# Patient Record
Sex: FEMALE | Race: BLACK OR AFRICAN AMERICAN | NOT HISPANIC OR LATINO | Employment: OTHER | URBAN - METROPOLITAN AREA
[De-identification: names, ages, dates, MRNs, and addresses within clinical notes are randomized per-mention and may not be internally consistent; named-entity substitution may affect disease eponyms.]

---

## 2017-01-01 ENCOUNTER — TELEPHONE (OUTPATIENT)
Dept: INFECTIOUS DISEASES | Facility: CLINIC | Age: 57
End: 2017-01-01

## 2017-01-01 ENCOUNTER — ANESTHESIA EVENT (OUTPATIENT)
Dept: SURGERY | Facility: HOSPITAL | Age: 57
DRG: 286 | End: 2017-01-01
Payer: MEDICARE

## 2017-01-01 ENCOUNTER — TELEPHONE (OUTPATIENT)
Dept: TRANSPLANT | Facility: CLINIC | Age: 57
End: 2017-01-01

## 2017-01-01 ENCOUNTER — PATIENT OUTREACH (OUTPATIENT)
Dept: ADMINISTRATIVE | Facility: CLINIC | Age: 57
End: 2017-01-01

## 2017-01-01 ENCOUNTER — ANESTHESIA (OUTPATIENT)
Dept: MEDSURG UNIT | Facility: HOSPITAL | Age: 57
DRG: 314 | End: 2017-01-01
Payer: MEDICARE

## 2017-01-01 ENCOUNTER — TELEPHONE (OUTPATIENT)
Dept: ELECTROPHYSIOLOGY | Facility: CLINIC | Age: 57
End: 2017-01-01

## 2017-01-01 ENCOUNTER — ANTI-COAG VISIT (OUTPATIENT)
Dept: CARDIOLOGY | Facility: CLINIC | Age: 57
End: 2017-01-01
Payer: MEDICARE

## 2017-01-01 ENCOUNTER — ANTI-COAG VISIT (OUTPATIENT)
Dept: CARDIOLOGY | Facility: CLINIC | Age: 57
End: 2017-01-01

## 2017-01-01 ENCOUNTER — TELEPHONE (OUTPATIENT)
Dept: TRANSPLANT | Facility: HOSPITAL | Age: 57
End: 2017-01-01

## 2017-01-01 ENCOUNTER — TELEPHONE (OUTPATIENT)
Dept: CARDIOLOGY | Facility: CLINIC | Age: 57
End: 2017-01-01

## 2017-01-01 ENCOUNTER — HOSPITAL ENCOUNTER (INPATIENT)
Facility: HOSPITAL | Age: 57
LOS: 10 days | Discharge: HOME HEALTH | DRG: 177 | End: 2017-09-08
Attending: INTERNAL MEDICINE | Admitting: INTERNAL MEDICINE
Payer: MEDICARE

## 2017-01-01 ENCOUNTER — INFUSION (OUTPATIENT)
Dept: INFECTIOUS DISEASES | Facility: HOSPITAL | Age: 57
End: 2017-01-01
Attending: INTERNAL MEDICINE
Payer: MEDICARE

## 2017-01-01 ENCOUNTER — OFFICE VISIT (OUTPATIENT)
Dept: PHYSICAL MEDICINE AND REHAB | Facility: CLINIC | Age: 57
End: 2017-01-01
Payer: MEDICARE

## 2017-01-01 ENCOUNTER — HOSPITAL ENCOUNTER (EMERGENCY)
Facility: HOSPITAL | Age: 57
Discharge: HOME | End: 2017-10-09
Attending: EMERGENCY MEDICINE
Payer: MEDICARE

## 2017-01-01 ENCOUNTER — DOCUMENTATION ONLY (OUTPATIENT)
Dept: CARDIOLOGY | Facility: CLINIC | Age: 57
End: 2017-01-01

## 2017-01-01 ENCOUNTER — DOCUMENTATION ONLY (OUTPATIENT)
Dept: TRANSPLANT | Facility: CLINIC | Age: 57
End: 2017-01-01

## 2017-01-01 ENCOUNTER — HOSPITAL ENCOUNTER (EMERGENCY)
Facility: HOSPITAL | Age: 57
Discharge: HOME | End: 2017-12-15
Attending: EMERGENCY MEDICINE
Payer: MEDICARE

## 2017-01-01 ENCOUNTER — OFFICE VISIT (OUTPATIENT)
Dept: TRANSPLANT | Facility: CLINIC | Age: 57
End: 2017-01-01
Payer: MEDICARE

## 2017-01-01 ENCOUNTER — TELEPHONE (OUTPATIENT)
Dept: PHYSICAL MEDICINE AND REHAB | Facility: CLINIC | Age: 57
End: 2017-01-01

## 2017-01-01 ENCOUNTER — HOSPITAL ENCOUNTER (INPATIENT)
Facility: HOSPITAL | Age: 57
LOS: 12 days | Discharge: HOME HEALTH | DRG: 291 | End: 2017-10-04
Attending: EMERGENCY MEDICINE | Admitting: INTERNAL MEDICINE
Payer: MEDICARE

## 2017-01-01 ENCOUNTER — HOSPITAL ENCOUNTER (OUTPATIENT)
Facility: HOSPITAL | Age: 57
Discharge: HOME | End: 2017-08-11
Attending: EMERGENCY MEDICINE | Admitting: INTERNAL MEDICINE
Payer: MEDICARE

## 2017-01-01 ENCOUNTER — HOSPITAL ENCOUNTER (INPATIENT)
Facility: HOSPITAL | Age: 57
LOS: 7 days | Discharge: HOME HEALTH | DRG: 314 | End: 2017-11-08
Attending: INTERNAL MEDICINE | Admitting: INTERNAL MEDICINE
Payer: MEDICARE

## 2017-01-01 ENCOUNTER — LAB VISIT (OUTPATIENT)
Dept: LAB | Facility: HOSPITAL | Age: 57
End: 2017-01-01
Attending: INTERNAL MEDICINE
Payer: MEDICARE

## 2017-01-01 ENCOUNTER — HOSPITAL ENCOUNTER (OUTPATIENT)
Dept: PULMONOLOGY | Facility: CLINIC | Age: 57
Discharge: HOME | End: 2017-03-17
Payer: MEDICARE

## 2017-01-01 ENCOUNTER — ANESTHESIA EVENT (OUTPATIENT)
Dept: SURGERY | Facility: HOSPITAL | Age: 57
DRG: 177 | End: 2017-01-01
Payer: MEDICARE

## 2017-01-01 ENCOUNTER — HOSPITAL ENCOUNTER (OUTPATIENT)
Facility: HOSPITAL | Age: 57
Discharge: HOME | End: 2017-11-21
Attending: INTERNAL MEDICINE | Admitting: INTERNAL MEDICINE
Payer: MEDICARE

## 2017-01-01 ENCOUNTER — OFFICE VISIT (OUTPATIENT)
Dept: ELECTROPHYSIOLOGY | Facility: CLINIC | Age: 57
End: 2017-01-01
Payer: MEDICARE

## 2017-01-01 ENCOUNTER — ANESTHESIA (OUTPATIENT)
Dept: SURGERY | Facility: HOSPITAL | Age: 57
DRG: 177 | End: 2017-01-01
Payer: MEDICARE

## 2017-01-01 ENCOUNTER — CLINICAL SUPPORT (OUTPATIENT)
Dept: ELECTROPHYSIOLOGY | Facility: CLINIC | Age: 57
End: 2017-01-01
Payer: MEDICARE

## 2017-01-01 ENCOUNTER — HOSPITAL ENCOUNTER (OUTPATIENT)
Dept: PULMONOLOGY | Facility: CLINIC | Age: 57
Discharge: HOME | End: 2017-07-17
Payer: MEDICARE

## 2017-01-01 ENCOUNTER — ANESTHESIA (OUTPATIENT)
Dept: SURGERY | Facility: HOSPITAL | Age: 57
DRG: 286 | End: 2017-01-01
Payer: MEDICARE

## 2017-01-01 ENCOUNTER — HOSPITAL ENCOUNTER (OUTPATIENT)
Dept: PULMONOLOGY | Facility: CLINIC | Age: 57
Discharge: HOME | DRG: 291 | End: 2017-09-22
Payer: MEDICARE

## 2017-01-01 ENCOUNTER — CLINICAL SUPPORT (OUTPATIENT)
Dept: INFECTIOUS DISEASES | Facility: CLINIC | Age: 57
End: 2017-01-01
Payer: MEDICARE

## 2017-01-01 ENCOUNTER — LAB VISIT (OUTPATIENT)
Dept: LAB | Facility: HOSPITAL | Age: 57
End: 2017-01-01
Payer: MEDICARE

## 2017-01-01 ENCOUNTER — PATIENT MESSAGE (OUTPATIENT)
Dept: TRANSPLANT | Facility: CLINIC | Age: 57
End: 2017-01-01

## 2017-01-01 ENCOUNTER — ANESTHESIA (OUTPATIENT)
Dept: MEDSURG UNIT | Facility: HOSPITAL | Age: 57
End: 2017-01-01
Payer: MEDICARE

## 2017-01-01 ENCOUNTER — HOSPITAL ENCOUNTER (OUTPATIENT)
Dept: RADIOLOGY | Facility: HOSPITAL | Age: 57
Discharge: HOME | End: 2017-03-17
Attending: INTERNAL MEDICINE
Payer: MEDICARE

## 2017-01-01 ENCOUNTER — ANESTHESIA EVENT (OUTPATIENT)
Dept: MEDSURG UNIT | Facility: HOSPITAL | Age: 57
End: 2017-01-01
Payer: MEDICARE

## 2017-01-01 ENCOUNTER — RESEARCH ENCOUNTER (OUTPATIENT)
Dept: RESEARCH | Facility: HOSPITAL | Age: 57
End: 2017-01-01
Payer: MEDICARE

## 2017-01-01 ENCOUNTER — OFFICE VISIT (OUTPATIENT)
Dept: INFECTIOUS DISEASES | Facility: CLINIC | Age: 57
End: 2017-01-01
Payer: MEDICARE

## 2017-01-01 ENCOUNTER — ANESTHESIA EVENT (OUTPATIENT)
Dept: MEDSURG UNIT | Facility: HOSPITAL | Age: 57
DRG: 314 | End: 2017-01-01
Payer: MEDICARE

## 2017-01-01 ENCOUNTER — HOSPITAL ENCOUNTER (OUTPATIENT)
Dept: PULMONOLOGY | Facility: CLINIC | Age: 57
Discharge: HOME | End: 2017-10-24
Payer: MEDICARE

## 2017-01-01 ENCOUNTER — HOSPITAL ENCOUNTER (OUTPATIENT)
Dept: CARDIOLOGY | Facility: CLINIC | Age: 57
Discharge: HOME | End: 2017-11-20
Attending: INTERNAL MEDICINE | Admitting: INTERNAL MEDICINE
Payer: MEDICARE

## 2017-01-01 ENCOUNTER — HOSPITAL ENCOUNTER (INPATIENT)
Facility: HOSPITAL | Age: 57
LOS: 20 days | Discharge: HOME HEALTH | DRG: 286 | End: 2017-06-20
Attending: INTERNAL MEDICINE | Admitting: INTERNAL MEDICINE
Payer: MEDICARE

## 2017-01-01 ENCOUNTER — TELEPHONE (OUTPATIENT)
Dept: INFECTIOUS DISEASES | Facility: HOSPITAL | Age: 57
End: 2017-01-01

## 2017-01-01 VITALS
DIASTOLIC BLOOD PRESSURE: 55 MMHG | RESPIRATION RATE: 22 BRPM | WEIGHT: 155 LBS | OXYGEN SATURATION: 97 % | BODY MASS INDEX: 27.46 KG/M2 | SYSTOLIC BLOOD PRESSURE: 101 MMHG | HEART RATE: 68 BPM | TEMPERATURE: 98 F

## 2017-01-01 VITALS
WEIGHT: 160.06 LBS | HEIGHT: 63 IN | BODY MASS INDEX: 26.57 KG/M2 | DIASTOLIC BLOOD PRESSURE: 57 MMHG | BODY MASS INDEX: 28.36 KG/M2 | TEMPERATURE: 98 F | RESPIRATION RATE: 18 BRPM | HEIGHT: 63 IN | SYSTOLIC BLOOD PRESSURE: 100 MMHG | HEART RATE: 69 BPM | WEIGHT: 149.94 LBS | DIASTOLIC BLOOD PRESSURE: 58 MMHG | SYSTOLIC BLOOD PRESSURE: 103 MMHG | OXYGEN SATURATION: 96 % | HEART RATE: 76 BPM

## 2017-01-01 VITALS
SYSTOLIC BLOOD PRESSURE: 115 MMHG | WEIGHT: 198.44 LBS | HEIGHT: 64 IN | BODY MASS INDEX: 33.88 KG/M2 | DIASTOLIC BLOOD PRESSURE: 75 MMHG | HEART RATE: 87 BPM

## 2017-01-01 VITALS
BODY MASS INDEX: 29.23 KG/M2 | HEIGHT: 63 IN | WEIGHT: 165 LBS | TEMPERATURE: 99 F | RESPIRATION RATE: 20 BRPM | DIASTOLIC BLOOD PRESSURE: 78 MMHG | SYSTOLIC BLOOD PRESSURE: 110 MMHG | HEIGHT: 63 IN | WEIGHT: 165 LBS | OXYGEN SATURATION: 98 % | BODY MASS INDEX: 29.23 KG/M2 | HEART RATE: 70 BPM

## 2017-01-01 VITALS
DIASTOLIC BLOOD PRESSURE: 66 MMHG | WEIGHT: 154.75 LBS | HEART RATE: 72 BPM | SYSTOLIC BLOOD PRESSURE: 102 MMHG | BODY MASS INDEX: 27.42 KG/M2 | HEIGHT: 63 IN

## 2017-01-01 VITALS
SYSTOLIC BLOOD PRESSURE: 113 MMHG | HEIGHT: 63 IN | BODY MASS INDEX: 27.5 KG/M2 | HEART RATE: 92 BPM | WEIGHT: 159.63 LBS | OXYGEN SATURATION: 96 % | SYSTOLIC BLOOD PRESSURE: 105 MMHG | DIASTOLIC BLOOD PRESSURE: 74 MMHG | TEMPERATURE: 99 F | DIASTOLIC BLOOD PRESSURE: 63 MMHG | WEIGHT: 155.19 LBS | OXYGEN SATURATION: 94 % | BODY MASS INDEX: 28.29 KG/M2 | HEART RATE: 75 BPM | RESPIRATION RATE: 18 BRPM | HEIGHT: 63 IN

## 2017-01-01 VITALS
HEART RATE: 77 BPM | HEIGHT: 63 IN | RESPIRATION RATE: 16 BRPM | DIASTOLIC BLOOD PRESSURE: 57 MMHG | BODY MASS INDEX: 28.29 KG/M2 | TEMPERATURE: 98 F | SYSTOLIC BLOOD PRESSURE: 95 MMHG | OXYGEN SATURATION: 94 % | WEIGHT: 159.63 LBS

## 2017-01-01 VITALS
WEIGHT: 144.19 LBS | OXYGEN SATURATION: 95 % | RESPIRATION RATE: 18 BRPM | BODY MASS INDEX: 25.55 KG/M2 | HEART RATE: 83 BPM | HEIGHT: 63 IN | DIASTOLIC BLOOD PRESSURE: 54 MMHG | TEMPERATURE: 98 F | SYSTOLIC BLOOD PRESSURE: 90 MMHG

## 2017-01-01 VITALS
HEIGHT: 63 IN | HEART RATE: 76 BPM | SYSTOLIC BLOOD PRESSURE: 99 MMHG | BODY MASS INDEX: 26.57 KG/M2 | WEIGHT: 149.94 LBS | DIASTOLIC BLOOD PRESSURE: 61 MMHG

## 2017-01-01 VITALS
SYSTOLIC BLOOD PRESSURE: 107 MMHG | HEART RATE: 68 BPM | WEIGHT: 161.19 LBS | BODY MASS INDEX: 28.56 KG/M2 | DIASTOLIC BLOOD PRESSURE: 68 MMHG | OXYGEN SATURATION: 97 % | HEIGHT: 63 IN

## 2017-01-01 VITALS
TEMPERATURE: 98 F | HEART RATE: 100 BPM | DIASTOLIC BLOOD PRESSURE: 58 MMHG | SYSTOLIC BLOOD PRESSURE: 93 MMHG | WEIGHT: 165 LBS | HEIGHT: 63 IN | BODY MASS INDEX: 29.23 KG/M2

## 2017-01-01 VITALS
WEIGHT: 161.38 LBS | HEART RATE: 61 BPM | TEMPERATURE: 98 F | DIASTOLIC BLOOD PRESSURE: 64 MMHG | BODY MASS INDEX: 28.59 KG/M2 | HEIGHT: 63 IN | SYSTOLIC BLOOD PRESSURE: 107 MMHG

## 2017-01-01 VITALS
SYSTOLIC BLOOD PRESSURE: 115 MMHG | BODY MASS INDEX: 32.81 KG/M2 | WEIGHT: 185.19 LBS | HEIGHT: 63 IN | SYSTOLIC BLOOD PRESSURE: 107 MMHG | HEIGHT: 63 IN | HEART RATE: 91 BPM | BODY MASS INDEX: 32.97 KG/M2 | DIASTOLIC BLOOD PRESSURE: 63 MMHG | HEIGHT: 63 IN | DIASTOLIC BLOOD PRESSURE: 64 MMHG | WEIGHT: 186.06 LBS | OXYGEN SATURATION: 90 % | HEART RATE: 100 BPM | BODY MASS INDEX: 32.81 KG/M2 | WEIGHT: 185.19 LBS

## 2017-01-01 VITALS
SYSTOLIC BLOOD PRESSURE: 106 MMHG | WEIGHT: 202.63 LBS | DIASTOLIC BLOOD PRESSURE: 70 MMHG | BODY MASS INDEX: 34.15 KG/M2 | BODY MASS INDEX: 35.9 KG/M2 | HEIGHT: 64 IN | HEART RATE: 68 BPM | WEIGHT: 200 LBS | HEIGHT: 63 IN | OXYGEN SATURATION: 91 %

## 2017-01-01 VITALS
SYSTOLIC BLOOD PRESSURE: 106 MMHG | WEIGHT: 180.13 LBS | HEIGHT: 63 IN | OXYGEN SATURATION: 81 % | BODY MASS INDEX: 31.92 KG/M2 | DIASTOLIC BLOOD PRESSURE: 57 MMHG | HEART RATE: 80 BPM

## 2017-01-01 VITALS
SYSTOLIC BLOOD PRESSURE: 122 MMHG | WEIGHT: 205 LBS | HEART RATE: 60 BPM | HEIGHT: 64 IN | DIASTOLIC BLOOD PRESSURE: 86 MMHG | BODY MASS INDEX: 35 KG/M2

## 2017-01-01 VITALS
BODY MASS INDEX: 28.79 KG/M2 | SYSTOLIC BLOOD PRESSURE: 119 MMHG | HEIGHT: 63 IN | OXYGEN SATURATION: 84 % | DIASTOLIC BLOOD PRESSURE: 79 MMHG | HEART RATE: 7 BPM | RESPIRATION RATE: 18 BRPM | WEIGHT: 162.5 LBS | TEMPERATURE: 98 F

## 2017-01-01 VITALS
OXYGEN SATURATION: 97 % | HEART RATE: 90 BPM | HEIGHT: 63 IN | SYSTOLIC BLOOD PRESSURE: 110 MMHG | TEMPERATURE: 98 F | RESPIRATION RATE: 18 BRPM | WEIGHT: 174.19 LBS | DIASTOLIC BLOOD PRESSURE: 61 MMHG | BODY MASS INDEX: 30.86 KG/M2

## 2017-01-01 VITALS — WEIGHT: 165 LBS | HEIGHT: 64 IN | BODY MASS INDEX: 28.17 KG/M2

## 2017-01-01 DIAGNOSIS — Z79.01 LONG TERM CURRENT USE OF ANTICOAGULANT THERAPY: ICD-10-CM

## 2017-01-01 DIAGNOSIS — I50.810 RIGHT-SIDED HEART FAILURE: ICD-10-CM

## 2017-01-01 DIAGNOSIS — I27.9 CHRONIC CARDIOPULMONARY DISEASE: ICD-10-CM

## 2017-01-01 DIAGNOSIS — I27.9 CHRONIC PULMONARY HEART DISEASE: ICD-10-CM

## 2017-01-01 DIAGNOSIS — M54.2 CHRONIC NECK PAIN: ICD-10-CM

## 2017-01-01 DIAGNOSIS — B95.61 STAPHYLOCOCCUS AUREUS BACTEREMIA: Primary | ICD-10-CM

## 2017-01-01 DIAGNOSIS — I49.9 CARDIAC ARRHYTHMIA, UNSPECIFIED CARDIAC ARRHYTHMIA TYPE: ICD-10-CM

## 2017-01-01 DIAGNOSIS — G89.29 CHRONIC NECK PAIN: Primary | ICD-10-CM

## 2017-01-01 DIAGNOSIS — T80.211A: ICD-10-CM

## 2017-01-01 DIAGNOSIS — T82.110A PACEMAKER LEAD MALFUNCTION: ICD-10-CM

## 2017-01-01 DIAGNOSIS — I27.0 PRIMARY PULMONARY HYPERTENSION: Primary | ICD-10-CM

## 2017-01-01 DIAGNOSIS — R78.81 MRSA BACTEREMIA: ICD-10-CM

## 2017-01-01 DIAGNOSIS — E87.6 HYPOKALEMIA: ICD-10-CM

## 2017-01-01 DIAGNOSIS — Z51.5 PALLIATIVE CARE ENCOUNTER: ICD-10-CM

## 2017-01-01 DIAGNOSIS — G47.30 SLEEP APNEA, UNSPECIFIED TYPE: ICD-10-CM

## 2017-01-01 DIAGNOSIS — M75.51 SUBDELTOID BURSITIS, RIGHT: ICD-10-CM

## 2017-01-01 DIAGNOSIS — M47.22 OSTEOARTHRITIS OF SPINE WITH RADICULOPATHY, CERVICAL REGION: ICD-10-CM

## 2017-01-01 DIAGNOSIS — J18.9 PNEUMONIA OF RIGHT UPPER LOBE DUE TO INFECTIOUS ORGANISM: ICD-10-CM

## 2017-01-01 DIAGNOSIS — Z95.0 CARDIAC PACEMAKER IN SITU: Primary | ICD-10-CM

## 2017-01-01 DIAGNOSIS — Z79.01 LONG TERM CURRENT USE OF ANTICOAGULANT THERAPY: Primary | ICD-10-CM

## 2017-01-01 DIAGNOSIS — I44.2 HEART BLOCK AV THIRD DEGREE: Primary | ICD-10-CM

## 2017-01-01 DIAGNOSIS — J96.21 ACUTE ON CHRONIC RESPIRATORY FAILURE WITH HYPOXIA: ICD-10-CM

## 2017-01-01 DIAGNOSIS — J96.21 ACUTE ON CHRONIC RESPIRATORY FAILURE WITH HYPOXIA: Primary | ICD-10-CM

## 2017-01-01 DIAGNOSIS — I27.0 PRIMARY PULMONARY HYPERTENSION: ICD-10-CM

## 2017-01-01 DIAGNOSIS — T82.110A PACEMAKER LEAD MALFUNCTION, INITIAL ENCOUNTER: Primary | ICD-10-CM

## 2017-01-01 DIAGNOSIS — I27.20 PULMONARY HYPERTENSION: ICD-10-CM

## 2017-01-01 DIAGNOSIS — M54.12 RIGHT CERVICAL RADICULOPATHY: ICD-10-CM

## 2017-01-01 DIAGNOSIS — B95.61 STAPHYLOCOCCUS AUREUS BACTEREMIA: ICD-10-CM

## 2017-01-01 DIAGNOSIS — G89.29 CHRONIC NECK PAIN: ICD-10-CM

## 2017-01-01 DIAGNOSIS — I44.2 HEART BLOCK AV THIRD DEGREE: ICD-10-CM

## 2017-01-01 DIAGNOSIS — Z95.0 CARDIAC PACEMAKER IN SITU: ICD-10-CM

## 2017-01-01 DIAGNOSIS — T82.514A: ICD-10-CM

## 2017-01-01 DIAGNOSIS — R78.81 STAPHYLOCOCCUS AUREUS BACTEREMIA: ICD-10-CM

## 2017-01-01 DIAGNOSIS — J96.11 CHRONIC RESPIRATORY FAILURE WITH HYPOXIA: ICD-10-CM

## 2017-01-01 DIAGNOSIS — G56.01 CARPAL TUNNEL SYNDROME, RIGHT: ICD-10-CM

## 2017-01-01 DIAGNOSIS — I27.9 PULMONARY HEART DISEASE: ICD-10-CM

## 2017-01-01 DIAGNOSIS — R79.1 SUBTHERAPEUTIC INTERNATIONAL NORMALIZED RATIO (INR): ICD-10-CM

## 2017-01-01 DIAGNOSIS — I27.20 PULMONARY HYPERTENSION: Primary | ICD-10-CM

## 2017-01-01 DIAGNOSIS — Z79.01 CHRONIC ANTICOAGULATION: ICD-10-CM

## 2017-01-01 DIAGNOSIS — G47.30 SLEEP APNEA: ICD-10-CM

## 2017-01-01 DIAGNOSIS — E87.6 HYPOKALEMIA: Primary | ICD-10-CM

## 2017-01-01 DIAGNOSIS — B95.62 MRSA BACTEREMIA: ICD-10-CM

## 2017-01-01 DIAGNOSIS — I38 ENDOCARDITIS: ICD-10-CM

## 2017-01-01 DIAGNOSIS — Q21.12 PFO (PATENT FORAMEN OVALE): ICD-10-CM

## 2017-01-01 DIAGNOSIS — T80.90XA: Primary | ICD-10-CM

## 2017-01-01 DIAGNOSIS — Z23 NEED FOR IMMUNIZATION AGAINST INFLUENZA: ICD-10-CM

## 2017-01-01 DIAGNOSIS — M54.2 CHRONIC NECK PAIN: Primary | ICD-10-CM

## 2017-01-01 DIAGNOSIS — R79.1 SUPRATHERAPEUTIC INR: ICD-10-CM

## 2017-01-01 DIAGNOSIS — I50.9 CHF (CONGESTIVE HEART FAILURE): ICD-10-CM

## 2017-01-01 DIAGNOSIS — R09.02 HYPOXIA: ICD-10-CM

## 2017-01-01 DIAGNOSIS — R78.81 STAPHYLOCOCCUS AUREUS BACTEREMIA: Primary | ICD-10-CM

## 2017-01-01 DIAGNOSIS — T82.514A HICKMAN CATHETER DYSFUNCTION, INITIAL ENCOUNTER: ICD-10-CM

## 2017-01-01 DIAGNOSIS — R78.81 BACTEREMIA: ICD-10-CM

## 2017-01-01 DIAGNOSIS — G47.30 SLEEP APNEA: Primary | ICD-10-CM

## 2017-01-01 DIAGNOSIS — I27.20 MODERATE TO SEVERE PULMONARY HYPERTENSION: ICD-10-CM

## 2017-01-01 DIAGNOSIS — T80.211D BLOODSTREAM INFECTION ASSOCIATED WITH CENTRAL VENOUS CATHETER, SUBSEQUENT ENCOUNTER: Primary | ICD-10-CM

## 2017-01-01 DIAGNOSIS — I50.9 ACUTE DECOMPENSATED HEART FAILURE: ICD-10-CM

## 2017-01-01 DIAGNOSIS — T80.211S: ICD-10-CM

## 2017-01-01 DIAGNOSIS — T82.524A DISPLACEMENT OF PERIPHERALLY INSERTED CENTRAL VENOUS CATHETER (PICC): Primary | ICD-10-CM

## 2017-01-01 DIAGNOSIS — J96.91 RESPIRATORY FAILURE WITH HYPOXIA: ICD-10-CM

## 2017-01-01 LAB
ABO + RH BLD: NORMAL
ABO + RH BLD: NORMAL
ALBUMIN SERPL BCP-MCNC: 2.4 G/DL
ALBUMIN SERPL BCP-MCNC: 2.5 G/DL
ALBUMIN SERPL BCP-MCNC: 2.6 G/DL
ALBUMIN SERPL BCP-MCNC: 2.7 G/DL
ALBUMIN SERPL BCP-MCNC: 2.8 G/DL
ALBUMIN SERPL BCP-MCNC: 2.8 G/DL
ALBUMIN SERPL BCP-MCNC: 2.9 G/DL
ALBUMIN SERPL BCP-MCNC: 3 G/DL
ALBUMIN SERPL BCP-MCNC: 3.1 G/DL
ALBUMIN SERPL BCP-MCNC: 3.2 G/DL
ALBUMIN SERPL BCP-MCNC: 3.2 G/DL
ALBUMIN SERPL BCP-MCNC: 3.4 G/DL
ALBUMIN SERPL BCP-MCNC: 3.6 G/DL
ALLENS TEST: ABNORMAL
ALP SERPL-CCNC: 100 U/L
ALP SERPL-CCNC: 101 U/L
ALP SERPL-CCNC: 102 U/L
ALP SERPL-CCNC: 103 U/L
ALP SERPL-CCNC: 105 U/L
ALP SERPL-CCNC: 110 U/L
ALP SERPL-CCNC: 112 U/L
ALP SERPL-CCNC: 113 U/L
ALP SERPL-CCNC: 113 U/L
ALP SERPL-CCNC: 121 U/L
ALP SERPL-CCNC: 64 U/L
ALP SERPL-CCNC: 72 U/L
ALP SERPL-CCNC: 74 U/L
ALP SERPL-CCNC: 79 U/L
ALP SERPL-CCNC: 84 U/L
ALP SERPL-CCNC: 90 U/L
ALP SERPL-CCNC: 93 U/L
ALP SERPL-CCNC: 94 U/L
ALP SERPL-CCNC: 95 U/L
ALP SERPL-CCNC: 97 U/L
ALP SERPL-CCNC: 98 U/L
ALP SERPL-CCNC: 98 U/L
ALP SERPL-CCNC: 99 U/L
ALT SERPL W/O P-5'-P-CCNC: 10 U/L
ALT SERPL W/O P-5'-P-CCNC: 11 U/L
ALT SERPL W/O P-5'-P-CCNC: 11 U/L
ALT SERPL W/O P-5'-P-CCNC: 12 U/L
ALT SERPL W/O P-5'-P-CCNC: 13 U/L
ALT SERPL W/O P-5'-P-CCNC: 14 U/L
ALT SERPL W/O P-5'-P-CCNC: 14 U/L
ALT SERPL W/O P-5'-P-CCNC: 16 U/L
ALT SERPL W/O P-5'-P-CCNC: 16 U/L
ALT SERPL W/O P-5'-P-CCNC: 17 U/L
ALT SERPL W/O P-5'-P-CCNC: 19 U/L
ALT SERPL W/O P-5'-P-CCNC: 19 U/L
ALT SERPL W/O P-5'-P-CCNC: 20 U/L
ALT SERPL W/O P-5'-P-CCNC: 20 U/L
ALT SERPL W/O P-5'-P-CCNC: 24 U/L
ALT SERPL W/O P-5'-P-CCNC: 24 U/L
ALT SERPL W/O P-5'-P-CCNC: 25 U/L
ALT SERPL W/O P-5'-P-CCNC: 26 U/L
ALT SERPL W/O P-5'-P-CCNC: 27 U/L
ALT SERPL W/O P-5'-P-CCNC: 7 U/L
ALT SERPL W/O P-5'-P-CCNC: 8 U/L
ALT SERPL W/O P-5'-P-CCNC: 9 U/L
AMORPH CRY UR QL COMP ASSIST: NORMAL
ANION GAP SERPL CALC-SCNC: 10 MMOL/L
ANION GAP SERPL CALC-SCNC: 11 MMOL/L
ANION GAP SERPL CALC-SCNC: 12 MMOL/L
ANION GAP SERPL CALC-SCNC: 13 MMOL/L
ANION GAP SERPL CALC-SCNC: 14 MMOL/L
ANION GAP SERPL CALC-SCNC: 15 MMOL/L
ANION GAP SERPL CALC-SCNC: 16 MMOL/L
ANION GAP SERPL CALC-SCNC: 5 MMOL/L
ANION GAP SERPL CALC-SCNC: 6 MMOL/L
ANION GAP SERPL CALC-SCNC: 7 MMOL/L
ANION GAP SERPL CALC-SCNC: 8 MMOL/L
ANION GAP SERPL CALC-SCNC: 9 MMOL/L
ANISOCYTOSIS BLD QL SMEAR: ABNORMAL
ANISOCYTOSIS BLD QL SMEAR: SLIGHT
AORTIC ATHEROMA: YES
APTT BLDCRRT: 25.9 SEC
APTT BLDCRRT: 27.4 SEC
AST SERPL-CCNC: 10 U/L
AST SERPL-CCNC: 11 U/L
AST SERPL-CCNC: 11 U/L
AST SERPL-CCNC: 12 U/L
AST SERPL-CCNC: 13 U/L
AST SERPL-CCNC: 14 U/L
AST SERPL-CCNC: 15 U/L
AST SERPL-CCNC: 15 U/L
AST SERPL-CCNC: 16 U/L
AST SERPL-CCNC: 17 U/L
AST SERPL-CCNC: 17 U/L
AST SERPL-CCNC: 18 U/L
AST SERPL-CCNC: 18 U/L
AST SERPL-CCNC: 19 U/L
AST SERPL-CCNC: 20 U/L
AST SERPL-CCNC: 21 U/L
AST SERPL-CCNC: 22 U/L
AST SERPL-CCNC: 23 U/L
AST SERPL-CCNC: 24 U/L
AST SERPL-CCNC: 26 U/L
AST SERPL-CCNC: 27 U/L
BACTERIA #/AREA URNS AUTO: ABNORMAL /HPF
BACTERIA #/AREA URNS AUTO: NORMAL /HPF
BACTERIA BLD CULT: NORMAL
BACTERIA CATH TIP CULT: NO GROWTH
BACTERIA CATH TIP CULT: NORMAL
BACTERIA SPEC AEROBE CULT: NO GROWTH
BACTERIA SPEC AEROBE CULT: NORMAL
BACTERIA SPEC AEROBE CULT: NORMAL
BACTERIA SPEC ANAEROBE CULT: NORMAL
BACTERIA UR CULT: NORMAL
BACTERIA UR CULT: NORMAL
BASO STIPL BLD QL SMEAR: ABNORMAL
BASOPHILS # BLD AUTO: 0 K/UL
BASOPHILS # BLD AUTO: 0.01 K/UL
BASOPHILS # BLD AUTO: 0.02 K/UL
BASOPHILS # BLD AUTO: 0.03 K/UL
BASOPHILS # BLD AUTO: 0.04 K/UL
BASOPHILS # BLD AUTO: 0.05 K/UL
BASOPHILS # BLD AUTO: 0.06 K/UL
BASOPHILS # BLD AUTO: 0.08 K/UL
BASOPHILS # BLD AUTO: 0.09 K/UL
BASOPHILS # BLD AUTO: 0.11 K/UL
BASOPHILS # BLD AUTO: ABNORMAL K/UL
BASOPHILS # BLD AUTO: NORMAL K/UL
BASOPHILS # BLD AUTO: NORMAL K/UL
BASOPHILS NFR BLD: 0 %
BASOPHILS NFR BLD: 0.1 %
BASOPHILS NFR BLD: 0.2 %
BASOPHILS NFR BLD: 0.3 %
BASOPHILS NFR BLD: 0.4 %
BASOPHILS NFR BLD: 0.5 %
BASOPHILS NFR BLD: 0.6 %
BASOPHILS NFR BLD: 0.7 %
BASOPHILS NFR BLD: 0.8 %
BASOPHILS NFR BLD: 0.9 %
BASOPHILS NFR BLD: 1 %
BASOPHILS NFR BLD: 1 %
BASOPHILS NFR BLD: 1.1 %
BASOPHILS NFR BLD: 1.1 %
BASOPHILS NFR BLD: 1.3 %
BASOPHILS NFR BLD: 1.4 %
BASOPHILS NFR BLD: 1.7 %
BASOPHILS NFR BLD: 1.8 %
BILIRUB SERPL-MCNC: 0.3 MG/DL
BILIRUB SERPL-MCNC: 0.3 MG/DL
BILIRUB SERPL-MCNC: 0.4 MG/DL
BILIRUB SERPL-MCNC: 0.5 MG/DL
BILIRUB SERPL-MCNC: 0.6 MG/DL
BILIRUB SERPL-MCNC: 0.7 MG/DL
BILIRUB SERPL-MCNC: 0.8 MG/DL
BILIRUB SERPL-MCNC: 0.9 MG/DL
BILIRUB SERPL-MCNC: 1 MG/DL
BILIRUB SERPL-MCNC: 1.1 MG/DL
BILIRUB SERPL-MCNC: 1.2 MG/DL
BILIRUB SERPL-MCNC: 1.3 MG/DL
BILIRUB SERPL-MCNC: 1.3 MG/DL
BILIRUB SERPL-MCNC: 1.4 MG/DL
BILIRUB SERPL-MCNC: 1.4 MG/DL
BILIRUB SERPL-MCNC: 1.5 MG/DL
BILIRUB SERPL-MCNC: 1.7 MG/DL
BILIRUB SERPL-MCNC: 1.7 MG/DL
BILIRUB SERPL-MCNC: 1.8 MG/DL
BILIRUB SERPL-MCNC: 1.9 MG/DL
BILIRUB SERPL-MCNC: 1.9 MG/DL
BILIRUB UR QL STRIP: NEGATIVE
BLD GP AB SCN CELLS X3 SERPL QL: NORMAL
BLD GP AB SCN CELLS X3 SERPL QL: NORMAL
BLD PROD TYP BPU: NORMAL
BLOOD UNIT EXPIRATION DATE: NORMAL
BLOOD UNIT TYPE CODE: 5100
BLOOD UNIT TYPE: NORMAL
BNP SERPL-MCNC: 108 PG/ML
BNP SERPL-MCNC: 144 PG/ML
BNP SERPL-MCNC: 1492 PG/ML
BNP SERPL-MCNC: 1931 PG/ML
BNP SERPL-MCNC: 212 PG/ML
BNP SERPL-MCNC: 340 PG/ML
BNP SERPL-MCNC: 804 PG/ML
BNP SERPL-MCNC: 93 PG/ML
BUN SERPL-MCNC: 10 MG/DL
BUN SERPL-MCNC: 11 MG/DL
BUN SERPL-MCNC: 12 MG/DL
BUN SERPL-MCNC: 13 MG/DL
BUN SERPL-MCNC: 14 MG/DL
BUN SERPL-MCNC: 15 MG/DL
BUN SERPL-MCNC: 16 MG/DL
BUN SERPL-MCNC: 17 MG/DL
BUN SERPL-MCNC: 18 MG/DL
BUN SERPL-MCNC: 19 MG/DL
BUN SERPL-MCNC: 20 MG/DL
BUN SERPL-MCNC: 24 MG/DL
BUN SERPL-MCNC: 24 MG/DL
BUN SERPL-MCNC: 25 MG/DL
BUN SERPL-MCNC: 29 MG/DL
BUN SERPL-MCNC: 7 MG/DL
BURR CELLS BLD QL SMEAR: ABNORMAL
CALCIUM SERPL-MCNC: 10 MG/DL
CALCIUM SERPL-MCNC: 10.1 MG/DL
CALCIUM SERPL-MCNC: 8.2 MG/DL
CALCIUM SERPL-MCNC: 8.2 MG/DL
CALCIUM SERPL-MCNC: 8.3 MG/DL
CALCIUM SERPL-MCNC: 8.3 MG/DL
CALCIUM SERPL-MCNC: 8.4 MG/DL
CALCIUM SERPL-MCNC: 8.4 MG/DL
CALCIUM SERPL-MCNC: 8.5 MG/DL
CALCIUM SERPL-MCNC: 8.6 MG/DL
CALCIUM SERPL-MCNC: 8.6 MG/DL
CALCIUM SERPL-MCNC: 8.7 MG/DL
CALCIUM SERPL-MCNC: 8.8 MG/DL
CALCIUM SERPL-MCNC: 8.9 MG/DL
CALCIUM SERPL-MCNC: 9 MG/DL
CALCIUM SERPL-MCNC: 9.1 MG/DL
CALCIUM SERPL-MCNC: 9.2 MG/DL
CALCIUM SERPL-MCNC: 9.3 MG/DL
CALCIUM SERPL-MCNC: 9.4 MG/DL
CALCIUM SERPL-MCNC: 9.5 MG/DL
CALCIUM SERPL-MCNC: 9.5 MG/DL
CALCIUM SERPL-MCNC: 9.9 MG/DL
CHLORIDE SERPL-SCNC: 100 MMOL/L
CHLORIDE SERPL-SCNC: 100 MMOL/L
CHLORIDE SERPL-SCNC: 101 MMOL/L
CHLORIDE SERPL-SCNC: 101 MMOL/L
CHLORIDE SERPL-SCNC: 102 MMOL/L
CHLORIDE SERPL-SCNC: 102 MMOL/L
CHLORIDE SERPL-SCNC: 103 MMOL/L
CHLORIDE SERPL-SCNC: 105 MMOL/L
CHLORIDE SERPL-SCNC: 105 MMOL/L
CHLORIDE SERPL-SCNC: 106 MMOL/L
CHLORIDE SERPL-SCNC: 107 MMOL/L
CHLORIDE SERPL-SCNC: 87 MMOL/L
CHLORIDE SERPL-SCNC: 89 MMOL/L
CHLORIDE SERPL-SCNC: 90 MMOL/L
CHLORIDE SERPL-SCNC: 91 MMOL/L
CHLORIDE SERPL-SCNC: 92 MMOL/L
CHLORIDE SERPL-SCNC: 93 MMOL/L
CHLORIDE SERPL-SCNC: 94 MMOL/L
CHLORIDE SERPL-SCNC: 95 MMOL/L
CHLORIDE SERPL-SCNC: 96 MMOL/L
CHLORIDE SERPL-SCNC: 97 MMOL/L
CHLORIDE SERPL-SCNC: 98 MMOL/L
CHLORIDE SERPL-SCNC: 99 MMOL/L
CLARITY UR REFRACT.AUTO: ABNORMAL
CO2 SERPL-SCNC: 22 MMOL/L
CO2 SERPL-SCNC: 23 MMOL/L
CO2 SERPL-SCNC: 24 MMOL/L
CO2 SERPL-SCNC: 25 MMOL/L
CO2 SERPL-SCNC: 26 MMOL/L
CO2 SERPL-SCNC: 26 MMOL/L
CO2 SERPL-SCNC: 27 MMOL/L
CO2 SERPL-SCNC: 28 MMOL/L
CO2 SERPL-SCNC: 29 MMOL/L
CO2 SERPL-SCNC: 30 MMOL/L
CO2 SERPL-SCNC: 31 MMOL/L
CO2 SERPL-SCNC: 32 MMOL/L
CO2 SERPL-SCNC: 33 MMOL/L
CO2 SERPL-SCNC: 33 MMOL/L
CO2 SERPL-SCNC: 34 MMOL/L
CO2 SERPL-SCNC: 34 MMOL/L
CO2 SERPL-SCNC: 35 MMOL/L
CODING SYSTEM: NORMAL
COLOR UR AUTO: YELLOW
CREAT SERPL-MCNC: 0.7 MG/DL
CREAT SERPL-MCNC: 0.8 MG/DL
CREAT SERPL-MCNC: 0.9 MG/DL
CREAT SERPL-MCNC: 1 MG/DL
CREAT SERPL-MCNC: 1.1 MG/DL
CREAT SERPL-MCNC: 1.2 MG/DL
CREAT SERPL-MCNC: 1.3 MG/DL
CREAT SERPL-MCNC: 1.4 MG/DL
CREAT SERPL-MCNC: 1.5 MG/DL
CREAT SERPL-MCNC: 1.6 MG/DL
CREAT SERPL-MCNC: 1.6 MG/DL
CREAT SERPL-MCNC: 1.7 MG/DL
CREAT SERPL-MCNC: 1.8 MG/DL
DACRYOCYTES BLD QL SMEAR: ABNORMAL
DACRYOCYTES BLD QL SMEAR: ABNORMAL
DELSYS: ABNORMAL
DIASTOLIC DYSFUNCTION: NO
DIASTOLIC DYSFUNCTION: NO
DIFFERENTIAL METHOD: ABNORMAL
DIFFERENTIAL METHOD: NORMAL
DISPENSE STATUS: NORMAL
EOSINOPHIL # BLD AUTO: 0 K/UL
EOSINOPHIL # BLD AUTO: 0.1 K/UL
EOSINOPHIL # BLD AUTO: 0.2 K/UL
EOSINOPHIL # BLD AUTO: 0.3 K/UL
EOSINOPHIL # BLD AUTO: 0.4 K/UL
EOSINOPHIL # BLD AUTO: 0.5 K/UL
EOSINOPHIL # BLD AUTO: ABNORMAL K/UL
EOSINOPHIL # BLD AUTO: NORMAL K/UL
EOSINOPHIL # BLD AUTO: NORMAL K/UL
EOSINOPHIL NFR BLD: 0 %
EOSINOPHIL NFR BLD: 0.2 %
EOSINOPHIL NFR BLD: 0.5 %
EOSINOPHIL NFR BLD: 0.7 %
EOSINOPHIL NFR BLD: 1 %
EOSINOPHIL NFR BLD: 1.1 %
EOSINOPHIL NFR BLD: 1.2 %
EOSINOPHIL NFR BLD: 1.2 %
EOSINOPHIL NFR BLD: 1.3 %
EOSINOPHIL NFR BLD: 1.5 %
EOSINOPHIL NFR BLD: 1.6 %
EOSINOPHIL NFR BLD: 1.8 %
EOSINOPHIL NFR BLD: 2 %
EOSINOPHIL NFR BLD: 2.3 %
EOSINOPHIL NFR BLD: 2.3 %
EOSINOPHIL NFR BLD: 2.4 %
EOSINOPHIL NFR BLD: 2.5 %
EOSINOPHIL NFR BLD: 2.6 %
EOSINOPHIL NFR BLD: 2.6 %
EOSINOPHIL NFR BLD: 2.8 %
EOSINOPHIL NFR BLD: 3 %
EOSINOPHIL NFR BLD: 3.1 %
EOSINOPHIL NFR BLD: 3.2 %
EOSINOPHIL NFR BLD: 3.2 %
EOSINOPHIL NFR BLD: 3.5 %
EOSINOPHIL NFR BLD: 3.5 %
EOSINOPHIL NFR BLD: 3.6 %
EOSINOPHIL NFR BLD: 4 %
EOSINOPHIL NFR BLD: 4.3 %
EOSINOPHIL NFR BLD: 4.7 %
EOSINOPHIL NFR BLD: 4.7 %
EOSINOPHIL NFR BLD: 4.9 %
EOSINOPHIL NFR BLD: 4.9 %
EOSINOPHIL NFR BLD: 5.3 %
EOSINOPHIL NFR BLD: 5.5 %
EOSINOPHIL NFR BLD: 5.6 %
EOSINOPHIL NFR BLD: 6 %
EOSINOPHIL NFR BLD: 6 %
EOSINOPHIL NFR BLD: 6.6 %
EOSINOPHIL NFR BLD: 6.9 %
EOSINOPHIL NFR BLD: 6.9 %
EOSINOPHIL NFR BLD: 7.2 %
EP: 5
ERYTHROCYTE [DISTWIDTH] IN BLOOD BY AUTOMATED COUNT: 13.4 %
ERYTHROCYTE [DISTWIDTH] IN BLOOD BY AUTOMATED COUNT: 13.5 %
ERYTHROCYTE [DISTWIDTH] IN BLOOD BY AUTOMATED COUNT: 13.6 %
ERYTHROCYTE [DISTWIDTH] IN BLOOD BY AUTOMATED COUNT: 13.7 %
ERYTHROCYTE [DISTWIDTH] IN BLOOD BY AUTOMATED COUNT: 13.8 %
ERYTHROCYTE [DISTWIDTH] IN BLOOD BY AUTOMATED COUNT: 13.9 %
ERYTHROCYTE [DISTWIDTH] IN BLOOD BY AUTOMATED COUNT: 14.5 %
ERYTHROCYTE [DISTWIDTH] IN BLOOD BY AUTOMATED COUNT: 14.8 %
ERYTHROCYTE [DISTWIDTH] IN BLOOD BY AUTOMATED COUNT: 15.1 %
ERYTHROCYTE [DISTWIDTH] IN BLOOD BY AUTOMATED COUNT: 15.8 %
ERYTHROCYTE [DISTWIDTH] IN BLOOD BY AUTOMATED COUNT: 16 %
ERYTHROCYTE [DISTWIDTH] IN BLOOD BY AUTOMATED COUNT: 16.9 %
ERYTHROCYTE [DISTWIDTH] IN BLOOD BY AUTOMATED COUNT: 17 %
ERYTHROCYTE [DISTWIDTH] IN BLOOD BY AUTOMATED COUNT: 17.1 %
ERYTHROCYTE [DISTWIDTH] IN BLOOD BY AUTOMATED COUNT: 17.6 %
ERYTHROCYTE [DISTWIDTH] IN BLOOD BY AUTOMATED COUNT: 17.8 %
ERYTHROCYTE [DISTWIDTH] IN BLOOD BY AUTOMATED COUNT: 17.9 %
ERYTHROCYTE [DISTWIDTH] IN BLOOD BY AUTOMATED COUNT: 18 %
ERYTHROCYTE [DISTWIDTH] IN BLOOD BY AUTOMATED COUNT: 18 %
ERYTHROCYTE [DISTWIDTH] IN BLOOD BY AUTOMATED COUNT: 18.2 %
ERYTHROCYTE [DISTWIDTH] IN BLOOD BY AUTOMATED COUNT: 18.2 %
ERYTHROCYTE [DISTWIDTH] IN BLOOD BY AUTOMATED COUNT: 20.2 %
ERYTHROCYTE [DISTWIDTH] IN BLOOD BY AUTOMATED COUNT: 20.3 %
ERYTHROCYTE [DISTWIDTH] IN BLOOD BY AUTOMATED COUNT: 20.5 %
ERYTHROCYTE [DISTWIDTH] IN BLOOD BY AUTOMATED COUNT: 20.5 %
ERYTHROCYTE [DISTWIDTH] IN BLOOD BY AUTOMATED COUNT: 20.6 %
ERYTHROCYTE [DISTWIDTH] IN BLOOD BY AUTOMATED COUNT: 20.7 %
ERYTHROCYTE [DISTWIDTH] IN BLOOD BY AUTOMATED COUNT: 20.7 %
ERYTHROCYTE [DISTWIDTH] IN BLOOD BY AUTOMATED COUNT: 20.9 %
ERYTHROCYTE [DISTWIDTH] IN BLOOD BY AUTOMATED COUNT: 21 %
ERYTHROCYTE [DISTWIDTH] IN BLOOD BY AUTOMATED COUNT: 21.2 %
ERYTHROCYTE [DISTWIDTH] IN BLOOD BY AUTOMATED COUNT: 21.8 %
ERYTHROCYTE [DISTWIDTH] IN BLOOD BY AUTOMATED COUNT: 21.9 %
ERYTHROCYTE [DISTWIDTH] IN BLOOD BY AUTOMATED COUNT: 23.3 %
ERYTHROCYTE [DISTWIDTH] IN BLOOD BY AUTOMATED COUNT: 23.4 %
ERYTHROCYTE [DISTWIDTH] IN BLOOD BY AUTOMATED COUNT: 23.5 %
ERYTHROCYTE [DISTWIDTH] IN BLOOD BY AUTOMATED COUNT: 23.7 %
ERYTHROCYTE [DISTWIDTH] IN BLOOD BY AUTOMATED COUNT: 23.8 %
ERYTHROCYTE [DISTWIDTH] IN BLOOD BY AUTOMATED COUNT: 23.9 %
ERYTHROCYTE [SEDIMENTATION RATE] IN BLOOD BY WESTERGREN METHOD: 12 MM/H
ERYTHROCYTE [SEDIMENTATION RATE] IN BLOOD BY WESTERGREN METHOD: 16 MM/H
EST. GFR  (AFRICAN AMERICAN): 35.5 ML/MIN/1.73 M^2
EST. GFR  (AFRICAN AMERICAN): 38 ML/MIN/1.73 M^2
EST. GFR  (AFRICAN AMERICAN): 40.9 ML/MIN/1.73 M^2
EST. GFR  (AFRICAN AMERICAN): 40.9 ML/MIN/1.73 M^2
EST. GFR  (AFRICAN AMERICAN): 44.3 ML/MIN/1.73 M^2
EST. GFR  (AFRICAN AMERICAN): 44.6 ML/MIN/1.73 M^2
EST. GFR  (AFRICAN AMERICAN): 48.1 ML/MIN/1.73 M^2
EST. GFR  (AFRICAN AMERICAN): 48.5 ML/MIN/1.73 M^2
EST. GFR  (AFRICAN AMERICAN): 52.6 ML/MIN/1.73 M^2
EST. GFR  (AFRICAN AMERICAN): 58.4 ML/MIN/1.73 M^2
EST. GFR  (AFRICAN AMERICAN): >60 ML/MIN/1.73 M^2
EST. GFR  (NON AFRICAN AMERICAN): 30.8 ML/MIN/1.73 M^2
EST. GFR  (NON AFRICAN AMERICAN): 33 ML/MIN/1.73 M^2
EST. GFR  (NON AFRICAN AMERICAN): 35.5 ML/MIN/1.73 M^2
EST. GFR  (NON AFRICAN AMERICAN): 35.5 ML/MIN/1.73 M^2
EST. GFR  (NON AFRICAN AMERICAN): 38.4 ML/MIN/1.73 M^2
EST. GFR  (NON AFRICAN AMERICAN): 38.7 ML/MIN/1.73 M^2
EST. GFR  (NON AFRICAN AMERICAN): 41.7 ML/MIN/1.73 M^2
EST. GFR  (NON AFRICAN AMERICAN): 42 ML/MIN/1.73 M^2
EST. GFR  (NON AFRICAN AMERICAN): 45.6 ML/MIN/1.73 M^2
EST. GFR  (NON AFRICAN AMERICAN): 50.6 ML/MIN/1.73 M^2
EST. GFR  (NON AFRICAN AMERICAN): 55.9 ML/MIN/1.73 M^2
EST. GFR  (NON AFRICAN AMERICAN): 56.3 ML/MIN/1.73 M^2
EST. GFR  (NON AFRICAN AMERICAN): >60 ML/MIN/1.73 M^2
ESTIMATED PA SYSTOLIC PRESSURE: 119.04
ESTIMATED PA SYSTOLIC PRESSURE: 78.56
ESTIMATED PA SYSTOLIC PRESSURE: 96
EXT ALBUMIN: 3.9
EXT ALT: 35
EXT AST: 25
EXT BUN: 12
EXT CALCIUM: 9.4
EXT CHLORIDE: 103
EXT CREATININE: 0.9 MG/DL
EXT HEMATOCRIT: 37.4
EXT HEMOGLOBIN: 12.1
EXT PLATELETS: 430
EXT POTASSIUM: 3.7
EXT PROTEIN TOTAL: 7.4
EXT SODIUM: 135 MMOL/L
EXT WBC: 5.3
FACT X PPP CHRO-ACNC: 0.26 IU/ML
FACT X PPP CHRO-ACNC: 0.53 IU/ML
FACT X PPP CHRO-ACNC: 0.57 IU/ML
FIO2: 100
FIO2: 40
FIO2: 50
FIO2: 55
FIO2: 80
FIO2: 85
FLOW: 10
FLOW: 14
FLOW: 15
FLOW: 20
FLOW: 22
FLOW: 22
FLOW: 28
GIANT PLATELETS BLD QL SMEAR: PRESENT
GLOBAL PERICARDIAL EFFUSION: ABNORMAL
GLOBAL PERICARDIAL EFFUSION: ABNORMAL
GLUCOSE SERPL-MCNC: 100 MG/DL
GLUCOSE SERPL-MCNC: 100 MG/DL
GLUCOSE SERPL-MCNC: 101 MG/DL
GLUCOSE SERPL-MCNC: 102 MG/DL
GLUCOSE SERPL-MCNC: 103 MG/DL
GLUCOSE SERPL-MCNC: 104 MG/DL
GLUCOSE SERPL-MCNC: 106 MG/DL
GLUCOSE SERPL-MCNC: 108 MG/DL
GLUCOSE SERPL-MCNC: 108 MG/DL
GLUCOSE SERPL-MCNC: 109 MG/DL
GLUCOSE SERPL-MCNC: 111 MG/DL
GLUCOSE SERPL-MCNC: 112 MG/DL
GLUCOSE SERPL-MCNC: 112 MG/DL
GLUCOSE SERPL-MCNC: 113 MG/DL
GLUCOSE SERPL-MCNC: 115 MG/DL
GLUCOSE SERPL-MCNC: 119 MG/DL
GLUCOSE SERPL-MCNC: 121 MG/DL
GLUCOSE SERPL-MCNC: 124 MG/DL
GLUCOSE SERPL-MCNC: 126 MG/DL
GLUCOSE SERPL-MCNC: 129 MG/DL
GLUCOSE SERPL-MCNC: 130 MG/DL
GLUCOSE SERPL-MCNC: 134 MG/DL
GLUCOSE SERPL-MCNC: 136 MG/DL
GLUCOSE SERPL-MCNC: 137 MG/DL
GLUCOSE SERPL-MCNC: 139 MG/DL
GLUCOSE SERPL-MCNC: 145 MG/DL
GLUCOSE SERPL-MCNC: 167 MG/DL
GLUCOSE SERPL-MCNC: 169 MG/DL
GLUCOSE SERPL-MCNC: 74 MG/DL
GLUCOSE SERPL-MCNC: 77 MG/DL
GLUCOSE SERPL-MCNC: 84 MG/DL
GLUCOSE SERPL-MCNC: 86 MG/DL
GLUCOSE SERPL-MCNC: 88 MG/DL
GLUCOSE SERPL-MCNC: 92 MG/DL
GLUCOSE SERPL-MCNC: 94 MG/DL
GLUCOSE SERPL-MCNC: 95 MG/DL
GLUCOSE SERPL-MCNC: 96 MG/DL
GLUCOSE SERPL-MCNC: 97 MG/DL
GLUCOSE SERPL-MCNC: 98 MG/DL
GLUCOSE UR QL STRIP: NEGATIVE
GRAM STN SPEC: NORMAL
HCO3 UR-SCNC: 26.3 MMOL/L (ref 24–28)
HCO3 UR-SCNC: 28 MMOL/L (ref 24–28)
HCO3 UR-SCNC: 28.2 MMOL/L (ref 24–28)
HCO3 UR-SCNC: 28.3 MMOL/L (ref 24–28)
HCO3 UR-SCNC: 28.9 MMOL/L (ref 24–28)
HCO3 UR-SCNC: 30.2 MMOL/L (ref 24–28)
HCO3 UR-SCNC: 31.8 MMOL/L (ref 24–28)
HCO3 UR-SCNC: 33.2 MMOL/L (ref 24–28)
HCO3 UR-SCNC: 35.9 MMOL/L (ref 24–28)
HCO3 UR-SCNC: 41.3 MMOL/L (ref 24–28)
HCO3 UR-SCNC: 45 MMOL/L (ref 24–28)
HCT VFR BLD AUTO: 27.9 %
HCT VFR BLD AUTO: 28.2 %
HCT VFR BLD AUTO: 28.9 %
HCT VFR BLD AUTO: 28.9 %
HCT VFR BLD AUTO: 29.6 %
HCT VFR BLD AUTO: 29.8 %
HCT VFR BLD AUTO: 30.9 %
HCT VFR BLD AUTO: 31.4 %
HCT VFR BLD AUTO: 32.2 %
HCT VFR BLD AUTO: 32.4 %
HCT VFR BLD AUTO: 32.6 %
HCT VFR BLD AUTO: 32.7 %
HCT VFR BLD AUTO: 33.1 %
HCT VFR BLD AUTO: 33.8 %
HCT VFR BLD AUTO: 34 %
HCT VFR BLD AUTO: 34.2 %
HCT VFR BLD AUTO: 34.6 %
HCT VFR BLD AUTO: 35 %
HCT VFR BLD AUTO: 35 %
HCT VFR BLD AUTO: 35.5 %
HCT VFR BLD AUTO: 36 %
HCT VFR BLD AUTO: 36.1 %
HCT VFR BLD AUTO: 36.5 %
HCT VFR BLD AUTO: 36.6 %
HCT VFR BLD AUTO: 37.7 %
HCT VFR BLD AUTO: 38.1 %
HCT VFR BLD AUTO: 38.3 %
HCT VFR BLD AUTO: 38.6 %
HCT VFR BLD AUTO: 39 %
HCT VFR BLD AUTO: 39 %
HCT VFR BLD AUTO: 39.1 %
HCT VFR BLD AUTO: 39.1 %
HCT VFR BLD AUTO: 39.6 %
HCT VFR BLD AUTO: 39.8 %
HCT VFR BLD AUTO: 39.8 %
HCT VFR BLD AUTO: 39.9 %
HCT VFR BLD AUTO: 40 %
HCT VFR BLD AUTO: 40 %
HCT VFR BLD AUTO: 40.7 %
HCT VFR BLD AUTO: 40.8 %
HCT VFR BLD AUTO: 41 %
HCT VFR BLD AUTO: 41.1 %
HCT VFR BLD AUTO: 41.2 %
HCT VFR BLD AUTO: 42.5 %
HCT VFR BLD AUTO: 42.6 %
HCT VFR BLD AUTO: 43 %
HCT VFR BLD AUTO: 44.2 %
HCT VFR BLD AUTO: 44.5 %
HCT VFR BLD AUTO: 44.7 %
HCT VFR BLD AUTO: 45.1 %
HCT VFR BLD AUTO: 45.5 %
HCT VFR BLD AUTO: 45.8 %
HCT VFR BLD AUTO: 46.4 %
HCT VFR BLD AUTO: 46.5 %
HCT VFR BLD AUTO: 46.5 %
HCT VFR BLD AUTO: 47.8 %
HCT VFR BLD AUTO: 48.2 %
HCT VFR BLD AUTO: 49.9 %
HGB BLD-MCNC: 10 G/DL
HGB BLD-MCNC: 10.2 G/DL
HGB BLD-MCNC: 10.3 G/DL
HGB BLD-MCNC: 10.4 G/DL
HGB BLD-MCNC: 10.6 G/DL
HGB BLD-MCNC: 10.8 G/DL
HGB BLD-MCNC: 10.9 G/DL
HGB BLD-MCNC: 10.9 G/DL
HGB BLD-MCNC: 11 G/DL
HGB BLD-MCNC: 11 G/DL
HGB BLD-MCNC: 11.1 G/DL
HGB BLD-MCNC: 11.1 G/DL
HGB BLD-MCNC: 11.2 G/DL
HGB BLD-MCNC: 11.5 G/DL
HGB BLD-MCNC: 11.6 G/DL
HGB BLD-MCNC: 11.7 G/DL
HGB BLD-MCNC: 11.8 G/DL
HGB BLD-MCNC: 11.8 G/DL
HGB BLD-MCNC: 12 G/DL
HGB BLD-MCNC: 12 G/DL
HGB BLD-MCNC: 12.2 G/DL
HGB BLD-MCNC: 12.4 G/DL
HGB BLD-MCNC: 12.4 G/DL
HGB BLD-MCNC: 12.5 G/DL
HGB BLD-MCNC: 12.7 G/DL
HGB BLD-MCNC: 12.8 G/DL
HGB BLD-MCNC: 12.8 G/DL
HGB BLD-MCNC: 12.9 G/DL
HGB BLD-MCNC: 13 G/DL
HGB BLD-MCNC: 13.1 G/DL
HGB BLD-MCNC: 13.4 G/DL
HGB BLD-MCNC: 13.5 G/DL
HGB BLD-MCNC: 13.5 G/DL
HGB BLD-MCNC: 13.7 G/DL
HGB BLD-MCNC: 13.7 G/DL
HGB BLD-MCNC: 14 G/DL
HGB BLD-MCNC: 14.5 G/DL
HGB BLD-MCNC: 14.6 G/DL
HGB BLD-MCNC: 14.6 G/DL
HGB BLD-MCNC: 14.8 G/DL
HGB BLD-MCNC: 14.9 G/DL
HGB BLD-MCNC: 15.2 G/DL
HGB BLD-MCNC: 15.3 G/DL
HGB BLD-MCNC: 15.4 G/DL
HGB BLD-MCNC: 15.6 G/DL
HGB BLD-MCNC: 15.8 G/DL
HGB BLD-MCNC: 15.9 G/DL
HGB BLD-MCNC: 16.2 G/DL
HGB BLD-MCNC: 16.4 G/DL
HGB BLD-MCNC: 16.7 G/DL
HGB BLD-MCNC: 17.2 G/DL
HGB BLD-MCNC: 8.8 G/DL
HGB BLD-MCNC: 9.2 G/DL
HGB BLD-MCNC: 9.2 G/DL
HGB BLD-MCNC: 9.3 G/DL
HGB BLD-MCNC: 9.6 G/DL
HGB UR QL STRIP: ABNORMAL
HGB UR QL STRIP: ABNORMAL
HGB UR QL STRIP: NEGATIVE
HYALINE CASTS UR QL AUTO: 4 /LPF
HYPOCHROMIA BLD QL SMEAR: ABNORMAL
HYPOCHROMIA BLD QL SMEAR: NORMAL
IMM GRANULOCYTES # BLD AUTO: 0 K/UL
IMM GRANULOCYTES # BLD AUTO: 0.02 K/UL
IMM GRANULOCYTES # BLD AUTO: 0.03 K/UL
IMM GRANULOCYTES # BLD AUTO: 0.03 K/UL
IMM GRANULOCYTES # BLD AUTO: 0.04 K/UL
IMM GRANULOCYTES # BLD AUTO: 0.05 K/UL
IMM GRANULOCYTES # BLD AUTO: 0.06 K/UL
IMM GRANULOCYTES # BLD AUTO: 0.06 K/UL
IMM GRANULOCYTES # BLD AUTO: 0.07 K/UL
IMM GRANULOCYTES # BLD AUTO: 0.07 K/UL
IMM GRANULOCYTES # BLD AUTO: 0.09 K/UL
IMM GRANULOCYTES NFR BLD AUTO: 0 %
IMM GRANULOCYTES NFR BLD AUTO: 0.3 %
IMM GRANULOCYTES NFR BLD AUTO: 0.5 %
IMM GRANULOCYTES NFR BLD AUTO: 0.6 %
IMM GRANULOCYTES NFR BLD AUTO: 0.6 %
IMM GRANULOCYTES NFR BLD AUTO: 0.7 %
IMM GRANULOCYTES NFR BLD AUTO: 0.9 %
IMM GRANULOCYTES NFR BLD AUTO: 1.1 %
INR PPP: 1
INR PPP: 1.1
INR PPP: 1.2
INR PPP: 1.3
INR PPP: 1.3 (ref 2–3)
INR PPP: 1.4
INR PPP: 1.4
INR PPP: 1.5
INR PPP: 1.5
INR PPP: 1.5 (ref 2–3)
INR PPP: 1.6
INR PPP: 1.8
INR PPP: 1.9
INR PPP: 2
INR PPP: 2.2
INR PPP: 2.2 (ref 2–3)
INR PPP: 2.3
INR PPP: 2.3
INR PPP: 2.4
INR PPP: 2.5
INR PPP: 2.5
INR PPP: 2.6
INR PPP: 2.7
INR PPP: 2.7
INR PPP: 2.7 (ref 2–3)
INR PPP: 2.8
INR PPP: 2.8 (ref 2–3)
INR PPP: 2.9
INR PPP: 2.9
INR PPP: 3
INR PPP: 3
INR PPP: 3.1
INR PPP: 3.1
INR PPP: 3.2
INR PPP: 3.2
INR PPP: 3.3
INR PPP: 3.7
INR PPP: 4
INR PPP: 4 (ref 2–3)
INR PPP: 4.1
INR PPP: 4.2 (ref 2–3)
INR PPP: 4.4
INR PPP: 5.4
INR PPP: 7.5
IP: 10
KETONES UR QL STRIP: NEGATIVE
LEUKOCYTE ESTERASE UR QL STRIP: ABNORMAL
LEUKOCYTE ESTERASE UR QL STRIP: NEGATIVE
LEUKOCYTE ESTERASE UR QL STRIP: NEGATIVE
LYMPHOCYTES # BLD AUTO: 0.5 K/UL
LYMPHOCYTES # BLD AUTO: 0.7 K/UL
LYMPHOCYTES # BLD AUTO: 0.7 K/UL
LYMPHOCYTES # BLD AUTO: 0.8 K/UL
LYMPHOCYTES # BLD AUTO: 0.8 K/UL
LYMPHOCYTES # BLD AUTO: 0.9 K/UL
LYMPHOCYTES # BLD AUTO: 1 K/UL
LYMPHOCYTES # BLD AUTO: 1.1 K/UL
LYMPHOCYTES # BLD AUTO: 1.2 K/UL
LYMPHOCYTES # BLD AUTO: 1.3 K/UL
LYMPHOCYTES # BLD AUTO: 1.4 K/UL
LYMPHOCYTES # BLD AUTO: 1.5 K/UL
LYMPHOCYTES # BLD AUTO: 1.6 K/UL
LYMPHOCYTES # BLD AUTO: 1.7 K/UL
LYMPHOCYTES # BLD AUTO: 1.8 K/UL
LYMPHOCYTES # BLD AUTO: 1.8 K/UL
LYMPHOCYTES # BLD AUTO: ABNORMAL K/UL
LYMPHOCYTES # BLD AUTO: NORMAL K/UL
LYMPHOCYTES # BLD AUTO: NORMAL K/UL
LYMPHOCYTES NFR BLD: 11.3 %
LYMPHOCYTES NFR BLD: 12 %
LYMPHOCYTES NFR BLD: 12 %
LYMPHOCYTES NFR BLD: 12.4 %
LYMPHOCYTES NFR BLD: 13.2 %
LYMPHOCYTES NFR BLD: 13.7 %
LYMPHOCYTES NFR BLD: 14 %
LYMPHOCYTES NFR BLD: 14.2 %
LYMPHOCYTES NFR BLD: 14.5 %
LYMPHOCYTES NFR BLD: 14.5 %
LYMPHOCYTES NFR BLD: 15.1 %
LYMPHOCYTES NFR BLD: 16 %
LYMPHOCYTES NFR BLD: 16.1 %
LYMPHOCYTES NFR BLD: 16.3 %
LYMPHOCYTES NFR BLD: 17.7 %
LYMPHOCYTES NFR BLD: 17.8 %
LYMPHOCYTES NFR BLD: 18 %
LYMPHOCYTES NFR BLD: 18.5 %
LYMPHOCYTES NFR BLD: 18.9 %
LYMPHOCYTES NFR BLD: 19 %
LYMPHOCYTES NFR BLD: 19.1 %
LYMPHOCYTES NFR BLD: 19.2 %
LYMPHOCYTES NFR BLD: 19.9 %
LYMPHOCYTES NFR BLD: 20.1 %
LYMPHOCYTES NFR BLD: 20.2 %
LYMPHOCYTES NFR BLD: 20.8 %
LYMPHOCYTES NFR BLD: 21 %
LYMPHOCYTES NFR BLD: 21 %
LYMPHOCYTES NFR BLD: 21.1 %
LYMPHOCYTES NFR BLD: 21.6 %
LYMPHOCYTES NFR BLD: 21.7 %
LYMPHOCYTES NFR BLD: 22.3 %
LYMPHOCYTES NFR BLD: 22.4 %
LYMPHOCYTES NFR BLD: 22.4 %
LYMPHOCYTES NFR BLD: 22.8 %
LYMPHOCYTES NFR BLD: 22.8 %
LYMPHOCYTES NFR BLD: 23 %
LYMPHOCYTES NFR BLD: 23 %
LYMPHOCYTES NFR BLD: 23.1 %
LYMPHOCYTES NFR BLD: 24 %
LYMPHOCYTES NFR BLD: 24.3 %
LYMPHOCYTES NFR BLD: 24.4 %
LYMPHOCYTES NFR BLD: 25.7 %
LYMPHOCYTES NFR BLD: 25.8 %
LYMPHOCYTES NFR BLD: 25.9 %
LYMPHOCYTES NFR BLD: 26.8 %
LYMPHOCYTES NFR BLD: 27.1 %
LYMPHOCYTES NFR BLD: 28.2 %
LYMPHOCYTES NFR BLD: 28.5 %
LYMPHOCYTES NFR BLD: 29.4 %
LYMPHOCYTES NFR BLD: 34.9 %
LYMPHOCYTES NFR BLD: 4 %
LYMPHOCYTES NFR BLD: 6.2 %
LYMPHOCYTES NFR BLD: 7.4 %
LYMPHOCYTES NFR BLD: 8.2 %
LYMPHOCYTES NFR BLD: 8.8 %
LYMPHOCYTES NFR BLD: 9 %
LYMPHOCYTES NFR BLD: 9 %
MAGNESIUM SERPL-MCNC: 1.5 MG/DL
MAGNESIUM SERPL-MCNC: 1.6 MG/DL
MAGNESIUM SERPL-MCNC: 1.7 MG/DL
MAGNESIUM SERPL-MCNC: 1.8 MG/DL
MAGNESIUM SERPL-MCNC: 1.9 MG/DL
MAGNESIUM SERPL-MCNC: 2 MG/DL
MAGNESIUM SERPL-MCNC: 2.1 MG/DL
MAGNESIUM SERPL-MCNC: 2.2 MG/DL
MAGNESIUM SERPL-MCNC: 2.3 MG/DL
MAGNESIUM SERPL-MCNC: 2.4 MG/DL
MAGNESIUM SERPL-MCNC: 2.5 MG/DL
MAGNESIUM SERPL-MCNC: 2.6 MG/DL
MAGNESIUM SERPL-MCNC: 2.7 MG/DL
MCH RBC QN AUTO: 23.2 PG
MCH RBC QN AUTO: 23.3 PG
MCH RBC QN AUTO: 23.6 PG
MCH RBC QN AUTO: 23.8 PG
MCH RBC QN AUTO: 23.9 PG
MCH RBC QN AUTO: 24 PG
MCH RBC QN AUTO: 24 PG
MCH RBC QN AUTO: 24.1 PG
MCH RBC QN AUTO: 24.2 PG
MCH RBC QN AUTO: 24.2 PG
MCH RBC QN AUTO: 24.3 PG
MCH RBC QN AUTO: 24.4 PG
MCH RBC QN AUTO: 24.5 PG
MCH RBC QN AUTO: 24.6 PG
MCH RBC QN AUTO: 24.7 PG
MCH RBC QN AUTO: 24.7 PG
MCH RBC QN AUTO: 24.8 PG
MCH RBC QN AUTO: 24.8 PG
MCH RBC QN AUTO: 25 PG
MCH RBC QN AUTO: 25.3 PG
MCH RBC QN AUTO: 26.4 PG
MCH RBC QN AUTO: 26.4 PG
MCH RBC QN AUTO: 26.7 PG
MCH RBC QN AUTO: 26.7 PG
MCH RBC QN AUTO: 27 PG
MCH RBC QN AUTO: 29.1 PG
MCH RBC QN AUTO: 29.2 PG
MCH RBC QN AUTO: 29.2 PG
MCH RBC QN AUTO: 29.3 PG
MCH RBC QN AUTO: 29.5 PG
MCH RBC QN AUTO: 29.5 PG
MCH RBC QN AUTO: 29.6 PG
MCH RBC QN AUTO: 29.7 PG
MCH RBC QN AUTO: 29.8 PG
MCH RBC QN AUTO: 29.9 PG
MCH RBC QN AUTO: 30 PG
MCH RBC QN AUTO: 30 PG
MCH RBC QN AUTO: 30.2 PG
MCH RBC QN AUTO: 30.3 PG
MCH RBC QN AUTO: 30.5 PG
MCHC RBC AUTO-ENTMCNC: 31.3 G/DL
MCHC RBC AUTO-ENTMCNC: 31.5 G/DL
MCHC RBC AUTO-ENTMCNC: 31.6 G/DL
MCHC RBC AUTO-ENTMCNC: 31.8 G/DL
MCHC RBC AUTO-ENTMCNC: 32 G/DL
MCHC RBC AUTO-ENTMCNC: 32.1 G/DL
MCHC RBC AUTO-ENTMCNC: 32.2 G/DL
MCHC RBC AUTO-ENTMCNC: 32.3 G/DL
MCHC RBC AUTO-ENTMCNC: 32.4 G/DL
MCHC RBC AUTO-ENTMCNC: 32.4 G/DL
MCHC RBC AUTO-ENTMCNC: 32.5 G/DL
MCHC RBC AUTO-ENTMCNC: 32.6 G/DL
MCHC RBC AUTO-ENTMCNC: 32.6 G/DL
MCHC RBC AUTO-ENTMCNC: 32.8 %
MCHC RBC AUTO-ENTMCNC: 32.8 G/DL
MCHC RBC AUTO-ENTMCNC: 32.8 G/DL
MCHC RBC AUTO-ENTMCNC: 32.9 G/DL
MCHC RBC AUTO-ENTMCNC: 33 %
MCHC RBC AUTO-ENTMCNC: 33 G/DL
MCHC RBC AUTO-ENTMCNC: 33.1 G/DL
MCHC RBC AUTO-ENTMCNC: 33.2 G/DL
MCHC RBC AUTO-ENTMCNC: 33.2 G/DL
MCHC RBC AUTO-ENTMCNC: 33.3 G/DL
MCHC RBC AUTO-ENTMCNC: 33.4 %
MCHC RBC AUTO-ENTMCNC: 33.4 %
MCHC RBC AUTO-ENTMCNC: 33.5 %
MCHC RBC AUTO-ENTMCNC: 33.7 %
MCHC RBC AUTO-ENTMCNC: 33.7 G/DL
MCHC RBC AUTO-ENTMCNC: 33.7 G/DL
MCHC RBC AUTO-ENTMCNC: 33.8 %
MCHC RBC AUTO-ENTMCNC: 33.8 G/DL
MCHC RBC AUTO-ENTMCNC: 33.9 %
MCHC RBC AUTO-ENTMCNC: 33.9 G/DL
MCHC RBC AUTO-ENTMCNC: 33.9 G/DL
MCHC RBC AUTO-ENTMCNC: 34 %
MCHC RBC AUTO-ENTMCNC: 34 %
MCHC RBC AUTO-ENTMCNC: 34.1 %
MCHC RBC AUTO-ENTMCNC: 34.1 G/DL
MCHC RBC AUTO-ENTMCNC: 34.2 %
MCHC RBC AUTO-ENTMCNC: 34.2 G/DL
MCHC RBC AUTO-ENTMCNC: 34.3 %
MCHC RBC AUTO-ENTMCNC: 34.3 %
MCHC RBC AUTO-ENTMCNC: 34.3 G/DL
MCHC RBC AUTO-ENTMCNC: 34.5 %
MCHC RBC AUTO-ENTMCNC: 34.6 %
MCHC RBC AUTO-ENTMCNC: 34.7 %
MCHC RBC AUTO-ENTMCNC: 34.7 G/DL
MCHC RBC AUTO-ENTMCNC: 34.8 %
MCHC RBC AUTO-ENTMCNC: 34.8 %
MCV RBC AUTO: 72 FL
MCV RBC AUTO: 73 FL
MCV RBC AUTO: 74 FL
MCV RBC AUTO: 75 FL
MCV RBC AUTO: 76 FL
MCV RBC AUTO: 77 FL
MCV RBC AUTO: 79 FL
MCV RBC AUTO: 81 FL
MCV RBC AUTO: 83 FL
MCV RBC AUTO: 87 FL
MCV RBC AUTO: 88 FL
MCV RBC AUTO: 90 FL
MCV RBC AUTO: 91 FL
MCV RBC AUTO: 91 FL
METAMYELOCYTES NFR BLD MANUAL: 0.5 %
METAMYELOCYTES NFR BLD MANUAL: 1 %
METAMYELOCYTES NFR BLD MANUAL: 1 %
METAMYELOCYTES NFR BLD MANUAL: 2 %
METAMYELOCYTES NFR BLD MANUAL: 2 %
MICROSCOPIC COMMENT: ABNORMAL
MICROSCOPIC COMMENT: NORMAL
MICROSCOPIC COMMENT: NORMAL
MITRAL VALVE MOBILITY: NORMAL
MITRAL VALVE REGURGITATION: ABNORMAL
MITRAL VALVE REGURGITATION: ABNORMAL
MODE: ABNORMAL
MONOCYTES # BLD AUTO: 0.3 K/UL
MONOCYTES # BLD AUTO: 0.4 K/UL
MONOCYTES # BLD AUTO: 0.5 K/UL
MONOCYTES # BLD AUTO: 0.6 K/UL
MONOCYTES # BLD AUTO: 0.7 K/UL
MONOCYTES # BLD AUTO: 0.8 K/UL
MONOCYTES # BLD AUTO: 0.9 K/UL
MONOCYTES # BLD AUTO: 1 K/UL
MONOCYTES # BLD AUTO: 1 K/UL
MONOCYTES # BLD AUTO: 1.1 K/UL
MONOCYTES # BLD AUTO: 1.1 K/UL
MONOCYTES # BLD AUTO: ABNORMAL K/UL
MONOCYTES # BLD AUTO: NORMAL K/UL
MONOCYTES # BLD AUTO: NORMAL K/UL
MONOCYTES NFR BLD: 10 %
MONOCYTES NFR BLD: 10 %
MONOCYTES NFR BLD: 10.1 %
MONOCYTES NFR BLD: 10.4 %
MONOCYTES NFR BLD: 10.6 %
MONOCYTES NFR BLD: 10.7 %
MONOCYTES NFR BLD: 10.9 %
MONOCYTES NFR BLD: 10.9 %
MONOCYTES NFR BLD: 11 %
MONOCYTES NFR BLD: 11.1 %
MONOCYTES NFR BLD: 11.2 %
MONOCYTES NFR BLD: 11.4 %
MONOCYTES NFR BLD: 11.5 %
MONOCYTES NFR BLD: 11.7 %
MONOCYTES NFR BLD: 11.9 %
MONOCYTES NFR BLD: 11.9 %
MONOCYTES NFR BLD: 12.3 %
MONOCYTES NFR BLD: 12.4 %
MONOCYTES NFR BLD: 12.4 %
MONOCYTES NFR BLD: 12.7 %
MONOCYTES NFR BLD: 13.1 %
MONOCYTES NFR BLD: 13.4 %
MONOCYTES NFR BLD: 13.4 %
MONOCYTES NFR BLD: 14.1 %
MONOCYTES NFR BLD: 14.3 %
MONOCYTES NFR BLD: 17.8 %
MONOCYTES NFR BLD: 2 %
MONOCYTES NFR BLD: 3 %
MONOCYTES NFR BLD: 5 %
MONOCYTES NFR BLD: 5.5 %
MONOCYTES NFR BLD: 6 %
MONOCYTES NFR BLD: 6.1 %
MONOCYTES NFR BLD: 6.1 %
MONOCYTES NFR BLD: 6.2 %
MONOCYTES NFR BLD: 6.6 %
MONOCYTES NFR BLD: 6.9 %
MONOCYTES NFR BLD: 7 %
MONOCYTES NFR BLD: 7 %
MONOCYTES NFR BLD: 7.5 %
MONOCYTES NFR BLD: 7.8 %
MONOCYTES NFR BLD: 7.9 %
MONOCYTES NFR BLD: 8 %
MONOCYTES NFR BLD: 8.1 %
MONOCYTES NFR BLD: 8.2 %
MONOCYTES NFR BLD: 8.5 %
MONOCYTES NFR BLD: 8.7 %
MONOCYTES NFR BLD: 8.9 %
MONOCYTES NFR BLD: 8.9 %
MONOCYTES NFR BLD: 9 %
MONOCYTES NFR BLD: 9.2 %
MONOCYTES NFR BLD: 9.3 %
MONOCYTES NFR BLD: 9.4 %
MONOCYTES NFR BLD: 9.4 %
MONOCYTES NFR BLD: 9.9 %
MYELOCYTES NFR BLD MANUAL: 1 %
MYELOCYTES NFR BLD MANUAL: 1 %
MYELOCYTES NFR BLD MANUAL: 2 %
MYELOCYTES NFR BLD MANUAL: 3 %
NEUTROPHILS # BLD AUTO: 1.8 K/UL
NEUTROPHILS # BLD AUTO: 10.6 K/UL
NEUTROPHILS # BLD AUTO: 14.4 K/UL
NEUTROPHILS # BLD AUTO: 2.5 K/UL
NEUTROPHILS # BLD AUTO: 2.6 K/UL
NEUTROPHILS # BLD AUTO: 2.6 K/UL
NEUTROPHILS # BLD AUTO: 2.7 K/UL
NEUTROPHILS # BLD AUTO: 2.7 K/UL
NEUTROPHILS # BLD AUTO: 2.8 K/UL
NEUTROPHILS # BLD AUTO: 2.9 K/UL
NEUTROPHILS # BLD AUTO: 3 K/UL
NEUTROPHILS # BLD AUTO: 3.1 K/UL
NEUTROPHILS # BLD AUTO: 3.3 K/UL
NEUTROPHILS # BLD AUTO: 3.4 K/UL
NEUTROPHILS # BLD AUTO: 3.4 K/UL
NEUTROPHILS # BLD AUTO: 3.6 K/UL
NEUTROPHILS # BLD AUTO: 3.7 K/UL
NEUTROPHILS # BLD AUTO: 3.8 K/UL
NEUTROPHILS # BLD AUTO: 3.9 K/UL
NEUTROPHILS # BLD AUTO: 4 K/UL
NEUTROPHILS # BLD AUTO: 4.3 K/UL
NEUTROPHILS # BLD AUTO: 4.3 K/UL
NEUTROPHILS # BLD AUTO: 4.6 K/UL
NEUTROPHILS # BLD AUTO: 4.7 K/UL
NEUTROPHILS # BLD AUTO: 4.7 K/UL
NEUTROPHILS # BLD AUTO: 4.8 K/UL
NEUTROPHILS # BLD AUTO: 4.8 K/UL
NEUTROPHILS # BLD AUTO: 4.9 K/UL
NEUTROPHILS # BLD AUTO: 5 K/UL
NEUTROPHILS # BLD AUTO: 5.2 K/UL
NEUTROPHILS # BLD AUTO: 5.4 K/UL
NEUTROPHILS # BLD AUTO: 5.7 K/UL
NEUTROPHILS # BLD AUTO: 5.7 K/UL
NEUTROPHILS # BLD AUTO: 5.9 K/UL
NEUTROPHILS # BLD AUTO: 6.4 K/UL
NEUTROPHILS # BLD AUTO: 6.5 K/UL
NEUTROPHILS # BLD AUTO: 6.7 K/UL
NEUTROPHILS # BLD AUTO: 7.3 K/UL
NEUTROPHILS # BLD AUTO: 7.4 K/UL
NEUTROPHILS # BLD AUTO: 7.5 K/UL
NEUTROPHILS # BLD AUTO: 8.5 K/UL
NEUTROPHILS # BLD AUTO: NORMAL K/UL
NEUTROPHILS NFR BLD: 43.4 %
NEUTROPHILS NFR BLD: 50.1 %
NEUTROPHILS NFR BLD: 50.9 %
NEUTROPHILS NFR BLD: 53.8 %
NEUTROPHILS NFR BLD: 54.3 %
NEUTROPHILS NFR BLD: 54.3 %
NEUTROPHILS NFR BLD: 56.2 %
NEUTROPHILS NFR BLD: 56.7 %
NEUTROPHILS NFR BLD: 57.6 %
NEUTROPHILS NFR BLD: 57.9 %
NEUTROPHILS NFR BLD: 58.6 %
NEUTROPHILS NFR BLD: 59 %
NEUTROPHILS NFR BLD: 59.5 %
NEUTROPHILS NFR BLD: 59.6 %
NEUTROPHILS NFR BLD: 60 %
NEUTROPHILS NFR BLD: 60.4 %
NEUTROPHILS NFR BLD: 60.7 %
NEUTROPHILS NFR BLD: 61 %
NEUTROPHILS NFR BLD: 61.2 %
NEUTROPHILS NFR BLD: 61.8 %
NEUTROPHILS NFR BLD: 62.1 %
NEUTROPHILS NFR BLD: 62.2 %
NEUTROPHILS NFR BLD: 62.5 %
NEUTROPHILS NFR BLD: 63 %
NEUTROPHILS NFR BLD: 64.4 %
NEUTROPHILS NFR BLD: 65.2 %
NEUTROPHILS NFR BLD: 66.1 %
NEUTROPHILS NFR BLD: 66.3 %
NEUTROPHILS NFR BLD: 66.8 %
NEUTROPHILS NFR BLD: 66.9 %
NEUTROPHILS NFR BLD: 67.1 %
NEUTROPHILS NFR BLD: 67.9 %
NEUTROPHILS NFR BLD: 68 %
NEUTROPHILS NFR BLD: 69.3 %
NEUTROPHILS NFR BLD: 71 %
NEUTROPHILS NFR BLD: 71 %
NEUTROPHILS NFR BLD: 71.5 %
NEUTROPHILS NFR BLD: 72.5 %
NEUTROPHILS NFR BLD: 72.5 %
NEUTROPHILS NFR BLD: 73 %
NEUTROPHILS NFR BLD: 73 %
NEUTROPHILS NFR BLD: 73.8 %
NEUTROPHILS NFR BLD: 74 %
NEUTROPHILS NFR BLD: 75 %
NEUTROPHILS NFR BLD: 76 %
NEUTROPHILS NFR BLD: 76 %
NEUTROPHILS NFR BLD: 76.7 %
NEUTROPHILS NFR BLD: 77.2 %
NEUTROPHILS NFR BLD: 77.4 %
NEUTROPHILS NFR BLD: 77.4 %
NEUTROPHILS NFR BLD: 77.5 %
NEUTROPHILS NFR BLD: 78 %
NEUTROPHILS NFR BLD: 78.7 %
NEUTROPHILS NFR BLD: 83 %
NEUTROPHILS NFR BLD: 83.9 %
NEUTROPHILS NFR BLD: 84 %
NEUTROPHILS NFR BLD: 84.9 %
NEUTROPHILS NFR BLD: 86.1 %
NEUTROPHILS NFR BLD: 86.2 %
NEUTROPHILS NFR BLD: 90 %
NEUTS BAND NFR BLD MANUAL: 1 %
NITRITE UR QL STRIP: NEGATIVE
NON-SQ EPI CELLS #/AREA URNS AUTO: <1 /HPF
NRBC BLD-RTO: 0 /100 WBC
NRBC BLD-RTO: ABNORMAL /100 WBC
OVALOCYTES BLD QL SMEAR: ABNORMAL
OVALOCYTES BLD QL SMEAR: NORMAL
PCO2 BLDA: 32 MMHG (ref 35–45)
PCO2 BLDA: 36.4 MMHG (ref 35–45)
PCO2 BLDA: 37.2 MMHG (ref 35–45)
PCO2 BLDA: 38.1 MMHG (ref 35–45)
PCO2 BLDA: 40.1 MMHG (ref 35–45)
PCO2 BLDA: 40.2 MMHG (ref 35–45)
PCO2 BLDA: 44.9 MMHG (ref 35–45)
PCO2 BLDA: 49.3 MMHG (ref 35–45)
PCO2 BLDA: 56.2 MMHG (ref 35–45)
PCO2 BLDA: 59.8 MMHG (ref 35–45)
PCO2 BLDA: 60.7 MMHG (ref 35–45)
PH SMN: 7.41 [PH] (ref 7.35–7.45)
PH SMN: 7.41 [PH] (ref 7.35–7.45)
PH SMN: 7.44 [PH] (ref 7.35–7.45)
PH SMN: 7.44 [PH] (ref 7.35–7.45)
PH SMN: 7.45 [PH] (ref 7.35–7.45)
PH SMN: 7.45 [PH] (ref 7.35–7.45)
PH SMN: 7.48 [PH] (ref 7.35–7.45)
PH SMN: 7.51 [PH] (ref 7.35–7.45)
PH SMN: 7.51 [PH] (ref 7.35–7.45)
PH SMN: 7.52 [PH] (ref 7.35–7.45)
PH SMN: 7.55 [PH] (ref 7.35–7.45)
PH UR STRIP: 5 [PH] (ref 5–8)
PH UR STRIP: 6 [PH] (ref 5–8)
PH UR STRIP: 7 [PH] (ref 5–8)
PHOSPHATE SERPL-MCNC: 2.5 MG/DL
PHOSPHATE SERPL-MCNC: 2.5 MG/DL
PHOSPHATE SERPL-MCNC: 2.6 MG/DL
PHOSPHATE SERPL-MCNC: 2.7 MG/DL
PHOSPHATE SERPL-MCNC: 2.8 MG/DL
PHOSPHATE SERPL-MCNC: 3 MG/DL
PHOSPHATE SERPL-MCNC: 3.1 MG/DL
PHOSPHATE SERPL-MCNC: 3.4 MG/DL
PHOSPHATE SERPL-MCNC: 3.4 MG/DL
PHOSPHATE SERPL-MCNC: 3.5 MG/DL
PHOSPHATE SERPL-MCNC: 3.9 MG/DL
PHOSPHATE SERPL-MCNC: 3.9 MG/DL
PHOSPHATE SERPL-MCNC: 4.1 MG/DL
PHOSPHATE SERPL-MCNC: 4.1 MG/DL
PHOSPHATE SERPL-MCNC: 4.2 MG/DL
PHOSPHATE SERPL-MCNC: 4.5 MG/DL
PLATELET # BLD AUTO: 117 K/UL
PLATELET # BLD AUTO: 126 K/UL
PLATELET # BLD AUTO: 127 K/UL
PLATELET # BLD AUTO: 127 K/UL
PLATELET # BLD AUTO: 128 K/UL
PLATELET # BLD AUTO: 131 K/UL
PLATELET # BLD AUTO: 143 K/UL
PLATELET # BLD AUTO: 146 K/UL
PLATELET # BLD AUTO: 157 K/UL
PLATELET # BLD AUTO: 160 K/UL
PLATELET # BLD AUTO: 206 K/UL
PLATELET # BLD AUTO: 208 K/UL
PLATELET # BLD AUTO: 210 K/UL
PLATELET # BLD AUTO: 211 K/UL
PLATELET # BLD AUTO: 215 K/UL
PLATELET # BLD AUTO: 222 K/UL
PLATELET # BLD AUTO: 223 K/UL
PLATELET # BLD AUTO: 225 K/UL
PLATELET # BLD AUTO: 229 K/UL
PLATELET # BLD AUTO: 233 K/UL
PLATELET # BLD AUTO: 235 K/UL
PLATELET # BLD AUTO: 236 K/UL
PLATELET # BLD AUTO: 238 K/UL
PLATELET # BLD AUTO: 242 K/UL
PLATELET # BLD AUTO: 245 K/UL
PLATELET # BLD AUTO: 248 K/UL
PLATELET # BLD AUTO: 253 K/UL
PLATELET # BLD AUTO: 256 K/UL
PLATELET # BLD AUTO: 258 K/UL
PLATELET # BLD AUTO: 261 K/UL
PLATELET # BLD AUTO: 268 K/UL
PLATELET # BLD AUTO: 270 K/UL
PLATELET # BLD AUTO: 275 K/UL
PLATELET # BLD AUTO: 281 K/UL
PLATELET # BLD AUTO: 292 K/UL
PLATELET # BLD AUTO: 296 K/UL
PLATELET # BLD AUTO: 297 K/UL
PLATELET # BLD AUTO: 300 K/UL
PLATELET # BLD AUTO: 301 K/UL
PLATELET # BLD AUTO: 304 K/UL
PLATELET # BLD AUTO: 305 K/UL
PLATELET # BLD AUTO: 308 K/UL
PLATELET # BLD AUTO: 308 K/UL
PLATELET # BLD AUTO: 319 K/UL
PLATELET # BLD AUTO: 324 K/UL
PLATELET # BLD AUTO: 333 K/UL
PLATELET # BLD AUTO: 344 K/UL
PLATELET # BLD AUTO: 381 K/UL
PLATELET # BLD AUTO: 385 K/UL
PLATELET # BLD AUTO: 391 K/UL
PLATELET # BLD AUTO: 398 K/UL
PLATELET # BLD AUTO: 411 K/UL
PLATELET # BLD AUTO: 413 K/UL
PLATELET # BLD AUTO: 421 K/UL
PLATELET # BLD AUTO: 426 K/UL
PLATELET # BLD AUTO: 496 K/UL
PLATELET BLD QL SMEAR: ABNORMAL
PLATELET BLD QL SMEAR: NORMAL
PMV BLD AUTO: 10 FL
PMV BLD AUTO: 10.1 FL
PMV BLD AUTO: 10.1 FL
PMV BLD AUTO: 10.2 FL
PMV BLD AUTO: 10.3 FL
PMV BLD AUTO: 10.4 FL
PMV BLD AUTO: 10.4 FL
PMV BLD AUTO: 10.5 FL
PMV BLD AUTO: 10.6 FL
PMV BLD AUTO: 8.3 FL
PMV BLD AUTO: 8.6 FL
PMV BLD AUTO: 8.6 FL
PMV BLD AUTO: 8.7 FL
PMV BLD AUTO: 8.9 FL
PMV BLD AUTO: 8.9 FL
PMV BLD AUTO: 9 FL
PMV BLD AUTO: 9 FL
PMV BLD AUTO: 9.1 FL
PMV BLD AUTO: 9.2 FL
PMV BLD AUTO: 9.3 FL
PMV BLD AUTO: 9.4 FL
PMV BLD AUTO: 9.4 FL
PMV BLD AUTO: 9.5 FL
PMV BLD AUTO: 9.7 FL
PMV BLD AUTO: 9.8 FL
PMV BLD AUTO: 9.8 FL
PMV BLD AUTO: 9.9 FL
PMV BLD AUTO: 9.9 FL
PMV BLD AUTO: ABNORMAL FL
PO2 BLDA: 31 MMHG (ref 40–60)
PO2 BLDA: 32 MMHG (ref 40–60)
PO2 BLDA: 38 MMHG (ref 40–60)
PO2 BLDA: 44 MMHG (ref 40–60)
PO2 BLDA: 44 MMHG (ref 80–100)
PO2 BLDA: 48 MMHG (ref 80–100)
PO2 BLDA: 51 MMHG (ref 80–100)
PO2 BLDA: 52 MMHG (ref 80–100)
PO2 BLDA: 58 MMHG (ref 80–100)
PO2 BLDA: 62 MMHG (ref 80–100)
PO2 BLDA: 63 MMHG (ref 80–100)
POC BE: 11 MMOL/L
POC BE: 17 MMOL/L
POC BE: 2 MMOL/L
POC BE: 22 MMOL/L
POC BE: 4 MMOL/L
POC BE: 4 MMOL/L
POC BE: 6 MMOL/L
POC BE: 6 MMOL/L
POC BE: 7 MMOL/L
POC BE: 9 MMOL/L
POC BE: 9 MMOL/L
POC SATURATED O2: 62 % (ref 95–100)
POC SATURATED O2: 63 % (ref 95–100)
POC SATURATED O2: 72 % (ref 95–100)
POC SATURATED O2: 82 % (ref 95–100)
POC SATURATED O2: 84 % (ref 95–100)
POC SATURATED O2: 84 % (ref 95–100)
POC SATURATED O2: 87 % (ref 95–100)
POC SATURATED O2: 91 % (ref 95–100)
POC SATURATED O2: 91 % (ref 95–100)
POC SATURATED O2: 92 % (ref 95–100)
POC SATURATED O2: 94 % (ref 95–100)
POC TCO2: 27 MMOL/L (ref 23–27)
POC TCO2: 29 MMOL/L (ref 23–27)
POC TCO2: 29 MMOL/L (ref 24–29)
POC TCO2: 30 MMOL/L (ref 23–27)
POC TCO2: 30 MMOL/L (ref 23–27)
POC TCO2: 31 MMOL/L (ref 23–27)
POC TCO2: 33 MMOL/L (ref 23–27)
POC TCO2: 35 MMOL/L (ref 24–29)
POC TCO2: 38 MMOL/L (ref 23–27)
POC TCO2: 43 MMOL/L (ref 24–29)
POC TCO2: 47 MMOL/L (ref 24–29)
POCT GLUCOSE: 102 MG/DL (ref 70–110)
POCT GLUCOSE: 97 MG/DL (ref 70–110)
POIKILOCYTOSIS BLD QL SMEAR: ABNORMAL
POIKILOCYTOSIS BLD QL SMEAR: SLIGHT
POLYCHROMASIA BLD QL SMEAR: ABNORMAL
POLYCHROMASIA BLD QL SMEAR: NORMAL
POTASSIUM SERPL-SCNC: 2.6 MMOL/L
POTASSIUM SERPL-SCNC: 2.7 MMOL/L
POTASSIUM SERPL-SCNC: 2.9 MMOL/L
POTASSIUM SERPL-SCNC: 2.9 MMOL/L
POTASSIUM SERPL-SCNC: 3 MMOL/L
POTASSIUM SERPL-SCNC: 3.1 MMOL/L
POTASSIUM SERPL-SCNC: 3.2 MMOL/L
POTASSIUM SERPL-SCNC: 3.3 MMOL/L
POTASSIUM SERPL-SCNC: 3.4 MMOL/L
POTASSIUM SERPL-SCNC: 3.5 MMOL/L
POTASSIUM SERPL-SCNC: 3.6 MMOL/L
POTASSIUM SERPL-SCNC: 3.7 MMOL/L
POTASSIUM SERPL-SCNC: 3.8 MMOL/L
POTASSIUM SERPL-SCNC: 3.9 MMOL/L
POTASSIUM SERPL-SCNC: 4 MMOL/L
POTASSIUM SERPL-SCNC: 4.1 MMOL/L
POTASSIUM SERPL-SCNC: 4.3 MMOL/L
POTASSIUM SERPL-SCNC: 4.4 MMOL/L
POTASSIUM SERPL-SCNC: 4.4 MMOL/L
POTASSIUM SERPL-SCNC: 4.8 MMOL/L
POTASSIUM SERPL-SCNC: 5.1 MMOL/L
PROT SERPL-MCNC: 5.7 G/DL
PROT SERPL-MCNC: 5.7 G/DL
PROT SERPL-MCNC: 6.1 G/DL
PROT SERPL-MCNC: 6.3 G/DL
PROT SERPL-MCNC: 6.6 G/DL
PROT SERPL-MCNC: 6.7 G/DL
PROT SERPL-MCNC: 6.7 G/DL
PROT SERPL-MCNC: 6.8 G/DL
PROT SERPL-MCNC: 6.9 G/DL
PROT SERPL-MCNC: 7 G/DL
PROT SERPL-MCNC: 7.1 G/DL
PROT SERPL-MCNC: 7.2 G/DL
PROT SERPL-MCNC: 7.3 G/DL
PROT SERPL-MCNC: 7.4 G/DL
PROT SERPL-MCNC: 7.8 G/DL
PROT UR QL STRIP: ABNORMAL
PROT UR QL STRIP: NEGATIVE
PROT UR QL STRIP: NEGATIVE
PROTHROMBIN TIME: 10.6 SEC
PROTHROMBIN TIME: 10.7 SEC
PROTHROMBIN TIME: 10.9 SEC
PROTHROMBIN TIME: 10.9 SEC
PROTHROMBIN TIME: 11 SEC
PROTHROMBIN TIME: 11.1 SEC
PROTHROMBIN TIME: 11.3 SEC
PROTHROMBIN TIME: 11.5 SEC
PROTHROMBIN TIME: 11.5 SEC
PROTHROMBIN TIME: 11.7 SEC
PROTHROMBIN TIME: 11.9 SEC
PROTHROMBIN TIME: 13 SEC
PROTHROMBIN TIME: 13.1 SEC
PROTHROMBIN TIME: 13.1 SEC
PROTHROMBIN TIME: 13.5 SEC
PROTHROMBIN TIME: 13.6 SEC
PROTHROMBIN TIME: 14.8 SEC
PROTHROMBIN TIME: 15 SEC
PROTHROMBIN TIME: 15 SEC
PROTHROMBIN TIME: 16.4 SEC
PROTHROMBIN TIME: 16.6 SEC
PROTHROMBIN TIME: 16.7 SEC
PROTHROMBIN TIME: 17.9 SEC
PROTHROMBIN TIME: 18.3 SEC
PROTHROMBIN TIME: 18.7 SEC
PROTHROMBIN TIME: 19.4 SEC
PROTHROMBIN TIME: 19.5 SEC
PROTHROMBIN TIME: 19.6 SEC
PROTHROMBIN TIME: 20.1 SEC
PROTHROMBIN TIME: 20.3 SEC
PROTHROMBIN TIME: 20.5 SEC
PROTHROMBIN TIME: 21.6 SEC
PROTHROMBIN TIME: 21.7 SEC
PROTHROMBIN TIME: 22.1 SEC
PROTHROMBIN TIME: 22.2 SEC
PROTHROMBIN TIME: 22.2 SEC
PROTHROMBIN TIME: 22.5 SEC
PROTHROMBIN TIME: 22.9 SEC
PROTHROMBIN TIME: 23.7 SEC
PROTHROMBIN TIME: 24 SEC
PROTHROMBIN TIME: 25.3 SEC
PROTHROMBIN TIME: 26.3 SEC
PROTHROMBIN TIME: 27.1 SEC
PROTHROMBIN TIME: 27.6 SEC
PROTHROMBIN TIME: 27.6 SEC
PROTHROMBIN TIME: 27.7 SEC
PROTHROMBIN TIME: 28.3 SEC
PROTHROMBIN TIME: 29.5 SEC
PROTHROMBIN TIME: 29.6 SEC
PROTHROMBIN TIME: 29.8 SEC
PROTHROMBIN TIME: 30.6 SEC
PROTHROMBIN TIME: 31.6 SEC
PROTHROMBIN TIME: 31.8 SEC
PROTHROMBIN TIME: 32.2 SEC
PROTHROMBIN TIME: 40.3 SEC
PROTHROMBIN TIME: 41.5 SEC
PROTHROMBIN TIME: 44.7 SEC
PROTHROMBIN TIME: 56.2 SEC
PROTHROMBIN TIME: 78.8 SEC
RBC # BLD AUTO: 3.68 M/UL
RBC # BLD AUTO: 3.73 M/UL
RBC # BLD AUTO: 3.76 M/UL
RBC # BLD AUTO: 3.86 M/UL
RBC # BLD AUTO: 3.98 M/UL
RBC # BLD AUTO: 4.04 M/UL
RBC # BLD AUTO: 4.22 M/UL
RBC # BLD AUTO: 4.29 M/UL
RBC # BLD AUTO: 4.31 M/UL
RBC # BLD AUTO: 4.31 M/UL
RBC # BLD AUTO: 4.36 M/UL
RBC # BLD AUTO: 4.36 M/UL
RBC # BLD AUTO: 4.39 M/UL
RBC # BLD AUTO: 4.4 M/UL
RBC # BLD AUTO: 4.45 M/UL
RBC # BLD AUTO: 4.51 M/UL
RBC # BLD AUTO: 4.54 M/UL
RBC # BLD AUTO: 4.54 M/UL
RBC # BLD AUTO: 4.58 M/UL
RBC # BLD AUTO: 4.59 M/UL
RBC # BLD AUTO: 4.6 M/UL
RBC # BLD AUTO: 4.65 M/UL
RBC # BLD AUTO: 4.69 M/UL
RBC # BLD AUTO: 4.69 M/UL
RBC # BLD AUTO: 4.7 M/UL
RBC # BLD AUTO: 4.74 M/UL
RBC # BLD AUTO: 4.79 M/UL
RBC # BLD AUTO: 4.79 M/UL
RBC # BLD AUTO: 4.81 M/UL
RBC # BLD AUTO: 4.81 M/UL
RBC # BLD AUTO: 4.83 M/UL
RBC # BLD AUTO: 4.83 M/UL
RBC # BLD AUTO: 4.84 M/UL
RBC # BLD AUTO: 4.84 M/UL
RBC # BLD AUTO: 4.86 M/UL
RBC # BLD AUTO: 4.88 M/UL
RBC # BLD AUTO: 4.9 M/UL
RBC # BLD AUTO: 4.9 M/UL
RBC # BLD AUTO: 4.94 M/UL
RBC # BLD AUTO: 4.94 M/UL
RBC # BLD AUTO: 5.07 M/UL
RBC # BLD AUTO: 5.11 M/UL
RBC # BLD AUTO: 5.11 M/UL
RBC # BLD AUTO: 5.12 M/UL
RBC # BLD AUTO: 5.15 M/UL
RBC # BLD AUTO: 5.17 M/UL
RBC # BLD AUTO: 5.18 M/UL
RBC # BLD AUTO: 5.22 M/UL
RBC # BLD AUTO: 5.23 M/UL
RBC # BLD AUTO: 5.29 M/UL
RBC # BLD AUTO: 5.3 M/UL
RBC # BLD AUTO: 5.33 M/UL
RBC # BLD AUTO: 5.34 M/UL
RBC # BLD AUTO: 5.35 M/UL
RBC # BLD AUTO: 5.42 M/UL
RBC # BLD AUTO: 5.45 M/UL
RBC # BLD AUTO: 5.47 M/UL
RBC # BLD AUTO: 5.51 M/UL
RBC # BLD AUTO: 5.53 M/UL
RBC # BLD AUTO: 5.67 M/UL
RBC #/AREA URNS AUTO: 1 /HPF (ref 0–4)
RBC #/AREA URNS AUTO: 2 /HPF (ref 0–4)
RBC #/AREA URNS AUTO: 4 /HPF (ref 0–4)
RETIRED EF AND QEF - SEE NOTES: 55 (ref 55–65)
RETIRED EF AND QEF - SEE NOTES: 60 (ref 55–65)
SAMPLE: ABNORMAL
SCHISTOCYTES BLD QL SMEAR: PRESENT
SITE: ABNORMAL
SODIUM SERPL-SCNC: 130 MMOL/L
SODIUM SERPL-SCNC: 130 MMOL/L
SODIUM SERPL-SCNC: 131 MMOL/L
SODIUM SERPL-SCNC: 131 MMOL/L
SODIUM SERPL-SCNC: 132 MMOL/L
SODIUM SERPL-SCNC: 133 MMOL/L
SODIUM SERPL-SCNC: 134 MMOL/L
SODIUM SERPL-SCNC: 135 MMOL/L
SODIUM SERPL-SCNC: 136 MMOL/L
SODIUM SERPL-SCNC: 137 MMOL/L
SODIUM SERPL-SCNC: 138 MMOL/L
SODIUM SERPL-SCNC: 139 MMOL/L
SODIUM SERPL-SCNC: 140 MMOL/L
SODIUM SERPL-SCNC: 140 MMOL/L
SODIUM SERPL-SCNC: 141 MMOL/L
SODIUM SERPL-SCNC: 142 MMOL/L
SODIUM SERPL-SCNC: 142 MMOL/L
SODIUM SERPL-SCNC: 143 MMOL/L
SODIUM SERPL-SCNC: 143 MMOL/L
SP GR UR STRIP: 1.01 (ref 1–1.03)
SP02: 85
SP02: 88
SP02: 89
SP02: 90
SP02: 90
SP02: 92
SP02: 93
SPHEROCYTES BLD QL SMEAR: ABNORMAL
SQUAMOUS #/AREA URNS AUTO: 1 /HPF
SQUAMOUS #/AREA URNS AUTO: 6 /HPF
TARGETS BLD QL SMEAR: ABNORMAL
TOXIC GRANULES BLD QL SMEAR: PRESENT
TOXIC GRANULES BLD QL SMEAR: PRESENT
TRANS ERYTHROCYTES VOL PATIENT: NORMAL ML
TRICUSPID VALVE REGURGITATION: ABNORMAL
TROPONIN I SERPL DL<=0.01 NG/ML-MCNC: 0.01 NG/ML
TROPONIN I SERPL DL<=0.01 NG/ML-MCNC: <0.006 NG/ML
URN SPEC COLLECT METH UR: ABNORMAL
UROBILINOGEN UR STRIP-ACNC: 4 EU/DL
UROBILINOGEN UR STRIP-ACNC: NEGATIVE EU/DL
UROBILINOGEN UR STRIP-ACNC: NEGATIVE EU/DL
VANCOMYCIN SERPL-MCNC: 14.7 UG/ML
VANCOMYCIN SERPL-MCNC: 15.2 UG/ML
VANCOMYCIN SERPL-MCNC: 16.7 UG/ML
VANCOMYCIN SERPL-MCNC: 21.4 UG/ML
VANCOMYCIN SERPL-MCNC: 7.3 UG/ML
VANCOMYCIN TROUGH SERPL-MCNC: 12.3 UG/ML
VANCOMYCIN TROUGH SERPL-MCNC: 12.6 UG/ML
VANCOMYCIN TROUGH SERPL-MCNC: 21.4 UG/ML
VANCOMYCIN TROUGH SERPL-MCNC: 27.9 UG/ML
VANCOMYCIN TROUGH SERPL-MCNC: 33.2 UG/ML
VANCOMYCIN TROUGH SERPL-MCNC: 33.8 UG/ML
WBC # BLD AUTO: 10.82 K/UL
WBC # BLD AUTO: 10.97 K/UL
WBC # BLD AUTO: 11.11 K/UL
WBC # BLD AUTO: 12.32 K/UL
WBC # BLD AUTO: 17.16 K/UL
WBC # BLD AUTO: 3.92 K/UL
WBC # BLD AUTO: 4.05 K/UL
WBC # BLD AUTO: 4.1 K/UL
WBC # BLD AUTO: 4.41 K/UL
WBC # BLD AUTO: 4.5 K/UL
WBC # BLD AUTO: 4.57 K/UL
WBC # BLD AUTO: 4.6 K/UL
WBC # BLD AUTO: 4.61 K/UL
WBC # BLD AUTO: 4.68 K/UL
WBC # BLD AUTO: 4.68 K/UL
WBC # BLD AUTO: 4.74 K/UL
WBC # BLD AUTO: 4.88 K/UL
WBC # BLD AUTO: 4.93 K/UL
WBC # BLD AUTO: 4.95 K/UL
WBC # BLD AUTO: 5.07 K/UL
WBC # BLD AUTO: 5.15 K/UL
WBC # BLD AUTO: 5.18 K/UL
WBC # BLD AUTO: 5.24 K/UL
WBC # BLD AUTO: 5.3 K/UL
WBC # BLD AUTO: 5.38 K/UL
WBC # BLD AUTO: 5.41 K/UL
WBC # BLD AUTO: 5.52 K/UL
WBC # BLD AUTO: 5.55 K/UL
WBC # BLD AUTO: 5.89 K/UL
WBC # BLD AUTO: 5.99 K/UL
WBC # BLD AUTO: 6.22 K/UL
WBC # BLD AUTO: 6.36 K/UL
WBC # BLD AUTO: 6.44 K/UL
WBC # BLD AUTO: 6.55 K/UL
WBC # BLD AUTO: 6.6 K/UL
WBC # BLD AUTO: 6.64 K/UL
WBC # BLD AUTO: 6.79 K/UL
WBC # BLD AUTO: 6.79 K/UL
WBC # BLD AUTO: 7 K/UL
WBC # BLD AUTO: 7 K/UL
WBC # BLD AUTO: 7.09 K/UL
WBC # BLD AUTO: 7.46 K/UL
WBC # BLD AUTO: 7.63 K/UL
WBC # BLD AUTO: 7.73 K/UL
WBC # BLD AUTO: 7.84 K/UL
WBC # BLD AUTO: 7.87 K/UL
WBC # BLD AUTO: 7.91 K/UL
WBC # BLD AUTO: 7.92 K/UL
WBC # BLD AUTO: 7.93 K/UL
WBC # BLD AUTO: 8.05 K/UL
WBC # BLD AUTO: 8.17 K/UL
WBC # BLD AUTO: 8.34 K/UL
WBC # BLD AUTO: 8.37 K/UL
WBC # BLD AUTO: 8.68 K/UL
WBC # BLD AUTO: 8.87 K/UL
WBC # BLD AUTO: 9.15 K/UL
WBC # BLD AUTO: 9.24 K/UL
WBC # BLD AUTO: 9.26 K/UL
WBC # BLD AUTO: 9.71 K/UL
WBC # BLD AUTO: 9.74 K/UL
WBC #/AREA URNS AUTO: 1 /HPF (ref 0–5)
WBC #/AREA URNS AUTO: 4 /HPF (ref 0–5)
WBC #/AREA URNS AUTO: 6 /HPF (ref 0–5)

## 2017-01-01 PROCEDURE — 27000221 HC OXYGEN, UP TO 24 HOURS

## 2017-01-01 PROCEDURE — 99233 SBSQ HOSP IP/OBS HIGH 50: CPT | Mod: ,,, | Performed by: PHYSICIAN ASSISTANT

## 2017-01-01 PROCEDURE — A4216 STERILE WATER/SALINE, 10 ML: HCPCS | Performed by: INTERNAL MEDICINE

## 2017-01-01 PROCEDURE — 20600001 HC STEP DOWN PRIVATE ROOM

## 2017-01-01 PROCEDURE — 63600175 PHARM REV CODE 636 W HCPCS: Performed by: INTERNAL MEDICINE

## 2017-01-01 PROCEDURE — 82803 BLOOD GASES ANY COMBINATION: CPT

## 2017-01-01 PROCEDURE — 86900 BLOOD TYPING SEROLOGIC ABO: CPT

## 2017-01-01 PROCEDURE — 87086 URINE CULTURE/COLONY COUNT: CPT

## 2017-01-01 PROCEDURE — 85610 PROTHROMBIN TIME: CPT

## 2017-01-01 PROCEDURE — 77001 FLUOROGUIDE FOR VEIN DEVICE: CPT | Mod: 26,,, | Performed by: SURGERY

## 2017-01-01 PROCEDURE — 25000003 PHARM REV CODE 250: Performed by: PHYSICIAN ASSISTANT

## 2017-01-01 PROCEDURE — 85007 BL SMEAR W/DIFF WBC COUNT: CPT | Mod: 91

## 2017-01-01 PROCEDURE — 36415 COLL VENOUS BLD VENIPUNCTURE: CPT

## 2017-01-01 PROCEDURE — 99222 1ST HOSP IP/OBS MODERATE 55: CPT | Mod: ,,, | Performed by: INTERNAL MEDICINE

## 2017-01-01 PROCEDURE — 25000003 PHARM REV CODE 250: Performed by: INTERNAL MEDICINE

## 2017-01-01 PROCEDURE — 94761 N-INVAS EAR/PLS OXIMETRY MLT: CPT

## 2017-01-01 PROCEDURE — D9220A PRA ANESTHESIA: Mod: CRNA,,, | Performed by: NURSE ANESTHETIST, CERTIFIED REGISTERED

## 2017-01-01 PROCEDURE — 84132 ASSAY OF SERUM POTASSIUM: CPT

## 2017-01-01 PROCEDURE — 37000008 HC ANESTHESIA 1ST 15 MINUTES: Performed by: INTERNAL MEDICINE

## 2017-01-01 PROCEDURE — 25000003 PHARM REV CODE 250: Performed by: STUDENT IN AN ORGANIZED HEALTH CARE EDUCATION/TRAINING PROGRAM

## 2017-01-01 PROCEDURE — 63600175 PHARM REV CODE 636 W HCPCS: Performed by: PHYSICIAN ASSISTANT

## 2017-01-01 PROCEDURE — 27100171 HC OXYGEN HIGH FLOW UP TO 24 HOURS

## 2017-01-01 PROCEDURE — 80048 BASIC METABOLIC PNL TOTAL CA: CPT

## 2017-01-01 PROCEDURE — 85007 BL SMEAR W/DIFF WBC COUNT: CPT

## 2017-01-01 PROCEDURE — 94640 AIRWAY INHALATION TREATMENT: CPT

## 2017-01-01 PROCEDURE — 99900035 HC TECH TIME PER 15 MIN (STAT)

## 2017-01-01 PROCEDURE — 99999 PR PBB SHADOW E&M-EST. PATIENT-LVL II: CPT | Mod: PBBFAC,,, | Performed by: PHYSICAL MEDICINE & REHABILITATION

## 2017-01-01 PROCEDURE — 85025 COMPLETE CBC W/AUTO DIFF WBC: CPT

## 2017-01-01 PROCEDURE — 63600175 PHARM REV CODE 636 W HCPCS: Performed by: NURSE PRACTITIONER

## 2017-01-01 PROCEDURE — 36569 INSJ PICC 5 YR+ W/O IMAGING: CPT

## 2017-01-01 PROCEDURE — 93320 DOPPLER ECHO COMPLETE: CPT

## 2017-01-01 PROCEDURE — 99239 HOSP IP/OBS DSCHRG MGMT >30: CPT | Mod: ,,, | Performed by: INTERNAL MEDICINE

## 2017-01-01 PROCEDURE — 99232 SBSQ HOSP IP/OBS MODERATE 35: CPT | Mod: ,,, | Performed by: INTERNAL MEDICINE

## 2017-01-01 PROCEDURE — 99284 EMERGENCY DEPT VISIT MOD MDM: CPT

## 2017-01-01 PROCEDURE — 99214 OFFICE O/P EST MOD 30 MIN: CPT | Mod: S$GLB,,, | Performed by: PHYSICAL MEDICINE & REHABILITATION

## 2017-01-01 PROCEDURE — 63600175 PHARM REV CODE 636 W HCPCS: Performed by: STUDENT IN AN ORGANIZED HEALTH CARE EDUCATION/TRAINING PROGRAM

## 2017-01-01 PROCEDURE — 99214 OFFICE O/P EST MOD 30 MIN: CPT | Mod: 25,S$GLB,, | Performed by: INTERNAL MEDICINE

## 2017-01-01 PROCEDURE — 25000003 PHARM REV CODE 250: Performed by: NURSE PRACTITIONER

## 2017-01-01 PROCEDURE — 83735 ASSAY OF MAGNESIUM: CPT

## 2017-01-01 PROCEDURE — 84100 ASSAY OF PHOSPHORUS: CPT

## 2017-01-01 PROCEDURE — 4A0239Z MEASUREMENT OF CARDIAC OUTPUT, PERCUTANEOUS APPROACH: ICD-10-PCS | Performed by: INTERNAL MEDICINE

## 2017-01-01 PROCEDURE — 87186 SC STD MICRODIL/AGAR DIL: CPT

## 2017-01-01 PROCEDURE — D9220A PRA ANESTHESIA: Mod: ,,, | Performed by: ANESTHESIOLOGY

## 2017-01-01 PROCEDURE — 63600175 PHARM REV CODE 636 W HCPCS

## 2017-01-01 PROCEDURE — 63600175 PHARM REV CODE 636 W HCPCS: Performed by: SURGERY

## 2017-01-01 PROCEDURE — 87040 BLOOD CULTURE FOR BACTERIA: CPT

## 2017-01-01 PROCEDURE — 99213 OFFICE O/P EST LOW 20 MIN: CPT | Mod: S$GLB,,, | Performed by: PHYSICIAN ASSISTANT

## 2017-01-01 PROCEDURE — 80202 ASSAY OF VANCOMYCIN: CPT

## 2017-01-01 PROCEDURE — 80053 COMPREHEN METABOLIC PANEL: CPT

## 2017-01-01 PROCEDURE — 99233 SBSQ HOSP IP/OBS HIGH 50: CPT | Mod: ,,, | Performed by: INTERNAL MEDICINE

## 2017-01-01 PROCEDURE — 63600175 PHARM REV CODE 636 W HCPCS: Performed by: ANESTHESIOLOGY

## 2017-01-01 PROCEDURE — 99285 EMERGENCY DEPT VISIT HI MDM: CPT | Mod: ,,, | Performed by: EMERGENCY MEDICINE

## 2017-01-01 PROCEDURE — 0399T 2D ECHO WITH COLOR FLOW DOPPLER: CPT | Mod: ,,, | Performed by: INTERNAL MEDICINE

## 2017-01-01 PROCEDURE — 99211 OFF/OP EST MAY X REQ PHY/QHP: CPT | Mod: 25,S$GLB,,

## 2017-01-01 PROCEDURE — C1751 CATH, INF, PER/CENT/MIDLINE: HCPCS

## 2017-01-01 PROCEDURE — 87040 BLOOD CULTURE FOR BACTERIA: CPT | Mod: 59

## 2017-01-01 PROCEDURE — 63600175 PHARM REV CODE 636 W HCPCS: Performed by: NURSE ANESTHETIST, CERTIFIED REGISTERED

## 2017-01-01 PROCEDURE — 85027 COMPLETE CBC AUTOMATED: CPT

## 2017-01-01 PROCEDURE — 78582 LUNG VENTILAT&PERFUS IMAGING: CPT | Mod: 26,,, | Performed by: NUCLEAR MEDICINE

## 2017-01-01 PROCEDURE — 86920 COMPATIBILITY TEST SPIN: CPT

## 2017-01-01 PROCEDURE — C1751 CATH, INF, PER/CENT/MIDLINE: HCPCS | Performed by: SURGERY

## 2017-01-01 PROCEDURE — 96374 THER/PROPH/DIAG INJ IV PUSH: CPT

## 2017-01-01 PROCEDURE — 99223 1ST HOSP IP/OBS HIGH 75: CPT | Mod: 25,,, | Performed by: SURGERY

## 2017-01-01 PROCEDURE — 93005 ELECTROCARDIOGRAM TRACING: CPT

## 2017-01-01 PROCEDURE — 85610 PROTHROMBIN TIME: CPT | Mod: 91

## 2017-01-01 PROCEDURE — 94760 N-INVAS EAR/PLS OXIMETRY 1: CPT

## 2017-01-01 PROCEDURE — 99999 PR PBB SHADOW E&M-EST. PATIENT-LVL IV: CPT | Mod: PBBFAC,,, | Performed by: PHYSICIAN ASSISTANT

## 2017-01-01 PROCEDURE — 25000003 PHARM REV CODE 250: Performed by: ANESTHESIOLOGY

## 2017-01-01 PROCEDURE — 99213 OFFICE O/P EST LOW 20 MIN: CPT | Mod: GC,,, | Performed by: SURGERY

## 2017-01-01 PROCEDURE — 99499 UNLISTED E&M SERVICE: CPT | Mod: ,,, | Performed by: PHYSICIAN ASSISTANT

## 2017-01-01 PROCEDURE — 4A033B3 MEASUREMENT OF ARTERIAL PRESSURE, PULMONARY, PERCUTANEOUS APPROACH: ICD-10-PCS | Performed by: INTERNAL MEDICINE

## 2017-01-01 PROCEDURE — 94660 CPAP INITIATION&MGMT: CPT

## 2017-01-01 PROCEDURE — 87088 URINE BACTERIA CULTURE: CPT

## 2017-01-01 PROCEDURE — 25000003 PHARM REV CODE 250: Performed by: EMERGENCY MEDICINE

## 2017-01-01 PROCEDURE — 25000003 PHARM REV CODE 250

## 2017-01-01 PROCEDURE — 93010 ELECTROCARDIOGRAM REPORT: CPT | Mod: ,,, | Performed by: INTERNAL MEDICINE

## 2017-01-01 PROCEDURE — A4216 STERILE WATER/SALINE, 10 ML: HCPCS | Performed by: STUDENT IN AN ORGANIZED HEALTH CARE EDUCATION/TRAINING PROGRAM

## 2017-01-01 PROCEDURE — 25000242 PHARM REV CODE 250 ALT 637 W/ HCPCS: Performed by: INTERNAL MEDICINE

## 2017-01-01 PROCEDURE — G0378 HOSPITAL OBSERVATION PER HR: HCPCS

## 2017-01-01 PROCEDURE — D9220A PRA ANESTHESIA: Mod: ANES,,, | Performed by: ANESTHESIOLOGY

## 2017-01-01 PROCEDURE — 99223 1ST HOSP IP/OBS HIGH 75: CPT | Mod: ,,, | Performed by: PHYSICIAN ASSISTANT

## 2017-01-01 PROCEDURE — 90686 IIV4 VACC NO PRSV 0.5 ML IM: CPT | Mod: S$GLB,,, | Performed by: INTERNAL MEDICINE

## 2017-01-01 PROCEDURE — C1766 INTRO/SHEATH,STRBLE,NON-PEEL: HCPCS

## 2017-01-01 PROCEDURE — 83880 ASSAY OF NATRIURETIC PEPTIDE: CPT

## 2017-01-01 PROCEDURE — 99211 OFF/OP EST MAY X REQ PHY/QHP: CPT | Mod: 25,S$GLB,, | Performed by: PHARMACIST

## 2017-01-01 PROCEDURE — 99214 OFFICE O/P EST MOD 30 MIN: CPT | Mod: S$GLB,,, | Performed by: INTERNAL MEDICINE

## 2017-01-01 PROCEDURE — 76937 US GUIDE VASCULAR ACCESS: CPT

## 2017-01-01 PROCEDURE — 83735 ASSAY OF MAGNESIUM: CPT | Mod: 91

## 2017-01-01 PROCEDURE — 99285 EMERGENCY DEPT VISIT HI MDM: CPT | Mod: 25

## 2017-01-01 PROCEDURE — 36600 WITHDRAWAL OF ARTERIAL BLOOD: CPT

## 2017-01-01 PROCEDURE — 87077 CULTURE AEROBIC IDENTIFY: CPT

## 2017-01-01 PROCEDURE — 27000190 HC CPAP FULL FACE MASK W/VALVE

## 2017-01-01 PROCEDURE — 36000706: Performed by: SURGERY

## 2017-01-01 PROCEDURE — B246ZZ4 ULTRASONOGRAPHY OF RIGHT AND LEFT HEART, TRANSESOPHAGEAL: ICD-10-PCS | Performed by: INTERNAL MEDICINE

## 2017-01-01 PROCEDURE — 99284 EMERGENCY DEPT VISIT MOD MDM: CPT | Mod: 25

## 2017-01-01 PROCEDURE — 87205 SMEAR GRAM STAIN: CPT | Mod: 59

## 2017-01-01 PROCEDURE — 99220 PR INITIAL OBSERVATION CARE,LEVL III: CPT | Mod: ,,, | Performed by: INTERNAL MEDICINE

## 2017-01-01 PROCEDURE — 81001 URINALYSIS AUTO W/SCOPE: CPT

## 2017-01-01 PROCEDURE — 97803 MED NUTRITION INDIV SUBSEQ: CPT

## 2017-01-01 PROCEDURE — 99291 CRITICAL CARE FIRST HOUR: CPT | Mod: 25

## 2017-01-01 PROCEDURE — 93306 TTE W/DOPPLER COMPLETE: CPT

## 2017-01-01 PROCEDURE — 87070 CULTURE OTHR SPECIMN AEROBIC: CPT

## 2017-01-01 PROCEDURE — 33235 REMOVAL PACEMAKER ELECTRODE: CPT | Mod: 22,,, | Performed by: INTERNAL MEDICINE

## 2017-01-01 PROCEDURE — 93325 DOPPLER ECHO COLOR FLOW MAPG: CPT

## 2017-01-01 PROCEDURE — 85610 PROTHROMBIN TIME: CPT | Mod: QW,S$GLB,,

## 2017-01-01 PROCEDURE — 3008F BODY MASS INDEX DOCD: CPT | Mod: S$GLB,,, | Performed by: PHYSICIAN ASSISTANT

## 2017-01-01 PROCEDURE — 99999 PR PBB SHADOW E&M-EST. PATIENT-LVL III: CPT | Mod: PBBFAC,,, | Performed by: INTERNAL MEDICINE

## 2017-01-01 PROCEDURE — 93306 TTE W/DOPPLER COMPLETE: CPT | Mod: 26,,, | Performed by: INTERNAL MEDICINE

## 2017-01-01 PROCEDURE — 0JPT3XZ REMOVAL OF TUNNELED VASCULAR ACCESS DEVICE FROM TRUNK SUBCUTANEOUS TISSUE AND FASCIA, PERCUTANEOUS APPROACH: ICD-10-PCS | Performed by: SURGERY

## 2017-01-01 PROCEDURE — 85520 HEPARIN ASSAY: CPT

## 2017-01-01 PROCEDURE — 37000009 HC ANESTHESIA EA ADD 15 MINS: Performed by: INTERNAL MEDICINE

## 2017-01-01 PROCEDURE — 99219 PR INITIAL OBSERVATION CARE,LEVL II: CPT | Mod: GC,,, | Performed by: INTERNAL MEDICINE

## 2017-01-01 PROCEDURE — 97110 THERAPEUTIC EXERCISES: CPT

## 2017-01-01 PROCEDURE — 87075 CULTR BACTERIA EXCEPT BLOOD: CPT | Mod: 59

## 2017-01-01 PROCEDURE — 97165 OT EVAL LOW COMPLEX 30 MIN: CPT

## 2017-01-01 PROCEDURE — 02HV33Z INSERTION OF INFUSION DEVICE INTO SUPERIOR VENA CAVA, PERCUTANEOUS APPROACH: ICD-10-PCS | Performed by: SURGERY

## 2017-01-01 PROCEDURE — 85730 THROMBOPLASTIN TIME PARTIAL: CPT

## 2017-01-01 PROCEDURE — 1160F RVW MEDS BY RX/DR IN RCRD: CPT | Mod: S$GLB,,, | Performed by: INTERNAL MEDICINE

## 2017-01-01 PROCEDURE — 36000707: Performed by: SURGERY

## 2017-01-01 PROCEDURE — 97116 GAIT TRAINING THERAPY: CPT

## 2017-01-01 PROCEDURE — 85610 PROTHROMBIN TIME: CPT | Mod: QW,S$GLB,, | Performed by: PHARMACIST

## 2017-01-01 PROCEDURE — 86850 RBC ANTIBODY SCREEN: CPT

## 2017-01-01 PROCEDURE — 99222 1ST HOSP IP/OBS MODERATE 55: CPT | Mod: ,,, | Performed by: SURGERY

## 2017-01-01 PROCEDURE — 99999 PR PBB SHADOW E&M-EST. PATIENT-LVL II: CPT | Mod: PBBFAC,,,

## 2017-01-01 PROCEDURE — 99223 1ST HOSP IP/OBS HIGH 75: CPT | Mod: ,,, | Performed by: INTERNAL MEDICINE

## 2017-01-01 PROCEDURE — 93320 DOPPLER ECHO COMPLETE: CPT | Mod: 26,,, | Performed by: INTERNAL MEDICINE

## 2017-01-01 PROCEDURE — 83880 ASSAY OF NATRIURETIC PEPTIDE: CPT | Mod: 91

## 2017-01-01 PROCEDURE — 02HV33Z INSERTION OF INFUSION DEVICE INTO SUPERIOR VENA CAVA, PERCUTANEOUS APPROACH: ICD-10-PCS | Performed by: INTERNAL MEDICINE

## 2017-01-01 PROCEDURE — 25000003 PHARM REV CODE 250: Performed by: NURSE ANESTHETIST, CERTIFIED REGISTERED

## 2017-01-01 PROCEDURE — 99232 SBSQ HOSP IP/OBS MODERATE 35: CPT | Mod: ,,, | Performed by: PHYSICIAN ASSISTANT

## 2017-01-01 PROCEDURE — 37000008 HC ANESTHESIA 1ST 15 MINUTES: Performed by: SURGERY

## 2017-01-01 PROCEDURE — 94620 PR PULMONARY STRESS TESTING,SIMPLE: CPT | Mod: S$GLB,,, | Performed by: INTERNAL MEDICINE

## 2017-01-01 PROCEDURE — 1160F RVW MEDS BY RX/DR IN RCRD: CPT | Mod: S$GLB,,, | Performed by: PHYSICAL MEDICINE & REHABILITATION

## 2017-01-01 PROCEDURE — 93005 ELECTROCARDIOGRAM TRACING: CPT | Mod: 59

## 2017-01-01 PROCEDURE — 4A023N6 MEASUREMENT OF CARDIAC SAMPLING AND PRESSURE, RIGHT HEART, PERCUTANEOUS APPROACH: ICD-10-PCS | Performed by: INTERNAL MEDICINE

## 2017-01-01 PROCEDURE — S0020 INJECTION, BUPIVICAINE HYDRO: HCPCS | Performed by: SURGERY

## 2017-01-01 PROCEDURE — 99285 EMERGENCY DEPT VISIT HI MDM: CPT

## 2017-01-01 PROCEDURE — 77001 FLUOROGUIDE FOR VEIN DEVICE: CPT | Mod: 26,,, | Performed by: INTERNAL MEDICINE

## 2017-01-01 PROCEDURE — 3008F BODY MASS INDEX DOCD: CPT | Mod: S$GLB,,, | Performed by: INTERNAL MEDICINE

## 2017-01-01 PROCEDURE — 33233 REMOVAL OF PM GENERATOR: CPT | Mod: ,,, | Performed by: INTERNAL MEDICINE

## 2017-01-01 PROCEDURE — 85025 COMPLETE CBC W/AUTO DIFF WBC: CPT | Mod: 91

## 2017-01-01 PROCEDURE — 36558 INSERT TUNNELED CV CATH: CPT | Mod: ,,, | Performed by: SURGERY

## 2017-01-01 PROCEDURE — 71000015 HC POSTOP RECOV 1ST HR: Performed by: SURGERY

## 2017-01-01 PROCEDURE — 99291 CRITICAL CARE FIRST HOUR: CPT | Mod: ,,, | Performed by: INTERNAL MEDICINE

## 2017-01-01 PROCEDURE — D9220A PRA ANESTHESIA: Mod: CRNA,,, | Performed by: REGISTERED NURSE

## 2017-01-01 PROCEDURE — 93325 DOPPLER ECHO COLOR FLOW MAPG: CPT | Mod: 26,,, | Performed by: INTERNAL MEDICINE

## 2017-01-01 PROCEDURE — 27200043 EP LAB PROCEDURE

## 2017-01-01 PROCEDURE — 77001 FLUOROGUIDE FOR VEIN DEVICE: CPT

## 2017-01-01 PROCEDURE — 27201642 EP LAB PROCEDURE

## 2017-01-01 PROCEDURE — 99217 PR OBSERVATION CARE DISCHARGE: CPT | Mod: ,,, | Performed by: INTERNAL MEDICINE

## 2017-01-01 PROCEDURE — C1894 INTRO/SHEATH, NON-LASER: HCPCS

## 2017-01-01 PROCEDURE — 80048 BASIC METABOLIC PNL TOTAL CA: CPT | Mod: 91

## 2017-01-01 PROCEDURE — 94799 UNLISTED PULMONARY SVC/PX: CPT

## 2017-01-01 PROCEDURE — 99223 1ST HOSP IP/OBS HIGH 75: CPT | Mod: ,,, | Performed by: SURGERY

## 2017-01-01 PROCEDURE — 85027 COMPLETE CBC AUTOMATED: CPT | Mod: 91

## 2017-01-01 PROCEDURE — 99284 EMERGENCY DEPT VISIT MOD MDM: CPT | Mod: ,,, | Performed by: EMERGENCY MEDICINE

## 2017-01-01 PROCEDURE — 20000000 HC ICU ROOM

## 2017-01-01 PROCEDURE — 84484 ASSAY OF TROPONIN QUANT: CPT

## 2017-01-01 PROCEDURE — 05HF33Z INSERTION OF INFUSION DEVICE INTO LEFT CEPHALIC VEIN, PERCUTANEOUS APPROACH: ICD-10-PCS | Performed by: INTERNAL MEDICINE

## 2017-01-01 PROCEDURE — 99217 PR OBSERVATION CARE DISCHARGE: CPT | Mod: ICN,,, | Performed by: INTERNAL MEDICINE

## 2017-01-01 PROCEDURE — 93280 PM DEVICE PROGR EVAL DUAL: CPT | Mod: S$GLB,,, | Performed by: INTERNAL MEDICINE

## 2017-01-01 PROCEDURE — 99238 HOSP IP/OBS DSCHRG MGMT 30/<: CPT | Mod: ,,, | Performed by: INTERNAL MEDICINE

## 2017-01-01 PROCEDURE — 37000009 HC ANESTHESIA EA ADD 15 MINS: Performed by: SURGERY

## 2017-01-01 PROCEDURE — 1159F MED LIST DOCD IN RCRD: CPT | Mod: S$GLB,,, | Performed by: PHYSICAL MEDICINE & REHABILITATION

## 2017-01-01 PROCEDURE — 80053 COMPREHEN METABOLIC PANEL: CPT | Mod: 91

## 2017-01-01 PROCEDURE — 25000003 PHARM REV CODE 250: Performed by: SURGERY

## 2017-01-01 PROCEDURE — 99214 OFFICE O/P EST MOD 30 MIN: CPT | Mod: S$GLB,,, | Performed by: PHYSICIAN ASSISTANT

## 2017-01-01 PROCEDURE — 99999 PR PBB SHADOW E&M-EST. PATIENT-LVL III: CPT | Mod: PBBFAC,,, | Performed by: PHYSICAL MEDICINE & REHABILITATION

## 2017-01-01 PROCEDURE — 25500020 PHARM REV CODE 255

## 2017-01-01 PROCEDURE — 93010 ELECTROCARDIOGRAM REPORT: CPT | Mod: S$GLB,,, | Performed by: INTERNAL MEDICINE

## 2017-01-01 PROCEDURE — 97802 MEDICAL NUTRITION INDIV IN: CPT

## 2017-01-01 PROCEDURE — 93312 ECHO TRANSESOPHAGEAL: CPT | Mod: 26,,, | Performed by: INTERNAL MEDICINE

## 2017-01-01 PROCEDURE — 97162 PT EVAL MOD COMPLEX 30 MIN: CPT

## 2017-01-01 PROCEDURE — 80202 ASSAY OF VANCOMYCIN: CPT | Mod: 91

## 2017-01-01 PROCEDURE — 99291 CRITICAL CARE FIRST HOUR: CPT | Mod: ,,, | Performed by: EMERGENCY MEDICINE

## 2017-01-01 PROCEDURE — 25500020 PHARM REV CODE 255: Performed by: INTERNAL MEDICINE

## 2017-01-01 PROCEDURE — 93451 RIGHT HEART CATH: CPT | Mod: 26,,, | Performed by: INTERNAL MEDICINE

## 2017-01-01 PROCEDURE — 71000033 HC RECOVERY, INTIAL HOUR: Performed by: SURGERY

## 2017-01-01 PROCEDURE — 36000 PLACE NEEDLE IN VEIN: CPT

## 2017-01-01 PROCEDURE — 63600175 PHARM REV CODE 636 W HCPCS: Performed by: EMERGENCY MEDICINE

## 2017-01-01 PROCEDURE — 05HA33Z INSERTION OF INFUSION DEVICE INTO LEFT BRACHIAL VEIN, PERCUTANEOUS APPROACH: ICD-10-PCS | Performed by: INTERNAL MEDICINE

## 2017-01-01 PROCEDURE — G0008 ADMIN INFLUENZA VIRUS VAC: HCPCS | Mod: S$GLB,,, | Performed by: INTERNAL MEDICINE

## 2017-01-01 PROCEDURE — 87205 SMEAR GRAM STAIN: CPT

## 2017-01-01 PROCEDURE — 71000016 HC POSTOP RECOV ADDL HR: Performed by: SURGERY

## 2017-01-01 PROCEDURE — 99213 OFFICE O/P EST LOW 20 MIN: CPT | Mod: S$GLB,,, | Performed by: INTERNAL MEDICINE

## 2017-01-01 PROCEDURE — 99283 EMERGENCY DEPT VISIT LOW MDM: CPT | Mod: ,,, | Performed by: EMERGENCY MEDICINE

## 2017-01-01 PROCEDURE — 99213 OFFICE O/P EST LOW 20 MIN: CPT | Mod: S$GLB,,, | Performed by: PHYSICAL MEDICINE & REHABILITATION

## 2017-01-01 PROCEDURE — 36558 INSERT TUNNELED CV CATH: CPT | Mod: 53,,, | Performed by: INTERNAL MEDICINE

## 2017-01-01 DEVICE — CATH POWERHICKMAN 8FR 5CM: Type: IMPLANTABLE DEVICE | Site: CHEST | Status: FUNCTIONAL

## 2017-01-01 RX ORDER — SODIUM CHLORIDE 0.9 % (FLUSH) 0.9 %
10 SYRINGE (ML) INJECTION
Status: DISCONTINUED | OUTPATIENT
Start: 2017-01-01 | End: 2017-01-01 | Stop reason: HOSPADM

## 2017-01-01 RX ORDER — POTASSIUM CHLORIDE 20 MEQ/1
40 TABLET, EXTENDED RELEASE ORAL ONCE
Status: COMPLETED | OUTPATIENT
Start: 2017-01-01 | End: 2017-01-01

## 2017-01-01 RX ORDER — HEPARIN SODIUM,PORCINE/D5W 25000/250
17 INTRAVENOUS SOLUTION INTRAVENOUS CONTINUOUS
Status: DISCONTINUED | OUTPATIENT
Start: 2017-01-01 | End: 2017-01-01

## 2017-01-01 RX ORDER — LOPERAMIDE HYDROCHLORIDE 2 MG/1
2 CAPSULE ORAL EVERY 4 HOURS PRN
Status: DISCONTINUED | OUTPATIENT
Start: 2017-01-01 | End: 2017-01-01

## 2017-01-01 RX ORDER — POTASSIUM CHLORIDE 20 MEQ/1
60 TABLET, EXTENDED RELEASE ORAL DAILY
Status: DISCONTINUED | OUTPATIENT
Start: 2017-01-01 | End: 2017-01-01

## 2017-01-01 RX ORDER — BUMETANIDE 2 MG/1
2 TABLET ORAL 2 TIMES DAILY
Qty: 60 TABLET | Refills: 11 | Status: SHIPPED | OUTPATIENT
Start: 2017-01-01 | End: 2017-01-01 | Stop reason: SDUPTHER

## 2017-01-01 RX ORDER — HEPARIN 100 UNIT/ML
SYRINGE INTRAVENOUS
Status: DISCONTINUED | OUTPATIENT
Start: 2017-01-01 | End: 2017-01-01 | Stop reason: HOSPADM

## 2017-01-01 RX ORDER — SODIUM CHLORIDE 0.9 % (FLUSH) 0.9 %
3 SYRINGE (ML) INJECTION EVERY 8 HOURS
Status: DISCONTINUED | OUTPATIENT
Start: 2017-01-01 | End: 2017-01-01 | Stop reason: HOSPADM

## 2017-01-01 RX ORDER — FUROSEMIDE 10 MG/ML
40 INJECTION INTRAMUSCULAR; INTRAVENOUS ONCE
Status: COMPLETED | OUTPATIENT
Start: 2017-01-01 | End: 2017-01-01

## 2017-01-01 RX ORDER — WARFARIN SODIUM 5 MG/1
TABLET ORAL
Qty: 150 TABLET | Refills: 0 | Status: SHIPPED | OUTPATIENT
Start: 2017-01-01 | End: 2017-01-01 | Stop reason: SDUPTHER

## 2017-01-01 RX ORDER — POTASSIUM CHLORIDE 20 MEQ/1
20 TABLET, EXTENDED RELEASE ORAL ONCE
Status: COMPLETED | OUTPATIENT
Start: 2017-01-01 | End: 2017-01-01

## 2017-01-01 RX ORDER — LANOLIN ALCOHOL/MO/W.PET/CERES
800 CREAM (GRAM) TOPICAL ONCE
Status: COMPLETED | OUTPATIENT
Start: 2017-01-01 | End: 2017-01-01

## 2017-01-01 RX ORDER — LINEZOLID 600 MG/1
600 TABLET, FILM COATED ORAL EVERY 12 HOURS
Qty: 20 TABLET | Refills: 0 | Status: CANCELLED | OUTPATIENT
Start: 2017-01-01 | End: 2017-01-01

## 2017-01-01 RX ORDER — AMOXICILLIN 250 MG
2 CAPSULE ORAL 2 TIMES DAILY
Status: DISCONTINUED | OUTPATIENT
Start: 2017-01-01 | End: 2017-01-01 | Stop reason: HOSPADM

## 2017-01-01 RX ORDER — BUMETANIDE 1 MG/1
2 TABLET ORAL 2 TIMES DAILY
Status: DISCONTINUED | OUTPATIENT
Start: 2017-01-01 | End: 2017-01-01 | Stop reason: HOSPADM

## 2017-01-01 RX ORDER — ONDANSETRON 8 MG/1
8 TABLET, ORALLY DISINTEGRATING ORAL EVERY 8 HOURS PRN
Status: DISCONTINUED | OUTPATIENT
Start: 2017-01-01 | End: 2017-01-01 | Stop reason: HOSPADM

## 2017-01-01 RX ORDER — ETOMIDATE 2 MG/ML
INJECTION INTRAVENOUS
Status: DISCONTINUED | OUTPATIENT
Start: 2017-01-01 | End: 2017-01-01

## 2017-01-01 RX ORDER — POTASSIUM CHLORIDE 750 MG/1
40 CAPSULE, EXTENDED RELEASE ORAL ONCE
Status: COMPLETED | OUTPATIENT
Start: 2017-01-01 | End: 2017-01-01

## 2017-01-01 RX ORDER — LANOLIN ALCOHOL/MO/W.PET/CERES
400 CREAM (GRAM) TOPICAL DAILY
Refills: 0 | Status: CANCELLED | COMMUNITY
Start: 2017-01-01

## 2017-01-01 RX ORDER — FUROSEMIDE 80 MG/1
80 TABLET ORAL 2 TIMES DAILY
Status: DISCONTINUED | OUTPATIENT
Start: 2017-01-01 | End: 2017-01-01 | Stop reason: HOSPADM

## 2017-01-01 RX ORDER — POTASSIUM CHLORIDE 20 MEQ/1
60 TABLET, EXTENDED RELEASE ORAL ONCE
Status: COMPLETED | OUTPATIENT
Start: 2017-01-01 | End: 2017-01-01

## 2017-01-01 RX ORDER — HYDROCODONE BITARTRATE AND ACETAMINOPHEN 10; 325 MG/1; MG/1
1 TABLET ORAL 3 TIMES DAILY PRN
Qty: 90 TABLET | Refills: 0 | Status: SHIPPED | OUTPATIENT
Start: 2017-01-01 | End: 2017-01-01 | Stop reason: SDUPTHER

## 2017-01-01 RX ORDER — LINEZOLID 600 MG/1
600 TABLET, FILM COATED ORAL EVERY 12 HOURS
Qty: 22 TABLET | Refills: 0 | Status: SHIPPED | OUTPATIENT
Start: 2017-01-01 | End: 2017-01-01

## 2017-01-01 RX ORDER — GABAPENTIN 300 MG/1
300 CAPSULE ORAL SEE ADMIN INSTRUCTIONS
Status: DISCONTINUED | OUTPATIENT
Start: 2017-01-01 | End: 2017-01-01 | Stop reason: HOSPADM

## 2017-01-01 RX ORDER — POTASSIUM CHLORIDE 750 MG/1
30 CAPSULE, EXTENDED RELEASE ORAL ONCE
Status: COMPLETED | OUTPATIENT
Start: 2017-01-01 | End: 2017-01-01

## 2017-01-01 RX ORDER — POTASSIUM CHLORIDE 29.8 MG/ML
40 INJECTION INTRAVENOUS ONCE
Status: COMPLETED | OUTPATIENT
Start: 2017-01-01 | End: 2017-01-01

## 2017-01-01 RX ORDER — FUROSEMIDE 10 MG/ML
40 INJECTION INTRAMUSCULAR; INTRAVENOUS 3 TIMES DAILY
Status: DISCONTINUED | OUTPATIENT
Start: 2017-01-01 | End: 2017-01-01

## 2017-01-01 RX ORDER — SPIRONOLACTONE 25 MG/1
25 TABLET ORAL DAILY
Qty: 30 TABLET | Refills: 11 | Status: SHIPPED | OUTPATIENT
Start: 2017-01-01

## 2017-01-01 RX ORDER — LINEZOLID 600 MG/1
600 TABLET, FILM COATED ORAL EVERY 12 HOURS
Status: DISCONTINUED | OUTPATIENT
Start: 2017-01-01 | End: 2017-01-01 | Stop reason: HOSPADM

## 2017-01-01 RX ORDER — FUROSEMIDE 10 MG/ML
80 INJECTION INTRAMUSCULAR; INTRAVENOUS
Status: COMPLETED | OUTPATIENT
Start: 2017-01-01 | End: 2017-01-01

## 2017-01-01 RX ORDER — OXYCODONE AND ACETAMINOPHEN 5; 325 MG/1; MG/1
1 TABLET ORAL EVERY 4 HOURS PRN
Status: DISCONTINUED | OUTPATIENT
Start: 2017-01-01 | End: 2017-01-01 | Stop reason: HOSPADM

## 2017-01-01 RX ORDER — POTASSIUM CHLORIDE 20 MEQ/1
20 TABLET, EXTENDED RELEASE ORAL 2 TIMES DAILY
Status: DISCONTINUED | OUTPATIENT
Start: 2017-01-01 | End: 2017-01-01

## 2017-01-01 RX ORDER — FENTANYL CITRATE 50 UG/ML
25 INJECTION, SOLUTION INTRAMUSCULAR; INTRAVENOUS EVERY 5 MIN PRN
Status: DISCONTINUED | OUTPATIENT
Start: 2017-01-01 | End: 2017-01-01 | Stop reason: HOSPADM

## 2017-01-01 RX ORDER — CEFAZOLIN SODIUM 1 G/3ML
INJECTION, POWDER, FOR SOLUTION INTRAMUSCULAR; INTRAVENOUS
Status: DISCONTINUED | OUTPATIENT
Start: 2017-01-01 | End: 2017-01-01

## 2017-01-01 RX ORDER — POLYETHYLENE GLYCOL 3350 17 G/17G
17 POWDER, FOR SOLUTION ORAL DAILY
Status: DISCONTINUED | OUTPATIENT
Start: 2017-01-01 | End: 2017-01-01 | Stop reason: HOSPADM

## 2017-01-01 RX ORDER — DOBUTAMINE HYDROCHLORIDE 400 MG/100ML
2.5 INJECTION INTRAVENOUS CONTINUOUS
Status: DISCONTINUED | OUTPATIENT
Start: 2017-01-01 | End: 2017-01-01

## 2017-01-01 RX ORDER — ONDANSETRON 2 MG/ML
4 INJECTION INTRAMUSCULAR; INTRAVENOUS EVERY 8 HOURS PRN
Status: DISCONTINUED | OUTPATIENT
Start: 2017-01-01 | End: 2017-01-01 | Stop reason: HOSPADM

## 2017-01-01 RX ORDER — POTASSIUM CHLORIDE 750 MG/1
20 CAPSULE, EXTENDED RELEASE ORAL 3 TIMES DAILY
Status: DISCONTINUED | OUTPATIENT
Start: 2017-01-01 | End: 2017-01-01 | Stop reason: HOSPADM

## 2017-01-01 RX ORDER — SODIUM CHLORIDE 9 MG/ML
INJECTION, SOLUTION INTRAVENOUS CONTINUOUS
Status: DISCONTINUED | OUTPATIENT
Start: 2017-01-01 | End: 2017-01-01

## 2017-01-01 RX ORDER — GABAPENTIN 300 MG/1
300 CAPSULE ORAL 3 TIMES DAILY
Status: DISCONTINUED | OUTPATIENT
Start: 2017-01-01 | End: 2017-01-01 | Stop reason: HOSPADM

## 2017-01-01 RX ORDER — ACETAMINOPHEN 325 MG/1
650 TABLET ORAL EVERY 6 HOURS PRN
Status: DISCONTINUED | OUTPATIENT
Start: 2017-01-01 | End: 2017-01-01

## 2017-01-01 RX ORDER — FUROSEMIDE 10 MG/ML
80 INJECTION INTRAMUSCULAR; INTRAVENOUS 2 TIMES DAILY
Status: DISCONTINUED | OUTPATIENT
Start: 2017-01-01 | End: 2017-01-01

## 2017-01-01 RX ORDER — BUPIVACAINE HYDROCHLORIDE 5 MG/ML
INJECTION, SOLUTION EPIDURAL; INTRACAUDAL
Status: DISCONTINUED | OUTPATIENT
Start: 2017-01-01 | End: 2017-01-01 | Stop reason: HOSPADM

## 2017-01-01 RX ORDER — POTASSIUM CHLORIDE 750 MG/1
40 CAPSULE, EXTENDED RELEASE ORAL 3 TIMES DAILY
Qty: 360 CAPSULE | Refills: 3 | Status: ON HOLD | OUTPATIENT
Start: 2017-01-01 | End: 2017-01-01 | Stop reason: HOSPADM

## 2017-01-01 RX ORDER — POTASSIUM CHLORIDE 7.45 MG/ML
10 INJECTION INTRAVENOUS
Status: COMPLETED | OUTPATIENT
Start: 2017-01-01 | End: 2017-01-01

## 2017-01-01 RX ORDER — POTASSIUM CHLORIDE 750 MG/1
20 CAPSULE, EXTENDED RELEASE ORAL ONCE
Status: DISCONTINUED | OUTPATIENT
Start: 2017-01-01 | End: 2017-01-01

## 2017-01-01 RX ORDER — WARFARIN 2 MG/1
4 TABLET ORAL DAILY
Status: DISCONTINUED | OUTPATIENT
Start: 2017-01-01 | End: 2017-01-01

## 2017-01-01 RX ORDER — LIDOCAINE HYDROCHLORIDE 10 MG/ML
INJECTION INFILTRATION; PERINEURAL
Status: COMPLETED
Start: 2017-01-01 | End: 2017-01-01

## 2017-01-01 RX ORDER — ACETAMINOPHEN 325 MG/1
650 TABLET ORAL EVERY 8 HOURS PRN
Status: DISCONTINUED | OUTPATIENT
Start: 2017-01-01 | End: 2017-01-01 | Stop reason: HOSPADM

## 2017-01-01 RX ORDER — POTASSIUM CHLORIDE 20 MEQ/15ML
40 SOLUTION ORAL ONCE
Status: COMPLETED | OUTPATIENT
Start: 2017-01-01 | End: 2017-01-01

## 2017-01-01 RX ORDER — LIDOCAINE HYDROCHLORIDE 10 MG/ML
10 INJECTION INFILTRATION; PERINEURAL ONCE
Status: COMPLETED | OUTPATIENT
Start: 2017-01-01 | End: 2017-01-01

## 2017-01-01 RX ORDER — WARFARIN SODIUM 5 MG/1
5 TABLET ORAL DAILY
Qty: 30 TABLET | Refills: 11
Start: 2017-01-01

## 2017-01-01 RX ORDER — MAGNESIUM SULFATE HEPTAHYDRATE 40 MG/ML
2 INJECTION, SOLUTION INTRAVENOUS ONCE
Status: COMPLETED | OUTPATIENT
Start: 2017-01-01 | End: 2017-01-01

## 2017-01-01 RX ORDER — POTASSIUM CHLORIDE 29.8 MG/ML
40 INJECTION INTRAVENOUS ONCE
Status: DISCONTINUED | OUTPATIENT
Start: 2017-01-01 | End: 2017-01-01

## 2017-01-01 RX ORDER — SODIUM CHLORIDE 0.9 % (FLUSH) 0.9 %
10 SYRINGE (ML) INJECTION EVERY 6 HOURS
Status: DISCONTINUED | OUTPATIENT
Start: 2017-01-01 | End: 2017-01-01 | Stop reason: HOSPADM

## 2017-01-01 RX ORDER — POTASSIUM CHLORIDE 20 MEQ/1
40 TABLET, EXTENDED RELEASE ORAL 3 TIMES DAILY
Status: DISCONTINUED | OUTPATIENT
Start: 2017-01-01 | End: 2017-01-01 | Stop reason: HOSPADM

## 2017-01-01 RX ORDER — POTASSIUM CHLORIDE 20 MEQ/1
40 TABLET, EXTENDED RELEASE ORAL 3 TIMES DAILY
Qty: 120 TABLET | Refills: 2 | Status: SHIPPED | OUTPATIENT
Start: 2017-01-01

## 2017-01-01 RX ORDER — WARFARIN SODIUM 5 MG/1
TABLET ORAL
Qty: 150 TABLET | Refills: 0 | Status: ON HOLD | OUTPATIENT
Start: 2017-01-01 | End: 2017-01-01

## 2017-01-01 RX ORDER — PHENYLEPHRINE HYDROCHLORIDE 10 MG/ML
INJECTION INTRAVENOUS
Status: DISCONTINUED | OUTPATIENT
Start: 2017-01-01 | End: 2017-01-01

## 2017-01-01 RX ORDER — POTASSIUM CHLORIDE 20 MEQ/1
20 TABLET, EXTENDED RELEASE ORAL ONCE
Status: DISCONTINUED | OUTPATIENT
Start: 2017-01-01 | End: 2017-01-01

## 2017-01-01 RX ORDER — EPINEPHRINE 1 MG/ML
INJECTION, SOLUTION INTRACARDIAC; INTRAMUSCULAR; INTRAVENOUS; SUBCUTANEOUS
Status: DISCONTINUED | OUTPATIENT
Start: 2017-01-01 | End: 2017-01-01

## 2017-01-01 RX ORDER — LINEZOLID 600 MG/1
600 TABLET, FILM COATED ORAL EVERY 12 HOURS
Qty: 22 TABLET | Refills: 0 | Status: ON HOLD | OUTPATIENT
Start: 2017-01-01 | End: 2017-01-01 | Stop reason: HOSPADM

## 2017-01-01 RX ORDER — POTASSIUM CHLORIDE 20 MEQ/1
60 TABLET, EXTENDED RELEASE ORAL 3 TIMES DAILY
Status: DISCONTINUED | OUTPATIENT
Start: 2017-01-01 | End: 2017-01-01 | Stop reason: HOSPADM

## 2017-01-01 RX ORDER — SODIUM CHLORIDE 0.9 % (FLUSH) 0.9 %
3 SYRINGE (ML) INJECTION
Status: DISCONTINUED | OUTPATIENT
Start: 2017-01-01 | End: 2017-01-01 | Stop reason: HOSPADM

## 2017-01-01 RX ORDER — LANOLIN ALCOHOL/MO/W.PET/CERES
400 CREAM (GRAM) TOPICAL 2 TIMES DAILY
Status: DISCONTINUED | OUTPATIENT
Start: 2017-01-01 | End: 2017-01-01 | Stop reason: HOSPADM

## 2017-01-01 RX ORDER — PROPOFOL 10 MG/ML
VIAL (ML) INTRAVENOUS
Status: DISCONTINUED | OUTPATIENT
Start: 2017-01-01 | End: 2017-01-01

## 2017-01-01 RX ORDER — KETAMINE HYDROCHLORIDE 100 MG/ML
INJECTION, SOLUTION INTRAMUSCULAR; INTRAVENOUS
Status: DISCONTINUED | OUTPATIENT
Start: 2017-01-01 | End: 2017-01-01

## 2017-01-01 RX ORDER — POTASSIUM CHLORIDE 20 MEQ/15ML
60 SOLUTION ORAL 3 TIMES DAILY
Status: DISCONTINUED | OUTPATIENT
Start: 2017-01-01 | End: 2017-01-01

## 2017-01-01 RX ORDER — DULOXETIN HYDROCHLORIDE 20 MG/1
60 CAPSULE, DELAYED RELEASE ORAL 2 TIMES DAILY
Status: DISCONTINUED | OUTPATIENT
Start: 2017-01-01 | End: 2017-01-01 | Stop reason: HOSPADM

## 2017-01-01 RX ORDER — LOPERAMIDE HYDROCHLORIDE 2 MG/1
2 CAPSULE ORAL EVERY 4 HOURS PRN
Status: DISCONTINUED | OUTPATIENT
Start: 2017-01-01 | End: 2017-01-01 | Stop reason: HOSPADM

## 2017-01-01 RX ORDER — HYDROMORPHONE HYDROCHLORIDE 1 MG/ML
0.2 INJECTION, SOLUTION INTRAMUSCULAR; INTRAVENOUS; SUBCUTANEOUS EVERY 5 MIN PRN
Status: DISCONTINUED | OUTPATIENT
Start: 2017-01-01 | End: 2017-01-01 | Stop reason: HOSPADM

## 2017-01-01 RX ORDER — HYDROCODONE BITARTRATE AND ACETAMINOPHEN 10; 325 MG/1; MG/1
1 TABLET ORAL 3 TIMES DAILY PRN
Status: DISCONTINUED | OUTPATIENT
Start: 2017-01-01 | End: 2017-01-01 | Stop reason: HOSPADM

## 2017-01-01 RX ORDER — POTASSIUM CHLORIDE 20 MEQ/1
20 TABLET, EXTENDED RELEASE ORAL DAILY
Status: DISCONTINUED | OUTPATIENT
Start: 2017-01-01 | End: 2017-01-01

## 2017-01-01 RX ORDER — SODIUM CHLORIDE 9 MG/ML
INJECTION, SOLUTION INTRAVENOUS CONTINUOUS PRN
Status: DISCONTINUED | OUTPATIENT
Start: 2017-01-01 | End: 2017-01-01

## 2017-01-01 RX ORDER — ALBUTEROL SULFATE 0.83 MG/ML
2.5 SOLUTION RESPIRATORY (INHALATION) EVERY 6 HOURS
Status: DISCONTINUED | OUTPATIENT
Start: 2017-01-01 | End: 2017-01-01 | Stop reason: HOSPADM

## 2017-01-01 RX ORDER — POTASSIUM CHLORIDE 20 MEQ/1
60 TABLET, EXTENDED RELEASE ORAL 3 TIMES DAILY
Status: DISCONTINUED | OUTPATIENT
Start: 2017-01-01 | End: 2017-01-01

## 2017-01-01 RX ORDER — FUROSEMIDE 40 MG/1
40 TABLET ORAL DAILY
Status: DISCONTINUED | OUTPATIENT
Start: 2017-01-01 | End: 2017-01-01

## 2017-01-01 RX ORDER — OLOPATADINE HYDROCHLORIDE 2 MG/ML
1 SOLUTION/ DROPS OPHTHALMIC DAILY
Status: DISCONTINUED | OUTPATIENT
Start: 2017-01-01 | End: 2017-01-01 | Stop reason: HOSPADM

## 2017-01-01 RX ORDER — WARFARIN SODIUM 5 MG/1
5 TABLET ORAL DAILY
Status: DISCONTINUED | OUTPATIENT
Start: 2017-01-01 | End: 2017-01-01

## 2017-01-01 RX ORDER — POTASSIUM CHLORIDE 20 MEQ/1
40 TABLET, EXTENDED RELEASE ORAL 2 TIMES DAILY
Status: DISCONTINUED | OUTPATIENT
Start: 2017-01-01 | End: 2017-01-01

## 2017-01-01 RX ORDER — ONDANSETRON 2 MG/ML
INJECTION INTRAMUSCULAR; INTRAVENOUS
Status: DISCONTINUED | OUTPATIENT
Start: 2017-01-01 | End: 2017-01-01

## 2017-01-01 RX ORDER — CEFAZOLIN SODIUM 2 G/50ML
2 SOLUTION INTRAVENOUS
Status: DISCONTINUED | OUTPATIENT
Start: 2017-01-01 | End: 2017-01-01 | Stop reason: HOSPADM

## 2017-01-01 RX ORDER — SPIRONOLACTONE 25 MG/1
25 TABLET ORAL DAILY
Status: DISCONTINUED | OUTPATIENT
Start: 2017-01-01 | End: 2017-01-01 | Stop reason: HOSPADM

## 2017-01-01 RX ORDER — SODIUM CHLORIDE 9 MG/ML
INJECTION, SOLUTION INTRAVENOUS CONTINUOUS
Status: ACTIVE | OUTPATIENT
Start: 2017-01-01 | End: 2017-01-01

## 2017-01-01 RX ORDER — WARFARIN 2.5 MG/1
2.5 TABLET ORAL DAILY
Status: DISCONTINUED | OUTPATIENT
Start: 2017-01-01 | End: 2017-01-01

## 2017-01-01 RX ORDER — GABAPENTIN 300 MG/1
CAPSULE ORAL
Qty: 360 CAPSULE | Refills: 3 | Status: SHIPPED | OUTPATIENT
Start: 2017-01-01 | End: 2017-01-01 | Stop reason: SDUPTHER

## 2017-01-01 RX ORDER — POTASSIUM CHLORIDE 750 MG/1
10 CAPSULE, EXTENDED RELEASE ORAL DAILY
Qty: 90 CAPSULE | Refills: 3 | Status: SHIPPED | OUTPATIENT
Start: 2017-01-01 | End: 2017-01-01 | Stop reason: SDUPTHER

## 2017-01-01 RX ORDER — HYDROXYZINE PAMOATE 25 MG/1
50 CAPSULE ORAL DAILY PRN
Status: DISCONTINUED | OUTPATIENT
Start: 2017-01-01 | End: 2017-01-01 | Stop reason: HOSPADM

## 2017-01-01 RX ORDER — ONDANSETRON 8 MG/1
8 TABLET, ORALLY DISINTEGRATING ORAL EVERY 8 HOURS PRN
Status: DISCONTINUED | OUTPATIENT
Start: 2017-01-01 | End: 2017-01-01

## 2017-01-01 RX ORDER — FUROSEMIDE 10 MG/ML
80 INJECTION INTRAMUSCULAR; INTRAVENOUS ONCE
Status: COMPLETED | OUTPATIENT
Start: 2017-01-01 | End: 2017-01-01

## 2017-01-01 RX ORDER — POTASSIUM CHLORIDE 750 MG/1
40 CAPSULE, EXTENDED RELEASE ORAL 2 TIMES DAILY
Status: DISCONTINUED | OUTPATIENT
Start: 2017-01-01 | End: 2017-01-01

## 2017-01-01 RX ORDER — DULOXETIN HYDROCHLORIDE 60 MG/1
60 CAPSULE, DELAYED RELEASE ORAL 2 TIMES DAILY
Status: DISCONTINUED | OUTPATIENT
Start: 2017-01-01 | End: 2017-01-01 | Stop reason: HOSPADM

## 2017-01-01 RX ORDER — FUROSEMIDE 10 MG/ML
40 INJECTION INTRAMUSCULAR; INTRAVENOUS 2 TIMES DAILY
Status: DISCONTINUED | OUTPATIENT
Start: 2017-01-01 | End: 2017-01-01

## 2017-01-01 RX ORDER — MIDAZOLAM HYDROCHLORIDE 1 MG/ML
INJECTION, SOLUTION INTRAMUSCULAR; INTRAVENOUS
Status: DISCONTINUED | OUTPATIENT
Start: 2017-01-01 | End: 2017-01-01

## 2017-01-01 RX ORDER — MUPIROCIN 20 MG/G
OINTMENT TOPICAL DAILY
Status: DISCONTINUED | OUTPATIENT
Start: 2017-01-01 | End: 2017-01-01 | Stop reason: HOSPADM

## 2017-01-01 RX ORDER — WARFARIN SODIUM 5 MG/1
5 TABLET ORAL DAILY
Status: DISCONTINUED | OUTPATIENT
Start: 2017-01-01 | End: 2017-01-01 | Stop reason: HOSPADM

## 2017-01-01 RX ORDER — POTASSIUM CHLORIDE 20 MEQ/1
40 TABLET, EXTENDED RELEASE ORAL 2 TIMES DAILY
Status: DISCONTINUED | OUTPATIENT
Start: 2017-01-01 | End: 2017-01-01 | Stop reason: HOSPADM

## 2017-01-01 RX ORDER — BUMETANIDE 2 MG/1
2 TABLET ORAL DAILY
Qty: 30 TABLET | Refills: 11 | Status: SHIPPED | OUTPATIENT
Start: 2017-01-01 | End: 2017-01-01 | Stop reason: SDUPTHER

## 2017-01-01 RX ORDER — OXYCODONE AND ACETAMINOPHEN 5; 325 MG/1; MG/1
1 TABLET ORAL EVERY 4 HOURS PRN
Status: DISCONTINUED | OUTPATIENT
Start: 2017-01-01 | End: 2017-01-01

## 2017-01-01 RX ORDER — PANTOPRAZOLE SODIUM 40 MG/1
40 TABLET, DELAYED RELEASE ORAL DAILY
Status: DISCONTINUED | OUTPATIENT
Start: 2017-01-01 | End: 2017-01-01 | Stop reason: HOSPADM

## 2017-01-01 RX ORDER — ALBUTEROL SULFATE 0.83 MG/ML
2.5 SOLUTION RESPIRATORY (INHALATION) EVERY 6 HOURS PRN
Status: DISCONTINUED | OUTPATIENT
Start: 2017-01-01 | End: 2017-01-01 | Stop reason: HOSPADM

## 2017-01-01 RX ORDER — SPIRONOLACTONE 25 MG/1
25 TABLET ORAL DAILY
Qty: 30 TABLET | Refills: 11 | Status: SHIPPED | OUTPATIENT
Start: 2017-01-01 | End: 2017-01-01 | Stop reason: SDUPTHER

## 2017-01-01 RX ORDER — PANTOPRAZOLE SODIUM 40 MG/1
40 TABLET, DELAYED RELEASE ORAL DAILY
Qty: 30 TABLET | Refills: 11 | Status: SHIPPED | OUTPATIENT
Start: 2017-01-01 | End: 2018-11-09

## 2017-01-01 RX ORDER — POTASSIUM CHLORIDE 20 MEQ/1
40 TABLET, EXTENDED RELEASE ORAL ONCE
Status: DISCONTINUED | OUTPATIENT
Start: 2017-01-01 | End: 2017-01-01

## 2017-01-01 RX ORDER — POLYETHYLENE GLYCOL 3350 17 G/17G
17 POWDER, FOR SOLUTION ORAL 2 TIMES DAILY
Status: DISCONTINUED | OUTPATIENT
Start: 2017-01-01 | End: 2017-01-01

## 2017-01-01 RX ORDER — POTASSIUM CHLORIDE 20 MEQ/15ML
20 SOLUTION ORAL
Status: COMPLETED | OUTPATIENT
Start: 2017-01-01 | End: 2017-01-01

## 2017-01-01 RX ORDER — DIPHENHYDRAMINE HYDROCHLORIDE 50 MG/ML
INJECTION INTRAMUSCULAR; INTRAVENOUS
Status: DISCONTINUED | OUTPATIENT
Start: 2017-01-01 | End: 2017-01-01

## 2017-01-01 RX ORDER — DEXMEDETOMIDINE HYDROCHLORIDE 100 UG/ML
INJECTION, SOLUTION INTRAVENOUS
Status: DISCONTINUED | OUTPATIENT
Start: 2017-01-01 | End: 2017-01-01

## 2017-01-01 RX ORDER — ACETAMINOPHEN 325 MG/1
650 TABLET ORAL EVERY 6 HOURS PRN
Status: DISCONTINUED | OUTPATIENT
Start: 2017-01-01 | End: 2017-01-01 | Stop reason: HOSPADM

## 2017-01-01 RX ORDER — POTASSIUM CHLORIDE 14.9 MG/ML
20 INJECTION INTRAVENOUS
Status: COMPLETED | OUTPATIENT
Start: 2017-01-01 | End: 2017-01-01

## 2017-01-01 RX ORDER — WARFARIN SODIUM 5 MG/1
5 TABLET ORAL DAILY
Qty: 150 TABLET | Refills: 0 | Status: ON HOLD
Start: 2017-01-01 | End: 2017-01-01

## 2017-01-01 RX ORDER — SILDENAFIL CITRATE 20 MG/1
20 TABLET ORAL 3 TIMES DAILY
Status: DISCONTINUED | OUTPATIENT
Start: 2017-01-01 | End: 2017-01-01

## 2017-01-01 RX ORDER — HYDROCODONE BITARTRATE AND ACETAMINOPHEN 10; 325 MG/1; MG/1
1 TABLET ORAL 3 TIMES DAILY PRN
Qty: 90 TABLET | Refills: 0 | Status: ON HOLD | OUTPATIENT
Start: 2017-01-01 | End: 2017-01-01 | Stop reason: SDUPTHER

## 2017-01-01 RX ORDER — LANOLIN ALCOHOL/MO/W.PET/CERES
400 CREAM (GRAM) TOPICAL 2 TIMES DAILY
Refills: 0 | COMMUNITY
Start: 2017-01-01

## 2017-01-01 RX ORDER — MAGNESIUM SULFATE HEPTAHYDRATE 40 MG/ML
2 INJECTION, SOLUTION INTRAVENOUS ONCE
Status: DISCONTINUED | OUTPATIENT
Start: 2017-01-01 | End: 2017-01-01

## 2017-01-01 RX ORDER — HYDROCODONE BITARTRATE AND ACETAMINOPHEN 500; 5 MG/1; MG/1
TABLET ORAL
Status: DISCONTINUED | OUTPATIENT
Start: 2017-01-01 | End: 2017-01-01 | Stop reason: HOSPADM

## 2017-01-01 RX ORDER — BUMETANIDE 2 MG/1
2 TABLET ORAL 2 TIMES DAILY
Qty: 60 TABLET | Refills: 11 | Status: CANCELLED | OUTPATIENT
Start: 2017-01-01 | End: 2018-09-07

## 2017-01-01 RX ORDER — POTASSIUM CHLORIDE 750 MG/1
20 CAPSULE, EXTENDED RELEASE ORAL ONCE
Status: COMPLETED | OUTPATIENT
Start: 2017-01-01 | End: 2017-01-01

## 2017-01-01 RX ORDER — LIDOCAINE HYDROCHLORIDE 20 MG/ML
SOLUTION OROPHARYNGEAL
Status: DISCONTINUED | OUTPATIENT
Start: 2017-01-01 | End: 2017-01-01

## 2017-01-01 RX ORDER — METOCLOPRAMIDE HYDROCHLORIDE 5 MG/ML
10 INJECTION INTRAMUSCULAR; INTRAVENOUS EVERY 6 HOURS PRN
Status: DISCONTINUED | OUTPATIENT
Start: 2017-01-01 | End: 2017-01-01 | Stop reason: HOSPADM

## 2017-01-01 RX ORDER — GABAPENTIN 300 MG/1
300 CAPSULE ORAL 3 TIMES DAILY
Qty: 120 CAPSULE | Refills: 3 | Status: SHIPPED | OUTPATIENT
Start: 2017-01-01 | End: 2017-01-01 | Stop reason: SDUPTHER

## 2017-01-01 RX ORDER — DOCUSATE SODIUM 100 MG/1
100 CAPSULE, LIQUID FILLED ORAL 2 TIMES DAILY PRN
Status: DISCONTINUED | OUTPATIENT
Start: 2017-01-01 | End: 2017-01-01 | Stop reason: HOSPADM

## 2017-01-01 RX ORDER — ONDANSETRON 2 MG/ML
4 INJECTION INTRAMUSCULAR; INTRAVENOUS EVERY 8 HOURS PRN
Status: DISCONTINUED | OUTPATIENT
Start: 2017-01-01 | End: 2017-01-01

## 2017-01-01 RX ORDER — BUMETANIDE 2 MG/1
2 TABLET ORAL 2 TIMES DAILY
Qty: 60 TABLET | Refills: 5 | Status: ON HOLD | OUTPATIENT
Start: 2017-01-01 | End: 2017-01-01

## 2017-01-01 RX ORDER — GABAPENTIN 300 MG/1
300 CAPSULE ORAL SEE ADMIN INSTRUCTIONS
Qty: 360 CAPSULE | Refills: 2 | Status: SHIPPED | OUTPATIENT
Start: 2017-01-01

## 2017-01-01 RX ORDER — WARFARIN SODIUM 5 MG/1
2.5 TABLET ORAL DAILY
Qty: 150 TABLET | Refills: 0 | Status: ON HOLD
Start: 2017-01-01 | End: 2017-01-01 | Stop reason: HOSPADM

## 2017-01-01 RX ORDER — HYDROCODONE BITARTRATE AND ACETAMINOPHEN 10; 325 MG/1; MG/1
1 TABLET ORAL EVERY 8 HOURS PRN
Status: DISCONTINUED | OUTPATIENT
Start: 2017-01-01 | End: 2017-01-01 | Stop reason: HOSPADM

## 2017-01-01 RX ORDER — HYDROXYZINE PAMOATE 50 MG/1
50 CAPSULE ORAL DAILY PRN
Status: DISCONTINUED | OUTPATIENT
Start: 2017-01-01 | End: 2017-01-01 | Stop reason: HOSPADM

## 2017-01-01 RX ORDER — FENTANYL CITRATE 50 UG/ML
INJECTION, SOLUTION INTRAMUSCULAR; INTRAVENOUS
Status: DISCONTINUED | OUTPATIENT
Start: 2017-01-01 | End: 2017-01-01

## 2017-01-01 RX ORDER — ONDANSETRON 2 MG/ML
INJECTION INTRAMUSCULAR; INTRAVENOUS
Status: COMPLETED
Start: 2017-01-01 | End: 2017-01-01

## 2017-01-01 RX ORDER — DULOXETIN HYDROCHLORIDE 60 MG/1
60 CAPSULE, DELAYED RELEASE ORAL 2 TIMES DAILY
Qty: 180 CAPSULE | Refills: 1 | Status: SHIPPED | OUTPATIENT
Start: 2017-01-01 | End: 2017-01-01 | Stop reason: SDUPTHER

## 2017-01-01 RX ORDER — FUROSEMIDE 40 MG/1
80 TABLET ORAL 2 TIMES DAILY
Status: DISCONTINUED | OUTPATIENT
Start: 2017-01-01 | End: 2017-01-01 | Stop reason: HOSPADM

## 2017-01-01 RX ORDER — LIDOCAINE HYDROCHLORIDE 10 MG/ML
INJECTION, SOLUTION EPIDURAL; INFILTRATION; INTRACAUDAL; PERINEURAL
Status: DISCONTINUED | OUTPATIENT
Start: 2017-01-01 | End: 2017-01-01 | Stop reason: HOSPADM

## 2017-01-01 RX ORDER — FUROSEMIDE 80 MG/1
80 TABLET ORAL 2 TIMES DAILY
Qty: 60 TABLET | Refills: 3 | Status: ON HOLD | OUTPATIENT
Start: 2017-01-01 | End: 2017-01-01 | Stop reason: HOSPADM

## 2017-01-01 RX ORDER — FUROSEMIDE 10 MG/ML
80 INJECTION INTRAMUSCULAR; INTRAVENOUS 3 TIMES DAILY
Status: DISCONTINUED | OUTPATIENT
Start: 2017-01-01 | End: 2017-01-01

## 2017-01-01 RX ORDER — POTASSIUM CHLORIDE 750 MG/1
20 CAPSULE, EXTENDED RELEASE ORAL 3 TIMES DAILY
Qty: 180 CAPSULE | Refills: 3 | Status: SHIPPED | OUTPATIENT
Start: 2017-01-01 | End: 2017-01-01 | Stop reason: SDUPTHER

## 2017-01-01 RX ORDER — DULOXETIN HYDROCHLORIDE 60 MG/1
60 CAPSULE, DELAYED RELEASE ORAL 2 TIMES DAILY
Qty: 180 CAPSULE | Refills: 1 | Status: SHIPPED | OUTPATIENT
Start: 2017-01-01

## 2017-01-01 RX ORDER — LIDOCAINE HYDROCHLORIDE 10 MG/ML
10 INJECTION INFILTRATION; PERINEURAL ONCE
Status: DISCONTINUED | OUTPATIENT
Start: 2017-01-01 | End: 2017-01-01 | Stop reason: HOSPADM

## 2017-01-01 RX ORDER — ONDANSETRON 2 MG/ML
4 INJECTION INTRAMUSCULAR; INTRAVENOUS EVERY 6 HOURS PRN
Status: DISCONTINUED | OUTPATIENT
Start: 2017-01-01 | End: 2017-01-01 | Stop reason: HOSPADM

## 2017-01-01 RX ORDER — POLYETHYLENE GLYCOL 3350 17 G/17G
17 POWDER, FOR SOLUTION ORAL 2 TIMES DAILY
Status: DISCONTINUED | OUTPATIENT
Start: 2017-01-01 | End: 2017-01-01 | Stop reason: HOSPADM

## 2017-01-01 RX ORDER — WARFARIN 2.5 MG/1
2.5 TABLET ORAL DAILY
Status: DISCONTINUED | OUTPATIENT
Start: 2017-01-01 | End: 2017-01-01 | Stop reason: HOSPADM

## 2017-01-01 RX ORDER — BUMETANIDE 2 MG/1
2 TABLET ORAL 2 TIMES DAILY PRN
Qty: 30 TABLET | Refills: 11 | Status: SHIPPED | OUTPATIENT
Start: 2017-01-01 | End: 2018-12-19

## 2017-01-01 RX ORDER — DOPAMINE HCL IN DEXTROSE 5 % 400MG/.25L
3 INFUSION BOTTLE (ML) INTRAVENOUS CONTINUOUS
Status: DISCONTINUED | OUTPATIENT
Start: 2017-01-01 | End: 2017-01-01

## 2017-01-01 RX ORDER — DULOXETIN HYDROCHLORIDE 30 MG/1
60 CAPSULE, DELAYED RELEASE ORAL 2 TIMES DAILY
Status: DISCONTINUED | OUTPATIENT
Start: 2017-01-01 | End: 2017-01-01

## 2017-01-01 RX ORDER — HYDROCODONE BITARTRATE AND ACETAMINOPHEN 10; 325 MG/1; MG/1
1 TABLET ORAL 3 TIMES DAILY PRN
Qty: 90 TABLET | Refills: 0 | Status: SHIPPED | OUTPATIENT
Start: 2017-01-01 | End: 2018-01-01 | Stop reason: SDUPTHER

## 2017-01-01 RX ADMIN — PANTOPRAZOLE SODIUM 40 MG: 40 TABLET, DELAYED RELEASE ORAL at 08:11

## 2017-01-01 RX ADMIN — POTASSIUM CHLORIDE 20 MEQ: 1500 TABLET, EXTENDED RELEASE ORAL at 08:11

## 2017-01-01 RX ADMIN — MACITENTAN 10 MG: 10 TABLET, FILM COATED ORAL at 08:06

## 2017-01-01 RX ADMIN — POTASSIUM CHLORIDE 20 MEQ: 1500 TABLET, EXTENDED RELEASE ORAL at 09:11

## 2017-01-01 RX ADMIN — ONDANSETRON 4 MG: 2 INJECTION INTRAMUSCULAR; INTRAVENOUS at 11:09

## 2017-01-01 RX ADMIN — Medication 10 ML: at 12:06

## 2017-01-01 RX ADMIN — DULOXETINE 60 MG: 60 CAPSULE, DELAYED RELEASE ORAL at 12:09

## 2017-01-01 RX ADMIN — EPOPROSTENOL 20 NG/KG/MIN: 0.5 INJECTION, POWDER, LYOPHILIZED, FOR SOLUTION INTRAVENOUS at 06:09

## 2017-01-01 RX ADMIN — Medication 10 ML: at 06:06

## 2017-01-01 RX ADMIN — EPOPROSTENOL 12 NG/KG/MIN: 1.5 INJECTION, POWDER, LYOPHILIZED, FOR SOLUTION INTRAVENOUS at 02:06

## 2017-01-01 RX ADMIN — WARFARIN SODIUM 5 MG: 2 TABLET ORAL at 04:09

## 2017-01-01 RX ADMIN — POTASSIUM CHLORIDE 60 MEQ: 1500 TABLET, EXTENDED RELEASE ORAL at 09:09

## 2017-01-01 RX ADMIN — PANTOPRAZOLE SODIUM 40 MG: 40 TABLET, DELAYED RELEASE ORAL at 09:11

## 2017-01-01 RX ADMIN — WARFARIN SODIUM 4 MG: 2 TABLET ORAL at 05:06

## 2017-01-01 RX ADMIN — POTASSIUM CHLORIDE 20 MEQ: 750 CAPSULE, EXTENDED RELEASE ORAL at 01:09

## 2017-01-01 RX ADMIN — MACITENTAN 10 MG: 10 TABLET, FILM COATED ORAL at 08:09

## 2017-01-01 RX ADMIN — WARFARIN SODIUM 5 MG: 5 TABLET ORAL at 05:06

## 2017-01-01 RX ADMIN — POTASSIUM CHLORIDE 20 MEQ: 750 CAPSULE, EXTENDED RELEASE ORAL at 02:08

## 2017-01-01 RX ADMIN — BUMETANIDE 2 MG: 1 TABLET ORAL at 09:09

## 2017-01-01 RX ADMIN — GABAPENTIN 300 MG: 300 CAPSULE ORAL at 06:06

## 2017-01-01 RX ADMIN — GABAPENTIN 300 MG: 300 CAPSULE ORAL at 08:06

## 2017-01-01 RX ADMIN — MAGNESIUM OXIDE TAB 400 MG (241.3 MG ELEMENTAL MG) 400 MG: 400 (241.3 MG) TAB at 08:09

## 2017-01-01 RX ADMIN — POTASSIUM CHLORIDE 20 MEQ: 750 CAPSULE, EXTENDED RELEASE ORAL at 09:09

## 2017-01-01 RX ADMIN — FUROSEMIDE 80 MG: 80 TABLET ORAL at 06:06

## 2017-01-01 RX ADMIN — HEPARIN SODIUM AND DEXTROSE 19 UNITS/KG/HR: 10000; 5 INJECTION INTRAVENOUS at 06:06

## 2017-01-01 RX ADMIN — POTASSIUM CHLORIDE 20 MEQ: 750 CAPSULE, EXTENDED RELEASE ORAL at 08:09

## 2017-01-01 RX ADMIN — POTASSIUM CHLORIDE 60 MEQ: 1500 TABLET, EXTENDED RELEASE ORAL at 02:10

## 2017-01-01 RX ADMIN — SPIRONOLACTONE 25 MG: 25 TABLET, FILM COATED ORAL at 08:08

## 2017-01-01 RX ADMIN — ALBUTEROL SULFATE 2.5 MG: 2.5 SOLUTION RESPIRATORY (INHALATION) at 08:09

## 2017-01-01 RX ADMIN — PHENYLEPHRINE HYDROCHLORIDE 100 MCG: 10 INJECTION INTRAVENOUS at 12:11

## 2017-01-01 RX ADMIN — MAGNESIUM OXIDE TAB 400 MG (241.3 MG ELEMENTAL MG) 400 MG: 400 (241.3 MG) TAB at 09:09

## 2017-01-01 RX ADMIN — MACITENTAN 10 MG: 10 TABLET, FILM COATED ORAL at 10:06

## 2017-01-01 RX ADMIN — FUROSEMIDE 80 MG: 80 TABLET ORAL at 05:06

## 2017-01-01 RX ADMIN — FUROSEMIDE 80 MG: 10 INJECTION, SOLUTION INTRAVENOUS at 06:08

## 2017-01-01 RX ADMIN — GABAPENTIN 300 MG: 300 CAPSULE ORAL at 01:06

## 2017-01-01 RX ADMIN — MAGNESIUM OXIDE TAB 400 MG (241.3 MG ELEMENTAL MG) 400 MG: 400 (241.3 MG) TAB at 08:10

## 2017-01-01 RX ADMIN — LINEZOLID 600 MG: 600 TABLET, FILM COATED ORAL at 08:11

## 2017-01-01 RX ADMIN — FUROSEMIDE 40 MG: 10 INJECTION, SOLUTION INTRAVENOUS at 10:09

## 2017-01-01 RX ADMIN — POTASSIUM CHLORIDE 20 MEQ: 750 CAPSULE, EXTENDED RELEASE ORAL at 02:09

## 2017-01-01 RX ADMIN — GABAPENTIN 300 MG: 300 CAPSULE ORAL at 10:06

## 2017-01-01 RX ADMIN — GABAPENTIN 300 MG: 300 CAPSULE ORAL at 05:08

## 2017-01-01 RX ADMIN — BUMETANIDE 2 MG: 1 TABLET ORAL at 02:11

## 2017-01-01 RX ADMIN — GABAPENTIN 300 MG: 300 CAPSULE ORAL at 05:09

## 2017-01-01 RX ADMIN — EPINEPHRINE 10 MCG: 1 INJECTION, SOLUTION INTRAMUSCULAR; SUBCUTANEOUS at 11:11

## 2017-01-01 RX ADMIN — POTASSIUM CHLORIDE 60 MEQ: 1500 TABLET, EXTENDED RELEASE ORAL at 10:11

## 2017-01-01 RX ADMIN — POTASSIUM CHLORIDE 60 MEQ: 1500 TABLET, EXTENDED RELEASE ORAL at 09:10

## 2017-01-01 RX ADMIN — MAGNESIUM OXIDE TAB 400 MG (241.3 MG ELEMENTAL MG) 400 MG: 400 (241.3 MG) TAB at 10:11

## 2017-01-01 RX ADMIN — POTASSIUM CHLORIDE 40 MEQ: 1500 TABLET, EXTENDED RELEASE ORAL at 09:11

## 2017-01-01 RX ADMIN — DULOXETINE 60 MG: 60 CAPSULE, DELAYED RELEASE ORAL at 09:08

## 2017-01-01 RX ADMIN — VANCOMYCIN HYDROCHLORIDE 1000 MG: 1 INJECTION, POWDER, LYOPHILIZED, FOR SOLUTION INTRAVENOUS at 03:11

## 2017-01-01 RX ADMIN — Medication 3 ML: at 06:06

## 2017-01-01 RX ADMIN — Medication 3 ML: at 09:10

## 2017-01-01 RX ADMIN — VANCOMYCIN HYDROCHLORIDE 1000 MG: 1 INJECTION, POWDER, LYOPHILIZED, FOR SOLUTION INTRAVENOUS at 09:08

## 2017-01-01 RX ADMIN — POTASSIUM CHLORIDE 20 MEQ: 750 CAPSULE, EXTENDED RELEASE ORAL at 03:09

## 2017-01-01 RX ADMIN — Medication 10 ML: at 11:06

## 2017-01-01 RX ADMIN — POTASSIUM CHLORIDE 20 MEQ: 750 CAPSULE, EXTENDED RELEASE ORAL at 06:09

## 2017-01-01 RX ADMIN — DULOXETINE 60 MG: 60 CAPSULE, DELAYED RELEASE ORAL at 09:10

## 2017-01-01 RX ADMIN — BUMETANIDE 2 MG: 1 TABLET ORAL at 08:09

## 2017-01-01 RX ADMIN — FUROSEMIDE 10 MG/HR: 10 INJECTION, SOLUTION INTRAMUSCULAR; INTRAVENOUS at 03:06

## 2017-01-01 RX ADMIN — Medication 10 ML: at 01:06

## 2017-01-01 RX ADMIN — OLOPATADINE HYDROCHLORIDE 1 DROP: 2 SOLUTION/ DROPS OPHTHALMIC at 09:08

## 2017-01-01 RX ADMIN — DEXTROSE 2 G: 50 INJECTION, SOLUTION INTRAVENOUS at 10:11

## 2017-01-01 RX ADMIN — ALBUTEROL SULFATE 2.5 MG: 2.5 SOLUTION RESPIRATORY (INHALATION) at 12:09

## 2017-01-01 RX ADMIN — POTASSIUM CHLORIDE 10 MEQ: 10 INJECTION, SOLUTION INTRAVENOUS at 12:06

## 2017-01-01 RX ADMIN — DOBUTAMINE HYDROCHLORIDE 2.5 MCG/KG/MIN: 400 INJECTION INTRAVENOUS at 09:06

## 2017-01-01 RX ADMIN — DEXMEDETOMIDINE HYDROCHLORIDE 12 MCG: 100 INJECTION, SOLUTION, CONCENTRATE INTRAVENOUS at 11:11

## 2017-01-01 RX ADMIN — WARFARIN SODIUM 5 MG: 2 TABLET ORAL at 04:10

## 2017-01-01 RX ADMIN — PHENYLEPHRINE HYDROCHLORIDE 100 MCG: 10 INJECTION INTRAVENOUS at 11:11

## 2017-01-01 RX ADMIN — EPOPROSTENOL 24 NG/KG/MIN: 0.5 INJECTION, POWDER, LYOPHILIZED, FOR SOLUTION INTRAVENOUS at 02:09

## 2017-01-01 RX ADMIN — DULOXETINE 60 MG: 60 CAPSULE, DELAYED RELEASE ORAL at 08:08

## 2017-01-01 RX ADMIN — MACITENTAN 10 MG: 10 TABLET, FILM COATED ORAL at 09:06

## 2017-01-01 RX ADMIN — Medication 3 ML: at 01:06

## 2017-01-01 RX ADMIN — SILDENAFIL 20 MG: 20 TABLET ORAL at 02:09

## 2017-01-01 RX ADMIN — VANCOMYCIN HYDROCHLORIDE 750 MG: 10 INJECTION, POWDER, LYOPHILIZED, FOR SOLUTION INTRAVENOUS at 10:09

## 2017-01-01 RX ADMIN — ETOMIDATE 4 MG: 2 INJECTION, SOLUTION INTRAVENOUS at 11:11

## 2017-01-01 RX ADMIN — Medication: at 10:08

## 2017-01-01 RX ADMIN — MACITENTAN 10 MG: 10 TABLET, FILM COATED ORAL at 11:10

## 2017-01-01 RX ADMIN — GABAPENTIN 300 MG: 300 CAPSULE ORAL at 03:08

## 2017-01-01 RX ADMIN — EPOPROSTENOL 25 NG/KG/MIN: 0.5 INJECTION, POWDER, LYOPHILIZED, FOR SOLUTION INTRAVENOUS at 10:08

## 2017-01-01 RX ADMIN — DULOXETINE 60 MG: 60 CAPSULE, DELAYED RELEASE ORAL at 09:09

## 2017-01-01 RX ADMIN — POTASSIUM CHLORIDE 40 MEQ: 750 CAPSULE, EXTENDED RELEASE ORAL at 08:06

## 2017-01-01 RX ADMIN — FUROSEMIDE 40 MG: 10 INJECTION, SOLUTION INTRAVENOUS at 09:06

## 2017-01-01 RX ADMIN — SPIRONOLACTONE 25 MG: 25 TABLET, FILM COATED ORAL at 09:09

## 2017-01-01 RX ADMIN — FUROSEMIDE 80 MG: 80 TABLET ORAL at 09:06

## 2017-01-01 RX ADMIN — GABAPENTIN 300 MG: 300 CAPSULE ORAL at 02:09

## 2017-01-01 RX ADMIN — ALBUTEROL SULFATE 2.5 MG: 2.5 SOLUTION RESPIRATORY (INHALATION) at 07:09

## 2017-01-01 RX ADMIN — MAGNESIUM OXIDE TAB 400 MG (241.3 MG ELEMENTAL MG) 400 MG: 400 (241.3 MG) TAB at 09:10

## 2017-01-01 RX ADMIN — MACITENTAN 10 MG: 10 TABLET, FILM COATED ORAL at 08:11

## 2017-01-01 RX ADMIN — POTASSIUM CHLORIDE 40 MEQ: 1500 TABLET, EXTENDED RELEASE ORAL at 08:06

## 2017-01-01 RX ADMIN — GABAPENTIN 300 MG: 300 CAPSULE ORAL at 02:08

## 2017-01-01 RX ADMIN — GABAPENTIN 300 MG: 300 CAPSULE ORAL at 06:08

## 2017-01-01 RX ADMIN — MAGNESIUM SULFATE IN WATER 2 G: 40 INJECTION, SOLUTION INTRAVENOUS at 07:08

## 2017-01-01 RX ADMIN — Medication 3 ML: at 02:09

## 2017-01-01 RX ADMIN — WARFARIN SODIUM 5 MG: 5 TABLET ORAL at 04:06

## 2017-01-01 RX ADMIN — DULOXETINE 60 MG: 60 CAPSULE, DELAYED RELEASE ORAL at 08:10

## 2017-01-01 RX ADMIN — BUMETANIDE 2 MG: 1 TABLET ORAL at 09:11

## 2017-01-01 RX ADMIN — GABAPENTIN 300 MG: 300 CAPSULE ORAL at 05:11

## 2017-01-01 RX ADMIN — MAGNESIUM OXIDE TAB 400 MG (241.3 MG ELEMENTAL MG) 400 MG: 400 (241.3 MG) TAB at 11:09

## 2017-01-01 RX ADMIN — MACITENTAN 10 MG: 10 TABLET, FILM COATED ORAL at 02:11

## 2017-01-01 RX ADMIN — SODIUM CHLORIDE, SODIUM GLUCONATE, SODIUM ACETATE, POTASSIUM CHLORIDE, MAGNESIUM CHLORIDE, SODIUM PHOSPHATE, DIBASIC, AND POTASSIUM PHOSPHATE: .53; .5; .37; .037; .03; .012; .00082 INJECTION, SOLUTION INTRAVENOUS at 07:11

## 2017-01-01 RX ADMIN — POTASSIUM CHLORIDE 40 MEQ: 1500 TABLET, EXTENDED RELEASE ORAL at 09:06

## 2017-01-01 RX ADMIN — Medication 3 ML: at 10:09

## 2017-01-01 RX ADMIN — POLYETHYLENE GLYCOL 3350 17 G: 17 POWDER, FOR SOLUTION ORAL at 11:09

## 2017-01-01 RX ADMIN — DULOXETINE 60 MG: 60 CAPSULE, DELAYED RELEASE ORAL at 10:09

## 2017-01-01 RX ADMIN — Medication 10 ML: at 05:11

## 2017-01-01 RX ADMIN — POTASSIUM CHLORIDE 60 MEQ: 1500 TABLET, EXTENDED RELEASE ORAL at 01:09

## 2017-01-01 RX ADMIN — POTASSIUM CHLORIDE 20 MEQ: 1500 TABLET, EXTENDED RELEASE ORAL at 08:06

## 2017-01-01 RX ADMIN — MAGNESIUM OXIDE TAB 400 MG (241.3 MG ELEMENTAL MG) 400 MG: 400 (241.3 MG) TAB at 08:11

## 2017-01-01 RX ADMIN — BUMETANIDE 2 MG: 1 TABLET ORAL at 08:11

## 2017-01-01 RX ADMIN — GABAPENTIN 300 MG: 300 CAPSULE ORAL at 03:06

## 2017-01-01 RX ADMIN — ALBUTEROL SULFATE 2.5 MG: 2.5 SOLUTION RESPIRATORY (INHALATION) at 01:09

## 2017-01-01 RX ADMIN — GABAPENTIN 300 MG: 300 CAPSULE ORAL at 08:11

## 2017-01-01 RX ADMIN — MAGNESIUM OXIDE TAB 400 MG (241.3 MG ELEMENTAL MG) 400 MG: 400 (241.3 MG) TAB at 09:11

## 2017-01-01 RX ADMIN — POTASSIUM CHLORIDE 20 MEQ: 750 CAPSULE, EXTENDED RELEASE ORAL at 09:08

## 2017-01-01 RX ADMIN — SPIRONOLACTONE 25 MG: 25 TABLET, FILM COATED ORAL at 09:08

## 2017-01-01 RX ADMIN — AZITHROMYCIN MONOHYDRATE 500 MG: 500 INJECTION, POWDER, LYOPHILIZED, FOR SOLUTION INTRAVENOUS at 02:09

## 2017-01-01 RX ADMIN — MACITENTAN 10 MG: 10 TABLET, FILM COATED ORAL at 08:10

## 2017-01-01 RX ADMIN — POTASSIUM CHLORIDE 40 MEQ: 750 CAPSULE, EXTENDED RELEASE ORAL at 12:09

## 2017-01-01 RX ADMIN — FUROSEMIDE 80 MG: 10 INJECTION, SOLUTION INTRAVENOUS at 05:09

## 2017-01-01 RX ADMIN — POTASSIUM CHLORIDE 30 MEQ: 750 CAPSULE, EXTENDED RELEASE ORAL at 09:06

## 2017-01-01 RX ADMIN — ONDANSETRON 4 MG: 2 INJECTION INTRAMUSCULAR; INTRAVENOUS at 08:09

## 2017-01-01 RX ADMIN — EPOPROSTENOL 10 NG/KG/MIN: 1.5 INJECTION, POWDER, LYOPHILIZED, FOR SOLUTION INTRAVENOUS at 09:06

## 2017-01-01 RX ADMIN — ONDANSETRON 8 MG: 8 TABLET, ORALLY DISINTEGRATING ORAL at 08:06

## 2017-01-01 RX ADMIN — EPINEPHRINE 10 MCG: 1 INJECTION, SOLUTION INTRAMUSCULAR; SUBCUTANEOUS at 12:11

## 2017-01-01 RX ADMIN — POLYETHYLENE GLYCOL 3350 17 G: 17 POWDER, FOR SOLUTION ORAL at 09:08

## 2017-01-01 RX ADMIN — DOPAMINE HYDROCHLORIDE 3 MCG/KG/MIN: 160 INJECTION, SOLUTION INTRAVENOUS at 11:06

## 2017-01-01 RX ADMIN — LIDOCAINE HYDROCHLORIDE 10 ML: 10 INJECTION INFILTRATION; PERINEURAL at 11:11

## 2017-01-01 RX ADMIN — MAGNESIUM OXIDE TAB 400 MG (241.3 MG ELEMENTAL MG) 400 MG: 400 (241.3 MG) TAB at 12:11

## 2017-01-01 RX ADMIN — FUROSEMIDE 80 MG: 40 TABLET ORAL at 08:08

## 2017-01-01 RX ADMIN — MAGNESIUM OXIDE TAB 400 MG (241.3 MG ELEMENTAL MG) 400 MG: 400 (241.3 MG) TAB at 02:11

## 2017-01-01 RX ADMIN — SPIRONOLACTONE 25 MG: 25 TABLET, FILM COATED ORAL at 08:09

## 2017-01-01 RX ADMIN — POTASSIUM CHLORIDE 60 MEQ: 1500 TABLET, EXTENDED RELEASE ORAL at 02:09

## 2017-01-01 RX ADMIN — POTASSIUM CHLORIDE 60 MEQ: 1500 TABLET, EXTENDED RELEASE ORAL at 01:10

## 2017-01-01 RX ADMIN — MUPIROCIN: 20 OINTMENT TOPICAL at 09:06

## 2017-01-01 RX ADMIN — ACETAMINOPHEN 650 MG: 325 TABLET ORAL at 02:06

## 2017-01-01 RX ADMIN — VANCOMYCIN HYDROCHLORIDE 1000 MG: 1 INJECTION, POWDER, LYOPHILIZED, FOR SOLUTION INTRAVENOUS at 09:09

## 2017-01-01 RX ADMIN — OLOPATADINE HYDROCHLORIDE 1 DROP: 2 SOLUTION/ DROPS OPHTHALMIC at 11:08

## 2017-01-01 RX ADMIN — SPIRONOLACTONE 25 MG: 25 TABLET, FILM COATED ORAL at 09:10

## 2017-01-01 RX ADMIN — Medication 3 ML: at 10:06

## 2017-01-01 RX ADMIN — Medication 3 ML: at 02:06

## 2017-01-01 RX ADMIN — GABAPENTIN 300 MG: 300 CAPSULE ORAL at 09:09

## 2017-01-01 RX ADMIN — SPIRONOLACTONE 25 MG: 25 TABLET, FILM COATED ORAL at 01:09

## 2017-01-01 RX ADMIN — GABAPENTIN 300 MG: 300 CAPSULE ORAL at 06:09

## 2017-01-01 RX ADMIN — POTASSIUM CHLORIDE 40 MEQ: 750 CAPSULE, EXTENDED RELEASE ORAL at 09:06

## 2017-01-01 RX ADMIN — IOHEXOL 100 ML: 350 INJECTION, SOLUTION INTRAVENOUS at 10:06

## 2017-01-01 RX ADMIN — DOPAMINE HYDROCHLORIDE 3 MCG/KG/MIN: 160 INJECTION, SOLUTION INTRAVENOUS at 08:06

## 2017-01-01 RX ADMIN — POTASSIUM CHLORIDE 40 MEQ: 20 SOLUTION ORAL at 12:06

## 2017-01-01 RX ADMIN — POTASSIUM CHLORIDE 60 MEQ: 1500 TABLET, EXTENDED RELEASE ORAL at 04:09

## 2017-01-01 RX ADMIN — SILDENAFIL 20 MG: 20 TABLET ORAL at 09:09

## 2017-01-01 RX ADMIN — GABAPENTIN 300 MG: 300 CAPSULE ORAL at 06:11

## 2017-01-01 RX ADMIN — VANCOMYCIN HYDROCHLORIDE 750 MG: 10 INJECTION, POWDER, LYOPHILIZED, FOR SOLUTION INTRAVENOUS at 11:09

## 2017-01-01 RX ADMIN — POTASSIUM CHLORIDE 60 MEQ: 1500 TABLET, EXTENDED RELEASE ORAL at 11:09

## 2017-01-01 RX ADMIN — POLYETHYLENE GLYCOL 3350 17 G: 17 POWDER, FOR SOLUTION ORAL at 08:11

## 2017-01-01 RX ADMIN — TREPROSTINIL 28 NG/KG/MIN: 200 INJECTION, SOLUTION INTRAVENOUS; SUBCUTANEOUS at 05:11

## 2017-01-01 RX ADMIN — Medication 3 ML: at 06:11

## 2017-01-01 RX ADMIN — GABAPENTIN 300 MG: 300 CAPSULE ORAL at 03:09

## 2017-01-01 RX ADMIN — MACITENTAN 10 MG: 10 TABLET, FILM COATED ORAL at 11:09

## 2017-01-01 RX ADMIN — SODIUM CHLORIDE: 0.9 INJECTION, SOLUTION INTRAVENOUS at 09:06

## 2017-01-01 RX ADMIN — POTASSIUM CHLORIDE 20 MEQ: 750 CAPSULE, EXTENDED RELEASE ORAL at 02:06

## 2017-01-01 RX ADMIN — EPOPROSTENOL 8 NG/KG/MIN: 0.5 INJECTION, POWDER, LYOPHILIZED, FOR SOLUTION INTRAVENOUS at 08:06

## 2017-01-01 RX ADMIN — Medication 3 ML: at 06:09

## 2017-01-01 RX ADMIN — Medication 10 ML: at 05:06

## 2017-01-01 RX ADMIN — GABAPENTIN 300 MG: 300 CAPSULE ORAL at 02:11

## 2017-01-01 RX ADMIN — EPOPROSTENOL 5 NG/KG/MIN: 1.5 INJECTION, POWDER, LYOPHILIZED, FOR SOLUTION INTRAVENOUS at 10:06

## 2017-01-01 RX ADMIN — POTASSIUM CHLORIDE 40 MEQ: 400 INJECTION, SOLUTION INTRAVENOUS at 06:06

## 2017-01-01 RX ADMIN — GABAPENTIN 300 MG: 300 CAPSULE ORAL at 09:06

## 2017-01-01 RX ADMIN — PANTOPRAZOLE SODIUM 40 MG: 40 TABLET, DELAYED RELEASE ORAL at 10:11

## 2017-01-01 RX ADMIN — Medication 10 ML: at 02:11

## 2017-01-01 RX ADMIN — FUROSEMIDE 80 MG: 80 TABLET ORAL at 08:06

## 2017-01-01 RX ADMIN — Medication 3 ML: at 04:09

## 2017-01-01 RX ADMIN — SILDENAFIL 20 MG: 20 TABLET ORAL at 08:09

## 2017-01-01 RX ADMIN — Medication: at 08:06

## 2017-01-01 RX ADMIN — PROPOFOL 40 MG: 10 INJECTION, EMULSION INTRAVENOUS at 11:11

## 2017-01-01 RX ADMIN — PANTOPRAZOLE SODIUM 40 MG: 40 TABLET, DELAYED RELEASE ORAL at 02:11

## 2017-01-01 RX ADMIN — BUMETANIDE 2 MG: 1 TABLET ORAL at 12:11

## 2017-01-01 RX ADMIN — EPOPROSTENOL 20 NG/KG/MIN: 0.5 INJECTION, POWDER, LYOPHILIZED, FOR SOLUTION INTRAVENOUS at 06:10

## 2017-01-01 RX ADMIN — EPOPROSTENOL 22 NG/KG/MIN: 1.5 INJECTION, POWDER, LYOPHILIZED, FOR SOLUTION INTRAVENOUS at 03:06

## 2017-01-01 RX ADMIN — HYDROMORPHONE HYDROCHLORIDE 0.2 MG: 1 INJECTION, SOLUTION INTRAMUSCULAR; INTRAVENOUS; SUBCUTANEOUS at 11:11

## 2017-01-01 RX ADMIN — WARFARIN SODIUM 7.5 MG: 2.5 TABLET ORAL at 05:06

## 2017-01-01 RX ADMIN — VANCOMYCIN HYDROCHLORIDE 1250 MG: 100 INJECTION, POWDER, LYOPHILIZED, FOR SOLUTION INTRAVENOUS at 09:09

## 2017-01-01 RX ADMIN — MIDAZOLAM HYDROCHLORIDE 1 MG: 1 INJECTION, SOLUTION INTRAMUSCULAR; INTRAVENOUS at 08:09

## 2017-01-01 RX ADMIN — SPIRONOLACTONE 25 MG: 25 TABLET, FILM COATED ORAL at 10:09

## 2017-01-01 RX ADMIN — SODIUM CHLORIDE, SODIUM GLUCONATE, SODIUM ACETATE, POTASSIUM CHLORIDE, MAGNESIUM CHLORIDE, SODIUM PHOSPHATE, DIBASIC, AND POTASSIUM PHOSPHATE: .53; .5; .37; .037; .03; .012; .00082 INJECTION, SOLUTION INTRAVENOUS at 10:11

## 2017-01-01 RX ADMIN — POTASSIUM CHLORIDE 20 MEQ: 1500 TABLET, EXTENDED RELEASE ORAL at 10:06

## 2017-01-01 RX ADMIN — POTASSIUM CHLORIDE 40 MEQ: 400 INJECTION, SOLUTION INTRAVENOUS at 04:06

## 2017-01-01 RX ADMIN — MACITENTAN 10 MG: 10 TABLET, FILM COATED ORAL at 10:11

## 2017-01-01 RX ADMIN — POTASSIUM CHLORIDE 20 MEQ: 750 CAPSULE, EXTENDED RELEASE ORAL at 06:08

## 2017-01-01 RX ADMIN — POTASSIUM CHLORIDE 20 MEQ: 1500 TABLET, EXTENDED RELEASE ORAL at 09:06

## 2017-01-01 RX ADMIN — FUROSEMIDE 80 MG: 10 INJECTION, SOLUTION INTRAVENOUS at 12:06

## 2017-01-01 RX ADMIN — MACITENTAN 10 MG: 10 TABLET, FILM COATED ORAL at 11:06

## 2017-01-01 RX ADMIN — LIDOCAINE HYDROCHLORIDE 10 ML: 20 SOLUTION OROPHARYNGEAL at 11:11

## 2017-01-01 RX ADMIN — ALBUTEROL SULFATE 2.5 MG: 2.5 SOLUTION RESPIRATORY (INHALATION) at 04:09

## 2017-01-01 RX ADMIN — SILDENAFIL 20 MG: 20 TABLET ORAL at 06:09

## 2017-01-01 RX ADMIN — FUROSEMIDE 10 MG/HR: 10 INJECTION, SOLUTION INTRAMUSCULAR; INTRAVENOUS at 01:06

## 2017-01-01 RX ADMIN — WARFARIN SODIUM 5 MG: 2 TABLET ORAL at 06:09

## 2017-01-01 RX ADMIN — GABAPENTIN 300 MG: 300 CAPSULE ORAL at 04:06

## 2017-01-01 RX ADMIN — MAGNESIUM OXIDE TAB 400 MG (241.3 MG ELEMENTAL MG) 800 MG: 400 (241.3 MG) TAB at 09:09

## 2017-01-01 RX ADMIN — GABAPENTIN 300 MG: 300 CAPSULE ORAL at 09:08

## 2017-01-01 RX ADMIN — MACITENTAN 10 MG: 10 TABLET, FILM COATED ORAL at 01:09

## 2017-01-01 RX ADMIN — SILDENAFIL 20 MG: 20 TABLET ORAL at 06:08

## 2017-01-01 RX ADMIN — FUROSEMIDE 80 MG: 10 INJECTION, SOLUTION INTRAVENOUS at 02:08

## 2017-01-01 RX ADMIN — EPOPROSTENOL 24 NG/KG/MIN: 0.5 INJECTION, POWDER, LYOPHILIZED, FOR SOLUTION INTRAVENOUS at 02:08

## 2017-01-01 RX ADMIN — Medication 3 ML: at 05:08

## 2017-01-01 RX ADMIN — STANDARDIZED SENNA CONCENTRATE AND DOCUSATE SODIUM 2 TABLET: 8.6; 5 TABLET, FILM COATED ORAL at 08:10

## 2017-01-01 RX ADMIN — DULOXETINE 60 MG: 60 CAPSULE, DELAYED RELEASE ORAL at 10:06

## 2017-01-01 RX ADMIN — EPOPROSTENOL 24 NG/KG/MIN: 0.5 INJECTION, POWDER, LYOPHILIZED, FOR SOLUTION INTRAVENOUS at 04:09

## 2017-01-01 RX ADMIN — EPOPROSTENOL 15 NG/KG/MIN: 1.5 INJECTION, POWDER, LYOPHILIZED, FOR SOLUTION INTRAVENOUS at 03:06

## 2017-01-01 RX ADMIN — MAGNESIUM OXIDE TAB 400 MG (241.3 MG ELEMENTAL MG) 400 MG: 400 (241.3 MG) TAB at 10:09

## 2017-01-01 RX ADMIN — WARFARIN SODIUM 4 MG: 2 TABLET ORAL at 04:06

## 2017-01-01 RX ADMIN — SILDENAFIL 20 MG: 20 TABLET ORAL at 10:09

## 2017-01-01 RX ADMIN — METOCLOPRAMIDE 10 MG: 5 INJECTION, SOLUTION INTRAMUSCULAR; INTRAVENOUS at 10:09

## 2017-01-01 RX ADMIN — WARFARIN SODIUM 5 MG: 2 TABLET ORAL at 05:09

## 2017-01-01 RX ADMIN — SPIRONOLACTONE 25 MG: 25 TABLET, FILM COATED ORAL at 08:10

## 2017-01-01 RX ADMIN — ONDANSETRON 4 MG: 2 INJECTION INTRAMUSCULAR; INTRAVENOUS at 01:06

## 2017-01-01 RX ADMIN — ONDANSETRON 4 MG: 2 INJECTION INTRAMUSCULAR; INTRAVENOUS at 07:06

## 2017-01-01 RX ADMIN — ONDANSETRON 4 MG: 2 INJECTION INTRAMUSCULAR; INTRAVENOUS at 09:06

## 2017-01-01 RX ADMIN — ACETAMINOPHEN 650 MG: 325 TABLET ORAL at 02:09

## 2017-01-01 RX ADMIN — EPOPROSTENOL 19 NG/KG/MIN: 1.5 INJECTION, POWDER, LYOPHILIZED, FOR SOLUTION INTRAVENOUS at 08:06

## 2017-01-01 RX ADMIN — Medication 3 ML: at 01:11

## 2017-01-01 RX ADMIN — FUROSEMIDE 40 MG: 10 INJECTION, SOLUTION INTRAVENOUS at 04:08

## 2017-01-01 RX ADMIN — POTASSIUM CHLORIDE 20 MEQ: 1500 TABLET, EXTENDED RELEASE ORAL at 09:09

## 2017-01-01 RX ADMIN — MIDAZOLAM 1 MG: 1 INJECTION INTRAMUSCULAR; INTRAVENOUS at 11:11

## 2017-01-01 RX ADMIN — DULOXETINE 60 MG: 60 CAPSULE, DELAYED RELEASE ORAL at 05:08

## 2017-01-01 RX ADMIN — AZITHROMYCIN MONOHYDRATE 500 MG: 500 INJECTION, POWDER, LYOPHILIZED, FOR SOLUTION INTRAVENOUS at 03:08

## 2017-01-01 RX ADMIN — FUROSEMIDE 10 MG/HR: 10 INJECTION, SOLUTION INTRAVENOUS at 11:09

## 2017-01-01 RX ADMIN — VANCOMYCIN HYDROCHLORIDE 1250 MG: 10 INJECTION, POWDER, LYOPHILIZED, FOR SOLUTION INTRAVENOUS at 01:11

## 2017-01-01 RX ADMIN — POTASSIUM CHLORIDE 20 MEQ: 750 CAPSULE, EXTENDED RELEASE ORAL at 05:08

## 2017-01-01 RX ADMIN — VANCOMYCIN HYDROCHLORIDE 1000 MG: 1 INJECTION, POWDER, LYOPHILIZED, FOR SOLUTION INTRAVENOUS at 08:08

## 2017-01-01 RX ADMIN — FUROSEMIDE 80 MG: 40 TABLET ORAL at 05:08

## 2017-01-01 RX ADMIN — MAGNESIUM SULFATE HEPTAHYDRATE 2 G: 40 INJECTION, SOLUTION INTRAVENOUS at 08:06

## 2017-01-01 RX ADMIN — GABAPENTIN 300 MG: 300 CAPSULE ORAL at 09:11

## 2017-01-01 RX ADMIN — POTASSIUM CHLORIDE 60 MEQ: 1500 TABLET, EXTENDED RELEASE ORAL at 08:11

## 2017-01-01 RX ADMIN — TREPROSTINIL 28 NG/KG/MIN: 200 INJECTION, SOLUTION INTRAVENOUS; SUBCUTANEOUS at 04:11

## 2017-01-01 RX ADMIN — POLYETHYLENE GLYCOL 3350 17 G: 17 POWDER, FOR SOLUTION ORAL at 08:09

## 2017-01-01 RX ADMIN — POTASSIUM CHLORIDE 40 MEQ: 1500 TABLET, EXTENDED RELEASE ORAL at 06:11

## 2017-01-01 RX ADMIN — MACITENTAN 10 MG: 10 TABLET, FILM COATED ORAL at 09:10

## 2017-01-01 RX ADMIN — Medication 3 ML: at 09:09

## 2017-01-01 RX ADMIN — STANDARDIZED SENNA CONCENTRATE AND DOCUSATE SODIUM 2 TABLET: 8.6; 5 TABLET, FILM COATED ORAL at 09:10

## 2017-01-01 RX ADMIN — VANCOMYCIN HYDROCHLORIDE 1250 MG: 100 INJECTION, POWDER, LYOPHILIZED, FOR SOLUTION INTRAVENOUS at 04:09

## 2017-01-01 RX ADMIN — GABAPENTIN 300 MG: 300 CAPSULE ORAL at 05:06

## 2017-01-01 RX ADMIN — EPOPROSTENOL 6 NG/KG/MIN: 1.5 INJECTION, POWDER, LYOPHILIZED, FOR SOLUTION INTRAVENOUS at 11:06

## 2017-01-01 RX ADMIN — SILDENAFIL 20 MG: 20 TABLET ORAL at 03:09

## 2017-01-01 RX ADMIN — Medication 10 ML: at 06:11

## 2017-01-01 RX ADMIN — GABAPENTIN 300 MG: 300 CAPSULE ORAL at 02:06

## 2017-01-01 RX ADMIN — FUROSEMIDE 20 MG/HR: 10 INJECTION, SOLUTION INTRAVENOUS at 09:09

## 2017-01-01 RX ADMIN — ALBUTEROL SULFATE 2.5 MG: 2.5 SOLUTION RESPIRATORY (INHALATION) at 12:08

## 2017-01-01 RX ADMIN — POTASSIUM CHLORIDE 10 MEQ: 10 INJECTION, SOLUTION INTRAVENOUS at 01:06

## 2017-01-01 RX ADMIN — EPOPROSTENOL 6 NG/KG/MIN: 1.5 INJECTION, POWDER, LYOPHILIZED, FOR SOLUTION INTRAVENOUS at 10:06

## 2017-01-01 RX ADMIN — Medication 3 ML: at 02:11

## 2017-01-01 RX ADMIN — MACITENTAN 10 MG: 10 TABLET, FILM COATED ORAL at 09:09

## 2017-01-01 RX ADMIN — FUROSEMIDE 40 MG: 10 INJECTION, SOLUTION INTRAVENOUS at 07:08

## 2017-01-01 RX ADMIN — POTASSIUM CHLORIDE 40 MEQ: 1500 TABLET, EXTENDED RELEASE ORAL at 05:11

## 2017-01-01 RX ADMIN — VANCOMYCIN HYDROCHLORIDE 1250 MG: 10 INJECTION, POWDER, LYOPHILIZED, FOR SOLUTION INTRAVENOUS at 02:11

## 2017-01-01 RX ADMIN — Medication: at 02:09

## 2017-01-01 RX ADMIN — FUROSEMIDE 40 MG: 10 INJECTION, SOLUTION INTRAVENOUS at 12:06

## 2017-01-01 RX ADMIN — BUMETANIDE 2 MG: 1 TABLET ORAL at 08:10

## 2017-01-01 RX ADMIN — POTASSIUM CHLORIDE 20 MEQ: 750 CAPSULE, EXTENDED RELEASE ORAL at 05:09

## 2017-01-01 RX ADMIN — DULOXETINE 60 MG: 60 CAPSULE, DELAYED RELEASE ORAL at 11:05

## 2017-01-01 RX ADMIN — FUROSEMIDE 20 MG/HR: 10 INJECTION, SOLUTION INTRAVENOUS at 08:09

## 2017-01-01 RX ADMIN — MAGNESIUM SULFATE HEPTAHYDRATE 1 G: 500 INJECTION, SOLUTION INTRAMUSCULAR; INTRAVENOUS at 11:06

## 2017-01-01 RX ADMIN — Medication 3 ML: at 11:11

## 2017-01-01 RX ADMIN — MIDAZOLAM HYDROCHLORIDE 1 MG: 1 INJECTION, SOLUTION INTRAMUSCULAR; INTRAVENOUS at 09:06

## 2017-01-01 RX ADMIN — POLYETHYLENE GLYCOL 3350 17 G: 17 POWDER, FOR SOLUTION ORAL at 09:11

## 2017-01-01 RX ADMIN — DOBUTAMINE HYDROCHLORIDE 2.5 MCG/KG/MIN: 400 INJECTION INTRAVENOUS at 01:06

## 2017-01-01 RX ADMIN — GABAPENTIN 300 MG: 300 CAPSULE ORAL at 10:09

## 2017-01-01 RX ADMIN — FUROSEMIDE 40 MG: 10 INJECTION, SOLUTION INTRAVENOUS at 05:06

## 2017-01-01 RX ADMIN — POTASSIUM CHLORIDE 40 MEQ: 750 CAPSULE, EXTENDED RELEASE ORAL at 10:06

## 2017-01-01 RX ADMIN — FUROSEMIDE 10 MG/HR: 10 INJECTION, SOLUTION INTRAVENOUS at 09:09

## 2017-01-01 RX ADMIN — MAGNESIUM SULFATE IN WATER 2 G: 40 INJECTION, SOLUTION INTRAVENOUS at 05:06

## 2017-01-01 RX ADMIN — ALBUTEROL SULFATE 2.5 MG: 2.5 SOLUTION RESPIRATORY (INHALATION) at 07:08

## 2017-01-01 RX ADMIN — POTASSIUM CHLORIDE 60 MEQ: 1500 TABLET, EXTENDED RELEASE ORAL at 05:10

## 2017-01-01 RX ADMIN — DULOXETINE 60 MG: 60 CAPSULE, DELAYED RELEASE ORAL at 08:09

## 2017-01-01 RX ADMIN — MAGNESIUM SULFATE IN WATER 2 G: 40 INJECTION, SOLUTION INTRAVENOUS at 08:06

## 2017-01-01 RX ADMIN — MIDAZOLAM 0.5 MG: 1 INJECTION INTRAMUSCULAR; INTRAVENOUS at 07:11

## 2017-01-01 RX ADMIN — MACITENTAN 10 MG: 10 TABLET, FILM COATED ORAL at 10:09

## 2017-01-01 RX ADMIN — EPINEPHRINE 100 MCG: 1 INJECTION, SOLUTION INTRAMUSCULAR; SUBCUTANEOUS at 08:11

## 2017-01-01 RX ADMIN — SPIRONOLACTONE 25 MG: 25 TABLET, FILM COATED ORAL at 02:11

## 2017-01-01 RX ADMIN — DULOXETINE 60 MG: 60 CAPSULE, DELAYED RELEASE ORAL at 11:09

## 2017-01-01 RX ADMIN — WARFARIN SODIUM 2.5 MG: 2.5 TABLET ORAL at 05:11

## 2017-01-01 RX ADMIN — POLYETHYLENE GLYCOL 3350 17 G: 17 POWDER, FOR SOLUTION ORAL at 10:09

## 2017-01-01 RX ADMIN — EPOPROSTENOL 2 NG/KG/MIN: 0.5 INJECTION, POWDER, LYOPHILIZED, FOR SOLUTION INTRAVENOUS at 05:06

## 2017-01-01 RX ADMIN — WARFARIN SODIUM 5 MG: 5 TABLET ORAL at 06:06

## 2017-01-01 RX ADMIN — FUROSEMIDE 80 MG: 80 TABLET ORAL at 11:06

## 2017-01-01 RX ADMIN — FUROSEMIDE 15 MG/HR: 10 INJECTION, SOLUTION INTRAVENOUS at 08:08

## 2017-01-01 RX ADMIN — AZITHROMYCIN MONOHYDRATE 500 MG: 500 INJECTION, POWDER, LYOPHILIZED, FOR SOLUTION INTRAVENOUS at 02:08

## 2017-01-01 RX ADMIN — KETAMINE HYDROCHLORIDE 10 MG: 100 INJECTION, SOLUTION, CONCENTRATE INTRAMUSCULAR; INTRAVENOUS at 09:11

## 2017-01-01 RX ADMIN — FUROSEMIDE 40 MG: 10 INJECTION, SOLUTION INTRAVENOUS at 08:06

## 2017-01-01 RX ADMIN — FUROSEMIDE 40 MG: 40 TABLET ORAL at 08:06

## 2017-01-01 RX ADMIN — SILDENAFIL 20 MG: 20 TABLET ORAL at 05:09

## 2017-01-01 RX ADMIN — METOCLOPRAMIDE 10 MG: 5 INJECTION, SOLUTION INTRAMUSCULAR; INTRAVENOUS at 09:09

## 2017-01-01 RX ADMIN — ALBUTEROL SULFATE 2.5 MG: 2.5 SOLUTION RESPIRATORY (INHALATION) at 01:08

## 2017-01-01 RX ADMIN — Medication 3 ML: at 08:10

## 2017-01-01 RX ADMIN — MACITENTAN 10 MG: 10 TABLET, FILM COATED ORAL at 09:11

## 2017-01-01 RX ADMIN — WARFARIN SODIUM 2.5 MG: 2.5 TABLET ORAL at 05:10

## 2017-01-01 RX ADMIN — FUROSEMIDE 40 MG: 10 INJECTION, SOLUTION INTRAVENOUS at 11:05

## 2017-01-01 RX ADMIN — POTASSIUM CHLORIDE 10 MEQ: 10 INJECTION, SOLUTION INTRAVENOUS at 09:11

## 2017-01-01 RX ADMIN — EPOPROSTENOL 9 NG/KG/MIN: 0.5 INJECTION, POWDER, LYOPHILIZED, FOR SOLUTION INTRAVENOUS at 08:06

## 2017-01-01 RX ADMIN — POTASSIUM CHLORIDE 20 MEQ: 750 CAPSULE, EXTENDED RELEASE ORAL at 10:09

## 2017-01-01 RX ADMIN — LIDOCAINE HYDROCHLORIDE 10 ML: 10 INJECTION, SOLUTION INFILTRATION; PERINEURAL at 11:11

## 2017-01-01 RX ADMIN — DULOXETINE 60 MG: 60 CAPSULE, DELAYED RELEASE ORAL at 01:09

## 2017-01-01 RX ADMIN — GABAPENTIN 300 MG: 300 CAPSULE ORAL at 03:11

## 2017-01-01 RX ADMIN — Medication 3 ML: at 08:11

## 2017-01-01 RX ADMIN — BUMETANIDE 2 MG: 1 TABLET ORAL at 09:10

## 2017-01-01 RX ADMIN — EPOPROSTENOL 1 NG/KG/MIN: 0.5 INJECTION, POWDER, LYOPHILIZED, FOR SOLUTION INTRAVENOUS at 11:06

## 2017-01-01 RX ADMIN — POTASSIUM CHLORIDE 10 MEQ: 10 INJECTION, SOLUTION INTRAVENOUS at 11:09

## 2017-01-01 RX ADMIN — EPOPROSTENOL 7 NG/KG/MIN: 0.5 INJECTION, POWDER, LYOPHILIZED, FOR SOLUTION INTRAVENOUS at 09:06

## 2017-01-01 RX ADMIN — Medication 10 ML: at 11:11

## 2017-01-01 RX ADMIN — POTASSIUM CHLORIDE 10 MEQ: 10 INJECTION, SOLUTION INTRAVENOUS at 10:09

## 2017-01-01 RX ADMIN — Medication 10 ML: at 01:11

## 2017-01-01 RX ADMIN — ONDANSETRON 8 MG: 8 TABLET, ORALLY DISINTEGRATING ORAL at 02:06

## 2017-01-01 RX ADMIN — FUROSEMIDE 40 MG: 10 INJECTION, SOLUTION INTRAVENOUS at 01:06

## 2017-01-01 RX ADMIN — GABAPENTIN 300 MG: 300 CAPSULE ORAL at 01:11

## 2017-01-01 RX ADMIN — EPOPROSTENOL 24 NG/KG/MIN: 0.5 INJECTION, POWDER, LYOPHILIZED, FOR SOLUTION INTRAVENOUS at 03:09

## 2017-01-01 RX ADMIN — POTASSIUM CHLORIDE 40 MEQ: 1500 TABLET, EXTENDED RELEASE ORAL at 08:09

## 2017-01-01 RX ADMIN — POTASSIUM CHLORIDE 60 MEQ: 1500 TABLET, EXTENDED RELEASE ORAL at 06:09

## 2017-01-01 RX ADMIN — VANCOMYCIN HYDROCHLORIDE 1000 MG: 1 INJECTION, POWDER, LYOPHILIZED, FOR SOLUTION INTRAVENOUS at 10:09

## 2017-01-01 RX ADMIN — DULOXETINE 60 MG: 60 CAPSULE, DELAYED RELEASE ORAL at 09:06

## 2017-01-01 RX ADMIN — EPOPROSTENOL 11 NG/KG/MIN: 0.5 INJECTION, POWDER, LYOPHILIZED, FOR SOLUTION INTRAVENOUS at 05:06

## 2017-01-01 RX ADMIN — POTASSIUM CHLORIDE 60 MEQ: 1500 TABLET, EXTENDED RELEASE ORAL at 05:09

## 2017-01-01 RX ADMIN — DOPAMINE HYDROCHLORIDE 3 MCG/KG/MIN: 160 INJECTION, SOLUTION INTRAVENOUS at 01:06

## 2017-01-01 RX ADMIN — MACITENTAN 10 MG: 10 TABLET, FILM COATED ORAL at 07:09

## 2017-01-01 RX ADMIN — FENTANYL CITRATE 25 MCG: 50 INJECTION, SOLUTION INTRAMUSCULAR; INTRAVENOUS at 08:09

## 2017-01-01 RX ADMIN — POTASSIUM CHLORIDE 60 MEQ: 20 SOLUTION ORAL at 09:09

## 2017-01-01 RX ADMIN — GABAPENTIN 300 MG: 300 CAPSULE ORAL at 01:09

## 2017-01-01 RX ADMIN — POTASSIUM CHLORIDE 40 MEQ: 1500 TABLET, EXTENDED RELEASE ORAL at 06:09

## 2017-01-01 RX ADMIN — POTASSIUM CHLORIDE 20 MEQ: 1500 TABLET, EXTENDED RELEASE ORAL at 01:11

## 2017-01-01 RX ADMIN — Medication 10 ML: at 06:09

## 2017-01-01 RX ADMIN — KETAMINE HYDROCHLORIDE 10 MG: 100 INJECTION, SOLUTION, CONCENTRATE INTRAMUSCULAR; INTRAVENOUS at 09:06

## 2017-01-01 RX ADMIN — MIDAZOLAM 1 MG: 1 INJECTION INTRAMUSCULAR; INTRAVENOUS at 07:11

## 2017-01-01 RX ADMIN — SODIUM CHLORIDE: 0.9 INJECTION, SOLUTION INTRAVENOUS at 10:11

## 2017-01-01 RX ADMIN — SPIRONOLACTONE 25 MG: 25 TABLET, FILM COATED ORAL at 11:09

## 2017-01-01 RX ADMIN — Medication 3 ML: at 05:06

## 2017-01-01 RX ADMIN — MAGNESIUM SULFATE IN WATER 2 G: 40 INJECTION, SOLUTION INTRAVENOUS at 10:09

## 2017-01-01 RX ADMIN — ALBUTEROL SULFATE 2.5 MG: 2.5 SOLUTION RESPIRATORY (INHALATION) at 11:09

## 2017-01-01 RX ADMIN — HYDROCODONE BITARTRATE AND ACETAMINOPHEN 1 TABLET: 10; 325 TABLET ORAL at 01:11

## 2017-01-01 RX ADMIN — Medication 3 ML: at 10:11

## 2017-01-01 RX ADMIN — DOCUSATE SODIUM 100 MG: 100 CAPSULE, LIQUID FILLED ORAL at 04:06

## 2017-01-01 RX ADMIN — WARFARIN SODIUM 7.5 MG: 2.5 TABLET ORAL at 06:06

## 2017-01-01 RX ADMIN — WARFARIN SODIUM 5 MG: 2 TABLET ORAL at 05:10

## 2017-01-01 RX ADMIN — Medication: at 10:06

## 2017-01-01 RX ADMIN — LINEZOLID 600 MG: 600 TABLET, FILM COATED ORAL at 12:11

## 2017-01-01 RX ADMIN — VANCOMYCIN HYDROCHLORIDE 1250 MG: 100 INJECTION, POWDER, LYOPHILIZED, FOR SOLUTION INTRAVENOUS at 01:09

## 2017-01-01 RX ADMIN — FUROSEMIDE 20 MG/HR: 10 INJECTION, SOLUTION INTRAVENOUS at 05:09

## 2017-01-01 RX ADMIN — FUROSEMIDE 40 MG: 40 TABLET ORAL at 09:06

## 2017-01-01 RX ADMIN — POTASSIUM CHLORIDE 40 MEQ: 1500 TABLET, EXTENDED RELEASE ORAL at 10:11

## 2017-01-01 RX ADMIN — POTASSIUM CHLORIDE 40 MEQ: 1500 TABLET, EXTENDED RELEASE ORAL at 02:11

## 2017-01-01 RX ADMIN — MUPIROCIN: 20 OINTMENT TOPICAL at 08:06

## 2017-01-01 RX ADMIN — BUMETANIDE 2 MG: 1 TABLET ORAL at 11:09

## 2017-01-01 RX ADMIN — SILDENAFIL 20 MG: 20 TABLET ORAL at 08:08

## 2017-01-01 RX ADMIN — EPOPROSTENOL 20 NG/KG/MIN: 0.5 INJECTION, POWDER, LYOPHILIZED, FOR SOLUTION INTRAVENOUS at 04:10

## 2017-01-01 RX ADMIN — SPIRONOLACTONE 25 MG: 25 TABLET, FILM COATED ORAL at 10:11

## 2017-01-01 RX ADMIN — POTASSIUM CHLORIDE 40 MEQ: 1500 TABLET, EXTENDED RELEASE ORAL at 08:11

## 2017-01-01 RX ADMIN — WARFARIN SODIUM 5 MG: 5 TABLET ORAL at 05:11

## 2017-01-01 RX ADMIN — Medication 3 ML: at 09:06

## 2017-01-01 RX ADMIN — ACETAMINOPHEN 650 MG: 325 TABLET ORAL at 05:06

## 2017-01-01 RX ADMIN — Medication 10 ML: at 11:09

## 2017-01-01 RX ADMIN — PROMETHAZINE HYDROCHLORIDE 25 MG: 25 INJECTION, SOLUTION INTRAMUSCULAR; INTRAVENOUS at 12:06

## 2017-01-01 RX ADMIN — POTASSIUM CHLORIDE 40 MEQ: 1500 TABLET, EXTENDED RELEASE ORAL at 07:11

## 2017-01-01 RX ADMIN — METOCLOPRAMIDE 10 MG: 5 INJECTION, SOLUTION INTRAMUSCULAR; INTRAVENOUS at 11:09

## 2017-01-01 RX ADMIN — Medication 3 ML: at 11:06

## 2017-01-01 RX ADMIN — FUROSEMIDE 80 MG: 10 INJECTION, SOLUTION INTRAVENOUS at 09:09

## 2017-01-01 RX ADMIN — EPOPROSTENOL 24 NG/KG/MIN: 0.5 INJECTION, POWDER, LYOPHILIZED, FOR SOLUTION INTRAVENOUS at 03:08

## 2017-01-01 RX ADMIN — POTASSIUM CHLORIDE 20 MEQ: 20 SOLUTION ORAL at 05:09

## 2017-01-01 RX ADMIN — Medication 3 ML: at 09:11

## 2017-01-01 RX ADMIN — ONDANSETRON 4 MG: 2 INJECTION INTRAMUSCULAR; INTRAVENOUS at 06:09

## 2017-01-01 RX ADMIN — ONDANSETRON 4 MG: 2 INJECTION INTRAMUSCULAR; INTRAVENOUS at 06:06

## 2017-01-01 RX ADMIN — LIDOCAINE HYDROCHLORIDE 200 MG: 10 INJECTION, SOLUTION INFILTRATION; PERINEURAL at 05:06

## 2017-01-01 RX ADMIN — POTASSIUM CHLORIDE 20 MEQ: 200 INJECTION, SOLUTION INTRAVENOUS at 10:06

## 2017-01-01 RX ADMIN — POTASSIUM CHLORIDE 20 MEQ: 750 CAPSULE, EXTENDED RELEASE ORAL at 08:08

## 2017-01-01 RX ADMIN — EPOPROSTENOL 7 NG/KG/MIN: 0.5 INJECTION, POWDER, LYOPHILIZED, FOR SOLUTION INTRAVENOUS at 08:06

## 2017-01-01 RX ADMIN — EPOPROSTENOL 20 NG/KG/MIN: 0.5 INJECTION, POWDER, LYOPHILIZED, FOR SOLUTION INTRAVENOUS at 05:10

## 2017-01-01 RX ADMIN — STANDARDIZED SENNA CONCENTRATE AND DOCUSATE SODIUM 2 TABLET: 8.6; 5 TABLET, FILM COATED ORAL at 09:09

## 2017-01-01 RX ADMIN — ONDANSETRON 8 MG: 8 TABLET, ORALLY DISINTEGRATING ORAL at 09:08

## 2017-01-01 RX ADMIN — EPOPROSTENOL 17 NG/KG/MIN: 1.5 INJECTION, POWDER, LYOPHILIZED, FOR SOLUTION INTRAVENOUS at 05:06

## 2017-01-01 RX ADMIN — POTASSIUM CHLORIDE 60 MEQ: 1500 TABLET, EXTENDED RELEASE ORAL at 06:10

## 2017-01-01 RX ADMIN — DEXMEDETOMIDINE HYDROCHLORIDE 0.5 MCG/KG/HR: 100 INJECTION, SOLUTION, CONCENTRATE INTRAVENOUS at 08:09

## 2017-01-01 RX ADMIN — POTASSIUM CHLORIDE 20 MEQ: 1500 TABLET, EXTENDED RELEASE ORAL at 03:10

## 2017-01-01 RX ADMIN — VANCOMYCIN HYDROCHLORIDE 1000 MG: 1 INJECTION, POWDER, LYOPHILIZED, FOR SOLUTION INTRAVENOUS at 02:11

## 2017-01-01 RX ADMIN — ALBUTEROL SULFATE 2.5 MG: 2.5 SOLUTION RESPIRATORY (INHALATION) at 08:08

## 2017-01-01 RX ADMIN — ONDANSETRON 4 MG: 2 INJECTION INTRAMUSCULAR; INTRAVENOUS at 06:08

## 2017-01-01 RX ADMIN — FUROSEMIDE 10 MG/HR: 10 INJECTION, SOLUTION INTRAVENOUS at 01:09

## 2017-01-01 RX ADMIN — EPOPROSTENOL 18 NG/KG/MIN: 1.5 INJECTION, POWDER, LYOPHILIZED, FOR SOLUTION INTRAVENOUS at 02:06

## 2017-01-01 RX ADMIN — FENTANYL CITRATE 25 MCG: 50 INJECTION, SOLUTION INTRAMUSCULAR; INTRAVENOUS at 09:11

## 2017-01-01 RX ADMIN — FUROSEMIDE 80 MG: 80 TABLET ORAL at 10:06

## 2017-01-01 RX ADMIN — GABAPENTIN 300 MG: 300 CAPSULE ORAL at 08:09

## 2017-01-01 RX ADMIN — KETAMINE HYDROCHLORIDE 10 MG: 100 INJECTION, SOLUTION, CONCENTRATE INTRAMUSCULAR; INTRAVENOUS at 11:11

## 2017-01-01 RX ADMIN — POTASSIUM CHLORIDE 20 MEQ: 1500 TABLET, EXTENDED RELEASE ORAL at 11:06

## 2017-01-01 RX ADMIN — EPOPROSTENOL 21 NG/KG/MIN: 1.5 INJECTION, POWDER, LYOPHILIZED, FOR SOLUTION INTRAVENOUS at 09:06

## 2017-01-01 RX ADMIN — GABAPENTIN 300 MG: 300 CAPSULE ORAL at 12:11

## 2017-01-01 RX ADMIN — ONDANSETRON 4 MG: 2 INJECTION INTRAMUSCULAR; INTRAVENOUS at 10:09

## 2017-01-01 RX ADMIN — FUROSEMIDE 40 MG: 10 INJECTION, SOLUTION INTRAVENOUS at 02:06

## 2017-01-01 RX ADMIN — EPOPROSTENOL 16 NG/KG/MIN: 1.5 INJECTION, POWDER, LYOPHILIZED, FOR SOLUTION INTRAVENOUS at 11:06

## 2017-01-01 RX ADMIN — EPOPROSTENOL 6 NG/KG/MIN: 1.5 INJECTION, POWDER, LYOPHILIZED, FOR SOLUTION INTRAVENOUS at 04:06

## 2017-01-01 RX ADMIN — DULOXETINE 60 MG: 60 CAPSULE, DELAYED RELEASE ORAL at 07:09

## 2017-01-01 RX ADMIN — ONDANSETRON 4 MG: 2 INJECTION INTRAMUSCULAR; INTRAVENOUS at 09:11

## 2017-01-01 RX ADMIN — EPOPROSTENOL 22 NG/KG/MIN: 1.5 INJECTION, POWDER, LYOPHILIZED, FOR SOLUTION INTRAVENOUS at 07:09

## 2017-01-01 RX ADMIN — POTASSIUM CHLORIDE 60 MEQ: 1500 TABLET, EXTENDED RELEASE ORAL at 07:09

## 2017-01-01 RX ADMIN — SILDENAFIL 20 MG: 20 TABLET ORAL at 09:08

## 2017-01-01 RX ADMIN — SILDENAFIL 20 MG: 20 TABLET ORAL at 02:08

## 2017-01-01 RX ADMIN — SODIUM CHLORIDE: 0.9 INJECTION, SOLUTION INTRAVENOUS at 04:11

## 2017-01-01 RX ADMIN — Medication 10 ML: at 12:11

## 2017-01-01 RX ADMIN — EPOPROSTENOL 15 NG/KG/MIN: 1.5 INJECTION, POWDER, LYOPHILIZED, FOR SOLUTION INTRAVENOUS at 06:06

## 2017-01-01 RX ADMIN — POLYETHYLENE GLYCOL 3350 17 G: 17 POWDER, FOR SOLUTION ORAL at 08:08

## 2017-01-01 RX ADMIN — MUPIROCIN: 20 OINTMENT TOPICAL at 02:06

## 2017-01-01 RX ADMIN — DOPAMINE HYDROCHLORIDE 3 MCG/KG/MIN: 160 INJECTION, SOLUTION INTRAVENOUS at 07:06

## 2017-01-01 RX ADMIN — EPOPROSTENOL 6 NG/KG/MIN: 1.5 INJECTION, POWDER, LYOPHILIZED, FOR SOLUTION INTRAVENOUS at 06:06

## 2017-01-01 RX ADMIN — POTASSIUM CHLORIDE 60 MEQ: 1500 TABLET, EXTENDED RELEASE ORAL at 09:11

## 2017-01-01 RX ADMIN — POTASSIUM CHLORIDE 10 MEQ: 10 INJECTION, SOLUTION INTRAVENOUS at 08:11

## 2017-01-01 RX ADMIN — MACITENTAN 10 MG: 10 TABLET, FILM COATED ORAL at 01:08

## 2017-01-01 RX ADMIN — GABAPENTIN 300 MG: 300 CAPSULE ORAL at 08:08

## 2017-01-01 RX ADMIN — WARFARIN SODIUM 5 MG: 5 TABLET ORAL at 04:11

## 2017-01-01 RX ADMIN — POTASSIUM CHLORIDE 40 MEQ: 1500 TABLET, EXTENDED RELEASE ORAL at 01:09

## 2017-01-01 RX ADMIN — POTASSIUM CHLORIDE 60 MEQ: 1500 TABLET, EXTENDED RELEASE ORAL at 08:09

## 2017-01-01 RX ADMIN — FUROSEMIDE 80 MG: 40 TABLET ORAL at 06:08

## 2017-01-01 RX ADMIN — ONDANSETRON 4 MG: 2 INJECTION INTRAMUSCULAR; INTRAVENOUS at 12:09

## 2017-01-01 RX ADMIN — EPOPROSTENOL 6 NG/KG/MIN: 1.5 INJECTION, POWDER, LYOPHILIZED, FOR SOLUTION INTRAVENOUS at 08:06

## 2017-01-01 RX ADMIN — POTASSIUM CHLORIDE 20 MEQ: 200 INJECTION, SOLUTION INTRAVENOUS at 08:06

## 2017-01-01 RX ADMIN — ALBUTEROL SULFATE 2.5 MG: 2.5 SOLUTION RESPIRATORY (INHALATION) at 09:08

## 2017-01-01 RX ADMIN — MUPIROCIN: 20 OINTMENT TOPICAL at 11:06

## 2017-01-01 RX ADMIN — SPIRONOLACTONE 25 MG: 25 TABLET, FILM COATED ORAL at 07:09

## 2017-01-01 RX ADMIN — EPINEPHRINE 5 MCG: 1 INJECTION, SOLUTION INTRAMUSCULAR; SUBCUTANEOUS at 11:11

## 2017-01-01 RX ADMIN — POTASSIUM PHOSPHATE, MONOBASIC AND POTASSIUM PHOSPHATE, DIBASIC 15 MMOL: 224; 236 INJECTION, SOLUTION, CONCENTRATE INTRAVENOUS at 08:06

## 2017-01-01 RX ADMIN — BUMETANIDE 2 MG: 1 TABLET ORAL at 10:11

## 2017-01-01 RX ADMIN — POTASSIUM CHLORIDE 40 MEQ: 400 INJECTION, SOLUTION INTRAVENOUS at 05:06

## 2017-01-01 RX ADMIN — ONDANSETRON 4 MG: 2 INJECTION INTRAMUSCULAR; INTRAVENOUS at 03:06

## 2017-01-01 RX ADMIN — DIPHENHYDRAMINE HYDROCHLORIDE 25 MG: 50 INJECTION, SOLUTION INTRAMUSCULAR; INTRAVENOUS at 08:09

## 2017-01-01 RX ADMIN — ALBUTEROL SULFATE 2.5 MG: 2.5 SOLUTION RESPIRATORY (INHALATION) at 02:09

## 2017-01-01 RX ADMIN — MAGNESIUM SULFATE HEPTAHYDRATE 2 G: 40 INJECTION, SOLUTION INTRAVENOUS at 06:06

## 2017-01-01 RX ADMIN — Medication 3 ML: at 02:10

## 2017-01-01 RX ADMIN — POTASSIUM CHLORIDE 20 MEQ: 750 CAPSULE, EXTENDED RELEASE ORAL at 03:08

## 2017-01-01 RX ADMIN — FUROSEMIDE 80 MG: 10 INJECTION, SOLUTION INTRAVENOUS at 08:08

## 2017-01-01 RX ADMIN — ALBUTEROL SULFATE 2.5 MG: 2.5 SOLUTION RESPIRATORY (INHALATION) at 06:09

## 2017-01-01 RX ADMIN — VANCOMYCIN HYDROCHLORIDE 1500 MG: 10 INJECTION, POWDER, LYOPHILIZED, FOR SOLUTION INTRAVENOUS at 08:11

## 2017-01-01 RX ADMIN — FUROSEMIDE 5 MG/HR: 10 INJECTION, SOLUTION INTRAMUSCULAR; INTRAVENOUS at 10:06

## 2017-01-01 RX ADMIN — Medication: at 03:09

## 2017-01-01 RX ADMIN — MAGNESIUM OXIDE TAB 400 MG (241.3 MG ELEMENTAL MG) 400 MG: 400 (241.3 MG) TAB at 01:09

## 2017-01-01 RX ADMIN — FUROSEMIDE 80 MG: 40 TABLET ORAL at 09:08

## 2017-01-01 RX ADMIN — POTASSIUM CHLORIDE 60 MEQ: 1500 TABLET, EXTENDED RELEASE ORAL at 08:10

## 2017-01-01 RX ADMIN — CEFAZOLIN 2 G: 1 INJECTION, POWDER, FOR SOLUTION INTRAVENOUS at 08:09

## 2017-01-01 RX ADMIN — HEPARIN SODIUM AND DEXTROSE 17 UNITS/KG/HR: 10000; 5 INJECTION INTRAVENOUS at 11:06

## 2017-01-03 ENCOUNTER — HOSPITAL ENCOUNTER (OUTPATIENT)
Dept: PULMONOLOGY | Facility: CLINIC | Age: 57
Discharge: HOME | End: 2017-01-03
Payer: MEDICARE

## 2017-01-03 ENCOUNTER — INITIAL CONSULT (OUTPATIENT)
Dept: TRANSPLANT | Facility: CLINIC | Age: 57
End: 2017-01-03
Payer: MEDICARE

## 2017-01-03 ENCOUNTER — ANTI-COAG VISIT (OUTPATIENT)
Dept: CARDIOLOGY | Facility: CLINIC | Age: 57
End: 2017-01-03
Payer: MEDICARE

## 2017-01-03 ENCOUNTER — HOSPITAL ENCOUNTER (OUTPATIENT)
Dept: RADIOLOGY | Facility: HOSPITAL | Age: 57
Discharge: HOME | End: 2017-01-03
Attending: INTERNAL MEDICINE
Payer: MEDICARE

## 2017-01-03 VITALS
RESPIRATION RATE: 20 BRPM | BODY MASS INDEX: 33.8 KG/M2 | DIASTOLIC BLOOD PRESSURE: 68 MMHG | OXYGEN SATURATION: 93 % | TEMPERATURE: 97 F | SYSTOLIC BLOOD PRESSURE: 101 MMHG | HEART RATE: 55 BPM | WEIGHT: 198 LBS | HEIGHT: 64 IN

## 2017-01-03 DIAGNOSIS — I27.0 PRIMARY PULMONARY HYPERTENSION: ICD-10-CM

## 2017-01-03 DIAGNOSIS — Z76.82 LUNG TRANSPLANT CANDIDATE: ICD-10-CM

## 2017-01-03 DIAGNOSIS — Z79.01 LONG TERM CURRENT USE OF ANTICOAGULANT THERAPY: Primary | ICD-10-CM

## 2017-01-03 DIAGNOSIS — G47.30 SLEEP APNEA, UNSPECIFIED TYPE: ICD-10-CM

## 2017-01-03 DIAGNOSIS — I27.9 CHRONIC CARDIOPULMONARY DISEASE: ICD-10-CM

## 2017-01-03 LAB
CTP QC/QA: NORMAL
INR PPP: 2.7 (ref 2–3)
PRE FEV1 FVC: 73
PRE FEV1: 1.47
PRE FVC: 2.01
PREDICTED FEV1 FVC: 81
PREDICTED FEV1: 2.5
PREDICTED FVC: 3.09

## 2017-01-03 PROCEDURE — 99999 PR PBB SHADOW E&M-EST. PATIENT-LVL III: CPT | Mod: PBBFAC,,, | Performed by: INTERNAL MEDICINE

## 2017-01-03 PROCEDURE — 94010 BREATHING CAPACITY TEST: CPT | Mod: S$GLB,,, | Performed by: INTERNAL MEDICINE

## 2017-01-03 PROCEDURE — 85610 PROTHROMBIN TIME: CPT | Mod: QW,S$GLB,,

## 2017-01-03 PROCEDURE — 1159F MED LIST DOCD IN RCRD: CPT | Mod: S$GLB,,, | Performed by: INTERNAL MEDICINE

## 2017-01-03 PROCEDURE — 99211 OFF/OP EST MAY X REQ PHY/QHP: CPT | Mod: 25,S$GLB,,

## 2017-01-03 PROCEDURE — 99203 OFFICE O/P NEW LOW 30 MIN: CPT | Mod: 25,S$GLB,, | Performed by: INTERNAL MEDICINE

## 2017-01-03 PROCEDURE — 94727 GAS DIL/WSHOT DETER LNG VOL: CPT | Mod: S$GLB,,, | Performed by: INTERNAL MEDICINE

## 2017-01-03 PROCEDURE — 94729 DIFFUSING CAPACITY: CPT | Mod: S$GLB,,, | Performed by: INTERNAL MEDICINE

## 2017-01-03 PROCEDURE — 71250 CT THORAX DX C-: CPT | Mod: 26,,, | Performed by: RADIOLOGY

## 2017-01-03 RX ORDER — CHOLECALCIFEROL (VITAMIN D3) 125 MCG
1 CAPSULE ORAL EVERY 6 HOURS PRN
COMMUNITY
End: 2017-01-01 | Stop reason: SINTOL

## 2017-01-03 NOTE — PROGRESS NOTES
INR therapeutic with warfarin dose adjustment.  Naproxen noted on med card.  Patient states she only takes as needed.  Reviewed increased bleeding risk with NSAIDs to avoid use if possible.  Patient advised to contact clinic with any changes, questions, or concerns.

## 2017-01-03 NOTE — PROGRESS NOTES
LUNG TRANSPLANT INITIAL EVALUATION                                                                                                                                           Reason for Visit:  Evaluation for lung transplant    Referring Physician: Kaylee Cazares MD    History of Present Illness: Emily Cook is a 56 y.o. female who is on 2L of oxygen prn and notes that she has not been wearing this lately.  She is on CPAP but again nightly compliance is questionable.  Her New York Heart Association Class is II  and a Karnofsky score of 80. Normal activity with effort; some symptoms of disease.  She is not diabetic. She explains that she has been seeing Dr. Cazares for PHTN (PAH, WHO group 1)since 2010 for dyspnea. She has been on SQ remodulin but states that she had several blisters and was taken off of it last year due to this intolerance. She was started on tyvaso and opsumit (but has not been able to obtain this). She did not tolerate adempas or adcirca in the past. She endorses compliance with tyvaso. She has worsening BNP over the course of the past year.   She is a lifetime non-smoker. Social alcohol use.  Has family near by and endorses emotional/social support from them.   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  Lives in [place].  Denies syncope, chest pain, dizziness, cough, sputum production, orthopnea, fevers or chills.     Past Medical History   Diagnosis Date    Arrhythmia     Arthritis     Cardiac pacemaker in situ     Carpal tunnel syndrome, right     Cervical spondylosis     Cervicalgia     CHF (congestive heart failure)     Diverticulitis     Heart block AV third degree     Hyperlipidemia     ALFREDO on CPAP     Pulmonary hypertension     Subdeltoid bursitis      Colonoscopy Results: No procedure found.    Past Surgical History   Procedure Laterality Date    Cardiac surgery      Cardiac pacemaker placement  7/29/13     Merku MT PM    Knee arthroscopy       "Ectopic pregnancy surgery Left     Colonoscopy N/A 9/28/2015     Procedure: COLONOSCOPY;  Surgeon: Jason Diaz MD;  Location: [facility] (2ND FLR);  Service: Endoscopy;  Laterality: N/A;  Please schedule in 2-3 months with Dr Diaz  Pulmonary HTN, 2nd floor (off remodulin infusion as of "a few days" before 9/9/15)    3 day hold Coumadin, [facility]  PT/INR scheduled before procedure    Cardiac catheterization       [place]umas: none    Blood Product Transfusions: no    Allergies: Chlorhexidine and Adhesive    Current Outpatient Prescriptions   Medication Sig    clobetasol (TEMOVATE) 0.05 % external solution     conjugated estrogens (PREMARIN) vaginal cream Place a pea-sized amount in vagina every night for 2 weeks, then use 2-3 nights a week    duloxetine (CYMBALTA) 60 MG capsule TAKE 1 CAPSULE BY MOUTH TWICE DAILY    furosemide (LASIX) 20 MG tablet Take 1 tablet (20 mg total) by mouth once daily.    hydrocodone-acetaminophen 10-325mg (NORCO)  mg Tab Take 1 tablet by mouth 3 (three) times daily as needed.    hydrOXYzine (VISTARIL) 50 MG Cap 50 mg daily as needed.     ketoconazole (NIZORAL) 2 % cream Apply topically 2 (two) times daily.    macitentan (OPSUMIT) 10 mg Tab Take 1 tablet (10 mg total) by mouth once daily.    naproxen sodium (ALEVE) 220 mg Cap Take 1 tablet by mouth every 6 (six) hours as needed.    PATADAY 0.2 % Drop     peg 3350-electrolytes-vit C (MOVIPREP) 100-7.5-2.691 gram PwPk Take 1 packet by mouth as directed.    potassium chloride (MICRO-K) 10 MEQ CpSR Take 1 capsule (10 mEq total) by mouth once daily.    treprostinil-neb accessories 1.74 mg/2.9 mL (0.6 mg/mL) Nebu Inhale as directed by physician, 3-9 breaths 4 times daily as tolerated, approximately 4 hours apart during waking hours.    warfarin (COUMADIN) 5 MG tablet TAKE 1 AND 1/2 TABLETS BY MOUTH DAILY EXCEPT 1 TABLET ON TUESDAY OR AS DIRECTED BY COUMADIN CLINIC    gabapentin (NEURONTIN) 300 MG capsule Take 1 " "capsule (300 mg total) by mouth every evening. In 1 wk, if no relief, increase to twice daily.In another wk, may increase to 3 times.     No current facility-administered medications for this visit.        Immunization History   Administered Date(s) Administered    Influenza 10/18/2013     Family History:    Family History   Problem Relation Age of Onset    Arthritis Mother     Diabetes Mother     Hyperlipidemia Mother     Arthritis Father     Hyperlipidemia Father      History   Alcohol Use    0.0 oz/week    0 Standard drinks or equivalent per week     Comment: rarely      History   Drug Use No      Social History     Social History    Marital status: Single     Spouse name: N/A    Number of children: N/A    Years of education: N/A     Occupational History    Not on file.     Social History Main Topics    Smoking status: Never Smoker    Smokeless tobacco: Not on file    Alcohol use 0.0 oz/week     0 Standard drinks or equivalent per week      Comment: rarely    Drug use: No    Sexual activity: No     Other Topics Concern    Not on file     Social History Narrative     Review of Systems   Constitutional: Negative for chills, fever and weight loss.   HENT: Negative for congestion, nosebleeds and sore throat.    Respiratory: Positive for shortness of breath. Negative for cough, hemoptysis, sputum production, wheezing and stridor.    Cardiovascular: Negative for chest pain, palpitations, orthopnea, claudication and leg swelling.   Gastrointestinal: Negative for constipation, diarrhea, heartburn and nausea.   Skin: Negative for rash.   Neurological: Negative for dizziness, tingling, tremors, focal weakness, weakness and headaches.   Psychiatric/Behavioral: Negative for memory loss. The patient is not nervous/anxious.      Vitals  Visit Vitals    /68 (BP Location: Left arm, Patient Position: Sitting, BP Method: Automatic)    Pulse (!) 55    Temp 96.8 °F (36 °C) (Oral)    Resp 20    Ht 5' 4" " "(1.626 m)    Wt 89.8 kg (198 lb)    SpO2 (!) 93%  Comment: room air    BMI 33.99 kg/m2     Physical Exam   Constitutional: She is oriented to person, place, and time and well-developed, well-nourished, and in no distress. No distress.   HENT:   Head: Normocephalic and atraumatic.   Mouth/Throat: Oropharynx is clear and moist. No oropharyngeal exudate.   Eyes: Conjunctivae and EOM are normal. Pupils are equal, round, and reactive to light. Right eye exhibits no discharge. Left eye exhibits no discharge. No scleral icterus.   Neck: Normal range of motion. Neck supple. No tracheal deviation present. No thyromegaly present.   Cardiovascular: Normal rate, regular rhythm, normal heart sounds and intact distal pulses.  Exam reveals no gallop and no friction rub.    No murmur heard.  Pulmonary/Chest: Effort normal and breath sounds normal. No stridor. No respiratory distress. She has no wheezes. She exhibits no tenderness.   Abdominal: Soft. There is no tenderness.   Musculoskeletal: Normal range of motion. She exhibits no edema, tenderness or deformity.   Neurological: She is alert and oriented to person, place, and time.   Skin: Skin is warm and dry. No rash noted. No erythema. No pallor.   Psychiatric: Mood, memory, affect and judgment normal.   Nursing note and vitals reviewed.     Vitals:    01/03/17 1302   BP: 101/68   BP Location: Left arm   Patient Position: Sitting   BP Method: Automatic   Pulse: (!) 55   Resp: 20   Temp: 96.8 °F (36 °C)   TempSrc: Oral   SpO2: (!) 93%  Comment: room air   Weight: 89.8 kg (198 lb)   Height: 5' 4" (1.626 m)       Labs:  Anti-coag visit on 01/03/2017   Component Date Value    INR 01/03/2017 2.7      Imaging:  CT chest 1/13/17  Narrative   Routine CT of the thorax was performed without contrast.  Multiplanar reformations and high-resolution lung images were post processed from the original data.  Comparison --- CT thorax 2/27/12, chest radiographs 9/3/16, 9/12/15.    The " visualized structures in the lower neck and in both axillary areas are unremarkable. Pacemaker is present on the left with electrodes to the right atrium and ventricle. Overall heart size is mildly enlarged. Small pericardial effusion is present. Minimal atherosclerosis calcifications are present in the thoracic aorta, which is normal size with left-sided arch. The pulmonary artery is again dilated, a little more than on old CT exam at 4.6 cm and this is consistent with history of primary pulmonary hypertension. The mediastinal and hilar areas show no abnormal masses or significant lymph node enlargement. Normal size lymph nodes are present, some demonstrating small granulomatous calcifications.    Visualized upper abdomen again shows a few small hypodense areas in the liver that are unchanged and likely cysts. Left adrenal gland has a nodule approximately 1 cm size with low attenuation consistent with adenoma, unchanged.    The trachea and bronchi are patent with good expansion of both lungs. Increased size of pulmonary arteries is present. Multiple, chronic pulmonary findings are again identified. The right lung has 3 subcentimeter nodules, 2 in the upper lobe and one in the lower lobe which are stable going back to 2012 indicating they are probably benign. Both lungs demonstrate mild, patchy groundglass opacity which may indicate edema. Alternatively, this may represent mosaic alteration of lung attenuation which could be seen with small vessel or airways disease. Both lungs, right more than left also demonstrate mild, scattered areas of small irregular opacities with reticulation in the periphery. These are consistent with localized areas of fibrosis. No mass, bronchiectasis, honeycombing, or pleural effusion is seen.    The skeletal and chest wall structures are intact without acute fracture or lytic/blastic lesion.   Impression       1. Chronic dilatation of main pulmonary artery and branches consistent with  pulmonary arterial hypertension.    2. Mild cardiomegaly with small pericardial effusion. Pacemaker present.    3. Stable, subcentimeter size right lung nodules that are probably benign. Mild reticulation in some areas of both lungs consistent with localized fibrotic change . Minimal, patchy groundglass opacities in both lungs, possible edema.    4. Other findings as above.      Electronically signed by: KARELY LOPEZ MD  Date: 01/03/17  Time: 13:38      RHC 10/24/16:  Summary/Post-Operative Diagnosis       Compared to prior RHC of  2013, significantly elevated pulmonary pressures (numbers are significant worse when compared to RHC done 3 years ago).    - Severe Pulmonary Hypertension with Eisenmenger physiology.  .    - Low Cardiac output / index.    Hemodynamic Results    Condition 1:  AOPRES: 92/63 (73)  AOSAT: 92  FICKCI: 1.94  FICKCO: 3.77  PAPRES: 106/38 (63)  PASAT: 58  PVR: 14.59  PWPRES: 12/9 (8)  RAPRES: 12/9 (7)  RVPRES: 101/4, 11    6MWT 12/2/16: CLINICAL INTERPRETATION:  Six minute walk distance is 322.48 meters (1058 feet) with somewhat heavy dyspnea.  During exercise, there was no significant desaturation while breathing supplemental oxygen.  Both blood pressure and heart rate increased significantly with walking.  This may represent a tachycardic response to exercise.  The patient did not report non-pulmonary symptoms during exercise.  Since the previous study in September 2016, exercise capacity may be somewhat worse.  Based upon age and body mass index, exercise capacity is less than predicted.  Assessment:  1. Lung transplant candidate    2. Primary pulmonary hypertension    3. Sleep apnea, unspecified type      Plan:  1. Review of RHC does show worsening PHTN with rising BNP, 6MWT with decline of 43 meters from 9/16 to 12/16 and echo with severe RV enlargement, but there is questionable compliance with oxygen therapy and CPAP therapy. Will need to discuss with Dr. Cazares optimization of PAH  medications prior to further discussion regarding transplant. Appears patient is a candidate for parental therapy.     2. Continue PAH medications including oxygen therapy.    3. CPAP qhs.    4. RTC once clarification regarding PAH optimization.      Eleanor Mendoza MD  Pulmonary/Critical Care Fellow  [phone]      Attending Note:    I have seen and evaluated the patient with the fellow. Their note reflects the content of our discussion and my plan of care. Although her symptoms are still NYHA class II all of her objective data suggests she should be evaluated for transplant (decreasing, 6MWT distance, increasing BNP, low CI). I spoke with Dr. Cazares and she agrees that her therapy needs to be optimized but that the patient is hesitant. Will follow up on Dr. Cazares plan. If Dr. Cazares recommends parenteral therapy and she refuses I would be suspicious about her commitment to therapy or a future transplant.       Brendan Hernandez MD  Pulmonary/Critical Care Medicine

## 2017-01-23 DIAGNOSIS — G89.29 CHRONIC NECK PAIN: ICD-10-CM

## 2017-01-23 DIAGNOSIS — M54.2 CHRONIC NECK PAIN: ICD-10-CM

## 2017-01-23 RX ORDER — HYDROCODONE BITARTRATE AND ACETAMINOPHEN 10; 325 MG/1; MG/1
1 TABLET ORAL 3 TIMES DAILY PRN
Qty: 90 TABLET | Refills: 0 | Status: SHIPPED | OUTPATIENT
Start: 2017-01-23 | End: 2017-01-01 | Stop reason: SDUPTHER

## 2017-01-23 NOTE — TELEPHONE ENCOUNTER
----- Message from Latisha Chavarria sent at 1/23/2017  9:21 AM CST -----  Contact: pt [phone]  Pt is calling for a refill of hydrocodone-acetaminophen 10-325mg (NORCO) 10-thanks                 [id number] - [place]  [address] AT [place] [address]  [place] [address]  Phone: [phone] Fax: [phone]

## 2017-01-30 NOTE — PROGRESS NOTES
Subjective:       Patient ID: Emily Cook is a 56 y.o. female.    Chief Complaint: No chief complaint on file.    HPI    Mrs. Cook is a 56-year-old black female with pulmonary hypertension, third   degree AV block, status post pacemaker, on chronic Coumadin therapy.  She is   followed up in the [facility] Medicine Clinic for chronic neck pain with right   cervical radiculopathy, right subdeltoid bursitis and right carpal tunnel   syndrome.  Her last visit was on 10/31/2016.  She was maintained on Cymbalta,   gabapentin, p.r.n. hydrocodone and topical compound cream.    The patient is coming today to the clinic for followup.  She fell while   exercising about two weeks ago and landed on her right shoulder.  Her right   shoulder pain has gotten worse, but is slowly improving.    Her neck pain has been stable.  It is a constant aching pain in the cervical   spine.  She has occasional shooting pain to the right hand with numbness.  Her   pain is worse with activity and better with rest.  Maximum pain is 7-8/10 and   minimum 3-4/10.  Today, it is 5-6/10.  The patient denies any upper extremity   weakness.  She continues also to complain of occasional right hand numbness,   often separate from her neck pain.  She wakes up often in the morning with this   numbness.  She has not been wearing her carpal tunnel splints regularly.    Her right shoulder pain has been worse as noted above.  It is a constant aching   pain in the deltoid region.  It is aggravated by shoulder movement and better   with holding her arm still and with her pain medications.  Her maximum pain is   9-10/10 and minimum 3-4/10.  Today, it is 3-4/10.    She is currently taking Cymbalta 60 mg p.o. twice per day, gabapentin 300 mg   p.o. twice per day, hydrocodone/APAP 10/325 p.r.n., usually three times per day   and topical compound cream to her right shoulder twice per day.  She has been   exercising regularly.      MS/IN  dd: 01/30/2017 10:50:18  (CST)  td: 01/31/2017 05:53:45 (CST)  Doc ID   #[id number]  Job ID #[id number]    CC:           Review of Systems   Constitutional: Negative for fatigue.   Eyes: Negative for visual disturbance.   Respiratory: Negative for shortness of breath.    Cardiovascular: Negative for chest pain.   Gastrointestinal: Positive for nausea. Negative for blood in stool, constipation and vomiting.   Genitourinary: Negative for difficulty urinating.   Musculoskeletal: Positive for arthralgias (Rt shoulder), back pain and neck pain. Negative for gait problem.   Neurological: Negative for dizziness and headaches.   Psychiatric/Behavioral: Negative for behavioral problems and sleep disturbance.       Objective:      Physical Exam   Constitutional: She appears well-developed and well-nourished. No distress.   Neck: Normal range of motion.   +ve tenderness low cervical spine.   Musculoskeletal:   BUE:  ROM:full.  Strength:    RUE: 4/5 at shoulder abduction, 5 elbow flexion, elbow extension, hand .   LUE: 5/5 at shoulder abduction, elbow flexion, elbow extension, hand .  Sensation to pinprick:   RUE: intact.   LUE: intact.    Impingement Signs:  Neer:  RUE: +ve    LUE: -ve  Sales: RUE: +ve    LUE: -ve   Empty Can test:    RUE: +ve    LUE: -ve  +ve Rt deltoid & upper trapezius tenderness.       Neurological: She is alert.   Skin: Skin is warm.   Psychiatric: She has a normal mood and affect.   Vitals reviewed.          Assessment:       1. Chronic neck pain    2. Osteoarthritis of spine with radiculopathy, cervical region    3. Right cervical radiculopathy    4. Subdeltoid bursitis, right    5. Carpal tunnel syndrome, right (mild)        Plan:         - Continue duloxetine (CYMBALTA) 60 MG capsule; Take 1 capsule (60 mg total) by mouth 2 (two) times daily.  - Increase gabapentin (NEURONTIN) 300 MG capsule; Take 1 capsule (300 mg total) by mouth 3 times per day. If no relief, she was asked to increase it to 1 qam, 1 qpm, 2 qhs.  -  Continue hydrocodone-acetaminophen 10-325mg (NORCO)  mg Tab; Take 1 tablet by mouth 3 times daily as needed.  - Increase topical compound ointment to Rt shoulder & neck 3-4 times per day.  - Regular home exercise program.  - She was encouraged to wear Rt carpal Tunnel Splint at night regularly.  - Return in about 3 months (around 4/30/2017).

## 2017-02-07 NOTE — PROGRESS NOTES
INR high. Pt has not had any greens this past week. No other changes. Pt has minor bruising. No s/sx of bleeding. Aleve in medlist and was discussed at past appt with her. She confirms today that she is avoiding it. We will hold a dose today, then resume maintenance dose. Pt will resume greens once per week. Repeat INR next week. Pt seen by student, Stephanie

## 2017-02-15 NOTE — PROGRESS NOTES
Pt called to R/s 2/16 appt., advised Pt against waiting since last INR was elevated and changes were made to coumadin, states no transportation--car in the shop, appt was R/s'ed to 2/20

## 2017-02-21 NOTE — TELEPHONE ENCOUNTER
01/30/17 last office visit  01/23/17 last Rx refill  04/26/17 RTC      Patient is requesting a early refill on her, hydrocodone-acetaminophen 10-325mg (NORCO)  mg Tab because she is going out of town.         [id number] - [place] - [address] AT [facility] & [place] [address]   [place] [address]   Phone: [phone] Fax: [phone]

## 2017-03-17 NOTE — PROGRESS NOTES
Subjective:     HPI  55 yo AAF with PPM 7/29/13 for high deg AVB/syncope,  pulmonary HTN, currently on  Tyvaso and opsummit(changed from IV to SQ secondary to line issues, then to inhaled tyvaso), is here for 2 month PH f/u.    Since last visit, pt says her reabthing has been fine except when she does too much- says her walk was harder today, thinks it was a farther stretch. When she gets winded will take a break. Walks at the grocery store but has to push the basket, no sOB getting dressed or showered, but does get SOB mopping, cleaning up around the kitchen. Her son says she is not taking her medicine right and has not been wearing her o2 hasnt been doing her tyvaso at all lately because she thinks shes better.      no CP or swelling  No orthopnea,  PND, LH.        6mw today 244, (322 Dec, 427m, 442m, 399.5 m  last visit)                                              O2 sat  99->93%                                                   HR   64-> 135                                                                 /56   ->148 /62                                                         Fatou  2 ->7-8    Echo last visit in sept  1 - Normal left ventricular systolic function (EF 60-65%).   2 - Normal right ventricular systolic function .   3 - Normal left ventricular diastolic function.   4 - Biatrial enlargement.   5 - Pulmonary hypertension.   6 - Mildly elevated central venous pressure PASP 49      PFTs    today  FVC    2.61 L  86   % pred  FEV1 1.92   L  78   % pred  FEV1/FVC  73   %  TLC  3.83 L  79 %pred  DLCO        72    % pred         Review of Systems   Constitution: Positive for weight loss. Negative for chills, fever, malaise/fatigue and weight gain.   HENT: Negative.    Eyes: Negative.    Cardiovascular: Negative for chest pain, dyspnea on exertion, leg swelling, near-syncope, orthopnea, palpitations, paroxysmal nocturnal dyspnea and syncope.   Respiratory: Negative for cough and shortness of breath.   "  Endocrine: Negative.    Skin: Negative.    Musculoskeletal: Negative.    Gastrointestinal: Negative for bloating, abdominal pain and change in bowel habit.   Neurological: Negative for dizziness and light-headedness.   Psychiatric/Behavioral: Negative for depression.        Objective: /70  Pulse 68  Ht 5' 3" (1.6 m)  Wt 91.9 kg (202 lb 9.6 oz)  SpO2 (!) 91%  BMI 35.89 kg/m2      Physical Exam   Constitutional: She is oriented to person, place, and time. She appears well-developed and well-nourished.   HENT:   Head: Normocephalic and atraumatic.   Eyes: Right eye exhibits no discharge. Left eye exhibits no discharge.   Neck: Neck supple. No JVD present. No thyromegaly present.   Cardiovascular: Normal rate and regular rhythm.  Exam reveals no gallop and no friction rub.    No murmur heard.       Pulmonary/Chest: Effort normal and breath sounds normal. No respiratory distress. She has no wheezes. She has no rales.   Abdominal: Soft. Bowel sounds are normal. She exhibits no distension. There is no tenderness.   Musculoskeletal: Normal range of motion. She exhibits no edema or tenderness.   Neurological: She is alert and oriented to person, place, and time. No cranial nerve deficit. Coordination normal.   Skin: Skin is warm and dry. No rash noted.   Psychiatric: She has a normal mood and affect. Judgment and thought content normal.       Chemistry        Component Value Date/Time     03/17/2017 1415    K 4.0 03/17/2017 1415     (H) 03/17/2017 1415    CO2 26 03/17/2017 1415    BUN 16 03/17/2017 1415    CREATININE 0.9 03/17/2017 1415    GLU 91 03/17/2017 1415     01/27/2017 2130        Component Value Date/Time    CALCIUM 8.3 (L) 03/17/2017 1415    ALKPHOS 73 03/17/2017 1415    AST 31 03/17/2017 1415    ALT 30 03/17/2017 1415    BILITOT 1.0 03/17/2017 1415            Magnesium   Date Value Ref Range Status   03/17/2017 2.1 1.6 - 2.6 mg/dL Final       Lab Results   Component Value Date    " WBC 6.34 03/17/2017    HGB 14.0 03/17/2017    HCT 42.1 03/17/2017    MCV 91 03/17/2017     03/17/2017       Lab Results   Component Value Date    INR 2.7 03/17/2017    INR 2.5 (H) 03/17/2017    INR 2.8 02/23/2017       BNP   Date Value Ref Range Status   03/17/2017 924 (H) 0 - 99 pg/mL Final     Comment:     Values of less than 100 pg/ml are consistent with non-CHF populations.   12/02/2016 541 (H) 0 - 99 pg/mL Final     Comment:     Values of less than 100 pg/ml are consistent with non-CHF populations.   09/27/2016 356 (H) 0 - 99 pg/mL Final     Comment:     Values of less than 100 pg/ml are consistent with non-CHF populations.           Assessment:       1. Pulmonary hypertension- WHO group 1- VERY concerned about her today. 6mw dropped, BNP cont to trend up (now its over 900), FC declining (III)  Did not tolerate adempas or adcirca.    2. Encounter for long-term (current) use of anticoagulants- INR therapeutic today- (goal 2-3)   3. PPM     Plan:     1.  Cont gregory, will switch tyvaso to uptravi as pt has been noncompliant with the tyvaso- instructed to call us with any SE  2. Needs to work on diet/wt loss  3.  Keep salt intake to under 2000 mg sodium, fluids to under 2 L (64 oz)  4. Daily am wts. If weight goes up 3# overnight or 5# in one week she should call us  Needs to wear her O2 on exertion    F/U 2 mo with labs and 6mw and echo

## 2017-03-17 NOTE — LETTER
March 17, 2017        Kaylee Cazares  [address] [facility] [address]  Phone: [phone]  Fax: [phone]             [facility]  [address]  [place] [address]  Phone: [phone]   Patient: Emily Cook   MR Number: [id number]   YOB: 1960   Date of Visit: 3/17/2017       Dear Dr. Kaylee Cazares    Thank you for referring Emily Cook to me for evaluation. Attached you will find relevant portions of my assessment and plan of care.    If you have questions, please do not hesitate to call me. I look forward to following Emily Cook along with you.    Sincerely,    Kaylee Cazares MD    Enclosure    If you would like to receive this communication electronically, please contact [email or web address] or [phone] to request [employer] access.    [employer] is a tool which provides read-only access to select patient information with whom you have a relationship. Its easy to use and provides real time access to review your patients record including encounter summaries, notes, results, and demographic information.    If you feel you have received this communication in error or would no longer like to receive these types of communications, please e-mail [email or web address]

## 2017-03-17 NOTE — PATIENT INSTRUCTIONS
Stop tyvaso and begin selexipag (uptravi) 200 mcg twice a day  We will slowly increase it until we get to 1600 mcg twice a day    Keep salt intake to under 2000 mg sodium, fluids to under 2 L (64 oz)    Wear your oxygen when you are exerting yourself    Try to work on diet and weight loss- stay away from the startches (sweets, potato, rice, bread and pasta) just cutting these out along with cold drinks will help take a little weight off, this will help your breathing too    Call us if you find yourself getting more short of breath, have more swelling or unexpected weight changes or if you have any issues with the new medicine

## 2017-03-17 NOTE — MR AVS SNAPSHOT
[facility]  [address]  [facility] [address]  Phone: [phone]                  Emily Cook   3/17/2017 3:30 PM   Office Visit    Description:  Female : 1960   Provider:  Kaylee Cazares MD   Department:  [facility]           Reason for Visit     Pulmonary Hypertension           Diagnoses this Visit        Comments    Primary pulmonary hypertension    -  Primary     Sleep apnea, unspecified type         Chronic pulmonary heart disease         Long term current use of anticoagulant therapy         Cardiac pacemaker in situ                To Do List           Future Appointments        Provider Department Dept Phone    3/29/2017 11:00 AM TELEPHONE CHECK, PACEMAKER [facility] - Arrhythmia [phone]    2017 9:00 AM Ronald LakeD Herbert [facility]esa - Coumadin [phone]    2017 10:40 AM MD Herbert Renner [facility] [phone]      Goals (5 Years of Data)     None      Follow-Up and Disposition     Return in about 2 months (around 2017).      [facility]     [facility] -  Assistance  Registered nurses in the [facility] provide clinical advisement, health education, appointment booking, and other advisory services.  Call for this free service at [phone].             Medications           Message regarding Medications     Verify the changes and/or additions to your medication regime listed below are the same as discussed with your clinician today.  If any of these changes or additions are incorrect, please notify your healthcare provider.             Verify that the below list of medications is an accurate representation of the medications you are currently taking.  If none reported, the list may be blank. If incorrect, please contact your healthcare provider. Carry this list with you in case of emergency.           Current Medications     clobetasol (TEMOVATE)  "0.05 % external solution     conjugated estrogens (PREMARIN) vaginal cream Place a pea-sized amount in vagina every night for 2 weeks, then use 2-3 nights a week    duloxetine (CYMBALTA) 60 MG capsule Take 1 capsule (60 mg total) by mouth 2 (two) times daily.    furosemide (LASIX) 20 MG tablet Take 1 tablet (20 mg total) by mouth once daily.    gabapentin (NEURONTIN) 300 MG capsule TAKE 1 CAPSULE BY MOUTH THREE TIMES DAILY FOR 1 WEEK, IF NO RELIEF INCREASE TO 1 IN MORNING, 1 IN AFTERNOON AND 2 AT BEDTIME    hydrocodone-acetaminophen 10-325mg (NORCO)  mg Tab Take 1 tablet by mouth 3 (three) times daily as needed.    hydrOXYzine (VISTARIL) 50 MG Cap 50 mg daily as needed.     ketoconazole (NIZORAL) 2 % cream Apply topically 2 (two) times daily.    macitentan (OPSUMIT) 10 mg Tab Take 1 tablet (10 mg total) by mouth once daily.    naproxen sodium (ALEVE) 220 mg Cap Take 1 tablet by mouth every 6 (six) hours as needed.    PATADAY 0.2 % Drop     peg 3350-electrolytes-vit C (MOVIPREP) 100-7.5-2.691 gram PwPk Take 1 packet by mouth as directed.    potassium chloride (MICRO-K) 10 MEQ CpSR Take 1 capsule (10 mEq total) by mouth once daily.    treprostinil-neb accessories 1.74 mg/2.9 mL (0.6 mg/mL) Nebu Inhale as directed by physician, 3-9 breaths 4 times daily as tolerated, approximately 4 hours apart during waking hours.    warfarin (COUMADIN) 5 MG tablet TAKE 1 AND 1/2 TABLETS BY MOUTH DAILY EXCEPT 1 TABLET ON TUESDAY OR AS DIRECTED BY COUMADIN CLINIC    warfarin (COUMADIN) 5 MG tablet TAKE 1 AND 1/2 TABLETS BY MOUTH DAILY EXCEPT 1 TABLET ON TUESDAY OR AS DIRECTED BY COUMADIN CLINIC           Clinical Reference Information           Your Vitals Were     BP Pulse Height Weight SpO2 BMI    106/70 68 5' 3" (1.6 m) 91.9 kg (202 lb 9.6 oz) 91% 35.89 kg/m2      Blood Pressure          Most Recent Value    BP  106/70      Allergies as of 3/17/2017     Chlorhexidine    Adhesive      Immunizations Administered on Date of " Encounter - 3/17/2017     None      Orders Placed During Today's Visit     Future Labs/Procedures Expected by Expires    2D echo with color flow doppler  As directed 3/17/2018      Maintenance Dialysis History     Patient has no recorded history of maintenance dialysis.      Instructions    Stop tyvaso and begin selexipag (uptravi) 200 mcg twice a day  We will slowly increase it until we get to 1600 mcg twice a day    Keep salt intake to under 2000 mg sodium, fluids to under 2 L (64 oz)    Wear your oxygen when you are exerting yourself    Try to work on diet and weight loss- stay away from the startches (sweets, potato, rice, bread and pasta) just cutting these out along with cold drinks will help take a little weight off, this will help your breathing too    Call us if you find yourself getting more short of breath, have more swelling or unexpected weight changes or if you have any issues with the new medicine         Language Assistance Services     ATTENTION: Language assistance services are available, free of charge. Please call [phone].      ATENCIÓN: Si habla sully, tiene a salazar disposición servicios gratuitos de asistencia lingüística. Llame al [phone].     CHÚ Ý: N?u b?n nói Ti?ng Vi?t, có các d?ch v? h? tr? ngôn ng? mi?n phí dành cho b?n. G?i s? [phone].         [facility] complies with applicable Federal civil rights laws and does not discriminate on the basis of race, color, national origin, age, disability, or sex.

## 2017-03-17 NOTE — PROGRESS NOTES
INR remains therapeutic.  Patient reports she is now avoiding Aleve.  She denies other changes to affect INR.  Continue warfarin dose and repeat INR nex month.  Patient advised to contact clinic with any changes, questions, or concerns.  I have reviewed the student's initial findings and agree with their assessment.  I have personally spoken with and assessed the patient in clinic to devise care plan.

## 2017-03-18 NOTE — PROCEDURES
Emily Cook is a 56 y.o.  female patient, who presents for a 6 minute walk test ordered by Kaylee Cazares MD.  The diagnosis is Pulmonary Hypertension.  The patient's BMI is 34.4 kg/m2.  Predicted distance (lower limit of normal) is 336.86 meters.      Test Results:    The test was not completed.  The patient stopped 1 time for a total of 70 seconds.  The total time walked was 290 seconds.  During walking, the patient reported:  Dyspnea, Lightheadedness. The patient used supplemental oxygen during testing.     03/17/2017---------Distance: 243.84 meters (800 feet)     O2 Sat % Supplemental Oxygen Heart Rate Blood Pressure Fatou Scale   Pre-exercise  (Resting) 99 % 4 L/M 64 bpm 108/56 mmHg 2   During Exercise 93 % 4 L/M 135 bpm 148/62 mmHg 7-8   Post-exercise  (Recovery) 97 % 4 L/M  87 bpm       Recovery Time: 172 seconds    Performing nurse/tech: ANTIONE Senior      PREVIOUS STUDY:   12/02/2016---------Distance: 322.48 meters (1058 feet)       O2 Sat % Supplemental Oxygen Heart Rate Blood Pressure Fatou Scale   Pre-exercise  (Resting) 97 % 4 L/M 79 bpm 123/79 mmHg 2   During Exercise 96 % 4 L/M 130 bpm 151/88 mmHg 4   Post-exercise  (Recovery) 96 % 4 L/M  77 bpm           CLINICAL INTERPRETATION:  Six minute walk distance is 243.84 meters (800 feet) with very heavy dyspnea.  During exercise, there was significant desaturation while breathing supplemental oxygen.  Both blood pressure and heart rate increased significantly with walking.  This may represent a tachycardic response to exercise.  The patient reported non-pulmonary symptoms during exercise.  Significant exercise impairment is likely due to cardiovascular causes and subjective symptoms.  The patient did not complete the study, walking 290 seconds of the 360 second test.  Since the previous study in December 2016, exercise capacity is significantly worse.  Based upon age and body mass index, exercise capacity is less than predicted.

## 2017-03-23 NOTE — TELEPHONE ENCOUNTER
01/30/17 last Office visit.  02/21/17 last Rx refill  04/26/17 RT    Patient is requesting a refill on her hydrocodone-acetaminophen 10-325mg (NORCO)  mg Tab.     [id number] - [place] - [address] AT [facility]   [address] [facility]   [place] [address]   Phone: [phone] Fax: [phone]

## 2017-04-12 NOTE — TELEPHONE ENCOUNTER
"Received a message from [facility] that patient has been taking her Uptravi incorrectly:    "WHEN I QUESTIONED PT RE: THE ABOVE SHE THEN STATED THAT SHE INCREASED DOSE YESTERDAY  MCG BID (WHICH IS CONSISTENT WITH THE 4/7 TITRATION TOLERANCE ASSESSMENT). BUT THEN WHEN JOAQUIM QUESTIONED THE PT FURTHER SHE REVERTED TO INSISTING THAT SHE TAKES THE UPTV ONLY ONCE A WEEK. PT SAID SHE SEES MD TOMORROW AND WILL REVIEW DOSING AT THAT TIME."    Called patient and reviewed medication administration and titration instructions. Per Dr. Cazares, patient is to start again with 200 mcg tablets, twice daily for one week. Will keep the same titration day but will base increase on how patient feels and MD direction. Patient verbalized understanding. Additionally, [facility] will re-educate patient.  "

## 2017-04-26 NOTE — PROGRESS NOTES
Subjective:       Patient ID: Emily Cook is a 56 y.o. female.    Chief Complaint: No chief complaint on file.    HPI    HISTORY OF PRESENT ILLNESS:  Ms. Cook is a 56-year-old black female with   pulmonary hypertension, third-degree AV block, status post pacemaker, on chronic   Coumadin therapy.  She is followed up in the [facility] Medicine Clinic for   chronic neck pain with right cervical radiculopathy, right subdeltoid bursitis   and right carpal tunnel syndrome.  Her last visit to the clinic was on   01/30/2017.  She was maintained on Cymbalta, gabapentin and p.r.n.   hydrocodone/APAP.    The patient is coming today to the clinic for followup.  Her neck pain has been   stable.  It is an almost constant aching pain in the cervical spine and across   her upper back.  She has occasional radiation to the right hand with numbness.    Her pain is worse with excess activity and better with rest.  Her maximum pain   is 7-8/10 and minimum 3-4/10.  Today, it is 5-6/10.  The patient complains of   mild upper extremity weakness.  She complains of occasional bilateral hand   numbness unrelated to her neck pain.  She has been using the carpal tunnel   splint at night, but only on the right.  She continues also to complain of right   shoulder pain.  It is an intermittent aching pain aggravated by shoulder   movement, especially elevation.  It is better with rest.  Her maximum pain is   7-8/10 and minimum 3-4/10.  Today, it is 3-4/10.    She is currently taking Cymbalta 60 mg p.o. twice per day, gabapentin 300 mg   p.o. three to four times per day (she reports that higher doses cause sedation),   and hydrocodone/APAP 10/325 p.r.n., usually three to four times per day.      MS/HN  dd: 04/26/2017 11:02:01 (CDT)  td: 04/27/2017 09:50:31 (CDT)  Doc ID   #[id number]  Job ID #[id number]    CC:       Review of Systems   Constitutional: Negative for fatigue.   Eyes: Negative for visual disturbance.   Respiratory: Positive for  shortness of breath.    Cardiovascular: Negative for chest pain.   Gastrointestinal: Positive for diarrhea and nausea. Negative for constipation and vomiting.   Genitourinary: Negative for difficulty urinating.   Musculoskeletal: Positive for arthralgias (Rt shoulder), back pain and neck pain. Negative for gait problem.   Neurological: Positive for headaches. Negative for dizziness.   Psychiatric/Behavioral: Negative for behavioral problems and sleep disturbance.       Objective:      Physical Exam   Constitutional: She appears well-developed and well-nourished. No distress.   Neck: Normal range of motion.   +ve tenderness low cervical spine.   Musculoskeletal:   BUE:  ROM:full.  Strength:    RUE: 4/5 at shoulder abduction, 5 elbow flexion, elbow extension, hand .   LUE: 5/5 at shoulder abduction, elbow flexion, elbow extension, hand .  Sensation to pinprick:   RUE: intact.   LUE: intact.  DTR:    RUE: +1 biceps, +1 triceps.   LUE:  +1 biceps, +1 triceps.  Fowler:   RUE: -ve.   LUE: -ve.    Impingement Signs:  Neer:  RUE: +ve    LUE: -ve  Sales: RUE: +ve    LUE: -ve     +ve Rt deltoid & upper trapezius tenderness.       Neurological: She is alert.   Skin: Skin is warm.   Psychiatric: She has a normal mood and affect.   Vitals reviewed.          Assessment:       1. Chronic neck pain    2. Osteoarthritis of spine with radiculopathy, cervical region    3. Right cervical radiculopathy    4. Subdeltoid bursitis, right    5. Carpal tunnel syndrome, right (mild)        Plan:     - Continue duloxetine (CYMBALTA) 60 MG capsule; Take 1 capsule (60 mg total) by mouth 2 (two) times daily.  - Increase gabapentin (NEURONTIN) 300 MG capsule; Take 1 capsule (300 mg total) by mouth 3 times per day. If no relief, she was asked to increase it to 1 qam, 1 qpm, 2 qhs.  - Continue hydrocodone-acetaminophen 10-325mg (NORCO)  mg Tab; Take 1 tablet by mouth 3 times daily as needed.  - Continue topical compound ointment to  Rt shoulder & neck 3-4 times per day.  - Regular home exercise program.  - She was encouraged to wear bilateral carpal Tunnel Splint at night regularly.  - Return in about 3 months (around 7/26/2017).

## 2017-04-26 NOTE — PROGRESS NOTES
Patient missed a dose and took a lower weekly dose than advised. These changes causing her INR. She will begin an increased weekly dose until follow-up. She has bruising on her leg and arm from use. I advised her to contact us with any changes or problems.

## 2017-05-03 NOTE — TELEPHONE ENCOUNTER
"Patient F/U. Asked patient how she felt and she stated, "so-so." Asked what dose of Uptravi patient is currently taking and patient stated, "I stopped that last week. It made me nauseous, have diarrhea and vomit." Patient reports passing out while vomiting. She states that she was up to 800 mcg but only once a day. Patient says that she is just going to go back to the inhaled [Tyvaso] because she has some left over.   Patient also reported that she was going to tell Dr. Cazares next week about the medication side effects and that she stopped taking the medication.    Notified Dr. Cazares and [facility].  "

## 2017-05-10 NOTE — PROGRESS NOTES
Patient reports no changes in diet or medications. She does report increase fluid retention, especially in her ankles and feet. She denies pain. She is taking lasix as prescribed but will place a call to her PCP to explain her fluid retention. We will hold her coumadin today with lowering of weekly dose

## 2017-05-24 NOTE — PROGRESS NOTES
INR subtherapeutic without overt complication.  Patient seen in ER 5/20 for SOB and fluid retention.  She reports symptoms have improved with diuresis and denies current fluid retention.  She is unable to clearly state warfarin dose but reports following yellow card.  Boost then increase warfarin regimen.  Patient on warfarin for chronic pulmonary heart disease; will not utilize lovenox.  Patient requests to coordinate follow up with other appointments.  Patient advised to contact clinic with any changes, questions, or concerns.

## 2017-05-31 NOTE — NURSING
Report received from Manda HYLTON. Will wait for patient's arrival at [facility] and the [facility].

## 2017-06-01 NOTE — CONSULTS
Single lumen PICC placed in right brachial vein , 36cm in length with 0cm exposed. Arm circumference 36cm. Lot #[id number].

## 2017-06-01 NOTE — PROGRESS NOTES
"0210- RN called CT to bring pt down. Tech stated, "there are 2 STAT  CT ahead of your pt, will call when ready."  0300- RN called CT to check status. CT lonnie stated, "We are still taking care of the STATS from the ED, will call when ready."  0325- RN called to check status. CT tech stated, "The air down here isn't working, and I can scan when the room temperature is over 73 degree, currently the room temperature is 75 degrees.  0348- CT tech notified RN that the room temperature is 75 degree and she can't scan with a temperature over 7 degrees. CT lonnie stated that maintenance should be on the way with fans to cool the room down       "

## 2017-06-01 NOTE — PROGRESS NOTES
"Pt found on bathroom floor. Pt stated, "I needed to have a BM and started to feel nauseous and passed out." Pt has an open wound and hematoma on anterior portion of head  and posterior portion of head Notified Abhijeet Hamilton. Ordered a STAT CT of the head.  Upon admission pt denied any recent dizziness, lightheadness, syncope. While in the room, pt on the phone with family and stated, " this happened to me a month ago." VSS. Post fall educated pt on the importance of calling to get out of the bed even to get to the bedside commode. Replaced nonskid socks. Explained to the pt that she is a fall risk so she will wear yellow nonskid socks, wear yellow fall risk band, and the bed alarm will be set and will alarm if she tries to get out of bed. Pt verbalized understanding. Will continue to monitor.   "

## 2017-06-01 NOTE — PROGRESS NOTES
Pt transferred from outside facility to . Transferred from stretcher to bed without assistance. No acute distress noted. Pt denies pain. 20g RAC peripheral IV intact. No redness or swelling noted. Telemetry and visi applied. Bed in lowest position. Call bell within reach. Side rails up x 2. Will continue to monitor.

## 2017-06-01 NOTE — NURSING TRANSFER
Nursing Transfer Note      6/1/2017     Transfer To: Cath Lab    Transfer via stretcher    Transfer with 10L Non-rebreather to O2, cardiac monitoring    Transported by [facility]    Medicines sent: IV infusing Mg    Chart send with patient: No

## 2017-06-01 NOTE — PLAN OF CARE
Problem: Patient Care Overview  Goal: Plan of Care Review  Outcome: Ongoing (interventions implemented as appropriate)  POC reviewed with patient and he verbalized understanding. VSS and patient denies presence of pain. Patient continues on non-rebreather at 15L with oxygen being monitored continuous. Patient had RHC preformed today which revealed elevated CVP, PA and wedge pressure, with decreased CO. Patient received Mg and K replacement today and was started on Lasix IVP to diurese. Fall bundle implemented and patient has adhered to calling for assistance to get up and down. Patient waiting for family to come with home breathing machine so she can receive PHTN medication. Patient in no apparent sign of distress, all questions answered, will continue to monitor.

## 2017-06-01 NOTE — PROGRESS NOTES
attempted to see pt in order to complete assessment.  Pt currently not in room.  Transplant  will follow.

## 2017-06-01 NOTE — H&P
HPI:   56 y.o. woman with PMH of AVB/syncope s/p PPM, pulmonary HTN, who is now transferred from [facility] for shortness of breath and management of patient's pulmonary HTN.   Patient has had difficult to treat PH, has been intolerant to Adcira and Adempas in the past. She last saw Dr. Cazares in 3/2017 and at that time was on a regimen of Macitentan and inhaled Tyvaso. She had a worsening 6MW at that time with uptrending BNP to 924, was switched from Tyvaso to Uptravi. She tolerated this regimen until early May when she called our clinic c/o nausea and vomiting from the medication. She still had her Tyvaso supplies and has since switched back to it, taking about 4 doses per day. Since then, she has been to the ED 3 times for SOB, on 5/14/2017, 5/20/2017, and again earlier today. Each time she was given IVP Lasix and sent home, although she refused admission for further workup on 5/14/2017.   After arrival tonight, patient feels comfortable, on 5L NC. Denies any chest discomfort, has minimal SOB, and no palpitations. Denies any recent dizziness or syncope, no PND, orthopnea. Has had some recent LE edema and abdominal distension, though states it is improved currently.     Passed out overnight in bathroom, hit her head. CT (-)   Now here for RHC in setting of what I suspect is end-staged PH    ROS:    Constitution: Negative for fever, chills, weight loss or gain.   HENT: Negative for sore throat, rhinorrhea, or headache.   Eyes: Negative for blurred or double vision.   Cardiovascular: See above   Pulmonary: Positive for SOB   Gastrointestinal: Negative for abdominal pain, nausea, vomiting, or diarrhea.   : Negative for dysuria.   Neurological: Negative for focal weakness or sensory changes.   PMH:          Past Medical History:   Diagnosis Date    Arrhythmia     Arthritis     Cardiac pacemaker in situ     Carpal tunnel syndrome, right     Cervical spondylosis     Cervicalgia     CHF (congestive  "heart failure)     Diverticulitis     Heart block AV third degree     Hyperlipidemia     ALFREDO on CPAP     Pulmonary hypertension     Subdeltoid bursitis            Past Surgical History:   Procedure Laterality Date    CARDIAC CATHETERIZATION      CARDIAC PACEMAKER PLACEMENT  7/29/13    [employer] DC PM    CARDIAC SURGERY      COLONOSCOPY N/A 9/28/2015    Procedure: COLONOSCOPY; Surgeon: Jason Diaz MD; Location: [facility] ([address] [place] [address]); Service: Endoscopy; Laterality: N/A; Please schedule in 2-3 months with Dr Diaz   Pulmonary HTN, 2nd floor (off remodulin infusion as of "a few days" before 9/9/15)   3 day hold Coumadin, [facility]   PT/INR scheduled before procedure    ECTOPIC PREGNANCY SURGERY Left     knee arthroscopy       Allergies:           Review of patient's allergies indicates:   Allergen Reactions    Chlorhexidine Itching and Rash     Patient had raised rash on neck following RHC procedure. She states she's never had a problem with the "prep" used until this time.     Adhesive Rash     Medications:               Current Facility-Administered Medications on File Prior to Encounter   Medication Dose Route Frequency Provider Last Rate Last Dose    [COMPLETED] albuterol-ipratropium 2.5mg-0.5mg/3mL nebulizer solution 3 mL 3 mL Nebulization Q5 Min BRANDI Duff  3 mL at 05/31/17 0751    [COMPLETED] albuterol-ipratropium 2.5mg-0.5mg/3mL nebulizer solution 3 mL 3 mL Nebulization Once Pankaj Eli MD  3 mL at 05/31/17 1742    [COMPLETED] furosemide injection 20 mg 20 mg Intravenous ED 1 Time Angel Sharpe MD  20 mg at 05/31/17 1412    [DISCONTINUED] albuterol-ipratropium 2.5mg-0.5mg/3mL (DUO-NEB) 0.5 mg-3 mg(2.5 mg base)/3 mL nebulizer solution                  Current Outpatient Prescriptions on File Prior to Encounter   Medication Sig Dispense Refill    clobetasol (TEMOVATE) 0.05 % external solution   2    conjugated estrogens (PREMARIN) vaginal cream Place a " pea-sized amount in vagina every night for 2 weeks, then use 2-3 nights a week 45 g 12    duloxetine (CYMBALTA) 60 MG capsule Take 1 capsule (60 mg total) by mouth 2 (two) times daily. 180 capsule 1    furosemide (LASIX) 20 MG tablet Take 1 tablet (20 mg total) by mouth once daily. 90 tablet 11    gabapentin (NEURONTIN) 300 MG capsule TAKE 1 CAPSULE BY MOUTH THREE TIMES DAILY FOR 1 WEEK, IF NO RELIEF INCREASE TO 1 IN MORNING, 1 IN AFTERNOON AND 2 AT BEDTIME 360 capsule 3    hydrocodone-acetaminophen 10-325mg (NORCO)  mg Tab Take 1 tablet by mouth 3 (three) times daily as needed. 90 tablet 0    hydrOXYzine (VISTARIL) 50 MG Cap 50 mg daily as needed.       ketoconazole (NIZORAL) 2 % cream Apply topically 2 (two) times daily. 60 g 2    macitentan (OPSUMIT) 10 mg Tab Take 1 tablet (10 mg total) by mouth once daily. 30 tablet 11    PATADAY 0.2 % Drop   6    peg 3350-electrolytes-vit C (MOVIPREP) 100-7.5-2.691 gram PwPk Take 1 packet by mouth as directed. 1 each 0    potassium chloride (MICRO-K) 10 MEQ CpSR Take 1 capsule (10 mEq total) by mouth once daily. 90 capsule 3    treprostinil-neb accessories 1.74 mg/2.9 mL (0.6 mg/mL) Nebu Inhale into the lungs.      warfarin (COUMADIN) 5 MG tablet TAKE 1 AND 1/2 TABLETS BY MOUTH DAILY EXCEPT 1 TABLET ON TUESDAY OR AS DIRECTED BY COUMADIN CLINIC 150 tablet 5    warfarin (COUMADIN) 5 MG tablet TAKE 1 AND 1/2 TABLETS BY MOUTH DAILY EXCEPT 1 TABLET ON TUESDAY OR AS DIRECTED BY COUMADIN CLINIC 150 tablet 0    warfarin (COUMADIN) 5 MG tablet TAKE 1 AND 1/2 TABLETS BY MOUTH DAILY EXCEPT 1 TABLET ON TUESDAY OR AS DIRECTED BY COUMADIN CLINIC 150 tablet 0     Inpatient Medications   Continuous Infusions:   Scheduled Meds:     duloxetine 60 mg Oral BID    [START ON 6/1/2017] gabapentin 300 mg Oral TID    [START ON 6/1/2017] macitentan 10 mg Oral Daily    [START ON 6/1/2017] sodium chloride 0.9% 3 mL Intravenous Q8H    [START ON 6/1/2017] warfarin 5 mg Oral Daily      PRN Meds:hydrocodone-acetaminophen 10-325mg     Social History:             Social History    Substance Use Topics     Smoking status: Never Smoker     Smokeless tobacco: Not on file     Alcohol use 0.0 oz/week      Comment: rarely      Family History:           Family History   Problem Relation Age of Onset    Arthritis Mother     Diabetes Mother     Hyperlipidemia Mother     Arthritis Father     Hyperlipidemia Father      Physical Exam:         Constitutional: NAD, conversant   HEENT: Sclera anicteric, PERRLA, EOMI   Neck: 12-14 cm JVD, no carotid bruits   CV: RRR, no murmur, S1/S2 with loud P2 component   Pulm: CTAB, no wheezes, rales, or ronchi   GI: Abdomen soft, NTND, +BS   Extremities: Trace LE edema, warm and well perfused   Skin: No ecchymosis, erythema, or ulcers   Psych: AOx3, appropriate affect   Neuro: No gross deficits   Labs:     Lab Results   Component Value Date    BNP 1,931 (H) 06/01/2017     06/01/2017    K 3.7 06/01/2017    MG 1.7 06/01/2017     06/01/2017    CO2 25 06/01/2017    BUN 16 06/01/2017    CREATININE 0.9 06/01/2017     06/01/2017     01/27/2017    HGBA1C 5.9 02/27/2012    AST 24 06/01/2017    ALT 20 06/01/2017    ALBUMIN 3.1 (L) 06/01/2017    ALBUMIN 3.6 01/27/2017    PROT 5.7 (L) 06/01/2017    BILITOT 1.3 (H) 06/01/2017    CHOL 232 (H) 02/27/2012    HDL 63 02/27/2012    LDLCALC 151.0 02/27/2012    TRIG 92 02/27/2012       Magnesium   Date Value Ref Range Status   06/01/2017 1.7 1.6 - 2.6 mg/dL Final       Lab Results   Component Value Date    WBC 4.50 06/01/2017    HGB 14.6 06/01/2017    HCT 44.2 06/01/2017    MCV 91 06/01/2017     06/01/2017       Lab Results   Component Value Date    INR 2.0 (H) 06/01/2017    INR 2.0 (H) 06/01/2017    INR 1.3 05/24/2017       BNP   Date Value Ref Range Status   06/01/2017 1,931 (H) 0 - 99 pg/mL Final     Comment:     Values of less than 100 pg/ml are consistent with non-CHF populations.   03/17/2017 928  (H) 0 - 99 pg/mL Final     Comment:     Values of less than 100 pg/ml are consistent with non-CHF populations.   12/02/2016 541 (H) 0 - 99 pg/mL Final     Comment:     Values of less than 100 pg/ml are consistent with non-CHF populations.       No results found for: LDH          Assessment:   56 y.o. woman with PMH of AVB/syncope s/p PPM, pulmonary HTN, who is now transferred from [facility] for shortness of breath and management of patient's pulmonary HTN. Here for RHC to reassess PAP/CO    Plan:    RHC L IJ, 7 Fr sheath with local lidocaine, micropuncture kit and US guidance.    I have explained the risks, benefits and alternatives of the procedure in detail. The patient voices understanding and all questions have been answered,. The patient agrees to proceed as planned.

## 2017-06-01 NOTE — NURSING
Pt is currently on inhaled Tyvaso for pulm htn. Pt reports taking med 4x daily since being started on med several weeks ago. She does not have her home medicine nor does she have the nebulizing machine needed to adminster the med. The [facility] SRN spoke with the pharmacist and the U charge nurse regarding the availability of a machine. Pharmacy stated they do not carry the machine. The SRN on U stated the same and stated their pt bring the apparatus from home. The pt has received 2 doses today. She stated she would have her personal machine brought to the hospital on tomorrow. Dr. Castle notified. Orders to monitor pt; goal O2 sats low 90's per MD. Will continue to monitor.

## 2017-06-01 NOTE — PROCEDURES
"Emily Cook is a 56 y.o. female patient.    Temp: 97.4 °F (36.3 °C) (06/01/17 0800)  Pulse: 79 (06/01/17 1600)  Resp: (!) 23 (06/01/17 1600)  BP: 122/68 (06/01/17 1600)  SpO2: 95 % (06/01/17 1600)  Weight: 86.1 kg (189 lb 13.1 oz) (06/01/17 0500)  Height: 5' 3" (160 cm) (05/31/17 2203)    PICC  Date/Time: 6/1/2017 5:08 PM  Performed by: JONATHON HUGHES  Consent Done: Yes  Time out: Immediately prior to procedure a time out was called to verify the correct patient, procedure, equipment, support staff and site/side marked as required  Indications: med administration and vascular access  Local anesthetic: lidocaine 1% without epinephrine  Anesthetic Total (mL): 3  Preparation: skin prepped with ChloraPrep  Skin prep agent dried: skin prep agent completely dried prior to procedure  Sterile barriers: all five maximum sterile barriers used - cap, mask, sterile gown, sterile gloves, and large sterile sheet  Hand hygiene: hand hygiene performed prior to central venous catheter insertion  Location details: right brachial  Catheter type: single lumen  Catheter size: 4 Fr  Catheter Length: 36cm    Ultrasound guidance: yes  Vessel Caliber: medium and patent, compressibility normal  Vascular Doppler: not done  Needle advanced into vessel with real time Ultrasound guidance.  Guidewire confirmed in vessel.  Image recorded and saved.  Sterile sheath used.  no esophageal manometryNumber of attempts: 1  Post-procedure: blood return through all ports, chlorhexidine patch and sterile dressing applied  Specimens: No  Implants: No  Assessment: placement verified by x-ray        Maria G La  6/1/2017  "

## 2017-06-01 NOTE — PLAN OF CARE
Problem: Fall Risk (Adult)  Intervention: Safety Promotion/Fall Prevention  Pt fall on 6/1/17. Replaced yellow socks, applied yellow arm band, and set bed alarm.

## 2017-06-01 NOTE — H&P
HTS History & Physical  Attending Physician: Yves Ruth Jr.,*  Chief Complaint: Shortness of breath     HPI:   56 y.o. woman with PMH of AVB/syncope s/p PPM, pulmonary HTN, who is now transferred from [facility] for shortness of breath and management of patient's pulmonary HTN.    Patient has had difficult to treat PH, has been intolerant to Adcira and Adempas in the past. She last saw Dr. Cazares in 3/2017 and at that time was on a regimen of Macitentan and inhaled Tyvaso. She had a worsening 6MW at that time with uptrending BNP to 924, was switched from Tyvaso to Uptravi. She tolerated this regimen until early May when she called our clinic c/o nausea and vomiting from the medication. She still had her Tyvaso supplies and has since switched back to it, taking about 4 doses per day. Since then, she has been to the ED 3 times for SOB, on 5/14/2017, 5/20/2017, and again earlier today. Each time she was given IVP Lasix and sent home, although she refused admission for further workup on 5/14/2017.    After arrival tonight, patient feels comfortable, on 5L NC. Denies any chest discomfort, has minimal SOB, and no palpitations. Denies any recent dizziness or syncope, no PND, orthopnea. Has had some recent LE edema and abdominal distension, though states it is improved currently. She states she has taken her Tyvaso earlier today, but did not bring her machine or supplies with her. No other complaints at this time.    ROS:    Constitution: Negative for fever, chills, weight loss or gain.   HENT: Negative for sore throat, rhinorrhea, or headache.  Eyes: Negative for blurred or double vision.   Cardiovascular: See above  Pulmonary: Positive for SOB   Gastrointestinal: Negative for abdominal pain, nausea, vomiting, or diarrhea.   : Negative for dysuria.   Neurological: Negative for focal weakness or sensory changes.  PMH:     Past Medical History:   Diagnosis Date    Arrhythmia     Arthritis      "Cardiac pacemaker in situ     Carpal tunnel syndrome, right     Cervical spondylosis     Cervicalgia     CHF (congestive heart failure)     Diverticulitis     Heart block AV third degree     Hyperlipidemia     ALFREDO on CPAP     Pulmonary hypertension     Subdeltoid bursitis      Past Surgical History:   Procedure Laterality Date    CARDIAC CATHETERIZATION      CARDIAC PACEMAKER PLACEMENT  7/29/13    [employer] DC PM    CARDIAC SURGERY      COLONOSCOPY N/A 9/28/2015    Procedure: COLONOSCOPY;  Surgeon: Jason Diaz MD;  Location: [facility] ([address] [place] [address]);  Service: Endoscopy;  Laterality: N/A;  Please schedule in 2-3 months with Dr Diaz  Pulmonary HTN, 2nd floor (off remodulin infusion as of "a few days" before 9/9/15)    3 day hold Coumadin, [facility]  PT/INR scheduled before procedure    ECTOPIC PREGNANCY SURGERY Left     knee arthroscopy       Allergies:     Review of patient's allergies indicates:   Allergen Reactions    Chlorhexidine Itching and Rash     Patient had raised rash on neck following RHC procedure. She states she's never had a problem with the "prep" used until this time.     Adhesive Rash     Medications:     Current Facility-Administered Medications on File Prior to Encounter   Medication Dose Route Frequency Provider Last Rate Last Dose    [COMPLETED] albuterol-ipratropium 2.5mg-0.5mg/3mL nebulizer solution 3 mL  3 mL Nebulization Q5 Min BRANDI Duff   3 mL at 05/31/17 0751    [COMPLETED] albuterol-ipratropium 2.5mg-0.5mg/3mL nebulizer solution 3 mL  3 mL Nebulization Once Pankaj Eli MD   3 mL at 05/31/17 1742    [COMPLETED] furosemide injection 20 mg  20 mg Intravenous ED 1 Time Angel Sharpe MD   20 mg at 05/31/17 1412    [DISCONTINUED] albuterol-ipratropium 2.5mg-0.5mg/3mL (DUO-NEB) 0.5 mg-3 mg(2.5 mg base)/3 mL nebulizer solution              Current Outpatient Prescriptions on File Prior to Encounter   Medication Sig Dispense Refill    " clobetasol (TEMOVATE) 0.05 % external solution   2    conjugated estrogens (PREMARIN) vaginal cream Place a pea-sized amount in vagina every night for 2 weeks, then use 2-3 nights a week 45 g 12    duloxetine (CYMBALTA) 60 MG capsule Take 1 capsule (60 mg total) by mouth 2 (two) times daily. 180 capsule 1    furosemide (LASIX) 20 MG tablet Take 1 tablet (20 mg total) by mouth once daily. 90 tablet 11    gabapentin (NEURONTIN) 300 MG capsule TAKE 1 CAPSULE BY MOUTH THREE TIMES DAILY FOR 1 WEEK, IF NO RELIEF INCREASE TO 1 IN MORNING, 1 IN AFTERNOON AND 2 AT BEDTIME 360 capsule 3    hydrocodone-acetaminophen 10-325mg (NORCO)  mg Tab Take 1 tablet by mouth 3 (three) times daily as needed. 90 tablet 0    hydrOXYzine (VISTARIL) 50 MG Cap 50 mg daily as needed.       ketoconazole (NIZORAL) 2 % cream Apply topically 2 (two) times daily. 60 g 2    macitentan (OPSUMIT) 10 mg Tab Take 1 tablet (10 mg total) by mouth once daily. 30 tablet 11    PATADAY 0.2 % Drop   6    peg 3350-electrolytes-vit C (MOVIPREP) 100-7.5-2.691 gram PwPk Take 1 packet by mouth as directed. 1 each 0    potassium chloride (MICRO-K) 10 MEQ CpSR Take 1 capsule (10 mEq total) by mouth once daily. 90 capsule 3    treprostinil-neb accessories 1.74 mg/2.9 mL (0.6 mg/mL) Nebu Inhale into the lungs.      warfarin (COUMADIN) 5 MG tablet TAKE 1 AND 1/2 TABLETS BY MOUTH DAILY EXCEPT 1 TABLET ON TUESDAY OR AS DIRECTED BY COUMADIN CLINIC 150 tablet 5    warfarin (COUMADIN) 5 MG tablet TAKE 1 AND 1/2 TABLETS BY MOUTH DAILY EXCEPT 1 TABLET ON TUESDAY OR AS DIRECTED BY COUMADIN CLINIC 150 tablet 0    warfarin (COUMADIN) 5 MG tablet TAKE 1 AND 1/2 TABLETS BY MOUTH DAILY EXCEPT 1 TABLET ON TUESDAY OR AS DIRECTED BY COUMADIN CLINIC 150 tablet 0       Inpatient Medications   Continuous Infusions:   Scheduled Meds:   duloxetine  60 mg Oral BID    [START ON 6/1/2017] gabapentin  300 mg Oral TID    [START ON 6/1/2017] macitentan  10 mg Oral Daily     [START ON 6/1/2017] sodium chloride 0.9%  3 mL Intravenous Q8H    [START ON 6/1/2017] warfarin  5 mg Oral Daily     PRN Meds:hydrocodone-acetaminophen 10-325mg     Social History:     Social History   Substance Use Topics    Smoking status: Never Smoker    Smokeless tobacco: Not on file    Alcohol use 0.0 oz/week      Comment: rarely     Family History:     Family History   Problem Relation Age of Onset    Arthritis Mother     Diabetes Mother     Hyperlipidemia Mother     Arthritis Father     Hyperlipidemia Father      Physical Exam:     Vitals:  Temp:  [99 °F (37.2 °C)-99.8 °F (37.7 °C)]   Pulse:  [66-90]   Resp:  [18-55]   BP: ()/(51-71)   SpO2:  [83 %-99 %]  on 5L NC I/O's:  No intake or output data in the 24 hours ending 05/31/17 2359     Constitutional: NAD, conversant  HEENT: Sclera anicteric, PERRLA, EOMI  Neck: 12-14 cm JVD, no carotid bruits  CV: RRR, no murmur, S1/S2 with loud P2 component  Pulm: CTAB, no wheezes, rales, or ronchi  GI: Abdomen soft, NTND, +BS  Extremities: Trace LE edema, warm and well perfused  Skin: No ecchymosis, erythema, or ulcers  Psych: AOx3, appropriate affect  Neuro: No gross deficits    Labs:       Recent Labs  Lab 05/31/17  0730      K 3.9      CO2 21*   BUN 17   CREATININE 0.90       Recent Labs  Lab 05/31/17  0730   AST 30   ALT 45*   ALKPHOS 82   BILITOT 1.3   ALBUMIN 3.4*       Recent Labs  Lab 05/31/17  0730   TROPONINI 0.022      Recent Labs  Lab 05/31/17  0730   WBC 3.90   HGB 13.8   HCT 42.2      GRAN 51.4  2.0     No results for input(s): PTT, INR in the last 168 hours.  Lab Results   Component Value Date    CHOL 232 (H) 02/27/2012    HDL 63 02/27/2012    LDLCALC 151.0 02/27/2012    TRIG 92 02/27/2012     Lab Results   Component Value Date    HGBA1C 5.9 02/27/2012        Micro:  Blood Cultures  Lab Results   Component Value Date    LABBLOO NO GROWTH AFTER 5 DAYS  - FINAL REPORT. 09/07/2011    SURI NO GROWTH AFTER 5 DAYS  - FINAL  REPORT. 09/07/2011    LABBLOO NO GROWTH AFTER 5 DAYS  - FINAL REPORT. 07/19/2011    LABBLOO NO GROWTH AFTER 5 DAYS  - FINAL REPORT. 07/19/2011    LABBLOO NO GROWTH AFTER 5 DAYS  - FINAL REPORT. 07/17/2011     Urine Cultures  Lab Results   Component Value Date    LABURIN  07/15/2011     MULTIPLE ORGANISMS ISOLATED. NONE IN PREDOMINANCE  REPEAT IF CLINICALLY NECESSARY.    LABURIN  06/20/2011     MULTIPLE ORGANISMS ISOLATED. NONE IN PREDOMINANCE  REPEAT IF CLINICALLY NECESSARY.    LABURIN NO GROWTH AFTER 48 HOURS. 11/10/2010       Imaging:     EF   Date Value Ref Range Status   09/27/2016 55 55 - 65    03/17/2015 60 55 - 65    12/09/2013 65     07/27/2013 65       Assessment:   56 y.o. woman with PMH of AVB/syncope s/p PPM, pulmonary HTN, who is now transferred from [facility] for shortness of breath and management of patient's pulmonary HTN.    Plan:   Pulmonary HTN, WHO group 1; clinically mildly overloaded on exam  - Will give one dose Lasix IVP 40 mg  - Will continue home dose Macitentan  - Has received home dose Tyvaso earlier today, cannot give inhaled Tyvaso tonight as equipment unavailable from [facility] and from pharmacy; will re-address in the AM with consideration for parenteral therapy after patient discusses with Dr. Cazares  - INR 2.0, will continue Coumadin at 5 mg Daily  - Additional rec's in AM    Code Status: FULL CODE  Cardiac Diet  On Warfarin, no GI PPx indicated at this time    Patient seen and examined earlier tonight. Discussed with Dr. Ruth.    Signed:  Abhijeet Hamilton MD  Cardiology Fellow, PGY-4  Pager: [phone]  5/31/2017 11:59 PM

## 2017-06-01 NOTE — BRIEF OP NOTE
Date of admit to cath lab: 6/1/2017      Date of discharge from cath lab: 6/1/2017      Principal diagnosis: PH/acute RVF    Discharge attending physician: Kaylee Cazares MD    Hospital Course/Outcome of the treatment, procedures or surgery: Pt admitted for RHC.   See CVIS/cath lab procedure report in EPIC  for full report of today's procedure.    Disposition of the case (d/c disposition): [facility]    Discharge Medication List: see med card    Plan for follow up care, diet, activity level: F/U as scheduled. Resume low Na diet.  Activity as tolerated    Condition on discharge from Cath lab: Stable.

## 2017-06-01 NOTE — PROGRESS NOTES
Admit Note     Met with patient to assess needs. Patient is a 56 y.o.  female, admitted  for pulmonary hypertension.  Pt reports SOB.     Patient admitted from [facility] on 5/31/2017 .  At this time, patient presents as alert and oriented x 4, pleasant and good eye contact.  At this time, patients caregiver is not in attendance.     Household/Family Systems     Patient resides with patient's son, at     [address] [id number].      Support system includes three sons and mother.     Patient does not have dependents that are need of being cared for.        Patients primary caregiver is self and son when needed.    Home phone:  [phone]  Pt's cell:  [phone]  Emergency Contacts:  Geeta Cook (mother, ) [phone]  Jaden Cook (son, lives with pt) [phone]  Pt's son is not currently working.  Pt reports they are going to apply for the Medicaid PCA program.  Malini Naidu Jr (son, lives in [place]) [phone]    During admission, patient's caregiver plans to stay at home.  Confirmed patient and patients caregivers do have access to reliable transportation.    Cognitive Status/Learning     Patient reports reading ability as 10th grade and states patient does not have difficulty with reading, writing, seeing, hearing, comprehension, learning and memory. Pt wears glasses.  Patient reports patient learns best by one on one.   Needed: No.   Highest education level: High School (9-12) or GED    Vocation/Disability   .  Working for Income: No  If no, reason not working: Disability  Patient is disabled due to PH  since 2011.  Prior to disability, patient  was employed as a cook.    Adherence     Patient reports a high level of adherence to patients health care regimen. Pt reports she is on a regular diet at home.   Adherence counseling and education provided. Patient verbalizes understanding.    Substance Use    Patient reports the following substance usage.    Tobacco:  none.  Pt has smoked in the past..  Alcohol: none, patient denies any use.  Illicit Drugs/Non-prescribed Medications: none.  Pt has used marijuana in the past.  Patient states clear understanding of the potential impact of substance use.  Substance abstinence/cessation counseling, education and resources provided and reviewed.     Services Utilizing/ADLS    Infusion Service: Prior to admission, patient utilizing? no Pt has used remoduline in the past.  .  Pt reports she is now open to returning home with IV PH medication.    Home Health: Prior to admission, patient utilizing? no Pt has used HH in the past, but can't remember the name.  Pt reports HH may be helpful when discharged.  Pt wants  to set up same with any co.   DME: Prior to admission, yes home o2 set up with portables at [facility] DME co not known.  Pt reports she has a walker and wheelchair.  Pulmonary/Cardiac Rehab: Prior to admission, no  Dialysis:  Prior to admission, no  Transplant Specialty Pharmacy:  Prior to admission, no.    Prior to admission, patient reports patient was independent with ADLS. Pt reports she has experienced dizziness and falling at home.  Pt experienced same during admission.   Pt  was driving. Pt reports he son also drives.  Patient reports patient is not able to care for self at this time due to compromised medical condition (as documented in medical record) and physical weakness..  Patient indicates a willingness to care for self once medically cleared to do so.    Insurance/Medications    Insured by   Payor/Plan Subscr  Sex Relation Sub. Ins. ID Effective Group Num   1. HUMANA MANAGE* SHALA OCNN MA* 1960 Female  [id number] 10/1/16 [id number]                                   [address] [address]   2. [place]* SHALA CONN [place]* 1960 Female  [id number]* 12                                    [address] [address]      Primary Insurance (for UNOS reporting): Public Insurance - Medicare FFS (Fee For  Service)  Secondary Insurance (for UNOS reporting): Public Insurance - Medicaid    Patient reports patient is able to obtain and afford medications at this time and at time of discharge.    Living Will/Healthcare Power of     Patient states patient does not have a LW and/or HCPA.   provided education regarding LW and HCPA and the completion of forms.    Coping/Mental Health    Patient is coping adequately with the aid of  family members. .  Patient denies mental health difficulties. Pt reports concern about medical status and need for IV treatment at home.  The pt reports the anniversary of her brothers murder was a few weeks ago and she has been grieving.  Worker provided support.    Pt reports no out pt counseling or need for same at this time.    Transplant  will follow.    Discharge Planning    At time of discharge, patient plans to return to patient's home under the care of self and son.  Patients son will transport patient.  Per rounds today, expected discharge date has not been medically determined at this time. Patient and patients caregiver  verbalize understanding and are involved in treatment planning and discharge process.    Additional Concerns    Patient is being followed for needs, education, resources, information, emotional support, supportive counseling, and for supportive and skilled discharge plan of care.  providing ongoing psychosocial support, education, resources and d/c planning as needed.  SW remains available. Patient denies additional needs and/or concerns at this time. Patient verbalizes understanding and agreement with information reviewed, social work availability, and how to access available resources as needed.

## 2017-06-02 NOTE — NURSING TRANSFER
Nursing Transfer Note      6/1/2017     Transfer To: TSU    Transfer via wheelchair    Transfer with  to O2, cardiac monitor        Chart send with patient: Yes      Patient reassessed at: 2122      Upon arrival to floor: cardiac monitor applied, patient oriented to room, call bell in reach and bed in lowest position

## 2017-06-02 NOTE — PHYSICIAN QUERY
"PT Name: Emily Cook  MR #: [id number]    Physician Query Form - Heart  Condition Clarification     CDS/: Nimisha Renner               Contact information: [email or web address]   This form is a permanent document in the medical record.     Query Date: June 2, 2017    By submitting this query, we are merely seeking further clarification of documentation. Please utilize your independent clinical judgment when addressing the question(s) below.    The medical record contains the following   Indicators     Supporting Clinical Findings Location in Medical Record   X BNP BNP 1931   Lab 6/1   X EF EF 55   2D Echo 6/1   X Radiology findings Cardiac silhouette is enlarged, unchanged.    CXR 5/31    Echo Results Small, underfilled left ventricle (compressed by the RV) with a normal ejection fraction (EF 55-60%).     Severe right ventricular enlargement with severely depressed systolic function.  2D Echo 6/1        2D Echo 6/1    "Ascites" documented     X "SOB" or "SANCHEZ" documented Positive for SOB  H&P 5/31    "Hypoxia" documented     X Heart Failure documented CHF (congestive heart failure)   H&P history 5/31   X "Edema" documented Trace LE edema H&P 5/31    Diuretics/Meds     X Treatment: Lasix 40mg ivp x one   Lasix 40mg ivp bid   Lasix 40mg po q day  MAR 5/31  MAR 6/1  MAR 6/2    Other:          Provider, please specify diagnosis or diagnoses associated with above clinical findings.                               [  ] Acute Systolic Heart Failure ( EF < 40)*  [  ] Acute on Chronic Systolic Heart Failure ( EF < 40)*  [  ] Acute Diastolic Heart Failure ( EF > 40)*  [  ] Acute on Chronic Diastolic Heart Failure( EF > 40)*  [  ] Acute Combined Systolic and Diastolic Heart Failure  [  ] Acute on Chronic Combined Systolic and Diastolic Heart Failure  [  ] Other Type of Heart Failure (please specify type): _________________________  [  ] Heart Failure Ruled Out  [x  ] Other (please specify): __Acute on Chronic R " Systolic HF on admit_________________________________  [  ] Clinically Undetermined            *American Heart Association                                                                                                          Please document in your progress notes daily for the duration of treatment until resolved and include in your discharge summary.

## 2017-06-02 NOTE — PROGRESS NOTES
Pt's infusion of Veletri started @ 1ng/kg/min DW 86 Kg. Medication and rate verified with ANNABELLA Allen RN. Pt teaching provided on possible side effects of medication. Pt connected to telemetry and continuous pulse oximetry.

## 2017-06-02 NOTE — PLAN OF CARE
Problem: Patient Care Overview  Goal: Plan of Care Review  Pt's Spo2 remains >92% on 10L non rebreather. Pt reports dyspnea on exertion only. Pt does not report any side effects from Veletri administration so far this shift. Veletri to be titrated Q6 by 1ng/kg/min. Pt's HR remains SR. UO >300ml overnight. Pt turns self in bed independently no signs of skin breakdown noted. This RN instructed the pt on the importance of hand hygiene, pt verbalized understanding. Fall precautions in place, side rails upx3, non slip footwear applied, pt instructed to call for assistance before attempting to ambulate, pt verbalized understanding. Call bell within reach.

## 2017-06-02 NOTE — PLAN OF CARE
Problem: Patient Care Overview  Goal: Plan of Care Review  Outcome: Ongoing (interventions implemented as appropriate)  POC reviewed w/ pt this am to include titrating veletri, monitoring for s/s of intolerance, weaning O2, and safety.  Pt doing well today.  Veletri titration in progress, currently at 3 ng/kg/min - pt tolerating well.  Pt's O2 weaning to venti mask 14L/55%.  Pt ambulating in room independently without difficult.  Pt c/o constipation, prn stool softener ordered.

## 2017-06-02 NOTE — PROGRESS NOTES
Pt being transferred from [facility] [place] [facility] to [facility]. Pt transferred from bed to wheelchair with stand by assistance. Pt transferred on O2 15L nonrebreather. Pt transferred on telemetry. Pt transferrred with TEA PICC and 20 g RAC PIV intact. No redness or swelling noted. Pt denies any distress.

## 2017-06-02 NOTE — PHYSICIAN QUERY
PT Name: Emily Cook  MR #: [id number]    Physician Query Form - Respiratory Condition Clarification      CDS/: Nimisha Renner               Contact information: [email or web address]    This form is a permanent document in the medical record.    Query Date: June 2, 2017    By submitting this query, we are merely seeking further clarification of documentation. Please utilize your independent clinical judgment when addressing the question(s) below.    The Medical record contains the following   Indicators   Supporting Clinical Findings Location in Medical Record   X   SOB, SANCHEZ, Wheezing, Productive Cough, Use of Accessory Muscles, etc. transferred from [facility] for shortness of breath     Positive for SOB     Pt reports dyspnea on exertion  H&P 6/1      H&P 6/1    RN POC 6/2      Acute/Chronic Illness        Radiology Findings  CHF and possible pulmonary artery hypertension CXR 6/1   X   Respiratory Distress or Failure Chronic Respiratory Failure Transplant  PN 6/2        Hypoxia or Hypercapnia        RR         ABGs         O2 sat        BiPAP/Intubation     X   Supplemental O2 Still on O2 via nonrebreather mask Transplant  PN 6/2   X   Home O2, Oxygen Dependence -On Home O2 via NC, 4-5L. Currenlty on nonrebreather mask Transplant  PN 6/2        Treatment     X   Other has been to the ED 3 times for SOB H&P 6/1     Provider, please specify diagnosis or diagnoses associated with above clinical findings.      [ x ] Acute and (on) Chronic Respiratory Failure with Hypoxia    [  ] Chronic Respiratory Failure with Hypoxia    [  ] Other Respiratory Diagnosis (please specify): _______________________________    [  ] Clinically Undetermined    Please document in your progress notes daily for the duration of treatment until resolved and include in your discharge summary.

## 2017-06-02 NOTE — PROGRESS NOTES
"Hospital Day: 3    Subjective:  Interval History: Feeling OK this am. Still on O2 via nonrebreather mask    Scheduled Meds:   duloxetine  60 mg Oral BID    furosemide  40 mg Oral Daily    gabapentin  300 mg Oral TID    macitentan  10 mg Oral Daily    sodium chloride 0.9%  10 mL Intravenous Q6H    sodium chloride 0.9%  3 mL Intravenous Q8H    warfarin  5 mg Oral Daily     Continuous Infusions:   epoprostenol (VELETRI) infusion 2 ng/kg/min (06/02/17 2050)    veletri/remodulin cassette      veletri/remodulin tubing       PRN Meds:acetaminophen, hydrocodone-acetaminophen 10-325mg, loperamide, ondansetron, ondansetron, Flushing PICC Protocol **AND** sodium chloride 0.9% **AND** sodium chloride 0.9%    Objective:  Vital signs in last 24 hours:   Temp:  [97.4 °F (36.3 °C)-99.1 °F (37.3 °C)] 98.6 °F (37 °C)  Pulse:  [58-84] 64  Resp:  [19-24] 24  SpO2:  [92 %-98 %] 93 %  BP: ()/(52-73) 97/52    Intake/Output last 3 shifts:  I/O last 3 completed shifts:  In: 935.9 [P.O.:930; I.V.:5.9]  Out: 3125 [Urine:3125]  Intake/Output this shift:  No intake/output data recorded.    Physical Exam:  /63 (BP Location: Left arm, Patient Position: Lying, BP Method: Automatic)   Pulse 78   Temp 98.5 °F (36.9 °C) (Oral)   Resp (!) 24   Ht 5' 3" (1.6 m)   Wt 89.9 kg (198 lb 1.6 oz)   SpO2 96%   Breastfeeding? No   BMI 35.09 kg/m²   General appearance: alert, appears stated age and cooperative  Neck: + JVD and supple, symmetrical, trachea midline  Lungs: clear to auscultation bilaterally  Heart: regular rate and rhythm, S1, S2 normal, II/VI systolic murmur  Abdomen: soft, non-tender; bowel sounds normal; no masses,  no organomegaly  Extremities: extremities normal, atraumatic, no cyanosis or edema  Neurologic: Grossly normal  Lab Review  Lab Results   Component Value Date     06/02/2017    K 4.0 06/02/2017     06/02/2017    CO2 27 06/02/2017    BUN 17 06/02/2017    CREATININE 0.9 06/02/2017    CALCIUM " 8.5 (L) 06/02/2017     Lab Results   Component Value Date    WBC 4.10 06/02/2017    HGB 14.6 06/02/2017    HCT 44.5 06/02/2017    MCV 91 06/02/2017     06/02/2017      RHC 6/1/17:  Hemodynamic Results  Condition 1:  AOPRES: 102/68 (79)  AOSAT: 95  FICKCI: 1.81  FICKCO: 3.44  PAPRES: 90/38 (57)  PASAT: 58  PVR: 12.21  PWPRES: 18/19 (15)  RAPRES: 18/15 (13)  RVPRES: 103/6, 20    2DE 6/1/17:  CONCLUSIONS     1 - Small, underfilled left ventricle (compressed by the RV) with a normal ejection fraction (EF 55-60%).     2 - Severe right ventricular enlargement with severely depressed systolic function.     3 - Right atrial enlargement.     4 - Mild to moderate tricuspid regurgitation.     5 - Severe pulmonary hypertension. The estimated PA systolic pressure is 119 mmHg.     6 - Increased central venous pressure.       Assessment/Plan:  56 y.o. woman with PMH of AVB/syncope s/p PPM, pulmonary HTN, who is now transferred from [facility] for shortness of breath and management of patient's pulmonary HTN.    Pulmonary HTN, WHO group 1  Chronic RV Failure  -Has not tolerated multiple PH therapies  -RHC yesterday showed RA 13, PCWP 15, PAP 90/38, CO/CI- 3.4/1.8.   -Pt was started on IV Valetri yesterday, currently at 2 ng. Will try to uptitrate to 20 ng prior to d/c. Continue home dose Macitentan  -Transitioned to PO Lasix today  - INR 1.8, will continue Coumadin at 5 mg Daily  - Pt currently has single lumen PICC but will plan for Cardoso prior to d/c if pt tolerates Veletri and gets approval    Chronic Respiratory Failure  -On Home O2 via NC, 4-5L. Currenlty on nonrebreather mask. Will wean today    Active Hospital Problems    Diagnosis  POA    Pulmonary hypertension [I27.2]  Yes      Resolved Hospital Problems    Diagnosis Date Resolved POA   No resolved problems to display.

## 2017-06-03 PROBLEM — J96.21 ACUTE ON CHRONIC RESPIRATORY FAILURE WITH HYPOXIA: Status: ACTIVE | Noted: 2017-01-01

## 2017-06-03 NOTE — PLAN OF CARE
Problem: Patient Care Overview  Goal: Plan of Care Review  Outcome: Ongoing (interventions implemented as appropriate)  Pt is AAOx3.Pt is ambulatory and independent.Pt able to perform all ADL's without assistance. NAD noted.Telly monitoring on going. Standard precautions maintained.  Pt turns independently, pt is aware of bony area and pressure reduction positions No breakdown noted, po fluids encouraged.Pt denies pian and/or discomfort at this time.Pt currently on strict I&O's and daily weight to monitor fluid status on a daily basis.Pt remains injury and fall free, non skid footwear donned, call light within reach, personal items within reach, bed in low/locked position, pt able to voice needs all needs voiced have been met at this time.

## 2017-06-03 NOTE — PROGRESS NOTES
I have seen the patient, reviewed the hospitalist's assessment and plan. I have personally interviewed and examined the patient at bedside and: agree with the findings.     She had drop in O2 sat this morning with higher flows  Elected to give dose of lasix and hold titration of veletri for now    She does not have ILD so will diurese as BP and renal function allow.      With BP 90's, under filled LV on recent echo and prior dizziness on diuretics will add dopamine 3-5 mcg/kg/min in view of RV failure and severe PH (I prefer over dobutamine due to latter vasodilatory effects) and will follow closely.      Would like to perform bubble study with saline contrast to assess for PFO as trying to explain need to high flow oxygen.  If that is confirmed as BP can be ideally raised or at least maintained will up-titrate veletri as only treatment for paradoxical shunting in this situation is to reduce RA pressure     Fellow will place central line and I have reviewed with patient and she is agreeable.

## 2017-06-03 NOTE — PROGRESS NOTES
"Hospital Day: 4    Subjective:  Interval History: Feeling OK this am. Still on O2 via nonrebreather mask    Scheduled Meds:   duloxetine  60 mg Oral BID    furosemide  40 mg Oral Daily    gabapentin  300 mg Oral TID    macitentan  10 mg Oral Daily    sodium chloride 0.9%  10 mL Intravenous Q6H    sodium chloride 0.9%  3 mL Intravenous Q8H    warfarin  5 mg Oral Daily     Continuous Infusions:   epoprostenol (VELETRI) infusion 6 ng/kg/min (06/03/17 0446)    veletri/remodulin cassette      veletri/remodulin tubing       PRN Meds:acetaminophen, docusate sodium, hydrocodone-acetaminophen 10-325mg, loperamide, ondansetron, ondansetron, Flushing PICC Protocol **AND** sodium chloride 0.9% **AND** sodium chloride 0.9%    Objective:  Vital signs in last 24 hours:   Temp:  [98.4 °F (36.9 °C)-99.3 °F (37.4 °C)] 99.3 °F (37.4 °C)  Pulse:  [] 82  Resp:  [21-24] 22  SpO2:  [86 %-98 %] 91 %  BP: ()/(53-70) 98/56    Intake/Output last 3 shifts:  I/O last 3 completed shifts:  In: 1970.5 [P.O.:1940; I.V.:30.5]  Out: 2325 [Urine:2325]  Intake/Output this shift:  I/O this shift:  In: -   Out: 1000 [Urine:1000]    Physical Exam:  BP (!) 98/56 (BP Location: Left arm, Patient Position: Lying, BP Method: Automatic)   Pulse 82   Temp 99.3 °F (37.4 °C) (Oral)   Resp (!) 22   Ht 5' 3" (1.6 m)   Wt 89.9 kg (198 lb 1.6 oz)   SpO2 (!) 91%   Breastfeeding? No   BMI 35.09 kg/m²   General appearance: alert, appears stated age and cooperative  Neck: + JVD and supple, symmetrical, trachea midline  Lungs: clear to auscultation bilaterally  Heart: regular rate and rhythm, S1, S2 normal, II/VI systolic murmur  Abdomen: soft, non-tender; bowel sounds normal; no masses,  no organomegaly  Extremities: extremities normal, atraumatic, no cyanosis or edema  Neurologic: Grossly normal  Lab Review  Lab Results   Component Value Date     (L) 06/03/2017    K 4.4 06/03/2017    CL 98 06/03/2017    CO2 24 06/03/2017    BUN 14 " 06/03/2017    CREATININE 0.9 06/03/2017    CALCIUM 8.2 (L) 06/03/2017     Lab Results   Component Value Date    WBC 4.88 06/03/2017    HGB 15.3 06/03/2017    HCT 45.8 06/03/2017    MCV 90 06/03/2017     06/03/2017      RHC 6/1/17:  Hemodynamic Results  Condition 1:  AOPRES: 102/68 (79)  AOSAT: 95  FICKCI: 1.81  FICKCO: 3.44  PAPRES: 90/38 (57)  PASAT: 58  PVR: 12.21  PWPRES: 18/19 (15)  RAPRES: 18/15 (13)  RVPRES: 103/6, 20    2DE 6/1/17:  CONCLUSIONS     1 - Small, underfilled left ventricle (compressed by the RV) with a normal ejection fraction (EF 55-60%).     2 - Severe right ventricular enlargement with severely depressed systolic function.     3 - Right atrial enlargement.     4 - Mild to moderate tricuspid regurgitation.     5 - Severe pulmonary hypertension. The estimated PA systolic pressure is 119 mmHg.     6 - Increased central venous pressure.       Assessment/Plan:  56 y.o. woman with PMH of AVB/syncope s/p PPM, pulmonary HTN, who is now transferred from [facility] for shortness of breath and management of patient's pulmonary HTN.    Pulmonary HTN, WHO group 1  Chronic RV Failure  -Has not tolerated multiple PH therapies  -RHC yesterday showed RA 13, PCWP 15, PAP 90/38, CO/CI- 3.4/1.8.   -Pt was started on IV Valetri; currently at 6 nG WITH TARGET DOSE AT 20 NG as per protocol, before discharge.   - Continue home dose Macitentan  - Continue PO Lasix today  - INR 1.6, will continue Coumadin at 5 mg Daily  - Pt currently has single lumen PICC but will plan for Cardoso prior to d/c if pt tolerates Veletri and gets approval    Chronic Respiratory Failure  -On Home O2 via NC, 4-5L. Currenlty on nonrebreather mask.   - Failed weaning yesterday. Will reassess after dose of Valetri increased.   - Probably will need IS a swell.     Active Hospital Problems    Diagnosis  POA    *Primary pulmonary hypertension [I27.0]  Yes    Sleep apnea- on CPAP [G47.30]  Yes    Cardiac pacemaker in situ [Z95.0]   Yes    Heart block AV third degree [I44.2]  Yes     Intermittent infranodal        Long term current use of anticoagulant therapy [Z79.01]  Not Applicable      Resolved Hospital Problems    Diagnosis Date Resolved POA   No resolved problems to display.

## 2017-06-03 NOTE — PROGRESS NOTES
Notified MD on call of pt's low O2 sats and hypotension.  BP 80's/50's - MAP 66.  Unable to titrate up on veletri.  Pt c/o mask irritation, respiratory notified - pt switched to comfort flow 20L/100% FiO2.  Order received for STAT ABG, will call MD w/ results.

## 2017-06-03 NOTE — PLAN OF CARE
Problem: Patient Care Overview  Goal: Plan of Care Review  Outcome: Ongoing (interventions implemented as appropriate)  POC reviewed w/ pt to include weaning O2, increasing veletri, promoting respiratory function, and accurate intake/output.  Pt unable to maintain adequate O2 sats this am on venti mask, pt transitioned from non-rebreather to comfort flow.  Currently on comfort flow at 20L/100% sating high 80s/low 90s.  Pt denied SOB, c/o mild headache.  ABG and CXR done.  40 mg IV lasix x 1 given.  Verbal instruction received from staff MD to hold veletri increases until patient can maintain O2 sat of 90 or above.  Pt experienced periodic hypotension 80s/50s.  2D echo w/ bubble study ordered today.  Central line being placed per HTS fellow this pm.  Dopamine gtt to start once central line is OK'd for use at 3 mcg/kg/min.

## 2017-06-04 NOTE — PROGRESS NOTES
"Progress Note  Heart Transplant Service    Admit Date: 5/31/2017   LOS: 4 days     SUBJECTIVE:     Follow up for: Primary pulmonary hypertension    Interval History:   Remains ill requiring pressors for RV support and renal perfusion. Low urine output; had to be started on lasix drip with adequate response. Had to be transferred to the unit given hypoxia.   Reports nauseas and vomiting this morning.     Scheduled Meds:   duloxetine  60 mg Oral BID    gabapentin  300 mg Oral TID    macitentan  10 mg Oral Daily    sodium chloride 0.9%  10 mL Intravenous Q6H    sodium chloride 0.9%  3 mL Intravenous Q8H    warfarin  5 mg Oral Daily     Continuous Infusions:   DOPamine 3 mcg/kg/min (06/04/17 0602)    epoprostenol (VELETRI) infusion 6 ng/kg/min (06/04/17 0602)    furosemide (LASIX) 1 mg/mL infusion (non-titrating) 10 mg/hr (06/04/17 0602)    veletri/remodulin cassette      veletri/remodulin tubing       PRN Meds:acetaminophen, docusate sodium, hydrocodone-acetaminophen 10-325mg, loperamide, ondansetron, ondansetron, Flushing PICC Protocol **AND** sodium chloride 0.9% **AND** sodium chloride 0.9%    Review of patient's allergies indicates:   Allergen Reactions    Chlorhexidine Itching and Rash     Patient had raised rash on neck following RHC procedure. She states she's never had a problem with the "prep" used until this time.     Adhesive Rash       OBJECTIVE:     Vital Signs (Most Recent)  Temp: 98.8 °F (37.1 °C) (06/04/17 0300)  Pulse: 81 (06/04/17 0602)  Resp: (!) 91 (06/04/17 0602)  BP: 109/70 (06/04/17 0602)  SpO2: 97 % (06/04/17 0602)    Vital Signs Range (Last 24H):  Temp:  [98.1 °F (36.7 °C)-99.3 °F (37.4 °C)]   Pulse:  []   Resp:  []   BP: ()/(52-70)   SpO2:  [84 %-97 %]     I & O (Last 24H):  Intake/Output Summary (Last 24 hours) at 06/04/17 0648  Last data filed at 06/04/17 0602   Gross per 24 hour   Intake           550.35 ml   Output             2690 ml   Net         -2139.65 " ml            Physical Exam   Neck: Tracheal deviation present.     Gen: NAD  Head/Eyes/Ears/Nose: NCAT, MMM   Neck: Soft, supple, no JVD   Lung: decreased breath sounds bilaterally, no wheezes  Heart: Normal S1/S2, regular rate and rhythm, no gallops, normal PMI  Abdomen: Soft, NT/ND, NABS, no masses, no guarding/rebounding, no HSM, no ascitis  Extremities: No LE edema bilaterally, 2+ pulses bilaterally in upper/lower extremities   Skin: Normal color and turgor. No rashes, no petechia, no ecchymoses.   Neuro: AAOx3    Labs:       Recent Labs  Lab 06/02/17  0456 06/03/17  0420 06/04/17  0350   WBC 4.10 4.88 6.60   HGB 14.6 15.3 16.2*   HCT 44.5 45.8 46.5    236 223   LYMPH 34.9  1.4 23.0  1.1 7.4*  0.5*   MONO 17.8*  0.7 14.1  0.7 6.1  0.4   EOSINOPHIL 3.2 0.2 0.0         Recent Labs  Lab 06/02/17  0456 06/03/17  0420 06/04/17  0350   INR 1.8* 1.6* 1.6*        Recent Labs  Lab 06/02/17  0456 06/03/17  0420 06/04/17  0350   GLU 95 109 167*   CALCIUM 8.5* 8.2* 8.8   ALBUMIN 2.9* 3.0* 3.1*   PROT 5.7* 6.3 6.9    132* 132*   K 4.0 4.4 3.4*   CO2 27 24 27    98 91*   BUN 17 14 19   CREATININE 0.9 0.9 0.9   ALKPHOS 79 84 93   ALT 19 17 17   AST 24 24 20   BILITOT 1.1* 1.3* 1.7*   MG 2.1 2.2 1.9   PHOS 4.1 3.5 4.5     Estimated Creatinine Clearance: 74.3 mL/min (based on Cr of 0.9).      Recent Labs  Lab 06/01/17  0011   BNP 1,931*       No results for input(s): LDH in the last 168 hours.    Microbiology Results (last 7 days)     ** No results found for the last 168 hours. **            ASSESSMENT AND PLAN:   56 y.o. woman with PMH of AVB/syncope s/p PPM, pulmonary HTN, who is now transferred from [facility] for shortness of breath and management of patient's pulmonary HTN.    Pulmonary HTN, WHO group 1  Chronic RV Failure  -Has not tolerated multiple PH therapies  -RHC showed RA 13, PCWP 15, PAP 90/38, CO/CI- 3.4/1.8.   -Pt was started on IV Valetri; currently at 6 nG.Holding up titration  given hypotension and significant hypoxia  -Started on dopamine 3 mcg/hour given RV failure and poor organ perfusion  - Continue home dose Macitentan  - Switched to lasix ggt at 10 ml./hour; has put out more than 2.5 L over the past 24 hours  - INR 1.6, will continue Coumadin at 5 mg Daily  - Pt currently has single lumen PICC but will plan for Cardoso prior to d/c if pt tolerates Veletri and gets approval     Chronic Hypoxia  -On Home O2 via NC, 4-5L. Currenlty on nonrebreather mask.   - Appreciate pulm recs  - doppler lower extremity failed to show DVT  - Will obtain TTE with bubble to evaluate right lo left shunt to explain hypoxia      Further recommendations pending attending addendum.    Julio Washington MD  Cardiology Fellow, PGY V  #[phone]

## 2017-06-04 NOTE — PROCEDURES
Procedure Note    Procedure:  Central line  Indication:  Hemodynamic monitoring, medications  Access Site: Left IJV    Risks, benefits, and indications for the procedure were reviewed with patient. Consent was signed and placed in chart. Pt prepped and draped in sterile fashion. The following sterile precautions were followed: cap and mask, hand hygiene, sterile gown and sterile gloves, large sterile sheet and sterile field and 2% Chlorhexidine for cutaneous antisepsis. Time out was performed with nursing staff. Lidocaine 1% injected at site. Ultrasound guidance utilized to visualize anatomy of LIJ. Access obtained without difficulty and blood flow was non-pulsatile. Wire threaded smoothly and US confirmed appropriate venous placement of wire. Catheter advanced without resistance. Pt tolerated procedure well. No evidence of hematoma at vascular access site. CXR has been ordered to confirm appropriate placement.     CXR reviewed: no complications  May proceed with use.    Nancy Ball MD  Cardiology Fellow  Pager: [phone]  6/3/2017 8:32 PM

## 2017-06-04 NOTE — PROGRESS NOTES
Pt on 20L 100% CF stating 84%. NV noted. Pt reports SOB. House Sup notified. Dr uRth notified of worsening condition. Dr Arana ordered Pt to transferred to CICU.

## 2017-06-04 NOTE — PROGRESS NOTES
Pt received from TSU on high flow canula 22L 100%fio2 and 100%nrb mask. Villetri gtt infusing via cad pump @6ng/kg/min via a dedicated picc line sats in high 80s no respirator distress noted will cont to monitor.

## 2017-06-04 NOTE — PLAN OF CARE
Problem: Patient Care Overview  Goal: Plan of Care Review  Outcome: Ongoing (interventions implemented as appropriate)  Pt is AAOx3.Pt needs stand by assistance only. NAD noted.Pt denies pian and/or discomfort at this time.Standard precautions maintained.  Telly monitoring on going. Pt transferred to CICU room 3085. RT at bedside through out transfer. Report called to Augie HYLTON.

## 2017-06-04 NOTE — PLAN OF CARE
Problem: Patient Care Overview  Goal: Plan of Care Review  Outcome: Ongoing (interventions implemented as appropriate)  No acute events throughout day.  Patient remains on Veletri at 6 ng/kg/min - currently not titrating dose at the moment.  She has been vomiting throughout the day with minimal relief with Zofran. She was eventually given Phenergan with moderate relief, but continues to report nausea. Patient has been unable to lie flat thus unable to achieve accurate CVPs. She remains on Comfort flow at 28L and 100% FiO2 which was weaned from 35L with an additional NRB on top. She denies SOB.  VS and assessment per flow sheet, patient progressing towards goals as tolerated, plan of care reviewed with Emily Cook and family, all concerns addressed, will continue to monitor.

## 2017-06-05 NOTE — CONSULTS
"Pulmonary & Critical Care Medicine   Consultation Note- 6/4/2017     Staff- Shannan   Fellow- Jhonatan     Reason for Consultation: Pulmonary HTN, worsening hypoxia     HPI:     57 y/o female with longstanding history of PH (WHO1), diagnosed in 2010 by Dr. Cazares. She has been on numerous PH specific therapy and most recently Ambika/inhaled Tyvaso prior to admission. Compliance with home inhaled therapy intermittent and questionable. Over the last 6 months she has had a precipitous decline in functional status (Class 3) with multiple ED visits to [facility] for worsening dyspnea. [place] [facility] Dr. Cazares on [place]/1 from [place] ED. Underwent RHC on admission with significant PH, PCWP- 15. Initially on 5L NC. Initiated on IV epoprostenol and since with worsening hypoxemia and significant increase in oxygen requirements. She is currently in ICU on dopamine (3mcg), Epo (6ng), lasix gtt. Remains on comfort flow 100%/28L. Due to worsening hypoxemia the pulmonary service has been asked to evaluate and make additional recommendations.     Past Medical History:   Diagnosis Date    Arrhythmia     Arthritis     Cardiac pacemaker in situ     Carpal tunnel syndrome, right     Cervical spondylosis     Cervicalgia     CHF (congestive heart failure)     Diverticulitis     Heart block AV third degree     Hyperlipidemia     ALFREDO on CPAP     Pulmonary hypertension     Subdeltoid bursitis      Past Surgical History:   Procedure Laterality Date    CARDIAC CATHETERIZATION      CARDIAC PACEMAKER PLACEMENT  7/29/13    [employer] CT PM    CARDIAC SURGERY      COLONOSCOPY N/A 9/28/2015    Procedure: COLONOSCOPY;  Surgeon: Jason Diaz MD;  Location: [facility] ([address] [place] [address]);  Service: Endoscopy;  Laterality: N/A;  Please schedule in 2-3 months with Dr Diaz  Pulmonary HTN, 2nd floor (off remodulin infusion as of "a few days" before 9/9/15)    3 day hold Coumadin, [facility]  PT/INR scheduled before procedure    ECTOPIC PREGNANCY SURGERY Left     knee arthroscopy       Drug Allergies:   Review of patient's allergies indicates:   Allergen Reactions    Chlorhexidine Itching and Rash     Patient had raised rash on neck following RHC procedure. She states she's never had a problem with the "prep" used until this time.     Adhesive Rash     Current Infusions:   DOPamine 3 mcg/kg/min (06/04/17 1800)    epoprostenol (VELETRI) infusion 6 ng/kg/min (06/04/17 1800)    furosemide (LASIX) 1 mg/mL infusion (non-titrating) 10 mg/hr (06/04/17 1800)    veletri/remodulin cassette      veletri/remodulin tubing       Scheduled Medications:     duloxetine  60 mg Oral BID    gabapentin  300 mg Oral TID    macitentan  10 mg Oral Daily    sodium chloride 0.9%  10 mL Intravenous Q6H    sodium chloride 0.9%  3 mL Intravenous Q8H    warfarin  5 mg Oral Daily     Vital Signs:    Temp:  [97.3 °F (36.3 °C)-98.8 °F (37.1 °C)] 98 °F (36.7 °C)  Pulse:  [72-97] 96  Resp:  [] 30  SpO2:  [84 %-97 %] 90 %  BP: ()/(52-70) 91/55     Fluid Balance:     Intake/Output Summary (Last 24 hours) at 06/04/17 1904  Last data filed at 06/04/17 1800   Gross per 24 hour   Intake           595.53 ml   Output             1970 ml   Net         -1374.47 ml       Physical Exam:   GEN- NAD AAOx3 HFNC inplace   HEENT- ATNC, PERRLA, EOMI, OP-Cl. No JVD, LAD or bruit noted. Trachea Midline.   CV- RRR No M/R/G Loud P2   RESP- CTA bilateral anterior. Few scattered basilar crackles noted. Decreased BS bilateral secondary to body habitus   GI- S/NT/ND. Positive BS X 4. No HSM Noted  BACK- Spine midline. No step off, crepitus or deformity noted. No midline TTP.   Ext- MAEW, No deformity. No edema or rashes noted.   Neuro- Strength 5/5 symmetric. CN 2-12 intact. Normal gait. Normal sensation.      Personal Review and Summary of Prior Diagnostics    Laboratory Studies:     Recent Labs  Lab 06/04/17  0016   PH 7.407 "   PCO2 44.9   PO2 48*   HCO3 28.3*   POCSATURATED 84*   BE 4       Recent Labs  Lab 06/04/17  0350   WBC 6.60   RBC 5.34   HGB 16.2*   HCT 46.5      MCV 87   MCH 30.3   MCHC 34.8       Recent Labs  Lab 06/04/17  0350 06/04/17  0911   * 132*   K 3.4* 4.3   CL 91* 91*   CO2 27 28   BUN 19 19   CREATININE 0.9 0.9   MG 1.9  --        Microbiology Data:   Microbiology Results (last 7 days)     ** No results found for the last 168 hours. **        Summary of Chest Imaging Personally Reviewed:     LE U/S- No evidence of DVT in either lower extremity.    CXR- patchy bilateral interstitial opacities, lines in appropriate position . Cardiomegally with enlarged pulmonary vessels        2D Echo:   CONCLUSIONS     1 - Small, underfilled left ventricle (compressed by the RV) with a normal ejection fraction (EF 55-60%).     2 - Severe right ventricular enlargement with severely depressed systolic function.     3 - Right atrial enlargement.     4 - Mild to moderate tricuspid regurgitation.     5 - Severe pulmonary hypertension. The estimated PA systolic pressure is 119 mmHg.     6 - Increased central venous pressure.      RHC-   CO- 3.44   CI 1.81  WP- 15  PAP- 57  PVR- 12        Impression & Recommendations    1. Acute on chronic hypoxemic respiratory failure- secondary to advanced PH (WHO-1). On arrival she had fairly reasonable O2 requirements (5L), but significant PH by RHC with echocardiogram evidence of RV overload and septal deviation impeding LV function. After initiation of IV epoprostenol she has had significant hypoxemia and worsening O2 requirements. No on HFNC @28L, 100% and maintaining SpO2 85-90's. LE doppler unrevealing for DVT and appears on chronic OAC from the best I can tell making superimposed PE unlikely. In reviewing her chest imaging her prior CTA has scattered GGO in pulmonary vascular distribution which is likely secondary to pulmonary edema. A few linear reticulations, but certainly no gross  evidence of underlying DPLD. Her admission CXR still appears to have some degree of mild pulmonary edema. Despite lack of significant parenchymal findings the temporal relationship between initiation of IV epoprostenol and worsening hypoxemia point more to an intrapulmonary shunt/loss of hypoxemic vasoconstriction as etiology.     -- Diuresis (currently on lasix infusion)   -- Ultimately, the epoprostenol may need to be scaled back  -- wean supplemental O2 to maintain sats 88-92%     Consult appreciated, will continue to follow with you.       Noam Israel M.D.  Pulmonary/Critical Care Fellow- PGY VII   [phone]     Patient seen on pulmonary consult rounds with Dr Hopper. Agree with consult note & recommendations as written by my co-fellow, Dr Israel.

## 2017-06-05 NOTE — PROGRESS NOTES
"Progress Note  Heart Transplant Service    Admit Date: 5/31/2017   LOS: 5 days     SUBJECTIVE:     Follow up for: Primary pulmonary hypertension    Interval History: On 28L, 100% FIO2. Currently no complaints.       Scheduled Meds:   duloxetine  60 mg Oral BID    gabapentin  300 mg Oral TID    macitentan  10 mg Oral Daily    potassium chloride  40 mEq Intravenous Once    sodium chloride 0.9%  10 mL Intravenous Q6H    sodium chloride 0.9%  3 mL Intravenous Q8H    warfarin  5 mg Oral Daily     Continuous Infusions:   DOPamine 3 mcg/kg/min (06/05/17 0900)    epoprostenol (VELETRI) infusion 6 ng/kg/min (06/05/17 0900)    furosemide (LASIX) 1 mg/mL infusion (non-titrating) 10 mg/hr (06/05/17 0900)    veletri/remodulin cassette      veletri/remodulin tubing       PRN Meds:acetaminophen, docusate sodium, hydrocodone-acetaminophen 10-325mg, loperamide, ondansetron, ondansetron, promethazine (PHENERGAN) IVPB, Flushing PICC Protocol **AND** sodium chloride 0.9% **AND** sodium chloride 0.9%    Review of patient's allergies indicates:   Allergen Reactions    Chlorhexidine Itching and Rash     Patient had raised rash on neck following RHC procedure. She states she's never had a problem with the "prep" used until this time.     Adhesive Rash       OBJECTIVE:     Vital Signs (Most Recent)  Temp: 98.2 °F (36.8 °C) (06/05/17 0701)  Pulse: 101 (06/05/17 0900)  Resp: (!) 35 (06/05/17 0900)  BP: (!) 104/59 (06/05/17 0900)  SpO2: (!) 94 % (06/05/17 0900)    Vital Signs Range (Last 24H):  Temp:  [97.5 °F (36.4 °C)-98.2 °F (36.8 °C)]   Pulse:  []   Resp:  []   BP: ()/(54-71)   SpO2:  [88 %-98 %]     I & O (Last 24H):    Intake/Output Summary (Last 24 hours) at 06/05/17 1024  Last data filed at 06/05/17 0900   Gross per 24 hour   Intake           759.68 ml   Output             1845 ml   Net         -1085.32 ml            Physical Exam     Gen: NAD  Neck: + JVD to above clavicle with HJR  Lung: CTA " bilat  Heart: Normal S1/S2, tachy, regular rhythm, no gallops, normal PMI  Abdomen: Soft, NT/ND, NABS, no masses, no guarding/rebounding  Extremities: No LE edema bilaterally, 2+ pulses bilaterally in upper/lower extremities   Skin: Normal color and turgor. No rashes, no petechia, no ecchymoses.   Neuro: AAOx3    Labs:       Recent Labs  Lab 06/03/17  0420 06/04/17  0350 06/05/17  0421   WBC 4.88 6.60 8.87   HGB 15.3 16.2* 16.4*   HCT 45.8 46.5 47.8    223 238   LYMPH 23.0  1.1 7.4*  0.5* 8.8*  0.8*   MONO 14.1  0.7 6.1  0.4 7.0  0.6   EOSINOPHIL 0.2 0.0 0.0         Recent Labs  Lab 06/03/17  0420 06/04/17  0350 06/05/17  0421   INR 1.6* 1.6* 2.9*          Recent Labs  Lab 06/03/17  0420 06/04/17  0350 06/04/17  0911 06/05/17  0421    167* 145* 169*   CALCIUM 8.2* 8.8 9.0 9.3   ALBUMIN 3.0* 3.1*  --  3.1*   PROT 6.3 6.9  --  7.2   * 132* 132* 136   K 4.4 3.4* 4.3 3.3*   CO2 24 27 28 35*   CL 98 91* 91* 89*   BUN 14 19 19 20   CREATININE 0.9 0.9 0.9 0.8   ALKPHOS 84 93  --  90   ALT 17 17  --  14   AST 24 20  --  18   BILITOT 1.3* 1.7*  --  1.4*   MG 2.2 1.9  --  1.9   PHOS 3.5 4.5  --  3.4     Estimated Creatinine Clearance: 83.5 mL/min (based on Cr of 0.8).      Recent Labs  Lab 06/01/17  0011   BNP 1,931*       No results for input(s): LDH in the last 168 hours.    Microbiology Results (last 7 days)     ** No results found for the last 168 hours. **            ASSESSMENT AND PLAN:   56 y.o. woman with PMH of AVB/syncope s/p PPM, pulmonary HTN, who is now transferred from [facility] for shortness of breath and management of patient's pulmonary HTN.    Pulmonary HTN, WHO group 1  Chronic RV Failure  -Has not tolerated multiple PH therapies  -RHC showed RA 13, PCWP 15, PAP 90/38, CO/CI- 3.4/1.8.   -Pt was started on IV Veletri; currently at 6 nG.Holding up titration given hypotension and significant hypoxia  -Started on dopamine 3 mcg/hour given RV failure and poor organ perfusion  -  Continue home dose Macitentan  - Switched to lasix drip 10 mg/hour for diuresis. Net neg 1.1L in 24 hours  - INR 2.9 today, will continue Coumadin at 5 mg Daily  - Pt currently has single lumen PICC but will plan for Cardoso prior to d/c if pt tolerates Veletri and gets approval     Chronic Hypoxia, Acute on Chronic Resp Failure  -On Home O2 via NC, 4-5L. Currenlty on high flow O2   - Appreciate pulm recs  - doppler lower extremity failed to show DVT  - Will obtain TTE with bubble to evaluate right lo left shunt to explain hypoxia      Elena Raygoza PA-C  Heart Transplant Service  [phone]

## 2017-06-05 NOTE — PROGRESS NOTES
Patient identified by 2 identifiers.   Allergies reviewed.  Lt neck TLC IV in place.  Bubble study explained to patient, patient verbalized understanding.  Bubble performed X 2, with & without Valsalva.  Pt tolerated procedure well.

## 2017-06-06 NOTE — PROGRESS NOTES
"Progress Note  Heart Transplant Service    Admit Date: 5/31/2017   LOS: 6 days     SUBJECTIVE:     Follow up for: Primary pulmonary hypertension    Interval History: Pt denies SOB    Scheduled Meds:   duloxetine  60 mg Oral BID    gabapentin  300 mg Oral TID    macitentan  10 mg Oral Daily    potassium chloride  40 mEq Oral BID    sodium chloride 0.9%  10 mL Intravenous Q6H    sodium chloride 0.9%  3 mL Intravenous Q8H     Continuous Infusions:   DOPamine 3 mcg/kg/min (06/06/17 0600)    epoprostenol (VELETRI) infusion 6 ng/kg/min (06/06/17 0600)    furosemide (LASIX) 1 mg/mL infusion (non-titrating) 10 mg/hr (06/06/17 0600)    veletri/remodulin cassette      veletri/remodulin tubing       PRN Meds:acetaminophen, docusate sodium, hydrocodone-acetaminophen 10-325mg, loperamide, ondansetron, ondansetron, promethazine (PHENERGAN) IVPB, Flushing PICC Protocol **AND** sodium chloride 0.9% **AND** sodium chloride 0.9%    Review of patient's allergies indicates:   Allergen Reactions    Chlorhexidine Itching and Rash     Patient had raised rash on neck following RHC procedure. She states she's never had a problem with the "prep" used until this time.     Adhesive Rash       OBJECTIVE:     Vital Signs (Most Recent)  Temp: 98 °F (36.7 °C) (06/05/17 2300)  Pulse: 103 (06/06/17 0305)  Resp: 20 (06/05/17 1800)  BP: 112/64 (06/06/17 0305)  SpO2: (!) 93 % (06/06/17 0305)    Vital Signs Range (Last 24H):  Temp:  [97.7 °F (36.5 °C)-98.2 °F (36.8 °C)]   Pulse:  []   Resp:  []   BP: ()/(54-75)   SpO2:  [86 %-98 %]     I & O (Last 24H):    Intake/Output Summary (Last 24 hours) at 06/06/17 0612  Last data filed at 06/06/17 0600   Gross per 24 hour   Intake           666.84 ml   Output             1655 ml   Net          -988.16 ml            Physical Exam  Gen: NAD  Neck: + JVD to above clavicle with HJR  Lung: CTA bilat  Heart: Normal S1/S2, tachy, regular rhythm, II/VI systolic murmur  Abdomen: Soft, " NT/ND, NABS, no masses, no guarding/rebounding  Extremities: No LE edema bilaterally, 2+ pulses bilaterally in upper/lower extremities   Skin: Normal color and turgor. No rashes, no petechia, no ecchymoses.   Neuro: AAOx3    Labs:       Recent Labs  Lab 06/04/17  0350 06/05/17  0421 06/06/17  0245   WBC 6.60 8.87 12.32   HGB 16.2* 16.4* 16.7*   HCT 46.5 47.8 48.2    238 225   LYMPH 7.4*  0.5* 8.8*  0.8* 6.2*  0.8*   MONO 6.1  0.4 7.0  0.6 6.9  0.9   EOSINOPHIL 0.0 0.0 0.0         Recent Labs  Lab 06/04/17 0350 06/05/17  0421 06/06/17  0245   INR 1.6* 2.9* 4.1*          Recent Labs  Lab 06/04/17  0350 06/04/17  0911 06/05/17  0421 06/05/17  1101 06/06/17  0245   * 145* 169*  --  139*   CALCIUM 8.8 9.0 9.3  --  9.3   ALBUMIN 3.1*  --  3.1*  --  3.0*   PROT 6.9  --  7.2  --  7.2   * 132* 136  --  135*   K 3.4* 4.3 3.3* 4.0 3.6   CO2 27 28 35*  --  34*   CL 91* 91* 89*  --  87*   BUN 19 19 20  --  17   CREATININE 0.9 0.9 0.8  --  0.8   ALKPHOS 93  --  90  --  93   ALT 17  --  14  --  13   AST 20  --  18  --  19   BILITOT 1.7*  --  1.4*  --  1.5*   MG 1.9  --  1.9 2.3 2.2   PHOS 4.5  --  3.4  --  2.5*     Estimated Creatinine Clearance: 83.5 mL/min (based on Cr of 0.8).      Recent Labs  Lab 06/01/17  0011   BNP 1,931*       No results for input(s): LDH in the last 168 hours.    Microbiology Results (last 7 days)     ** No results found for the last 168 hours. **            ASSESSMENT AND PLAN:   56 y.o. woman with PMH of AVB/syncope s/p PPM, pulmonary HTN, who is now transferred from [facility] for shortness of breath and management of patient's pulmonary HTN.    Pulmonary HTN, WHO group 1  Acute on Chronic RV Systolic Failure  -Has not tolerated multiple PH therapies  -RHC 6/1/17 showed RA 13, PCWP 15, PAP 90/38, CO/CI- 3.4/1.8.   -Pt was started on IV Veletri; currently at 6 nG.Holding up titration given hypotension and significant hypoxia  -Started on dopamine 3 mcg/hour for RV  failure and poor organ perfusion  - Continue home dose Macitentan  - On lasix drip 10 mg/hour for diuresis. Net neg 1.2L in 24 hours  - INR 4.1 today, hold coumadin  - Pt currently has single lumen PICC but will plan for Cardoso prior to d/c if pt tolerates Veletri and gets approval     Chronic Hypoxia, Acute on Chronic Resp Failure  -On Home O2 via NC, 4-5L but currenlty on high flow O2   - Appreciate pulm recs  - doppler lower extremity failed to show DVT  - TTE with bubble negative  -Get CTA Chest today to r/o Pulmonary Veno-Occlusive Disease    Elena Raygoza PA-C  Heart Transplant Service  [phone]

## 2017-06-06 NOTE — PLAN OF CARE
Problem: Patient Care Overview  Goal: Plan of Care Review  Outcome: Ongoing (interventions implemented as appropriate)  No acute events overnight, however patient remains nauseated and vomited twice. Pt is finding it difficult to keep down PO medications. No c/o SOB, and is on comfort flow 28L and 100% FiO2. Pt remains on lasix, veletri, and dopamine gtts. Plan of care is to try and wean veletri and continue to diurese. Plan of care reviewed with patient, questions answered, needs addressed.

## 2017-06-07 NOTE — PLAN OF CARE
Problem: Patient Care Overview  Goal: Plan of Care Review  Outcome: Ongoing (interventions implemented as appropriate)  No acute events throughout day. See vital signs and assessments in flowsheets. See below for updates on today's progress.     Pulmonary: O2 weaned to 22L and 55% FiO2 on Comfort Flow, sats >90%    Cardiovascular: Sinus tach 110-120s, SBP 90s-100s. Dopamine infusion stopped and switched with dobutamine. Pt tolerated well.    Neurological: AAOx4    Gastrointestinal: No BM today, cardiac diet.    Genitourinary: Vasquez output 45-80cc/hr.    Infusions: Lasix, dobutamine    Patient progressing towards goals as tolerated, plan of care communicated and reviewed with Emily Cook and family. All concerns addressed. Will continue to monitor.

## 2017-06-07 NOTE — PROGRESS NOTES
Patient removed the bipap mask ,she stated that she could not sleep with it.Comfort flow resumed with 22L and 60% fio2.

## 2017-06-07 NOTE — PROGRESS NOTES
"Progress Note  Heart Transplant Service    Admit Date: 5/31/2017   LOS: 7 days     SUBJECTIVE:     Follow up for: Primary pulmonary hypertension    Interval History: Pt denies SOB, weaned off some of her O2. Wanted to get up    Scheduled Meds:   duloxetine  60 mg Oral BID    gabapentin  300 mg Oral TID    macitentan  10 mg Oral Daily    potassium chloride  40 mEq Oral BID    sodium chloride 0.9%  10 mL Intravenous Q6H    sodium chloride 0.9%  3 mL Intravenous Q8H    warfarin  4 mg Oral Daily     Continuous Infusions:   DOBUTamine 2.5 mcg/kg/min (06/07/17 1329)    epoprostenol (VELETRI) infusion 6 ng/kg/min (06/07/17 1111)    furosemide (LASIX) 1 mg/mL infusion (non-titrating) 10 mg/hr (06/07/17 1100)    veletri/remodulin cassette      veletri/remodulin tubing       PRN Meds:acetaminophen, docusate sodium, hydrocodone-acetaminophen 10-325mg, loperamide, ondansetron, ondansetron, promethazine (PHENERGAN) IVPB, Flushing PICC Protocol **AND** sodium chloride 0.9% **AND** sodium chloride 0.9%    Review of patient's allergies indicates:   Allergen Reactions    Chlorhexidine Itching and Rash     Patient had raised rash on neck following RHC procedure. She states she's never had a problem with the "prep" used until this time.     Adhesive Rash       OBJECTIVE:     Vital Signs (Most Recent)  Temp: 98.4 °F (36.9 °C) (06/06/17 2300)  Pulse: (!) 120 (06/07/17 1300)  Resp: 18 (06/07/17 1300)  BP: (!) 85/51 (06/07/17 1300)  SpO2: (!) 88 % (06/07/17 1300)    Vital Signs Range (Last 24H):  Temp:  [97.7 °F (36.5 °C)-98.8 °F (37.1 °C)]   Pulse:  []   Resp:  [15-30]   BP: ()/(51-67)   SpO2:  [87 %-98 %]     I & O (Last 24H):    Intake/Output Summary (Last 24 hours) at 06/07/17 1410  Last data filed at 06/07/17 1100   Gross per 24 hour   Intake           939.76 ml   Output             1600 ml   Net          -660.24 ml            Physical Exam  Gen: NAD  Neck: + JVD to above clavicle with HJR  Lung: CTA " bilat  Heart: Normal S1/S2, tachy, regular rhythm, II/VI systolic murmur  Abdomen: Soft, NT/ND, NABS, no masses, no guarding/rebounding  Extremities: No LE edema bilaterally, 2+ pulses bilaterally in upper/lower extremities   Skin: Normal color and turgor. No rashes, no petechia, no ecchymoses.   Neuro: AAOx3    Labs:       Recent Labs  Lab 06/05/17  0421 06/06/17  0245 06/07/17  0406   WBC 8.87 12.32 9.24   HGB 16.4* 16.7* 17.2*   HCT 47.8 48.2 49.9*    225 233   LYMPH 8.8*  0.8* 6.2*  0.8* 11.3*  1.0   MONO 7.0  0.6 6.9  0.9 9.0  0.8   EOSINOPHIL 0.0 0.0 0.0         Recent Labs  Lab 06/05/17  0421 06/06/17  0245 06/07/17  0406   INR 2.9* 4.1* 2.7*          Recent Labs  Lab 06/05/17  0421  06/06/17  0245 06/06/17  1435 06/07/17  0406   *  --  139*  --  137*   CALCIUM 9.3  --  9.3  --  9.4   ALBUMIN 3.1*  --  3.0*  --  2.9*   PROT 7.2  --  7.2  --  7.3     --  135*  --  137   K 3.3*  < > 3.6 3.8 3.5   CO2 35*  --  34*  --  33*   CL 89*  --  87*  --  90*   BUN 20  --  17  --  18   CREATININE 0.8  --  0.8  --  0.8   ALKPHOS 90  --  93  --  102   ALT 14  --  13  --  13   AST 18  --  19  --  20   BILITOT 1.4*  --  1.5*  --  1.9*   MG 1.9  < > 2.2 2.3 2.1   PHOS 3.4  --  2.5*  --  2.5*   < > = values in this interval not displayed.  Estimated Creatinine Clearance: 83.5 mL/min (based on Cr of 0.8).      Recent Labs  Lab 06/01/17  0011   BNP 1,931*       No results for input(s): LDH in the last 168 hours.    Microbiology Results (last 7 days)     ** No results found for the last 168 hours. **            ASSESSMENT AND PLAN:   56 y.o. woman with PMH of AVB/syncope s/p PPM, pulmonary HTN, who is now transferred from [facility] for shortness of breath and management of patient's pulmonary HTN.    Pulmonary HTN, WHO group 1  Acute on Chronic RV Systolic Failure  -Has not tolerated multiple PH therapies  -RHC 6/1/17 showed RA 13, PCWP 15, PAP 90/38, CO/CI- 3.4/1.8.   -Pt was started on IV  Veletri; currently at 6 nG.Holding up titration given hypotension and significant hypoxia  -Started on dopamine 3 mcg/hour for RV failure and poor organ perfusion; will change to  2.5mcg/kg/min 6/7  - Continue home dose Macitentan  - On lasix drip 10 mg/hour for diuresis. Net neg 1.2L in 24 hours  - INR 2.9, restart coumadin 6/7  - Pt currently has single lumen PICC but will plan for Cardoso prior to d/c if pt tolerates Veletri and gets approval  - will slowly uptitrate Veletri and watch for side effects 6/7  - hope to get to TSU this PM or tomorrow Am  - CT-A with no evidence of PVOD     Chronic Hypoxia, Acute on Chronic Resp Failure  -On Home O2 via NC, 4-5L but currenlty on high flow O2   - Appreciate pulm recs  - doppler lower extremity failed to show DVT  - TTE with bubble negative  - CTA chest with no evidence of PVOD     Further recommendations per attending addendum    Naman Delgado MD  PGY-4 ([phone])  Cardiology Fellow

## 2017-06-08 NOTE — PROGRESS NOTES
"Progress Note  Heart Transplant Service    Admit Date: 5/31/2017   LOS: 8 days     SUBJECTIVE:     Follow up for: Primary pulmonary hypertension    Interval History: Pt denies SOB, weaned to FiO2 50% but at 22L. Moved to TSU.     Scheduled Meds:   duloxetine  60 mg Oral BID    gabapentin  300 mg Oral TID    macitentan  10 mg Oral Daily    potassium chloride  40 mEq Oral BID    sodium chloride 0.9%  10 mL Intravenous Q6H    sodium chloride 0.9%  3 mL Intravenous Q8H    warfarin  4 mg Oral Daily     Continuous Infusions:   DOBUTamine 2.5 mcg/kg/min (06/07/17 2230)    epoprostenol (VELETRI) infusion 6 ng/kg/min (06/07/17 2100)    furosemide (LASIX) 1 mg/mL infusion (non-titrating) 10 mg/hr (06/08/17 0311)    veletri/remodulin cassette      veletri/remodulin tubing       PRN Meds:acetaminophen, docusate sodium, hydrocodone-acetaminophen 10-325mg, loperamide, ondansetron, ondansetron, promethazine (PHENERGAN) IVPB, Flushing PICC Protocol **AND** sodium chloride 0.9% **AND** sodium chloride 0.9%    Review of patient's allergies indicates:   Allergen Reactions    Chlorhexidine Itching and Rash     Patient had raised rash on neck following RHC procedure. She states she's never had a problem with the "prep" used until this time.     Adhesive Rash       OBJECTIVE:     Vital Signs (Most Recent)  Temp: 98.3 °F (36.8 °C) (06/08/17 0800)  Pulse: (!) 121 (06/08/17 0800)  Resp: 20 (06/08/17 0800)  BP: (!) 84/56 (06/08/17 0800)  SpO2: (!) 92 % (06/08/17 0800)    Vital Signs Range (Last 24H):  Temp:  [98.3 °F (36.8 °C)-98.8 °F (37.1 °C)]   Pulse:  [109-130]   Resp:  [17-20]   BP: ()/(51-67)   SpO2:  [87 %-93 %]     I & O (Last 24H):    Intake/Output Summary (Last 24 hours) at 06/08/17 1018  Last data filed at 06/08/17 0623   Gross per 24 hour   Intake           594.25 ml   Output             1100 ml   Net          -505.75 ml            Physical Exam  Gen: NAD  Neck: + JVD to above clavicle with HJR  Lung: CTA " bilat  Heart: Normal S1/S2, tachy, regular rhythm, II/VI systolic murmur  Abdomen: Soft, NT/ND, NABS, no masses, no guarding/rebounding  Extremities: No LE edema bilaterally, 2+ pulses bilaterally in upper/lower extremities   Skin: Normal color and turgor. No rashes, no petechia, no ecchymoses.   Neuro: AAOx3    Labs:       Recent Labs  Lab 06/06/17  0245 06/07/17  0406 06/08/17  0301   WBC 12.32 9.24 10.97   HGB 16.7* 17.2* 15.8   HCT 48.2 49.9* 46.4    233 210   LYMPH 6.2*  0.8* 11.3*  1.0 15.1*  1.7   MONO 6.9  0.9 9.0  0.8 6.2  0.7   EOSINOPHIL 0.0 0.0 0.2         Recent Labs  Lab 06/06/17  0245 06/07/17  0406 06/08/17  0301   INR 4.1* 2.7* 2.0*          Recent Labs  Lab 06/06/17  0245 06/06/17  1435 06/07/17  0406 06/08/17  0301   *  --  137* 129*   CALCIUM 9.3  --  9.4 8.9   ALBUMIN 3.0*  --  2.9* 2.7*   PROT 7.2  --  7.3 7.0   *  --  137 135*   K 3.6 3.8 3.5 3.2*   CO2 34*  --  33* 32*   CL 87*  --  90* 90*   BUN 17  --  18 18   CREATININE 0.8  --  0.8 0.8   ALKPHOS 93  --  102 101   ALT 13  --  13 12   AST 19  --  20 18   BILITOT 1.5*  --  1.9* 1.9*   MG 2.2 2.3 2.1 1.9   PHOS 2.5*  --  2.5* 2.7     Estimated Creatinine Clearance: 79.6 mL/min (based on Cr of 0.8).    No results for input(s): BNP, BNPTRIAGEBLO in the last 168 hours.    No results for input(s): LDH in the last 168 hours.    Microbiology Results (last 7 days)     ** No results found for the last 168 hours. **            ASSESSMENT AND PLAN:   56 y.o. woman with PMH of AVB/syncope s/p PPM, pulmonary HTN, who is now transferred from [facility] for shortness of breath and management of patient's pulmonary HTN.    Pulmonary HTN, WHO group 1  Acute on Chronic RV Systolic Failure  -Has not tolerated multiple PH therapies  -RHC 6/1/17 showed RA 13, PCWP 15, PAP 90/38, CO/CI- 3.4/1.8.   -Pt was started on IV Veletri; currently at 6 nG.Holding up titration given hypotension and significant hypoxia - will slowly increase  today to 7ng if tolerated. Will be likely determined by BP  -Started on dopamine 3 mcg/hour for RV failure and poor organ perfusion; changed to  2.5mcg/kg/min 6/7  - Continue home dose Macitentan  - On lasix drip 10 mg/hour for diuresis. Net neg 1.2L in 24 hours - will titrate down to 5mg/hr today and hopefully to PO tomorrow  - INR 2.0, restarted coumadin 6/7  - Pt currently has single lumen PICC but will plan for Cardoso prior to d/c if pt tolerates Veletri and gets approval  - will slowly uptitrate Veletri and watch for side effects 6/7  - CT-A with no evidence of PVOD  - if unable to increase dose or if significant side effects, will initiate code discussion and involve palliative care     Chronic Hypoxia, Acute on Chronic Resp Failure  -On Home O2 via NC, 4-5L but currenlty on high flow O2   - Appreciate pulm recs  - doppler lower extremity failed to show DVT  - TTE with bubble negative  - CTA chest with no evidence of PVOD     Further recommendations per attending addendum    Naman Delgado MD  PGY-4 ([phone])  Cardiology Fellow

## 2017-06-08 NOTE — PT/OT/SLP EVAL
"Physical Therapy  Evaluation    Emily Cook   MRN: [id number]   Admitting Diagnosis: Primary pulmonary hypertension    PT Received On: 06/07/17  PT Start Time: 1028     PT Stop Time: 1042    PT Total Time (min): 14 min       Billable Minutes:  Evaluation 14 mins    Diagnosis: Primary pulmonary hypertension    Past Medical History:   Diagnosis Date    Arrhythmia     Arthritis     Cardiac pacemaker in situ     Carpal tunnel syndrome, right     Cervical spondylosis     Cervicalgia     CHF (congestive heart failure)     Diverticulitis     Heart block AV third degree     Hyperlipidemia     ALFREDO on CPAP     Pulmonary hypertension     Subdeltoid bursitis       Past Surgical History:   Procedure Laterality Date    CARDIAC CATHETERIZATION      CARDIAC PACEMAKER PLACEMENT  7/29/13    [employer] DC PM    CARDIAC SURGERY      COLONOSCOPY N/A 9/28/2015    Procedure: COLONOSCOPY;  Surgeon: Jason Diaz MD;  Location: [facility] ([address] [place] [address]);  Service: Endoscopy;  Laterality: N/A;  Please schedule in 2-3 months with Dr Diaz  Pulmonary HTN, 2nd floor (off remodulin infusion as of "a few days" before 9/9/15)    3 day hold Coumadin, [facility]  PT/INR scheduled before procedure    ECTOPIC PREGNANCY SURGERY Left     knee arthroscopy       General Precautions: Standard, fall  Orthopedic Precautions: N/A      Do you have any cultural, spiritual, Mandaen conflicts, given your current situation?: none noted    Patient History:  Lives With: significant other, child(varinder), adult  Living Environment Comment: pt reports living in a 1 level house with 4-5 [address] with her fiancee and son, however they both work during the day.   Equipment Currently Used at Home:  (Veletri)  DME owned (not currently used): rolling walker and wheelchair    Previous Level of Function:  Ambulation Skills: independent  Transfer Skills: independent  ADL Skills: independent  Work/Leisure Activity: " independent    Subjective:  Communicated with RN prior to session.  Pt agreeable to session  Chief Complaint: back soreness from being in bed  Patient goals: to go home    Pain/Comfort  Pain Rating 1: 0/10 (does report soreness from being in the bed)  Pain Rating Post-Intervention 1: 0/10      Objective:   Patient found with: blood pressure cuff, pulse ox (continuous), telemetry, oxygen, central line, PICC line, metz catheter (Veletri)     Cognitive Exam:  Oriented to: Person, Place, Time and Situation    Follows Commands/attention: Follows multistep  commands  Communication: clear/fluent  Safety awareness/insight to disability: intact    Physical Exam:  Postural examination/scapula alignment: No postural abnormalities identified    Skin integrity: Visible skin intact  Edema: None noted     Upper Extremity Range of Motion:  Right Upper Extremity: WFL  Left Upper Extremity: WFL    Upper Extremity Strength:  Right Upper Extremity: WFL  Left Upper Extremity: WFL    Lower Extremity Range of Motion:  Right Lower Extremity: WFL  Left Lower Extremity: WFL    Lower Extremity Strength:  Right Lower Extremity: WFL  Left Lower Extremity: WFL     Fine motor coordination:  Intact    Gross motor coordination: WFL    Functional Mobility:  Bed Mobility:  Scooting: CGA  Supine to sit: CGA    Transfers:  Sit<>stand with Min A with no AD  Bed<>chair: SPT with min A with HHA    Balance:   Static Sit: NORMAL: No deviations seen in posture held statically  Dynamic Sit: GOOD+: Maintains balance through MAXIMAL excursions of active trunk motion  Static Stand: FAIR+: Takes MINIMAL challenges from all directions  Dynamic stand: POOR: N/A    AM-PAC 6 CLICK MOBILITY  How much help from another person does this patient currently need?   1 = Unable, Total/Dependent Assistance  2 = A lot, Maximum/Moderate Assistance  3 = A little, Minimum/Contact Guard/Supervision  4 = None, Modified [place]/Independent    Turning over in bed (including  adjusting bedclothes, sheets and blankets)?: 3  Sitting down on and standing up from a chair with arms (e.g., wheelchair, bedside commode, etc.): 3  Moving from lying on back to sitting on the side of the bed?: 3  Moving to and from a bed to a chair (including a wheelchair)?: 3  Need to walk in hospital room?: 3  Climbing 3-5 steps with a railing?: 2  Total Score: 17     AM-PAC Raw Score CMS G-Code Modifier Level of Impairment Assistance   6 % Total / Unable   7 - 9 CM 80 - 100% Maximal Assist   10 - 14 CL 60 - 80% Moderate Assist   15 - 19 CK 40 - 60% Moderate Assist   20 - 22 CJ 20 - 40% Minimal Assist   23 CI 1-20% SBA / CGA   24 CH 0% Independent/ Mod I     Patient left up in chair with all lines intact, call button in reach, RN notified and mother present.    Assessment:   Emily Cook is a 56 y.o. female with a medical diagnosis of Primary pulmonary hypertension and presents with deficits listed below. Pt appears very motivated this session, however limited by comfort flow O2. Pt will need skilled PT to address deficits and increase functional mobility as able.      Rehab identified problem list/impairments: Rehab identified problem list/impairments: weakness, impaired endurance, impaired functional mobilty, impaired balance, gait instability, decreased lower extremity function, impaired cardiopulmonary response to activity    Rehab potential is good.    Activity tolerance: Good    Discharge recommendations: Discharge Facility/Level Of Care Needs: home with home health     Barriers to discharge: Barriers to Discharge: Decreased caregiver support    Equipment recommendations: Equipment Needed After Discharge: none     GOALS:    Physical Therapy Goals        Problem: Physical Therapy Goal    Goal Priority Disciplines Outcome Goal Variances Interventions   Physical Therapy Goal     PT/OT, PT Ongoing (interventions implemented as appropriate)     Description:  Goals to be met by: 6/21/17     Patient  will increase functional independence with mobility by performin. Supine to sit with Stand-by Assistance  2. Sit to supine with Stand-by Assistance  3. Sit to stand transfer with Stand-by Assistance  4. Gait  x 150 feet with Stand-by Assistance.   5. Lower extremity exercise program x15 reps per handout, with supervision                      PLAN:    Patient to be seen 4 x/week to address the above listed problems via gait training, therapeutic activities, therapeutic exercises, neuromuscular re-education  Plan of Care expires: 17  Plan of Care reviewed with: patient, mother          Mel FRIEND Alina, PT  2017

## 2017-06-08 NOTE — TREATMENT PLAN
Notified by RN patient is hypotensive BP high 70's-80's.   Rn notified Dr. Howard, Lasix gtt decreased from 10mg to 5mg/hr    Plan:  Once BP returns to baseline, will increase IV Veletri by 1ng/day as tolerated by the patient.    Specialty Pharmacy RN to educate patient today around noon for home Veletri management.

## 2017-06-08 NOTE — PROGRESS NOTES
UPDATE    SW to pt's room for update. Pt presents as aaox3 with calm affect. No family at bedside. Pt reports coping adequately with no needs at this time. SW providing psychosocial and counseling support, education, resources, and d/c planning as needed. SW continuing to follow and remains available.

## 2017-06-08 NOTE — PLAN OF CARE
Problem: Patient Care Overview  Goal: Plan of Care Review  Outcome: Ongoing (interventions implemented as appropriate)  Pt had a good day. Her Veletri was increased today to 7 ngs. Pt tolerated increase well. Pt was instructed on the use of Veletri at home by outside pharmacy. Will continue to monitor.

## 2017-06-08 NOTE — PLAN OF CARE
Problem: Patient Care Overview  Goal: Plan of Care Review  Outcome: Ongoing (interventions implemented as appropriate)  Pt has call bell in reach, non slip socks on, and bedrails up x2. Pt is on lasix gtt with metz catheter patent. Pt on dobutamine gtt with telemetry monitoring. Pt encouraged to wash hands. Pt has strict I&Os. Pt encouraged to sleep on side in bed.

## 2017-06-08 NOTE — NURSING
Dr. Rhodes is on the unit doing rounds. He was updated on pt B/P and other vitals. No new orders were rec'd.

## 2017-06-08 NOTE — PLAN OF CARE
Problem: Physical Therapy Goal  Goal: Physical Therapy Goal  Goals to be met by: 17     Patient will increase functional independence with mobility by performin. Supine to sit with Stand-by Assistance  2. Sit to supine with Stand-by Assistance  3. Sit to stand transfer with Stand-by Assistance  4. Gait  x 150 feet with Stand-by Assistance.   5. Lower extremity exercise program x15 reps per handout, with supervision    Outcome: Ongoing (interventions implemented as appropriate)  Goals established this date

## 2017-06-08 NOTE — PROGRESS NOTES
Pt arrived to TSU by bed with respiratory therapist, CONCETTA uribe, and a PCT. Pt vitals are stable and on telemetry. Pt able to walk with assistance. Pt dobutamine gtt set to a weight based dose of 89.9kg instead of the ordered 86kg. The gtt is going at a rate of 3.4 cc/hr instead of 3.2 cc/hr. I have notified charge nurse Emily and will let the day nurse know tomorrow. I will notify  on call with HTS.

## 2017-06-09 NOTE — PLAN OF CARE
Problem: Patient Care Overview  Goal: Plan of Care Review  Patient is AAOx4. Bed low, locked, call bell within reach, non skid socks on.   Patient educated to use call bell for assistance, verbalized understanding.   Telemetry monitoring ST -110s.   On 22 L/50% comfort flow, sats 85-91%. MD ordered to titrate to keep sats 88% and above. Patient denies shortness of breath.   Afebrile, WBC elevated at 17.6 - UA negative, UC pending, BC pending.   TEA PICC with Veletri infusing at 7 ng/kg/min, dosing weight 86 kg, 58 ml/24 hours. Backup cassette in fridge, additional tubing/CADD pump/batteries at bedside. Cassette changed due tonight.   HOLDING titration today due to hypotension. Discontinued dobutamine infusion.   CR 0.8 - urine output 675 cc. Transitioned to 40 mg IV lasix TID. Vasquez dcd, BSC/bed pan in use.   Patient reported 1 bowel movement this shift.   No complaints of pain or nausea.   No skin breakdown noted, patient able to reposition self in bed independently.   See flow sheet for complete assessment details.

## 2017-06-09 NOTE — PROGRESS NOTES
Notified Dr. Delgado of the following:  CAROLINA Gunter RN ordered to check BP prior to placing orders for titrating veletri.   BP checked 83/57, .   MD ordered to STOP dobutamine, recheck BP in 1 hour. Will cycle BP q15.   MD also placed orders to stop lasix infusion, orders placed for IV push lasix.   Will continue to monitor closely.

## 2017-06-09 NOTE — PROGRESS NOTES
Notified Dr. Delgado of the following:  Patient's BP 1 hour after dobutamine stopped - 89/50, .   MD stated will NOT titrate veletri today.   Continue to hold Dobutamine.   OK to admin 40 mg IV lasix per order.   Will carry out orders. Will continue to monitor.

## 2017-06-09 NOTE — PROGRESS NOTES
"Progress Note  Heart Transplant Service    Admit Date: 5/31/2017   LOS: 9 days     SUBJECTIVE:     Follow up for: Primary pulmonary hypertension    Interval History: Pt denies SOB, tolerated increase in Veletri but remains hypoxic. Cultured this AM for increased WBC    Scheduled Meds:   duloxetine  60 mg Oral BID    furosemide  40 mg Intravenous TID    gabapentin  300 mg Oral TID    macitentan  10 mg Oral Daily    potassium chloride  40 mEq Oral BID    sodium chloride 0.9%  10 mL Intravenous Q6H    sodium chloride 0.9%  3 mL Intravenous Q8H    warfarin  4 mg Oral Daily     Continuous Infusions:   DOBUTamine 2.5 mcg/kg/min (06/08/17 2113)    epoprostenol (VELETRI) infusion 7 ng/kg/min (06/08/17 2109)    veletri/remodulin cassette      veletri/remodulin tubing       PRN Meds:acetaminophen, docusate sodium, hydrocodone-acetaminophen 10-325mg, loperamide, ondansetron, ondansetron, promethazine (PHENERGAN) IVPB, Flushing PICC Protocol **AND** sodium chloride 0.9% **AND** sodium chloride 0.9%    Review of patient's allergies indicates:   Allergen Reactions    Chlorhexidine Itching and Rash     Patient had raised rash on neck following RHC procedure. She states she's never had a problem with the "prep" used until this time.     Adhesive Rash       OBJECTIVE:     Vital Signs (Most Recent)  Temp: 98.5 °F (36.9 °C) (06/09/17 1130)  Pulse: 106 (06/09/17 1200)  Resp: 20 (06/09/17 1135)  BP: (!) 89/55 (06/09/17 1130)  SpO2: (!) 90 % (06/09/17 1135)    Vital Signs Range (Last 24H):  Temp:  [97.7 °F (36.5 °C)-98.9 °F (37.2 °C)]   Pulse:  [106-132]   Resp:  [20]   BP: ()/(50-64)   SpO2:  [85 %-94 %]     I & O (Last 24H):    Intake/Output Summary (Last 24 hours) at 06/09/17 1315  Last data filed at 06/09/17 0530   Gross per 24 hour   Intake           253.29 ml   Output             1975 ml   Net         -1721.71 ml            Physical Exam  Gen: NAD  Neck: + JVD to above clavicle with HJR  Lung: CTA bilat  Heart: " Normal S1/S2, tachy, regular rhythm, II/VI systolic murmur  Abdomen: Soft, NT/ND, NABS, no masses, no guarding/rebounding  Extremities: No LE edema bilaterally, 2+ pulses bilaterally in upper/lower extremities   Skin: Normal color and turgor. No rashes, no petechia, no ecchymoses.   Neuro: AAOx3    Labs:       Recent Labs  Lab 06/07/17  0406 06/08/17  0301 06/09/17  0508   WBC 9.24 10.97 17.16*   HGB 17.2* 15.8 15.4   HCT 49.9* 46.4 45.1    210 208   LYMPH 11.3*  1.0 15.1*  1.7 8.2*  1.4   MONO 9.0  0.8 6.2  0.7 6.6  1.1*   EOSINOPHIL 0.0 0.2 0.2         Recent Labs  Lab 06/07/17  0406 06/08/17  0301 06/09/17  0540   INR 2.7* 2.0* 2.4*          Recent Labs  Lab 06/07/17  0406 06/08/17  0301 06/09/17  0508   * 129* 124*   CALCIUM 9.4 8.9 8.7   ALBUMIN 2.9* 2.7* 2.5*   PROT 7.3 7.0 6.8    135* 136   K 3.5 3.2* 4.1   CO2 33* 32* 29   CL 90* 90* 96   BUN 18 18 20   CREATININE 0.8 0.8 0.8   ALKPHOS 102 101 100   ALT 13 12 13   AST 20 18 16   BILITOT 1.9* 1.9* 1.8*   MG 2.1 1.9 2.1   PHOS 2.5* 2.7 2.8     Estimated Creatinine Clearance: 80.1 mL/min (based on Cr of 0.8).    No results for input(s): BNP, BNPTRIAGEBLO in the last 168 hours.    No results for input(s): LDH in the last 168 hours.    Microbiology Results (last 7 days)     Procedure Component Value Units Date/Time    Blood culture [[id number]] Collected:  06/09/17 0913    Order Status:  Sent Specimen:  Blood Updated:  06/09/17 0957    Blood culture [[id number]] Collected:  06/09/17 0742    Order Status:  Sent Specimen:  Blood from Peripheral, Jugular, Internal Left Updated:  06/09/17 0847    Urine culture [[id number]] Collected:  06/09/17 0743    Order Status:  Sent Specimen:  Urine from Urine, Catheterized Updated:  06/09/17 0846    Blood culture [[id number]]     Order Status:  Canceled Specimen:  Blood     Blood culture [[id number]]     Order Status:  Canceled Specimen:  Blood     Blood culture [[id number]]     Order Status:  Canceled  Specimen:  Blood             ASSESSMENT AND PLAN:   56 y.o. woman with PMH of AVB/syncope s/p PPM, pulmonary HTN, who is now transferred from [facility] for shortness of breath and management of patient's pulmonary HTN.    Pulmonary HTN, WHO group 1  Acute on Chronic RV Systolic Failure  -Has not tolerated multiple PH therapies  -RHC 6/1/17 showed RA 13, PCWP 15, PAP 90/38, CO/CI- 3.4/1.8.   -Pt was started on IV Veletri; currently at 7 nG.Holding up titration given hypotension and significant hypoxia - will slowly increase today to 8ng if tolerated. Will be likely determined by BP  -Started on dopamine 3 mcg/hour for RV failure and poor organ perfusion; changed to  2.5mcg/kg/min 6/7; if remains hypotensive, will turn off   - Continue home dose Macitentan  - On lasix drip 5 mg/hour for diuresis. - will titrate down to 40mg TID IV today and hopefully to PO over the weekend  - INR at goal, restarted coumadin 6/7  - Pt currently has single lumen PICC but will plan for Cardoso prior to d/c if pt tolerates Veletri and gets approval  - will slowly uptitrate Veletri and watch for side effects  - CT-A with no evidence of PVOD  - if unable to increase dose or if significant side effects, will initiate code discussion and involve palliative care     Chronic Hypoxia, Acute on Chronic Resp Failure  -On Home O2 via NC, 4-5L but currenlty on high flow O2   - Appreciate pulm recs  - doppler lower extremity failed to show DVT  - TTE with bubble negative  - CTA chest with no evidence of PVOD     Leukocytosis  --cultured urine and blood; U/A unremarkable    Further recommendations per attending addendum    Naman Delgado MD  PGY-4 [phone])  Cardiology Fellow

## 2017-06-09 NOTE — PROGRESS NOTES
Notified Dr. Delgado of the following:  Patient's BP 89/55, .   Oxygen saturation on 22L/50% - 85%. Continued on 22 L, increased FiO2 to 55% - sats increased to 90%. Patient denies shortness of breath.   Will continue to monitor.

## 2017-06-09 NOTE — PROGRESS NOTES
Notified Dr. Delgado of the following:   Patient's BP 89/52, .   KCL 40 meq 2x a day ordered - K 4.1 this AM - per MD hold AM dose.   Orders in place for BC/UC/UA.  Will carry out orders. Will continue to monitor.

## 2017-06-09 NOTE — PLAN OF CARE
Problem: Patient Care Overview  Goal: Plan of Care Review  Outcome: Ongoing (interventions implemented as appropriate)  Pt AAO X 4. Comfort flow 22 L @ 50%, O2 sats 90-94%. Veletri infusing @ 7 ng/kg/min, Dobutamine @ 2.5 mcg/kg, Lasix @ 5 mg/hr. Vasquez to gravity. Tele in place. AM labs ordered.

## 2017-06-09 NOTE — PROGRESS NOTES
Notified Dr. Delgado of patient's metz catheter leaking.   MD ordered to remove catheter, OK for patient to use BSC.   Will carry out orders. Will continue to monitor.

## 2017-06-10 NOTE — PLAN OF CARE
**VSS and afebrile.   **Bed in lower and locked position, non-skid socks on, call light within reach, pt verbalized to call fro assistance. **Telemetry monitoring ST -114.   **TEA PICC w/ Veletri infusing @ 8ng/kg/min --was titrated up today.. Backup cassette in fridge.   **No c/o of nausea, SOB or pain thus far.   **Titrated pt @ 1520 to 22L-40% O2- 92%   See flowsheet for full assessment. Care on going.

## 2017-06-10 NOTE — NURSING
Increased Veletri pt's BP 30mins in 86/55 MAP 66 --called MD. No new orders at this time. Continue to monitor want MAP >/=65 per MD. Pt asymptomatic and sleeping no s/s of distress noted. Care ongoing.

## 2017-06-10 NOTE — PLAN OF CARE
Problem: Patient Care Overview  Goal: Plan of Care Review  Outcome: Ongoing (interventions implemented as appropriate)  Pt is AAOx4 in bed wearing non-skid footwear, bed in low/locked position and with call bell within reach. Pt reminded to use call bell to call for assistance, pt verbalizes understanding. Pt is afebrile at this time. Proper hand hygiene performed before and after pt care activities. Veletri infusing via single lumen PICC at 7ng/kg/min (58mL/24hrs). Veletri cassette changed at 9pm, patient tolerated well. Patient 92-93% on comfort flow 22L 50%. Denies any SOB or difficulty breathing. Sinus tachycardia in low 100s on telemetry monitor while at rest. Denies any pain or discomfort at this time.

## 2017-06-10 NOTE — PROGRESS NOTES
" LOS: 10 days     24h events and S:  Emily Cook is a 56 y.o. female who reports feeling better.     O:  Vitals:  Temp: 98.7 °F (37.1 °C) (06/10/17 0730)  Pulse: 107 (06/10/17 0730)  Resp: 18 (06/10/17 0730)  BP: 102/62 (06/10/17 0730)  SpO2: (!) 90 % (06/10/17 0810)    Ins/Outs:    Intake/Output Summary (Last 24 hours) at 06/10/17 0916  Last data filed at 06/10/17 0600   Gross per 24 hour   Intake          1368.26 ml   Output             1725 ml   Net          -356.74 ml       Physical Exam:  General: alert and oriented, conversant  Heart: RRR, no r/g appreciated  Lungs: CTAB  Abdomen: soft, NT, ND, +BS  Vascular: 2+ radial pulse, no edema    Medications:   duloxetine  60 mg Oral BID    furosemide  40 mg Intravenous TID    gabapentin  300 mg Oral TID    macitentan  10 mg Oral Daily    potassium chloride  40 mEq Oral BID    potassium chloride  20 mEq Oral Once    sodium chloride 0.9%  10 mL Intravenous Q6H    sodium chloride 0.9%  3 mL Intravenous Q8H    warfarin  4 mg Oral Daily      epoprostenol (VELETRI) infusion 7 ng/kg/min (06/09/17 2054)    veletri/remodulin cassette      veletri/remodulin tubing       acetaminophen, docusate sodium, hydrocodone-acetaminophen 10-325mg, loperamide, ondansetron, ondansetron, promethazine (PHENERGAN) IVPB, Flushing PICC Protocol **AND** sodium chloride 0.9% **AND** sodium chloride 0.9%    Review of patient's allergies indicates:   Allergen Reactions    Chlorhexidine Itching and Rash     Patient had raised rash on neck following RHC procedure. She states she's never had a problem with the "prep" used until this time.     Adhesive Rash       Labs:  CBC with Diff:     Recent Labs  Lab 06/08/17  0301 06/09/17  0508 06/10/17  0500   WBC 10.97 17.16* 11.11   HGB 15.8 15.4 15.6   HCT 46.4 45.1 45.5    208 238   LYMPH 15.1*  1.7 8.2*  1.4 16.0*  CANCELED   MONO 6.2  0.7 6.6  1.1* 6.0  CANCELED   EOSINOPHIL 0.2 0.2 1.0       COAG:    Recent Labs  Lab " 06/08/17  0301 06/09/17  0540 06/10/17  0500   INR 2.0* 2.4* 2.2*       CMP:   Recent Labs  Lab 06/08/17  0301 06/09/17  0508 06/10/17  0500   * 124* 115*   CALCIUM 8.9 8.7 9.1   ALBUMIN 2.7* 2.5* 2.5*   PROT 7.0 6.8 7.2   * 136 136   K 3.2* 4.1 3.4*   CO2 32* 29 30*   CL 90* 96 95   BUN 18 20 19   CREATININE 0.8 0.8 0.8   ALKPHOS 101 100 105   ALT 12 13 13   AST 18 16 17   BILITOT 1.9* 1.8* 1.7*   MG 1.9 2.1 2.1   PHOS 2.7 2.8 3.0     Estimated Creatinine Clearance: 79.8 mL/min (based on Cr of 0.8).    .No results for input(s): CPK, TROPONINI, MB, BNP in the last 168 hours.      Diagnostic Results:  Ejection Fractions   EF   Date Value Ref Range Status   06/05/2017 60 55 - 65    06/01/2017 55 55 - 65    09/27/2016 55 55 - 65         Infectious disease labs:  Microbiology Results (last 7 days)     Procedure Component Value Units Date/Time    Blood culture [[id number]] Collected:  06/09/17 0742    Order Status:  Completed Specimen:  Blood from Peripheral, Jugular, Internal Left Updated:  06/09/17 1715     Blood Culture, Routine No Growth to date    Blood culture [[id number]] Collected:  06/09/17 0913    Order Status:  Completed Specimen:  Blood Updated:  06/09/17 1715     Blood Culture, Routine No Growth to date    Urine culture [[id number]] Collected:  06/09/17 0743    Order Status:  Sent Specimen:  Urine from Urine, Catheterized Updated:  06/09/17 0846    Blood culture [[id number]]     Order Status:  Canceled Specimen:  Blood     Blood culture [[id number]]     Order Status:  Canceled Specimen:  Blood     Blood culture [[id number]]     Order Status:  Canceled Specimen:  Blood           Assessment and Plan:  56 y.o. woman with PMH of AVB/syncope s/p PPM, pulmonary HTN, who is now transferred from [facility] for shortness of breath and management of patient's pulmonary HTN.     Pulmonary HTN, WHO group 1  Acute on Chronic RV Systolic Failure  -Has not tolerated multiple PH therapies  -RHC 6/1/17 showed  RA 13, PCWP 15, PAP 90/38, CO/CI- 3.4/1.8.   -Pt was started on IV Veletri; currently at 7 nG. Increase today to 8ng as tolerated. Will be likely determined by BP  -Started on dopamine 3 mcg/hour for RV failure and poor organ perfusion; changed to  2.5mcg/kg/min 6/7; remained hypotensive, will turned off   - Continue home dose Macitentan  - On lasix 40mg TID IV today and hopefully to PO over the weekend  - INR at goal, restarted coumadin 6/7  - Pt currently has single lumen PICC but will plan for Cardoso prior to d/c if pt tolerates Veletri and gets approval  - will slowly uptitrate Veletri and watch for side effects  - CT-A with no evidence of PVOD  - if unable to increase dose or if significant side effects, will initiate code discussion and involve palliative care  - decrease supplemental O2 as tolerated     Chronic Hypoxia, Acute on Chronic Resp Failure  -On Home O2 via NC, 4-5L but currenlty on high flow O2   - Appreciate pulm recs  - doppler lower extremity failed to show DVT  - TTE with bubble negative  - CTA chest with no evidence of PVOD      Leukocytosis, resolved  --cultured urine and blood; U/A unremarkable     Further recommendations per attending addendum

## 2017-06-10 NOTE — NURSING
"Tele called pt had a run of vtac. v/s at 1722 stable. Pt stated, " I feel fine I just finished using the bathroom." will continue to monitor.   "

## 2017-06-10 NOTE — NURSING
Increased Veletri by 1 @ 1115  --- new rate 8ng/kg/min 2.75ml/hr (66ml/24hrs)   --- pt BP 98/53     Will try to titrate O2 after monitoring pt's Veletri increase for 1hr.   Care ongoing. Will continue to monitor.

## 2017-06-11 NOTE — PLAN OF CARE
Problem: Patient Care Overview  Goal: Plan of Care Review  Outcome: Ongoing (interventions implemented as appropriate)  Patient injury free this shift. No changes in mentation noted. Code status changed to DNR, patient agreeable and aware of the meaning of new code status. Documentation in the the chart. Patient bathe and up w/ 1 person assistance this shift. Bedside commode in close reach, see flowsheet for I&Os. Lasix changed to PO this shift. Veletri infusion incrased to 9ng/kg/min at 1230. Infusion infusing now at 74 mL/24hr. Patient has tolerated well so far, Zofran given x 1 after titration. Afebrile this shift. No signs of infection. Telemetry continued, STACH 100-110s. MAP above 65 throughout this shift. Patient denied c/o dizziness. Bed locked and low. Side rails up x's two. Call bell in close reach. Will monitor.

## 2017-06-11 NOTE — PLAN OF CARE
Problem: Patient Care Overview  Goal: Plan of Care Review  Outcome: Ongoing (interventions implemented as appropriate)  Pt is AAOx4 in bed wearing non-skid footwear, bed in low/locked position and with call bell within reach. Pt reminded to use call bell to call for assistance, pt verbalizes understanding. Pt is afebrile at this time. Proper hand hygiene performed before and after pt care activities. 89-92% on comfort flow 22L 40%. Patient denies SOB or difficulty breathing while at rest. Bedside commode present. veletri infusing via single lumen PICC to right upper arm at 8ng/kg/min (66mL/24hr). Sinus tachycardia in low 100s on telemetry while at rest. Denies any pain or discomfort at this time.

## 2017-06-11 NOTE — PROGRESS NOTES
" LOS: 11 days     24h events and S:  Emily Cook is a 56 y.o. female with no SOB overnight.     O:  Vitals:  Temp: 98.7 °F (37.1 °C) (06/11/17 0730)  Pulse: 107 (06/11/17 1159)  Resp: 20 (06/11/17 0730)  BP: 103/65 (06/11/17 1159)  SpO2: 95 % (06/11/17 1159)    Ins/Outs:    Intake/Output Summary (Last 24 hours) at 06/11/17 1301  Last data filed at 06/11/17 1200   Gross per 24 hour   Intake             1080 ml   Output             2575 ml   Net            -1495 ml       Physical Exam:  General: alert and oriented, conversant  Heart: RRR, no r/g appreciated  Lungs: CTAB  Abdomen: soft, NT, ND, +BS  Vascular: 2+ radial pulse, no edema    Medications:   furosemide  80 mg Oral BID    gabapentin  300 mg Oral TID    macitentan  10 mg Oral Daily    potassium chloride  40 mEq Oral BID    sodium chloride 0.9%  10 mL Intravenous Q6H    sodium chloride 0.9%  3 mL Intravenous Q8H    warfarin  4 mg Oral Daily      epoprostenol (VELETRI) infusion 9 ng/kg/min (06/11/17 1233)    veletri/remodulin cassette      veletri/remodulin tubing       acetaminophen, docusate sodium, hydrocodone-acetaminophen 10-325mg, loperamide, ondansetron, ondansetron, promethazine (PHENERGAN) IVPB, Flushing PICC Protocol **AND** sodium chloride 0.9% **AND** sodium chloride 0.9%    Review of patient's allergies indicates:   Allergen Reactions    Chlorhexidine Itching and Rash     Patient had raised rash on neck following RHC procedure. She states she's never had a problem with the "prep" used until this time.     Adhesive Rash       Labs:  CBC with Diff:     Recent Labs  Lab 06/09/17  0508 06/10/17  0500 06/11/17  0500   WBC 17.16* 11.11 9.71   HGB 15.4 15.6 15.9   HCT 45.1 45.5 46.5    238 238   LYMPH 8.2*  1.4 16.0*  CANCELED 18.5  1.8   MONO 6.6  1.1* 6.0  CANCELED 11.4  1.1*   EOSINOPHIL 0.2 1.0 1.2       COAG:    Recent Labs  Lab 06/09/17  0540 06/10/17  0500 06/11/17  0500   INR 2.4* 2.2* 2.2*       CMP:   Recent " Labs  Lab 06/09/17  0508 06/10/17  0500 06/11/17  0500   * 115* 115*   CALCIUM 8.7 9.1 9.0   ALBUMIN 2.5* 2.5* 2.6*   PROT 6.8 7.2 7.4    136 134*   K 4.1 3.4* 3.7   CO2 29 30* 30*   CL 96 95 94*   BUN 20 19 20   CREATININE 0.8 0.8 0.8   ALKPHOS 100 105 105   ALT 13 13 14   AST 16 17 17   BILITOT 1.8* 1.7* 1.4*   MG 2.1 2.1 2.0   PHOS 2.8 3.0 3.0     Estimated Creatinine Clearance: 79.8 mL/min (based on Cr of 0.8).    .No results for input(s): CPK, TROPONINI, MB, BNP in the last 168 hours.      Diagnostic Results:  Ejection Fractions   EF   Date Value Ref Range Status   06/05/2017 60 55 - 65    06/01/2017 55 55 - 65    09/27/2016 55 55 - 65         Infectious disease labs:  Microbiology Results (last 7 days)     Procedure Component Value Units Date/Time    Urine culture [[id number]]  (Susceptibility) Collected:  06/09/17 0743    Order Status:  Completed Specimen:  Urine from Urine, Catheterized Updated:  06/11/17 1248     Urine Culture, Routine --     ESCHERICHIA COLI  >100,000 cfu/ml      Blood culture [[id number]] Collected:  06/09/17 0742    Order Status:  Completed Specimen:  Blood from Peripheral, Jugular, Internal Left Updated:  06/11/17 1022     Blood Culture, Routine No Growth to date     Blood Culture, Routine No Growth to date     Blood Culture, Routine No Growth to date    Blood culture [[id number]] Collected:  06/09/17 0913    Order Status:  Completed Specimen:  Blood Updated:  06/11/17 1022     Blood Culture, Routine No Growth to date     Blood Culture, Routine No Growth to date     Blood Culture, Routine No Growth to date    Blood culture [[id number]]     Order Status:  Canceled Specimen:  Blood     Blood culture [[id number]]     Order Status:  Canceled Specimen:  Blood     Blood culture [[id number]]     Order Status:  Canceled Specimen:  Blood           Assessment and Plan:  56 y.o. woman with PMH of AVB/syncope s/p PPM, pulmonary HTN, who is now transferred from [facility] for  shortness of breath and management of patient's pulmonary HTN.     Pulmonary HTN, WHO group 1  Acute on Chronic RV Systolic Failure  -ABG reviewed with staff  -Has not tolerated multiple PH therapies  -RHC 6/1/17 showed RA 13, PCWP 15, PAP 90/38, CO/CI- 3.4/1.8.   -Pt was started on IV Veletri; currently at 8 nG. Increase today to 9ng as tolerated. Will be likely determined by BP  -Started on dopamine 3 mcg/hour for RV failure and poor organ perfusion; changed to  2.5mcg/kg/min 6/7; remained hypotensive, turned off   - Continue home dose Macitentan  - PO Lasix 80 mg BID  - INR at goal, restarted coumadin 6/7  - Pt currently has single lumen PICC but will plan for Cardoso prior to d/c if pt tolerates Veletri and gets approval  - will slowly uptitrate Veletri and watch for side effects  - CT-A with no evidence of PVOD  - if unable to increase dose or if significant side effects, will initiate code discussion and involve palliative care  - decrease supplemental O2 as tolerated     Chronic Hypoxia, Acute on Chronic Resp Failure  -On Home O2 via NC, 4-5L but currenlty on high flow O2   - Appreciate pulm recs  - doppler lower extremity failed to show DVT  - TTE with bubble negative  - CTA chest with no evidence of PVOD      Leukocytosis, resolved  --UCx with E. Coli but UA w/o pyuria    DNR     Further recommendations per attending addendum

## 2017-06-11 NOTE — PROGRESS NOTES
Patient's Veletri increased to increased to 9 ng/kg/min per MD orders. Infusion currently infusing via single lumen R upper arm PICC at 77mL/24hr. Patient is currently tolerating titration. See doc flow sheet for VS, MAP currently >70. Patient denying c/o difficulty breathing. Will continue monitor.

## 2017-06-12 NOTE — PLAN OF CARE
Problem: Physical Therapy Goal  Goal: Physical Therapy Goal  Goals to be met by: 17     Patient will increase functional independence with mobility by performin. Supine to sit with Stand-by Assistance - GOAL MET  2. Sit to supine with Stand-by Assistance - GOAL MET  3. Sit to stand transfer with Stand-by Assistance - GOAL MET  4. Gait  x 150 feet with Stand-by Assistance.   5. Lower extremity exercise program x15 reps per handout, with supervision     Outcome: Ongoing (interventions implemented as appropriate)  Pt achieved 3/5 goals.

## 2017-06-12 NOTE — CONSULTS
Consult Note  Palliative Care      Consult Requested By: Augustine Rhodes MD  Reason for Consult: Goals of Care      ASSESSMENT/PLAN:     Impression: Emily Cook is a 56 y.o. female with AVB/syncope s/p pacemaker placement and pulmonary HTN. She was transferred to [facility] for worsening symptoms related to her pulmonary HTN. Cardiology is attempting to wean the HFNC as they uptitrate Veletri.     Spoke with Dr. Delgado of [facility] regarding consult placed to palliative medicine. Assistance is needed with goals of care.    Goals of Care:  -DNR  -Next of kin for decision making, are patient's 3 sons.  -Patient was seen this afternoon. Introduction to palliative medicine was made.  -Ms. Cook offers good insight into her current condition and prognosis. She reports improvement in her symptoms with Veletri.  -The patient has a large family/support system  -Spiritual Care was contacted; they will see the patient  -Hospice was NOT discussed     Plan/Recommendations:  -If Veletri can be uptitrated enough to wean the patient from high flow oxygen, then she will likely be discharged home with home health. Recommend an Advanced Illness Management (AIM) service.  -If patient cannot be weaned from high flow oxygen, then goals of care discussion will change direction.   -Palliative medicine will continue to follow    Thank you for allowing palliative medicine to participate in the care of this patient. Please call with any questions.  [facility]  [phone]      > 50% of 70 min visit spent in chart review, face to face discussion of goals of care,  symptom assessment, coordination of care and emotional support.      SUBJECTIVE:     History of Present Illness:  Emily Cook is a 56 y.o. female with AVB/syncope s/p pacemaker placement and pulmonary HTN. She was transferred to [facility] for worsening symptoms related to her pulmonary HTN. Cardiology attempting to  "uptitrate Veletri in order to wean oxygen; she is currently on 22L HFNC.    Hospital Course:  -RHC on 6/01/2017  -Pulmonology consulted  -Palliative medicine consulted for goals of care    Interval History:  There were no acute events overnight.     Past Medical History:   Diagnosis Date    Arrhythmia     Arthritis     Cardiac pacemaker in situ     Carpal tunnel syndrome, right     Cervical spondylosis     Cervicalgia     CHF (congestive heart failure)     Diverticulitis     Heart block AV third degree     Hyperlipidemia     ALFREDO on CPAP     Pulmonary hypertension     Subdeltoid bursitis      Past Surgical History:   Procedure Laterality Date    CARDIAC CATHETERIZATION      CARDIAC PACEMAKER PLACEMENT  7/29/13    [employer] DC PM    CARDIAC SURGERY      COLONOSCOPY N/A 9/28/2015    Procedure: COLONOSCOPY;  Surgeon: Jason Diaz MD;  Location: [facility] ([address] [place] [address]);  Service: Endoscopy;  Laterality: N/A;  Please schedule in 2-3 months with Dr Diaz  Pulmonary HTN, 2nd floor (off remodulin infusion as of "a few days" before 9/9/15)    3 day hold Coumadin, [facility]  PT/INR scheduled before procedure    ECTOPIC PREGNANCY SURGERY Left     knee arthroscopy       Family History   Problem Relation Age of Onset    Arthritis Mother     Diabetes Mother     Hyperlipidemia Mother     Arthritis Father     Hyperlipidemia Father      Review of patient's allergies indicates:   Allergen Reactions    Chlorhexidine Itching and Rash     Patient had raised rash on neck following RHC procedure. She states she's never had a problem with the "prep" used until this time.     Adhesive Rash       Medications:  Continuous Infusions:    epoprostenol (VELETRI) infusion      veletri/remodulin cassette      veletri/remodulin tubing       Scheduled:    furosemide  80 mg Oral BID    gabapentin  300 mg Oral TID    macitentan  10 mg Oral Daily    potassium chloride  40 mEq Oral BID    sodium chloride " 0.9%  10 mL Intravenous Q6H    sodium chloride 0.9%  3 mL Intravenous Q8H     PRN Meds: acetaminophen, docusate sodium, hydrocodone-acetaminophen 10-325mg, loperamide, ondansetron, ondansetron, promethazine (PHENERGAN) IVPB, Flushing PICC Protocol **AND** sodium chloride 0.9% **AND** sodium chloride 0.9%    24h Oral Morphine Equivalents (OME): 0    Bowel Management Plan (BMP): Yes (x )  NO  (  ) docusate sodium capsule 100 mg  BID PRN constipation    OBJECTIVE:   Symptom Assessment (ESAS 0-10 scale)     ESAS 0 1 2 3 4 5 6 7 8 9 10   Pain x             Dyspnea        x      Anxiety x             Nausea x             Depression  x             Anorexia x             Fatigue x             Insomnia x             Restlessness  x             Agitation x             Constipation     -  Diarrhea          -     Comments: The patient denies complaints of pain or discomfort      Perfromance Status: PPS Score ( 60 )       Physical Exam:  Vitals: Temp: 98.3 °F (36.8 °C) (06/12/17 0830)  Pulse: 101 (06/12/17 1053)  Resp: 20 (06/12/17 0830)  BP: (!) 95/52 (06/12/17 0830)  SpO2: (!) 93 % (06/12/17 0900)  General: Afebrile, alert, comfortable, no acute distress.   Pulmonary: Labored, HFNC oxygen; CTAB   Cardiac: Tachycardic. Normal S1 & S2 w/o rubs/murmurs/gallops.   Abdominal: Non-tender, non-distended.Bowel sounds present    Extremities: Moves all extremities x 4.  Skin: Healing abrasion to forehead.No jaundice, rashes.  Neurological: Alert and oriented. No focal neuro deficits.   Psych/Mental Status:  CAM / Delirium -  FAST Stage for Dementia:1      Labs:  CBC:   WBC   Date Value Ref Range Status   06/12/2017 9.15 3.90 - 12.70 K/uL Final     Hemoglobin   Date Value Ref Range Status   06/12/2017 15.2 12.0 - 16.0 g/dL Final     Hematocrit   Date Value Ref Range Status   06/12/2017 44.7 37.0 - 48.5 % Final     MCV   Date Value Ref Range Status   06/12/2017 88 82 - 98 fL Final     Platelets   Date Value Ref Range Status   06/12/2017  "206 150 - 350 K/uL Final       BMP:   Recent Labs  Lab 06/12/17  0122   *   *   K 3.7   CL 94*   CO2 30*   BUN 25*   CREATININE 0.8   CALCIUM 8.6*   MG 2.0       LFT: Lab Results   Component Value Date    AST 19 06/12/2017    ALKPHOS 113 06/12/2017    BILITOT 1.0 06/12/2017       Albumin:   Albumin   Date Value Ref Range Status   06/12/2017 2.5 (L) 3.5 - 5.2 g/dL Final   01/27/2017 3.6 3.5 - 5.0 G/DL Final     Protein:   Total Protein   Date Value Ref Range Status   06/12/2017 7.2 6.0 - 8.4 g/dL Final       LACTIC ACID: No results found for: LACTATE    Radiology:  CTA Chest 6/06/17:  Read as "1. Normal-appearing pulmonary veins without evidence of venoocclusive disease.  2.  Diffuse pulmonary parenchymal heterogeneity, interlobular septal thickening, and widespread groundglass attenuation seen throughout both lungs, however most prominent in the lower lobes in a perihilar/posterior distribution.  These findings are consistent with pulmonary edema, and demonstrate significant interval detriment change when compared to CTA chest 05/14/2017.  3.  Mild cardiomegaly with a small pericardial effusion.  4.  Dilated main pulmonary artery which can be seen in the setting of pulmonary artery hypertension.    CXR 6/04/17:   Read as "There is no pneumothorax.  Left chest wall pacer and right PICC catheter are stable as is left central venous catheter.  There is no pneumothorax.  There has been no significant interval detrimental change in the cardiopulmonary status since previous exam allowing for rotation.    Legal/Advanced Directives: not on file  Living Will: not on file  Resuscitate Status: DNR  Decision-Making Capacity: demonstrates capacity  Medical Power of : no; next of kin are patient's 3 sons    Psychosocial/Cultural: Ms. Cook resides in [place], [place]. She has been  twice. She is currently engaged; they have been together for 17 years. She has 3 adult sons and 5 grandchildren, all living in " [place]. She has 1 sister and 2 brothers. She lives with her fiancee and son, Ashwin Nadiu; her mother and father live down the street. 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 Hobbies include playing bingo.    Spiritual:   F- Itzel and Belief: Sabianist    I - Importance: attends service regularly  .  C - Community: no mention of community involvement    A - Address in Care: asked spiritual care to see the patient      Problem list:  Active Hospital Problems    Diagnosis  POA    *Primary pulmonary hypertension [I27.0]  Yes    Acute on chronic respiratory failure with hypoxia [J96.21]  Yes    Sleep apnea- on CPAP [G47.30]  Yes    Cardiac pacemaker in situ [Z95.0]  Yes    Heart block AV third degree [I44.2]  Yes     Intermittent infranodal        Pulmonary hypertension [I27.2]  Yes    Long term current use of anticoagulant therapy [Z79.01]  Not Applicable      Resolved Hospital Problems    Diagnosis Date Resolved POA   No resolved problems to display.

## 2017-06-12 NOTE — PROGRESS NOTES
Notified MD of pt with 9 beat run of VTACH.   MD ordered to go ahead and have all of pt AM labs drawn NOW.   Will continue to monitor.

## 2017-06-12 NOTE — PLAN OF CARE
Problem: Patient Care Overview  Goal: Plan of Care Review  Outcome: Ongoing (interventions implemented as appropriate)    Pt AAOx4     VSS  Pt on tele running sinus tach with hr  Low 100's-1 teens  MD on call for HTS notified of 9 beat run of V tach. AM labs drawn early.   BPs low 100's/50's-70's  Afebrile  Satting 91% on 22L 40% on comfort flow    Veletri infusing continueous to R UA PICC  Veletri cassette changed at 9PM with NOEL Chung.  Veletri currently at 9ng/kg/min (DW 86 kg) (3.1 mL/hr) (74 ml/ 24 hr )      Pt remained free from falls or injury thus far.   Bed is in low/ locked position, side rails are up x 2, call light is in reach.   Will continue to monitor.

## 2017-06-12 NOTE — PLAN OF CARE
"Problem: Spiritual Distress, Risk/Actual (Adult,Obstetrics,Pediatric)  Intervention: Facilitate Personal Exploration/Expression of Spirituality  Facts (Spiritual Perception of Illness): Pt requested prayer support.     Feelings (Emotions/Experiences/Coping):  Pt mentioned she was "doing better" and "hopes to get home."    Family/Friends:   Pt expressed family support.     Itzel (Belief/Meaning/Philosophy): Pt of Cheondoism itzel.     Future (Spiritual Care Plan of Action): Introduced and offered pastoral support with reflective listening and prayer upon request. No further spiritual needs expressed. Pt made aware of 's presence as needed.         "

## 2017-06-12 NOTE — PT/OT/SLP PROGRESS
"Physical Therapy  Treatment    Emily Cook   MRN: [id number]   Admitting Diagnosis: Primary pulmonary hypertension    PT Received On: 06/12/17  PT Start Time: 1526     PT Stop Time: 1600    PT Total Time (min): 34 min       Billable Minutes:  Gait Training 16 and Therapeutic Exercise 15    Treatment Type: Treatment  PT/PTA: PT             General Precautions: Standard, fall  Orthopedic Precautions: N/A   Braces: N/A    Do you have any cultural, spiritual, Voodoo conflicts, given your current situation?: none noted    Subjective:  Communicated with nursing prior to session.  "I take off the O2 to go to the bathroom."    Pain/Comfort  Pain Rating 1: 0/10    Objective:   Patient found with: telemetry, oxygen, central line, peripheral IV (PICC line)    Functional Mobility:  Bed Mobility:   Scooting/Bridging: Independent  Supine to Sit: Independent  Sit to Supine: Independent    Transfers:  Sit <> Stand Assistance: Supervision (Pt perf multiple times)  Sit <> Stand Assistive Device: No Assistive Device    Gait:   Gait Distance: Pt amb 974', with multiple episodes of LOB with Cassandra to recover, lacks trunk control and proper foot placement with advancement, with 6L/min supp O2 via NC  Assistance 1: Minimum assistance  Gait Assistive Device: No device  Gait Pattern: swing-through gait  Gait Deviation(s): decreased neville, increased time in double stance, decreased step length, decreased stride length, increased stride width, decreased weight-shifting ability    Balance:   Static Sit: NORMAL: No deviations seen in posture held statically  Dynamic Sit: NORMAL: No deviations seen in posture held dynamically  Static Stand: POOR+: Needs MINIMAL assist to maintain  Dynamic stand: POOR: N/A     Therapeutic Activities and Exercises:  Ankle pumps: 20 reps   LAQs: 20 reps each LE perf individually, in sit  Hip fabd/add: 20 reps each LE perf individually, in sit  Ipsilateral hip flex and sh flex: 20 reps, alt sides, in " sit  Czuka 10x sit<>stand: 28.21 sec, 0/10 RPE following, SaO2 92%,    6MWT: 874' with Cassandra for safety, 8/10 RPE following, SaO2 96%,     AM-PAC 6 CLICK MOBILITY  How much help from another person does this patient currently need?   1 = Unable, Total/Dependent Assistance  2 = A lot, Maximum/Moderate Assistance  3 = A little, Minimum/Contact Guard/Supervision  4 = None, Modified Harrington/Independent    Turning over in bed (including adjusting bedclothes, sheets and blankets)?: 3  Sitting down on and standing up from a chair with arms (e.g., wheelchair, bedside commode, etc.): 4  Moving from lying on back to sitting on the side of the bed?: 4  Moving to and from a bed to a chair (including a wheelchair)?: 3  Need to walk in hospital room?: 3  Climbing 3-5 steps with a railing?: 2  Total Score: 19    AM-PAC Raw Score CMS G-Code Modifier Level of Impairment Assistance   6 % Total / Unable   7 - 9 CM 80 - 100% Maximal Assist   10 - 14 CL 60 - 80% Moderate Assist   15 - 19 CK 40 - 60% Moderate Assist   20 - 22 CJ 20 - 40% Minimal Assist   23 CI 1-20% SBA / CGA   24 CH 0% Independent/ Mod I     Patient left supine with all lines intact, call button in reach and nursing notified.    Assessment:  Emily Cook is a 56 y.o. female with a medical diagnosis of Primary pulmonary hypertension and presents with improvements in functional mobility and aerobic capacity.  Pt was able to perf bed mobility independently.  Pt cont to require A with gait sec to impaired balance.  Pt also requires supp O2 at rest and with activity.  Established a HEP for pt to perf 2xs/day.  Pt will cont to benefit from skilled PT services.      Rehab identified problem list/impairments: Rehab identified problem list/impairments: weakness, impaired balance, gait instability, impaired functional mobilty, impaired endurance, impaired cardiopulmonary response to activity    Rehab potential is fair.    Activity tolerance:  Good    Discharge recommendations: Discharge Facility/Level Of Care Needs: home health PT     Barriers to discharge: Barriers to Discharge: Decreased caregiver support    Equipment recommendations: Equipment Needed After Discharge: none     GOALS:    Physical Therapy Goals        Problem: Physical Therapy Goal    Goal Priority Disciplines Outcome Goal Variances Interventions   Physical Therapy Goal     PT/OT, PT Ongoing (interventions implemented as appropriate)     Description:  Goals to be met by: 17     Patient will increase functional independence with mobility by performin. Supine to sit with Stand-by Assistance - GOAL MET  2. Sit to supine with Stand-by Assistance - GOAL MET  3. Sit to stand transfer with Stand-by Assistance - GOAL MET  4. Gait  x 150 feet with Stand-by Assistance.   5. Lower extremity exercise program x15 reps per handout, with supervision                       PLAN:    Patient to be seen 4 x/week  to address the above listed problems via gait training, therapeutic activities, therapeutic exercises, neuromuscular re-education  Plan of Care expires: 17  Plan of Care reviewed with: patient         Flor Anusha, PT  2017

## 2017-06-12 NOTE — PROGRESS NOTES
"Progress Note  Heart Transplant Service    Admit Date: 5/31/2017   LOS: 12 days     SUBJECTIVE:     Follow up for: Primary pulmonary hypertension    Interval History: Pt denies SOB, tolerated increase in Veletri but remains hypoxic. Made DNR over weekend    Scheduled Meds:   furosemide  80 mg Oral BID    gabapentin  300 mg Oral TID    macitentan  10 mg Oral Daily    potassium chloride  40 mEq Oral BID    sodium chloride 0.9%  10 mL Intravenous Q6H    sodium chloride 0.9%  3 mL Intravenous Q8H     Continuous Infusions:   epoprostenol (VELETRI) infusion 10 ng/kg/min (06/12/17 1145)    veletri/remodulin cassette      veletri/remodulin tubing       PRN Meds:acetaminophen, docusate sodium, hydrocodone-acetaminophen 10-325mg, loperamide, ondansetron, ondansetron, promethazine (PHENERGAN) IVPB, Flushing PICC Protocol **AND** sodium chloride 0.9% **AND** sodium chloride 0.9%    Review of patient's allergies indicates:   Allergen Reactions    Chlorhexidine Itching and Rash     Patient had raised rash on neck following RHC procedure. She states she's never had a problem with the "prep" used until this time.     Adhesive Rash     Gen: denies fever chills fatigue weight loss  HEENT: denies HA, blurry vision, tinnitus, dry mouth  Resp: denies SOB, cough, hemoptysis  CV: Denies CP, palps, orthopnea, PND, edema  GI: denies abd pain, nausea/vomiting, diarrhea, melena, constipation, hematochezia    OBJECTIVE:     Vital Signs (Most Recent)  Temp: 97.6 °F (36.4 °C) (06/12/17 1208)  Pulse: 105 (06/12/17 1208)  Resp: 20 (06/12/17 0830)  BP: 109/64 (06/12/17 1208)  SpO2: (!) 93 % (06/12/17 1208)    Vital Signs Range (Last 24H):  Temp:  [97.6 °F (36.4 °C)-98.6 °F (37 °C)]   Pulse:  []   Resp:  [18-20]   BP: ()/(52-75)   SpO2:  [84 %-94 %]     I & O (Last 24H):    Intake/Output Summary (Last 24 hours) at 06/12/17 1302  Last data filed at 06/12/17 0900   Gross per 24 hour   Intake           635.45 ml   Output          "     800 ml   Net          -164.55 ml            Physical Exam  Gen: NAD  Neck: + JVD to above clavicle with HJR  Lung: CTA bilat  Heart: Normal S1/S2, tachy, regular rhythm, II/VI systolic murmur  Abdomen: Soft, NT/ND, NABS, no masses, no guarding/rebounding  Extremities: No LE edema bilaterally, 2+ pulses bilaterally in upper/lower extremities   Skin: Normal color and turgor. No rashes, no petechia, no ecchymoses.   Neuro: AAOx3    Labs:       Recent Labs  Lab 06/10/17  0500 06/11/17  0500 06/12/17  0122   WBC 11.11 9.71 9.15   HGB 15.6 15.9 15.2   HCT 45.5 46.5 44.7    238 206   LYMPH 16.0*  CANCELED 18.5  1.8 19.0  CANCELED   MONO 6.0  CANCELED 11.4  1.1* 6.0  CANCELED   EOSINOPHIL 1.0 1.2 0.0         Recent Labs  Lab 06/10/17  0500 06/11/17  0500 06/12/17  0122   INR 2.2* 2.2* 2.2*          Recent Labs  Lab 06/10/17  0500 06/11/17  0500 06/12/17  0122   * 115* 115*   CALCIUM 9.1 9.0 8.6*   ALBUMIN 2.5* 2.6* 2.5*   PROT 7.2 7.4 7.2    134* 133*   K 3.4* 3.7 3.7   CO2 30* 30* 30*   CL 95 94* 94*   BUN 19 20 25*   CREATININE 0.8 0.8 0.8   ALKPHOS 105 105 113   ALT 13 14 16   AST 17 17 19   BILITOT 1.7* 1.4* 1.0   MG 2.1 2.0 2.0   PHOS 3.0 3.0 2.8     Estimated Creatinine Clearance: 78.7 mL/min (based on Cr of 0.8).    No results for input(s): BNP, BNPTRIAGEBLO in the last 168 hours.    No results for input(s): LDH in the last 168 hours.    Microbiology Results (last 7 days)     Procedure Component Value Units Date/Time    Blood culture [[id number]] Collected:  06/09/17 0742    Order Status:  Completed Specimen:  Blood from Peripheral, Jugular, Internal Left Updated:  06/12/17 1022     Blood Culture, Routine No Growth to date     Blood Culture, Routine No Growth to date     Blood Culture, Routine No Growth to date     Blood Culture, Routine No Growth to date    Blood culture [[id number]] Collected:  06/09/17 0913    Order Status:  Completed Specimen:  Blood Updated:  06/12/17 1022     Blood  Culture, Routine No Growth to date     Blood Culture, Routine No Growth to date     Blood Culture, Routine No Growth to date     Blood Culture, Routine No Growth to date    Urine culture [[id number]]  (Susceptibility) Collected:  06/09/17 0743    Order Status:  Completed Specimen:  Urine from Urine, Catheterized Updated:  06/11/17 1248     Urine Culture, Routine --     ESCHERICHIA COLI  >100,000 cfu/ml      Blood culture [[id number]]     Order Status:  Canceled Specimen:  Blood     Blood culture [[id number]]     Order Status:  Canceled Specimen:  Blood     Blood culture [[id number]]     Order Status:  Canceled Specimen:  Blood             ASSESSMENT AND PLAN:   56 y.o. woman with PMH of AVB/syncope s/p PPM, pulmonary HTN, who is now transferred from [facility] for shortness of breath and management of patient's pulmonary HTN.    Pulmonary HTN, WHO group 1  Acute on Chronic RV Systolic Failure  -Has not tolerated multiple PH therapies  -RHC 6/1/17 showed RA 13, PCWP 15, PAP 90/38, CO/CI- 3.4/1.8.   -Pt was started on IV Veletri; currently at 9 nG. Increase q6h 1ng/hr to see what she can tolerate  -Started on dopamine 3 mcg/hour for RV failure and poor organ perfusion; changed to  2.5mcg/kg/min 6/7;  turned off 6/9 2/2 hypotension  - Continue home dose Macitentan  - changed to PO lasix 80mg BID  - INR at goal, restarted coumadin 6/7  - Pt currently has single lumen PICC but will plan for Cardoso prior to d/c if pt tolerates Veletri and gets approval  - will slowly uptitrate Veletri and watch for side effects  - CT-A with no evidence of PVOD  - consult palliative care given persistent hypoxia despite high flow oxygen delivery     Chronic Hypoxia, Acute on Chronic Resp Failure  -On Home O2 via NC, 4-5L but currenlty on high flow O2   - Appreciate pulm recs  - doppler lower extremity failed to show DVT  - TTE with bubble negative  - CTA chest with no evidence of PVOD     Leukocytosis  --resolved    Further  recommendations per attending addendum    Naman Delgado MD  PGY-4 [phone])  Cardiology Fellow

## 2017-06-12 NOTE — CONSULTS
Palliative Care Acknowledgement of Consult 6/12/17 at 11:00 am.    Consult received.  Will touch base with team prior to seeing patient.  Full consult to follow.    Thank you for allowing us to be a part of the care of this patient.    aVnia Alvarado PA-C  [facility]  [phone]

## 2017-06-13 NOTE — PLAN OF CARE
Problem: Physical Therapy Goal  Goal: Physical Therapy Goal  Goals to be met by: 17     Patient will increase functional independence with mobility by performin. Supine to sit with Stand-by Assistance - GOAL MET  2. Sit to supine with Stand-by Assistance - GOAL MET  3. Sit to stand transfer with Stand-by Assistance - GOAL MET  4. Gait  x 150 feet with Stand-by Assistance.   5. Lower extremity exercise program x15 reps per handout, with supervision met (2017)    Outcome: Ongoing (interventions implemented as appropriate)  Pt met one goal today.

## 2017-06-13 NOTE — PROGRESS NOTES
"Progress Note  Heart Transplant Service    Admit Date: 5/31/2017   LOS: 13 days     SUBJECTIVE:     Follow up for: Primary pulmonary hypertension    Interval History: Pt denies SOB, tolerated increase in Veletri but remains hypoxic.     Scheduled Meds:   furosemide  80 mg Oral BID    gabapentin  300 mg Oral TID    macitentan  10 mg Oral Daily    potassium chloride 10%  40 mEq Oral Once    potassium chloride  40 mEq Oral BID    sodium chloride 0.9%  10 mL Intravenous Q6H    sodium chloride 0.9%  3 mL Intravenous Q8H     Continuous Infusions:   epoprostenol (VELETRI) infusion      heparin (porcine) in D5W      veletri/remodulin cassette      veletri/remodulin tubing       PRN Meds:acetaminophen, docusate sodium, heparin (PORCINE), heparin (PORCINE), hydrocodone-acetaminophen 10-325mg, loperamide, ondansetron, ondansetron, promethazine (PHENERGAN) IVPB, Flushing PICC Protocol **AND** sodium chloride 0.9% **AND** sodium chloride 0.9%    Review of patient's allergies indicates:   Allergen Reactions    Chlorhexidine Itching and Rash     Patient had raised rash on neck following RHC procedure. She states she's never had a problem with the "prep" used until this time.     Adhesive Rash     Gen: denies fever chills fatigue weight loss  HEENT: denies HA, blurry vision, tinnitus, dry mouth  Resp: denies SOB, cough, hemoptysis  CV: Denies CP, palps, orthopnea, PND, edema  GI: denies abd pain, nausea/vomiting, diarrhea, melena, constipation, hematochezia    OBJECTIVE:     Vital Signs (Most Recent)  Temp: 98.3 °F (36.8 °C) (06/13/17 0832)  Pulse: 100 (06/13/17 0900)  Resp: 19 (06/13/17 0900)  BP: (!) 95/56 (06/13/17 0832)  SpO2: (!) 93 % (06/13/17 0900)    Vital Signs Range (Last 24H):  Temp:  [97.6 °F (36.4 °C)-98.7 °F (37.1 °C)]   Pulse:  []   Resp:  [18-20]   BP: ()/(52-64)   SpO2:  [67 %-94 %]     I & O (Last 24H):    Intake/Output Summary (Last 24 hours) at 06/13/17 1124  Last data filed at 06/13/17 " 1000   Gross per 24 hour   Intake              900 ml   Output             1700 ml   Net             -800 ml            Physical Exam  Gen: NAD  Neck: + JVD to above clavicle with HJR  Lung: CTA bilat  Heart: Normal S1/S2, tachy, regular rhythm, II/VI systolic murmur  Abdomen: Soft, NT/ND, NABS, no masses, no guarding/rebounding  Extremities: No LE edema bilaterally, 2+ pulses bilaterally in upper/lower extremities   Skin: Normal color and turgor. No rashes, no petechia, no ecchymoses.   Neuro: AAOx3    Labs:       Recent Labs  Lab 06/12/17  0122 06/13/17  0430 06/13/17  1032   WBC 9.15 9.26 7.84   HGB 15.2 14.9 14.8   HCT 44.7 43.0 42.5    222 215   LYMPH 19.0  CANCELED 12.0*  CANCELED  --    MONO 6.0  CANCELED 10.0  CANCELED  --    EOSINOPHIL 0.0 0.0  --          Recent Labs  Lab 06/11/17  0500 06/12/17  0122 06/13/17  0430   INR 2.2* 2.2* 1.4*          Recent Labs  Lab 06/11/17  0500 06/12/17  0122 06/13/17  0430   * 115* 111*   CALCIUM 9.0 8.6* 8.5*   ALBUMIN 2.6* 2.5* 2.5*   PROT 7.4 7.2 6.9   * 133* 134*   K 3.7 3.7 3.5   CO2 30* 30* 30*   CL 94* 94* 96   BUN 20 25* 24*   CREATININE 0.8 0.8 0.8   ALKPHOS 105 113 103   ALT 14 16 17   AST 17 19 19   BILITOT 1.4* 1.0 0.9   MG 2.0 2.0 2.0   PHOS 3.0 2.8 2.6*     Estimated Creatinine Clearance: 78.7 mL/min (based on Cr of 0.8).    No results for input(s): BNP, BNPTRIAGEBLO in the last 168 hours.    No results for input(s): LDH in the last 168 hours.    Microbiology Results (last 7 days)     Procedure Component Value Units Date/Time    Blood culture [[id number]] Collected:  06/09/17 0742    Order Status:  Completed Specimen:  Blood from Peripheral, Jugular, Internal Left Updated:  06/13/17 1022     Blood Culture, Routine No Growth to date     Blood Culture, Routine No Growth to date     Blood Culture, Routine No Growth to date     Blood Culture, Routine No Growth to date     Blood Culture, Routine No Growth to date    Blood culture [[id number]]  Collected:  06/09/17 0913    Order Status:  Completed Specimen:  Blood Updated:  06/13/17 1022     Blood Culture, Routine No Growth to date     Blood Culture, Routine No Growth to date     Blood Culture, Routine No Growth to date     Blood Culture, Routine No Growth to date     Blood Culture, Routine No Growth to date    Urine culture [[id number]]  (Susceptibility) Collected:  06/09/17 0743    Order Status:  Completed Specimen:  Urine from Urine, Catheterized Updated:  06/11/17 1248     Urine Culture, Routine --     ESCHERICHIA COLI  >100,000 cfu/ml      Blood culture [[id number]]     Order Status:  Canceled Specimen:  Blood     Blood culture [[id number]]     Order Status:  Canceled Specimen:  Blood     Blood culture [[id number]]     Order Status:  Canceled Specimen:  Blood             ASSESSMENT AND PLAN:   56 y.o. woman with PMH of AVB/syncope s/p PPM, pulmonary HTN, who is now transferred from [facility] for shortness of breath and management of patient's pulmonary HTN.    Pulmonary HTN, WHO group 1  Acute on Chronic RV Systolic Failure  -Has not tolerated multiple PH therapies  -RHC 6/1/17 showed RA 13, PCWP 15, PAP 90/38, CO/CI- 3.4/1.8.   -Pt was started on IV Veletri; currently at 11 nG. Increase q6h 1ng/hr to see what she can tolerate  -Started on dopamine 3 mcg/hour for RV failure and poor organ perfusion; changed to  2.5mcg/kg/min 6/7;  turned off 6/9 2/2 hypotension  - Continue home dose Macitentan  - changed to PO lasix 80mg BID  - INR 1.4 6/13 after stopping coumadin for tunneled cath, will bridge with heparin  - Pt currently has single lumen PICC but will plan for Cardoso prior to d/c if pt tolerates Veletri and gets approval  - will slowly uptitrate Veletri and watch for side effects  - CT-A with no evidence of PVOD  - appreciate palliative care assistance     Chronic Hypoxia, Acute on Chronic Resp Failure  -On Home O2 via NC, 4-5L but currenlty on high flow O2   - Appreciate pulm recs  -  doppler lower extremity failed to show DVT  - TTE with bubble negative  - CTA chest with no evidence of PVOD     Leukocytosis  --resolved    Further recommendations per attending addendum    Naman Delgado MD  PGY-4 [phone])  Cardiology Fellow

## 2017-06-13 NOTE — PLAN OF CARE
Problem: Patient Care Overview  Goal: Plan of Care Review  Outcome: Ongoing (interventions implemented as appropriate)    Pt AAOx4. Very pleasant.    VSS  Pt on tele running sinus tach with hr  Low 100's  BPs low 90's-100's/50's  Afebrile  Satting 89-92% on 22L 40% on comfort flow    Veletri infusing continueous to R UA PICC  Veletri cassette changed at 9PM with NOEL Diaz.  Veletri currently at 10ng/kg/min (DW 86 kg) (3.4 mL/hr) (83 ml/ 24 hr )    Pt up to bedside commode independently.  Pt remained free from falls or injury thus far.   Bed is in low/ locked position, side rails are up x 2, call light is in reach.   Will continue to monitor.

## 2017-06-13 NOTE — PT/OT/SLP PROGRESS
Physical Therapy  Treatment    Emily Cook   MRN: [id number]   Admitting Diagnosis: Primary pulmonary hypertension    PT Received On: 06/13/17  PT Start Time: 1430     PT Stop Time: 1445    PT Total Time (min): 15 min       Billable Minutes:  Therapeutic Exercise 15    Treatment Type: Treatment  PT/PTA: PT     PTA Visit Number: 0       General Precautions: Standard, fall  Orthopedic Precautions: N/A   Braces:      Do you have any cultural, spiritual, Latter day conflicts, given your current situation?: none noted    Subjective:  Communicated with pt prior to session.  Pt was agreeable to PT services via exercises. Reported she sat up in the bedside chair for the majority of the day.    Pain/Comfort  Pain Rating 1: 0/10    Objective:   Patient found with: telemetry, oxygen, central line (COMFORT FLOW)    Functional Mobility:  Bed Mobility:   Scooting/Bridging: Modified Independent, Independent  Supine to Sit: Modified Independent, Independent  Sit to Supine: Modified Independent, Independent    Transfers:  Sit <> Stand Assistance: Supervision  Sit <> Stand Assistive Device: No Assistive Device  Bed <> Chair Technique: Stand Pivot  Bed <> Chair Assistance: Stand By Assistance (bed<>BSC)  Bed <> Chair Assistive Device: No Assistive Device    Gait:   Gait Distance: refused 2/2 lines (comfort flow O2)    Balance:   Static Sit: GOOD+: Takes MAXIMAL challenges from all directions.    Dynamic Sit: GOOD+: Maintains balance through MAXIMAL excursions of active trunk motion  Static Stand: GOOD-: Takes MODERATE challenges from all directions inconsistently    Therapeutic Activities and Exercises:  Seated- hip flexion, long arc quads, ankle pumps (with quad/hamstring co-contraction), gluteal sets- 2x 20 reps BLE   Standing- heel raises, marches-2x 20 reps BLE     AM-PAC 6 CLICK MOBILITY  How much help from another person does this patient currently need?   1 = Unable, Total/Dependent Assistance  2 = A lot, Maximum/Moderate  Assistance  3 = A little, Minimum/Contact Guard/Supervision  4 = None, Modified Graves/Independent    Turning over in bed (including adjusting bedclothes, sheets and blankets)?: 4  Sitting down on and standing up from a chair with arms (e.g., wheelchair, bedside commode, etc.): 4  Moving from lying on back to sitting on the side of the bed?: 4  Moving to and from a bed to a chair (including a wheelchair)?: 4  Need to walk in hospital room?: 4  Climbing 3-5 steps with a railing?: 3  Total Score: 23    AM-PAC Raw Score CMS G-Code Modifier Level of Impairment Assistance   6 % Total / Unable   7 - 9 CM 80 - 100% Maximal Assist   10 - 14 CL 60 - 80% Moderate Assist   15 - 19 CK 40 - 60% Moderate Assist   20 - 22 CJ 20 - 40% Minimal Assist   23 CI 1-20% SBA / CGA   24 CH 0% Independent/ Mod I     Patient left supine with call button in reach.    Assessment:  Emily Cook is a 56 y.o. female with a medical diagnosis of Primary pulmonary hypertension and presents with slight imbalance with standing.  Pt declined ambulation to the door 2/2 lines involved with comfort flow oxygen.  Ms. Cook will benefit from further PT services to improve her endurance and ensure functional mobility.    Rehab identified problem list/impairments: Rehab identified problem list/impairments: impaired endurance, impaired balance, impaired cardiopulmonary response to activity    Rehab potential is good.    Activity tolerance: Good    Discharge recommendations: Discharge Facility/Level Of Care Needs: home health PT     Barriers to discharge:      Equipment recommendations: Equipment Needed After Discharge: none     GOALS:    Physical Therapy Goals        Problem: Physical Therapy Goal    Goal Priority Disciplines Outcome Goal Variances Interventions   Physical Therapy Goal     PT/OT, PT Ongoing (interventions implemented as appropriate)     Description:  Goals to be met by: 6/21/17     Patient will increase functional  independence with mobility by performin. Supine to sit with Stand-by Assistance - GOAL MET  2. Sit to supine with Stand-by Assistance - GOAL MET  3. Sit to stand transfer with Stand-by Assistance - GOAL MET  4. Gait  x 150 feet with Stand-by Assistance.   5. Lower extremity exercise program x15 reps per handout, with supervision met (2017)                      PLAN:    Patient to be seen 4 x/week  to address the above listed problems via gait training, therapeutic activities, therapeutic exercises, neuromuscular re-education  Plan of Care expires: 17  Plan of Care reviewed with: patient         Chelsea HOLLAND Sharon, PT  2017

## 2017-06-13 NOTE — PROGRESS NOTES
"[facility]  Adult Nutrition  Progress Note    SUMMARY     Recommendations    Recommendation/Intervention: Pt consuming % of meals. Continue cardiac diet, continue to encourage good PO intake.  RD following.     Goals: Pt t consume >75% of meals  Nutrition Goal Status: goal met  Communication of RD Recs: reviewed with RN      Reason for Assessment    Reason for Assessment: length of stay  Diagnosis: pulmonary disease, cardiac disease (pulmonary hypertension, heart block, pacemaker)  General Information Comments: Pt seems well laying in bed.  Pt reports loosing 30# of fluid.  Pt requires CPAP to help sleep.   Nutrition Discharge Planning: Pt to consume adequate nutrition and follow a low Na diet.    Nutrition Prescription Ordered    Current Diet Order: Cardiac           Nutrition Risk Screen     Nutrition Risk Screen: no indicators present    Nutrition/Diet History    Patient Reported Diet/Restrictions/Preferences: general  Typical Food/Fluid Intake: Pt reports good appetite, consuming 100% of meals.  Food Preferences: No cultural, Anabaptist needs reported.        Factors Affecting Nutritional Intake:  (none at this time)    Labs/Tests/Procedures/Meds       Pertinent Labs Reviewed: reviewed  Pertinent Labs Comments: Na 133, Cl 94, BUN 25, glu 115, Ca 8.6   Pertinent Medications Reviewed: reviewed  Pertinent Medications Comments: furesemide, gabapentin    Physical Findings    Overall Physical Appearance: overweight, nourished  Skin: intact    Anthropometrics    Temp: 98.3 °F (36.8 °C)     Height: 5' 3" (160 cm)  Weight Method: Bed Scale  Weight: 80.1 kg (176 lb 9.4 oz)     Ideal Body Weight (IBW), Female: 115 lb     % Ideal Body Weight, Female (lb): 153.56 lb  BMI (Calculated): 31.3  BMI Grade: 30 - 34.9- obesity - grade I       Estimated/Assessed Needs    Weight Used For Calorie Calculations: 80.1 kg (176 lb 9.4 oz)         Energy Need Method: Rusk-St Jeor (1700 kcal/day (1.25))  RMR " (Dimmit-St. Shaunor Equation): 1360.12  Weight Used For Protein Calculations: 80.1 kg (176 lb 9.4 oz)  Protein Requirements: 80 g/day  Fluid Need Method: RDA Method (or per MD)      Nutrition Diagnosis:    None at this time    Monitor and Evaluation    Food and Nutrient Intake: energy intake, food and beverage intake  Food and Nutrient Adminstration: diet order  Knowledge/Beliefs/Attitudes: food and nutrition knowledge/skill  Physical Activity and Function: nutrition-related ADLs and IADLs  Anthropometric Measurements: weight, weight change, body mass index  Biochemical Data, Medical Tests and Procedures: electrolyte and renal panel, gastrointestinal profile, glucose/endocrine profile  Nutrition-Focused Physical Findings: overall appearance  % Intake of Estimated Energy Needs: 75 - 100 %  % Meal Intake: 100%    Nutrition Risk    Level of Risk:  (1 x per week)    Nutrition Follow-Up    RD Follow-up?: Yes

## 2017-06-13 NOTE — PLAN OF CARE
Problem: Occupational Therapy Goal  Goal: Occupational Therapy Goal  Pt is currently performing ADLs, functional mobility & t/fs without assistance and displays age-appropriate strength, endurance & balance. OT services are not recommended at this time and patient is safe to D/C home.    D/C acute OT services     Comments: Patrice Soto OTR/L  6/13/2017

## 2017-06-13 NOTE — PT/OT/SLP EVAL
"Occupational Therapy  Evaluation/ Discharge     Emily Cook   MRN: [id number]   Admitting Diagnosis: Primary pulmonary hypertension    OT Date of Treatment: 06/13/17   OT Start Time: 0904  OT Stop Time: 0914  OT Total Time (min): 10 min    Billable Minutes:  Evaluation 10 minutes    Diagnosis: Primary pulmonary hypertension       Past Medical History:   Diagnosis Date    Arrhythmia     Arthritis     Cardiac pacemaker in situ     Carpal tunnel syndrome, right     Cervical spondylosis     Cervicalgia     CHF (congestive heart failure)     Diverticulitis     Heart block AV third degree     Hyperlipidemia     ALFREDO on CPAP     Pulmonary hypertension     Subdeltoid bursitis       Past Surgical History:   Procedure Laterality Date    CARDIAC CATHETERIZATION      CARDIAC PACEMAKER PLACEMENT  7/29/13    [employer] DC PM    CARDIAC SURGERY      COLONOSCOPY N/A 9/28/2015    Procedure: COLONOSCOPY;  Surgeon: Jason Diaz MD;  Location: [facility] ([address] [place] [address]);  Service: Endoscopy;  Laterality: N/A;  Please schedule in 2-3 months with Dr Diaz  Pulmonary HTN, 2nd floor (off remodulin infusion as of "a few days" before 9/9/15)    3 day hold Coumadin, [facility]  PT/INR scheduled before procedure    ECTOPIC PREGNANCY SURGERY Left     knee arthroscopy         General Precautions: Standard, fall  Orthopedic Precautions: N/A  Braces: N/A    Patient History:  Living Environment  Lives With: significant other, child(varinder), adult  Living Arrangements: house  Home Accessibility: stairs to enter home  Number of Stairs to Enter Home: 5  Stair Railings at Home: none  Transportation Available: family or friend will provide  Living Environment Comment: Pt statese she lives in a [facility] w/ 5 steps to enter and no railings. Pt lives w/ significant other and adult son. PLOF indep w/ ADL's and Mod I for mobility (rollator). Pt has assist at home.  Equipment Currently Used at Home: walker, rolling    Prior level of " function:   Bed Mobility/Transfers: needs device  Grooming: independent  Bathing: independent  Upper Body Dressing: independent  Lower Body Dressing: independent  Toileting: independent  Home Management Skills: independent        Dominant hand: right    Subjective:  Communicated with nursee prior to session.  Pt agreeable to skilled OT services.    Chief Complaint: No complaints   Patient/Family stated goals: return home    Pain/Comfort  Pain Rating 1: 0/10  Pain Rating Post-Intervention 2: 0/10    Objective:  Patient found with: telemetry, oxygen, central line  Pt received w/ bed in chair position. Pt stated she was indep w/ ADL's and had just completed them before OT entered.     Cognitive Exam:  Oriented to: Person, Place, Time and Situation  Follows Commands/attention: Follows multistep  commands  Communication: clear/fluent  Memory:  No Deficits noted  Safety awareness/insight to disability: intact  Coping skills/emotional control: Appropriate to situation    Visual/perceptual:  Intact    Physical Exam:  Postural examination/scapula alignment: No postural abnormalities identified  Skin integrity: Visible skin intact  Edema: None noted     Sensation:   Intact    Upper Extremity Range of Motion:  Right Upper Extremity: WFL  Left Upper Extremity: WFL    Upper Extremity Strength:  Right Upper Extremity: WFL  Left Upper Extremity: WFL   Strength: WFL    Fine motor coordination:   Intact    Gross motor coordination: WFL    Functional Mobility:  Bed Mobility:  Rolling/Turning Right: Modified independent, With side rail  Scooting/Bridging: Modified Independent, With side rail  Supine to Sit: Modified Independent, Other (see comments) (HOB elevated)  Sit to Supine: Modified Independent (HOB elevated)    Transfers:  Sit <> Stand Assistance: Independent  Sit <> Stand Assistive Device: No Assistive Device  Toilet Transfer Technique: Stand Pivot  Toilet Transfer Assistance: Supervision  Toilet Transfer Assistive Device:  "bedside commode    Functional Ambulation: Took 4 steps to bsc. Pt ambulation limited s/t non-mobile comfort flow machine.     Activities of Daily Living:  Feeding Level of Assistance: Supervision  LE Dressing Level of Assistance: Independent (donned/doffed socks)    Balance:   Static Sit: GOOD+: Takes MAXIMAL challenges from all directions.    Dynamic Sit: GOOD+: Maintains balance through MAXIMAL excursions of active trunk motion  Static Stand: GOOD: Takes MODERATE challenges from all directions  Dynamic stand: GOOD: Needs SUPERVISION only during gait and able to self right with moderate     Therapeutic Activities and Exercises:  Pt educated on POC.    AM-PAC 6 CLICK ADL  How much help from another person does this patient currently need?  1 = Unable, Total/Dependent Assistance  2 = A lot, Maximum/Moderate Assistance  3 = A little, Minimum/Contact Guard/Supervision  4 = None, Modified [place]/Independent    Putting on and taking off regular lower body clothing? : 4  Bathing (including washing, rinsing, drying)?: 3  Toileting, which includes using toilet, bedpan, or urinal? : 4  Putting on and taking off regular upper body clothing?: 4  Taking care of personal grooming such as brushing teeth?: 4  Eating meals?: 4  Total Score: 23    AM-PAC Raw Score CMS "G-Code Modifier Level of Impairment Assistance   6 % Total / Unable   7 - 9 CM 80 - 100% Maximal Assist   10-14 CL 60 - 80% Moderate Assist   15 - 19 CK 40 - 60% Moderate Assist   20 - 22 CJ 20 - 40% Minimal Assist   23 CI 1-20% SBA / CGA   24 CH 0% Independent/ Mod I       Patient left with bed in chair position with all lines intact and call button in reach    Assessment:  Emily Cook is a 56 y.o. female with a medical diagnosis of Primary pulmonary hypertension and presents with Impairments listed below. Pt is not currently displaying a need for acute OT services. D/C acute OT services and recommend pt D/C home. Pt to continue to see PT for " improvement in ambulation.     Rehab identified problem list/impairments: Rehab identified problem list/impairments: impaired endurance, impaired cardiopulmonary response to activity    Rehab potential is good.    Activity tolerance: Good    Discharge recommendations: Discharge Facility/Level Of Care Needs: home     Barriers to discharge: Barriers to Discharge: None    Equipment recommendations: none     GOALS:    Occupational Therapy Goals        Problem: Occupational Therapy Goal    Goal Priority Disciplines Outcome Interventions   Occupational Therapy Goal     OT, PT/OT     Description:  Pt is currently performing ADLs, functional mobility & t/fs without assistance and displays age-appropriate strength, endurance & balance. OT services are not recommended at this time and patient is safe to D/C home.                      PLAN:    (D/C acute OT services )   Plan of Care reviewed with: patient    Patrice RUBIA Soto, OT  06/13/2017

## 2017-06-14 NOTE — PROGRESS NOTES
"Progress Note  Heart Transplant Service    Admit Date: 5/31/2017   LOS: 14 days     SUBJECTIVE:     Follow up for: Primary pulmonary hypertension    Interval History: No worsening SOB. Sat up in chair yesterday. Per nurse, pt down to flow 10 and 30% yesterday.     Scheduled Meds:   furosemide  80 mg Oral BID    gabapentin  300 mg Oral TID    macitentan  10 mg Oral Daily    potassium chloride  40 mEq Oral BID    sodium chloride 0.9%  10 mL Intravenous Q6H    sodium chloride 0.9%  3 mL Intravenous Q8H     Continuous Infusions:   epoprostenol (VELETRI) infusion 15 ng/kg/min (06/14/17 0617)    heparin (porcine) in D5W 19 Units/kg/hr (06/14/17 0659)    veletri/remodulin cassette      veletri/remodulin tubing       PRN Meds:acetaminophen, docusate sodium, heparin (PORCINE), heparin (PORCINE), hydrocodone-acetaminophen 10-325mg, loperamide, ondansetron, ondansetron, promethazine (PHENERGAN) IVPB, Flushing PICC Protocol **AND** sodium chloride 0.9% **AND** sodium chloride 0.9%    Review of patient's allergies indicates:   Allergen Reactions    Chlorhexidine Itching and Rash     Patient had raised rash on neck following RHC procedure. She states she's never had a problem with the "prep" used until this time.     Adhesive Rash     Gen: denies fever chills fatigue weight loss  HEENT: denies HA, blurry vision, tinnitus, dry mouth  Resp: denies SOB, cough, hemoptysis  CV: Denies CP, palps, orthopnea, PND, edema  GI: denies abd pain, nausea/vomiting, diarrhea, melena, constipation, hematochezia    OBJECTIVE:     Vital Signs (Most Recent)  Temp: 98.2 °F (36.8 °C) (06/14/17 0400)  Pulse: 105 (06/14/17 0426)  Resp: 18 (06/14/17 0426)  BP: (!) 95/51 (06/14/17 0400)  SpO2: (!) 88 % (06/14/17 0400)    Vital Signs Range (Last 24H):  Temp:  [98.1 °F (36.7 °C)-98.8 °F (37.1 °C)]   Pulse:  []   Resp:  [18-20]   BP: ()/(50-66)   SpO2:  [67 %-95 %]     I & O (Last 24H):    Intake/Output Summary (Last 24 hours) at " 06/14/17 0724  Last data filed at 06/14/17 0600   Gross per 24 hour   Intake            640.1 ml   Output             2300 ml   Net          -1659.9 ml            Physical Exam  Gen: NAD  Neck: + JVD to above clavicle with HJR  Lung: CTA bilat  Heart: Normal S1/S2, tachy, regular rhythm, II/VI systolic murmur  Abdomen: Soft, NT/ND, NABS, no masses, no guarding/rebounding  Extremities: No LE edema bilaterally, 2+ pulses bilaterally in upper/lower extremities   Skin: Normal color and turgor.    Neuro: AAOx3    Labs:       Recent Labs  Lab 06/12/17  0122 06/13/17  0430 06/13/17  1032   WBC 9.15 9.26 7.84   HGB 15.2 14.9 14.8   HCT 44.7 43.0 42.5    222 215   LYMPH 19.0  CANCELED 12.0*  CANCELED 21.0  CANCELED   MONO 6.0  CANCELED 10.0  CANCELED 2.0*  CANCELED   EOSINOPHIL 0.0 0.0 0.0         Recent Labs  Lab 06/12/17  0122 06/13/17  0430 06/13/17  1032 06/14/17  0120   APTT  --   --  25.9  --    INR 2.2* 1.4*  --  1.3*          Recent Labs  Lab 06/11/17  0500 06/12/17  0122 06/13/17  0430   * 115* 111*   CALCIUM 9.0 8.6* 8.5*   ALBUMIN 2.6* 2.5* 2.5*   PROT 7.4 7.2 6.9   * 133* 134*   K 3.7 3.7 3.5   CO2 30* 30* 30*   CL 94* 94* 96   BUN 20 25* 24*   CREATININE 0.8 0.8 0.8   ALKPHOS 105 113 103   ALT 14 16 17   AST 17 19 19   BILITOT 1.4* 1.0 0.9   MG 2.0 2.0 2.0   PHOS 3.0 2.8 2.6*     Estimated Creatinine Clearance: 79.1 mL/min (based on Cr of 0.8).    No results for input(s): BNP, BNPTRIAGEBLO in the last 168 hours.    No results for input(s): LDH in the last 168 hours.    Microbiology Results (last 7 days)     Procedure Component Value Units Date/Time    Blood culture [[id number]] Collected:  06/09/17 0742    Order Status:  Completed Specimen:  Blood from Peripheral, Jugular, Internal Left Updated:  06/13/17 1022     Blood Culture, Routine No Growth to date     Blood Culture, Routine No Growth to date     Blood Culture, Routine No Growth to date     Blood Culture, Routine No Growth to date      Blood Culture, Routine No Growth to date    Blood culture [[id number]] Collected:  06/09/17 0913    Order Status:  Completed Specimen:  Blood Updated:  06/13/17 1022     Blood Culture, Routine No Growth to date     Blood Culture, Routine No Growth to date     Blood Culture, Routine No Growth to date     Blood Culture, Routine No Growth to date     Blood Culture, Routine No Growth to date    Urine culture [[id number]]  (Susceptibility) Collected:  06/09/17 0743    Order Status:  Completed Specimen:  Urine from Urine, Catheterized Updated:  06/11/17 1248     Urine Culture, Routine --     ESCHERICHIA COLI  >100,000 cfu/ml      Blood culture [[id number]]     Order Status:  Canceled Specimen:  Blood     Blood culture [[id number]]     Order Status:  Canceled Specimen:  Blood     Blood culture [[id number]]     Order Status:  Canceled Specimen:  Blood             ASSESSMENT AND PLAN:   56 y.o. woman with PMH of AVB/syncope s/p PPM, pulmonary HTN, who [place] now [place] for shortness of breath and management of patient's pulmonary HTN.    Pulmonary HTN, WHO group 1  Acute on Chronic RV Systolic Failure  -Has not tolerated multiple PH therapies  -RHC 6/1/17 showed RA 13, PCWP 15, PAP 90/38, CO/CI- 3.4/1.8.   -Pt was started on IV Veletri; currently at 15 nG. Increase q6h 1ng/hr to see what she can tolerate  -Started on dopamine 3 mcg/hour for RV failure and poor organ perfusion; changed to  2.5 mcg/kg/min 6/7;  turned off 6/9 2/2 hypotension  - Continue home dose Macitentan  - changed to PO lasix 80mg BID  - INR 1.3 today. Holding coumadin for tunneled cath. No need for Heparin bridge   - Pt currently has single lumen PICC but will plan for Cardoso. Consult General Surgery today  - CT-A with no evidence of PVOD  - appreciate palliative care assistance. Plan for now is for pt to go home with H/H AIM program.  Pt DNR     Chronic Hypoxia, Acute on Chronic Resp Failure  -On Home O2 via NC, 4-5L but  currenlty on high flow O2- weaning and will go to 6L NC today   - Appreciate pulm recs  - doppler lower extremity failed to show DVT  - TTE with bubble negative  - CTA chest with no evidence of PVOD     Leukocytosis  --resolved    Elena Raygoza PA-C  HT[facility], [id number]

## 2017-06-14 NOTE — CONSULTS
[facility]  General Surgery  Consult Note    Inpatient consult to General Surgery  Consult performed by: RIDGE ROBLERO  Consult ordered by: CLEMENTINA NAIDU JR  Reason for consult: single lumen brush for veletri        Subjective:     Chief Complaint/Reason for Admission: Primary pulmonary hypertension    History of Present Illness:   Patient is a 57 yo female a pmhx of AVB/syncope s/p PPM, pulmonary HTN, who was transferred from OSH for shortness of breath and management of pulmonary HTN. Her pHTN has been difficult to treat, and she has been intolerant of multiple medications in the past. She currently denies dizziness, syncope, PND, orthopnea. She has some recent LE edema and abdominal distension. Underwent RHC on 6/1/17 which showed RV overload and septal deviation impeding LV function. Hospital course has been complicated by worsening hypoxemia. Primary has started Veletri and patient's symptoms have improved. Primary team has started veletri with the intent to wean HFNC and uptitrate Veletri. Currently on 11L NC. General surgery has been consulted for brush placement for home veletri.      No current facility-administered medications on file prior to encounter.      Current Outpatient Prescriptions on File Prior to Encounter   Medication Sig    clobetasol (TEMOVATE) 0.05 % external solution     conjugated estrogens (PREMARIN) vaginal cream Place a pea-sized amount in vagina every night for 2 weeks, then use 2-3 nights a week    duloxetine (CYMBALTA) 60 MG capsule Take 1 capsule (60 mg total) by mouth 2 (two) times daily.    furosemide (LASIX) 20 MG tablet Take 1 tablet (20 mg total) by mouth once daily.    gabapentin (NEURONTIN) 300 MG capsule TAKE 1 CAPSULE BY MOUTH THREE TIMES DAILY FOR 1 WEEK, IF NO RELIEF INCREASE TO 1 IN MORNING, 1 IN AFTERNOON AND 2 AT BEDTIME    hydrocodone-acetaminophen 10-325mg (NORCO)  mg Tab Take 1 tablet by mouth 3 (three) times daily as  "needed.    hydrOXYzine (VISTARIL) 50 MG Cap 50 mg daily as needed.     ketoconazole (NIZORAL) 2 % cream Apply topically 2 (two) times daily.    macitentan (OPSUMIT) 10 mg Tab Take 1 tablet (10 mg total) by mouth once daily.    PATADAY 0.2 % Drop     peg 3350-electrolytes-vit C (MOVIPREP) 100-7.5-2.691 gram PwPk Take 1 packet by mouth as directed.    potassium chloride (MICRO-K) 10 MEQ CpSR Take 1 capsule (10 mEq total) by mouth once daily.    treprostinil-neb accessories 1.74 mg/2.9 mL (0.6 mg/mL) Nebu Inhale into the lungs.    warfarin (COUMADIN) 5 MG tablet TAKE 1 AND 1/2 TABLETS BY MOUTH DAILY EXCEPT 1 TABLET ON TUESDAY OR AS DIRECTED BY COUMADIN CLINIC    warfarin (COUMADIN) 5 MG tablet TAKE 1 AND 1/2 TABLETS BY MOUTH DAILY EXCEPT 1 TABLET ON TUESDAY OR AS DIRECTED BY COUMADIN CLINIC    warfarin (COUMADIN) 5 MG tablet TAKE 1 AND 1/2 TABLETS BY MOUTH DAILY EXCEPT 1 TABLET ON TUESDAY OR AS DIRECTED BY COUMADIN CLINIC       Review of patient's allergies indicates:   Allergen Reactions    Chlorhexidine Itching and Rash     Patient had raised rash on neck following RHC procedure. She states she's never had a problem with the "prep" used until this time.     Adhesive Rash       Past Medical History:   Diagnosis Date    Arrhythmia     Arthritis     Cardiac pacemaker in situ     Carpal tunnel syndrome, right     Cervical spondylosis     Cervicalgia     CHF (congestive heart failure)     Diverticulitis     Heart block AV third degree     Hyperlipidemia     ALFREDO on CPAP     Pulmonary hypertension     Subdeltoid bursitis      Past Surgical History:   Procedure Laterality Date    CARDIAC CATHETERIZATION      CARDIAC PACEMAKER PLACEMENT  7/29/13    [facility] PM    CARDIAC SURGERY      COLONOSCOPY N/A 9/28/2015    Procedure: COLONOSCOPY;  Surgeon: Jason Diaz MD;  Location: [facility] ([address] [place] [address]);  Service: Endoscopy;  Laterality: N/A;  Please schedule in 2-3 months with Dr" "Joe  Pulmonary HTN, 2nd floor (off remodulin infusion as of "a few days" before 9/9/15)    3 day hold Coumadin, [facility]  PT/INR scheduled before procedure    ECTOPIC PREGNANCY SURGERY Left     knee arthroscopy       Family History     Problem Relation (Age of Onset)    Arthritis Mother, Father    Diabetes Mother    Hyperlipidemia Mother, Father        Social History Main Topics    Smoking status: Never Smoker    Smokeless tobacco: Not on file    Alcohol use 0.0 oz/week      Comment: rarely    Drug use: No    Sexual activity: No     Review of Systems   Constitutional: Negative for chills and fever.   HENT: Negative for congestion and trouble swallowing.    Eyes: Negative for photophobia and visual disturbance.   Respiratory: Negative for cough and shortness of breath.    Cardiovascular: Negative for chest pain and palpitations.   Gastrointestinal: Negative for nausea and vomiting.   Endocrine: Positive for heat intolerance. Negative for cold intolerance.   Musculoskeletal: Negative for arthralgias and myalgias.   Skin: Negative for color change and wound.   Neurological: Negative for tremors and weakness.   Psychiatric/Behavioral: Negative for agitation.     Objective:     Vital Signs (Most Recent):  Temp: 98.1 °F (36.7 °C) (06/14/17 1150)  Pulse: 92 (06/14/17 1150)  Resp: 20 (06/14/17 1150)  BP: (!) 85/55 (06/14/17 1150)  SpO2: 96 % (06/14/17 1150) Vital Signs (24h Range):  Temp:  [98.1 °F (36.7 °C)-98.9 °F (37.2 °C)] 98.1 °F (36.7 °C)  Pulse:  [] 92  Resp:  [18-20] 20  SpO2:  [86 %-97 %] 96 %  BP: ()/(51-66) 85/55     Weight: 81 kg (178 lb 9.2 oz)  Body mass index is 31.63 kg/m².      Intake/Output Summary (Last 24 hours) at 06/14/17 1446  Last data filed at 06/14/17 1323   Gross per 24 hour   Intake            737.3 ml   Output             1700 ml   Net           -962.7 ml       Physical Exam   Constitutional: She is oriented to person, place, and time. She appears well-developed " and well-nourished. No distress.   HENT:   Head: Normocephalic and atraumatic.   Eyes: EOM are normal.   Neck: Normal range of motion. Neck supple.   Cardiovascular: Normal rate and regular rhythm.    Pulmonary/Chest: Effort normal. No respiratory distress.   Left pacemaker in place  Previous history of right tunneled line   Abdominal:   Soft, NTND   Musculoskeletal: She exhibits no edema or tenderness.   Neurological: She is alert and oriented to person, place, and time.   Skin: Skin is warm and dry.   Psychiatric: She has a normal mood and affect.       Significant Labs:  BMP:   Recent Labs  Lab 06/14/17 0627      *   K 3.7   CL 97   CO2 27   BUN 18   CREATININE 0.8   CALCIUM 8.5*   MG 1.9       CBC:   Recent Labs  Lab 06/14/17 0627   WBC 8.68   RBC 4.90   HGB 14.5   HCT 42.6      MCV 87   MCH 29.6   MCHC 34.0     CMP:   Recent Labs  Lab 06/14/17 0627      CALCIUM 8.5*   ALBUMIN 2.5*   PROT 6.7   *   K 3.7   CO2 27   CL 97   BUN 18   CREATININE 0.8   ALKPHOS 97   ALT 16   AST 21   BILITOT 0.9     Coagulation:   Recent Labs  Lab 06/13/17  1032 06/14/17  0120   INR  --  1.3*   APTT 25.9  --      LFTs:   Recent Labs  Lab 06/14/17 0627   ALT 16   AST 21   ALKPHOS 97   BILITOT 0.9   PROT 6.7   ALBUMIN 2.5*     All pertinent labs from the last 24 hours have been reviewed.    Assessment/Plan:     Active Diagnoses:    Diagnosis Date Noted POA    PRINCIPAL PROBLEM:  Primary pulmonary hypertension [I27.0]  Yes    Acute on chronic respiratory failure with hypoxia [J96.21] 06/03/2017 Yes    Sleep apnea- on CPAP [G47.30] 06/19/2015 Yes    Cardiac pacemaker in situ [Z95.0] 09/10/2013 Yes    Heart block AV third degree [I44.2] 07/28/2013 Yes    Pulmonary hypertension [I27.2] 10/18/2012 Yes    Long term current use of anticoagulant therapy [Z79.01] 07/11/2012 Not Applicable      Problems Resolved During this Admission:    Diagnosis Date Noted Date Resolved POA     Plan for gonsalo  placement in OR on Friday 6/16/17  NPO p MN 6/16/17  Consents to be obtained  Discussed with staff    Thank you for your consult. I will follow-up with patient. Please contact us if you have any additional questions.    Candi Saha PA-C   [id number]  General Surgery  [facility]

## 2017-06-14 NOTE — NURSING
Elena PAZ notified that Veletri not titrated up to 16 ng as sbp 80's this afternoon with vomitting episode this am.. OK to titrate up this pm if sbp>90 and patient without symptoms ( nausea, headache). Will also recheck K+ level now as Kcl riders given today ( patient said she could not take Micro K caps scheduled bid)

## 2017-06-14 NOTE — PLAN OF CARE
Problem: Patient Care Overview  Goal: Plan of Care Review  Outcome: Ongoing (interventions implemented as appropriate)  Pt tolerating titration of veletri Increase. Pt on heparin drip at 19units/kg.Pt free from falls. Pt wears non slip socks when ambulating. Pt bed low and locked position. Pt afebrile. Pt IV site without redness or edema. . Pt has denied any pain or discomfort this shift. Pt has orders for oxycodone PRN for pain.

## 2017-06-14 NOTE — PROGRESS NOTES
Progress Note  Palliative Care      Consult Requested By: Yves Ruth Jr.,*  Reason for Consult: Goals of Care      ASSESSMENT/PLAN:     Impression: Emily Cook is a 56 y.o. female with AVB/syncope s/p pacemaker placement and pulmonary HTN. She was transferred to [facility] from [facility] for worsening symptoms related to her pulmonary HTN. Cardiology is attempting to wean the HFNC as they uptitrate Veletri.       Goals of Care:  -DNR  -Next of kin for decision making, are patient's 3 sons.  -The patient's HFNC oxygen is being weaned as the Veletri is uptitrated. General surgery is planning for Cardoso placement on 6/16/17.  -The patient was seen this afternoon. She reports improvement in her dyspnea. She was solemn/tearful after general surgery discussed the Cardoso placement and risk for intubation with the inability to extubate.     Plan/Recommendations:  -Recommend discharge home with home health with Advanced Illness Management (AIM) program.    Thank you for allowing palliative medicine to participate in the care of this patient. Please call with any questions.  Vania Alvarado PA-C  [facility]  [phone]      > 50% of 35 min visit spent in chart review, face to face discussion of goals of care,  symptom assessment, coordination of care and emotional support.      SUBJECTIVE:     History of Present Illness:  Emily Cook is a 56 y.o. female with AVB/syncope s/p pacemaker placement and pulmonary HTN. She was transferred to [facility] from [facility] for worsening symptoms related to her pulmonary HTN. Cardiology attempting to uptitrate Veletri in order to wean oxygen.    Hospital Course:  -[facility] on 6/01/2017  -Pulmonology consulted  -Palliative medicine consulted for goals of care  -General surgery consulted for Cardoso placement; plan on procedure on 6/16/17.    Interval History:  There were no acute events overnight.     Past Medical History:   Diagnosis Date    Arrhythmia      "Arthritis     Cardiac pacemaker in situ     Carpal tunnel syndrome, right     Cervical spondylosis     Cervicalgia     CHF (congestive heart failure)     Diverticulitis     Heart block AV third degree     Hyperlipidemia     ALFREDO on CPAP     Pulmonary hypertension     Subdeltoid bursitis      Past Surgical History:   Procedure Laterality Date    CARDIAC CATHETERIZATION      CARDIAC PACEMAKER PLACEMENT  7/29/13    [employer] [facility] PM    CARDIAC SURGERY      COLONOSCOPY N/A 9/28/2015    Procedure: COLONOSCOPY;  Surgeon: Jason Diaz MD;  Location: [facility] ([address] [place] [address]);  Service: Endoscopy;  Laterality: N/A;  Please schedule in 2-3 months with Dr Diaz  Pulmonary HTN, 2nd floor (off remodulin infusion as of "a few days" before 9/9/15)    3 day hold Coumadin, [facility]  PT/INR scheduled before procedure    ECTOPIC PREGNANCY SURGERY Left     knee arthroscopy       Family History   Problem Relation Age of Onset    Arthritis Mother     Diabetes Mother     Hyperlipidemia Mother     Arthritis Father     Hyperlipidemia Father      Review of patient's allergies indicates:   Allergen Reactions    Chlorhexidine Itching and Rash     Patient had raised rash on neck following RHC procedure. She states she's never had a problem with the "prep" used until this time.     Adhesive Rash       Medications:  Continuous Infusions:    epoprostenol (VELETRI) infusion 15 ng/kg/min (06/14/17 0617)    veletri/remodulin cassette      veletri/remodulin tubing       Scheduled:    furosemide  80 mg Oral BID    gabapentin  300 mg Oral TID    macitentan  10 mg Oral Daily    potassium chloride  10 mEq Intravenous Q1H    potassium chloride  40 mEq Oral BID    sodium chloride 0.9%  10 mL Intravenous Q6H    sodium chloride 0.9%  3 mL Intravenous Q8H     PRN Meds: acetaminophen, docusate sodium, heparin (PORCINE), heparin (PORCINE), hydrocodone-acetaminophen 10-325mg, loperamide, ondansetron, ondansetron, " promethazine (PHENERGAN) IVPB, Flushing PICC Protocol **AND** sodium chloride 0.9% **AND** sodium chloride 0.9%    24h Oral Morphine Equivalents (OME): 0    Bowel Management Plan (BMP): Yes (x )  NO  (  ) docusate sodium capsule 100 mg  BID PRN constipation    OBJECTIVE:   Symptom Assessment (ESAS 0-10 scale)     ESAS 0 1 2 3 4 5 6 7 8 9 10   Pain x             Dyspnea     x         Anxiety x             Nausea     x         Depression  x             Anorexia x             Fatigue x             Insomnia x             Restlessness  x             Agitation x             Constipation     -  Diarrhea          -     Comments: The patient denies complaints of pain or discomfort      Perfromance Status: PPS Score ( 60 )       Physical Exam:  Vitals: Temp: 98.1 °F (36.7 °C) (06/14/17 1150)  Pulse: 92 (06/14/17 1150)  Resp: 20 (06/14/17 1150)  BP: (!) 85/55 (06/14/17 1150)  SpO2: 96 % (06/14/17 1150)  General: Afebrile, alert, comfortable, no acute distress.   Pulmonary: Labored, HFNC oxygen; CTAB   Cardiac: Tachycardic. Normal S1 & S2 w/o rubs/murmurs/gallops.   Abdominal: Non-tender, non-distended.Bowel sounds present    Extremities: Moves all extremities x 4.  Skin: Healing abrasion to forehead with bandage.No jaundice, rashes.  Neurological: Alert and oriented. No focal neuro deficits.   Psych/Mental Status:  CAM / Delirium -  FAST Stage for Dementia:1      Labs:  CBC:   WBC   Date Value Ref Range Status   06/14/2017 8.68 3.90 - 12.70 K/uL Final       Hemoglobin   Date Value Ref Range Status   06/14/2017 14.5 12.0 - 16.0 g/dL Final       Hematocrit   Date Value Ref Range Status   06/14/2017 42.6 37.0 - 48.5 % Final       MCV   Date Value Ref Range Status   06/14/2017 87 82 - 98 fL Final       Platelets   Date Value Ref Range Status   06/14/2017 245 150 - 350 K/uL Corrected     Comment:     Platelets are clumped on smear.Platelet count may be affected.       BMP:     Recent Labs  Lab 06/14/17  0627      *   K  "3.7   CL 97   CO2 27   BUN 18   CREATININE 0.8   CALCIUM 8.5*   MG 1.9       LFT:   Lab Results   Component Value Date    AST 21 06/14/2017    ALKPHOS 97 06/14/2017    BILITOT 0.9 06/14/2017       Albumin:   Albumin   Date Value Ref Range Status   06/14/2017 2.5 (L) 3.5 - 5.2 g/dL Final   01/27/2017 3.6 3.5 - 5.0 G/DL Final     Protein:   Total Protein   Date Value Ref Range Status   06/14/2017 6.7 6.0 - 8.4 g/dL Final       LACTIC ACID: No results found for: LACTATE    Radiology:  CTA Chest 6/06/17:  Read as "1. Normal-appearing pulmonary veins without evidence of venoocclusive disease.  2.  Diffuse pulmonary parenchymal heterogeneity, interlobular septal thickening, and widespread groundglass attenuation seen throughout both lungs, however most prominent in the lower lobes in a perihilar/posterior distribution.  These findings are consistent with pulmonary edema, and demonstrate significant interval detriment change when compared to CTA chest 05/14/2017.  3.  Mild cardiomegaly with a small pericardial effusion.  4.  Dilated main pulmonary artery which can be seen in the setting of pulmonary artery hypertension.    CXR 6/04/17:   Read as "There is no pneumothorax.  Left chest wall pacer and right PICC catheter are stable as is left central venous catheter.  There is no pneumothorax.  There has been no significant interval detrimental change in the cardiopulmonary status since previous exam allowing for rotation.    Legal/Advanced Directives: not on file  Living Will: not on file  Resuscitate Status: DNR  Decision-Making Capacity: demonstrates capacity  Medical Power of : no; next of kin are patient's 3 sons    Psychosocial/Cultural: Ms. Cook resides in [place], [place]. She has been  twice. She is currently engaged; they have been together for 17 years. She has 3 adult sons and 5 grandchildren, all living in [place]. She has 1 sister and 2 brothers. She lives with her fiancee and son, Ashwin Naidu; her " mother and father live down the street. 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 Hobbies include playing bingo.    Spiritual:   F- Itzel and Belief: Christian    I - Importance: attends service regularly  .  C - Community: no mention of community involvement    A - Address in Care: patient was seen by spiritual care      Problem list:  Active Hospital Problems    Diagnosis  POA    *Primary pulmonary hypertension [I27.0]  Yes    Acute on chronic respiratory failure with hypoxia [J96.21]  Yes    Sleep apnea- on CPAP [G47.30]  Yes    Cardiac pacemaker in situ [Z95.0]  Yes    Heart block AV third degree [I44.2]  Yes     Intermittent infranodal        Pulmonary hypertension [I27.2]  Yes    Long term current use of anticoagulant therapy [Z79.01]  Not Applicable      Resolved Hospital Problems    Diagnosis Date Resolved POA   No resolved problems to display.

## 2017-06-15 NOTE — NURSING
Titrated O2 down at 0930 to 8L high flow NC then again at 1011 to 7L high flow NC. Stats 93-95% will continue to monitor.

## 2017-06-15 NOTE — PROGRESS NOTES
Veletri gtt titrated to 16ng/kg/min (66ml/24hrs) at 2340. Pt tolerated well; pt denies having any adverse reactions(headache, SOB, etc). B/P stable (sys); MAP 62-72. Sheikh PRECIADO, notified. Order given to avoid titrating up  On Veletri till patient's B/P is >90(sys). Last B/P 86(sys)

## 2017-06-15 NOTE — NURSING
Titrated O2 to 7L high flow NC pt's stats 93-95% while in bed. When ambulating to the bathroom dropped to 81%.

## 2017-06-15 NOTE — NURSING
"On tele monitor pt's -140 knocked on bathroom door pt stated, "I'm still kicking." care ongoing. Will continue to monitor.   "

## 2017-06-15 NOTE — PROGRESS NOTES
"Progress Note  Heart Transplant Service    Admit Date: 5/31/2017   LOS: 15 days     SUBJECTIVE:     Follow up for: Primary pulmonary hypertension    Interval History: No issues overnight. Currently at 17ng/kg/mn on Veletri and 9L HFNC.     Scheduled Meds:   furosemide  80 mg Oral BID    gabapentin  300 mg Oral TID    macitentan  10 mg Oral Daily    potassium chloride  40 mEq Oral BID    sodium chloride 0.9%  10 mL Intravenous Q6H    sodium chloride 0.9%  3 mL Intravenous Q8H     Continuous Infusions:   epoprostenol (VELETRI) infusion 17 ng/kg/min (06/15/17 0559)    veletri/remodulin cassette      veletri/remodulin tubing       PRN Meds:acetaminophen, docusate sodium, heparin (PORCINE), heparin (PORCINE), hydrocodone-acetaminophen 10-325mg, loperamide, ondansetron, ondansetron, promethazine (PHENERGAN) IVPB, Flushing PICC Protocol **AND** sodium chloride 0.9% **AND** sodium chloride 0.9%    Review of patient's allergies indicates:   Allergen Reactions    Chlorhexidine Itching and Rash     Patient had raised rash on neck following RHC procedure. She states she's never had a problem with the "prep" used until this time.     Adhesive Rash     Gen: denies fever chills fatigue weight loss  HEENT: denies HA, blurry vision, tinnitus, dry mouth  Resp: denies SOB, cough, hemoptysis  CV: Denies CP, palps, orthopnea, PND, edema  GI: denies abd pain, nausea/vomiting, diarrhea, melena, constipation, hematochezia    OBJECTIVE:     Vital Signs (Most Recent)  Temp: 98.3 °F (36.8 °C) (06/15/17 0730)  Pulse: 94 (06/15/17 0711)  Resp: 18 (06/15/17 0711)  BP: (!) 89/55 (06/15/17 0711)  SpO2: (!) 88 % (06/15/17 0711)    Vital Signs Range (Last 24H):  Temp:  [98 °F (36.7 °C)-98.9 °F (37.2 °C)]   Pulse:  []   Resp:  [16-20]   BP: ()/(50-61)   SpO2:  [85 %-97 %]     I & O (Last 24H):    Intake/Output Summary (Last 24 hours) at 06/15/17 0750  Last data filed at 06/15/17 0500   Gross per 24 hour   Intake             " 1341 ml   Output             1900 ml   Net             -559 ml        Physical Exam  Gen: NAD  Neck: + JVD to above clavicle with HJR  Lung: CTA bilat  Heart: Normal S1/S2, tachy, regular rhythm, II/VI systolic murmur  Abdomen: Soft, NT/ND, NABS, no masses, no guarding/rebounding  Extremities: No LE edema bilaterally, 2+ pulses bilaterally in upper/lower extremities   Skin: Normal color and turgor.    Neuro: AAOx3    Labs:       Recent Labs  Lab 06/13/17  1032 06/14/17  0627 06/15/17  0500   WBC 7.84 8.68 7.46   HGB 14.8 14.5 14.0   HCT 42.5 42.6 41.1    245 253   LYMPH 21.0  CANCELED 18.0  Test Not Performed 18.9  1.4   MONO 2.0*  CANCELED 8.0  Test Not Performed 7.8  0.6   EOSINOPHIL 0.0 0.0 2.0         Recent Labs  Lab 06/13/17  0430 06/13/17  1032 06/14/17  0120 06/15/17  0500   APTT  --  25.9  --   --    INR 1.4*  --  1.3* 1.1          Recent Labs  Lab 06/13/17  0430 06/14/17  0627 06/14/17  1724 06/15/17  0500   * 104  --  108   CALCIUM 8.5* 8.5*  --  8.3*   ALBUMIN 2.5* 2.5*  --  2.6*   PROT 6.9 6.7  --  6.9   * 134*  --  133*   K 3.5 3.7 3.7 3.6   CO2 30* 27  --  29   CL 96 97  --  96   BUN 24* 18  --  15   CREATININE 0.8 0.8  --  0.8   ALKPHOS 103 97  --  98   ALT 17 16  --  19   AST 19 21  --  22   BILITOT 0.9 0.9  --  0.8   MG 2.0 1.9  --  2.2   PHOS 2.6* 2.8  --  3.0     Estimated Creatinine Clearance: 79.8 mL/min (based on Cr of 0.8).      Recent Labs  Lab 06/14/17  0627   *       No results for input(s): LDH in the last 168 hours.    Microbiology Results (last 7 days)     Procedure Component Value Units Date/Time    Blood culture [[id number]] Collected:  06/09/17 0742    Order Status:  Completed Specimen:  Blood from Peripheral, Jugular, Internal Left Updated:  06/14/17 1022     Blood Culture, Routine No growth after 5 days.    Blood culture [[id number]] Collected:  06/09/17 0913    Order Status:  Completed Specimen:  Blood Updated:  06/14/17 1022     Blood Culture,  Routine No growth after 5 days.    Urine culture [[id number]]  (Susceptibility) Collected:  06/09/17 0743    Order Status:  Completed Specimen:  Urine from Urine, Catheterized Updated:  06/11/17 1248     Urine Culture, Routine --     ESCHERICHIA COLI  >100,000 cfu/ml      Blood culture [[id number]]     Order Status:  Canceled Specimen:  Blood     Blood culture [[id number]]     Order Status:  Canceled Specimen:  Blood     Blood culture [[id number]]     Order Status:  Canceled Specimen:  Blood         ASSESSMENT AND PLAN:   56 y.o. woman with PMH of AVB/syncope s/p PPM, pulmonary HTN, who is now transferred from [facility] for shortness of breath and management of patient's pulmonary HTN.     Pulmonary HTN, WHO group 1  Acute on Chronic RV Systolic Failure  -Has not tolerated multiple PH therapies  - Continue home dose Macitentan  -RHC 6/1/17 showed RA 13, PCWP 15, PAP 90/38, CO/CI- 3.4/1.8.   -Pt was started on IV Veletri; currently at 17 ng/kg/mn. Increase q6h 1ng/hr to see what she can tolerate  -Started on dopamine 3 mcg/hour for RV failure and poor organ perfusion; changed to  2.5 mcg/kg/min 6/7;  turned off 6/9 2/2 hypotension  -CT-A with no evidence of PVOD  -Continue PO lasix 80mg BID  -INR sub therqpeutic today. Holding coumadin for tunneled cath. No need for Heparin bridge   -Plan for Cardoso on Fridy with Gen SOPHIA.   -Appreciate palliative care assistance. Plan for now is for pt to go home with H/H AIM program.  Pt DNR     Chronic Hypoxia, Acute on Chronic Resp Failure  - On Home O2 via NC, 4-5L but currenlty on high flow O2. Will wean as tolerated.   - Appreciate pulm recs  - Doppler lower extremity failed to show DVT  - TTE with bubble negative  - CTA chest with no evidence of PVOD     Leukocytosis  - Resolved

## 2017-06-15 NOTE — ANESTHESIA PREPROCEDURE EVALUATION
"                                                                                                             06/15/2017  Pre-operative evaluation for Procedure(s) (LRB):  INSERTION-CATHETER-CARUSO (Right)    Emily Cook is a 56 y.o. female AVB/syncope s/p PPM, pulmonary HTN, who was transferred from OSH for shortness of breath and management of pulmonary HTN. Her pHTN has been difficult to treat, and she has been intolerant of multiple medications in the past. Primary team has started veletri with the intent to wean HFNC and uptitrate Veletri. Currently on 7L NC with O2 sats noted to be in mid-90s. General surgery has been consulted for caruso placement for home veletri.    Per Palliative Care note: "Yesterday general surgery explained the risk of intubation with the inability to extubate due to her underlying pulmonary HTN. She is in agreement with Caruso placement for home Veletri at this time. She clearly stated today as she has in the past, that she would "not want to live attached to a machine." She is NOT in agreement with a long term ventilator or tracheostomy." She has been made DNR.     LDA:   L IJ triple lumen  R brachial PICC    Prev airway: None on file    Drips: Veletri     Patient Active Problem List   Diagnosis    Chronic pulmonary heart disease    Long term current use of anticoagulant therapy    Pulmonary hypertension    Heart block AV third degree    Cardiac pacemaker in situ    Cervical spondylosis    Cervicalgia    Chronic neck pain    Subdeltoid bursitis    Diverticulitis    Sleep apnea- on CPAP    Diverticulitis large intestine    Carpal tunnel syndrome, right (mild)    Primary pulmonary hypertension    Acute on chronic respiratory failure with hypoxia       Review of patient's allergies indicates:   Allergen Reactions    Chlorhexidine Itching and Rash     Patient had raised rash on neck following RHC procedure. She states she's never had a problem with the "prep" " used until this time.     Adhesive Rash        No current facility-administered medications on file prior to encounter.      Current Outpatient Prescriptions on File Prior to Encounter   Medication Sig Dispense Refill    clobetasol (TEMOVATE) 0.05 % external solution   2    conjugated estrogens (PREMARIN) vaginal cream Place a pea-sized amount in vagina every night for 2 weeks, then use 2-3 nights a week 45 g 12    duloxetine (CYMBALTA) 60 MG capsule Take 1 capsule (60 mg total) by mouth 2 (two) times daily. 180 capsule 1    furosemide (LASIX) 20 MG tablet Take 1 tablet (20 mg total) by mouth once daily. 90 tablet 11    gabapentin (NEURONTIN) 300 MG capsule TAKE 1 CAPSULE BY MOUTH THREE TIMES DAILY FOR 1 WEEK, IF NO RELIEF INCREASE TO 1 IN MORNING, 1 IN AFTERNOON AND 2 AT BEDTIME 360 capsule 3    hydrocodone-acetaminophen 10-325mg (NORCO)  mg Tab Take 1 tablet by mouth 3 (three) times daily as needed. 90 tablet 0    hydrOXYzine (VISTARIL) 50 MG Cap 50 mg daily as needed.       ketoconazole (NIZORAL) 2 % cream Apply topically 2 (two) times daily. 60 g 2    macitentan (OPSUMIT) 10 mg Tab Take 1 tablet (10 mg total) by mouth once daily. 30 tablet 11    PATADAY 0.2 % Drop   6    peg 3350-electrolytes-vit C (MOVIPREP) 100-7.5-2.691 gram PwPk Take 1 packet by mouth as directed. 1 each 0    potassium chloride (MICRO-K) 10 MEQ CpSR Take 1 capsule (10 mEq total) by mouth once daily. 90 capsule 3    treprostinil-neb accessories 1.74 mg/2.9 mL (0.6 mg/mL) Nebu Inhale into the lungs.      warfarin (COUMADIN) 5 MG tablet TAKE 1 AND 1/2 TABLETS BY MOUTH DAILY EXCEPT 1 TABLET ON TUESDAY OR AS DIRECTED BY COUMADIN CLINIC 150 tablet 5    warfarin (COUMADIN) 5 MG tablet TAKE 1 AND 1/2 TABLETS BY MOUTH DAILY EXCEPT 1 TABLET ON TUESDAY OR AS DIRECTED BY COUMADIN CLINIC 150 tablet 0    warfarin (COUMADIN) 5 MG tablet TAKE 1 AND 1/2 TABLETS BY MOUTH DAILY EXCEPT 1 TABLET ON TUESDAY OR AS DIRECTED BY COUMADIN CLINIC  "150 tablet 0       Past Surgical History:   Procedure Laterality Date    CARDIAC CATHETERIZATION      CARDIAC PACEMAKER PLACEMENT  7/29/13    [employer] DC PM    CARDIAC SURGERY      COLONOSCOPY N/A 9/28/2015    Procedure: COLONOSCOPY;  Surgeon: Jason Diaz MD;  Location: [facility] ([address] [place] [address]);  Service: Endoscopy;  Laterality: N/A;  Please schedule in 2-3 months with Dr Diaz  Pulmonary HTN, 2nd floor (off remodulin infusion as of "a few days" before 9/9/15)    3 day hold Coumadin, [facility]  PT/INR scheduled before procedure    ECTOPIC PREGNANCY SURGERY Left     knee arthroscopy         Social History     Social History    Marital status: Single     Spouse name: N/A    Number of children: N/A    Years of education: N/A     Occupational History    Not on file.     Social History Main Topics    Smoking status: Never Smoker    Smokeless tobacco: Not on file    Alcohol use 0.0 oz/week      Comment: rarely    Drug use: No    Sexual activity: No     Other Topics Concern    Not on file     Social History Narrative    No narrative on file         Vital Signs Range (Last 24H):  Temp:  [36.7 °C (98 °F)-37.3 °C (99.2 °F)]   Pulse:  []   Resp:  [16-20]   BP: ()/(50-61)   SpO2:  [85 %-97 %]       CBC:   Recent Labs      06/14/17   0627  06/15/17   0500   WBC  8.68  7.46   RBC  4.90  4.74   HGB  14.5  14.0   HCT  42.6  41.1   PLT  245  253   MCV  87  87   MCH  29.6  29.5   MCHC  34.0  34.1       CMP:   Recent Labs      06/14/17   0627  06/14/17   1724  06/15/17   0500   NA  134*   --   133*   K  3.7  3.7  3.6   CL  97   --   96   CO2  27   --   29   BUN  18   --   15   CREATININE  0.8   --   0.8   GLU  104   --   108   MG  1.9   --   2.2   PHOS  2.8   --   3.0   CALCIUM  8.5*   --   8.3*   ALBUMIN  2.5*   --   2.6*   PROT  6.7   --   6.9   ALKPHOS  97   --   98   ALT  16   --   19   AST  21   --   22   BILITOT  0.9   --   0.8       INR  Recent Labs      06/13/17   0430  06/13/17   " 1032  06/14/17   0120  06/15/17   0500   INR  1.4*   --   1.3*  1.1   APTT   --   25.9   --    --        Diagnostic Studies:  RHC 6/1/17:   Severe Pulmonary Hypertension.  RHC demonstrates increased right and left-sided filling pressures.  Severely decreased CI by Eliu Method indicative of Low output state.   Compared to prior RHC of  10/16 ,   no significant change in PAP, CO is alittle lower and filling pressures are higher today.    EKG 5/31/17:  Sinus rhythm with marked sinus arrythmia  Possible Left atrial enlargement  Right axis deviation  Possible Right ventricular hypertrophy  Septal infarct (cited on or before 20-MAY-2017)  ST and T wave abnormality, consider anterior ischemia  Abnormal ECG  When compared with ECG of 20-MAY-2017 17:51,  Questionable change in initial forces of Septal leads    2D Echo 6/5/17:  CONCLUSIONS     1 - Normal left ventricular systolic function (EF 60-65%).     2 - Right ventricular enlargement with moderately depressed systolic function.     3 - Indeterminate LV diastolic function.     4 - Trivial mitral regurgitation.     5 - Trivial tricuspid regurgitation.     6 - Intermediate central venous pressure.     7 - No evidence of intracardiac shunt.     8 - Pulmonary hypertension. The estimated PA systolic pressure is 79 mmHg. PA pressure likely underestimated due to incomplete TR jet.     Previous echo had PAP of 119.   Anesthesia Evaluation    I have reviewed the Patient Summary Reports.    I have reviewed the Nursing Notes.   I have reviewed the Medications.     Review of Systems  Anesthesia Hx:  History of prior surgery of interest to airway management or planning: Denies Family Hx of Anesthesia complications.   Denies Personal Hx of Anesthesia complications.   Cardiovascular:   Exercise tolerance: poor Pacemaker Denies MI.   CHF Severe pulm HTN. Moderate depressed RV systolic function   Pulmonary:   Shortness of breath Severe pulm htn   Renal/:  Renal/ Normal      Hepatic/GI:  Hepatic/GI Normal    Musculoskeletal:  Musculoskeletal Normal    Neurological:   Denies TIA. Denies CVA. Denies Seizures.    Endocrine:  Endocrine Normal        Physical Exam  General:  Well nourished    Airway/Jaw/Neck:  Airway Findings: Mouth Opening: Normal Tongue: Normal  Mallampati: II  TM Distance: Normal, at least 6 cm  Jaw/Neck Findings:  Neck ROM: Normal ROM      Dental:  Dental Findings: In tact   Chest/Lungs:  Chest/Lungs Findings: Normal Respiratory Rate, Clear to auscultation     Heart/Vascular:  Heart Findings: Rate: Normal  Rhythm: Regular Rhythm        Mental Status:  Mental Status Findings:  Cooperative, Alert and Oriented         Anesthesia Plan  Type of Anesthesia, risks & benefits discussed:  Anesthesia Type:  general, MAC  Patient's Preference: Local-MAC  Intra-op Monitoring Plan:   Intra-op Monitoring Plan Comments:   Post Op Pain Control Plan:   Post Op Pain Control Plan Comments:   Induction:   IV  Beta Blocker:  Patient is not currently on a Beta-Blocker (No further documentation required).       Informed Consent: Patient understands risks and agrees with Anesthesia plan.  Questions answered. Anesthesia consent signed with patient.  ASA Score: 4     Day of Surgery Review of History & Physical:        Anesthesia Plan Notes: Discussed with patient about code status with respect to leti-operative time course. DNR to be rescinded during leti-operative time with reinstatement upon transfer back to floor. Discussed that procedure would be done with local anesthetic and minimal sedation in order to prevent a decline in respiratory status.        Ready For Surgery From Anesthesia Perspective.

## 2017-06-15 NOTE — PLAN OF CARE
Problem: Patient Care Overview  Goal: Plan of Care Review  Plan of care reviewed on am rounds, afrebrile , sbp 85-94/50's, tele 's SR, Cr 0.8, Mg 1.9( replaced) K 3.7 ( replaced with IV riders as patient states hard to swallow Micro K caps). Hep gtt dcd per MD order, Veletri infusion in progress via PICC @ current dose 15 ng, no titration done this shift as patient with n/v this am and SBP< 90, will resume when asymptomatic and bp> 90, CF weaned off to high flow nasal cannula at 10 liters( goal > 82%), states more comfortable with change in O2 delivery, up to bedside commode often,PT/OT in progress, encouraged to walk in montero tomorrow with PT since O2 now portable. Family to visit, plan for tunneled cath placement on Fri, cooperative with care though not looking forward to going home on Veletri. Signed DNR on chart.

## 2017-06-15 NOTE — PROGRESS NOTES
Pt's K 3.7. PO K currently ordered however, pt is refusing it due to nausea. Cony PRECIADO, notified. 40meq of IV K ordered in addition to 2g of IV mg. Recheck electrolytes with AM labs.    MD also notified of pt's B/P of 84(sys). No new orders at this time.     Will continue to monitor.

## 2017-06-15 NOTE — NURSING
Increased and changed cassette @ 1430  --veletri now @ 18ng/kg/hr 74ml/hr     --next increase @ 2030     Care on going. Will continue to monitor.

## 2017-06-15 NOTE — PROGRESS NOTES
"Progress Note  Palliative Care      Consult Requested By: Yves Ruth Jr.,*  Reason for Consult: Goals of Care      ASSESSMENT/PLAN:     Impression: Emily Cook is a 56 y.o. female with AVB/syncope s/p pacemaker placement and pulmonary HTN. She was transferred to [facility] from [facility] for worsening symptoms related to her pulmonary HTN. Cardiology is attempting to wean the HFNC as they uptitrate Veletri.       Goals of Care:  -DNR  -Next of kin for decision making, are patient's 3 sons.  -The patient's HFNC oxygen is being weaned as the Veletri is uptitrated. General surgery is planning for Cardoso placement on 6/16/17.  -The patient was seen this morning.  -Yesterday general surgery explained the risk of intubation with the inability to extubate due to her underlying pulmonary HTN. She is in agreement with Cardoso placement for home Veletri at this time. She clearly stated today as she has in the past, that she would "not want to live attached to a machine." She is NOT in agreement with a long term ventilator or tracheostomy.  -Her mother and son are arriving at the hospital tomorrow for support.     Plan/Recommendations:  -Recommend discharge home with home health with [facility] (AIM) program.    Ms. Cook was discussed with attending, Dr. Baldwin.  We will sign off for now. Please re consult if the patient's status or hospital course changes and our assistance is needed. Thank you for allowing palliative medicine to participate in the care of this patient.  Vania Alvarado PA-C  [facility]  [id number]      > 50% of 35 min visit spent in chart review, face to face discussion of goals of care,  symptom assessment, coordination of care and emotional support.      SUBJECTIVE:     History of Present Illness:  Emily Cook is a 56 y.o. female with AVB/syncope s/p pacemaker placement and pulmonary HTN. She was transferred to [facility] from [facility] for worsening " "symptoms related to her pulmonary HTN. Cardiology attempting to uptitrate Veletri in order to wean oxygen.    Hospital Course:  -RHC on 6/01/2017  -Pulmonology consulted  -Palliative medicine consulted for goals of care  -General surgery consulted for Cardoso placement; plan on procedure on 6/16/17.    Interval History:  There were no acute events overnight. At the time the patient was seen this morning she was sitting in a chair watching her soap opera. She had no complaints of pain and reports improvement in her dyspnea.     Past Medical History:   Diagnosis Date    Arrhythmia     Arthritis     Cardiac pacemaker in situ     Carpal tunnel syndrome, right     Cervical spondylosis     Cervicalgia     CHF (congestive heart failure)     Diverticulitis     Heart block AV third degree     Hyperlipidemia     ALFREDO on CPAP     Pulmonary hypertension     Subdeltoid bursitis      Past Surgical History:   Procedure Laterality Date    CARDIAC CATHETERIZATION      CARDIAC PACEMAKER PLACEMENT  7/29/13    [employer] DC PM    CARDIAC SURGERY      COLONOSCOPY N/A 9/28/2015    Procedure: COLONOSCOPY;  Surgeon: Jason Diaz MD;  Location: [facility] ([address] [place] [address]);  Service: Endoscopy;  Laterality: N/A;  Please schedule in 2-3 months with Dr Diaz  Pulmonary HTN, 2nd floor (off remodulin infusion as of "a few days" before 9/9/15)    3 day hold Coumadin, [facility]  PT/INR scheduled before procedure    ECTOPIC PREGNANCY SURGERY Left     knee arthroscopy       Family History   Problem Relation Age of Onset    Arthritis Mother     Diabetes Mother     Hyperlipidemia Mother     Arthritis Father     Hyperlipidemia Father      Review of patient's allergies indicates:   Allergen Reactions    Chlorhexidine Itching and Rash     Patient had raised rash on neck following RHC procedure. She states she's never had a problem with the "prep" used until this time.     Adhesive Rash       Medications:  Continuous " Infusions:    epoprostenol (VELETRI) infusion 17 ng/kg/min (06/15/17 0559)    veletri/remodulin cassette      veletri/remodulin tubing       Scheduled:    furosemide  80 mg Oral BID    gabapentin  300 mg Oral TID    macitentan  10 mg Oral Daily    mupirocin   Topical (Top) Daily    potassium chloride  20 mEq Oral BID    sodium chloride 0.9%  10 mL Intravenous Q6H    sodium chloride 0.9%  3 mL Intravenous Q8H     PRN Meds: acetaminophen, docusate sodium, heparin (PORCINE), heparin (PORCINE), hydrocodone-acetaminophen 10-325mg, loperamide, ondansetron, ondansetron, promethazine (PHENERGAN) IVPB, Flushing PICC Protocol **AND** sodium chloride 0.9% **AND** sodium chloride 0.9%    24h Oral Morphine Equivalents (OME): 0    Bowel Management Plan (BMP): Yes (x )  NO  (  ) docusate sodium capsule 100 mg  BID PRN constipation    OBJECTIVE:   Symptom Assessment (ESAS 0-10 scale)     ESAS 0 1 2 3 4 5 6 7 8 9 10   Pain x             Dyspnea    x          Anxiety x             Nausea x             Depression  x             Anorexia x             Fatigue x             Insomnia x             Restlessness  x             Agitation x             Constipation     -  Diarrhea          -     Comments: The patient denies complaints of pain or discomfort      Perfromance Status: PPS Score ( 60 )       Physical Exam:  Vitals: Temp: 98.3 °F (36.8 °C) (06/15/17 0730)  Pulse: 105 (06/15/17 0912)  Resp: 18 (06/15/17 0912)  BP: 100/60 (06/15/17 0912)  SpO2: (!) 92 % (06/15/17 1011)  General: Afebrile, alert, comfortable, no acute distress.   Pulmonary: HFNC oxygen; CTAB   Cardiac: Tachycardic. Normal S1 & S2 w/o rubs/murmurs/gallops.   Abdominal: Non-tender, non-distended.Bowel sounds present    Extremities: Moves all extremities x 4.  Skin: Healing abrasion to forehead with bandage.No jaundice, rashes.  Neurological: Alert and oriented. No focal neuro deficits.   Psych/Mental Status:  CAM / Delirium -  FAST Stage for  "Dementia:1      Labs:  CBC:   WBC   Date Value Ref Range Status   06/15/2017 7.46 3.90 - 12.70 K/uL Final     MCV   Date Value Ref Range Status   06/14/2017 87 82 - 98 fL Final           Hematocrit   Date Value Ref Range Status   06/15/2017 41.1 37.0 - 48.5 % Final                               MG 2.2       LFT:   Lab Results   Component Value Date    AST 22 06/15/2017    ALKPHOS 98 06/15/2017    BILITOT 0.8 06/15/2017       Albumin:   Albumin   Date Value Ref Range Status   06/15/2017 2.6 (L) 3.5 - 5.2 g/dL Final   01/27/2017 3.6 3.5 - 5.0 G/DL Final     Protein:   Total Protein   Date Value Ref Range Status   06/15/2017 6.9 6.0 - 8.4 g/dL Final       LACTIC ACID: No results found for: LACTATE    Radiology:  CTA Chest 6/06/17:  Read as "1. Normal-appearing pulmonary veins without evidence of venoocclusive disease.  2.  Diffuse pulmonary parenchymal heterogeneity, interlobular septal thickening, and widespread groundglass attenuation seen throughout both lungs, however most prominent in the lower lobes in a perihilar/posterior distribution.  These findings are consistent with pulmonary edema, and demonstrate significant interval detriment change when compared to CTA chest 05/14/2017.  3.  Mild cardiomegaly with a small pericardial effusion.  4.  Dilated main pulmonary artery which can be seen in the setting of pulmonary artery hypertension.    CXR 6/04/17:   Read as "There is no pneumothorax.  Left chest wall pacer and right PICC catheter are stable as is left central venous catheter.  There is no pneumothorax.  There has been no significant interval detrimental change in the cardiopulmonary status since previous exam allowing for rotation.    Legal/Advanced Directives: not on file  Living Will: not on file  Resuscitate Status: DNR  Decision-Making Capacity: demonstrates capacity  Medical Power of : no; next of kin are patient's 3 sons    Psychosocial/Cultural: Ms. Cook resides in [place], [place]. She has been "  twice. She is currently engaged; they have been together for 17 years. She has 3 adult sons and 5 grandchildren, all living in [place]. She has 1 sister and 2 brothers. She lives with her fiancee and son, Ashwin Naidu; her mother and father live down the street. 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 Hobbies include playing bingo.    Spiritual:   F- Itzel and Belief: Denominational    I - Importance: attends service regularly  .  C - Community: no mention of community involvement    A - Address in Care: patient was seen by spiritual care      Problem list:  Active Hospital Problems    Diagnosis  POA    *Primary pulmonary hypertension [I27.0]  Yes    Acute on chronic respiratory failure with hypoxia [J96.21]  Yes    Sleep apnea- on CPAP [G47.30]  Yes    Cardiac pacemaker in situ [Z95.0]  Yes    Heart block AV third degree [I44.2]  Yes     Intermittent infranodal        Pulmonary hypertension [I27.2]  Yes    Long term current use of anticoagulant therapy [Z79.01]  Not Applicable      Resolved Hospital Problems    Diagnosis Date Resolved POA   No resolved problems to display.

## 2017-06-15 NOTE — PLAN OF CARE
Problem: Patient Care Overview  Goal: Plan of Care Review  Outcome: Ongoing (interventions implemented as appropriate)  Pt AAO. VSS. B/P remains low (sys), see chart for further assessment details.    Telemetry monitoring in progress. NSR. HR     Pulm htn mgmt in progress. Veletri titrated up 1ng during shift. Current rate 17ng/kg/min, DW 86kg (66mls/24hrs). Goal rate 25ng/kg/min. Will continue to titrate up 1 ng Q6 as long as pt's systolic B/P is > 90. Pt to go to OR on 6/16 for Hickmann placement.    O2 levels stable at 85-95% on 9L via HF n/c. Will continue to wean oxygen down if pt can tolerate.    I/Os monitored. UO 1L.    Pt chairfast, up with 2 person assist. NO new skin breakdown noted. Bandaid on forehead changed on 6/15.    Fall precautions in place. Daily labs monitored; K(40meq) and Mg(2g) given overnight. NO acute events overnight.

## 2017-06-16 NOTE — PT/OT/SLP PROGRESS
Physical Therapy      Emily Cook  MRN: [id number]    Patient not seen today secondary to  (pt. in OR for line placement). Will follow-up over weekend or Monday.    Dain Nesbitt, PT   6/16/2017

## 2017-06-16 NOTE — PROGRESS NOTES
Pt seen and examined.   VALENCIA. HDS.  Ready for OR for brush placement      Dain Ford MD PGY II  [phone]

## 2017-06-16 NOTE — PROGRESS NOTES
UPDATE    SW to pt's room for update. Pt presents as aaox3 with calm affect. Pt reports coping adequately with no needs at this time. Per HTS rounds this morning, pt will likely d/c beginning of next week with AIM. SW explained to pt that home health and AIM are compatible with IV Veletri, but not with hospice. Pt confirms understanding. SW provided some education regarding home health, AIM, and hospice. Pt states she would like SW to return tomorrow when her family is present. SW providing psychosocial and counseling support, education, resources, and d/c planning as needed. SW continuing to follow and remains available.

## 2017-06-16 NOTE — PLAN OF CARE
Problem: Patient Care Overview  Goal: Plan of Care Review  Outcome: Ongoing (interventions implemented as appropriate)  Pt AAO. VSS. B/P remains low (sys), see chart for further assessment details.     Telemetry monitoring in progress. NSR. HR/ST      Pulm htn mgmt in progress. Veletri titrated up 1ng during shift. Current rate 19ng/kg/min, DW 86kg (78mls/24hrs). Goal rate 25ng/kg/min, per MD notes. Will continue to titrate up 1 ng Q6 as long as pt's systolic B/P is > 90. Pt to go to OR on 6/16 for Hickmann placement.     O2 levels stable at 89-95% on 6-7L via HF n/c when lying or sitting. However, pt often desats to 80-85% on 6-7L when ambulating or any type of activity. Will continue to wean oxygen down if pt can tolerate.     I/Os monitored. UO 600mls with 0BM during shift. .     Pt chairfast, up with 2 person assist. NO new skin breakdown noted. Bandaid on forehead changed on 6/15.     Fall precautions in place. Daily labs monitored. NO acute events overnight.

## 2017-06-16 NOTE — PROGRESS NOTES
"Progress Note  Heart Transplant Service    Admit Date: 5/31/2017   LOS: 16 days     SUBJECTIVE:     Follow up for: Primary pulmonary hypertension    Interval History: No issues overnight. Currently at 17ng/kg/mn on Veletri and 9L HFNC.     Scheduled Meds:   furosemide  80 mg Oral BID    gabapentin  300 mg Oral TID    macitentan  10 mg Oral Daily    mupirocin   Topical (Top) Daily    potassium chloride  20 mEq Oral BID    sodium chloride 0.9%  10 mL Intravenous Q6H    sodium chloride 0.9%  3 mL Intravenous Q8H     Continuous Infusions:   epoprostenol (VELETRI) infusion 19 ng/kg/min (06/15/17 2053)    veletri/remodulin cassette      veletri/remodulin tubing       PRN Meds:acetaminophen, docusate sodium, heparin (PORCINE), heparin (PORCINE), hydrocodone-acetaminophen 10-325mg, loperamide, ondansetron, ondansetron, promethazine (PHENERGAN) IVPB, Flushing PICC Protocol **AND** sodium chloride 0.9% **AND** sodium chloride 0.9%    Review of patient's allergies indicates:   Allergen Reactions    Chlorhexidine Itching and Rash     Patient had raised rash on neck following RHC procedure. She states she's never had a problem with the "prep" used until this time.     Adhesive Rash     Gen: denies fever chills fatigue weight loss  HEENT: denies HA, blurry vision, tinnitus, dry mouth  Resp: denies SOB, cough, hemoptysis  CV: Denies CP, palps, orthopnea, PND, edema  GI: denies abd pain, nausea/vomiting, diarrhea, melena, constipation, hematochezia    OBJECTIVE:     Vital Signs (Most Recent)  Temp: 98.2 °F (36.8 °C) (06/16/17 0453)  Pulse: 88 (06/16/17 0453)  Resp: 18 (06/16/17 0453)  BP: (!) 89/53 (06/16/17 0453)  SpO2: 97 % (06/16/17 0453)    Vital Signs Range (Last 24H):  Temp:  [97.8 °F (36.6 °C)-99.2 °F (37.3 °C)]   Pulse:  []   Resp:  [18]   BP: ()/(48-61)   SpO2:  [87 %-97 %]     I & O (Last 24H):    Intake/Output Summary (Last 24 hours) at 06/16/17 0717  Last data filed at 06/16/17 0500   Gross per " 24 hour   Intake             1400 ml   Output             1200 ml   Net              200 ml        Physical Exam  Gen: NAD  Neck: + JVD to above clavicle with HJR  Lung: CTA bilat  Heart: Normal S1/S2, tachy, regular rhythm, II/VI systolic murmur  Abdomen: Soft, NT/ND, NABS, no masses, no guarding/rebounding  Extremities: No LE edema bilaterally, 2+ pulses bilaterally in upper/lower extremities   Skin: Normal color and turgor.    Neuro: AAOx3    Labs:       Recent Labs  Lab 06/14/17  0627 06/15/17  0500 06/16/17  0500   WBC 8.68 7.46 7.92   HGB 14.5 14.0 13.4   HCT 42.6 41.1 40.0    253 261   LYMPH 18.0  Test Not Performed 18.9  1.4 17.7*  1.4   MONO 8.0  Test Not Performed 7.8  0.6 10.7  0.9   EOSINOPHIL 0.0 2.0 1.8         Recent Labs  Lab 06/13/17  1032 06/14/17  0120 06/15/17  0500 06/16/17  0500   APTT 25.9  --   --   --    INR  --  1.3* 1.1 1.0          Recent Labs  Lab 06/14/17  0627 06/14/17  1724 06/15/17  0500 06/16/17  0500     --  108 106   CALCIUM 8.5*  --  8.3* 8.4*   ALBUMIN 2.5*  --  2.6* 2.6*   PROT 6.7  --  6.9 6.6   *  --  133* 130*   K 3.7 3.7 3.6 3.8   CO2 27  --  29 29   CL 97  --  96 94*   BUN 18  --  15 15   CREATININE 0.8  --  0.8 0.8   ALKPHOS 97  --  98 97   ALT 16  --  19 20   AST 21  --  22 24   BILITOT 0.9  --  0.8 0.9   MG 1.9  --  2.2 2.0   PHOS 2.8  --  3.0 3.1     Estimated Creatinine Clearance: 79.8 mL/min (based on Cr of 0.8).      Recent Labs  Lab 06/14/17 0627   *       No results for input(s): LDH in the last 168 hours.    Microbiology Results (last 7 days)     Procedure Component Value Units Date/Time    Blood culture [[id number]] Collected:  06/09/17 0742    Order Status:  Completed Specimen:  Blood from Peripheral, Jugular, Internal Left Updated:  06/14/17 1022     Blood Culture, Routine No growth after 5 days.    Blood culture [[id number]] Collected:  06/09/17 0913    Order Status:  Completed Specimen:  Blood Updated:  06/14/17 1022     Blood  Culture, Routine No growth after 5 days.    Urine culture [[id number]]  (Susceptibility) Collected:  06/09/17 0743    Order Status:  Completed Specimen:  Urine from Urine, Catheterized Updated:  06/11/17 1248     Urine Culture, Routine --     ESCHERICHIA COLI  >100,000 cfu/ml      Blood culture [[id number]]     Order Status:  Canceled Specimen:  Blood     Blood culture [[id number]]     Order Status:  Canceled Specimen:  Blood     Blood culture [[id number]]     Order Status:  Canceled Specimen:  Blood         ASSESSMENT AND PLAN:   56 y.o. woman with PMH of AVB/syncope s/p PPM, pulmonary HTN, who is now transferred from [facility] for shortness of breath and management of patient's pulmonary HTN.     Pulmonary HTN, WHO group 1  Acute on Chronic RV Systolic Failure  -Has not tolerated multiple PH therapies  - Continue home dose Macitentan  -RHC 6/1/17 showed RA 13, PCWP 15, PAP 90/38, CO/CI- 3.4/1.8.   -Pt was started on IV Veletri; currently at 17 ng/kg/mn. Increase q6h 1ng/hr to see what she can tolerate  -Started on dopamine 3 mcg/hour for RV failure and poor organ perfusion; changed to  2.5 mcg/kg/min 6/7;  turned off 6/9 2/2 hypotension  -CT-A with no evidence of PVOD  -Continue PO lasix 80mg BID  -INR sub therqpeutic today. Holding coumadin for tunneled cath. No need for Heparin bridge   -Plan for Cardoso on Fridy with Gen SOPHIA.   -Appreciate palliative care assistance. Plan for now is for pt to go home with H/H AIM program.  Pt DNR     Chronic Hypoxia, Acute on Chronic Resp Failure  - On Home O2 via NC, 4-5L but currenlty on high flow O2. Will wean as tolerated.   - Appreciate pulm recs  - Doppler lower extremity failed to show DVT  - TTE with bubble negative  - CTA chest with no evidence of PVOD     Leukocytosis  - Resolved

## 2017-06-16 NOTE — PROGRESS NOTES
"Progress Note  Heart Transplant Service    Admit Date: 5/31/2017   LOS: 16 days     SUBJECTIVE:     Follow up for: Primary pulmonary hypertension    Interval History: No issues overnight. Currently at 19ng/kg/mn on Veletri and 6L NC.     Scheduled Meds:   furosemide  80 mg Oral BID    gabapentin  300 mg Oral TID    macitentan  10 mg Oral Daily    mupirocin   Topical (Top) Daily    potassium chloride  20 mEq Oral BID    sodium chloride 0.9%  10 mL Intravenous Q6H    sodium chloride 0.9%  3 mL Intravenous Q8H     Continuous Infusions:   epoprostenol (VELETRI) infusion 19 ng/kg/min (06/15/17 2053)    veletri/remodulin cassette      veletri/remodulin tubing       PRN Meds:acetaminophen, docusate sodium, heparin (PORCINE), heparin (PORCINE), hydrocodone-acetaminophen 10-325mg, loperamide, ondansetron, ondansetron, promethazine (PHENERGAN) IVPB, Flushing PICC Protocol **AND** sodium chloride 0.9% **AND** sodium chloride 0.9%    Review of patient's allergies indicates:   Allergen Reactions    Chlorhexidine Itching and Rash     Patient had raised rash on neck following RHC procedure. She states she's never had a problem with the "prep" used until this time.     Adhesive Rash     Gen: denies fever chills fatigue weight loss  HEENT: denies HA, blurry vision, tinnitus, dry mouth  Resp: denies SOB, cough, hemoptysis  CV: Denies CP, palps, orthopnea, PND, edema  GI: denies abd pain, nausea/vomiting, diarrhea, melena, constipation, hematochezia    OBJECTIVE:     Vital Signs (Most Recent)  Temp: 98.2 °F (36.8 °C) (06/16/17 0453)  Pulse: 88 (06/16/17 0453)  Resp: 18 (06/16/17 0453)  BP: (!) 89/53 (06/16/17 0453)  SpO2: 97 % (06/16/17 0453)    Vital Signs Range (Last 24H):  Temp:  [97.8 °F (36.6 °C)-99.2 °F (37.3 °C)]   Pulse:  []   Resp:  [18]   BP: ()/(48-61)   SpO2:  [87 %-97 %]     I & O (Last 24H):    Intake/Output Summary (Last 24 hours) at 06/16/17 8264  Last data filed at 06/16/17 0500   Gross per 24 " hour   Intake             1400 ml   Output             1200 ml   Net              200 ml        Physical Exam  Gen: NAD  Neck: JVD RIGHT above clavicle  Lung: CTA bilat  Heart: Normal S1/S2, tachy, regular rhythm, II/VI systolic murmur  Abdomen: Soft, NT/ND, NABS, no masses, no guarding/rebounding  Extremities: No LE edema bilaterally, 2+ pulses bilaterally in upper/lower extremities   Skin: Normal color and turgor.    Neuro: AAOx3    Labs:       Recent Labs  Lab 06/14/17  0627 06/15/17  0500 06/16/17  0500   WBC 8.68 7.46 7.92   HGB 14.5 14.0 13.4   HCT 42.6 41.1 40.0    253 261   LYMPH 18.0  Test Not Performed 18.9  1.4 17.7*  1.4   MONO 8.0  Test Not Performed 7.8  0.6 10.7  0.9   EOSINOPHIL 0.0 2.0 1.8         Recent Labs  Lab 06/13/17  1032 06/14/17  0120 06/15/17  0500 06/16/17  0500   APTT 25.9  --   --   --    INR  --  1.3* 1.1 1.0          Recent Labs  Lab 06/14/17  0627 06/14/17  1724 06/15/17  0500 06/16/17  0500     --  108 106   CALCIUM 8.5*  --  8.3* 8.4*   ALBUMIN 2.5*  --  2.6* 2.6*   PROT 6.7  --  6.9 6.6   *  --  133* 130*   K 3.7 3.7 3.6 3.8   CO2 27  --  29 29   CL 97  --  96 94*   BUN 18  --  15 15   CREATININE 0.8  --  0.8 0.8   ALKPHOS 97  --  98 97   ALT 16  --  19 20   AST 21  --  22 24   BILITOT 0.9  --  0.8 0.9   MG 1.9  --  2.2 2.0   PHOS 2.8  --  3.0 3.1     Estimated Creatinine Clearance: 79.8 mL/min (based on Cr of 0.8).      Recent Labs  Lab 06/14/17 0627   *       No results for input(s): LDH in the last 168 hours.    Microbiology Results (last 7 days)     Procedure Component Value Units Date/Time    Blood culture [[id number]] Collected:  06/09/17 0742    Order Status:  Completed Specimen:  Blood from Peripheral, Jugular, Internal Left Updated:  06/14/17 1022     Blood Culture, Routine No growth after 5 days.    Blood culture [[id number]] Collected:  06/09/17 0913    Order Status:  Completed Specimen:  Blood Updated:  06/14/17 1022     Blood Culture,  Routine No growth after 5 days.    Urine culture [[id number]]  (Susceptibility) Collected:  06/09/17 0743    Order Status:  Completed Specimen:  Urine from Urine, Catheterized Updated:  06/11/17 1248     Urine Culture, Routine --     ESCHERICHIA COLI  >100,000 cfu/ml      Blood culture [[id number]]     Order Status:  Canceled Specimen:  Blood         ASSESSMENT AND PLAN:   56 y.o. woman with PMH of AVB/syncope s/p PPM, pulmonary HTN, who is now transferred from [facility] for shortness of breath and management of patient's pulmonary HTN.     Pulmonary HTN, WHO group 1  Acute on Chronic RV Systolic Failure  -Has not tolerated multiple PH therapies  - Continue home dose Macitentan  -RHC 6/1/17 showed RA 13, PCWP 15, PAP 90/38, CO/CI- 3.4/1.8.   -Pt was started on IV Veletri; currently at 17 ng/kg/mn. Increase q6h 1ng/hr to see what she can tolerate  -Started on dopamine 3 mcg/hour for RV failure and poor organ perfusion; changed to  2.5 mcg/kg/min 6/7;  turned off 6/9 2/2 hypotension  -CT-A with no evidence of PVOD  -Continue PO lasix 80mg BID  -INR sub therqpeutic today. Holding coumadin for tunneled cath. No need for Heparin bridge   -Plan for Cardoso today with Gen SOPHIA.   -Appreciate palliative care assistance. Plan for now is for pt to go home with H/H AIM program.  Pt DNR     Chronic Hypoxia, Acute on Chronic Resp Failure  - On Home O2 via NC, 4-5L but currenlty on high flow O2. Will wean as tolerated.   - Appreciate pulm recs  - Doppler lower extremity failed to show DVT  - TTE with bubble negative  - CTA chest with no evidence of PVOD     Leukocytosis  - Resolved

## 2017-06-16 NOTE — ANESTHESIA POSTPROCEDURE EVALUATION
"Anesthesia Post Evaluation    Patient: Emily Cook    Procedure(s) Performed: Procedure(s) (LRB):  INSERTION-CATHETER-CARUSO (Right)    Final Anesthesia Type: MAC  Patient location during evaluation: PACU  Patient participation: Yes- Able to Participate  Level of consciousness: awake and alert  Post-procedure vital signs: reviewed and stable  Pain management: adequate  Airway patency: patent  PONV status at discharge: No PONV  Anesthetic complications: no      Cardiovascular status: blood pressure returned to baseline  Respiratory status: unassisted, room air and spontaneous ventilation  Hydration status: euvolemic  Follow-up not needed.        Visit Vitals  BP 92/62   Pulse 96   Temp 37.2 °C (98.9 °F) (Oral)   Resp 20   Ht 5' 3" (1.6 m)   Wt 82.3 kg (181 lb 7 oz)   SpO2 (!) 93%   Breastfeeding? No   BMI 32.14 kg/m²       Pain/Rancho Score: Pain Assessment Performed: Yes (6/16/2017 10:12 AM)  Presence of Pain: denies (6/16/2017 10:12 AM)  Pain Rating Prior to Med Admin: 0 (6/15/2017  2:46 PM)      "

## 2017-06-16 NOTE — NURSING
Patient escorted off unit for line placement. Report given to Ms. Mariana. Veletri infusing at 78cc/24 hours, 39cc remaining in reservoir. Telemetry monitoring intact. Patient ambulated to stretcher. Oxygen sats 91% on 8L portable oxygen while on stretcher. Will await patient's return. HAMILTON-RN

## 2017-06-16 NOTE — NURSING TRANSFER
Nursing Transfer Note      6/16/2017     Transfer To: [id number]    Transfer via stretcher    Transfer with 6L NC to O2, cardiac monitoring    Transported by nurse and pct    Medicines sent: none    Chart send with patient: Yes    Notified: family    Patient reassessed at: 6/16/17 1100

## 2017-06-16 NOTE — OP NOTE
DATE OF PROCEDURE:  06/16/2017    PREOPERATIVE DIAGNOSIS:  Pulmonary hypertension.    POSTOPERATIVE DIAGNOSIS:  Pulmonary hypertension.    PROCEDURES:  1.  Placement of right subclavian Cardoso catheter (single lumen tunneled with   cuff).  2.  Intraoperative fluoroscopy.    SURGEON:  Abdi Pride M.D.    ASSISTANT:  Dain Ford M.D. (RES).    ANESTHESIA:  Intravenous sedation plus local 1% Xylocaine.    CLINICAL NOTE:  The patient is a 56-year-old female with pulmonary hypertension   who will require chronic infusion.    PROCEDURE NOTE:  Following induction of adequate intravenous sedation, the   patient's right chest was prepped and draped with Betadine.  We then infiltrated   1% Xylocaine utilizing Seldinger technique, cannulated the right subclavian   vein and threaded the guidewire centrally.  Its proper positioning in the   superior vena cava was documented fluoroscopically and then we made a separate   stab wound in the right upper chest and tunneled the catheter to the insertion   site.  We then cut the catheter in length and utilizing a peel-away sheath,   threaded it centrally.  It had excellent flow with blood return and it was in   excellent position in the superior vena cava fluoroscopically.  We then attached   the catheter to the exit site in the right upper chest with nylon suture and   then closed the insertion site with subcuticular 4-0 Monocryl suture.  Sterile   dressings were then applied and the procedure was terminated with the patient   tolerating it well.    ESTIMATED BLOOD LOSS:  5 mL      MCT/HN  dd: 06/16/2017 10:22:15 (CDT)  td: 06/16/2017 16:18:42 (CDT)  Doc ID   #[id number]  Job ID #[id number]    CC:

## 2017-06-16 NOTE — OP NOTE
DATE: 06/16/2017    PREOPERATIVE DIAGNOSIS: pulmonary hypertension    POSTOPERATIVE DIAGNOSIS: pulmonary hypertension    PROCEDURE PERFORMED: Left subclavian tunneled venous (Starr) catheter placement.     ATTENDING SURGEON: Abdi Pride M.D.     HOUSESTAFF SURGEON: Dain Ford M.D. (RES)     ANESTHESIA: Monitored anesthesia care plus local.     ESTIMATED BLOOD LOSS: 10 mL     FINDINGS: A single-lumen Power Starr catheter placed with tip at junction of superior vena cava and right atrium.     SPECIMEN: None.     DRAINS: None.     COMPLICATIONS: None.     INDICATIONS:  is a 56 y.o. y/o female referred to my General Surgery consultation for Starr catheter placement for home anti-pulmonary hypertension medication administration . I recommended a right subclavian catheter given patients ICD in place on left, and the patient agreed to proceed. The patient did sign informed consent and expressed understanding of the risks and benefits of surgery.     OPERATIVE PROCEDURE: The patient was identified in preoperative holding and transported directly to the Operating Room. she was placed supine on the operating table and padded appropriately. Monitors were applied. Monitored anesthesia care was initiated. The right neck and chest were prepped and draped in the standard sterile surgical fashion. A timeout was performed and all team members present agreed this was the correct procedure on the correct patient. We also confirmed administration of appropriate preoperative antibiotics.     After administering local anesthesia, the right subclavian vein was cannulated with a hollow bore needle. A guidewire was placed and confirmed to be in correct position using fluoroscopy. A small skin incision was made around the guidewire. An appropriately sized catheter was chosen. A counter incision inferolateral on the right chest was made after administering additional lidocaine. A subcutaneous tunnel between the lower chest  incision and the cannulation site was made with a hemostat after administration of additional local. The catheter was loaded onto the tunneling device and tunneled from the right chest incision to the upper one, and then cut to appropriate length. Under fluoroscopic guidance, the split sheath and dilator were placed over the guidewire, and the guidewire and dilator were then removed. The catheter was placed down the split sheath and the sheath was split and peeled away. Fluoroscopy confirmed appropriate position of the catheter, which aspirated and flushed easily. Heparinized saline was instilled in the catheter. The catheter was secured at it's exit site using 2-0 Prolene suture. The skin incision at the cannulation site was closed with subcuticular 4-0 Monocryl. A sterile dressing was applied. The patient was transported to the Recovery Room in stable condition. All sponge, instrument and needle counts were correct at the end of the procedure. I was present and scrubbed for the entire case.

## 2017-06-16 NOTE — ANESTHESIA RELEASE NOTE
Anesthesia Release from PACU Note    Patient name: Emily Cook    Procedure(s): Procedure(s) (LRB):  INSERTION-CATHETER-CARUSO (Right)    Anesthesia type: MAC    Post pain: adequate analgesia    Post assessment: no apparent complications    Last vitals:   Vitals:    06/16/17 1030   BP: 92/62   Pulse: 96   Resp: 20   Temp:        Post vital signs: stable    Level of consciousness: alert     Nausea/Vomiting: no nausea/no vomiting    Complications: none    Airway Patency:  patent    Respiratory: unassisted    Cardiovascular: stable and blood pressure at baseline    Hydration: euvolemic

## 2017-06-16 NOTE — BRIEF OP NOTE
[facility]  Brief Operative Note    SUMMARY     Surgery Date: 6/16/2017     Surgeon(s) and Role:     * Abdi Pride MD - Primary     * Dain Ford MD - Resident - Assisting        Pre-op Diagnosis:  Pulmonary hypertension [I27.2]    Post-op Diagnosis:  Post-Op Diagnosis Codes:     * Pulmonary hypertension [I27.2]    Procedure(s) (LRB):  INSERTION-CATHETER-CARUSO (Right)    Anesthesia: Choice    Description of Procedure: right sided subclavian caruso catheter placed    Description of the findings of the procedure: see above    Estimated Blood Loss: * No values recorded between 6/16/2017  9:45 AM and 6/16/2017 10:08 AM *         Specimens:   Specimen (12h ago through future)    None

## 2017-06-16 NOTE — TRANSFER OF CARE
"Anesthesia Transfer of Care Note    Patient: Emily Cook    Procedure(s) Performed: Procedure(s) (LRB):  INSERTION-CATHETER-CARUSO (Right)    Patient location: PACU    Anesthesia Type: MAC    Transport from OR: Transported from OR on 6-10 L/min O2 by face mask with adequate spontaneous ventilation    Post pain: adequate analgesia    Post assessment: no apparent anesthetic complications    Post vital signs: stable    Level of consciousness: awake    Nausea/Vomiting: no nausea/vomiting    Complications: none    Transfer of care protocol was followed      Last vitals:   Visit Vitals  BP (!) 89/62   Pulse 96   Temp 37.2 °C (98.9 °F) (Oral)   Resp 20   Ht 5' 3" (1.6 m)   Wt 82.3 kg (181 lb 7 oz)   SpO2 (!) 92%   Breastfeeding? No   BMI 32.14 kg/m²     "

## 2017-06-17 NOTE — NURSING
Patient with 13 beat run of vtach on the monitor. Patient asymptomatic, denies chest pain, palpitations, shortness of breath. BP 80/50. Notified Dr. Lewis of the above. New orders for serum mag and potassium levels noted and carried out. Will continue to monitor patient. MIKEY

## 2017-06-17 NOTE — PT/OT/SLP PROGRESS
Physical Therapy      Emily Cook  MRN: [id number]    Patient not seen today secondary to pt with c/o of fatigue and nuasea. Will follow-up  Next scheduled treatment per PT POC.    Piero Tesfaye, PTA  6/17/2017

## 2017-06-17 NOTE — NURSING
In to perform cassette change and rate increase. Patient's blood pressure 82/53, outside titration parameters. Will only change cassette at this time. Dose will remain at 22ng/kg/min. Will continue to monitor patient. BNW-RN

## 2017-06-17 NOTE — PROGRESS NOTES
"Progress Note  Heart Transplant Service    Admit Date: 5/31/2017   LOS: 17 days     SUBJECTIVE:     Follow up for: Primary pulmonary hypertension    Interval History: No issues overnight. Currently at 22ng/kg/mn on Veletri and 6L NC.     Scheduled Meds:   furosemide  80 mg Oral BID    gabapentin  300 mg Oral TID    macitentan  10 mg Oral Daily    mupirocin   Topical (Top) Daily    potassium chloride  20 mEq Oral BID    sodium chloride 0.9%  10 mL Intravenous Q6H    sodium chloride 0.9%  3 mL Intravenous Q8H     Continuous Infusions:   epoprostenol (VELETRI) infusion 22 ng/kg/min (06/17/17 0605)    veletri/remodulin cassette      veletri/remodulin tubing       PRN Meds:acetaminophen, docusate sodium, heparin (PORCINE), heparin (PORCINE), hydrocodone-acetaminophen 10-325mg, loperamide, ondansetron, ondansetron, promethazine (PHENERGAN) IVPB, Flushing PICC Protocol **AND** sodium chloride 0.9% **AND** sodium chloride 0.9%    Review of patient's allergies indicates:   Allergen Reactions    Chlorhexidine Itching and Rash     Patient had raised rash on neck following RHC procedure. She states she's never had a problem with the "prep" used until this time.     Adhesive Rash     Gen: denies fever chills fatigue weight loss  HEENT: denies HA, blurry vision, tinnitus, dry mouth  Resp: denies SOB, cough, hemoptysis  CV: Denies CP, palps, orthopnea, PND, edema  GI: denies abd pain, nausea/vomiting, diarrhea, melena, constipation, hematochezia    OBJECTIVE:     Vital Signs (Most Recent)  Temp: 98.8 °F (37.1 °C) (06/17/17 0500)  Pulse: 87 (06/17/17 0700)  Resp: 18 (06/17/17 0630)  BP: (!) 98/53 (06/17/17 0645)  SpO2: (!) 91 % (06/17/17 0500)    Vital Signs Range (Last 24H):  Temp:  [97.8 °F (36.6 °C)-98.9 °F (37.2 °C)]   Pulse:  []   Resp:  [18-20]   BP: ()/(50-62)   SpO2:  [88 %-97 %]     I & O (Last 24H):    Intake/Output Summary (Last 24 hours) at 06/17/17 0728  Last data filed at 06/17/17 0600   Gross " per 24 hour   Intake          1070.36 ml   Output             2200 ml   Net         -1129.64 ml        Physical Exam  Gen: NAD  Neck: JVD RIGHT above clavicle  Lung: CTA bilat  Heart: Normal S1/S2, tachy, regular rhythm, II/VI systolic murmur  Abdomen: Soft, NT/ND, NABS, no masses, no guarding/rebounding  Extremities: No LE edema bilaterally, 2+ pulses bilaterally in upper/lower extremities   Skin: Normal color and turgor.    Neuro: AAOx3    Labs:       Recent Labs  Lab 06/14/17  0627 06/15/17  0500 06/16/17  0500 06/17/17  0500   WBC 8.68 7.46 7.92  --    HGB 14.5 14.0 13.4 13.7   HCT 42.6 41.1 40.0 40.7    253 261 292   LYMPH 18.0  Test Not Performed 18.9  1.4 17.7*  1.4  --    MONO 8.0  Test Not Performed 7.8  0.6 10.7  0.9  --    EOSINOPHIL 0.0 2.0 1.8  --          Recent Labs  Lab 06/13/17  1032  06/15/17  0500 06/16/17  0500 06/17/17  0500   APTT 25.9  --   --   --   --    INR  --   < > 1.1 1.0 1.0   < > = values in this interval not displayed.       Recent Labs  Lab 06/15/17  0500 06/16/17  0500 06/17/17  0500    106 115*   CALCIUM 8.3* 8.4* 8.7   ALBUMIN 2.6* 2.6* 2.7*   PROT 6.9 6.6 7.0   * 130* 134*   K 3.6 3.8 3.4*   CO2 29 29 29   CL 96 94* 97   BUN 15 15 13   CREATININE 0.8 0.8 0.9   ALKPHOS 98 97 100   ALT 19 20 24   AST 22 24 26   BILITOT 0.8 0.9 0.9   MG 2.2 2.0 1.8   PHOS 3.0 3.1 4.1     Estimated Creatinine Clearance: 69.9 mL/min (based on Cr of 0.9).      Recent Labs  Lab 06/14/17 0627   *       No results for input(s): LDH in the last 168 hours.    Microbiology Results (last 7 days)     Procedure Component Value Units Date/Time    Blood culture [[id number]] Collected:  06/09/17 0742    Order Status:  Completed Specimen:  Blood from Peripheral, Jugular, Internal Left Updated:  06/14/17 1022     Blood Culture, Routine No growth after 5 days.    Blood culture [[id number]] Collected:  06/09/17 0913    Order Status:  Completed Specimen:  Blood Updated:  06/14/17 1022      Blood Culture, Routine No growth after 5 days.    Urine culture [[id number]]  (Susceptibility) Collected:  06/09/17 0743    Order Status:  Completed Specimen:  Urine from Urine, Catheterized Updated:  06/11/17 1248     Urine Culture, Routine --     ESCHERICHIA COLI  >100,000 cfu/ml          ASSESSMENT AND PLAN:   56 y.o. woman with PMH of AVB/syncope s/p PPM, pulmonary HTN, who is now transferred from [facility] for shortness of breath and management of patient's pulmonary HTN.     Pulmonary HTN, WHO group 1  Acute on Chronic RV Systolic Failure  -Has not tolerated multiple PH therapies  -Continue home dose Macitentan  -RHC 6/1/17 showed RA 13, PCWP 15, PAP 90/38, CO/CI- 3.4/1.8.   -Pt was started on IV Veletri; currently at 22 ng/kg/mn. Increase q6h 1ng/hr to see what she can tolerate  -Started on dopamine 3 mcg/hour for RV failure and poor organ perfusion; changed to  2.5 mcg/kg/min 6/7;  turned off 6/9 2/2 hypotension  -CT-A with no evidence of PVOD  -Continue PO lasix 80mg BID  -INR sub therapeutic today. Restart post brush.   -Appreciate palliative care assistance. Plan for now is for pt to go home with H/H AIM program.  Pt DNR     Chronic Hypoxia, Acute on Chronic Resp Failure  - On Home O2 via NC, 4-5L but currenlty on high flow O2. Will wean as tolerated.   - Appreciate pulm recs  - Doppler lower extremity failed to show DVT  - TTE with bubble negative  - CTA chest with no evidence of PVOD     Leukocytosis  - Resolved

## 2017-06-17 NOTE — PROGRESS NOTES
I increased the veletri dose to 22ng/kg/min, 90cc/24hr. I had charge nurse julienne check with me. Pt is asymptomatic and vitals are stable.

## 2017-06-17 NOTE — PLAN OF CARE
Problem: Patient Care Overview  Goal: Plan of Care Review  Outcome: Ongoing (interventions implemented as appropriate)  Pt has call bell in reach, non slip socks on, and bedrails up x2. Pt on telemetry monitoring and veletri gtt to dedicated line. Pt has veletri increases q6 hrs as tolerated and being checked off with the charge nurse. Pt encouraged to wash hands.

## 2017-06-17 NOTE — PROGRESS NOTES
I increased the veletri gtt to 21 ng at 2100. Pt's bp has been systolic btn 93-83. I notified dr oliva on call with cardiology. He is okay with this and said to check her bp q1hr. Pt is asymptomatic.

## 2017-06-18 NOTE — PLAN OF CARE
Problem: Patient Care Overview  Goal: Plan of Care Review  Outcome: Ongoing (interventions implemented as appropriate)  Veletri remains at 22ng/kg/min. Unable to titrate d/t BP - SBP 82 - 87 this shift. Oxygen weaned to 4.5L - sats maintained 89%. Patient up to BSC - UOP 600cc. Patient denies any pain or complaints. Patient is a DNR - plan is to d/c with [facility]IM this week.

## 2017-06-18 NOTE — PROGRESS NOTES
"Progress Note  Heart Transplant Service    Admit Date: 5/31/2017   LOS: 18 days     SUBJECTIVE:     Follow up for: Primary pulmonary hypertension    Interval History: No issues overnight. Currently at 22ng/kg/mn on Veletri and 6L NC.     Scheduled Meds:   furosemide  80 mg Oral BID    gabapentin  300 mg Oral TID    macitentan  10 mg Oral Daily    mupirocin   Topical (Top) Daily    potassium chloride  20 mEq Oral Once    potassium chloride  40 mEq Oral BID    sodium chloride 0.9%  10 mL Intravenous Q6H    sodium chloride 0.9%  3 mL Intravenous Q8H    warfarin  7.5 mg Oral Daily     Continuous Infusions:   epoprostenol (VELETRI) infusion 22 ng/kg/min (06/17/17 1501)    veletri/remodulin cassette      veletri/remodulin cassette      veletri/remodulin tubing      veletri/remodulin tubing       PRN Meds:acetaminophen, docusate sodium, heparin (PORCINE), heparin (PORCINE), hydrocodone-acetaminophen 10-325mg, loperamide, ondansetron, ondansetron, promethazine (PHENERGAN) IVPB, Flushing PICC Protocol **AND** sodium chloride 0.9% **AND** sodium chloride 0.9%    Review of patient's allergies indicates:   Allergen Reactions    Chlorhexidine Itching and Rash     Patient had raised rash on neck following RHC procedure. She states she's never had a problem with the "prep" used until this time.     Adhesive Rash     Gen: denies fever chills fatigue weight loss  HEENT: denies HA, blurry vision, tinnitus, dry mouth  Resp: denies SOB, cough, hemoptysis  CV: Denies CP, palps, orthopnea, PND, edema  GI: denies abd pain, nausea/vomiting, diarrhea, melena, constipation, hematochezia    OBJECTIVE:     Vital Signs (Most Recent)  Temp: 98.4 °F (36.9 °C) (06/18/17 0745)  Pulse: 103 (06/18/17 0745)  Resp: 20 (06/18/17 0745)  BP: (!) 87/52 (06/18/17 0745)  SpO2: (!) 89 % (06/18/17 0745)    Vital Signs Range (Last 24H):  Temp:  [98.4 °F (36.9 °C)-99.4 °F (37.4 °C)]   Pulse:  []   Resp:  [18-20]   BP: ()/(50-56) "   SpO2:  [89 %-92 %]     I & O (Last 24H):    Intake/Output Summary (Last 24 hours) at 06/18/17 0753  Last data filed at 06/18/17 0600   Gross per 24 hour   Intake          1142.39 ml   Output             1850 ml   Net          -707.61 ml        Physical Exam  Gen: NAD  Neck: JVD RIGHT above clavicle  Lung: CTA bilat  Heart: Normal S1/S2, tachy, regular rhythm, II/VI systolic murmur  Abdomen: Soft, NT/ND, NABS, no masses, no guarding/rebounding  Extremities: No LE edema bilaterally, 2+ pulses bilaterally in upper/lower extremities   Skin: Normal color and turgor.    Neuro: AAOx3    Labs:       Recent Labs  Lab 06/16/17  0500 06/17/17  0500 06/18/17  0500   WBC 7.92 8.37 7.09   HGB 13.4 13.7 13.7   HCT 40.0 40.7 41.0    292 301   LYMPH 17.7*  1.4 14.2*  1.2 21.0  1.5   MONO 10.7  0.9 7.0  0.6 9.4  0.7   EOSINOPHIL 1.8 1.1 2.3         Recent Labs  Lab 06/13/17  1032  06/16/17  0500 06/17/17  0500 06/18/17  0500   APTT 25.9  --   --   --   --    INR  --   < > 1.0 1.0 1.0   < > = values in this interval not displayed.       Recent Labs  Lab 06/16/17  0500 06/17/17  0500 06/17/17  1739 06/18/17  0500    115*  --  112*   CALCIUM 8.4* 8.7  --  8.9   ALBUMIN 2.6* 2.7*  --  2.8*   PROT 6.6 7.0  --  7.0   * 134*  --  134*   K 3.8 3.4* 3.3* 3.4*   CO2 29 29  --  28   CL 94* 97  --  97   BUN 15 13  --  15   CREATININE 0.8 0.9  --  1.1   ALKPHOS 97 100  --  99   ALT 20 24  --  27   AST 24 26  --  27   BILITOT 0.9 0.9  --  0.8   MG 2.0 1.8 1.8 1.7   PHOS 3.1 4.1  --  4.2     Estimated Creatinine Clearance: 56.8 mL/min (based on Cr of 1.1).      Recent Labs  Lab 06/14/17  0627   *       No results for input(s): LDH in the last 168 hours.    Microbiology Results (last 7 days)     Procedure Component Value Units Date/Time    Blood culture [[id number]] Collected:  06/09/17 0742    Order Status:  Completed Specimen:  Blood from Peripheral, Jugular, Internal Left Updated:  06/14/17 1022     Blood  Culture, Routine No growth after 5 days.    Blood culture [[id number]] Collected:  06/09/17 0913    Order Status:  Completed Specimen:  Blood Updated:  06/14/17 1022     Blood Culture, Routine No growth after 5 days.    Urine culture [[id number]]  (Susceptibility) Collected:  06/09/17 0743    Order Status:  Completed Specimen:  Urine from Urine, Catheterized Updated:  06/11/17 1248     Urine Culture, Routine --     ESCHERICHIA COLI  >100,000 cfu/ml          ASSESSMENT AND PLAN:   56 y.o. woman with PMH of AVB/syncope s/p PPM, pulmonary HTN, who is now transferred from [facility] for shortness of breath and management of patient's pulmonary HTN.     Pulmonary HTN, WHO group 1  Acute on Chronic RV Systolic Failure  -Has not tolerated multiple PH therapies  -Continue home dose Macitentan  -RHC 6/1/17 showed RA 13, PCWP 15, PAP 90/38, CO/CI- 3.4/1.8.   -Pt was started on IV Veletri; currently at 22 ng/kg/mn. Increase q6h 1ng/hr to see what she can tolerate  -Started on dopamine 3 mcg/hour for RV failure and poor organ perfusion; changed to  2.5 mcg/kg/min 6/7;  turned off 6/9 2/2 hypotension  -CT-A with no evidence of PVOD  -Continue PO lasix 80mg BID  -INR sub therapeutic today. Restarted warfarin post brush.   -Appreciate palliative care assistance. Plan for now is for pt to go home with H/H AIM program.  Pt DNR     Chronic Hypoxia, Acute on Chronic Resp Failure  - On Home O2 via NC, 4-5L but currenlty on high flow O2. Will wean as tolerated.   - Appreciate pulm recs  - Doppler lower extremity failed to show DVT  - TTE with bubble negative  - CTA chest with no evidence of PVOD     Leukocytosis  - Resolved

## 2017-06-18 NOTE — PLAN OF CARE
Problem: Patient Care Overview  Goal: Plan of Care Review  Outcome: Ongoing (interventions implemented as appropriate)  Pt has call bell in reach, non slip socks on, and bedrails up x2. Pt on telemetry monitoring. Pt on strict I&Os. Pt sitting in chair with  at bedside. Pt encouraged to wash hands. Pt on veletri gtt.

## 2017-06-19 NOTE — PLAN OF CARE
Problem: Patient Care Overview  Goal: Plan of Care Review  Outcome: Ongoing (interventions implemented as appropriate)  Plan is for patient to be d/c'd home tomorrow. Veletri remains at 22ng/kg/min. SKYLAR Torres RN with [facility] to bedside today to continue Veletri teaching. [facility] TLC d/c'd per order; pressure dressing remains CDI. Patient instructed to leave dressing in place x24 hours. SL PICC left in place as IV access until discharge. Potassium 3.5 - replaced with additional dose of KCl.

## 2017-06-19 NOTE — PROGRESS NOTES
Patient's HR 150s on telemetry. Checked on patient - up brushing teeth at sink, asymptomatic, denies feeling any palpitations or chest pain. Patient returned to bed - patient's heart rate decreased to 120s and then back to baseline after several minutes of rest. SKYLAR Calles, NP notified. Clarified titration orders - okay to hold Veletri titration with current SBP 87. Will try to increase dose for SBP >90 as patient tolerates. Will also try to wean oxygen - currently on 7L high-flow NC. Goal is to maintain SpO2 > 82% per NP.

## 2017-06-19 NOTE — PT/OT/SLP PROGRESS
Physical Therapy      Emily Cook  MRN: [id number]    Patient not seen today secondary to Patient unwilling to participate. Attempted to treat pt 2xs: 1:05pm pt refused sec to complaints of nausea.  4:40pm pt refused stating that she already walked around the room to clean up.  Changing recommendation for pt discharge to home without PT services secondary to repeated refusals of PT services.  Will follow-up when scheduled.    Flor Tavares, PT

## 2017-06-19 NOTE — PROGRESS NOTES
[facility]  Adult Nutrition  Progress Note    SUMMARY     Pt reports varying appetite. PO intake 50% per flowsheets. C/o nausea. She attributes poor PO to not feeling well. Declined ONS at this time. Na 132. .     Recommendations    1. Continue current diet order.   2. Encoruaged good PO     RD following    Goals: Pt t consume >75% of meals  Nutrition Goal Status: goal met  Communication of RD Recs: reviewed with RN    Continuum of Care Plan    Nursing Team: obtain weeky wts    Reason for Assessment    Reason for Assessment: RD follow-up  Diagnosis: pulmonary disease, cardiac disease (pulmonary hypertension, heart block, pacemaker)  Past Medical History:   Diagnosis Date    Arrhythmia     Arthritis     Cardiac pacemaker in situ     Carpal tunnel syndrome, right     Cervical spondylosis     Cervicalgia     CHF (congestive heart failure)     Diverticulitis     Heart block AV third degree     Hyperlipidemia     ALFREDO on CPAP     Pulmonary hypertension     Subdeltoid bursitis      Interdisciplinary Rounds: did not attend  Nutrition Discharge Planning: Pt to consume adequate nutrition and follow a low Na diet.    Nutrition Prescription Ordered    Current Diet Order: Cardiac  Nutrition Order Comments: 2 gm Na  Oral Nutrition Supplement: declined ONS today       Nutrition Risk Screen     Nutrition Risk Screen: no indicators present    Nutrition/Diet History    Patient Reported Diet/Restrictions/Preferences: general  Typical Food/Fluid Intake: Pt reports good appetite, consuming 100% of meals.  Food Preferences: No cultural, Yazidism needs reported.  Factors Affecting Nutritional Intake:  Decreased appetite    Labs/Tests/Procedures/Meds    Pertinent Labs Reviewed: reviewed  Pertinent Labs Comments: Na 132,   Pertinent Medications Reviewed: reviewed  Pertinent Medications Comments: lasix, warfarin    Physical Findings    Overall Physical Appearance: overweight, nourished  Tubes:   "(none)  Oral/Mouth Cavity: WDL  Skin: intact    Anthropometrics    Temp: 98.5 °F (36.9 °C)  Height: 5' 3" (160 cm)  Weight Method: Bed Scale  Weight: 79.9 kg (176 lb 2.4 oz)  Ideal Body Weight (IBW), Female: 115 lb  % Ideal Body Weight, Female (lb): 153.56 lb  BMI (Calculated): 31.3  BMI Grade: 30 - 34.9- obesity - grade I  Weight Loss:  (YOUNG)    Estimated/Assessed Needs    Weight Used For Calorie Calculations: 80.1 kg (176 lb 9.4 oz)   Energy Need Method: [place]-St Jeor (1700 kcal/day (1.25))  RMR ([place]-St. Jeor Equation): 1358.13  Weight Used For Protein Calculations: 80.1 kg (176 lb 9.4 oz)  Protein Requirements: 80 g/day  Fluid Need Method: RDA Method (1700 mL/d or per MD)    Nutrition Dx    None at this time    Monitor and Evaluation    Food and Nutrient Intake: energy intake, food and beverage intake  Food and Nutrient Adminstration: diet order  Knowledge/Beliefs/Attitudes: food and nutrition knowledge/skill  Physical Activity and Function: nutrition-related ADLs and IADLs  Anthropometric Measurements: weight, weight change, body mass index  Biochemical Data, Medical Tests and Procedures: electrolyte and renal panel, gastrointestinal profile, glucose/endocrine profile  Nutrition-Focused Physical Findings: overall appearance     Nutrition Risk    Level of Risk:  (1 x per week)    Nutrition Follow-Up    RD Follow-up?: Yes     Steffi Dietrich RD, LEON   "

## 2017-06-19 NOTE — PROGRESS NOTES
"Progress Note  Heart Transplant Service    Admit Date: 5/31/2017   LOS: 19 days     SUBJECTIVE:     Follow up for: Primary pulmonary hypertension    Interval History: No issues overnight. Currently at 22ng/kg/mn on Veletri and 6L NC.     Scheduled Meds:   furosemide  80 mg Oral BID    gabapentin  300 mg Oral TID    macitentan  10 mg Oral Daily    mupirocin   Topical (Top) Daily    potassium chloride  30 mEq Oral Once    potassium chloride  40 mEq Oral BID    sodium chloride 0.9%  10 mL Intravenous Q6H    sodium chloride 0.9%  3 mL Intravenous Q8H    warfarin  7.5 mg Oral Daily     Continuous Infusions:   epoprostenol (VELETRI) infusion 22 ng/kg/min (06/18/17 1548)    veletri/remodulin cassette      veletri/remodulin cassette      veletri/remodulin tubing      veletri/remodulin tubing       PRN Meds:acetaminophen, docusate sodium, heparin (PORCINE), heparin (PORCINE), hydrocodone-acetaminophen 10-325mg, loperamide, ondansetron, ondansetron, promethazine (PHENERGAN) IVPB, Flushing PICC Protocol **AND** sodium chloride 0.9% **AND** sodium chloride 0.9%    Review of patient's allergies indicates:   Allergen Reactions    Chlorhexidine Itching and Rash     Patient had raised rash on neck following RHC procedure. She states she's never had a problem with the "prep" used until this time.     Adhesive Rash     Gen: denies fever chills fatigue weight loss  HEENT: denies HA, blurry vision, tinnitus, dry mouth  Resp: denies SOB, cough, hemoptysis  CV: Denies CP, palps, orthopnea, PND, edema  GI: denies abd pain, nausea/vomiting, diarrhea, melena, constipation, hematochezia    OBJECTIVE:     Vital Signs (Most Recent)  Temp: 98.7 °F (37.1 °C) (06/19/17 0500)  Pulse: 104 (06/19/17 0500)  Resp: 18 (06/19/17 0500)  BP: (!) 86/47 (06/19/17 0500)  SpO2: (!) 90 % (06/19/17 0500)    Vital Signs Range (Last 24H):  Temp:  [98.5 °F (36.9 °C)-99.6 °F (37.6 °C)]   Pulse:  []   Resp:  [18-20]   BP: (81-86)/(4659) "   SpO2:  [89 %-97 %]     I & O (Last 24H):    Intake/Output Summary (Last 24 hours) at 06/19/17 0754  Last data filed at 06/19/17 0600   Gross per 24 hour   Intake          1345.36 ml   Output             1300 ml   Net            45.36 ml        Physical Exam  Gen: NAD  Neck: JVD RIGHT above clavicle  Lung: CTA bilat  Heart: Normal S1/S2, tachy, regular rhythm, II/VI systolic murmur  Abdomen: Soft, NT/ND, NABS, no masses, no guarding/rebounding  Extremities: No LE edema bilaterally, 2+ pulses bilaterally in upper/lower extremities   Skin: Normal color and turgor.    Neuro: AAOx3    Labs:       Recent Labs  Lab 06/17/17  0500 06/18/17  0500 06/19/17  0500   WBC 8.37 7.09 9.74   HGB 13.7 13.7 13.4   HCT 40.7 41.0 39.6    301 292   LYMPH 14.2*  1.2 21.0  1.5 14.5*  1.4   MONO 7.0  0.6 9.4  0.7 5.5  0.5   EOSINOPHIL 1.1 2.3 2.0         Recent Labs  Lab 06/13/17  1032  06/17/17  0500 06/18/17  0500 06/19/17  0500   APTT 25.9  --   --   --   --    INR  --   < > 1.0 1.0 1.1   < > = values in this interval not displayed.       Recent Labs  Lab 06/17/17  0500 06/17/17  1739 06/18/17  0500 06/19/17  0500   *  --  112* 113*   CALCIUM 8.7  --  8.9 9.1   ALBUMIN 2.7*  --  2.8* 2.9*   PROT 7.0  --  7.0 7.1   *  --  134* 132*   K 3.4* 3.3* 3.4* 3.5   CO2 29  --  28 25   CL 97  --  97 95   BUN 13  --  15 18   CREATININE 0.9  --  1.1 1.0   ALKPHOS 100  --  99 100   ALT 24  --  27 25   AST 26  --  27 23   BILITOT 0.9  --  0.8 0.8   MG 1.8 1.8 1.7 1.7   PHOS 4.1  --  4.2 3.4     Estimated Creatinine Clearance: 62.9 mL/min (based on Cr of 1).      Recent Labs  Lab 06/14/17  0627   *       No results for input(s): LDH in the last 168 hours.    Microbiology Results (last 7 days)     Procedure Component Value Units Date/Time    Blood culture [[id number]] Collected:  06/09/17 0742    Order Status:  Completed Specimen:  Blood from Peripheral, Jugular, Internal Left Updated:  06/14/17 1022     Blood Culture,  Routine No growth after 5 days.    Blood culture [[id number]] Collected:  06/09/17 0913    Order Status:  Completed Specimen:  Blood Updated:  06/14/17 1022     Blood Culture, Routine No growth after 5 days.        ASSESSMENT AND PLAN:   56 y.o. woman with PMH of AVB/syncope s/p PPM, pulmonary HTN, who is now transferred from [facility] for shortness of breath and management of patient's pulmonary HTN.     Pulmonary HTN, WHO group 1  Acute on Chronic RV Systolic Failure  -Has not tolerated multiple PH therapies in the past.   -Continue home dose Macitentan  -RHC 6/1/17 showed RA 13, PCWP 15, PAP 90/38, CO/CI- 3.4/1.8.   -Pt was started on IV Veletri; currently at 22 ng/kg/mn.  -Started on dopamine 3 mcg/hour for RV failure and poor organ perfusion; changed to ;  turned off 6/9 2/2 hypotension  -CT-A with no evidence of PVOD  -Continue PO lasix 80mg BID  -INR sub therapeutic today. Restarted warfarin post brush.   -Appreciate palliative care assistance.   -Plan for now is for pt to go home with H/H AIM program today. Pt DNR  -Remove TLC/PICC. Keep brush.      Chronic Hypoxia, Acute on Chronic Resp Failure  - O2 weaned down to 4.5 L which is her home dose.   - Appreciate pulm recs  - Doppler lower extremity failed to show DVT  - TTE with bubble negative  - CTA chest with no evidence of PVOD

## 2017-06-19 NOTE — PLAN OF CARE
[facility]   Heart Transplant/PHTN Clinic   [address] [place] [address]   [phone] after hours [phone] fax   HOME HEALTH ORDERS     Admit to Home Health   Diagnosis:  Patient Active Problem List   Diagnosis    Chronic pulmonary heart disease    Long term current use of anticoagulant therapy    Pulmonary hypertension    Heart block AV third degree    Cardiac pacemaker in situ    Cervical spondylosis    Cervicalgia    Chronic neck pain    Subdeltoid bursitis    Diverticulitis    Sleep apnea- on CPAP    Diverticulitis large intestine    Carpal tunnel syndrome, right (mild)    Primary pulmonary hypertension    Acute on chronic respiratory failure with hypoxia       Patient is homebound due to: PHTN    Diet: Low Sodium    Acitivities: As Tolerated    Nursing:   SN to complete comprehensive assessment including routine vital signs. Instruct on disease process and s/s of complications to report to MD. Review/verify medication list sent home with the patient at time of discharge and instruct patient/caregiver as needed. Frequency may be adjusted depending on start of care date.     Notify MD if SBP > 160 or < 80; DBP > 90 or < 50; HR > 120 or < 50; Temp > 101; Weight gain >3lbs in 1 day or 5lbs in 1 week.    HOME INFUSION THERAPY:     SN to perform Infusion Therapy/Central Line Care.   Review Central Line Care & Central Line Flush with patient.     **For questions or concerns, please call [phone] and ask for Pulmonary Hypertension clinic, M-F 8-5. After hours, weekends, call [phone] and ask for the Heart Transplant Cardiologist on call.**     Central line care:   Do not access/flush line.     Central :   - Sterile dressing changes are done weekly and as needed.   - Use chlor-hexadine scrub to cleanse site, apply Biopatch to insertion site,   apply securement device dressing   - Posi-flow caps are changed weekly and after  EVERY lab draw.   - If sterile gauze is under dressing to control oozing,   dressing change must be performed every 24 hours until gauze is not needed.     CONSULTS:     Physical Therapy to evaluate and treat. Evaluate for home safety and equipment needs; Establish/upgrade home exercise program. Perform / instruct on therapeutic exercises, gait training, transfer training, and Range of Motion.     Occupational Therapy to evaluate and treat. Evaluate home environment for safety and equipment needs. Perform/Instruct on transfers, ADL training, ROM, and therapeutic exercises.     Send follow up questions to [phone] or [phone].

## 2017-06-20 NOTE — PLAN OF CARE
Problem: Physical Therapy Goal  Goal: Physical Therapy Goal  Goals to be met by: 17     Patient will increase functional independence with mobility by performin. Supine to sit with Stand-by Assistance - GOAL MET  2. Sit to supine with Stand-by Assistance - GOAL MET  3. Sit to stand transfer with Stand-by Assistance - GOAL MET  4. Gait  x 150 feet with Stand-by Assistance.   5. Lower extremity exercise program x15 reps per handout, with supervision met (2017)     Outcome: Ongoing (interventions implemented as appropriate)  Pt will continue to progress towards goals per PT POC.    I certify that I was present in the room directing the student in service delivery and guiding them using my skilled judgment. As the co-signing therapist I have reviewed the students documentation and am responsible for the treatment, assessment, and plan  Piero Tesfaye, PTA  2017

## 2017-06-20 NOTE — PT/OT/SLP PROGRESS
"Physical Therapy  Treatment    Emily Cook   MRN: [id number]   Admitting Diagnosis: Primary pulmonary hypertension    PT Received On: 06/20/17  PT Start Time: 1020     PT Stop Time: 1047    PT Total Time (min): 27 min       Billable Minutes:  Gait [id number] and Therapeutic Exercise 8    Treatment Type: Treatment  PT/PTA: PTA     PTA Visit Number: 1       General Precautions: Standard, fall  Orthopedic Precautions: N/A   Braces: N/A    Do you have any cultural, spiritual, Muslim conflicts, given your current situation?: none noted    Subjective:  Communicated with RN prior to session.  Pt stated that she was fine with working with PT session, stating, "Let me us the commode first".    Pain/Comfort  Pain Rating 1: 0/10  Pain Rating Post-Intervention 1: 0/10    Objective:   Patient found with: oxygen, central line, telemetry,CADD pump    Functional Mobility:  Bed Mobility:   Rolling/Turning Right: Supervision  Scooting/Bridging: Supervision    Transfers:  Sit <> Stand Assistance: Supervision  Sit <> Stand Assistive Device: No Assistive Device  SPT bed to commode with no AD with (S)  Gait:   Gait Distance: 220ft CGA no AD and oxygen in tow. Pt started amb with 4L O2 and stats were 88%,PTA increased O2 to 6L for gait and stats increased to 91%. Pt stats presented slightly decreased from exertion and increased after short rest break.   Assistance 1: Contact Guard Assistance  Gait Assistive Device: No device  Gait Deviation(s): decreased velocity of limb motion, decreased step length, decreased stride length    Stairs:  Pt ascended/descend x7 stair(s) with No Assistive Device with left and handrails with Contact Guard Assistance.     Therapeutic Activities and Exercises:  Pt performed BLE 1x15 reps each of ankle pumps, LAQ, and hip flex/abd/add.   White board updated.  Donned an extra gown   Pt encouraged to ambulate in hallways 3x/day with nursing or family assistance to improve endurance.    AM-PAC 6 CLICK " MOBILITY  How much help from another person does this patient currently need?   1 = Unable, Total/Dependent Assistance  2 = A lot, Maximum/Moderate Assistance  3 = A little, Minimum/Contact Guard/Supervision  4 = None, Modified [place]/Independent    Turning over in bed (including adjusting bedclothes, sheets and blankets)?: 4  Sitting down on and standing up from a chair with arms (e.g., wheelchair, bedside commode, etc.): 4  Moving from lying on back to sitting on the side of the bed?: 4  Moving to and from a bed to a chair (including a wheelchair)?: 4  Need to walk in hospital room?: 3  Climbing 3-5 steps with a railing?: 3  Total Score: 22    AM-PAC Raw Score CMS G-Code Modifier Level of Impairment Assistance   6 % Total / Unable   7 - 9 CM 80 - 100% Maximal Assist   10 - 14 CL 60 - 80% Moderate Assist   15 - 19 CK 40 - 60% Moderate Assist   20 - 22 CJ 20 - 40% Minimal Assist   23 CI 1-20% SBA / CGA   24 CH 0% Independent/ Mod I     Patient left up in chair with call button in reach.    Assessment:  Emily Cook is a 56 y.o. female with a medical diagnosis of Primary pulmonary hypertension and presents with combined deficits as listed below. Pt return demonstrated good tolerance to gait/stairs/therex with SOB. Pt O2 stats  Decreased from exertion on 4L, Pt O2 stat needs to be monitored during amb. Pt will continue to benefit and progress from PT if pt willing to participate. Pt presented good participation in PT session today.       Rehab identified problem list/impairments: Rehab identified problem list/impairments: impaired endurance, impaired functional mobilty    Rehab potential is good.    Activity tolerance: Good    Discharge recommendations: Discharge Facility/Level Of Care Needs: home with home health     Barriers to discharge: Barriers to Discharge: Inaccessible home environment, Decreased caregiver support    Equipment recommendations: Equipment Needed After Discharge: none      GOALS:    Physical Therapy Goals        Problem: Physical Therapy Goal    Goal Priority Disciplines Outcome Goal Variances Interventions   Physical Therapy Goal     PT/OT, PT Ongoing (interventions implemented as appropriate)     Description:  Goals to be met by: 17     Patient will increase functional independence with mobility by performin. Supine to sit with Stand-by Assistance - GOAL MET  2. Sit to supine with Stand-by Assistance - GOAL MET  3. Sit to stand transfer with Stand-by Assistance - GOAL MET  4. Gait  x 150 feet with Stand-by Assistance.   5. Lower extremity exercise program x15 reps per handout, with supervision met (2017)                      PLAN:    Patient to be seen 4 x/week  to address the above listed problems via gait training, therapeutic exercises, therapeutic activities  Plan of Care expires: 17  Plan of Care reviewed with: patient         Deondre Washington, SPTA  2017  I certify that I was present in the room directing the student in service delivery and guiding them using my skilled judgment. As the co-signing therapist I have reviewed the students documentation and am responsible for the treatment, assessment, and plan. Piero Tesfaye, PTA      2017

## 2017-06-20 NOTE — DISCHARGE SUMMARY
[facility] General Surgery  Discharge Summary      Patient Name: Emily Cook  MRN: [id number]  Admission Date: 5/31/2017  Hospital Length of Stay: 20 days  Discharge Date and Time:  06/20/2017 1:46 PM  Attending Physician: Augustine Rhodes MD   Discharging Provider: BRANDI Dexter  Primary Care Provider: Kaylee Cazares MD     HPI: 56 y.o. woman with PMH of AVB/syncope s/p PPM, pulmonary HTN, who is now transferred from [facility] for shortness of breath and management of patient's pulmonary HTN.   Patient has had difficult to treat PH, has been intolerant to Adcira and Adempas in the past. She last saw Dr. Cazares in 3/2017 and at that time was on a regimen of Macitentan and inhaled Tyvaso. She had a worsening 6MW at that time with uptrending BNP to 924, was switched from Tyvaso to Uptravi. She tolerated this regimen until early May when she called our clinic c/o nausea and vomiting from the medication. She still had her Tyvaso supplies and has since switched back to it, taking about 4 doses per day. Since then, she has been to the ED 3 times for SOB, on 5/14/2017, 5/20/2017, and again earlier today. Each time she was given IVP Lasix and sent home, although she refused admission for further workup on 5/14/2017.   After arrival tonight, patient feels comfortable, on 5L NC. Denies any chest discomfort, has minimal SOB, and no palpitations. Denies any recent dizziness or syncope, no PND, orthopnea. Has had some recent LE edema and abdominal distension, though states it is improved currently. She states she has taken her Tyvaso earlier today, but did not bring her machine or supplies with her. No other complaints at this time.     Procedure(s) (LRB):  INSERTION-CATHETER-CARUSO (Right)     Hospital Course: Patient admitted to the [facility] service and placed on 24 hour telemetry  Pulmonary HTN, WHO group 1  Acute on Chronic RV Systolic Failure  -Has not tolerated multiple PH therapies  in the past, on admit, we continued home dose of Macitentan.    -RHC 6/1/17 showed RA 13, PCWP 15, PAP 90/38, CO/CI- 3.4/1.8; therefor, patient was started on Veletri with plan to up titrate as tolerated.  She had to be moved to the ICU secondary to need for high flow oxygen.  She was started on Dopamine and diuresed.  CT of chest negative for PVOD.  -Dopamine changed to ;  turned off 6/9 2/2 hypotension  -Palliative Care consulted and pt made DNR/DNI secondary to advance class 4 PAH.  -We were able up titrate Veteltri to 22ng/kg/mn.  Cardoso line was placed and pt was set up for home infusion  -She was transitioned from IV Lasix to PO at 80mg BID  -Coumadin was restarted after Cardoso placement.   -Plan for now is for pt to go home with home health  -will check lab work and arrange for follow up in one week     Chronic Hypoxia, Acute on Chronic Resp Failure  - O2 weaned down to 4.5 L which is her home dose.   - Appreciate pulm recs  - Doppler lower extremity failed to show DVT  - TTE with bubble negative  - CTA chest with no evidence of PVOD     Consults:   Consults         Status Ordering Provider     Inpatient consult to General Surgery  Once     Provider:  (Not yet assigned)    Completed LUIS ENRIQUE DEL TORO     Inpatient consult to Palliative Care  Once     Provider:  (Not yet assigned)    Completed SCOOBY CANDELARIA     Inpatient consult to PICC team ([facility])  Once     Provider:  (Not yet assigned)    Completed LUIS ENRIQUE DEL TORO     Inpatient consult to Pulmonology  Once     Provider:  (Not yet assigned)    Completed MERLENE JULIEN          Significant Diagnostic Studies: see report for full details  RHC: 6/1/17   Severe Pulmonary Hypertension.    RHC demonstrates increased right and left-sided filling pressures.    Severely decreased CI by Eliu Method indicative of Low output state.    Compared to prior RHC of  10/16 ,   no significant change in PAP, CO is alittle lower and filling pressures are  higher today.  2D echo with CFD: 6/1/17  CONCLUSIONS     1 - Small, underfilled left ventricle (compressed by the RV) with a normal ejection fraction (EF 55-60%).     2 - Severe right ventricular enlargement with severely depressed systolic function.     3 - Right atrial enlargement.     4 - Mild to moderate tricuspid regurgitation.     5 - Severe pulmonary hypertension. The estimated PA systolic pressure is 119 mmHg.     6 - Increased central venous pressure.  2D echo with CFD: 6/5/17  CONCLUSIONS     1 - Normal left ventricular systolic function (EF 60-65%).     2 - Right ventricular enlargement with moderately depressed systolic function.     3 - Indeterminate LV diastolic function.     4 - Trivial mitral regurgitation.     5 - Trivial tricuspid regurgitation.     6 - Intermediate central venous pressure.     7 - No evidence of intracardiac shunt.     8 - Pulmonary hypertension. The estimated PA systolic pressure is 79 mmHg. PA pressure likely underestimated due to incomplete TR jet.  US lower extremity: 6/4/17  No evidence of DVT in either lower extremity  CTA chest: 6/6/17  1. Normal-appearing pulmonary veins without evidence of venoocclusive disease.  2.  Diffuse pulmonary parenchymal heterogeneity, interlobular septal thickening, and widespread groundglass attenuation seen throughout both lungs, however most prominent in the lower lobes in a perihilar/posterior distribution.  These findings are consistent with pulmonary edema, and demonstrate significant interval detriment change when compared to CTA chest 05/14/2017.  3.  Mild cardiomegaly with a small pericardial effusion.  4.  Dilated main pulmonary artery which can be seen in the setting of pulmonary artery hypertension.  5.  Additional findings as above.      Pending Diagnostic Studies:     None        Final Active Diagnoses:    Diagnosis Date Noted POA    PRINCIPAL PROBLEM:  Primary pulmonary hypertension [I27.0]  Yes    Acute on chronic respiratory  failure with hypoxia [J96.21] 06/03/2017 Yes    Sleep apnea- on CPAP [G47.30] 06/19/2015 Yes    Cardiac pacemaker in situ [Z95.0] 09/10/2013 Yes    Heart block AV third degree [I44.2] 07/28/2013 Yes    Pulmonary hypertension [I27.2] 10/18/2012 Yes    Long term current use of anticoagulant therapy [Z79.01] 07/11/2012 Not Applicable      Problems Resolved During this Admission:    Diagnosis Date Noted Date Resolved POA      Discharged Condition: stable    Disposition: Home or Self Care    Follow Up:  Follow-up Information     Augustine Rhodes MD In 1 week.    Specialties:  Cardiology, Transplant  Contact information:  [facility] [id number] [phone]                 Patient Instructions:     BASIC METABOLIC PANEL   Standing Status: Future  Standing Exp. Date: 08/19/18     B-TYPE NATRIURETIC PEPTIDE   Standing Status: Future  Standing Exp. Date: 08/19/18     Diet general   Order Specific Question Answer Comments   Additional restrictions: Cardiac (Low Na/Chol)      Activity as tolerated     Call MD for:  temperature >100.4     Call MD for:  redness, tenderness, or signs of infection (pain, swelling, redness, odor or green/yellow discharge around incision site)       Medications:  Reconciled Home Medications:   Current Discharge Medication List      CONTINUE these medications which have CHANGED    Details   furosemide (LASIX) 80 MG tablet Take 1 tablet (80 mg total) by mouth 2 (two) times daily.  Qty: 60 tablet, Refills: 3         CONTINUE these medications which have NOT CHANGED    Details   clobetasol (TEMOVATE) 0.05 % external solution Refills: 2      conjugated estrogens (PREMARIN) vaginal cream Place a pea-sized amount in vagina every night for 2 weeks, then use 2-3 nights a week  Qty: 45 g, Refills: 12    Associated Diagnoses: Vaginal atrophy      duloxetine (CYMBALTA) 60 MG capsule Take 1 capsule (60 mg total) by mouth 2 (two) times daily.  Qty: 180 capsule, Refills: 1    Associated  Diagnoses: Chronic neck pain; Right cervical radiculopathy      gabapentin (NEURONTIN) 300 MG capsule TAKE 1 CAPSULE BY MOUTH THREE TIMES DAILY FOR 1 WEEK, IF NO RELIEF INCREASE TO 1 IN MORNING, 1 IN AFTERNOON AND 2 AT BEDTIME  Qty: 360 capsule, Refills: 3    Comments: **Patient requests 90 days supply**  Associated Diagnoses: Chronic neck pain; Right cervical radiculopathy      hydrocodone-acetaminophen 10-325mg (NORCO)  mg Tab Take 1 tablet by mouth 3 (three) times daily as needed.  Qty: 90 tablet, Refills: 0    Associated Diagnoses: Chronic neck pain      hydrOXYzine (VISTARIL) 50 MG Cap 50 mg daily as needed.       ketoconazole (NIZORAL) 2 % cream Apply topically 2 (two) times daily.  Qty: 60 g, Refills: 2      macitentan (OPSUMIT) 10 mg Tab Take 1 tablet (10 mg total) by mouth once daily.  Qty: 30 tablet, Refills: 11    Associated Diagnoses: Secondary pulmonary hypertension      PATADAY 0.2 % Drop Refills: 6      peg 3350-electrolytes-vit C (MOVIPREP) 100-7.5-2.691 gram PwPk Take 1 packet by mouth as directed.  Qty: 1 each, Refills: 0    Associated Diagnoses: Special screening for malignant neoplasms, colon      potassium chloride (MICRO-K) 10 MEQ CpSR Take 1 capsule (10 mEq total) by mouth once daily.  Qty: 90 capsule, Refills: 3      !! warfarin (COUMADIN) 5 MG tablet TAKE 1 AND 1/2 TABLETS BY MOUTH DAILY EXCEPT 1 TABLET ON TUESDAY OR AS DIRECTED BY COUMADIN CLINIC  Qty: 150 tablet, Refills: 5    Comments: **Patient requests 90 days supply**      !! warfarin (COUMADIN) 5 MG tablet TAKE 1 AND 1/2 TABLETS BY MOUTH DAILY EXCEPT 1 TABLET ON TUESDAY OR AS DIRECTED BY COUMADIN CLINIC  Qty: 150 tablet, Refills: 0      !! warfarin (COUMADIN) 5 MG tablet TAKE 1 AND 1/2 TABLETS BY MOUTH DAILY EXCEPT 1 TABLET ON TUESDAY OR AS DIRECTED BY COUMADIN CLINIC  Qty: 150 tablet, Refills: 0       !! - Potential duplicate medications found. Please discuss with provider.      STOP taking these medications        treprostinil-neb accessories 1.74 mg/2.9 mL (0.6 mg/mL) Nebu Comments:   Reason for Stopping:               BRANDI Dexter  General Surgery  [facility]

## 2017-06-20 NOTE — PROGRESS NOTES
Notified BRANDI Dexter with HTS of pt's BP with am vitals: 75/45 manual lying. Pt asymptomatic. No new orders at this time. PA will round on pt again and notify RN for any new orders.

## 2017-06-20 NOTE — PROGRESS NOTES
"Progress Note  Heart Transplant Service    Admit Date: 5/31/2017   LOS: 20 days     SUBJECTIVE:     Follow up for: Primary pulmonary hypertension    Interval History: No issues overnight. Currently at 22ng/kg/mn on Veletri and 6L NC.     Scheduled Meds:   furosemide  80 mg Oral BID    gabapentin  300 mg Oral TID    macitentan  10 mg Oral Daily    mupirocin   Topical (Top) Daily    potassium chloride  40 mEq Oral BID    sodium chloride 0.9%  10 mL Intravenous Q6H    sodium chloride 0.9%  3 mL Intravenous Q8H    warfarin  7.5 mg Oral Daily     Continuous Infusions:   epoprostenol (VELETRI) infusion 22 ng/kg/min (06/19/17 1532)    veletri/remodulin cassette      veletri/remodulin cassette      veletri/remodulin tubing      veletri/remodulin tubing       PRN Meds:acetaminophen, docusate sodium, heparin (PORCINE), heparin (PORCINE), hydrocodone-acetaminophen 10-325mg, loperamide, ondansetron, ondansetron, promethazine (PHENERGAN) IVPB, Flushing PICC Protocol **AND** sodium chloride 0.9% **AND** sodium chloride 0.9%    Review of patient's allergies indicates:   Allergen Reactions    Chlorhexidine Itching and Rash     Patient had raised rash on neck following RHC procedure. She states she's never had a problem with the "prep" used until this time.     Adhesive Rash     Gen: denies fever chills fatigue weight loss  HEENT: denies HA, blurry vision, tinnitus, dry mouth  Resp: denies SOB, cough, hemoptysis  CV: Denies CP, palps, orthopnea, PND, edema  GI: denies abd pain, nausea/vomiting, diarrhea, melena, constipation, hematochezia    OBJECTIVE:     Vital Signs (Most Recent)  Temp: 98.4 °F (36.9 °C) (06/20/17 0445)  Pulse: 105 (06/20/17 0445)  Resp: 18 (06/20/17 0445)  BP: (!) 88/51 (06/20/17 0445)  SpO2: (!) 90 % (06/20/17 0445)    Vital Signs Range (Last 24H):  Temp:  [97.8 °F (36.6 °C)-98.7 °F (37.1 °C)]   Pulse:  []   Resp:  [18-20]   BP: (86-95)/(50-61)   SpO2:  [89 %-96 %]     I & O (Last " 24H):    Intake/Output Summary (Last 24 hours) at 06/20/17 0724  Last data filed at 06/20/17 0500   Gross per 24 hour   Intake           823.47 ml   Output             1400 ml   Net          -576.53 ml        Physical Exam  Gen: NAD  Neck: JVD RIGHT above clavicle  Lung: CTA bilat  Heart: Normal S1/S2, tachy, regular rhythm, II/VI systolic murmur  Abdomen: Soft, NT/ND, NABS, no masses, no guarding/rebounding  Extremities: No LE edema bilaterally, 2+ pulses bilaterally in upper/lower extremities   Skin: Normal color and turgor.    Neuro: AAOx3    Labs:       Recent Labs  Lab 06/17/17  0500 06/18/17  0500 06/19/17  0500 06/20/17  0400   WBC 8.37 7.09 9.74  --    HGB 13.7 13.7 13.4 13.5   HCT 40.7 41.0 39.6 39.8    301 292 296   LYMPH 14.2*  1.2 21.0  1.5 14.5*  1.4  --    MONO 7.0  0.6 9.4  0.7 5.5  0.5  --    EOSINOPHIL 1.1 2.3 2.0  --          Recent Labs  Lab 06/13/17  1032  06/18/17  0500 06/19/17  0500 06/20/17  0400   APTT 25.9  --   --   --   --    INR  --   < > 1.0 1.1 1.5*   < > = values in this interval not displayed.       Recent Labs  Lab 06/18/17  0500 06/19/17  0500 06/20/17  0400   * 113* 109   CALCIUM 8.9 9.1 9.0   ALBUMIN 2.8* 2.9* 3.1*   PROT 7.0 7.1 7.3   * 132* 134*   K 3.4* 3.5 3.8   CO2 28 25 30*   CL 97 95 96   BUN 15 18 17   CREATININE 1.1 1.0 1.1   ALKPHOS 99 100 98   ALT 27 25 24   AST 27 23 20   BILITOT 0.8 0.8 0.8   MG 1.7 1.7 1.9   PHOS 4.2 3.4 3.9     Estimated Creatinine Clearance: 56.8 mL/min (based on Cr of 1.1).      Recent Labs  Lab 06/14/17  0627   *       No results for input(s): LDH in the last 168 hours.    Microbiology Results (last 7 days)     Procedure Component Value Units Date/Time    Blood culture [[id number]] Collected:  06/09/17 0742    Order Status:  Completed Specimen:  Blood from Peripheral, Jugular, Internal Left Updated:  06/14/17 1022     Blood Culture, Routine No growth after 5 days.    Blood culture [[id number]] Collected:  06/09/17  0913    Order Status:  Completed Specimen:  Blood Updated:  06/14/17 1022     Blood Culture, Routine No growth after 5 days.        ASSESSMENT AND PLAN:   56 y.o. woman with PMH of AVB/syncope s/p PPM, pulmonary HTN, who is now transferred from [facility] for shortness of breath and management of patient's pulmonary HTN.     Pulmonary HTN, WHO group 1  Acute on Chronic RV Systolic Failure  -Has not tolerated multiple PH therapies in the past.   -Continue home dose Macitentan  -RHC 6/1/17 showed RA 13, PCWP 15, PAP 90/38, CO/CI- 3.4/1.8.   -Pt was started on IV Veletri; currently at 22 ng/kg/mn.  -Started on dopamine 3 mcg/hour for RV failure and poor organ perfusion; changed to ;  turned off 6/9 2/2 hypotension  -CT-A with no evidence of PVOD  -Continue PO lasix 80mg BID  -INR sub therapeutic today. Restarted warfarin post brush.   -Appreciate palliative care assistance.   -Plan for now is for pt to go home with H/H AIM program today. Pt DNR  -Remove TLC/PICC. Keep brush.      Chronic Hypoxia, Acute on Chronic Resp Failure  - O2 weaned down to 4.5 L which is her home dose.   - Appreciate pulm recs  - Doppler lower extremity failed to show DVT  - TTE with bubble negative  - CTA chest with no evidence of PVOD

## 2017-06-20 NOTE — PROGRESS NOTES
[facility] over to home cadd pump with prescribed home concentration of veletri running to R chest wall brush. Central line dressing changed using sterile technique this afternoon, marked with date. PICC removed. Discharge instructions and follow up appointments reviewed with pt and sons. Pt verbalized understanding, no questions at this time. VSS, see flowsheet for details. Request placed for patient transport to bring pt to son's car with personal belongings and boxes of supplies provided by [facility]. Pt's son has brought home oxygen supply for the trip. Will continue to monitor until pt leaves unit.

## 2017-06-20 NOTE — PLAN OF CARE
AAO x 4. VSS, afebrile, SpO2 93% on 4.5L HFNC. Veletri infusing at 22 ng/kg/min x 86 kg. PO Lasix administered during day. PO K replacement as ordered. OIB=073 mL so far this shift. Fall precautions maintained and pt repositions self. See flowsheet for assessment findings. Plan for d/c with home health today. Will continue to monitor.

## 2017-06-21 NOTE — PROGRESS NOTES
The pt was recently admitted from 5/31 through 6/20 for pulm HTN/SOB.  See calendar for recent INRs and coumadin doses.  The pt will resume her past weekly dose and I have asked her home health agency to check an INR for us upon admit on 6/22.

## 2017-06-21 NOTE — TELEPHONE ENCOUNTER
SW confirmed the [facility] ph [phone], fax [phone] is able to accept pt for home health needs and will admit her tomorrow. SW remains available.

## 2017-06-21 NOTE — PROGRESS NOTES
DISCHARGE    SW to pt's room for d/c plan. Pt presents as aaox3 with calm affect. Pt reports in agreement with plan to d/c home today with home health. Pt's family providing transportation. Pt states she has portable O2 for ride home.    SW called the following home health companies:  [facility]  - cannot take pt because PCP is not an [facility] MD  [facility] - is not in network with pt's insurance  [facility] - is not in network with pt's insurance  [facility] - not in network  [facility] - not in network    The [facility] ph [phone], fax [phone] states they do accept [employer], but will have to review referral and follow up with SW. SW faxed referral and confirmed receipt. SW providing psychosocial and counseling support, education, resources, and d/c planning as needed. SW continuing to follow and remains available.

## 2017-06-21 NOTE — PROGRESS NOTES
6/21 :  Patient was admitted into [facility] regarding Pulmonary hypertension 5/31/17. Then transferred to [facility] 6/03/17 and transferd out 6/07/17. Insertion catheter brush dated 6/16 and discharged 6/20.  Called patient no answer phone line was busy.

## 2017-06-26 NOTE — PROGRESS NOTES
Physical Therapy Discharge Summary    Emily Cook  MRN: [id number]   Primary pulmonary hypertension   Patient Discharged from acute Physical Therapy on 17.  Please refer to prior PT noted date on 17 for functional status.     Assessment:   Patient appropriate for care in another setting.  GOALS:    Physical Therapy Goals        Problem: Physical Therapy Goal    Goal Priority Disciplines Outcome Goal Variances Interventions   Physical Therapy Goal     PT/OT, PT Ongoing (interventions implemented as appropriate)     Description:  Goals to be met by: 17     Patient will increase functional independence with mobility by performin. Supine to sit with Stand-by Assistance - GOAL MET  2. Sit to supine with Stand-by Assistance - GOAL MET  3. Sit to stand transfer with Stand-by Assistance - GOAL MET  4. Gait  x 150 feet with Stand-by Assistance.   5. Lower extremity exercise program x15 reps per handout, with supervision met (2017)                    Reasons for Discontinuation of Therapy Services  Transfer to alternate level of care.      Plan:  Patient Discharged to: Home with Home Health Service.    Chelsea Herrera, PT  2017

## 2017-06-26 NOTE — PROGRESS NOTES
Labs reviewed- K was 2.5-   Will increase micro K to 20 meq tid and add aldactone 25mg daily. Repeat labs on Thurs.

## 2017-06-27 NOTE — TELEPHONE ENCOUNTER
Spoke with patient yesterday about lab results and change of medication regimen, per Dr. Cazares. Patient is to take 20 mEq Potassium, three times a day. Also, MD prescribed Aldactone, 25 mg. Both RX's sent to patient's preferred pharmacy.  Contacted the [facility] and informed them of medication changes. CONCETTA Porter stated that she will confirm and reinforce MD instructions. Will draw labs on Thursday.

## 2017-06-29 NOTE — TELEPHONE ENCOUNTER
Roman, RN with [facility] called to report on home visit w/patient today. Roman worked w/patient's son and taught dressing change to Cardoso catheter. Also reinforced teaching regarding mixing of Veletri cassettes, as well as need for back up cassette in refrigerator at all times (good for 8 days), as patient had  cassette during visit.     Pt was reportedly not wearing oxygen upon nurse arrival, but put it on during nurse visit. Pt had also not made increases in Veletri since discharge home from hospital, so dosing sheet was dated for patient to make increases accordingly, and teaching on this reinforced with patient's son. Patient should be up to 30ng by , if tolerates increases. Will continue to monitor.

## 2017-07-05 NOTE — PROGRESS NOTES
Asked Dr. Hernandez about f/u for this patient.  He would like to wait until she is fully optimized on PH therapy and for Dr. Cazares to re-refer once she is.

## 2017-07-10 NOTE — PROGRESS NOTES
The pt reports no new meds, no alcohol intake, no changes to warfarin dose.  She has been constipated lately and reports an overall decreased appetite.  I am holding her coumadin tonight, lowering her weekly dose of warfarin and monitoring closely with a repeat INR later this week.

## 2017-07-17 NOTE — PROGRESS NOTES
Subjective:       Patient ID: Emily Cook is a 56 y.o. female.    Chief Complaint: No chief complaint on file.    HPI    Mrs. Cook is a 56-year-old black female with past medical history of pulmonary   hypertension, third-degree AV block status post pacemaker, on chronic Coumadin   therapy.  She is followed up in the [facility] for chronic neck   pain with right cervical radiculopathy and right carpal tunnel syndrome and   recurrent right subdeltoid bursitis.  Her last visit to the clinic was on   04/26/2017.  She was maintained on gabapentin, Cymbalta and p.r.n.   hydrocodone/APAP.  The patient reports being recently discharged from the   hospital after a 20-day hospital stay due to pneumonia.  She is still feeling   fatigued.  She denies any fevers or chills.  Her neck pain has been stable.  It   is a constant aching pain in the cervical spine.  She has occasional radiation   to the right hand with numbness.  Her pain is worse with activity and better   with rest.  Her maximum pain is 7-8/10 and minimum 3-4/10.  Today, it is 5-6/10.    The patient complains of mild right upper extremity weakness, but without   change from baseline.  She continues complaining of right hand numbness due to   carpal tunnel syndrome.  She has been wearing the carpal tunnel splint, but not   consistently.    Her right shoulder pain has also been stable.  It is an almost constant aching   pain at the deltoid region.  It is aggravated by extensive shoulder movement and   better with rest.  Her maximum pain is 7-8/10 and minimum 3-4/10.  Today, it is   5-6/10.    She is currently taking gabapentin 300 mg p.o. three times per day, Cymbalta 60   mg p.o. twice per day and hydrocodone/APAP 10/325 p.r.n., usually three times   per day.  She uses a topical analgesic ointment to her shoulder once or twice   per day.      MS/HN  dd: 07/17/2017 13:27:14 (CDT)  td: 07/18/2017 01:35:58 (CDT)  Doc ID   #[id number]  Job ID  #[id number]    CC:           Review of Systems   Constitutional: Positive for fatigue.   Eyes: Positive for visual disturbance.   Respiratory: Positive for shortness of breath.    Cardiovascular: Negative for chest pain.   Gastrointestinal: Positive for diarrhea and nausea. Negative for constipation and vomiting.   Genitourinary: Negative for difficulty urinating.   Musculoskeletal: Positive for arthralgias (Rt shoulder), back pain and neck pain. Negative for gait problem.   Neurological: Positive for headaches. Negative for dizziness.   Psychiatric/Behavioral: Positive for sleep disturbance. Negative for behavioral problems.       Objective:      Physical Exam   Constitutional: She appears well-developed and well-nourished. No distress.   Neck: Normal range of motion.   Pain at end range.  +ve tenderness low cervical spine.   Musculoskeletal:   BUE:  ROM:full.  Strength:    RUE: 4/5 at shoulder abduction, 5 elbow flexion, elbow extension, hand .   LUE: 5/5 at shoulder abduction, elbow flexion, elbow extension, hand .  Sensation to pinprick:   RUE: intact.   LUE: intact.  DTR:    RUE: +1 biceps, +1 triceps.   LUE:  +1 biceps, +1 triceps.  Fowler:   RUE: -ve.   LUE: -ve.    Impingement Signs:  Neer:  RUE: +ve    LUE: -ve  Sales: RUE: +ve    LUE: -ve  Empty Can test:    RUE: +-ve    LUE: -ve  +ve Rt deltoid & upper trapezius tenderness.       Neurological: She is alert.   Skin: Skin is warm.   Psychiatric: She has a normal mood and affect.   Vitals reviewed.          Assessment:       1. Chronic neck pain    2. Osteoarthritis of spine with radiculopathy, cervical region    3. Right cervical radiculopathy    4. Subdeltoid bursitis, right    5. Carpal tunnel syndrome, right (mild)        Plan:     - Continue duloxetine (CYMBALTA) 60 MG capsule; Take 1 capsule (60 mg total) by mouth 2 (two) times daily.  - Increase gabapentin (NEURONTIN) 300 MG capsule; Take 1 capsule (300 mg total) by mouth, take 1 qam, 1 qpm, 2  qhs.  - Continue hydrocodone-acetaminophen 10-325mg (NORCO)  mg Tab; Take 1 tablet by mouth 3 times daily as needed.  - Continue topical compound ointment to Rt shoulder & neck 3-4 times per day.  - Regular home exercise program for neck and shoulder  - She was again encouraged to wear bilateral carpal Tunnel Splint at night regularly.  - Return in about 3 months (around 10/17/2017).

## 2017-07-17 NOTE — PROGRESS NOTES
Subjective:     HPI:  Ms. Cook is a 56 y.o. year old Black or  female who presents for hospital discharge followup. She has a PMH of AVB/syncope s/p PPM, pulmonary HTN WHO group 1, who was transferred from [facility] for shortness of breath and management of patient's pulmonary HTN. Patient admitted to the [facility] service and placed on 24 hour telemetry.  On admit, we continued home dose of Macitentan.  RHC 6/1/17 showed RA 13, PCWP 15, PAP 90/38, CO/CI- 3.4/1.8; therefor, patient was started on Veletri with plan to up titrate as tolerated.  She had to be moved to the ICU secondary to need for high flow oxygen.  She was started on Dopamine and diuresed.  CT of chest negative for PVOD. Dopamine changed to ;  turned off 6/9 2/2 hypotension. Palliative Care consulted and pt made DNR/DNI secondary to advance class 4 PAH.  We were able up titrate Veteltri to 22ng/kg/mn.  Cardoso line was placed and pt was set up for home infusion. Since then her son has been uptitrating her dose every other day as directed. Today, she feels worse. Feels more flushed and has a rash which she attributes to potassium. Of note, she stopped taking her potassium for a few days but has now resumed. 6MWT done with 600 feet walked.     Past Medical History:   Diagnosis Date    Arrhythmia     Arthritis     Cardiac pacemaker in situ     Carpal tunnel syndrome, right     Cervical spondylosis     Cervicalgia     CHF (congestive heart failure)     Diverticulitis     Heart block AV third degree     Hyperlipidemia     ALFREDO on CPAP     Pulmonary hypertension     Subdeltoid bursitis      Past Surgical History:   Procedure Laterality Date    CARDIAC CATHETERIZATION      CARDIAC PACEMAKER PLACEMENT  7/29/13    [employer] DC PM    CARDIAC SURGERY      COLONOSCOPY N/A 9/28/2015    Procedure: COLONOSCOPY;  Surgeon: Jason Diaz MD;  Location: [facility] ([address] [place] [address]);  Service: Endoscopy;  Laterality: N/A;   "Please schedule in 2-3 months with Dr Diaz  Pulmonary HTN, 2nd floor (off remodulin infusion as of "a few days" before 9/9/15)    3 day hold Coumadin, [facility]  PT/INR scheduled before procedure    ECTOPIC PREGNANCY SURGERY Left     knee arthroscopy       OB History      Para Term  AB Living    5 4 4   1 3    SAB TAB Ectopic Multiple Live Births        1            Review of Systems   Constitution: Positive for diaphoresis and malaise/fatigue. Negative for chills, decreased appetite, fever, night sweats and weight gain.   Eyes: Negative for blurred vision.   Cardiovascular: Positive for dyspnea on exertion. Negative for chest pain, claudication, leg swelling, near-syncope, orthopnea and palpitations.   Respiratory: Positive for shortness of breath. Negative for sleep disturbances due to breathing and snoring.    Skin: Positive for color change, dry skin, flushing, itching and rash. Negative for poor wound healing.   Musculoskeletal: Negative for back pain.   Gastrointestinal: Positive for nausea. Negative for melena.       Objective:   Blood pressure 115/64, pulse 91, height 5' 3" (1.6 m), weight 84.4 kg (186 lb 1.1 oz), SpO2 (!) 90 %.body mass index is 32.96 kg/m².  Physical Exam  Constitutional: She is oriented to person, place, and time. She appears well-developed and well-nourished.   HENT:   Head: Normocephalic and atraumatic.   Eyes: Right eye exhibits no discharge. Left eye exhibits no discharge.   Neck: Neck supple. No JVD present. No thyromegaly present.   Cardiovascular: Normal rate and regular rhythm.  Exam reveals no gallop and no friction rub.    No murmur heard.  Pulmonary/Chest: Effort normal and breath sounds normal. No respiratory distress. She has no wheezes. She has no rales.   Abdominal: Soft. Bowel sounds are normal. She exhibits no distension. There is no tenderness.   Musculoskeletal: Normal range of motion. She exhibits no edema or tenderness.   Neurological: She " is alert and oriented to person, place, and time. No cranial nerve deficit. Coordination normal.   Skin: Skin is warm and dry. No rash noted.   Psychiatric: She has a normal mood and affect. Judgment and thought content normal.   Labs:    Chemistry        Component Value Date/Time     07/17/2017 1144    K 3.4 (L) 07/17/2017 1144     07/17/2017 1144    CO2 22 (L) 07/17/2017 1144    BUN 11 07/17/2017 1144    CREATININE 0.8 07/17/2017 1144     07/17/2017 1144     01/27/2017 2130        Component Value Date/Time    CALCIUM 8.7 07/17/2017 1144    ALKPHOS 82 07/17/2017 1144    AST 15 07/17/2017 1144    ALT 13 07/17/2017 1144    BILITOT 1.1 (H) 07/17/2017 1144    ESTGFRAFRICA >60.0 07/17/2017 1144    EGFRNONAA >60.0 07/17/2017 1144          Magnesium   Date Value Ref Range Status   07/17/2017 1.9 1.6 - 2.6 mg/dL Final     Lab Results   Component Value Date    WBC 3.85 (L) 07/17/2017    HGB 12.9 07/17/2017    HCT 40.1 07/17/2017     07/17/2017     Lab Results   Component Value Date    INR 1.9 07/13/2017    INR 3.7 07/10/2017    INR 3.3 07/05/2017     BNP   Date Value Ref Range Status   07/17/2017 507 (H) 0 - 99 pg/mL Final     Comment:     Values of less than 100 pg/ml are consistent with non-CHF populations.   06/14/2017 144 (H) 0 - 99 pg/mL Final     Comment:     Values of less than 100 pg/ml are consistent with non-CHF populations.   06/01/2017 1,931 (H) 0 - 99 pg/mL Final     Comment:     Values of less than 100 pg/ml are consistent with non-CHF populations.     No results found for: LDH  No results found for this or any previous visit.  No results found for this or any previous visit.    Assessment:      1. Acute on chronic respiratory failure with hypoxia    2. Chronic pulmonary heart disease    3. Pulmonary hypertension        Plan:   Will hold off on any further uptitration at this time secondary to side effects. Will call patient next week and see if flushing improved.   RTC in 3  weeks for PH clinic   Patient is now NYHA III  Recommend 2 gram sodium restriction and 1500cc fluid restriction.  Encourage physical activity with graded exercise program.  Requested patient to weigh themselves daily, and to notify us if their weight increases by more than 3 lbs in 1 day or 5 lbs in 1 week.

## 2017-07-17 NOTE — LETTER
July 17, 2017        Kaylee Cazares  [address] [address]  Phone: [phone]  Fax: [phone]             [facility]  [address]  [place] [address]  Phone: [phone]   Patient: Emily Cook   MR Number: [id number]   YOB: 1960   Date of Visit: 7/17/2017       Dear Dr. Kaylee Cazares    Thank you for referring Emily Cook to me for evaluation. Attached you will find relevant portions of my assessment and plan of care.    If you have questions, please do not hesitate to call me. I look forward to following Emily Cook along with you.    Sincerely,    BRANDI Gusman    Enclosure    If you would like to receive this communication electronically, please contact [email or web address] or [phone] to request [employer] access.    [employer] is a tool which provides read-only access to select patient information with whom you have a relationship. Its easy to use and provides real time access to review your patients record including encounter summaries, notes, results, and demographic information.    If you feel you have received this communication in error or would no longer like to receive these types of communications, please e-mail [email or web address]

## 2017-07-18 NOTE — PROGRESS NOTES
Per emanuel from medical team [facility] , states patient was unable to contact yesterday will go out today . 7/18.

## 2017-07-18 NOTE — PROCEDURES
Emily Cook is a 56 y.o.  female patient, who presents for a 6 minute walk test ordered by MD. Debora.  The diagnosis is Pulmonary Hypertension.  The patient's BMI is 32.9 kg/m2.  Predicted distance (lower limit of normal) is 346.22 meters.      Test Results:    The test was not completed.  The patient stopped 1 times for a total of 137 seconds.  The total time walked was 223 seconds.  During walking, the patient reported:  Dyspnea, Leg pain, Lightheadedness. The patient used no assistive devices and supplemental oxygen during testing.     07/17/2017---------Distance: 182.88 meters (600 feet)     O2 Sat % Supplemental Oxygen Heart Rate Blood Pressure Fatou Scale   Pre-exercise  (Resting) 92 % 6 L/M 100 bpm 102/58 mmHg 2   During Exercise 69 % 6 L/M 122 bpm 109/67 mmHg 5-6   Post-exercise  (Recovery) 91 % 6 L/M  96 bpm   mmHg       Recovery Time: 201 seconds    Performing nurse/tech: ANTIONE Diaz      PREVIOUS STUDY:   The patient had a previous study.  03/17/2017---------Distance: 243.84 meters (800 feet)       O2 Sat % Supplemental Oxygen Heart Rate Blood Pressure Fatou Scale   Pre-exercise  (Resting) 99 % 4 L/M 64 bpm 108/56 mmHg 2   During Exercise 93 % 4 L/M 135 bpm 148/62 mmHg 7-8   Post-exercise  (Recovery) 97 % 4 L/M  87 bpm             CLINICAL INTERPRETATION:  Six minute walk distance is 182.88 meters (600 feet) with heavy dyspnea.  During exercise, there was significant desaturation while breathing supplemental oxygen.  Both blood pressure and heart rate remained stable with walking.  Tachycardia was present prior to exercise.  The patient reported non-pulmonary symptoms during exercise.  Significant exercise impairment is likely due to desaturation.  The patient did not complete the study, walking 223 seconds of the 360 second test.  Since the previous study in March 2017, exercise capacity is significantly worse.  Based upon age and body mass index, exercise capacity is less than  predicted.

## 2017-07-26 NOTE — TELEPHONE ENCOUNTER
"Called patient for follow-up. States that she is doing ok. Asked the current does of patient's Veletri and patient stated that she had "gone down" on the medication because she was feeling bad. Patient could only tell me that she was at 68 ml/24hr. She was unsure of her dose. Confirmed appt with Dr. sheehan on August 18th.  RN contacted [facility] for help determining patient's actual dose. On 7/19, the patient told the specialty pharmacist that she was at 27mg/kg/min. The rate at that time was 74ml/24hr. Pharmacist stated that a rate of 68ml/24hours would equal a dose of 24.7ng/kg/min/    "

## 2017-08-08 PROBLEM — T82.514A HICKMAN CATHETER DYSFUNCTION: Status: ACTIVE | Noted: 2017-01-01

## 2017-08-09 NOTE — SUBJECTIVE & OBJECTIVE
"Past Medical History:   Diagnosis Date    Arrhythmia     Arthritis     Cardiac pacemaker in situ     Carpal tunnel syndrome, right     Cervical spondylosis     Cervicalgia     CHF (congestive heart failure)     Diverticulitis     Heart block AV third degree     Hyperlipidemia     ALFREDO on CPAP     Pulmonary hypertension     Subdeltoid bursitis      Past Surgical History:   Procedure Laterality Date    CARDIAC CATHETERIZATION      CARDIAC PACEMAKER PLACEMENT  7/29/13    [employer] DC PM    CARDIAC SURGERY      COLONOSCOPY N/A 9/28/2015    Procedure: COLONOSCOPY;  Surgeon: Jason Diaz MD;  Location: [facility] ([address] [place] [address]);  Service: Endoscopy;  Laterality: N/A;  Please schedule in 2-3 months with Dr Diaz  Pulmonary HTN, 2nd floor (off remodulin infusion as of "a few days" before 9/9/15)    3 day hold Coumadin, [facility]  PT/INR scheduled before procedure    ECTOPIC PREGNANCY SURGERY Left     HYSTERECTOMY      partial    knee arthroscopy       Review of patient's allergies indicates:   Allergen Reactions    Chlorhexidine Itching and Rash     Patient had raised rash on neck following RHC procedure. She states she's never had a problem with the "prep" used until this time.     Adhesive Rash     No current facility-administered medications on file prior to encounter.      Current Outpatient Prescriptions on File Prior to Encounter   Medication Sig    clobetasol (TEMOVATE) 0.05 % external solution     conjugated estrogens (PREMARIN) vaginal cream Place a pea-sized amount in vagina every night for 2 weeks, then use 2-3 nights a week    duloxetine (CYMBALTA) 60 MG capsule Take 1 capsule (60 mg total) by mouth 2 (two) times daily.    furosemide (LASIX) 80 MG tablet Take 1 tablet (80 mg total) by mouth 2 (two) times daily.    gabapentin (NEURONTIN) 300 MG capsule Take 1 capsule (300 mg total) by mouth As instructed. Take one capsule qam, 1 qpm, 2 qhs.    hydrocodone-acetaminophen " 10-325mg (NORCO)  mg Tab Take 1 tablet by mouth 3 (three) times daily as needed.    hydrOXYzine (VISTARIL) 50 MG Cap 50 mg daily as needed.     ketoconazole (NIZORAL) 2 % cream Apply topically 2 (two) times daily.    macitentan (OPSUMIT) 10 mg Tab Take 1 tablet (10 mg total) by mouth once daily.    PATADAY 0.2 % Drop     potassium chloride (MICRO-K) 10 MEQ CpSR Take 2 capsules (20 mEq total) by mouth 3 (three) times daily.    spironolactone (ALDACTONE) 25 MG tablet Take 1 tablet (25 mg total) by mouth once daily.    warfarin (COUMADIN) 5 MG tablet TAKE 1 AND 1/2 TABLETS BY MOUTH DAILY EXCEPT 1 TABLET ON TUESDAY OR AS DIRECTED BY COUMADIN CLINIC     Family History     Problem Relation (Age of Onset)    Arthritis Mother, Father    Diabetes Mother    Hyperlipidemia Mother, Father        Social History Main Topics    Smoking status: Never Smoker    Smokeless tobacco: Never Used    Alcohol use No      Comment: rarely    Drug use: No    Sexual activity: No     SOB as below at baseline.   Review of Systems   Constitution: Negative.   HENT: Negative.    Eyes: Negative.    Cardiovascular: Negative.    Respiratory: Positive for shortness of breath. Negative for sputum production and wheezing.    Endocrine: Negative.    Hematologic/Lymphatic: Negative.    Skin: Negative.    Musculoskeletal: Negative.    Gastrointestinal: Negative.    Genitourinary: Negative.    Neurological: Negative.    All other systems reviewed and are negative.    Objective:     Vital Signs (Most Recent):  Temp: 98 °F (36.7 °C) (08/08/17 1744)  Pulse: 98 (08/08/17 1744)  Resp: 20 (08/08/17 1744)  BP: 110/60 (08/08/17 1744)  SpO2: (!) 91 % (on 5L 02 per NC as prescribed at home states patient) (08/08/17 1744) Vital Signs (24h Range):  Temp:  [98 °F (36.7 °C)] 98 °F (36.7 °C)  Pulse:  [98] 98  Resp:  [20] 20  SpO2:  [91 %] 91 %  BP: (110)/(60) 110/60     Weight: 84.4 kg (186 lb)  Body mass index is 32.95 kg/m².    SpO2: (!) 91 % (on 5L 02  "per NC as prescribed at home states patient)       No intake or output data in the 24 hours ending 08/08/17 2343    Lines/Drains/Airways     Central Venous Catheter Line                 Tunneled Central Line Insertion/Assessment - Single Lumen  06/16/17 0956 right subclavian 53 days              Physical Exam    Significant Labs: All pertinent lab results from the last 24 hours have been reviewed.    Significant Imaging: No new imaging   Past Medical History:   Diagnosis Date    Arrhythmia     Arthritis     Cardiac pacemaker in situ     Carpal tunnel syndrome, right     Cervical spondylosis     Cervicalgia     CHF (congestive heart failure)     Diverticulitis     Heart block AV third degree     Hyperlipidemia     ALFREDO on CPAP     Pulmonary hypertension     Subdeltoid bursitis        Past Surgical History:   Procedure Laterality Date    CARDIAC CATHETERIZATION      CARDIAC PACEMAKER PLACEMENT  7/29/13    [employer] DC PM    CARDIAC SURGERY      COLONOSCOPY N/A 9/28/2015    Procedure: COLONOSCOPY;  Surgeon: Jason Diaz MD;  Location: [facility] ([address] [place] [address]);  Service: Endoscopy;  Laterality: N/A;  Please schedule in 2-3 months with Dr Diaz  Pulmonary HTN, 2nd floor (off remodulin infusion as of "a few days" before 9/9/15)    3 day hold Coumadin, [facility]  PT/INR scheduled before procedure    ECTOPIC PREGNANCY SURGERY Left     HYSTERECTOMY      partial    knee arthroscopy         Review of patient's allergies indicates:   Allergen Reactions    Chlorhexidine Itching and Rash     Patient had raised rash on neck following RHC procedure. She states she's never had a problem with the "prep" used until this time.     Adhesive Rash         (Not in a hospital admission)  Family History     Problem Relation (Age of Onset)    Arthritis Mother, Father    Diabetes Mother    Hyperlipidemia Mother, Father        Social History Main Topics    Smoking status: Never Smoker    Smokeless tobacco: " Never Used    Alcohol use No      Comment: rarely    Drug use: No    Sexual activity: No     ROS   Gen: denies fever chills fatigue weight loss  Resp: denies SOB, cough, hemoptysis  CV: Denies CP, palps, orthopnea, PND, edema  GI: denies abd pain, nausea/vomiting, diarrhea  : denies dysuria, hematuria      Objective:     Vital Signs (Most Recent):  Temp: 98 °F (36.7 °C) (08/08/17 1744)  Pulse: 98 (08/08/17 1744)  Resp: 20 (08/08/17 1744)  BP: 110/60 (08/08/17 1744)  SpO2: (!) 91 % (on 5L 02 per NC as prescribed at home states patient) (08/08/17 1744) Vital Signs (24h Range):  Temp:  [98 °F (36.7 °C)] 98 °F (36.7 °C)  Pulse:  [98] 98  Resp:  [20] 20  SpO2:  [91 %] 91 %  BP: (110)/(60) 110/60     Weight: 84.4 kg (186 lb)  Body mass index is 32.95 kg/m².    SpO2: (!) 91 % (on 5L 02 per NC as prescribed at home states patient)       No intake or output data in the 24 hours ending 08/08/17 2356    Lines/Drains/Airways     Central Venous Catheter Line                 Tunneled Central Line Insertion/Assessment - Single Lumen  06/16/17 0956 right subclavian 53 days              Physical Exam   Gen: NAD. Well nourished   Neck: JVD at +14cm  CV:RRR no M/R/G; Cardoso catheter dislodged 3cm over R chest. No drainage, redness or tenderness around insertion site.   Resp: CTA B/L. No signs of respiratory distress.   GI: Soft NT. Non disteneded. Normal bowel sounds.   Ext: Warm. Minimal (1+ edema) in lower legs up to knees.     Significant Labs:     Recent Labs  Lab 08/08/17  2054   WBC 4.95   HGB 12.9   HCT 39.9          Recent Labs  Lab 08/07/17   INR 2.5     All pertinent lab results from the last 24 hours have been reviewed.    Significant Imaging: Echocardiogram:   2D echo with color flow doppler:   Results for orders placed or performed during the hospital encounter of 05/31/17   2D echo with color flow doppler   Result Value Ref Range    EF 55 55 - 65    Est. PA Systolic Pressure 119.04 (A)     Pericardial  Effusion TRIVIAL     Tricuspid Valve Regurgitation MILD TO MODERATE

## 2017-08-09 NOTE — ASSESSMENT & PLAN NOTE
Ms. Cook is a 56 year old with end-stage PAH on IV remodulin who presents this evening with a slightly dislodged Cardoso. Remodulin still infusing but will need to be replaced in AM. No exam findings concerning for infection.    --admit to HTS, observation status  --NPO after MN for Cardoso replacement  --continue home meds  --check coags, CBC, CMP    Discussed with Dr. Rhodes and hospitalist on call

## 2017-08-09 NOTE — CONSULTS
[facility]  Interventional Cardiology  Consult Note    Patient Name: Emily Cook  MRN: [id number]  Admission Date: 8/8/2017  Hospital Length of Stay: 0 days  Code Status: Prior   Attending Provider: Sharon Ruiz MD  Consulting Provider: Scooby Howard MD  Primary Care Physician: Kaylee Cazares MD  Principal Problem:Chronic pulmonary heart disease    Patient information was obtained from patient.     Inpatient consult to Cardiology  Consult performed by: SCOOBY HOWARD  Consult ordered by: SHARON RUIZ        Subjective:     Chief Complaint:  Dislodged Cardoso     HPI:  Ms. Cook is a 56 y.o. year old Black or  female  with a PMH of AVB/syncope s/p PPM, pulmonary HTN WHO group 1 who presents this evening with a displaced Cardoso. She was recently admitted with decompensated PAH and made DNR/DNI with goal of uptitrating Veletri as tolerated. She has been at her baseline at home but noticed this afternoon that her Cardoso sutures had popped and had fallen out ~3-4 inches. Has noticed scant amounts of clear-white discharge from the entry site but denies fever/chills worsening SOB.    Past Medical History:   Diagnosis Date    Arrhythmia     Arthritis     Cardiac pacemaker in situ     Carpal tunnel syndrome, right     Cervical spondylosis     Cervicalgia     CHF (congestive heart failure)     Diverticulitis     Heart block AV third degree     Hyperlipidemia     ALFREDO on CPAP     Pulmonary hypertension     Subdeltoid bursitis        Past Surgical History:   Procedure Laterality Date    CARDIAC CATHETERIZATION      CARDIAC PACEMAKER PLACEMENT  7/29/13    [employer] DC PM    CARDIAC SURGERY      COLONOSCOPY N/A 9/28/2015    Procedure: COLONOSCOPY;  Surgeon: Jason Diaz MD;  [facility]  Service: Endoscopy;  Laterality: N/A;  Please schedule in 2-3 months with Dr Diaz  Pulmonary HTN, 2nd floor (off remodulin infusion as of  ""a few days" before 9/9/15)    3 day hold Coumadin, [facility]  PT/INR scheduled before procedure    ECTOPIC PREGNANCY SURGERY Left     HYSTERECTOMY      partial    knee arthroscopy         Review of patient's allergies indicates:   Allergen Reactions    Chlorhexidine Itching and Rash     Patient had raised rash on neck following RHC procedure. She states she's never had a problem with the "prep" used until this time.     Adhesive Rash         (Not in a hospital admission)  Family History     Problem Relation (Age of Onset)    Arthritis Mother, Father    Diabetes Mother    Hyperlipidemia Mother, Father        Social History Main Topics    Smoking status: Never Smoker    Smokeless tobacco: Never Used    Alcohol use No      Comment: rarely    Drug use: No    Sexual activity: No     ROS   Gen: denies fever chills fatigue weight loss  Resp: denies SOB, cough, hemoptysis  CV: Denies CP, palps, orthopnea, PND, edema  GI: denies abd pain, nausea/vomiting, diarrhea  : denies dysuria, hematuria      Objective:     Vital Signs (Most Recent):  Temp: 98 °F (36.7 °C) (08/08/17 1744)  Pulse: 98 (08/08/17 1744)  Resp: 20 (08/08/17 1744)  BP: 110/60 (08/08/17 1744)  SpO2: (!) 91 % (on 5L 02 per NC as prescribed at home states patient) (08/08/17 1744) Vital Signs (24h Range):  Temp:  [98 °F (36.7 °C)] 98 °F (36.7 °C)  Pulse:  [98] 98  Resp:  [20] 20  SpO2:  [91 %] 91 %  BP: (110)/(60) 110/60     Weight: 84.4 kg (186 lb)  Body mass index is 32.95 kg/m².    SpO2: (!) 91 % (on 5L 02 per NC as prescribed at home states patient)       No intake or output data in the 24 hours ending 08/08/17 2050    Lines/Drains/Airways     Central Venous Catheter Line                 Tunneled Central Line Insertion/Assessment - Single Lumen  06/16/17 0956 right subclavian 53 days                Physical Exam   Gen: no acute distress, alert & oriented x 3  Neck: +14cm JVD, no carotid bruits  CV: regular rate and rhythm, normal S1/2, " no murmurs, rubs, gallops; R chest Cardoso catheter ~3 inches dislodged but still infusing  Resp: clear to auscultation bilaterally, normal effort  GI: soft, nontender nondistended, normal bowel sounds  Ext: warm well perfused, +1 edema, no clubbing or cyanosis      Significant Labs:   CMP No results for input(s): NA, K, CL, CO2, GLU, BUN, CREATININE, CALCIUM, PROT, ALBUMIN, BILITOT, ALKPHOS, AST, ALT, ANIONGAP, ESTGFRAFRICA, EGFRNONAA in the last 48 hours., CBC No results for input(s): WBC, HGB, HCT, PLT in the last 48 hours., INR   Recent Labs  Lab 08/07/17   INR 2.5    and All pertinent lab results from the last 24 hours have been reviewed.    Significant Imaging: Echocardiogram:   2D echo with color flow doppler:   Results for orders placed or performed during the hospital encounter of 05/31/17   2D echo with color flow doppler   Result Value Ref Range    EF 55 55 - 65    Est. PA Systolic Pressure 119.04 (A)     Pericardial Effusion TRIVIAL     Tricuspid Valve Regurgitation MILD TO MODERATE      Assessment and Plan:     * Chronic pulmonary heart disease    Ms. Cook is a 56 year old with end-stage PAH on IV remodulin who presents this evening with a slightly dislodged Cardoso. Remodulin still infusing but will need to be replaced in AM. No exam findings concerning for infection.    --admit to HTS, observation status  --NPO after MN for Cardoso replacement  --continue home meds  --check coags, CBC, CMP    Discussed with Dr. Rhodes and hospitalist on call            VTE Risk Mitigation     None          Thank you for your consult. I will follow-up with patient. Please contact us if you have any additional questions.    Naman Howard MD  Interventional Cardiology   [facility]

## 2017-08-09 NOTE — ED NOTES
"Pt ambulated to restroom without assistance, upon return, pt vomited. pt states that she started choking"possibly on her spit". But felt nauseous.Will continue to monitor.   "

## 2017-08-09 NOTE — ED PROVIDER NOTES
"Encounter Date: 8/8/2017    SCRIBE #1 NOTE: I, Archana Reardon, am scribing for, and in the presence of,  Dr. Mays. I have scribed the entire note.       History     Chief Complaint   Patient presents with    Vascular Access Problem     pt states that the stitch on her PICC line "popped" states line was not pulled out, medicine infusing well-- states noticed today at 4 pm     Time seen by provider: 8:17 PM    This is a 56 y.o. female with history of HLD, arrhythmia, pulmonary HTN, CHF, cardiac pacemaker, and ALFREDO who presents with complaint of acute Cardoso catheter problem. Pt states she noticed that the stitches on her Cardoso  had popped around 4 PM today and is concerned the area may be infected. She notes that the area around the Cardoso line has been itching. She states the line is otherwise intact and still infusing medicine. Pt denies fever.       The history is provided by the patient.     Review of patient's allergies indicates:   Allergen Reactions    Chlorhexidine Itching and Rash     Patient had raised rash on neck following RHC procedure. She states she's never had a problem with the "prep" used until this time.     Adhesive Rash     Past Medical History:   Diagnosis Date    Arrhythmia     Arthritis     Cardiac pacemaker in situ     Carpal tunnel syndrome, right     Cervical spondylosis     Cervicalgia     CHF (congestive heart failure)     Diverticulitis     Heart block AV third degree     Hyperlipidemia     ALFREDO on CPAP     Pulmonary hypertension     Subdeltoid bursitis      Past Surgical History:   Procedure Laterality Date    CARDIAC CATHETERIZATION      CARDIAC PACEMAKER PLACEMENT  7/29/13    [employer] DC PM    CARDIAC SURGERY      COLONOSCOPY N/A 9/28/2015    Procedure: COLONOSCOPY;  Surgeon: Jason Diaz MD;  Location: [facility] ([address] [place] [address]);  Service: Endoscopy;  Laterality: N/A;  Please schedule in 2-3 months with Dr Diaz  Pulmonary HTN, 2nd floor (off remodulin " "infusion as of "a few days" before 9/9/15)    3 day hold Coumadin, [facility]  PT/INR scheduled before procedure    ECTOPIC PREGNANCY SURGERY Left     HYSTERECTOMY      partial    knee arthroscopy       Family History   Problem Relation Age of Onset    Arthritis Mother     Diabetes Mother     Hyperlipidemia Mother     Arthritis Father     Hyperlipidemia Father      Social History   Substance Use Topics    Smoking status: Never Smoker    Smokeless tobacco: Never Used    Alcohol use No      Comment: rarely     Review of Systems   Constitutional: Negative for fever.   HENT: Negative for nosebleeds.    Eyes: Negative for visual disturbance.   Respiratory: Negative for cough.    Cardiovascular: Negative for chest pain.   Gastrointestinal: Negative for abdominal pain.   Genitourinary: Negative for dysuria.   Musculoskeletal: Negative for back pain.   Skin:        Positive for itching around PICC line.    Neurological: Negative for headaches.       Physical Exam     Initial Vitals [08/08/17 1744]   BP Pulse Resp Temp SpO2   110/60 98 20 98 °F (36.7 °C) (!) 91 %      MAP       76.67         Physical Exam    Nursing note and vitals reviewed.  Constitutional: No distress.   HENT:   Head: Normocephalic and atraumatic.   Neck: Normal range of motion. Neck supple.   Pulmonary/Chest: No respiratory distress.   Pt has flushing of the anterior chest but no tenderness to palpation. Insertion of the catheter is 3 cm from the base. There is no dressing on the brush. There is some crusting over the insertion site. Base of brush is loose as the stitch has popped. There is no swelling at the incision site. Pt on 2 liter of O2.   She is tachypnic but this is baseline for pt.    Abdominal: Soft. She exhibits no distension. There is no tenderness.   Neurological: She is alert and oriented to person, place, and time.   Skin:   Some hyperpigmentation and scaling in a rectangular distribution surrounding the brush " catheter.    Psychiatric: Her behavior is normal. Thought content normal.         ED Course   Procedures  Labs Reviewed   CBC W/ AUTO DIFFERENTIAL - Abnormal; Notable for the following:        Result Value    MCH 26.7 (*)     RDW 16.0 (*)     All other components within normal limits   CBC W/ AUTO DIFFERENTIAL - Abnormal; Notable for the following:     Hematocrit 36.0 (*)     MCV 81 (*)     RDW 15.8 (*)     MPV 9.1 (*)     All other components within normal limits   BASIC METABOLIC PANEL - Abnormal; Notable for the following:     Potassium 3.0 (*)     Calcium 8.2 (*)     All other components within normal limits   PROTIME-INR - Abnormal; Notable for the following:     Prothrombin Time 22.1 (*)     INR 2.2 (*)     All other components within normal limits   TYPE & SCREEN             Medical Decision Making:   History:   Old Medical Records: I decided to obtain old medical records.  Old Records Summarized: other records.       <> Summary of Records: Pt with history of arthritis, HLD, pulmonary HTN, CHF, pacemaker, diverticulitis, on coumadin, on veletri through brush for pulmonary HTN. PT is DNR.   Initial Assessment:   Pt with severe pulmonary HTN on veletri through a brush catheter presenting after brush catheter has come loose. There is no sterile dressing overlying the brush. It appears to have either pulled out 3 cm or only inserted up to 3 cm from the base. The stitch at the base has popped off. There appears to be dermatitis in a rectangular fashion over the dressing which prompted the pt to stop using the dressing and use gauze instead. Medicine is still infusing well. Pt denies any fever or change in her mental status. Will do basic labs and consult cardiology. Pt will likely need admission for new vascular access.   Clinical Tests:   Lab Tests: Reviewed and Ordered  Other:   I have discussed this case with another health care provider.       <> Summary of the Discussion: Cardiology.            Bebeto  Attestation:   Scribe #1: I performed the above scribed service and the documentation accurately describes the services I performed. I attest to the accuracy of the note.    Attending Attestation:           Physician Attestation for Scribe:  Physician Attestation Statement for Scribe #1: I, Dr. Mays, reviewed documentation, as scribed by rAchana Reardon in my presence, and it is both accurate and complete.         Attending ED Notes:   9:51 PM   Pt seen by cardiology who have recommended admission to [facility] for brush replacement in the morning.           ED Course     Clinical Impression:   The primary encounter diagnosis was Primary pulmonary hypertension. Diagnoses of Brush catheter dysfunction, Brush catheter dysfunction, initial encounter, Acute on chronic respiratory failure with hypoxia, Chronic pulmonary heart disease, Long term current use of anticoagulant therapy, Sleep apnea, unspecified type, Pulmonary hypertension, and Cardiac pacemaker in situ were also pertinent to this visit.    Disposition:   Disposition: Admitted                        Sharon Mays MD  08/12/17 1120

## 2017-08-09 NOTE — H&P
"[facility]  Cardiology  History and Physical     Patient Name: Emily Cook  MRN: [id number]  Admission Date: 8/8/2017  Code Status: Prior   Attending Provider: Augustine Rhodes MD  Primary Care Physician: Kaylee Cazares MD  Principal Problem:Cardoso catheter dysfunction    Patient information was obtained from patient and ER records.     Subjective:     Chief Complaint:  Dislodgment of Cardoso Catheter     HPI:  "Ms. Cook is a 56 y.o. year old Black or  female  with a PMH of AVB/syncope s/p PPM, pulmonary HTN WHO group 1 who presents this evening with a displaced Cardoso. She was recently admitted with decompensated PAH and made DNR/DNI with goal of uptitrating Veletri as tolerated. She has been at her baseline at home but noticed this afternoon that her Cardoso sutures had popped and had fallen out ~3-4 inches. Has noticed scant amounts of clear-white discharge from the entry site but denies fever/chills worsening SOB."     Current Outpatient Prescriptions on File Prior to Encounter   Medication Sig    clobetasol (TEMOVATE) 0.05 % external solution     conjugated estrogens (PREMARIN) vaginal cream Place a pea-sized amount in vagina every night for 2 weeks, then use 2-3 nights a week    duloxetine (CYMBALTA) 60 MG capsule Take 1 capsule (60 mg total) by mouth 2 (two) times daily.    furosemide (LASIX) 80 MG tablet Take 1 tablet (80 mg total) by mouth 2 (two) times daily.    gabapentin (NEURONTIN) 300 MG capsule Take 1 capsule (300 mg total) by mouth As instructed. Take one capsule qam, 1 qpm, 2 qhs.    hydrocodone-acetaminophen 10-325mg (NORCO)  mg Tab Take 1 tablet by mouth 3 (three) times daily as needed.    hydrOXYzine (VISTARIL) 50 MG Cap 50 mg daily as needed.     ketoconazole (NIZORAL) 2 % cream Apply topically 2 (two) times daily.    macitentan (OPSUMIT) 10 mg Tab Take 1 tablet (10 mg total) by mouth once daily.    PATADAY 0.2 % Drop     potassium " "chloride (MICRO-K) 10 MEQ CpSR Take 2 capsules (20 mEq total) by mouth 3 (three) times daily.    spironolactone (ALDACTONE) 25 MG tablet Take 1 tablet (25 mg total) by mouth once daily.    warfarin (COUMADIN) 5 MG tablet TAKE 1 AND 1/2 TABLETS BY MOUTH DAILY EXCEPT 1 TABLET ON TUESDAY OR AS DIRECTED BY COUMADIN CLINIC     Past Medical History:   Diagnosis Date    Arrhythmia     Arthritis     Cardiac pacemaker in situ     Carpal tunnel syndrome, right     Cervical spondylosis     Cervicalgia     CHF (congestive heart failure)     Diverticulitis     Heart block AV third degree     Hyperlipidemia     ALFREDO on CPAP     Pulmonary hypertension     Subdeltoid bursitis      Past Surgical History:   Procedure Laterality Date    CARDIAC CATHETERIZATION      CARDIAC PACEMAKER PLACEMENT  7/29/13    [employer] DC PM    CARDIAC SURGERY      COLONOSCOPY N/A 9/28/2015    Procedure: COLONOSCOPY;  Surgeon: Jason Diaz MD;  Location: [facility] ([address] [place] [address]);  Service: Endoscopy;  Laterality: N/A;  Please schedule in 2-3 months with Dr Diaz  Pulmonary HTN, 2nd floor (off remodulin infusion as of "a few days" before 9/9/15)    3 day hold Coumadin, [facility]  PT/INR scheduled before procedure    ECTOPIC PREGNANCY SURGERY Left     HYSTERECTOMY      partial    knee arthroscopy       Review of patient's allergies indicates:   Allergen Reactions    Chlorhexidine Itching and Rash     Patient had raised rash on neck following RHC procedure. She states she's never had a problem with the "prep" used until this time.     Adhesive Rash     Family History     Problem Relation (Age of Onset)    Arthritis Mother, Father    Diabetes Mother    Hyperlipidemia Mother, Father        Social History Main Topics    Smoking status: Never Smoker    Smokeless tobacco: Never Used    Alcohol use No      Comment: rarely    Drug use: No    Sexual activity: No     SOB as below at baseline    Review of Systems "   Constitution: Negative.   HENT: Negative.    Eyes: Negative.    Cardiovascular: Negative.    Respiratory: Positive for shortness of breath. Negative for sputum production and wheezing.    Endocrine: Negative.    Hematologic/Lymphatic: Negative.    Skin: Negative.    Musculoskeletal: Negative.    Gastrointestinal: Negative.    Genitourinary: Negative.    Neurological: Negative.    All other systems reviewed and are negative.    Objective:     Vital Signs (Most Recent):  Temp: 98 °F (36.7 °C) (08/08/17 1744)  Pulse: 98 (08/08/17 1744)  Resp: 20 (08/08/17 1744)  BP: 110/60 (08/08/17 1744)  SpO2: (!) 91 % (on 5L 02 per NC as prescribed at home states patient) (08/08/17 1744) Vital Signs (24h Range):  Temp:  [98 °F (36.7 °C)] 98 °F (36.7 °C)  Pulse:  [98] 98  Resp:  [20] 20  SpO2:  [91 %] 91 %  BP: (110)/(60) 110/60     Weight: 84.4 kg (186 lb)  Body mass index is 32.95 kg/m².  SpO2: (!) 91 % (on 5L 02 per NC as prescribed at home states patient)    Lines/Drains/Airways     Central Venous Catheter Line                 Tunneled Central Line Insertion/Assessment - Single Lumen  06/16/17 0956 right subclavian 53 days              Physical Exam   Gen: NAD. Well nourished   Neck: JVD at +14cm  CV:RRR no M/R/G; Cardoso catheter dislodged 3cm over R chest. No drainage, redness or tenderness around insertion site.   Resp: CTA B/L. No signs of respiratory distress.   GI: Soft NT. Non disteneded. Normal bowel sounds.   Ext: Warm. Minimal (1+ edema) in lower legs up to knees.     Significant Labs:     Recent Labs  Lab 08/08/17  2054   WBC 4.95   HGB 12.9   HCT 39.9          Recent Labs  Lab 08/07/17   INR 2.5     All pertinent lab results from the last 24 hours have been reviewed.    Significant Imaging: Echocardiogram:   Results for orders placed or performed during the hospital encounter of 05/31/17   2D echo with color flow doppler   Result Value Ref Range    EF 55 55 - 65    Est. PA Systolic Pressure 119.04 (A)      Pericardial Effusion TRIVIAL     Tricuspid Valve Regurgitation MILD TO MODERATE      Assessment and Plan:     * Cardoso catheter dysfunction    NPO after midnight for placement of new catheter in the AM  No concern for sepsis / bacteremia based on clinical symptoms and evaluation of insertion site         Primary pulmonary hypertension    Called specialty pharmacy ([facility]) and verified most recent script with their on call nurse   Will continue Epoprostenol per home setting using the hospital order set and patient's home pump. (Dosing Weight: 86kg; Concentration (ng/mL) 45,000; Patient dose (ng/kg/minute): 25)          Acute on chronic respiratory failure with hypoxia    Continue oxygen therapy  Pulmonary toilet while admitted         Sleep apnea- on CPAP    Continue CPAP QHS per home settings.         Long term current use of anticoagulant therapy    Will check INR and hold coumadin for now with goal of INR <1.5 for procedure   Will obtain type and screen in expectation of FFP as we would like to avoid vitamin K if possible         Chronic pulmonary heart disease    Continue PTA Lasix and spironolactone             VTE Risk Mitigation     None        Patient Discussed with Dr. Carmelo [email or web address],   Cardiology   [facility]

## 2017-08-09 NOTE — ED NOTES
Spoke to Adela with HTS at [address].  Notified of patient's sats in 80s.  States sats in 80s is OK for the patient due to her pulmonary HTN.  States to notify her if she drops to less than 80's.

## 2017-08-09 NOTE — ED NOTES
Patient resting with family at the bedside. No complaints of pain. Complains of nausea.  Bed rails up x1, call bell within reach, and bed in low position.

## 2017-08-09 NOTE — ED TRIAGE NOTES
Pt reports stitches to picc line popped and she thinks the access site is infected.  Line still otherwise intact and medicine is infusing.

## 2017-08-09 NOTE — ASSESSMENT & PLAN NOTE
Called specialty pharmacy ([facility]) and verified most recent script with their on call nurse   Will continue Epoprostenol per home setting using the hospital order set and patient's home pump. (Dosing Weight: 86kg; Concentration (ng/mL) 45,000; Patient dose (ng/kg/minute): 25)

## 2017-08-09 NOTE — ED NOTES
Attempted to call report, nurse has to check with charge nurse regarding assignment. Awaiting call back.

## 2017-08-09 NOTE — ASSESSMENT & PLAN NOTE
NPO after midnight for placement of new catheter in the AM  No concern for sepsis / bacteremia based on clinical symptoms and evaluation of insertion site

## 2017-08-09 NOTE — ED NOTES
Pt identifiers Emily Cook checked and correct  LOC: The patient is awake, alert, aware of environment with an appropriate affect. Oriented x3, speaking appropriately  APPEARANCE: Pt resting comfortably, in no acute distress, pt is clean and well groomed, clothing properly fastened  SKIN: Skin warm, dry and intact, normal skin turgor, moist mucus membranes. Cardoso noted to right chest with medication infusing  RESPIRATORY: Airway is open and patent, respirations are spontaneous, even and unlabored, normal effort and rate. Pt on 5L NC per home use.   CARDIAC: Normal rate and rhythm, no peripheral edema noted, capillary refill < 3 seconds, bilateral radial pulses 2+

## 2017-08-09 NOTE — SUBJECTIVE & OBJECTIVE
"Interval History: patient denies SOB    Continuous Infusions:   epoprostenol (veletri)      veletri/remodulin cassette      veletri/remodulin tubing       Scheduled Meds:   duloxetine  60 mg Oral BID    furosemide  80 mg Oral BID    gabapentin  300 mg Oral TID    olopatadine  1 drop Both Eyes Daily    potassium chloride  20 mEq Oral TID    sodium chloride 0.9%  3 mL Intravenous Q8H    spironolactone  25 mg Oral Daily     PRN Meds:acetaminophen, loperamide, ondansetron, ondansetron, oxycodone-acetaminophen    Review of patient's allergies indicates:   Allergen Reactions    Chlorhexidine Itching and Rash     Patient had raised rash on neck following RHC procedure. She states she's never had a problem with the "prep" used until this time.     Adhesive Rash     Objective:     Vital Signs (Most Recent):  Temp: 97.6 °F (36.4 °C) (08/09/17 0944)  Pulse: 86 (08/09/17 1223)  Resp: 18 (08/09/17 0938)  BP: 116/60 (08/09/17 1222)  SpO2: (!) 84 % (08/09/17 1223) Vital Signs (24h Range):  Temp:  [97.6 °F (36.4 °C)-98 °F (36.7 °C)] 97.6 °F (36.4 °C)  Pulse:  [77-98] 86  Resp:  [18-20] 18  SpO2:  [84 %-94 %] 84 %  BP: (102-116)/(51-72) 116/60     Weight: 84.4 kg (186 lb)  Body mass index is 32.95 kg/m².    No intake or output data in the 24 hours ending 08/09/17 1334    Hemodynamic Parameters:           Physical Exam   Constitutional: She appears well-developed and well-nourished. No distress.   HENT:   Head: Normocephalic.   Eyes: Pupils are equal, round, and reactive to light.   Neck: Normal range of motion. No JVD present.   Cardiovascular: Normal rate.  Exam reveals no gallop and no friction rub.    No murmur heard.  Pulmonary/Chest: Effort normal. No respiratory distress.   Abdominal: Soft. She exhibits no distension. There is no tenderness. There is no guarding.   Musculoskeletal: Normal range of motion. She exhibits no edema.   Neurological: She is alert.   Skin: Skin is warm. She is not diaphoretic. "   Psychiatric: She has a normal mood and affect.       Significant Labs:  CBC:    Recent Labs  Lab 08/09/17 0518   WBC 4.61   RBC 4.45   HGB 12.0   HCT 36.0*      MCV 81*   MCH 27.0   MCHC 33.3     BNP:  No results for input(s): BNP in the last 72 hours.    Invalid input(s): BNPTRIAGELBLO  CMP:    Recent Labs  Lab 08/09/17 0518      CALCIUM 8.2*      K 3.0*   CO2 24      BUN 12   CREATININE 0.8      Coagulation:     Recent Labs  Lab 08/09/17 0518   INR 2.2*     LDH:  No results for input(s): LDH in the last 72 hours.  Microbiology:  Microbiology Results (last 7 days)     ** No results found for the last 168 hours. **          I have reviewed all pertinent labs within the past 24 hours.    Estimated Creatinine Clearance: 80.8 mL/min (based on Cr of 0.8).    Diagnostic Results:  I have reviewed and interpreted all pertinent imaging results/findings within the past 24 hours.

## 2017-08-09 NOTE — HPI
""Ms. Cook is a 56 y.o. year old Black or  female  with a PMH of AVB/syncope s/p PPM, pulmonary HTN WHO group 1 who presents this evening with a displaced Cardoso. She was recently admitted with decompensated PAH and made DNR/DNI with goal of uptitrating Veletri as tolerated. She has been at her baseline at home but noticed this afternoon that her Cardoso sutures had popped and had fallen out ~3-4 inches. Has noticed scant amounts of clear-white discharge from the entry site but denies fever/chills worsening SOB."   "

## 2017-08-09 NOTE — SUBJECTIVE & OBJECTIVE
"Past Medical History:   Diagnosis Date    Arrhythmia     Arthritis     Cardiac pacemaker in situ     Carpal tunnel syndrome, right     Cervical spondylosis     Cervicalgia     CHF (congestive heart failure)     Diverticulitis     Heart block AV third degree     Hyperlipidemia     ALFREDO on CPAP     Pulmonary hypertension     Subdeltoid bursitis        Past Surgical History:   Procedure Laterality Date    CARDIAC CATHETERIZATION      CARDIAC PACEMAKER PLACEMENT  7/29/13    [employer] [facility]    CARDIAC SURGERY      COLONOSCOPY N/A 9/28/2015    Procedure: COLONOSCOPY;  Surgeon: Jason Diaz MD;  [facility] ([address] [place] [address]);  Service: Endoscopy;  Laterality: N/A;  Please schedule in 2-3 months with Dr Diaz  Pulmonary HTN, 2nd floor (off remodulin infusion as of "a few days" before 9/9/15)    3 day hold Coumadin, [facility]  PT/INR scheduled before procedure    ECTOPIC PREGNANCY SURGERY Left     HYSTERECTOMY      partial    knee arthroscopy         Review of patient's allergies indicates:   Allergen Reactions    Chlorhexidine Itching and Rash     Patient had raised rash on neck following RHC procedure. She states she's never had a problem with the "prep" used until this time.     Adhesive Rash         (Not in a hospital admission)  Family History     Problem Relation (Age of Onset)    Arthritis Mother, Father    Diabetes Mother    Hyperlipidemia Mother, Father        Social History Main Topics    Smoking status: Never Smoker    Smokeless tobacco: Never Used    Alcohol use No      Comment: rarely    Drug use: No    Sexual activity: No     ROS   Gen: denies fever chills fatigue weight loss  Resp: denies SOB, cough, hemoptysis  CV: Denies CP, palps, orthopnea, PND, edema  GI: denies abd pain, nausea/vomiting, diarrhea  : denies dysuria, hematuria      Objective:     Vital Signs (Most Recent):  Temp: 98 °F (36.7 °C) (08/08/17 1744)  Pulse: 98 (08/08/17 1744)  Resp: 20 (08/08/17 " 1744)  BP: 110/60 (08/08/17 1744)  SpO2: (!) 91 % (on 5L 02 per NC as prescribed at home states patient) (08/08/17 1744) Vital Signs (24h Range):  Temp:  [98 °F (36.7 °C)] 98 °F (36.7 °C)  Pulse:  [98] 98  Resp:  [20] 20  SpO2:  [91 %] 91 %  BP: (110)/(60) 110/60     Weight: 84.4 kg (186 lb)  Body mass index is 32.95 kg/m².    SpO2: (!) 91 % (on 5L 02 per NC as prescribed at home states patient)       No intake or output data in the 24 hours ending 08/08/17 2050    Lines/Drains/Airways     Central Venous Catheter Line                 Tunneled Central Line Insertion/Assessment - Single Lumen  06/16/17 0956 right subclavian 53 days                Physical Exam   Gen: no acute distress, alert & oriented x 3  Neck: +14cm JVD, no carotid bruits  CV: regular rate and rhythm, normal S1/2, no murmurs, rubs, gallops; R chest Cardoso catheter ~3 inches dislodged but still infusing  Resp: clear to auscultation bilaterally, normal effort  GI: soft, nontender nondistended, normal bowel sounds  Ext: warm well perfused, +1 edema, no clubbing or cyanosis      Significant Labs:   CMP No results for input(s): NA, K, CL, CO2, GLU, BUN, CREATININE, CALCIUM, PROT, ALBUMIN, BILITOT, ALKPHOS, AST, ALT, ANIONGAP, ESTGFRAFRICA, EGFRNONAA in the last 48 hours., CBC No results for input(s): WBC, HGB, HCT, PLT in the last 48 hours., INR   Recent Labs  Lab 08/07/17   INR 2.5    and All pertinent lab results from the last 24 hours have been reviewed.    Significant Imaging: Echocardiogram:   2D echo with color flow doppler:   Results for orders placed or performed during the hospital encounter of 05/31/17   2D echo with color flow doppler   Result Value Ref Range    EF 55 55 - 65    Est. PA Systolic Pressure 119.04 (A)     Pericardial Effusion TRIVIAL     Tricuspid Valve Regurgitation MILD TO MODERATE

## 2017-08-09 NOTE — ASSESSMENT & PLAN NOTE
Will check INR and hold coumadin for now with goal of INR <1.5 for procedure   Will obtain type and screen in expectation of FFP as we would like to avoid vitamin K if possible

## 2017-08-09 NOTE — ED NOTES
Patient resting with family members at the bedside. Call bell within reach, bed in low position, and bed rails up x2.

## 2017-08-09 NOTE — PROGRESS NOTES
"[facility]  Heart Transplant  Progress Note    Patient Name: Emily Cook  MRN: [id number]  Admission Date: 8/8/2017  Hospital Length of Stay: 0 days  Attending Physician: Sharon Mays MD  Primary Care Provider: Kaylee Cazares MD  Principal Problem:Cardoso catheter dysfunction    Subjective:     Interval History: patient denies SOB    Continuous Infusions:   epoprostenol (veletri)      veletri/remodulin cassette      veletri/remodulin tubing       Scheduled Meds:   duloxetine  60 mg Oral BID    furosemide  80 mg Oral BID    gabapentin  300 mg Oral TID    olopatadine  1 drop Both Eyes Daily    potassium chloride  20 mEq Oral TID    sodium chloride 0.9%  3 mL Intravenous Q8H    spironolactone  25 mg Oral Daily     PRN Meds:acetaminophen, loperamide, ondansetron, ondansetron, oxycodone-acetaminophen    Review of patient's allergies indicates:   Allergen Reactions    Chlorhexidine Itching and Rash     Patient had raised rash on neck following RHC procedure. She states she's never had a problem with the "prep" used until this time.     Adhesive Rash     Objective:     Vital Signs (Most Recent):  Temp: 97.6 °F (36.4 °C) (08/09/17 0944)  Pulse: 86 (08/09/17 1223)  Resp: 18 (08/09/17 0938)  BP: 116/60 (08/09/17 1222)  SpO2: (!) 84 % (08/09/17 1223) Vital Signs (24h Range):  Temp:  [97.6 °F (36.4 °C)-98 °F (36.7 °C)] 97.6 °F (36.4 °C)  Pulse:  [77-98] 86  Resp:  [18-20] 18  SpO2:  [84 %-94 %] 84 %  BP: (102-116)/(51-72) 116/60     Weight: 84.4 kg (186 lb)  Body mass index is 32.95 kg/m².    No intake or output data in the 24 hours ending 08/09/17 1334    Hemodynamic Parameters:           Physical Exam   Constitutional: She appears well-developed and well-nourished. No distress.   HENT:   Head: Normocephalic.   Eyes: Pupils are equal, round, and reactive to light.   Neck: Normal range of motion. No JVD present.   Cardiovascular: Normal rate.  Exam reveals no gallop and no friction " rub.    No murmur heard.  Pulmonary/Chest: Effort normal. No respiratory distress.   Abdominal: Soft. She exhibits no distension. There is no tenderness. There is no guarding.   Musculoskeletal: Normal range of motion. She exhibits no edema.   Neurological: She is alert.   Skin: Skin is warm. She is not diaphoretic.   Psychiatric: She has a normal mood and affect.       Significant Labs:  CBC:    Recent Labs  Lab 08/09/17 0518   WBC 4.61   RBC 4.45   HGB 12.0   HCT 36.0*      MCV 81*   MCH 27.0   MCHC 33.3     BNP:  No results for input(s): BNP in the last 72 hours.    Invalid input(s): BNPTRIAGELBLO  CMP:    Recent Labs  Lab 08/09/17 0518      CALCIUM 8.2*      K 3.0*   CO2 24      BUN 12   CREATININE 0.8      Coagulation:     Recent Labs  Lab 08/09/17 0518   INR 2.2*     LDH:  No results for input(s): LDH in the last 72 hours.  Microbiology:  Microbiology Results (last 7 days)     ** No results found for the last 168 hours. **          I have reviewed all pertinent labs within the past 24 hours.    Estimated Creatinine Clearance: 80.8 mL/min (based on Cr of 0.8).    Diagnostic Results:  I have reviewed and interpreted all pertinent imaging results/findings within the past 24 hours.    Assessment and Plan:     Acute on chronic respiratory failure with hypoxia    -continue po lasix BID        Primary pulmonary hypertension    -continue IV remodulin         Sleep apnea- on CPAP    -continue CPAP        Long term current use of anticoagulant therapy    -continue coumadin         Chronic pulmonary heart disease    -Continue IV remodulin, will consult general surgery for replacement of Cardoso         * Cardoso catheter dysfunction    -will consult general surgery for replacement of Cardoso            BRANDI Gusman  Heart Transplant  [facility]

## 2017-08-10 NOTE — PROGRESS NOTES
"[facility]  Heart Transplant  Progress Note    Patient Name: Emily Cook  MRN: [id number]  Admission Date: 8/8/2017  Hospital Length of Stay: 0 days  Attending Physician: Augustine Rhodes MD  Primary Care Provider: Kaylee Cazares MD  Principal Problem:Cardoso catheter dysfunction    Subjective:     Interval History: Patient had some NSVT overnight that was asymptomatic.  She has no new complaints and is ready to go home.    Continuous Infusions:   epoprostenol (VELETRI) infusion 25 ng/kg/min (08/09/17 2224)    veletri/remodulin cassette      veletri/remodulin tubing       Scheduled Meds:   duloxetine  60 mg Oral BID    furosemide  80 mg Oral BID    gabapentin  300 mg Oral TID    olopatadine  1 drop Both Eyes Daily    potassium chloride  20 mEq Oral TID    sodium chloride 0.9%  3 mL Intravenous Q8H    spironolactone  25 mg Oral Daily     PRN Meds:acetaminophen, loperamide, ondansetron, ondansetron, oxycodone-acetaminophen    Review of patient's allergies indicates:   Allergen Reactions    Chlorhexidine Itching and Rash     Patient had raised rash on neck following RHC procedure. She states she's never had a problem with the "prep" used until this time.     Adhesive Rash     Objective:     Vital Signs (Most Recent):  Temp: 98.4 °F (36.9 °C) (08/10/17 0800)  Pulse: 105 (08/10/17 1055)  Resp: 20 (08/10/17 0800)  BP: 103/60 (08/10/17 0800)  SpO2: (!) 89 % (08/10/17 0810) Vital Signs (24h Range):  Temp:  [97.8 °F (36.6 °C)-99 °F (37.2 °C)] 98.4 °F (36.9 °C)  Pulse:  [] 105  Resp:  [16-22] 20  SpO2:  [75 %-95 %] 89 %  BP: ()/(52-73) 103/60     Weight: 74 kg (163 lb 2.3 oz)  Body mass index is 28.9 kg/m².      Intake/Output Summary (Last 24 hours) at 08/10/17 1109  Last data filed at 08/10/17 1100   Gross per 24 hour   Intake           507.09 ml   Output              600 ml   Net           -92.91 ml       Hemodynamic Parameters:           Physical Exam   Constitutional: She " appears well-developed and well-nourished. No distress.   HENT:   Head: Normocephalic.   Eyes: Pupils are equal, round, and reactive to light.   Neck: Normal range of motion. No JVD present.   Cardiovascular: Normal rate.  Exam reveals no gallop and no friction rub.    No murmur heard.  Pulmonary/Chest: Effort normal. No respiratory distress.   Abdominal: Soft. She exhibits no distension. There is no tenderness. There is no guarding.   Musculoskeletal: Normal range of motion. She exhibits no edema.   Neurological: She is alert.   Skin: Skin is warm. She is not diaphoretic.   Psychiatric: She has a normal mood and affect.       Significant Labs:  CBC:    Recent Labs  Lab 08/10/17  0416   WBC 4.05   RBC 4.69   HGB 12.4   HCT 39.0      MCV 83   MCH 26.4*   MCHC 31.8*     BNP:  No results for input(s): BNP in the last 72 hours.    Invalid input(s): BNPTRIAGELBLO  CMP:    Recent Labs  Lab 08/10/17  0416   *   CALCIUM 8.7      K 3.4*   CO2 23      BUN 13   CREATININE 1.0      Coagulation:     Recent Labs  Lab 08/10/17  0416   INR 2.2*     LDH:  No results for input(s): LDH in the last 72 hours.  Microbiology:  Microbiology Results (last 7 days)     ** No results found for the last 168 hours. **          I have reviewed all pertinent labs within the past 24 hours.    Estimated Creatinine Clearance: 60.5 mL/min (based on Cr of 1).    Diagnostic Results:  CXR: 8/9/17  I have reviewed and interpreted all pertinent imaging results/findings within the past 24 hours.       Assessment and Plan:     * Brush catheter dysfunction    -Consulted general surgery and awaiting recommendations and brush repair or replacement         Chronic pulmonary heart disease    -Continue IV remodulin, general surgery consulted for replacement of Brush         Primary pulmonary hypertension    -continue IV remodulin         Sleep apnea- on CPAP    -continue CPAP        Acute on chronic respiratory failure with hypoxia     -continue po lasix BID        Long term current use of anticoagulant therapy    -continue coumadin             BRANDI Dexter  Heart Transplant spectralink: [id number]  [facility]

## 2017-08-10 NOTE — PROGRESS NOTES
Call to patient's room for c/o episodic vomiting. Once at bedside, patient unresponsive to verbal and tactile simulation. CN called to bedside. Episode lasted between 2-3 minutes. VS assessed, BP 87/57; ; oxy saturations 40-60% after nasal cannula was replaced. NC noted onside of patient in the bed. Patient has been on 6 L w/ oxy saturations between 80-90%. Orders were to keep saturations >80%. Patient was pending dc'd but was waiting for Veletri to be mixed w/ home concentration.     Dr. Ibrahim, cardiology fellow, paged also. MD at bedside immediatly assisting with situation - orders for labs, stat ABG, EKG, and Xray noted.      1745: Patient more alert, responding to name and answering questions verbally. VS- /60  - patient now on non-rebreather at 100% - oxy saturations between 70-80%. CORE team called, but cancel due to MD arrival and change in mental status.     1805: VSS - BP 98/55 HR 94 and oxy saturation 94% on non-re breather. Patient oriented x 3 and denying c/o CP or SOB. Will titrate oxygen down once patient is more stable per Dr. Ibrahim orders. PIV reinserted - IV dose of Zofran given.

## 2017-08-10 NOTE — CONSULTS
"[facility]  General Surgery  Consult Note    Patient Name: Emily Cook  MRN: [id number]  Code Status: DNR  Admission Date: 8/8/2017  Hospital Length of Stay: 0 days  Attending Physician: Augustine Rhodes MD  Primary Care Provider: Kaylee Cazares MD    Patient information was obtained from patient and ER records.     Inpatient consult to General Surgery  Consult performed by: THADDEUS HANEY  Consult ordered by: MANISH HUGO        Subjective:     Principal Problem: Cardoso catheter dysfunction    History of Present Illness: Ms Cook is a 56 yoF with PMH of AVB/Syncompe s/p PPM, pulmonary HTN on Remodulin who presented yesterday in the emergency department due to "displaced Cardoso". Patient states the stitch that was holding the Cardoso catheter fell off but states the catheter is in the same position.     She has some purulent drainage from the skin incision site, but denies any fevers or chills, no cp or sob.     She is on Coumadin with an INR of 2.2    No current facility-administered medications on file prior to encounter.      Current Outpatient Prescriptions on File Prior to Encounter   Medication Sig    clobetasol (TEMOVATE) 0.05 % external solution     conjugated estrogens (PREMARIN) vaginal cream Place a pea-sized amount in vagina every night for 2 weeks, then use 2-3 nights a week    duloxetine (CYMBALTA) 60 MG capsule Take 1 capsule (60 mg total) by mouth 2 (two) times daily.    furosemide (LASIX) 80 MG tablet Take 1 tablet (80 mg total) by mouth 2 (two) times daily.    gabapentin (NEURONTIN) 300 MG capsule Take 1 capsule (300 mg total) by mouth As instructed. Take one capsule qam, 1 qpm, 2 qhs.    hydrocodone-acetaminophen 10-325mg (NORCO)  mg Tab Take 1 tablet by mouth 3 (three) times daily as needed.    hydrOXYzine (VISTARIL) 50 MG Cap 50 mg daily as needed.     ketoconazole (NIZORAL) 2 % cream Apply topically 2 (two) times daily.    macitentan " "(OPSUMIT) 10 mg Tab Take 1 tablet (10 mg total) by mouth once daily.    PATADAY 0.2 % Drop     potassium chloride (MICRO-K) 10 MEQ CpSR Take 2 capsules (20 mEq total) by mouth 3 (three) times daily.    spironolactone (ALDACTONE) 25 MG tablet Take 1 tablet (25 mg total) by mouth once daily.    warfarin (COUMADIN) 5 MG tablet TAKE 1 AND 1/2 TABLETS BY MOUTH DAILY EXCEPT 1 TABLET ON TUESDAY OR AS DIRECTED BY COUMADIN CLINIC       Review of patient's allergies indicates:   Allergen Reactions    Chlorhexidine Itching and Rash     Patient had raised rash on neck following RHC procedure. She states she's never had a problem with the "prep" used until this time.     Adhesive Rash       Past Medical History:   Diagnosis Date    Arrhythmia     Arthritis     Cardiac pacemaker in situ     Carpal tunnel syndrome, right     Cervical spondylosis     Cervicalgia     CHF (congestive heart failure)     Diverticulitis     Heart block AV third degree     Hyperlipidemia     ALFREDO on CPAP     Pulmonary hypertension     Subdeltoid bursitis      Past Surgical History:   Procedure Laterality Date    CARDIAC CATHETERIZATION      CARDIAC PACEMAKER PLACEMENT  7/29/13    [facility]    CARDIAC SURGERY      COLONOSCOPY N/A 9/28/2015    Procedure: COLONOSCOPY;  Surgeon: Jason Diaz MD;  Location: [facility] ([address] [place] [address]);  Service: Endoscopy;  Laterality: N/A;  Please schedule in 2-3 months with Dr Diaz  Pulmonary HTN, 2nd floor (off remodulin infusion as of "a few days" before 9/9/15)    3 day hold Coumadin, [facility]  PT/INR scheduled before procedure    ECTOPIC PREGNANCY SURGERY Left     HYSTERECTOMY      partial    knee arthroscopy       Family History     Problem Relation (Age of Onset)    Arthritis Mother, Father    Diabetes Mother    Hyperlipidemia Mother, Father        Social History Main Topics    Smoking status: Never Smoker    Smokeless tobacco: Never Used    Alcohol use No      Comment: " rarely    Drug use: No    Sexual activity: No     Review of Systems   Constitutional: Negative for activity change, appetite change and chills.   HENT: Negative.    Respiratory: Negative.  Negative for chest tightness, shortness of breath, wheezing and stridor.    Cardiovascular: Negative.  Negative for chest pain and palpitations.   Gastrointestinal: Negative.  Negative for abdominal distention, abdominal pain, blood in stool, diarrhea, nausea and rectal pain.   Genitourinary: Negative.    Musculoskeletal: Negative.    Neurological: Negative.    Hematological: Negative.      Objective:     Vital Signs (Most Recent):  Temp: 98.2 °F (36.8 °C) (08/10/17 1100)  Pulse: 87 (08/10/17 1100)  Resp: 18 (08/10/17 1100)  BP: 112/73 (08/10/17 1100)  SpO2: (!) 85 % (08/10/17 1100) Vital Signs (24h Range):  Temp:  [97.8 °F (36.6 °C)-99 °F (37.2 °C)] 98.2 °F (36.8 °C)  Pulse:  [] 87  Resp:  [18-22] 18  SpO2:  [75 %-95 %] 85 %  BP: ()/(52-73) 112/73     Weight: 74 kg (163 lb 2.3 oz)  Body mass index is 28.9 kg/m².    Physical Exam      General: In no acute distress, well appearance.   CV: RRR, S1, S2 no murmur or gallops   Chest: Right Cardoso catheter in place with stitch not sutured to skin. Patient states Cardoso catheter is in the same position as before. Some purulent drainage seen at the insertion site.             Significant Labs:  CBC:   Recent Labs  Lab 08/10/17  0416   WBC 4.05   RBC 4.69   HGB 12.4   HCT 39.0      MCV 83   MCH 26.4*   MCHC 31.8*     CMP:   Recent Labs  Lab 08/10/17  0416   *   CALCIUM 8.7      K 3.4*   CO2 23      BUN 13   CREATININE 1.0       Significant Diagnostics:    CXR    One view: Central line in SVC.  There is a pacer, cardiomegaly, and mild improving CHF.    CXR showing Cardoso catheter in well position, same as past CXR        Assessment/Plan:     * Cardoso catheter dysfunction    Ms Cook is a 56 yoF with PMH of AVB/Syncompe s/p PPM, pulmonary HTN on  "Remodulin who presented yesterday in the emergency department due to "displaced Cardoso".    - Cardoso catheter in place, confirmed by CXR.   - Stitches can come off > 6 weeks since surgery.   - Mild localized purulent discharge from the insertion site stitch, this was removed and the wound was washed out.   - No need for antibiotics at this time.   - Patient was advised to come back to clinic if any signs of worsening infection, fevers or chills.   - Thank you for the consult, please call if any questions.     Isha Holcomb MD  General Surgery PGY IV  Beeper: [phone]                "

## 2017-08-10 NOTE — SUBJECTIVE & OBJECTIVE
"Interval History: Patient had some NSVT overnight that was asymptomatic.  She has no new complaints and is ready to go home.    Continuous Infusions:   epoprostenol (VELETRI) infusion 25 ng/kg/min (08/09/17 2224)    veletri/remodulin cassette      veletri/remodulin tubing       Scheduled Meds:   duloxetine  60 mg Oral BID    furosemide  80 mg Oral BID    gabapentin  300 mg Oral TID    olopatadine  1 drop Both Eyes Daily    potassium chloride  20 mEq Oral TID    sodium chloride 0.9%  3 mL Intravenous Q8H    spironolactone  25 mg Oral Daily     PRN Meds:acetaminophen, loperamide, ondansetron, ondansetron, oxycodone-acetaminophen    Review of patient's allergies indicates:   Allergen Reactions    Chlorhexidine Itching and Rash     Patient had raised rash on neck following RHC procedure. She states she's never had a problem with the "prep" used until this time.     Adhesive Rash     Objective:     Vital Signs (Most Recent):  Temp: 98.4 °F (36.9 °C) (08/10/17 0800)  Pulse: 105 (08/10/17 1055)  Resp: 20 (08/10/17 0800)  BP: 103/60 (08/10/17 0800)  SpO2: (!) 89 % (08/10/17 0810) Vital Signs (24h Range):  Temp:  [97.8 °F (36.6 °C)-99 °F (37.2 °C)] 98.4 °F (36.9 °C)  Pulse:  [] 105  Resp:  [16-22] 20  SpO2:  [75 %-95 %] 89 %  BP: ()/(52-73) 103/60     Weight: 74 kg (163 lb 2.3 oz)  Body mass index is 28.9 kg/m².      Intake/Output Summary (Last 24 hours) at 08/10/17 1109  Last data filed at 08/10/17 1100   Gross per 24 hour   Intake           507.09 ml   Output              600 ml   Net           -92.91 ml       Hemodynamic Parameters:           Physical Exam   Constitutional: She appears well-developed and well-nourished. No distress.   HENT:   Head: Normocephalic.   Eyes: Pupils are equal, round, and reactive to light.   Neck: Normal range of motion. No JVD present.   Cardiovascular: Normal rate.  Exam reveals no gallop and no friction rub.    No murmur heard.  Pulmonary/Chest: Effort normal. No " respiratory distress.   Abdominal: Soft. She exhibits no distension. There is no tenderness. There is no guarding.   Musculoskeletal: Normal range of motion. She exhibits no edema.   Neurological: She is alert.   Skin: Skin is warm. She is not diaphoretic.   Psychiatric: She has a normal mood and affect.       Significant Labs:  CBC:    Recent Labs  Lab 08/10/17  0416   WBC 4.05   RBC 4.69   HGB 12.4   HCT 39.0      MCV 83   MCH 26.4*   MCHC 31.8*     BNP:  No results for input(s): BNP in the last 72 hours.    Invalid input(s): BNPTRIAGELBLO  CMP:    Recent Labs  Lab 08/10/17  0416   *   CALCIUM 8.7      K 3.4*   CO2 23      BUN 13   CREATININE 1.0      Coagulation:     Recent Labs  Lab 08/10/17  0416   INR 2.2*     LDH:  No results for input(s): LDH in the last 72 hours.  Microbiology:  Microbiology Results (last 7 days)     ** No results found for the last 168 hours. **          I have reviewed all pertinent labs within the past 24 hours.    Estimated Creatinine Clearance: 60.5 mL/min (based on Cr of 1).    Diagnostic Results:  CXR: 8/9/17  I have reviewed and interpreted all pertinent imaging results/findings within the past 24 hours.   Interval History: patient denies SOB    Continuous Infusions:   epoprostenol (VELETRI) infusion 25 ng/kg/min (08/09/17 222)    veletri/remodulin cassette      veletri/remodulin tubing       Scheduled Meds:   duloxetine  60 mg Oral BID    furosemide  80 mg Oral BID    gabapentin  300 mg Oral TID    olopatadine  1 drop Both Eyes Daily    potassium chloride  20 mEq Oral TID    sodium chloride 0.9%  3 mL Intravenous Q8H    spironolactone  25 mg Oral Daily     PRN Meds:acetaminophen, loperamide, ondansetron, ondansetron, oxycodone-acetaminophen    Review of patient's allergies indicates:   Allergen Reactions    Chlorhexidine Itching and Rash     Patient had raised rash on neck following RHC procedure. She states she's never had a problem with the  ""prep" used until this time.     Adhesive Rash     Objective:     Vital Signs (Most Recent):  Temp: 98.2 °F (36.8 °C) (08/10/17 1100)  Pulse: 87 (08/10/17 1100)  Resp: 18 (08/10/17 1100)  BP: 112/73 (08/10/17 1100)  SpO2: (!) 85 % (08/10/17 1100) Vital Signs (24h Range):  Temp:  [97.8 °F (36.6 °C)-99 °F (37.2 °C)] 98.2 °F (36.8 °C)  Pulse:  [] 87  Resp:  [18-22] 18  SpO2:  [75 %-95 %] 85 %  BP: ()/(52-73) 112/73     Weight: 74 kg (163 lb 2.3 oz)  Body mass index is 28.9 kg/m².      Intake/Output Summary (Last 24 hours) at 08/10/17 1403  Last data filed at 08/10/17 1100   Gross per 24 hour   Intake           507.09 ml   Output              900 ml   Net          -392.91 ml       Hemodynamic Parameters:           Physical Exam   Constitutional: She appears well-developed and well-nourished. No distress.   HENT:   Head: Normocephalic.   Eyes: Pupils are equal, round, and reactive to light.   Neck: Normal range of motion. No JVD present.   Cardiovascular: Normal rate.  Exam reveals no gallop and no friction rub.    No murmur heard.  Pulmonary/Chest: Effort normal. No respiratory distress.   Abdominal: Soft. She exhibits no distension. There is no tenderness. There is no guarding.   Musculoskeletal: Normal range of motion. She exhibits no edema.   Neurological: She is alert.   Skin: Skin is warm. She is not diaphoretic.   Psychiatric: She has a normal mood and affect.       Significant Labs:  CBC:    Recent Labs  Lab 08/10/17  0416   WBC 4.05   RBC 4.69   HGB 12.4   HCT 39.0      MCV 83   MCH 26.4*   MCHC 31.8*     BNP:  No results for input(s): BNP in the last 72 hours.    Invalid input(s): BNPTRIAGELBLO  CMP:    Recent Labs  Lab 08/10/17  0416   *   CALCIUM 8.7      K 3.4*   CO2 23      BUN 13   CREATININE 1.0      Coagulation:     Recent Labs  Lab 08/10/17  0416   INR 2.2*     LDH:  No results for input(s): LDH in the last 72 hours.  Microbiology:  Microbiology Results (last 7 " days)     ** No results found for the last 168 hours. **          I have reviewed all pertinent labs within the past 24 hours.    Estimated Creatinine Clearance: 60.5 mL/min (based on Cr of 1).

## 2017-08-10 NOTE — PROGRESS NOTES
Cardiology Brief Progress Note    I was notified by the RN that patient had an episode of vomiting and she was unresponsive for 2-3 minutes. Her O2 sat went to 40% momentarily after vomiting.Currently she is AAOX3 with bialteral normal pupil reactive to light. Her neurological exam is unremarkable. She sat 92 on non re breather. Her lungs clear bilaterally on exam.  -Will get Stat ABG.  -Stat Labs.  -Chest Xray.  -Will keep her overnight (there was an issue also with mixing her Veletri for discharge, spoke to pharmacist and they will solve issue by am)  -Discussed with Dr Rhodes.      Francis Ibrahim MD  Cardiology Fellow  Pager: [phone]

## 2017-08-10 NOTE — NURSING
Patient dc'd home per providers. Patient being sent home on IV Veletri infusing to R chest wall brush. Patient being dc'd after seeing the resident from general surgery who removed/washed out the mild localized purulent discharge surrounding R chest wall brush. Patient instructed to come back to clinic for any signs of worsening infection, fevers, or chills. All discharge instructions given to patient. Patient verbalized understanding. PIV removed, guaze dressing applied. Patient will be dc'd once home Veletri pump is applied. Patient will leave unit via wheelchair per family members. Will continue to monitor.

## 2017-08-10 NOTE — PROGRESS NOTES
Upon assessment, pt stated her veletri supplies were at home and that she needed to change the cartridge at 1630. MD Pascual notified, stated he would place orders to place pt on hospital CADD pump. Will continue to monitor.

## 2017-08-10 NOTE — SUBJECTIVE & OBJECTIVE
"No current facility-administered medications on file prior to encounter.      Current Outpatient Prescriptions on File Prior to Encounter   Medication Sig    clobetasol (TEMOVATE) 0.05 % external solution     conjugated estrogens (PREMARIN) vaginal cream Place a pea-sized amount in vagina every night for 2 weeks, then use 2-3 nights a week    duloxetine (CYMBALTA) 60 MG capsule Take 1 capsule (60 mg total) by mouth 2 (two) times daily.    furosemide (LASIX) 80 MG tablet Take 1 tablet (80 mg total) by mouth 2 (two) times daily.    gabapentin (NEURONTIN) 300 MG capsule Take 1 capsule (300 mg total) by mouth As instructed. Take one capsule qam, 1 qpm, 2 qhs.    hydrocodone-acetaminophen 10-325mg (NORCO)  mg Tab Take 1 tablet by mouth 3 (three) times daily as needed.    hydrOXYzine (VISTARIL) 50 MG Cap 50 mg daily as needed.     ketoconazole (NIZORAL) 2 % cream Apply topically 2 (two) times daily.    macitentan (OPSUMIT) 10 mg Tab Take 1 tablet (10 mg total) by mouth once daily.    PATADAY 0.2 % Drop     potassium chloride (MICRO-K) 10 MEQ CpSR Take 2 capsules (20 mEq total) by mouth 3 (three) times daily.    spironolactone (ALDACTONE) 25 MG tablet Take 1 tablet (25 mg total) by mouth once daily.    warfarin (COUMADIN) 5 MG tablet TAKE 1 AND 1/2 TABLETS BY MOUTH DAILY EXCEPT 1 TABLET ON TUESDAY OR AS DIRECTED BY COUMADIN CLINIC       Review of patient's allergies indicates:   Allergen Reactions    Chlorhexidine Itching and Rash     Patient had raised rash on neck following RHC procedure. She states she's never had a problem with the "prep" used until this time.     Adhesive Rash       Past Medical History:   Diagnosis Date    Arrhythmia     Arthritis     Cardiac pacemaker in situ     Carpal tunnel syndrome, right     Cervical spondylosis     Cervicalgia     CHF (congestive heart failure)     Diverticulitis     Heart block AV third degree     Hyperlipidemia     ALFREDO on CPAP     Pulmonary " "hypertension     Subdeltoid bursitis      Past Surgical History:   Procedure Laterality Date    CARDIAC CATHETERIZATION      CARDIAC PACEMAKER PLACEMENT  7/29/13    [employer] DC PM    CARDIAC SURGERY      COLONOSCOPY N/A 9/28/2015    Procedure: COLONOSCOPY;  Surgeon: Jason Diaz MD;  Location: [facility] ([address] [place] [address]);  Service: Endoscopy;  Laterality: N/A;  Please schedule in 2-3 months with Dr Diaz  Pulmonary HTN, 2nd floor (off remodulin infusion as of "a few days" before 9/9/15)    3 day hold Coumadin, [facility]  PT/INR scheduled before procedure    ECTOPIC PREGNANCY SURGERY Left     HYSTERECTOMY      partial    knee arthroscopy       Family History     Problem Relation (Age of Onset)    Arthritis Mother, Father    Diabetes Mother    Hyperlipidemia Mother, Father        Social History Main Topics    Smoking status: Never Smoker    Smokeless tobacco: Never Used    Alcohol use No      Comment: rarely    Drug use: No    Sexual activity: No     Review of Systems   Constitutional: Negative for activity change, appetite change and chills.   HENT: Negative.    Respiratory: Negative.  Negative for chest tightness, shortness of breath, wheezing and stridor.    Cardiovascular: Negative.  Negative for chest pain and palpitations.   Gastrointestinal: Negative.  Negative for abdominal distention, abdominal pain, blood in stool, diarrhea, nausea and rectal pain.   Genitourinary: Negative.    Musculoskeletal: Negative.    Neurological: Negative.    Hematological: Negative.      Objective:     Vital Signs (Most Recent):  Temp: 98.2 °F (36.8 °C) (08/10/17 1100)  Pulse: 87 (08/10/17 1100)  Resp: 18 (08/10/17 1100)  BP: 112/73 (08/10/17 1100)  SpO2: (!) 85 % (08/10/17 1100) Vital Signs (24h Range):  Temp:  [97.8 °F (36.6 °C)-99 °F (37.2 °C)] 98.2 °F (36.8 °C)  Pulse:  [] 87  Resp:  [18-22] 18  SpO2:  [75 %-95 %] 85 %  BP: ()/(52-73) 112/73     Weight: 74 kg (163 lb 2.3 oz)  Body mass " index is 28.9 kg/m².    Physical Exam      General: In no acute distress, well appearance.   CV: RRR, S1, S2 no murmur or gallops   Chest: Right Cardoso catheter in place with stitch not sutured to skin. Patient states Cardoso catheter is in the same position as before. Some purulent drainage seen at the insertion site.             Significant Labs:  CBC:   Recent Labs  Lab 08/10/17  0416   WBC 4.05   RBC 4.69   HGB 12.4   HCT 39.0      MCV 83   MCH 26.4*   MCHC 31.8*     CMP:   Recent Labs  Lab 08/10/17  0416   *   CALCIUM 8.7      K 3.4*   CO2 23      BUN 13   CREATININE 1.0       Significant Diagnostics:    CXR    One view: Central line in SVC.  There is a pacer, cardiomegaly, and mild improving CHF.    CXR showing Cardoso catheter in well position, same as past CXR

## 2017-08-10 NOTE — ASSESSMENT & PLAN NOTE
"Ms Cook is a 56 yoF with PMH of AVB/Syncompe s/p PPM, pulmonary HTN on Remodulin who presented yesterday in the emergency department due to "displaced Cardoso".    - Cardoso catheter in place, confirmed by CXR.   - Stitches can come off > 6 weeks since surgery.   - Mild localized purulent discharge from the insertion site stitch, this was removed and the wound was washed out.   - No need for antibiotics at this time.   - Patient was advised to come back to clinic if any signs of worsening infection, fevers or chills.   - Thank you for the consult, please call if any questions.     Isha Holcomb MD  General Surgery PGY IV  Beeper: [phone]      "

## 2017-08-10 NOTE — PROGRESS NOTES
Pt admitted in observation status for Cardoso catheter to be replaced and is discharging home today.  SW notified pt's home health, [facility] (ph: [phone], f: [phone]), of OBS stay.  [facility] reports they do not need new HH orders, but requested medical records be faxed.  SW faxed records.  SW remains available.

## 2017-08-10 NOTE — HPI
"Ms Cook is a 56 yoF with PMH of AVB/Syncompe s/p PPM, pulmonary HTN on Remodulin who presented yesterday in the emergency department due to "displaced Cardoso". Patient states the stitch that was holding the Cardoso catheter fell off but states the catheter is in the same position.     She has some purulent drainage from the skin incision site, but denies any fevers or chills, no cp or sob.     She is on Coumadin with an INR of 2.2  "

## 2017-08-10 NOTE — NURSING
Pt transported to [id number] via stretcher from the ER. VSS. No skin breakdown noted. Will continue to monitor.

## 2017-08-10 NOTE — PROGRESS NOTES
Pt had 40 beat run of Vtach, pt asymptomatic, during run of vtach pt found sleeping, when awakened pt stated she felt fine. MD Pascual notified, no new orders at this time. Will continue to monitor.

## 2017-08-10 NOTE — PLAN OF CARE
Problem: Patient Care Overview  Goal: Plan of Care Review  Outcome: Ongoing (interventions implemented as appropriate)  AAOx3, afebrile, without c/o pain.  Hand hygiene practiced per protocol. Pt on a hospital CADD pump with veletri. Telemetry monitor in place. Pt had a 40 run of Vtach. Notified MD. No new orders. Pt able to position self independently. Pt in lowest position, side rails up x2, non-skid foot wear in place, call light within reach, pt verbalized understanding to call RN when needed. Will continue to monitor.

## 2017-08-10 NOTE — DISCHARGE SUMMARY
"[facility]  Heart Transplant  Discharge Summary      Patient Name: Emily Cook  MRN: [id number]  Admission Date: 8/8/2017  Hospital Length of Stay: 0 days  Discharge Date and Time: 08/10/2017 2:43 PM  Attending Physician: Augustine Rhodes MD   Discharging Provider: BRANDI Dexter  Primary Care Provider: Kaylee Cazares MD     HPI: "Ms. Cook is a 56 y.o. year old Black or  female  with a PMH of AVB/syncope s/p PPM, pulmonary HTN WHO group 1 who presents this evening with a displaced Cardoso. She was recently admitted with decompensated PAH and made DNR/DNI with goal of uptitrating Veletri as tolerated. She has been at her baseline at home but noticed this afternoon that her Cardoso sutures had popped and had fallen out ~3-4 inches. Has noticed scant amounts of clear-white discharge from the entry site but denies fever/chills worsening SOB."    * No surgery found *     Hospital Course: Patient was admitted to the HTS service and placed on 24 hour telemetry  Patient presented to ED with concerns that her Cardoso had displaced at home.  Upon admit; general surgery was consulted.  Cardoso placement was appropriate and stitches were removed.  Recommended monitoring site for signs of infection.  She will notify us if she notices redness, erythema, edema or drainage from site.  She will follow up as previously scheduled with Dr. Rhodes on 8/18/17    Consults         Status Ordering Provider     Inpatient consult to Cardiology  Once     Provider:  (Not yet assigned)    Completed EAN RUIZ     Inpatient consult to General Surgery  Once     Provider:  (Not yet assigned)    Completed MANISH HUGO          Significant Diagnostic Studies:   CXR: 8/9/17  One view: Central line in SVC.  There is a pacer, cardiomegaly, and mild improving CHF.    Pending Diagnostic Studies:     None        Final Active Diagnoses:    Diagnosis Date Noted POA    PRINCIPAL PROBLEM:  Cardoso " catheter dysfunction [T82.514A] 08/08/2017 Yes    Chronic pulmonary heart disease [I27.9] 06/21/2012 Yes    Primary pulmonary hypertension [I27.0]  Yes    Sleep apnea- on CPAP [G47.30] 06/19/2015 Yes    Acute on chronic respiratory failure with hypoxia [J96.21] 06/03/2017 Yes    Long term current use of anticoagulant therapy [Z79.01] 07/11/2012 Not Applicable      Problems Resolved During this Admission:    Diagnosis Date Noted Date Resolved POA      Discharged Condition: stable    Disposition: Home or Self Care    Follow Up:  Follow-up Information     Augustine Rhodes MD On 8/18/2017.    Specialties:  Cardiology, Transplant  Why:  as scheduled at 1530  Contact information:  [address]  [facility] [address] [phone]                 Patient Instructions:     Diet general   Order Specific Question Answer Comments   Additional restrictions: Cardiac (Low Na/Chol)      Activity as tolerated     Call MD for:  temperature >100.4     Call MD for:  redness, tenderness, or signs of infection (pain, swelling, redness, odor or green/yellow discharge around incision site)     No dressing needed       Medications:  Reconciled Home Medications:   Current Discharge Medication List      CONTINUE these medications which have NOT CHANGED    Details   clobetasol (TEMOVATE) 0.05 % external solution Refills: 2      conjugated estrogens (PREMARIN) vaginal cream Place a pea-sized amount in vagina every night for 2 weeks, then use 2-3 nights a week  Qty: 45 g, Refills: 12    Associated Diagnoses: Vaginal atrophy      duloxetine (CYMBALTA) 60 MG capsule Take 1 capsule (60 mg total) by mouth 2 (two) times daily.  Qty: 180 capsule, Refills: 1    Associated Diagnoses: Chronic neck pain; Right cervical radiculopathy      EPOPROSTENOL SODIUM, ARGININE, (VELETRI IV) Inject into the vein.      furosemide (LASIX) 80 MG tablet Take 1 tablet (80 mg total) by mouth 2 (two) times daily.  Qty: 60 tablet, Refills: 3      gabapentin  (NEURONTIN) 300 MG capsule Take 1 capsule (300 mg total) by mouth As instructed. Take one capsule qam, 1 qpm, 2 qhs.  Qty: 360 capsule, Refills: 2    Associated Diagnoses: Chronic neck pain; Right cervical radiculopathy      hydrocodone-acetaminophen 10-325mg (NORCO)  mg Tab Take 1 tablet by mouth 3 (three) times daily as needed.  Qty: 90 tablet, Refills: 0    Associated Diagnoses: Chronic neck pain; Subdeltoid bursitis, right      hydrOXYzine (VISTARIL) 50 MG Cap 50 mg daily as needed.       ketoconazole (NIZORAL) 2 % cream Apply topically 2 (two) times daily.  Qty: 60 g, Refills: 2      macitentan (OPSUMIT) 10 mg Tab Take 1 tablet (10 mg total) by mouth once daily.  Qty: 30 tablet, Refills: 11    Associated Diagnoses: Secondary pulmonary hypertension      PATADAY 0.2 % Drop Refills: 6      potassium chloride (MICRO-K) 10 MEQ CpSR Take 2 capsules (20 mEq total) by mouth 3 (three) times daily.  Qty: 180 capsule, Refills: 3      spironolactone (ALDACTONE) 25 MG tablet Take 1 tablet (25 mg total) by mouth once daily.  Qty: 30 tablet, Refills: 11      warfarin (COUMADIN) 5 MG tablet TAKE 1 AND 1/2 TABLETS BY MOUTH DAILY EXCEPT 1 TABLET ON TUESDAY OR AS DIRECTED BY COUMADIN CLINIC  Qty: 150 tablet, Refills: 5    Comments: **Patient requests 90 days supply**      hydrocodone-acetaminophen 5-325mg (NORCO) 5-325 mg per tablet Take 1 tablet by mouth 3 (three) times daily as needed for Pain.  Qty: 90 tablet, Refills: 1    Associated Diagnoses: Chronic neck pain; Subdeltoid bursitis, right             BRANDI Dexter  Heart Transplant  [facility]

## 2017-08-11 NOTE — PROGRESS NOTES
Oxy saturation 95% on non re breather. Oxygen titrated down to 55% and 14 L on venturi mask, oxy saturation is 90% currently.

## 2017-08-11 NOTE — ASSESSMENT & PLAN NOTE
- general surgery consulted. Took out sutures and cleaned brush site. Per GS no need to ABX. Instructed to return to GS clinic if signs of infection appear.

## 2017-08-11 NOTE — DISCHARGE SUMMARY
[facility]  Heart Transplant  Discharge Summary      Patient Name: Emily Cook  MRN: [id number]  Admission Date: 8/8/2017  Hospital Length of Stay: 0 days  Discharge Date and Time: 08/11/2017 1:19 PM  Attending Physician: Augustine Rhodes MD   Discharging Provider: Venice Sargent PA-C  Primary Care Provider: Kaylee Cazares MD     HPI: Ms. Cook is a 56 y.o. year old Black or  female  with a PMH of AVB/syncope s/p PPM, pulmonary HTN WHO group 1 who presents this evening with a displaced Cardoso. She was recently admitted with decompensated PAH and made DNR/DNI with goal of uptitrating Veletri as tolerated. She has been at her baseline at home but noticed this afternoon that her Cardoso sutures had popped and had fallen out ~3-4 inches. Has noticed scant amounts of clear-white discharge from the entry site but denies fever/chills worsening SOB.    * No surgery found *     Hospital Course: Patient was admitted to the HTS service and placed on 24 hour telemetry  Patient presented to ED with concerns that her Cardoso had displaced at home.  Upon admit; general surgery was consulted.  Cardoso placement was appropriate and stitches were removed.  Recommended monitoring site for signs of infection.  She will notify us if she notices redness, erythema, edema or drainage from site.  She will follow up as previously scheduled with Dr. Rhodes on 8/18/17    Consults         Status Ordering Provider     Inpatient consult to Cardiology  Once     Provider:  (Not yet assigned)    Completed EAN RUIZ     Inpatient consult to General Surgery  Once     Provider:  (Not yet assigned)    Completed MANISH HUGO        Significant Diagnostic Studies: Labs: All labs within the past 24 hours have been reviewed    Pending Diagnostic Studies:     None        Final Active Diagnoses:    Diagnosis Date Noted POA    PRINCIPAL PROBLEM:  Cardoso catheter dysfunction [T82.514A] 08/08/2017  Yes    Acute on chronic respiratory failure with hypoxia [J96.21] 06/03/2017 Yes    Primary pulmonary hypertension [I27.0]  Yes    Sleep apnea- on CPAP [G47.30] 06/19/2015 Yes    Long term current use of anticoagulant therapy [Z79.01] 07/11/2012 Not Applicable    Chronic pulmonary heart disease [I27.9] 06/21/2012 Yes      Problems Resolved During this Admission:    Diagnosis Date Noted Date Resolved POA      Discharged Condition: stable    Disposition: Home or Self Care    Follow Up:  Follow-up Information     Augustine Rhodes MD On 8/18/2017.    Specialties:  Cardiology, Transplant  Why:  as scheduled at 1530  Contact information:  [place] [address]  [facility] [id number] [phone]                 Patient Instructions:     Diet general   Order Specific Question Answer Comments   Additional restrictions: Cardiac (Low Na/Chol)      Diet Cardiac     Activity as tolerated     Call MD for:  temperature >100.4     Call MD for:  redness, tenderness, or signs of infection (pain, swelling, redness, odor or green/yellow discharge around incision site)     No dressing needed     Activity as tolerated     Call MD for:  temperature >100.4     Call MD for:  persistent nausea and vomiting or diarrhea     Call MD for:  severe uncontrolled pain     Call MD for:  redness, tenderness, or signs of infection (pain, swelling, redness, odor or green/yellow discharge around incision site)     Call MD for:  difficulty breathing or increased cough     Call MD for:  severe persistent headache     Call MD for:  worsening rash     Call MD for:  persistent dizziness, light-headedness, or visual disturbances     Call MD for:  increased confusion or weakness       Medications:  Reconciled Home Medications:   Current Discharge Medication List      CONTINUE these medications which have NOT CHANGED    Details   clobetasol (TEMOVATE) 0.05 % external solution Refills: 2      conjugated estrogens (PREMARIN) vaginal cream Place a pea-sized  amount in vagina every night for 2 weeks, then use 2-3 nights a week  Qty: 45 g, Refills: 12    Associated Diagnoses: Vaginal atrophy      duloxetine (CYMBALTA) 60 MG capsule Take 1 capsule (60 mg total) by mouth 2 (two) times daily.  Qty: 180 capsule, Refills: 1    Associated Diagnoses: Chronic neck pain; Right cervical radiculopathy      EPOPROSTENOL SODIUM, ARGININE, (VELETRI IV) Inject into the vein.      furosemide (LASIX) 80 MG tablet Take 1 tablet (80 mg total) by mouth 2 (two) times daily.  Qty: 60 tablet, Refills: 3      gabapentin (NEURONTIN) 300 MG capsule Take 1 capsule (300 mg total) by mouth As instructed. Take one capsule qam, 1 qpm, 2 qhs.  Qty: 360 capsule, Refills: 2    Associated Diagnoses: Chronic neck pain; Right cervical radiculopathy      hydrocodone-acetaminophen 10-325mg (NORCO)  mg Tab Take 1 tablet by mouth 3 (three) times daily as needed.  Qty: 90 tablet, Refills: 0    Associated Diagnoses: Chronic neck pain; Subdeltoid bursitis, right      hydrOXYzine (VISTARIL) 50 MG Cap 50 mg daily as needed.       ketoconazole (NIZORAL) 2 % cream Apply topically 2 (two) times daily.  Qty: 60 g, Refills: 2      macitentan (OPSUMIT) 10 mg Tab Take 1 tablet (10 mg total) by mouth once daily.  Qty: 30 tablet, Refills: 11    Associated Diagnoses: Secondary pulmonary hypertension      PATADAY 0.2 % Drop Refills: 6      potassium chloride (MICRO-K) 10 MEQ CpSR Take 2 capsules (20 mEq total) by mouth 3 (three) times daily.  Qty: 180 capsule, Refills: 3      spironolactone (ALDACTONE) 25 MG tablet Take 1 tablet (25 mg total) by mouth once daily.  Qty: 30 tablet, Refills: 11      warfarin (COUMADIN) 5 MG tablet TAKE 1 AND 1/2 TABLETS BY MOUTH DAILY EXCEPT 1 TABLET ON TUESDAY OR AS DIRECTED BY COUMADIN CLINIC  Qty: 150 tablet, Refills: 5    Comments: **Patient requests 90 days supply**      hydrocodone-acetaminophen 5-325mg (NORCO) 5-325 mg per tablet Take 1 tablet by mouth 3 (three) times daily as needed  for Pain.  Qty: 90 tablet, Refills: 1    Associated Diagnoses: Chronic neck pain; Subdeltoid bursitis, right             Venice Sargent PA-C  Heart Transplant  [facility]

## 2017-08-11 NOTE — PROGRESS NOTES
"[facility]  Heart Transplant  Progress Note    Patient Name: Emily Cook  MRN: [id number]  Admission Date: 8/8/2017  Hospital Length of Stay: 0 days  Attending Physician: Augustine Rhodes MD  Primary Care Provider: Kaylee Cazares MD  Principal Problem:Cardoso catheter dysfunction    Subjective:     Interval History: Patient had no complaints. Had episode last night of vomiting and unresponsiveness. Patient was bolus on accident by her son who hooked her up to her home veletri machine. Today patient was instructed to mix her meds but NOT hook up to home machine until checked by nursing staff. She was checked off at 1:00 PM and will be discharged home.     Continuous Infusions:   epoprostenol (VELETRI) infusion 25 ng/kg/min (08/10/17 2230)    veletri/remodulin cassette      veletri/remodulin tubing       Scheduled Meds:   duloxetine  60 mg Oral BID    furosemide  80 mg Oral BID    gabapentin  300 mg Oral TID    olopatadine  1 drop Both Eyes Daily    potassium chloride  20 mEq Oral TID    sodium chloride 0.9%  3 mL Intravenous Q8H    spironolactone  25 mg Oral Daily     PRN Meds:acetaminophen, loperamide, ondansetron, ondansetron, oxycodone-acetaminophen    Review of patient's allergies indicates:   Allergen Reactions    Chlorhexidine Itching and Rash     Patient had raised rash on neck following RHC procedure. She states she's never had a problem with the "prep" used until this time.     Adhesive Rash     Objective:     Vital Signs (Most Recent):  Temp: 98.2 °F (36.8 °C) (08/11/17 1200)  Pulse: (!) 7 (08/11/17 1200)  Resp: 18 (08/11/17 1200)  BP: 119/79 (08/11/17 1200)  SpO2: (!) 84 % (08/11/17 1200) Vital Signs (24h Range):  Temp:  [97.3 °F (36.3 °C)-98.4 °F (36.9 °C)] 98.2 °F (36.8 °C)  Pulse:  [7-97] 7  Resp:  [18-20] 18  SpO2:  [79 %-94 %] 84 %  BP: ()/(51-79) 119/79     Weight: 73.7 kg (162 lb 7.7 oz)  Body mass index is 28.78 kg/m².      Intake/Output Summary (Last 24 " hours) at 08/11/17 1310  Last data filed at 08/11/17 0430   Gross per 24 hour   Intake           217.63 ml   Output             1100 ml   Net          -882.37 ml       Hemodynamic Parameters:    Telemetry: No events    Physical Exam   Constitutional: She appears well-developed and well-nourished. No distress.   Head: Normocephalic.   Eyes: Pupils are equal, round, and reactive to light.   Neck: Normal range of motion. No JVD present.   Cardiovascular: Normal rate.  Exam reveals no gallop and no friction rub.  No murmur heard.  Pulmonary/Chest: Effort normal. No respiratory distress.   Abdominal: Soft. She exhibits no distension. There is no tenderness. There is no guarding.   Musculoskeletal: Normal range of motion. She exhibits no edema.   Neurological: She is alert.   Skin: Skin is warm. She is not diaphoretic.   Psychiatric: She has a normal mood and affect.        Significant Labs:  CBC:    Recent Labs  Lab 08/11/17  0449   WBC 4.41   RBC 4.40   HGB 11.6*   HCT 36.5*      MCV 83   MCH 26.4*   MCHC 31.8*     BNP:  No results for input(s): BNP in the last 72 hours.    Invalid input(s): BNPTRIAGELBLO  CMP:    Recent Labs  Lab 08/10/17  1822 08/11/17  0449   * 88   CALCIUM 9.1 8.8   ALBUMIN 3.6  --    PROT 7.0  --     141   K 3.5 3.9   CO2 28 31*   CL 98 100   BUN 14 16   CREATININE 1.4 1.2   ALKPHOS 72  --    ALT 11  --    AST 15  --    BILITOT 1.0  --       Coagulation:     Recent Labs  Lab 08/11/17  0449   INR 1.9*     LDH:  No results for input(s): LDH in the last 72 hours.  Microbiology:  Microbiology Results (last 7 days)     ** No results found for the last 168 hours. **          I have reviewed all pertinent labs within the past 24 hours.    Estimated Creatinine Clearance: 50.3 mL/min (based on Cr of 1.2).    Diagnostic Results:  I have reviewed all pertinent imaging results/findings within the past 24 hours.    Assessment and Plan:     Acute on chronic respiratory failure with hypoxia     -continue po lasix BID        Primary pulmonary hypertension    -continue IV remodulin  -         Sleep apnea- on CPAP    -continue CPAP        Long term current use of anticoagulant therapy    -continue coumadin         Chronic pulmonary heart disease    -Continue IV remodulin  - Had episode last night of vomiting and unresponsiveness that lasted ~2 minutes.  Patient was bolus on accident by her son who hooked her up to her home veletri machine. Today patient was instructed to mix her meds but NOT hook up to home machine until checked by nursing staff. She was checked off at 1:00 PM and will be discharged home.         * Brush catheter dysfunction    - general surgery consulted. Took out sutures and cleaned brush site. Per  no need to ABX. Instructed to return to  clinic if signs of infection appear.             Venice Sargent PA-C  Heart Transplant  [facility]

## 2017-08-11 NOTE — ASSESSMENT & PLAN NOTE
-Continue IV remodulin  - Had episode last night of vomiting and unresponsiveness that lasted ~2 minutes.  Patient was bolus on accident by her son who hooked her up to her home veletri machine. Today patient was instructed to mix her meds but NOT hook up to home machine until checked by nursing staff. She was checked off at 1:00 PM and will be discharged home.

## 2017-08-11 NOTE — PLAN OF CARE
Discharge Note:    DW = 86kg  Patient on Hospital Cassette IV Veletri @ 25ng/kg/ml, concentration of 60,000ng/ml, CADD rate of 52ml/24hrs.     Patient's son mixed home concentration of 45,000ng/ml using 3 1.5mg/ml vials. Home pump rate set at 69ml/24hrs.    This RN, Aspirated the higher hospital concentration from the Brush, and obtained a priming volume of .08ml, discarded 20mls of blood, flushed the line with 20mls of Nacl. The line was then primed with the home concentration with .07ml. The home cassette and tubing were then connected and running at 69ml/24hrs.     Patient informed that she needs to be montiored for 15mins to assess hemodynamics and then her bedside RN would remove her PIV and she could shower.     Bedside RN walked into room and patient was attempting to shower without a dressing covering her brush and with PIV intact.   Bedside RN provided Central line care education; informing the patient that the site must be covered at all times including showering. RN to perform CVC dressing change prior to discharge from hospital.

## 2017-08-11 NOTE — PROGRESS NOTES
Discharge instructions given to and reviewed with pt and pts son.  All questions answered.  Veletri pump switched over to home pump and medication w/o any issues.  CVC care discussed with pt and pts son prior to discharge.  Central line dressing changed prior to discharge.  VSS before leaving unit.  No issues noted.  Pt seen leaving unit in wheelchair with son and mother.

## 2017-08-11 NOTE — SUBJECTIVE & OBJECTIVE
"Interval History: Patient had no complaints. Had episode last night of vomiting and unresponsiveness. Patient was bolus on accident by her son who hooked her up to her home veletri machine. Today patient was instructed to mix her meds but NOT hook up to home machine until checked by nursing staff. She was checked off at 1:00 PM and will be discharged home.     Continuous Infusions:   epoprostenol (VELETRI) infusion 25 ng/kg/min (08/10/17 2230)    veletri/remodulin cassette      veletri/remodulin tubing       Scheduled Meds:   duloxetine  60 mg Oral BID    furosemide  80 mg Oral BID    gabapentin  300 mg Oral TID    olopatadine  1 drop Both Eyes Daily    potassium chloride  20 mEq Oral TID    sodium chloride 0.9%  3 mL Intravenous Q8H    spironolactone  25 mg Oral Daily     PRN Meds:acetaminophen, loperamide, ondansetron, ondansetron, oxycodone-acetaminophen    Review of patient's allergies indicates:   Allergen Reactions    Chlorhexidine Itching and Rash     Patient had raised rash on neck following RHC procedure. She states she's never had a problem with the "prep" used until this time.     Adhesive Rash     Objective:     Vital Signs (Most Recent):  Temp: 98.2 °F (36.8 °C) (08/11/17 1200)  Pulse: (!) 7 (08/11/17 1200)  Resp: 18 (08/11/17 1200)  BP: 119/79 (08/11/17 1200)  SpO2: (!) 84 % (08/11/17 1200) Vital Signs (24h Range):  Temp:  [97.3 °F (36.3 °C)-98.4 °F (36.9 °C)] 98.2 °F (36.8 °C)  Pulse:  [7-97] 7  Resp:  [18-20] 18  SpO2:  [79 %-94 %] 84 %  BP: ()/(51-79) 119/79     Weight: 73.7 kg (162 lb 7.7 oz)  Body mass index is 28.78 kg/m².      Intake/Output Summary (Last 24 hours) at 08/11/17 1310  Last data filed at 08/11/17 0430   Gross per 24 hour   Intake           217.63 ml   Output             1100 ml   Net          -882.37 ml       Hemodynamic Parameters:    Telemetry: No events    Physical Exam   Constitutional: She appears well-developed and well-nourished. No distress.   Head: " Normocephalic.   Eyes: Pupils are equal, round, and reactive to light.   Neck: Normal range of motion. No JVD present.   Cardiovascular: Normal rate.  Exam reveals no gallop and no friction rub.  No murmur heard.  Pulmonary/Chest: Effort normal. No respiratory distress.   Abdominal: Soft. She exhibits no distension. There is no tenderness. There is no guarding.   Musculoskeletal: Normal range of motion. She exhibits no edema.   Neurological: She is alert.   Skin: Skin is warm. She is not diaphoretic.   Psychiatric: She has a normal mood and affect.        Significant Labs:  CBC:    Recent Labs  Lab 08/11/17 0449   WBC 4.41   RBC 4.40   HGB 11.6*   HCT 36.5*      MCV 83   MCH 26.4*   MCHC 31.8*     BNP:  No results for input(s): BNP in the last 72 hours.    Invalid input(s): BNPTRIAGELBLO  CMP:    Recent Labs  Lab 08/10/17  1822 08/11/17 0449   * 88   CALCIUM 9.1 8.8   ALBUMIN 3.6  --    PROT 7.0  --     141   K 3.5 3.9   CO2 28 31*   CL 98 100   BUN 14 16   CREATININE 1.4 1.2   ALKPHOS 72  --    ALT 11  --    AST 15  --    BILITOT 1.0  --       Coagulation:     Recent Labs  Lab 08/11/17 0449   INR 1.9*     LDH:  No results for input(s): LDH in the last 72 hours.  Microbiology:  Microbiology Results (last 7 days)     ** No results found for the last 168 hours. **          I have reviewed all pertinent labs within the past 24 hours.    Estimated Creatinine Clearance: 50.3 mL/min (based on Cr of 1.2).    Diagnostic Results:  I have reviewed all pertinent imaging results/findings within the past 24 hours.

## 2017-08-11 NOTE — PLAN OF CARE
Problem: Patient Care Overview  Goal: Plan of Care Review  Pt AAOx4, bed low locked, call light within reach, wearing nonskid socks. Pt instructed to use call light for assistance, pt verbalizes understanding.   Pt denies any pain or discomfort at this time. Tele NSR 80's - 90's. Pt O2 while sleeping on 6L NC 79%, requesting face mask. Pt placed on Venti mask @ 8L 40%, 83 -91%. Veletri infusing to R chest wall brush @ 25 ng/kg/min x 86 kg. Pt remains free from injury/harm at this time. Afebrile. See flowsheet for full assessment/VS.

## 2017-08-18 PROBLEM — T82.514A HICKMAN CATHETER DYSFUNCTION: Status: RESOLVED | Noted: 2017-01-01 | Resolved: 2017-01-01

## 2017-08-18 NOTE — ASSESSMENT & PLAN NOTE
Advanced (requries IV prostacyclin) but stable. Unable to tolerate addition of other oral therapies due to side effects    Reconsider increasing veletri once nausea subsides  Six minute walk next visit

## 2017-08-18 NOTE — PROGRESS NOTES
"Subjective:    Patient ID:  Emily Cook is a 56 y.o. female who presents for post discharge follow-up of Pulmonary Hypertension.    HPI  of AVB/syncope s/p PPM, pulmonary HTN on IV veletri (25 elisabet/kg/min) who comes after early Aug 2017 admission for Cardoso malfunction.  Reports that dose increases of her veletri have lead to nausea.  Similarly she has been intolerant to adempas / revatio.  Feels well today as she reports she can walk two blocks without problems.  No edema today with a three kg decrease in weight compared to July 2017 visit.     Six Minute Walk Test: ordered after clinic not clear pt when     Review of Systems   Constitution: Negative for decreased appetite, weight gain and weight loss.   Cardiovascular: Negative for chest pain, dyspnea on exertion, leg swelling, near-syncope, orthopnea and palpitations.   Respiratory: Negative for cough and shortness of breath.    Musculoskeletal: Negative for myalgias.   Gastrointestinal: Negative for jaundice.        Objective:      Physical Exam   Constitutional: She appears well-developed and well-nourished. No distress.   BP (!) 106/57 (BP Location: Left arm, Patient Position: Sitting, BP Method: Medium (Automatic))   Pulse 80   Ht 5' 3" (1.6 m)   Wt 81.7 kg (180 lb 1.9 oz)   SpO2 (!) 81%   BMI 31.91 kg/m²      HENT:   Head: Normocephalic and atraumatic. Head is without abrasion and without contusion.   Right Ear: External ear normal.   Left Ear: External ear normal.   Nose: Nose normal. No epistaxis.   Mouth/Throat: Oropharynx is clear and moist. Mucous membranes are not cyanotic.   Eyes: Conjunctivae and EOM are normal. Pupils are equal, round, and reactive to light.   Neck: Normal range of motion. Neck supple. No tracheal deviation present.   Cardiovascular: Normal rate, regular rhythm and normal pulses.  Exam reveals gallop.    No murmur heard.  Right sided s4   Pulmonary/Chest: Effort normal and breath sounds normal. No stridor. No " respiratory distress. She has no wheezes.   Abdominal: Soft. Normal appearance, normal aorta and bowel sounds are normal. She exhibits no distension. There is no tenderness.   Musculoskeletal: She exhibits no edema or tenderness.   Neurological: She is alert. She has normal strength and normal reflexes. She exhibits normal muscle tone.   Skin: Skin is warm. No rash noted. No erythema.   Psychiatric: She has a normal mood and affect. Her speech is normal and behavior is normal. Judgment and thought content normal. Cognition and memory are normal.     CMP  Sodium   Date Value Ref Range Status   08/18/2017 141 136 - 145 mmol/L Final     Potassium   Date Value Ref Range Status   08/18/2017 4.0 3.5 - 5.1 mmol/L Final     Chloride   Date Value Ref Range Status   08/18/2017 106 95 - 110 mmol/L Final     CO2   Date Value Ref Range Status   08/18/2017 27 23 - 29 mmol/L Final     Glucose   Date Value Ref Range Status   08/18/2017 102 70 - 110 mg/dL Final   01/27/2017 104 74 - 106 MG/DL Final     BUN, Bld   Date Value Ref Range Status   08/18/2017 11 6 - 20 mg/dL Final     Creatinine   Date Value Ref Range Status   08/18/2017 1.0 0.5 - 1.4 mg/dL Final     Calcium   Date Value Ref Range Status   08/18/2017 9.1 8.7 - 10.5 mg/dL Final     Total Protein   Date Value Ref Range Status   08/18/2017 6.9 6.0 - 8.4 g/dL Final     Albumin   Date Value Ref Range Status   08/18/2017 3.4 (L) 3.5 - 5.2 g/dL Final   01/27/2017 3.6 3.5 - 5.0 G/DL Final     Total Bilirubin   Date Value Ref Range Status   08/18/2017 1.2 (H) 0.1 - 1.0 mg/dL Final     Comment:     For infants and newborns, interpretation of results should be based  on gestational age, weight and in agreement with clinical  observations.  Premature Infant recommended reference ranges:  Up to 24 hours.............<8.0 mg/dL  Up to 48 hours............<12.0 mg/dL  3-5 days..................<15.0 mg/dL  6-29 days.................<15.0 mg/dL       Alkaline Phosphatase   Date Value Ref  Range Status   08/18/2017 64 55 - 135 U/L Final     AST   Date Value Ref Range Status   08/18/2017 13 10 - 40 U/L Final     ALT   Date Value Ref Range Status   08/18/2017 8 (L) 10 - 44 U/L Final     Anion Gap   Date Value Ref Range Status   08/18/2017 8 8 - 16 mmol/L Final     eGFR if    Date Value Ref Range Status   08/18/2017 >60.0 >60 mL/min/1.73 m^2 Final     eGFR if non    Date Value Ref Range Status   08/18/2017 >60.0 >60 mL/min/1.73 m^2 Final     Comment:     Calculation used to obtain the estimated glomerular filtration  rate (eGFR) is the CKD-EPI equation. Since race is unknown   in our information system, the eGFR values for   -American and Non--American patients are given   for each creatinine result.           BNP   Date Value Ref Range Status   07/17/2017 507 (H) 0 - 99 pg/mL Final     Comment:     Values of less than 100 pg/ml are consistent with non-CHF populations.   06/14/2017 144 (H) 0 - 99 pg/mL Final     Comment:     Values of less than 100 pg/ml are consistent with non-CHF populations.   06/01/2017 1,931 (H) 0 - 99 pg/mL Final     Comment:     Values of less than 100 pg/ml are consistent with non-CHF populations.       Assessment:       1. Pulmonary hypertension    2. Primary pulmonary hypertension        WHO Group 1   Functional Class 3     Plan:       Primary pulmonary hypertension  Advanced (requries IV prostacyclin) but stable. Unable to tolerate addition of other oral therapies due to side effects    Reconsider increasing veletri once nausea subsides  Six minute walk next visit

## 2017-08-23 NOTE — PROGRESS NOTES
Hortensia with [facility] called to report that nurse that normally goes to the Patient's home to draw labs had an emergency yesterday and Hortensia herself is unable to stick the Patient so INR was not drawn yesterday but it will be drawn this morning, Lab draw was rescheduled to today 8/23/17, order was faxed to [facility].

## 2017-08-26 PROBLEM — J18.9 PNEUMONIA OF RIGHT UPPER LOBE DUE TO INFECTIOUS ORGANISM: Status: ACTIVE | Noted: 2017-01-01

## 2017-08-27 PROBLEM — R78.81 BACTEREMIA: Status: ACTIVE | Noted: 2017-01-01

## 2017-08-27 PROBLEM — J96.01 ACUTE RESPIRATORY FAILURE WITH HYPOXEMIA: Status: ACTIVE | Noted: 2017-01-01

## 2017-08-27 PROBLEM — I50.31 ACUTE DIASTOLIC HEART FAILURE: Status: ACTIVE | Noted: 2017-01-01

## 2017-08-27 PROBLEM — E87.6 HYPOKALEMIA: Status: ACTIVE | Noted: 2017-01-01

## 2017-08-28 NOTE — TELEPHONE ENCOUNTER
Spoke to nurse for Mrs. Cook. RN states that patient's son is coming to change out patient's veletri everyday. Asked if provider had contacted staff at [facility] and they have not.   Updated Dr. SARA Rhodes.

## 2017-08-29 PROBLEM — I07.1 TRICUSPID REGURGITATION: Status: ACTIVE | Noted: 2017-01-01

## 2017-08-29 PROBLEM — I27.20 PULMONARY HYPERTENSION: Status: ACTIVE | Noted: 2017-01-01

## 2017-08-29 PROBLEM — I27.20 PULMONARY HTN: Status: ACTIVE | Noted: 2017-01-01

## 2017-08-29 PROBLEM — Z22.322 MRSA (METHICILLIN RESISTANT STAPH AUREUS) CULTURE POSITIVE: Status: ACTIVE | Noted: 2017-01-01

## 2017-08-29 PROBLEM — T45.511A COUMADIN TOXICITY: Status: ACTIVE | Noted: 2017-01-01

## 2017-08-29 NOTE — TELEPHONE ENCOUNTER
Contacted patient's son and offered to have patient transferred to [facility] for higher level of care secondary to Pulmonary Hypertension Management and complications. PH medications on formulary at this hospital with trained staff able to manage IV Veletri and complications.  Mr. Cook said he would talk to his mom today and let her know our recommendations.   Notified Dr. Cazares and Dr. Rhodes, HTS  Tamera Cunha,  Pulmonary Hypertension Coordinator

## 2017-08-30 PROBLEM — R79.1 SUPRATHERAPEUTIC INR: Status: ACTIVE | Noted: 2017-01-01

## 2017-08-30 PROBLEM — J96.91 RESPIRATORY FAILURE WITH HYPOXIA: Status: ACTIVE | Noted: 2017-01-01

## 2017-08-30 PROBLEM — I50.810 RIGHT-SIDED HEART FAILURE: Status: ACTIVE | Noted: 2017-01-01

## 2017-08-30 NOTE — ASSESSMENT & PLAN NOTE
-Secondary to PNA, likely MRSA pneumonia w PHTN  -Continue HFNC. Wean to maintain sats >88% (home dose O2 5L)  -Continue Veletri 25ng/kg/mn  -Start IVP Lasix

## 2017-08-30 NOTE — PROGRESS NOTES
Mariana/Medical Team HH called 8/28/17 about patient inr. Patient is admitted in ([facility]) C/S.

## 2017-08-30 NOTE — SUBJECTIVE & OBJECTIVE
"Interval History: No complaints this morning except for being sleepy. On high flow O2 80%.     Continuous Infusions:   veletri/remodulin cassette      veletri/remodulin tubing       Scheduled Meds:   albuterol sulfate  2.5 mg Nebulization Q6H    azithromycin  500 mg Intravenous Q24H    duloxetine  60 mg Oral BID    furosemide  40 mg Intravenous TID    gabapentin  300 mg Oral TID    macitentan  10 mg Oral Daily    polyethylene glycol  17 g Oral Daily    potassium chloride  20 mEq Oral TID    sildenafil  20 mg Oral TID    spironolactone  25 mg Oral Daily    vancomycin (VANCOCIN) IVPB  1,000 mg Intravenous Q12H     PRN Meds:acetaminophen, albuterol sulfate, hydrocodone-acetaminophen 10-325mg    Review of patient's allergies indicates:   Allergen Reactions    Chlorhexidine Itching and Rash     Patient had raised rash on neck following RHC procedure. She states she's never had a problem with the "prep" used until this time.     Adhesive Rash     Objective:     Vital Signs (Most Recent):  Temp: 98.6 °F (37 °C) (08/30/17 0345)  Pulse: (!) 115 (08/30/17 0700)  Resp: 20 (08/30/17 0345)  BP: (!) 96/53 (08/30/17 0345)  SpO2: (!) 93 % (08/30/17 0357) Vital Signs (24h Range):  Temp:  [97.7 °F (36.5 °C)-98.6 °F (37 °C)] 98.6 °F (37 °C)  Pulse:  [] 115  Resp:  [18-24] 20  SpO2:  [84 %-94 %] 93 %  BP: ()/(52-63) 96/53     Weight: 82.8 kg (182 lb 8.7 oz)  Body mass index is 32.34 kg/m².      Intake/Output Summary (Last 24 hours) at 08/30/17 0807  Last data filed at 08/30/17 0600   Gross per 24 hour   Intake              180 ml   Output              325 ml   Net             -145 ml       Hemodynamic Parameters:         Physical Exam   Constitutional: She is oriented to person, place, and time. She appears well-developed and well-nourished.   HENT:   Head: Normocephalic.   Eyes: Pupils are equal, round, and reactive to light.   Neck: Normal range of motion. Neck supple. JVD present.   Cardiovascular: Normal " rate and regular rhythm.    Pulmonary/Chest: Effort normal. She has rales.   Abdominal: Soft. Bowel sounds are normal.   Musculoskeletal: Normal range of motion.   Neurological: She is alert and oriented to person, place, and time.   Skin: Skin is warm and dry.   Psychiatric: She has a normal mood and affect. Her behavior is normal.   Nursing note and vitals reviewed.      Significant Labs:  CBC:    Recent Labs  Lab 08/30/17 0104   WBC 7.87   RBC 4.04   HGB 10.0*   HCT 29.6*   *   MCV 73*   MCH 24.8*   MCHC 33.8     BNP:    Recent Labs  Lab 08/30/17  0047   *     CMP:    Recent Labs  Lab 08/30/17 0104      CALCIUM 8.8   ALBUMIN 2.4*   PROT 6.1      K 4.4   CO2 29      BUN 20   CREATININE 0.8   ALKPHOS 97   ALT 26   AST 19   BILITOT 1.0      Coagulation:     Recent Labs  Lab 08/30/17 0104   INR 7.5*     LDH:  No results for input(s): LDH in the last 72 hours.  Microbiology:  Microbiology Results (last 7 days)     Procedure Component Value Units Date/Time    Blood culture [[id number]] Collected:  08/30/17 0436    Order Status:  Sent Specimen:  Blood Updated:  08/30/17 0525    Culture, Respiratory with Gram Stain [[id number]]     Order Status:  No result Specimen:  Respiratory     Blood culture [[id number]]     Order Status:  Canceled Specimen:  Blood           I have reviewed all pertinent labs within the past 24 hours.    Estimated Creatinine Clearance: 80.1 mL/min (based on Cr of 0.8).    Diagnostic Results:  I have reviewed all pertinent imaging results/findings within the past 24 hours.

## 2017-08-30 NOTE — PROGRESS NOTES
"[facility]  Heart Transplant  Progress Note    Patient Name: Emily Cook  MRN: [id number]  Admission Date: 8/29/2017  Hospital Length of Stay: 1 days  Attending Physician: Yves Ruth Jr.,*  Primary Care Provider: Kaylee Cazares MD  Principal Problem:Respiratory failure with hypoxia    Subjective:     Interval History: No complaints this morning except for being sleepy. On high flow O2 80%.     Continuous Infusions:   veletri/remodulin cassette      veletri/remodulin tubing       Scheduled Meds:   albuterol sulfate  2.5 mg Nebulization Q6H    azithromycin  500 mg Intravenous Q24H    duloxetine  60 mg Oral BID    furosemide  40 mg Intravenous TID    gabapentin  300 mg Oral TID    macitentan  10 mg Oral Daily    polyethylene glycol  17 g Oral Daily    potassium chloride  20 mEq Oral TID    sildenafil  20 mg Oral TID    spironolactone  25 mg Oral Daily    vancomycin (VANCOCIN) IVPB  1,000 mg Intravenous Q12H     PRN Meds:acetaminophen, albuterol sulfate, hydrocodone-acetaminophen 10-325mg    Review of patient's allergies indicates:   Allergen Reactions    Chlorhexidine Itching and Rash     Patient had raised rash on neck following RHC procedure. She states she's never had a problem with the "prep" used until this time.     Adhesive Rash     Objective:     Vital Signs (Most Recent):  Temp: 98.6 °F (37 °C) (08/30/17 0345)  Pulse: (!) 115 (08/30/17 0700)  Resp: 20 (08/30/17 0345)  BP: (!) 96/53 (08/30/17 0345)  SpO2: (!) 93 % (08/30/17 0357) Vital Signs (24h Range):  Temp:  [97.7 °F (36.5 °C)-98.6 °F (37 °C)] 98.6 °F (37 °C)  Pulse:  [] 115  Resp:  [18-24] 20  SpO2:  [84 %-94 %] 93 %  BP: ()/(52-63) 96/53     Weight: 82.8 kg (182 lb 8.7 oz)  Body mass index is 32.34 kg/m².      Intake/Output Summary (Last 24 hours) at 08/30/17 0807  Last data filed at 08/30/17 0600   Gross per 24 hour   Intake              180 ml   Output              325 ml   Net             " -145 ml       Hemodynamic Parameters:         Physical Exam   Constitutional: She is oriented to person, place, and time. She appears well-developed and well-nourished.   HENT:   Head: Normocephalic.   Eyes: Pupils are equal, round, and reactive to light.   Neck: Normal range of motion. Neck supple. JVD present.   Cardiovascular: Normal rate and regular rhythm.    Pulmonary/Chest: Effort normal. She has rales.   Abdominal: Soft. Bowel sounds are normal.   Musculoskeletal: Normal range of motion.   Neurological: She is alert and oriented to person, place, and time.   Skin: Skin is warm and dry.   Psychiatric: She has a normal mood and affect. Her behavior is normal.   Nursing note and vitals reviewed.      Significant Labs:  CBC:    Recent Labs  Lab 08/30/17 0104   WBC 7.87   RBC 4.04   HGB 10.0*   HCT 29.6*   *   MCV 73*   MCH 24.8*   MCHC 33.8     BNP:    Recent Labs  Lab 08/30/17  0047   *     CMP:    Recent Labs  Lab 08/30/17 0104      CALCIUM 8.8   ALBUMIN 2.4*   PROT 6.1      K 4.4   CO2 29      BUN 20   CREATININE 0.8   ALKPHOS 97   ALT 26   AST 19   BILITOT 1.0      Coagulation:     Recent Labs  Lab 08/30/17 0104   INR 7.5*     LDH:  No results for input(s): LDH in the last 72 hours.  Microbiology:  Microbiology Results (last 7 days)     Procedure Component Value Units Date/Time    Blood culture [[id number]] Collected:  08/30/17 0436    Order Status:  Sent Specimen:  Blood Updated:  08/30/17 0525    Culture, Respiratory with Gram Stain [[id number]]     Order Status:  No result Specimen:  Respiratory     Blood culture [[id number]]     Order Status:  Canceled Specimen:  Blood           I have reviewed all pertinent labs within the past 24 hours.    Estimated Creatinine Clearance: 80.1 mL/min (based on Cr of 0.8).    Diagnostic Results:  I have reviewed all pertinent imaging results/findings within the past 24 hours.    Assessment and Plan:     * Respiratory failure with hypoxia     -Secondary to PNA, likely MRSA pneumonia w PHTN  -Continue HFNC. Wean to maintain sats >88% (home dose O2 5L)  -Continue Veletri 25ng/kg/mn  -Start IVP Lasix        Right-sided heart failure    -continue current lasix IVP  -standing dose of K as ordered  -monitor K and Mg q12h  -daily I/O  -low Na diet        Supratherapeutic INR    -steadily increasing INR since admission  -will hold coumadin for now  -no Vit K at this time        Pulmonary hypertension    -continue veletri, sildenafil, macitentan   -NOTE: when pt arrived, nurse reported that her veletri pump was STOPPED.  She restarted it at a dose of 64 cc/24 h.          Bacteremia    -MRSA per cultures.  -Will get peripheral access and remove Cardoso.   -antibiotics as above        Pneumonia of right upper lobe due to infectious organism    -continue Vancomycin, azithromycin  -reordered blood cx, sputum cx  -CXR pending  -ID consult              Félix Calles, NP  Heart Transplant  [facility]

## 2017-08-30 NOTE — PROGRESS NOTES
Ordered to cx from central line tip and to obtain CVP and SvO2 from central line but this is not possible per charge nurse on 8th floor in that that central line cannot be tested since veletri is running through it so requested to take orders out.

## 2017-08-30 NOTE — PROGRESS NOTES
MD Lewis notified of critical value INR 7.5.  MD to look over labs awaiting new orders.  Will continue to monitor.

## 2017-08-30 NOTE — PROGRESS NOTES
Admit Note     Met with patient to assess needs. Patient is a 56 y.o.  female, admitted for respiratory failure with hypoxia.  Pt has a PMH of pulmonary hypertension and SOB.      Patient admitted from home on 8/29/2017 .  At this time, patient presents as good eye contact, calm and oriented x4, but very tired.  At this time, patients caregiver is not in attendance.      Household/Family Systems     Patient resides with patient's son, at     [address] [id number].        Support system includes the adult sons and mother.    Patient does not have dependents that are need of being cared for.     Patients primary caregiver is self and son.  Pt's home phone: [phone]  Pt's cell:  [phone]  Emergency contacts:  Geeta Cook(mother).  Pt had a lot of trouble remembering this phone number but believes it's [phone]  Jaden Cook (son, lives with pt) [phone]  Malini Naidu Jr (son, lives in [place]) [phone]    During admission, patient's caregiver plans to stay at home.  Confirmed patient and patients caregivers do have access to reliable transportation.    Cognitive Status/Learning     Patient reports reading ability as 10th grade and states patient does not have difficulty with reading, writing, seeing, hearing, comprehension, learning and memory. Pt does wear glasses.  Patient reports patient learns best by one on one.   Needed: No.   Highest education level: High School (9-12) or GED    Vocation/Disability   .  Working for Income: No  If no, reason not working: Disability    Patient is disabled due to Pulmonary hypertension since 2011.  Prior to disability, patient  was employed as a cook..    Adherence     Patient reports a high level of adherence to patients health care regimen.  Adherence counseling and education provided. Patient verbalizes understanding.    Substance Use    Patient reports the following substance usage.    Tobacco: none.  Pt has smoked in the  past.  Alcohol: none, patient denies any use.  Illicit Drugs/Non-prescribed Medications: none.  Pt has used marijuana.  Patient states clear understanding of the potential impact of substance use.  Substance abstinence/cessation counseling, education and resources provided and reviewed.     Services Utilizing/ADLS    Infusion Service: Prior to admission, patient utilizing? yes on continous IV Veletri  Home Health: Prior to admission, patient utilizing? no Pt has used HH in the past  DME: Prior to admission, yes 02 set up with [employer]ables pt at 5LPM .  DME CO? Pt reports she has a walker.    Pulmonary/Cardiac Rehab: Prior to admission, no  Dialysis:  Prior to admission, no  Transplant Specialty Pharmacy:  Prior to admission, no.   HH companies not available to pt:  [facility] because [facility] not [facility]  [facility]-not in network with pt's insurance  [facility]-not in network with pt's insurance  [facility]-not in network  [facility] not in network      Prior to admission, patient reports patient was somewhat independent with ADLS, but is having trouble with dizziness and falling at home.   Pt  was driving. Pt's son also drives.  Patient reports patient is not able to care for self at this time due to compromised medical condition (as documented in medical record) and physical weakness..  Patient indicates a willingness to care for self once medically cleared to do so.    Insurance/Medications    Insured by   Payor/Plan Subscr  Sex Relation Sub. Ins. ID Effective Group Num   1. HU[employer]NA MANAGE* SHALA CONN MA* 1960 Female  [id number] 10/1/16 [id number]                                   [address] [address]   2. [place]* SHALA CONN [place]* 1960 Female  [id number]* 12                                    [address] [address]      Primary Insurance (for UNOS reporting): Public Insurance - Medicare FFS (Fee For Service)  Secondary Insurance (for UNOS reporting): Public Insurance - Medicaid    Patient reports patient  is able to obtain and afford medications at this time and at time of discharge.    Living Will/Healthcare Power of     Patient states patient does not have a LW and/or HCPA.   provided education regarding LW and HCPA and the completion of forms.    Coping/Mental Health    Patient is coping adequately with the aid of  family members. .  Patient denies mental health difficulties. Pt reports she has been coping adequately at home.    Discharge Planning    At time of discharge, patient plans to return to patient's home under the care of self and son.  Patients son will transport patient.  Per rounds today, expected discharge date has not been medically determined at this time. Patient and patients caregiver  verbalize understanding and are involved in treatment planning and discharge process.    Additional Concerns    Patient is being followed for needs, education, resources, information, emotional support, supportive counseling, and for supportive and skilled discharge plan of care.  providing ongoing psychosocial support, education, resources and d/c planning as needed.  SW remains available. Patient denies additional needs and/or concerns at this time. Patient verbalizes understanding and agreement with information reviewed, social work availability, and how to access available resources as needed.

## 2017-08-30 NOTE — ASSESSMENT & PLAN NOTE
-continue veletri, sildenafil, macitentan   -NOTE: when pt arrived, nurse reported that her veletri pump was STOPPED.  She restarted it at a dose of 64 cc/24 h.

## 2017-08-30 NOTE — ASSESSMENT & PLAN NOTE
Pt on coumadin for PH, documented by Derrek D/c summary 11/15/2011  -steadily increasing INR since admission  -will hold coumadin for now  -no Vit K at this time

## 2017-08-30 NOTE — H&P
[facility]  Heart Transplant  H&P    Patient Name: Emily Cook  MRN: [id number]  Admission Date: 8/29/2017  Attending Physician: Hernán Kilpatrick MD  Primary Care Provider: Kaylee Cazares MD  Principal Problem:Respiratory failure with hypoxia    Subjective:     History of Present Illness:  57 y/o F w PHTN on 3 agents transferred from [facility] hospital after being admitted w acute respiratory failure 2/2 to [facility] in setting of severe PHTN.  Found to be MRSA bacteremic and have supratherapeutic INR at [facility].  PMHx includes 3rd degree HB s/p pacemaker, tricuspid regurg, on chronic coumadin since 2011.  Pt was admitted to the ER at [facility] with complaints of neck pain and SOB. Upon admission, oxygen saturation was 60's. Placed on 100% bipap and maintained at O2 sat of 90's. CXR there showed hilar vascular congestion and a question of mild bilateral perihilar edema. On admission Tmax of 100.4 with complaints of dry nonproductive cough. BNP 21890 when admitted at outside facility.  BCx at previous hospital + for MRSA.  Started on azithromycin, vanc, rocephin but then rocephin d/cherry when + MRSA found.    When pt arrived, nurse reported that her veletri pump was STOPPED.  She restarted it at a dose of 64 cc/24 h, which was what the nurse was told to be last known dose by report.    Pt's son controls her veletri dosing.  No dosing sheet or record of Veletri dose came with the patient.  I called [facility] to obtain dosing sheet, which was faxed to 8th floor, nurse has it and calling pharmacy to dose Veletri   -Nurse got in report that pt's son changed the cartridge yesterday.  Tried to contact son regarding details but no answer.  I spoke with pt's mother, Geeta, who gave me his number.            Past Medical History:   Diagnosis Date    Arrhythmia     Arthritis     Cardiac pacemaker in situ     Carpal tunnel syndrome, right     Cervical spondylosis     Cervicalgia     CHF (congestive heart failure)      "Diverticulitis     Heart block AV third degree     Hyperlipidemia     ALFREDO on CPAP     Pulmonary hypertension     Subdeltoid bursitis        Past Surgical History:   Procedure Laterality Date    CARDIAC CATHETERIZATION      CARDIAC PACEMAKER PLACEMENT  7/29/13    [employer] DC PM    CARDIAC SURGERY      COLONOSCOPY N/A 9/28/2015    Procedure: COLONOSCOPY;  Surgeon: Jason Diaz MD;  Location: [facility] ([address] [place] [address]);  Service: Endoscopy;  Laterality: N/A;  Please schedule in 2-3 months with Dr Diaz  Pulmonary HTN, 2nd floor (off remodulin infusion as of "a few days" before 9/9/15)    3 day hold Coumadin, [facility]  PT/INR scheduled before procedure    ECTOPIC PREGNANCY SURGERY Left     HYSTERECTOMY      partial    knee arthroscopy         Review of patient's allergies indicates:   Allergen Reactions    Chlorhexidine Itching and Rash     Patient had raised rash on neck following RHC procedure. She states she's never had a problem with the "prep" used until this time.     Adhesive Rash       Current Facility-Administered Medications   Medication    acetaminophen tablet 650 mg    albuterol nebulizer solution 2.5 mg    albuterol nebulizer solution 2.5 mg    azithromycin 500 mg in dextrose 5 % 250 mL IVPB (ready to mix system)    duloxetine DR capsule 60 mg    furosemide injection 40 mg    gabapentin capsule 300 mg    hydrocodone-acetaminophen 10-325mg per tablet 1 tablet    macitentan tablet 10 mg    polyethylene glycol packet 17 g    potassium chloride CR capsule 20 mEq    sildenafil tablet 20 mg    spironolactone tablet 25 mg    vancomycin 1 g in dextrose 5 % 250 mL IVPB (ready to mix system)    VELETRI/REMODULIN CASSETTE    VELETRI/REMODULIN TUBING     Family History     Problem Relation (Age of Onset)    Arthritis Mother, Father    Diabetes Mother    Hyperlipidemia Mother, Father        Social History Main Topics    Smoking status: Never Smoker    Smokeless tobacco: " Never Used    Alcohol use No      Comment: rarely    Drug use: No    Sexual activity: No     Review of Systems   Constitutional: Positive for chills, diaphoresis, fatigue and fever. Negative for activity change, appetite change and unexpected weight change.   HENT: Positive for congestion. Negative for hearing loss, postnasal drip, rhinorrhea, sinus pressure, sneezing, sore throat and tinnitus.    Respiratory: Positive for apnea, cough, choking, chest tightness, shortness of breath and wheezing. Negative for stridor.    Cardiovascular: Negative for chest pain, palpitations and leg swelling.   Gastrointestinal: Positive for diarrhea. Negative for nausea and vomiting.   Genitourinary: Negative for difficulty urinating.   Neurological: Positive for dizziness, weakness and headaches. Negative for syncope.     Objective:     Vital Signs (Most Recent):  Temp: 98.5 °F (36.9 °C) (08/29/17 2315)  Pulse: 105 (08/29/17 2315)  Resp: (!) 22 (08/29/17 2315)  BP: 105/63 (08/29/17 2315)  SpO2: (!) 91 % (08/29/17 2315) Vital Signs (24h Range):  Temp:  [97.7 °F (36.5 °C)-98.5 °F (36.9 °C)] 98.5 °F (36.9 °C)  Pulse:  [] 105  Resp:  [18-24] 22  SpO2:  [90 %-95 %] 91 %  BP: ()/(52-63) 105/63        There is no height or weight on file to calculate BMI.    No intake or output data in the 24 hours ending 08/30/17 0013    Physical Exam     General Appearance:    Alert, cooperative, no distress   Lungs:     B/l crackles at bases, respirations unlabored    Heart:    No JVP, Regular rate and rhythm, S1 and S2 normal, no murmur, rub   or gallop   Abdomen:     Soft, non-tender, +BS, no masses, no organomegaly   Extremities:   No edema b/l, pulses +2 b/l       Significant Labs:  CBC:    Recent Labs  Lab 08/29/17  0554   WBC 6.30   RBC 4.07   HGB 10.3*   HCT 31.3*   *   MCV 77*   MCH 25.2*   MCHC 32.7     BNP:  No results for input(s): BNP in the last 72 hours.    Invalid input(s): BNPTRIAGELHUYEN  CMP:    Recent Labs  Lab  08/27/17  0640  08/29/17  0554   *  < > 102   CALCIUM 8.7  < > 8.7   ALBUMIN 3.4*  --   --    PROT 6.8  --   --    *  < > 137   K 4.1  < > 4.3   CO2 25  < > 28     < > 100   BUN 26*  < > 28*   CREATININE 1.20  < > 0.80   ALKPHOS 87  --   --    ALT 60*  --   --    AST 40*  --   --    BILITOT 1.6*  --   --    < > = values in this interval not displayed. Coagulation:     Recent Labs  Lab 08/29/17  0554   INR 5.1*     LDH:  No results for input(s): LDH in the last 72 hours.  Microbiology:  Microbiology Results (last 7 days)     ** No results found for the last 168 hours. **          Microbiology Results (last 7 days)     ** No results found for the last 168 hours. **        I have reviewed all pertinent labs within the past 24 hours.    Diagnostic Results:  CXR: No results found in the last 24 hours.    Assessment/Plan:       * Respiratory failure with hypoxia    Secondary to PNA, likely MRSA pneumonia w PHTN  See treatment below  Continue ventimask at 10 L (home dose O2 5L)  -obtain ABG  -treat PHTN as below and PNA        Pneumonia of right upper lobe due to infectious organism    -continue to monitor lactate, WBC daily, SIRS criteria  -continue to monitor O2 sats  -continue Vancomycin, azithromycin  -reordered blood cx, sputum cx  -unable to cx off of central line since this is the Veletri line   -CXR pending  -ID consult          Pulmonary hypertension    -continue veletri, sildenafil, macitentan   -paper came with almost no paperwork from outside hospital  -called [facility] to obtain dosing sheet, which was faxed to 8th floor, nurse has it and calling pharmacy to dose Veletri  -pt stated that her son doses Veletri for her.  He changed the cartridge yesterday.  Tried to contact son regarding details but no answer.  I spoke with pt's mother, Geeta, who gave me his number.  -NOTE: when pt arrived, nurse reported that her veletri pump was STOPPED.  She restarted it at a dose of 64 cc/24 h.  -we are  currently having pharmacy address this dosing issue and I will email Jese to ensure this is resolved in the morning now that we have the dosing sheet        Bacteremia    -continue to monitor blood cx  -antibiotics as above        Supratherapeutic INR    Pt on coumadin for PH, documented by Derrek D/c summary 11/15/2011  -steadily increasing INR since admission  -will hold coumadin for now  -no Vit K at this time        Right-sided heart failure    Currently hypervolemic on exam  -continue current lasix dose of 40 mg IV TID  -standing dose of K as ordered  -monitor K and Mg q12h  -daily I/O  -low Na diet  -although septic, would like to maintain net negative          Pt discussed with Dr Grabiel Jarvis, [facility] fellow.    Sonia Lewis MD  Heart Transplant  [facility]

## 2017-08-30 NOTE — ASSESSMENT & PLAN NOTE
-continue to monitor lactate, WBC daily, SIRS criteria  -continue to monitor O2 sats  -continue Vancomycin, azithromycin  -reordered blood cx, sputum cx  -unable to cx off of central line since this is the Veletri line   -CXR pending  -ID consult

## 2017-08-30 NOTE — PROGRESS NOTES
Pt's O2 sat 83% on 22L 80%   84% on 22L 100%   Félix Calles NP notified. Pt states is supposed to wear Cpap while sleeping, but doesn't.   Will follow any new orders and continue to monitor patient.

## 2017-08-30 NOTE — NURSING
Pt arrived to unit floor from [facility] in NAD.  Oriented pt to room. Educated pt on use of call bell.  Pt verbalize understanding to call when needed assistance.   Notified MD Lewis of pt arrival to unit floor and veletri CADD pump was stopped upon arrival.  Veletri CADD pump restarted at previous dose of 64mL/24hr.  Pt stable at current rate.  [facility] called for pt dosage sheet.   Will be converted to hospital veletri model in AM.  Contact precautions maintained due to OSH result (+) MRSA in blood.  Admission documents completed. Will continue to monitor.

## 2017-08-30 NOTE — PLAN OF CARE
Patient currently on ~23.5ng/kg/min IV Veletri per home concentration of 45,000 and pump rate of 64cc/ml. (pump rate is not listed on dosing sheet, as the patient's son has been reportedly titrating the dose in accordance with symptoms.     Plan is to switch to Hospital concentration of 60,000ng/kg/ml. Dose will be 24ng/kg/min new pump rate will be 50ml/24hrs with a dosing weight of 86kg.     Plan is to obtain PIV's x 2, and swithc patient to PIV Veletri once pharmacy send up the hospital mixed cassette and backup.     +Blood Culture (MRSA) obtained from OSH.     Will have Cardoso removed once INR is ~1.5. INR is currently 7.5

## 2017-08-30 NOTE — PLAN OF CARE
Problem: Patient Care Overview  Goal: Plan of Care Review  Outcome: Ongoing (interventions implemented as appropriate)  Pt AAOx4, VSS, afebrile.  Pt currently on comfort flow 85% oxygen concentration, 22L/min.  O2 sats > 91%.  Bcx pending.  Pt currently on strict contact isolation due to (+) MRSA blood results at OSH.  Denies c/o pain, HA, palpitations, SOB.  Veletri infusing to R subclavian tunnel cath at 64mL/24hours. Pt given 40mg IVP lasix.  Pt having constant urges to urinate.  4 occurrences of urine 50cc clear yellow urine measured 2BM during shift.  Pt unsteady on feet.  Fall risk precautions initiated.  Pt in lowest bed position setting, lighting adjusted, pt to use bed pan, side rails up x2.  Pt remain free from falls during shift.  Pt verbalize understanding to call when needed assistance.  Call light within reach. Will continue to monitor.

## 2017-08-30 NOTE — ASSESSMENT & PLAN NOTE
Secondary to PNA, likely MRSA pneumonia w PHTN  See treatment below  Continue ventimask at 10 L (home dose O2 5L)  -obtain ABG  -treat PHTN as below and PNA

## 2017-08-30 NOTE — ASSESSMENT & PLAN NOTE
-continue current lasix IVP  -standing dose of K as ordered  -monitor K and Mg q12h  -daily I/O  -low Na diet

## 2017-08-30 NOTE — SUBJECTIVE & OBJECTIVE
"Past Medical History:   Diagnosis Date    Arrhythmia     Arthritis     Cardiac pacemaker in situ     Carpal tunnel syndrome, right     Cervical spondylosis     Cervicalgia     CHF (congestive heart failure)     Diverticulitis     Heart block AV third degree     Hyperlipidemia     ALFREDO on CPAP     Pulmonary hypertension     Subdeltoid bursitis        Past Surgical History:   Procedure Laterality Date    CARDIAC CATHETERIZATION      CARDIAC PACEMAKER PLACEMENT  7/29/13    [employer] DC PM    CARDIAC SURGERY      COLONOSCOPY N/A 9/28/2015    Procedure: COLONOSCOPY;  Surgeon: Jason Diaz MD;  Location: [facility] ([address] [place] [address]);  Service: Endoscopy;  Laterality: N/A;  Please schedule in 2-3 months with Dr Diaz  Pulmonary HTN, 2nd floor (off remodulin infusion as of "a few days" before 9/9/15)    3 day hold Coumadin, [facility]  PT/INR scheduled before procedure    ECTOPIC PREGNANCY SURGERY Left     HYSTERECTOMY      partial    knee arthroscopy         Review of patient's allergies indicates:   Allergen Reactions    Chlorhexidine Itching and Rash     Patient had raised rash on neck following RHC procedure. She states she's never had a problem with the "prep" used until this time.     Adhesive Rash       Current Facility-Administered Medications   Medication    acetaminophen tablet 650 mg    albuterol nebulizer solution 2.5 mg    albuterol nebulizer solution 2.5 mg    azithromycin 500 mg in dextrose 5 % 250 mL IVPB (ready to mix system)    duloxetine DR capsule 60 mg    furosemide injection 40 mg    gabapentin capsule 300 mg    hydrocodone-acetaminophen 10-325mg per tablet 1 tablet    macitentan tablet 10 mg    polyethylene glycol packet 17 g    potassium chloride CR capsule 20 mEq    sildenafil tablet 20 mg    spironolactone tablet 25 mg    vancomycin 1 g in dextrose 5 % 250 mL IVPB (ready to mix system)    VELETRI/REMODULIN CASSETTE    VELETRI/REMODULIN TUBING "     Family History     Problem Relation (Age of Onset)    Arthritis Mother, Father    Diabetes Mother    Hyperlipidemia Mother, Father        Social History Main Topics    Smoking status: Never Smoker    Smokeless tobacco: Never Used    Alcohol use No      Comment: rarely    Drug use: No    Sexual activity: No     Review of Systems   Constitutional: Positive for chills, diaphoresis, fatigue and fever. Negative for activity change, appetite change and unexpected weight change.   HENT: Positive for congestion. Negative for hearing loss, postnasal drip, rhinorrhea, sinus pressure, sneezing, sore throat and tinnitus.    Respiratory: Positive for apnea, cough, choking, chest tightness, shortness of breath and wheezing. Negative for stridor.    Cardiovascular: Negative for chest pain, palpitations and leg swelling.   Gastrointestinal: Positive for diarrhea. Negative for nausea and vomiting.   Genitourinary: Negative for difficulty urinating.   Neurological: Positive for dizziness, weakness and headaches. Negative for syncope.     Objective:     Vital Signs (Most Recent):  Temp: 98.5 °F (36.9 °C) (08/29/17 2315)  Pulse: 105 (08/29/17 2315)  Resp: (!) 22 (08/29/17 2315)  BP: 105/63 (08/29/17 2315)  SpO2: (!) 91 % (08/29/17 2315) Vital Signs (24h Range):  Temp:  [97.7 °F (36.5 °C)-98.5 °F (36.9 °C)] 98.5 °F (36.9 °C)  Pulse:  [] 105  Resp:  [18-24] 22  SpO2:  [90 %-95 %] 91 %  BP: ()/(52-63) 105/63        There is no height or weight on file to calculate BMI.    No intake or output data in the 24 hours ending 08/30/17 0013    Physical Exam     General Appearance:    Alert, cooperative, no distress   Lungs:     B/l crackles at bases, respirations unlabored    Heart:    No JVP, Regular rate and rhythm, S1 and S2 normal, no murmur, rub   or gallop   Abdomen:     Soft, non-tender, +BS, no masses, no organomegaly   Extremities:   No edema b/l, pulses +2 b/l       Significant Labs:  CBC:    Recent Labs  Lab  08/29/17  0554   WBC 6.30   RBC 4.07   HGB 10.3*   HCT 31.3*   *   MCV 77*   MCH 25.2*   MCHC 32.7     BNP:  No results for input(s): BNP in the last 72 hours.    Invalid input(s): BNPTRIAGELBLO  CMP:    Recent Labs  Lab 08/27/17  0640  08/29/17  0554   *  < > 102   CALCIUM 8.7  < > 8.7   ALBUMIN 3.4*  --   --    PROT 6.8  --   --    *  < > 137   K 4.1  < > 4.3   CO2 25  < > 28     < > 100   BUN 26*  < > 28*   CREATININE 1.20  < > 0.80   ALKPHOS 87  --   --    ALT 60*  --   --    AST 40*  --   --    BILITOT 1.6*  --   --    < > = values in this interval not displayed. Coagulation:     Recent Labs  Lab 08/29/17  0554   INR 5.1*     LDH:  No results for input(s): LDH in the last 72 hours.  Microbiology:  Microbiology Results (last 7 days)     ** No results found for the last 168 hours. **          Microbiology Results (last 7 days)     ** No results found for the last 168 hours. **        I have reviewed all pertinent labs within the past 24 hours.    Diagnostic Results:  CXR: No results found in the last 24 hours.

## 2017-08-30 NOTE — TELEPHONE ENCOUNTER
Merrill Cook (son) called to find out if his mom had been transferred to our facility. He stated that he went back to [facility] and they could not tell him where she had gone. Confirmed that patient is here. Also asked patient to bring enough drug and supplies to mix two cassettes of Remodulin for discharge. Mr. Cook verbalized understanding.

## 2017-08-30 NOTE — ASSESSMENT & PLAN NOTE
Currently hypervolemic on exam  -continue current lasix dose of 40 mg IV TID  -standing dose of K as ordered  -monitor K and Mg q12h  -daily I/O  -low Na diet  -although septic, would like to maintain net negative

## 2017-08-30 NOTE — ASSESSMENT & PLAN NOTE
-continue veletri, sildenafil, macitentan   -paper came with almost no paperwork from outside hospital  -called [facility] to obtain dosing sheet, which was faxed to 8th floor, nurse has it and calling pharmacy to dose Veletri  -pt stated that her son doses Veletri for her.  He changed the cartridge yesterday.  Tried to contact son regarding details but no answer.  I spoke with pt's mother, Geeta, who gave me his number.  -NOTE: when pt arrived, nurse reported that her veletri pump was STOPPED.  She restarted it at a dose of 64 cc/24 h.  -we are currently having pharmacy address this dosing issue and I will email Jese to ensure this is resolved in the morning now that we have the dosing sheet

## 2017-08-30 NOTE — HPI
57 y/o F w PHTN  transferred from [facility] after being admitted w acute respiratory failure 2/2 to [facility] in setting of severe PHTN.  Found to have MRSA bacteremia and have supratherapeutic INR at OSH.  PMHx includes 3rd degree HB s/p pacemaker, tricuspid regurg, on chronic coumadin since 2011, Pulm HTN on IV Veletri.  Pt was admitted to the ER at OSH with complaints of neck pain and SOB. Upon admission, oxygen saturation was 60's. Placed on 100% bipap and maintained at O2 sat of 90's. CXR there showed hilar vascular congestion and a question of mild bilateral perihilar edema. On admission Tmax of 100.4 with complaints of dry nonproductive cough. BNP 40055 when admitted at outside facility.  BCx at previous hospital + for MRSA.  Started on azithromycin, vanc, rocephin but then rocephin d/cherry when + MRSA found.

## 2017-08-31 NOTE — CONSULTS
[facility]  Infectious Disease  Consult Note    Patient Name: Emily Cook  MRN: [id number]  Admission Date: 8/29/2017  Hospital Length of Stay: 2 days  Attending Physician: Yves Ruth Jr.,*  Primary Care Provider: Kaylee Cazares MD     Isolation Status: Contact      Inpatient consult to Infectious Diseases  Consult performed by: CARTER FOLEY  Consult ordered by: MARILIA SHARMA  Reason for consult: MRSA bacteremia         ID consult received. Chart being reviewed. Full note with recommendations to follow.    Thank you,  Carter Foley PA-C  Pgr [phone]

## 2017-08-31 NOTE — HPI
57 y/o female with PHTN on veletri and lasix for pulmonary HTN, brush catheter placed 6/2017 here transferred from OSH after being admitted with acute respiratory failure 2/2 to PNA. Pt reports feeling unwell for the past few days with fevers and chills at home and neck pain. Pt febrile 100.4 on admit with leukocytosis of 14.20. Blood cultures 8/26, 8/30 +MRSA. Pt currently on azithromycin for PNA and vancomycin for MRSA bacteremia. 2D echo negative for vegetations. Respiratory cultures normal respiratory maribel. Today pt reports much improvement since admission and breathing is back to baseline. Reports having intermittent cough. Afebrile, without a leukocytosis. Denies having any other acute symptoms.

## 2017-08-31 NOTE — CONSULTS
[facility]  Infectious Disease  Consult Note    Patient Name: Emily Cook  MRN: [id number]  Admission Date: 8/29/2017  Hospital Length of Stay: 2 days  Attending Physician: Yves Ruth Jr.,*  Primary Care Provider: Kaylee Cazares MD     Isolation Status: Contact    Consults  Assessment/Plan:     Bacteremia    55 y/o female with PHTN on Veletri and lasix for pulmonary HTN, brush catheter placed 6/2017 here transferred from OSH after being admitted with acute respiratory failure 2/2 to PNA with blood cultures 8/26 +MRSA.   -afebrile here leukocytosis resolved, pt clinically improved since admission  -general surgery consulted for brush removal  -repeat blood cultures +MRSA  -2D echo negative for vegetations    Plan  1.continue vancomycin, vanc trough goal 15-20. vanc trough due today. Continue azithromycin to complete 7 day course for PNA. (Day 5)   2.repeat blood cultures today  3.recommend removal of brush catheter as possible source of bacteremia.   4.pt stable, afebrile, without a leukocytosis. Will follow closely with you.           Thank you for your consult. I will follow-up with patient. Please contact us if you have any additional questions.    Tyra Cleveland PA-C  Infectious Disease  [facility] Pgr [phone]    Subjective:     Principal Problem: Respiratory failure with hypoxia    HPI: 55 y/o female with PHTN on veletri and lasix for pulmonary HTN, brush catheter placed 6/2017 here transferred from OSH after being admitted with acute respiratory failure 2/2 to PNA. Pt reports feeling unwell for the past few days with fevers and chills at home and neck pain. Pt febrile 100.4 on admit with leukocytosis of 14.20. Blood cultures 8/26, 8/30 +MRSA. Pt currently on azithromycin for PNA and vancomycin for MRSA bacteremia. 2D echo negative for vegetations. Respiratory cultures normal respiratory maribel. Today pt reports much improvement since admission and  "breathing is back to baseline. Reports having intermittent cough. Afebrile, without a leukocytosis. Denies having any other acute symptoms.     Past Medical History:   Diagnosis Date    Arrhythmia     Arthritis     Cardiac pacemaker in situ     Carpal tunnel syndrome, right     Cervical spondylosis     Cervicalgia     CHF (congestive heart failure)     Diverticulitis     Heart block AV third degree     Hyperlipidemia     ALFREDO on CPAP     Pulmonary hypertension     Subdeltoid bursitis        Past Surgical History:   Procedure Laterality Date    CARDIAC CATHETERIZATION      CARDIAC PACEMAKER PLACEMENT  7/29/13    [employer] DC PM    CARDIAC SURGERY      COLONOSCOPY N/A 9/28/2015    Procedure: COLONOSCOPY;  Surgeon: Jason Diaz MD;  Location: [facility] ([address] [place] [address]);  Service: Endoscopy;  Laterality: N/A;  Please schedule in 2-3 months with Dr Diaz  Pulmonary HTN, 2nd floor (off remodulin infusion as of "a few days" before 9/9/15)    3 day hold Coumadin, [facility]  PT/INR scheduled before procedure    ECTOPIC PREGNANCY SURGERY Left     HYSTERECTOMY      partial    knee arthroscopy         Review of patient's allergies indicates:   Allergen Reactions    Chlorhexidine Itching and Rash     Patient had raised rash on neck following RHC procedure. She states she's never had a problem with the "prep" used until this time.     Adhesive Rash       Medications:  Prescriptions Prior to Admission   Medication Sig    duloxetine (CYMBALTA) 60 MG capsule Take 1 capsule (60 mg total) by mouth 2 (two) times daily.    EPOPROSTENOL SODIUM, ARGININE, (VELETRI IV) Inject into the vein.    furosemide (LASIX) 80 MG tablet Take 1 tablet (80 mg total) by mouth 2 (two) times daily.    gabapentin (NEURONTIN) 300 MG capsule Take 1 capsule (300 mg total) by mouth As instructed. Take one capsule qam, 1 qpm, 2 qhs.    hydrocodone-acetaminophen 10-325mg (NORCO)  mg Tab Take 1 tablet by mouth 3 " (three) times daily as needed.    macitentan (OPSUMIT) 10 mg Tab Take 1 tablet (10 mg total) by mouth once daily.    PATADAY 0.2 % Drop     potassium chloride (MICRO-K) 10 MEQ CpSR Take 2 capsules (20 mEq total) by mouth 3 (three) times daily.    spironolactone (ALDACTONE) 25 MG tablet Take 1 tablet (25 mg total) by mouth once daily.    warfarin (COUMADIN) 5 MG tablet TAKE 1 AND 1/2 TABLETS BY MOUTH DAILY EXCEPT 1 TABLET ON TUESDAY OR AS DIRECTED BY COUMADIN CLINIC    clobetasol (TEMOVATE) 0.05 % external solution     hydrOXYzine (VISTARIL) 50 MG Cap 50 mg daily as needed.      Antibiotics     Start     Stop Route Frequency Ordered    08/30/17 1400  azithromycin 500 mg in dextrose 5 % 250 mL IVPB (ready to mix system)      -- IV Every 24 hours (non-standard times) 08/29/17 2352    08/30/17 0900  vancomycin 1 g in dextrose 5 % 250 mL IVPB (ready to mix system)      -- IV Every 12 hours (non-standard times) 08/29/17 2352        Antifungals     None        Antivirals     None           Immunization History   Administered Date(s) Administered    Influenza 10/18/2013       Family History     Problem Relation (Age of Onset)    Arthritis Mother, Father    Diabetes Mother    Hyperlipidemia Mother, Father        Social History     Social History    Marital status: Single     Spouse name: N/A    Number of children: N/A    Years of education: N/A     Social History Main Topics    Smoking status: Never Smoker    Smokeless tobacco: Never Used    Alcohol use No      Comment: rarely    Drug use: No    Sexual activity: No     Other Topics Concern    None     Social History Narrative    None     Review of Systems   Constitutional: Positive for chills. Negative for diaphoresis, fatigue and fever.   Respiratory: Positive for cough and shortness of breath.    Gastrointestinal: Negative for abdominal pain, diarrhea, nausea and vomiting.   Genitourinary: Negative for dysuria.   Musculoskeletal: Negative for back pain.    Skin: Negative for wound.     Objective:     Vital Signs (Most Recent):  Temp: 97.1 °F (36.2 °C) (08/31/17 1045)  Pulse: 97 (08/31/17 1100)  Resp: 20 (08/31/17 1045)  BP: 100/61 (08/31/17 1045)  SpO2: 96 % (08/31/17 1045) Vital Signs (24h Range):  Temp:  [97.1 °F (36.2 °C)-99 °F (37.2 °C)] 97.1 °F (36.2 °C)  Pulse:  [] 97  Resp:  [18-20] 20  SpO2:  [80 %-97 %] 96 %  BP: ()/(50-61) 100/61     Weight: 80.5 kg (177 lb 7.5 oz)  Body mass index is 31.44 kg/m².    Estimated Creatinine Clearance: 78.8 mL/min (based on Cr of 0.8).    Physical Exam   Constitutional: She is oriented to person, place, and time. She appears well-developed and well-nourished. No distress.   HENT:   Bandage overlying nose 2/2 nasal mask    Cardiovascular: Normal rate, regular rhythm and normal heart sounds.  Exam reveals no friction rub.    No murmur heard.  Pulmonary/Chest: Effort normal. No respiratory distress. She has no wheezes. She has no rales.   On O2 cannula    Abdominal: Soft. She exhibits no distension. There is no tenderness. There is no guarding.   Musculoskeletal: Normal range of motion.   Cardoso catheter right upper chest    Neurological: She is alert and oriented to person, place, and time.   Skin: Skin is warm and dry. She is not diaphoretic.   Psychiatric: She has a normal mood and affect.   Nursing note and vitals reviewed.      Significant Labs:   Blood Culture:   Recent Labs  Lab 06/09/17  0742 06/09/17  0913 08/26/17  0840 08/26/17  1028 08/30/17  0436   LABBLOO No growth after 5 days. No growth after 5 days. Gram stain peds bottle: Gram positive cocci in clusters resembling Staph  Positive results previously called ON DUPLICATE PEDIATRIC BOTTLE   (ORDER # [id number]) TO DINO ON 3N AT 23:13 8/26/2017 08/27/2017  01:20  METHICILLIN RESISTANT STAPHYLOCOCCUS AUREUSrefer to culture #[id number] Gram stain peds bottle: Gram positive cocci in clusters resembling Staph   Results called to and read back  by:dylan plascencia 08/26/2017  23:13 cg  METHICILLIN RESISTANT STAPHYLOCOCCUS AUREUS Gram stain aer bottle: Gram positive cocci in clusters resembling Staph  Results called to and read back by: Steven Bland RN  08/30/2017  20:20  STAPHYLOCOCCUS AUREUSSusceptibility pendingID consult required at [facility] and [place] locations.     CBC:   Recent Labs  Lab 08/30/17  0104 08/31/17  0402   WBC 7.87 6.79   HGB 10.0* 10.4*   HCT 29.6* 30.9*   * 127*     CMP:   Recent Labs  Lab 08/30/17  0104 08/31/17  0402    138   K 4.4 4.1    99   CO2 29 29    97   BUN 20 13   CREATININE 0.8 0.8   CALCIUM 8.8 8.5*   PROT 6.1  --    ALBUMIN 2.4*  --    BILITOT 1.0  --    ALKPHOS 97  --    AST 19  --    ALT 26  --    ANIONGAP 5* 10   EGFRNONAA >60.0 >60.0     Procalcitonin: No results for input(s): PROCAL in the last 48 hours.  Respiratory Culture:   Recent Labs  Lab 08/30/17  0928   GSRESP >10epis/lfp and <than many WBC's   Predominance of oropharyngeal maribel. Please recollect.   RESPIRATORYC Specimen inadequate - culture not performed. Spoke with Valerie Mojica 08/30/2017  14:18     All pertinent labs within the past 24 hours have been reviewed.    Significant Imaging: I have reviewed all pertinent imaging results/findings within the past 24 hours.

## 2017-08-31 NOTE — PROGRESS NOTES
Patient currently up to chair.  Utilized non rebreather to use commode.  Currently on BIPAP, requested adjustment mask for bipap from resp.  Will continue to monitor.

## 2017-08-31 NOTE — PROGRESS NOTES
Pt sats 78-80% upon entering room on CF 22L 80%.    CF bumped up.  Resp called to place pt on bipap.  Frida cooper with [facility] called to inform of gram positive cocci and clusters from the morning blood cultures and of current sats.  Ordered ABGs.    ABG obtained on bipap.  MD called with results.  Ordered continuous BIpap.  Now pulse ox on bipa 95%

## 2017-08-31 NOTE — PROGRESS NOTES
Patient O2 sat was 84% on 9L high flow NC, increased to 10L, sats improved to 85%, patient stated that she did not feel SOB.  Notified respiratory, will place on comfort flow.Will continue to monitor.

## 2017-08-31 NOTE — SUBJECTIVE & OBJECTIVE
"Interval History: No complaints this morning. States she is feeling a bit better although now on high flow O2 100%.     Continuous Infusions:   epoprostenol (VELETRI) infusion 24 ng/kg/min (08/30/17 1456)    furosemide (LASIX) 5 mg/mL infusion (non-titrating) 15 mg/hr (08/31/17 0803)    veletri/remodulin tubing      veletri/remodulin tubing       Scheduled Meds:   albuterol sulfate  2.5 mg Nebulization Q6H    azithromycin  500 mg Intravenous Q24H    duloxetine  60 mg Oral BID    gabapentin  300 mg Oral TID    macitentan  10 mg Oral Daily    magnesium sulfate IVPB  2 g Intravenous Once    polyethylene glycol  17 g Oral Daily    potassium chloride  20 mEq Oral TID    sildenafil  20 mg Oral TID    spironolactone  25 mg Oral Daily    vancomycin (VANCOCIN) IVPB  1,000 mg Intravenous Q12H     PRN Meds:acetaminophen, albuterol sulfate, hydrocodone-acetaminophen 10-325mg    Review of patient's allergies indicates:   Allergen Reactions    Chlorhexidine Itching and Rash     Patient had raised rash on neck following RHC procedure. She states she's never had a problem with the "prep" used until this time.     Adhesive Rash     Objective:     Vital Signs (Most Recent):  Temp: 97.8 °F (36.6 °C) (08/31/17 0424)  Pulse: (!) 120 (08/31/17 0800)  Resp: 20 (08/31/17 0100)  BP: (!) 99/58 (08/31/17 0621)  SpO2: 96 % (08/31/17 0621) Vital Signs (24h Range):  Temp:  [97.8 °F (36.6 °C)-99 °F (37.2 °C)] 97.8 °F (36.6 °C)  Pulse:  [] 120  Resp:  [18-20] 20  SpO2:  [80 %-97 %] 96 %  BP: ()/(50-58) 99/58     Weight: 80.5 kg (177 lb 7.5 oz)  Body mass index is 31.44 kg/m².      Intake/Output Summary (Last 24 hours) at 08/31/17 0841  Last data filed at 08/31/17 0702   Gross per 24 hour   Intake          1411.75 ml   Output              925 ml   Net           486.75 ml       Hemodynamic Parameters:         Physical Exam   Constitutional: She is oriented to person, place, and time. She appears well-developed and " well-nourished.   HENT:   Head: Normocephalic.   Eyes: Pupils are equal, round, and reactive to light.   Neck: Normal range of motion. Neck supple. JVD present.   Cardiovascular: Normal rate and regular rhythm.    Pulmonary/Chest: Effort normal. She has rales.   Abdominal: Soft. Bowel sounds are normal.   Musculoskeletal: Normal range of motion.   Neurological: She is alert and oriented to person, place, and time.   Skin: Skin is warm and dry.   Psychiatric: She has a normal mood and affect. Her behavior is normal.   Nursing note and vitals reviewed.      Significant Labs:  CBC:    Recent Labs  Lab 08/31/17  0402   WBC 6.79   RBC 4.22   HGB 10.4*   HCT 30.9*   *   MCV 73*   MCH 24.6*   MCHC 33.7     BNP:    Recent Labs  Lab 08/30/17  0047   *     CMP:    Recent Labs  Lab 08/30/17  0104 08/31/17  0402    97   CALCIUM 8.8 8.5*   ALBUMIN 2.4*  --    PROT 6.1  --     138   K 4.4 4.1   CO2 29 29    99   BUN 20 13   CREATININE 0.8 0.8   ALKPHOS 97  --    ALT 26  --    AST 19  --    BILITOT 1.0  --       Coagulation:     Recent Labs  Lab 08/31/17 0402   INR 5.4*     LDH:  No results for input(s): LDH in the last 72 hours.  Microbiology:  Microbiology Results (last 7 days)     Procedure Component Value Units Date/Time    Blood culture [[id number]] Collected:  08/30/17 0436    Order Status:  Completed Specimen:  Blood Updated:  08/30/17 2021     Blood Culture, Routine Gram stain aer bottle: Gram positive cocci in clusters resembling Staph     Blood Culture, Routine Results called to and read back by: Steven Bland RN     Blood Culture, Routine 08/30/2017  20:20    Culture, Respiratory with Gram Stain [[id number]] Collected:  08/30/17 0928    Order Status:  Completed Specimen:  Respiratory from Sputum, Expectorated Updated:  08/30/17 1419     Respiratory Culture Specimen inadequate - culture not performed. Spoke with Valerie      Respiratory Culture Esquinance 08/30/2017  14:18     Gram Stain  (Respiratory) >10epis/lfp and <than many WBC's      Gram Stain (Respiratory) Predominance of oropharyngeal maribel. Please recollect.    Culture, Respiratory with Gram Stain [[id number]]     Order Status:  No result Specimen:  Respiratory from Sputum, Expectorated     Blood culture [[id number]]     Order Status:  Canceled Specimen:  Blood           I have reviewed all pertinent labs within the past 24 hours.    Estimated Creatinine Clearance: 78.8 mL/min (based on Cr of 0.8).    Diagnostic Results:  I have reviewed all pertinent imaging results/findings within the past 24 hours.

## 2017-08-31 NOTE — SUBJECTIVE & OBJECTIVE
"Past Medical History:   Diagnosis Date    Arrhythmia     Arthritis     Cardiac pacemaker in situ     Carpal tunnel syndrome, right     Cervical spondylosis     Cervicalgia     CHF (congestive heart failure)     Diverticulitis     Heart block AV third degree     Hyperlipidemia     ALFREDO on CPAP     Pulmonary hypertension     Subdeltoid bursitis        Past Surgical History:   Procedure Laterality Date    CARDIAC CATHETERIZATION      CARDIAC PACEMAKER PLACEMENT  7/29/13    [employer] DC PM    CARDIAC SURGERY      COLONOSCOPY N/A 9/28/2015    Procedure: COLONOSCOPY;  Surgeon: Jason Diaz MD;  Location: [facility] ([address] [place] [address]);  Service: Endoscopy;  Laterality: N/A;  Please schedule in 2-3 months with Dr Diaz  Pulmonary HTN, 2nd floor (off remodulin infusion as of "a few days" before 9/9/15)    3 day hold Coumadin, [facility]  PT/INR scheduled before procedure    ECTOPIC PREGNANCY SURGERY Left     HYSTERECTOMY      partial    knee arthroscopy         Review of patient's allergies indicates:   Allergen Reactions    Chlorhexidine Itching and Rash     Patient had raised rash on neck following RHC procedure. She states she's never had a problem with the "prep" used until this time.     Adhesive Rash       Medications:  Prescriptions Prior to Admission   Medication Sig    duloxetine (CYMBALTA) 60 MG capsule Take 1 capsule (60 mg total) by mouth 2 (two) times daily.    EPOPROSTENOL SODIUM, ARGININE, (VELETRI IV) Inject into the vein.    furosemide (LASIX) 80 MG tablet Take 1 tablet (80 mg total) by mouth 2 (two) times daily.    gabapentin (NEURONTIN) 300 MG capsule Take 1 capsule (300 mg total) by mouth As instructed. Take one capsule qam, 1 qpm, 2 qhs.    hydrocodone-acetaminophen 10-325mg (NORCO)  mg Tab Take 1 tablet by mouth 3 (three) times daily as needed.    macitentan (OPSUMIT) 10 mg Tab Take 1 tablet (10 mg total) by mouth once daily.    PATADAY 0.2 % Drop     " potassium chloride (MICRO-K) 10 MEQ CpSR Take 2 capsules (20 mEq total) by mouth 3 (three) times daily.    spironolactone (ALDACTONE) 25 MG tablet Take 1 tablet (25 mg total) by mouth once daily.    warfarin (COUMADIN) 5 MG tablet TAKE 1 AND 1/2 TABLETS BY MOUTH DAILY EXCEPT 1 TABLET ON TUESDAY OR AS DIRECTED BY COUMADIN CLINIC    clobetasol (TEMOVATE) 0.05 % external solution     hydrOXYzine (VISTARIL) 50 MG Cap 50 mg daily as needed.      Antibiotics     Start     Stop Route Frequency Ordered    08/30/17 1400  azithromycin 500 mg in dextrose 5 % 250 mL IVPB (ready to mix system)      -- IV Every 24 hours (non-standard times) 08/29/17 2352    08/30/17 0900  vancomycin 1 g in dextrose 5 % 250 mL IVPB (ready to mix system)      -- IV Every 12 hours (non-standard times) 08/29/17 2352        Antifungals     None        Antivirals     None           Immunization History   Administered Date(s) Administered    Influenza 10/18/2013       Family History     Problem Relation (Age of Onset)    Arthritis Mother, Father    Diabetes Mother    Hyperlipidemia Mother, Father        Social History     Social History    Marital status: Single     Spouse name: N/A    Number of children: N/A    Years of education: N/A     Social History Main Topics    Smoking status: Never Smoker    Smokeless tobacco: Never Used    Alcohol use No      Comment: rarely    Drug use: No    Sexual activity: No     Other Topics Concern    None     Social History Narrative    None     Review of Systems   Constitutional: Positive for chills. Negative for diaphoresis, fatigue and fever.   Respiratory: Positive for cough and shortness of breath.    Gastrointestinal: Negative for abdominal pain, diarrhea, nausea and vomiting.   Genitourinary: Negative for dysuria.   Musculoskeletal: Negative for back pain.   Skin: Negative for wound.     Objective:     Vital Signs (Most Recent):  Temp: 97.1 °F (36.2 °C) (08/31/17 1045)  Pulse: 97 (08/31/17  1100)  Resp: 20 (08/31/17 1045)  BP: 100/61 (08/31/17 1045)  SpO2: 96 % (08/31/17 1045) Vital Signs (24h Range):  Temp:  [97.1 °F (36.2 °C)-99 °F (37.2 °C)] 97.1 °F (36.2 °C)  Pulse:  [] 97  Resp:  [18-20] 20  SpO2:  [80 %-97 %] 96 %  BP: ()/(50-61) 100/61     Weight: 80.5 kg (177 lb 7.5 oz)  Body mass index is 31.44 kg/m².    Estimated Creatinine Clearance: 78.8 mL/min (based on Cr of 0.8).    Physical Exam   Constitutional: She is oriented to person, place, and time. She appears well-developed and well-nourished. No distress.   HENT:   Bandage overlying nose 2/2 nasal mask    Cardiovascular: Normal rate, regular rhythm and normal heart sounds.  Exam reveals no friction rub.    No murmur heard.  Pulmonary/Chest: Effort normal. No respiratory distress. She has no wheezes. She has no rales.   On O2 cannula    Abdominal: Soft. She exhibits no distension. There is no tenderness. There is no guarding.   Musculoskeletal: Normal range of motion.   Cardoso catheter right upper chest    Neurological: She is alert and oriented to person, place, and time.   Skin: Skin is warm and dry. She is not diaphoretic.   Psychiatric: She has a normal mood and affect.   Nursing note and vitals reviewed.      Significant Labs:   Blood Culture:   Recent Labs  Lab 06/09/17  0742 06/09/17  0913 08/26/17  0840 08/26/17  1028 08/30/17  0436   LABBLOO No growth after 5 days. No growth after 5 days. Gram stain peds bottle: Gram positive cocci in clusters resembling Staph  Positive results previously called ON DUPLICATE PEDIATRIC BOTTLE   (ORDER # [id number]) TO DYLAN PEARSON AT 23:13 8/26/2017 08/27/2017  01:20  METHICILLIN RESISTANT STAPHYLOCOCCUS AUREUSrefer to culture #[id number] Gram stain peds bottle: Gram positive cocci in clusters resembling Staph   Results called to and read back by:dylan pearson 08/26/2017  23:13 cg  METHICILLIN RESISTANT STAPHYLOCOCCUS AUREUS Gram stain aer bottle: Gram positive cocci in clusters  resembling Staph  Results called to and read back by: Steven Bland RN  08/30/2017  20:20  STAPHYLOCOCCUS AUREUSSusceptibility pendingID consult required at [facility].     CBC:   Recent Labs  Lab 08/30/17  0104 08/31/17  0402   WBC 7.87 6.79   HGB 10.0* 10.4*   HCT 29.6* 30.9*   * 127*     CMP:   Recent Labs  Lab 08/30/17  0104 08/31/17  0402    138   K 4.4 4.1    99   CO2 29 29    97   BUN 20 13   CREATININE 0.8 0.8   CALCIUM 8.8 8.5*   PROT 6.1  --    ALBUMIN 2.4*  --    BILITOT 1.0  --    ALKPHOS 97  --    AST 19  --    ALT 26  --    ANIONGAP 5* 10   EGFRNONAA >60.0 >60.0     Procalcitonin: No results for input(s): PROCAL in the last 48 hours.  Respiratory Culture:   Recent Labs  Lab 08/30/17  0928   GSRESP >10epis/lfp and <than many WBC's   Predominance of oropharyngeal maribel. Please recollect.   RESPIRATORYC Specimen inadequate - culture not performed. Spoke with Valerie Mojica 08/30/2017  14:18     All pertinent labs within the past 24 hours have been reviewed.    Significant Imaging: I have reviewed all pertinent imaging results/findings within the past 24 hours.

## 2017-08-31 NOTE — ASSESSMENT & PLAN NOTE
-MRSA per cultures.  -Will get peripheral access and remove Cardoso(Will need INR to come down a bit).  -antibiotics as above

## 2017-08-31 NOTE — ASSESSMENT & PLAN NOTE
-Secondary to PNA vs volume overload.   -Continue HFNC. Wean to maintain sats >88% (home dose O2 5L)  -Continue Veletri 25ng/kg/mn  -Transition to continuous Lasix gtt 15mg/hr

## 2017-08-31 NOTE — ASSESSMENT & PLAN NOTE
57 y/o female with PHTN on Veletri and lasix for pulmonary HTN, brush catheter placed 6/2017 here transferred from OSH after being admitted with acute respiratory failure 2/2 to PNA with blood cultures 8/26 +MRSA.   -afebrile here leukocytosis resolved, pt clinically improved since admission  -general surgery consulted for brush removal  -repeat blood cultures +MRSA  -2D echo negative for vegetations    Plan  1.continue vancomycin, vanc trough goal 15-20. vanc trough due today. Continue azithromycin to complete 7 day course for PNA. (Day 5)   2.repeat blood cultures today  3.recommend removal of brush catheter as possible source of bacteremia.   4.pt stable, afebrile, without a leukocytosis. Will follow closely with you.

## 2017-08-31 NOTE — PLAN OF CARE
Pt sleeping most of time, but easily arousable.  AAOx3.  Bed in low and locked position.  Nonskid socks in use.  Call bell, phone, drink within reach in bed or on bedside table.  Aware to call if needing assistance.  Denies pain.  [facility] permcath drsg CDI - plan for removal due to line infection once INR lower.  Veletri infusing to PIV @ 24ng/kg/min with a dosing weight of 86kg and rate 49cc/24hrs.  Fell at previous hospital.  Aware to call for assistance.  Using bedpan.  Blood cultures from yest am came back tonight with gram positive cocci and clusters.  On vanc and azithromycin IVPB.  80mg IVP lasix TID.

## 2017-08-31 NOTE — PLAN OF CARE
Problem: Patient Care Overview  Goal: Plan of Care Review  Patient admitted with hypoxia, currently sats 95-96% on 8L high flow NC.  Patient currently on lasix drip 15mg/hr.  , lasix IV push given this am 40mg.  Afebrile today BP on low end of normal.  Currently receiving veletri @24nng, tolerating well.  Cassette change due at 1500 today.  No complaints of pain.  Patient able to use BS commode independently this am, one person assist with walking.  Vanc trough due before pm dose today. R SC cath remains until INR is lower.

## 2017-08-31 NOTE — PROGRESS NOTES
"[facility]  Heart Transplant  Progress Note    Patient Name: Emily Cook  MRN: [id number]  Admission Date: 8/29/2017  Hospital Length of Stay: 2 days  Attending Physician: Yves Ruth Jr.,*  Primary Care Provider: Kaylee Cazares MD  Principal Problem:Respiratory failure with hypoxia    Subjective:     Interval History: No complaints this morning. States she is feeling a bit better although now on high flow O2 100%.     Continuous Infusions:   epoprostenol (VELETRI) infusion 24 ng/kg/min (08/30/17 1456)    furosemide (LASIX) 5 mg/mL infusion (non-titrating) 15 mg/hr (08/31/17 0803)    veletri/remodulin tubing      veletri/remodulin tubing       Scheduled Meds:   albuterol sulfate  2.5 mg Nebulization Q6H    azithromycin  500 mg Intravenous Q24H    duloxetine  60 mg Oral BID    gabapentin  300 mg Oral TID    macitentan  10 mg Oral Daily    magnesium sulfate IVPB  2 g Intravenous Once    polyethylene glycol  17 g Oral Daily    potassium chloride  20 mEq Oral TID    sildenafil  20 mg Oral TID    spironolactone  25 mg Oral Daily    vancomycin (VANCOCIN) IVPB  1,000 mg Intravenous Q12H     PRN Meds:acetaminophen, albuterol sulfate, hydrocodone-acetaminophen 10-325mg    Review of patient's allergies indicates:   Allergen Reactions    Chlorhexidine Itching and Rash     Patient had raised rash on neck following RHC procedure. She states she's never had a problem with the "prep" used until this time.     Adhesive Rash     Objective:     Vital Signs (Most Recent):  Temp: 97.8 °F (36.6 °C) (08/31/17 0424)  Pulse: (!) 120 (08/31/17 0800)  Resp: 20 (08/31/17 0100)  BP: (!) 99/58 (08/31/17 0621)  SpO2: 96 % (08/31/17 0621) Vital Signs (24h Range):  Temp:  [97.8 °F (36.6 °C)-99 °F (37.2 °C)] 97.8 °F (36.6 °C)  Pulse:  [] 120  Resp:  [18-20] 20  SpO2:  [80 %-97 %] 96 %  BP: ()/(50-58) 99/58     Weight: 80.5 kg (177 lb 7.5 oz)  Body mass index is 31.44 " kg/m².      Intake/Output Summary (Last 24 hours) at 08/31/17 0841  Last data filed at 08/31/17 0702   Gross per 24 hour   Intake          1411.75 ml   Output              925 ml   Net           486.75 ml       Hemodynamic Parameters:         Physical Exam   Constitutional: She is oriented to person, place, and time. She appears well-developed and well-nourished.   HENT:   Head: Normocephalic.   Eyes: Pupils are equal, round, and reactive to light.   Neck: Normal range of motion. Neck supple. JVD present.   Cardiovascular: Normal rate and regular rhythm.    Pulmonary/Chest: Effort normal. She has rales.   Abdominal: Soft. Bowel sounds are normal.   Musculoskeletal: Normal range of motion.   Neurological: She is alert and oriented to person, place, and time.   Skin: Skin is warm and dry.   Psychiatric: She has a normal mood and affect. Her behavior is normal.   Nursing note and vitals reviewed.      Significant Labs:  CBC:    Recent Labs  Lab 08/31/17  0402   WBC 6.79   RBC 4.22   HGB 10.4*   HCT 30.9*   *   MCV 73*   MCH 24.6*   MCHC 33.7     BNP:    Recent Labs  Lab 08/30/17  0047   *     CMP:    Recent Labs  Lab 08/30/17  0104 08/31/17  0402    97   CALCIUM 8.8 8.5*   ALBUMIN 2.4*  --    PROT 6.1  --     138   K 4.4 4.1   CO2 29 29    99   BUN 20 13   CREATININE 0.8 0.8   ALKPHOS 97  --    ALT 26  --    AST 19  --    BILITOT 1.0  --       Coagulation:     Recent Labs  Lab 08/31/17  0402   INR 5.4*     LDH:  No results for input(s): LDH in the last 72 hours.  Microbiology:  Microbiology Results (last 7 days)     Procedure Component Value Units Date/Time    Blood culture [[id number]] Collected:  08/30/17 0436    Order Status:  Completed Specimen:  Blood Updated:  08/30/17 2021     Blood Culture, Routine Gram stain aer bottle: Gram positive cocci in clusters resembling Staph     Blood Culture, Routine Results called to and read back by: Steven Bland RN     Blood Culture, Routine  08/30/2017  20:20    Culture, Respiratory with Gram Stain [[id number]] Collected:  08/30/17 0928    Order Status:  Completed Specimen:  Respiratory from Sputum, Expectorated Updated:  08/30/17 1419     Respiratory Culture Specimen inadequate - culture not performed. Spoke with Valerie      Respiratory Culture Esquinance 08/30/2017  14:18     Gram Stain (Respiratory) >10epis/lfp and <than many WBC's      Gram Stain (Respiratory) Predominance of oropharyngeal maribel. Please recollect.    Culture, Respiratory with Gram Stain [[id number]]     Order Status:  No result Specimen:  Respiratory from Sputum, Expectorated     Blood culture [[id number]]     Order Status:  Canceled Specimen:  Blood           I have reviewed all pertinent labs within the past 24 hours.    Estimated Creatinine Clearance: 78.8 mL/min (based on Cr of 0.8).    Diagnostic Results:  I have reviewed all pertinent imaging results/findings within the past 24 hours.    Assessment and Plan:     * Respiratory failure with hypoxia    -Secondary to PNA vs volume overload.   -Continue HFNC. Wean to maintain sats >88% (home dose O2 5L)  -Continue Veletri 25ng/kg/mn  -Transition to continuous Lasix gtt 15mg/hr        Right-sided heart failure    -continue current lasix IVP  -standing dose of K as ordered  -monitor K and Mg q12h  -daily I/O  -low Na diet        Supratherapeutic INR    -trending down today.   -will hold coumadin for now  -no Vit K at this time        Pulmonary hypertension    -continue veletri, sildenafil, macitentan   -NOTE: when pt arrived, nurse reported that her veletri pump was STOPPED.  She restarted it at a dose of 64 cc/24 h.          Bacteremia    -MRSA per cultures.  -Will get peripheral access and remove Cardoso(Will need INR to come down a bit).  -antibiotics as above        Pneumonia of right upper lobe due to infectious organism    -continue Vancomycin, azithromycin  -reordered blood cx, sputum cx  -CXR pending  -ID consult               Félix Calles NP  Heart Transplant  [facility]

## 2017-09-01 NOTE — PROGRESS NOTES
Changed patients dressing to right SC central line.  NO ss of infection noted to site.  Will contineu to monitor.

## 2017-09-01 NOTE — PLAN OF CARE
Problem: Patient Care Overview  Goal: Plan of Care Review  Outcome: Ongoing (interventions implemented as appropriate)  Pt is AAOx4 in bed wearing non-skid footwear, bed in low/locked position and with call bell within reach. Pt reminded to use call bell to call for assistance, pt verbalizes understanding. Pt is afebrile at this time. Proper hand hygiene performed before and after pt care activities. Nighttime dose of vancomycin held per Dr. Tidwell on call for HTS due to vanc trough of 21.4. Random vanc level ordered for AM. Patient 84% on comfort flow 25L 100% earlier in shift. Patient placed on Bipap at that time, O2 sats stable at 97%. NSR to ST (90s-100s) on telemetry monitor. Lasix gtt infusing at 15mg/hr. Veletri infusing via CADD pump at 24ng/kg/min (50cc/24hrs). Patient turning in bed independently, bedside commode present. Denies any pain or discomfort at this time.

## 2017-09-01 NOTE — ASSESSMENT & PLAN NOTE
56 year-old female with history of pulmonary HTN on Veletri via cardoso catheter placed 6/2017 transferred from OSH with pneumonia and MRSA bacteremia. Blood cultures 8/26 and 8/30 are positive for MRSA. Sputum cx inadequate for culture. TTE negative for vegetations. CXR shows perihilar edema. Cardoso catheter remains in place. Patient is currently on IV Vancomycin and oral azithromycin.  Fevers and leukocytosis from admit have resolved. She is clinically improving on antibiotic therapy.     Plan  - Bacteremia likely from infected cardoso catheter. Recommend removal ASAP for adequate source control.   - Continue Vancomycin 1g IV q 12 hours. Vanc trough 21.4. Will repeat trough before morning dose and adjust antibiotics as needed with goal vanc trough 15-20.  - Discontinue Azithromycin as patient has completed a 7 day course of  therapy for pneumonia.   - Repeat blood cultures daily until clear  - ID will follow.

## 2017-09-01 NOTE — PHYSICIAN QUERY
PT Name: Emily Cook  MR #: [id number]     Physician Query Form - Documentation Clarification      CDS/: Michelle Silva RN, CCDS              Contact information: [email or web address]    This form is a permanent document in the medical record.     Query Date: September 1, 2017    By submitting this query, we are merely seeking further clarification of documentation. Please utilize your independent clinical judgment when addressing the question(s) below.    The Medical record reflects the following:    Supporting Clinical Findings Location in Medical Record     acute respiratory failure 2/2 to PNA in setting of severe PHTN    Pulmonary hypertension  continue veletri, sildenafil, macitentan    8/30 h/p                                                                                      Doctor, Please specify diagnosis or diagnoses associated with above clinical findings.    Provider Use Only      ( [id number]   )  Primary Pulmonary Hypertension    (    )  Secondary Pulmonary Hypertension    (  XXXX  )  Other dx is acute on chronic resp failure, hypoxia                                                                                                             [  ] Clinically undetermined

## 2017-09-01 NOTE — PHYSICIAN QUERY
PT Name: Emily Cook  MR #: [id number]    Physician Query Form -Systemic Infectious Process Clarification     CDS/: Michelle Silva RN, CCDS             Contact information: [email or web address]  This form is a permanent document in the medical record.     Query Date: September 1, 2017     By submitting this query, we are merely seeking further clarification of documentation. Please utilize your independent clinical judgment when addressing the question(s) below.    The Medical record contains the following:     Indicators   Supporting Clinical Findings   Location in Medical Record   X HR RR BP Temp On admission T max of 100.4    Hr-105, rr-22, b/p 105/63, T 98.5  H/p 8/30          Lactic Acid             Procalcitonin     X WBC                Bands                     CRP afebrile here leukocytosis resolved 8/31 ID note   X Culture(s) BCx at previous hospital + for MRSA  8/30 bld cx's MRSA H/p 8/30 8/30 lab    AMS, Confusion, LOC, etc.     X Organ Dysfunction / Failure acute respiratory failure 2/2 to PNA in setting of severe PHTN 8/30h/p   X Bacteremia or Sepsis / Septic Found to be MRSA bacteremic,     although septic, would like to maintain net negative H/p 8/30   X Known or Suspected Source of Infection documented recommend removal of brush catheter as possible source of bacteremia 8/31 ID note    (Failed) Outpatient Treatment     X Medication  Vancomycin 1gm q 12 hrs IV  Azithromycin 50 mg IV q 24 hrs 8/30, 8/31, 9/1 mar    Treatment      Other       Provider, please specify diagnosis or diagnoses associated with above clinical findings.    [  ] Sepsis  [  ] Other Infectious Disease (please specify): _________________________________  [ XX ] Other: bacteremia present but not sepsis  [  ] Clinically Undetermined    Please document in your progress notes daily for the duration of treatment until resolved and include in your discharge summary.

## 2017-09-01 NOTE — ASSESSMENT & PLAN NOTE
-Secondary to PNA vs volume overload.   -Continue HFNC. Wean to maintain sats >/= 85% (home dose O2 5L)  -Continue Veletri 25ng/kg/mn  -Continue Lasix gtt for now.

## 2017-09-01 NOTE — SUBJECTIVE & OBJECTIVE
"Interval History: No complaints this morning. States she is feeling a bit better although still on high flow O2 100%.     Continuous Infusions:   epoprostenol (VELETRI) infusion 24 ng/kg/min (08/31/17 1524)    furosemide (LASIX) 5 mg/mL infusion (non-titrating) 20 mg/hr (09/01/17 1012)    veletri/remodulin tubing      veletri/remodulin tubing       Scheduled Meds:   albuterol sulfate  2.5 mg Nebulization Q6H    azithromycin  500 mg Intravenous Q24H    duloxetine  60 mg Oral BID    gabapentin  300 mg Oral TID    macitentan  10 mg Oral Daily    polyethylene glycol  17 g Oral Daily    potassium chloride  20 mEq Oral TID    sildenafil  20 mg Oral TID    spironolactone  25 mg Oral Daily    vancomycin (VANCOCIN) IVPB  1,000 mg Intravenous Q12H     PRN Meds:acetaminophen, albuterol sulfate, hydrocodone-acetaminophen 10-325mg    Review of patient's allergies indicates:   Allergen Reactions    Chlorhexidine Itching and Rash     Patient had raised rash on neck following RHC procedure. She states she's never had a problem with the "prep" used until this time.     Adhesive Rash     Objective:     Vital Signs (Most Recent):  Temp: 97.6 °F (36.4 °C) (09/01/17 0815)  Pulse: 98 (09/01/17 1217)  Resp: 19 (09/01/17 1217)  BP: (!) 94/49 (09/01/17 0815)  SpO2: 97 % (09/01/17 1217) Vital Signs (24h Range):  Temp:  [97.5 °F (36.4 °C)-98.6 °F (37 °C)] 97.6 °F (36.4 °C)  Pulse:  [] 98  Resp:  [16-22] 19  SpO2:  [84 %-97 %] 97 %  BP: (92-99)/(49-68) 94/49     Weight: 80.1 kg (176 lb 9.4 oz)  Body mass index is 31.28 kg/m².      Intake/Output Summary (Last 24 hours) at 09/01/17 1303  Last data filed at 09/01/17 0545   Gross per 24 hour   Intake           847.85 ml   Output              950 ml   Net          -102.15 ml       Hemodynamic Parameters:         Physical Exam   Constitutional: She is oriented to person, place, and time. She appears well-developed and well-nourished.   HENT:   Head: Normocephalic.   Eyes: " Pupils are equal, round, and reactive to light.   Neck: Normal range of motion. Neck supple. JVD (improving) present.   Cardiovascular: Normal rate and regular rhythm.    Pulmonary/Chest: Effort normal. She has rales.   Abdominal: Soft. Bowel sounds are normal.   Musculoskeletal: Normal range of motion.   Neurological: She is alert and oriented to person, place, and time.   Skin: Skin is warm and dry.   Psychiatric: She has a normal mood and affect. Her behavior is normal.   Nursing note and vitals reviewed.      Significant Labs:  CBC:    Recent Labs  Lab 09/01/17 0424   WBC 6.22   RBC 4.39   HGB 11.1*   HCT 32.7*   *   MCV 75*   MCH 25.3*   MCHC 33.9     BNP:    Recent Labs  Lab 08/30/17  0047   *     CMP:    Recent Labs  Lab 08/30/17  0104  09/01/17 0424     < > 101   CALCIUM 8.8  < > 8.8   ALBUMIN 2.4*  --   --    PROT 6.1  --   --      < > 137   K 4.4  < > 4.0   CO2 29  < > 32*     < > 96   BUN 20  < > 13   CREATININE 0.8  < > 0.8   ALKPHOS 97  --   --    ALT 26  --   --    AST 19  --   --    BILITOT 1.0  --   --    < > = values in this interval not displayed.   Coagulation:     Recent Labs  Lab 09/01/17 0424   INR 4.4*     LDH:  No results for input(s): LDH in the last 72 hours.  Microbiology:  Microbiology Results (last 7 days)     Procedure Component Value Units Date/Time    Blood culture [[id number]]  (Susceptibility) Collected:  08/30/17 0436    Order Status:  Completed Specimen:  Blood Updated:  09/01/17 1050     Blood Culture, Routine Gram stain aer bottle: Gram positive cocci in clusters resembling Staph     Blood Culture, Routine Results called to and read back by: Steven Bland RN     Blood Culture, Routine 08/30/2017  20:20     Blood Culture, Routine --     METHICILLIN RESISTANT STAPHYLOCOCCUS AUREUS  ID consult required at [facility].      Blood culture [[id number]] Collected:  09/01/17 0423    Order Status:  Sent Specimen:  Blood Updated:   09/01/17 0617    Blood culture [[id number]] Collected:  09/01/17 0423    Order Status:  Sent Specimen:  Blood Updated:  09/01/17 0453    Culture, Respiratory with Gram Stain [[id number]] Collected:  08/30/17 0928    Order Status:  Completed Specimen:  Respiratory from Sputum, Expectorated Updated:  08/30/17 1419     Respiratory Culture Specimen inadequate - culture not performed. Spoke with Valerie      Respiratory Culture [place] 08/30/2017  14:18     Gram Stain (Respiratory) >10epis/lfp and <than many WBC's      Gram Stain (Respiratory) Predominance of oropharyngeal maribel. Please recollect.    Culture, Respiratory with Gram Stain [[id number]]     Order Status:  No result Specimen:  Respiratory from Sputum, Expectorated     Blood culture [[id number]]     Order Status:  Canceled Specimen:  Blood           I have reviewed all pertinent labs within the past 24 hours.    Estimated Creatinine Clearance: 78.7 mL/min (based on SCr of 0.8 mg/dL).    Diagnostic Results:  I have reviewed all pertinent imaging results/findings within the past 24 hours.

## 2017-09-01 NOTE — SUBJECTIVE & OBJECTIVE
Interval History: NAEON. Without new complaints today. Blood cultures from 8/30 returned positive. Repeated cultures today. Afebrile without a leukocytosis. Tolerating IV Vancomycin.     Review of Systems   Constitutional: Positive for chills and fatigue. Negative for diaphoresis and fever.   Respiratory: Positive for cough and shortness of breath.    Cardiovascular: Negative for chest pain and leg swelling.   Gastrointestinal: Negative for abdominal pain, diarrhea, nausea and vomiting.   Genitourinary: Negative for dysuria.   Musculoskeletal: Negative for back pain.   Skin: Negative for wound.   Neurological: Positive for weakness. Negative for headaches.     Objective:     Vital Signs (Most Recent):  Temp: 97.6 °F (36.4 °C) (09/01/17 0815)  Pulse: 101 (09/01/17 1454)  Resp: 19 (09/01/17 1217)  BP: (!) 94/49 (09/01/17 0815)  SpO2: 97 % (09/01/17 1217) Vital Signs (24h Range):  Temp:  [97.5 °F (36.4 °C)-98.6 °F (37 °C)] 97.6 °F (36.4 °C)  Pulse:  [] 101  Resp:  [16-22] 19  SpO2:  [84 %-97 %] 97 %  BP: (92-99)/(49-68) 94/49     Weight: 80.1 kg (176 lb 9.4 oz)  Body mass index is 31.28 kg/m².    Estimated Creatinine Clearance: 78.7 mL/min (based on SCr of 0.8 mg/dL).    Physical Exam   Constitutional: She is oriented to person, place, and time. She appears well-developed. No distress.   Cardiovascular: Normal rate, regular rhythm and normal heart sounds.  Exam reveals no friction rub.    No murmur heard.  Pulmonary/Chest: Effort normal. No respiratory distress. She has no wheezes. She has rales.   On O2 via nasal cannula    Abdominal: Soft. She exhibits no distension. There is no tenderness. There is no guarding.   Musculoskeletal: She exhibits no edema, tenderness or deformity.   Cardoso catheter right upper chest dressed. Dressing c/d/i.   Neurological: She is alert and oriented to person, place, and time.   Skin: Skin is warm and dry. She is not diaphoretic.   Psychiatric: She has a normal mood and affect.    Nursing note and vitals reviewed.      Significant Labs:   Blood Culture:   Recent Labs  Lab 06/09/17  0913 08/26/17  0840 08/26/17  1028 08/30/17  0436 09/01/17  0423   LABBLOO No growth after 5 days. Gram stain peds bottle: Gram positive cocci in clusters resembling Staph  Positive results previously called ON DUPLICATE PEDIATRIC BOTTLE   (ORDER # [id number]) TO DINO ON 3N AT 23:13 8/26/2017 08/27/2017  01:20  METHICILLIN RESISTANT STAPHYLOCOCCUS AUREUSrefer to culture #[id number] Gram stain peds bottle: Gram positive cocci in clusters resembling Staph   Results called to and read back by:dino 3n 08/26/2017  23:13 cg  METHICILLIN RESISTANT STAPHYLOCOCCUS AUREUS Gram stain aer bottle: Gram positive cocci in clusters resembling Staph  Results called to and read back by: Steven Bland RN  08/30/2017  20:20  METHICILLIN RESISTANT STAPHYLOCOCCUS AUREUSID consult required at [facility] and [place] locations. No Growth to date  No Growth to date     CBC:   Recent Labs  Lab 08/31/17  0402 09/01/17  0424   WBC 6.79 6.22   HGB 10.4* 11.1*   HCT 30.9* 32.7*   * 146*     CMP:   Recent Labs  Lab 08/31/17  0402 09/01/17  0424    137   K 4.1 4.0   CL 99 96   CO2 29 32*   GLU 97 101   BUN 13 13   CREATININE 0.8 0.8   CALCIUM 8.5* 8.8   ANIONGAP 10 9   EGFRNONAA >60.0 >60.0     Respiratory Culture:   Recent Labs  Lab 08/30/17  0928   GSRESP >10epis/lfp and <than many WBC's   Predominance of oropharyngeal maribel. Please recollect.   RESPIRATORYC Specimen inadequate - culture not performed. Spoke with Valerie Mojica 08/30/2017  14:18     Urine Culture:   Recent Labs  Lab 06/09/17  0743   LABURIN ESCHERICHIA COLI>100,000 cfu/ml     Urine Studies:   Recent Labs  Lab 08/26/17  0904   COLORU Yellow   APPEARANCEUA Clear   PHUR 6.0   SPECGRAV 1.020   PROTEINUA 100*   GLUCUA Negative   KETONESU Negative   BILIRUBINUA Small*   OCCULTUA Moderate*   NITRITE Negative   UROBILINOGEN 4.0*   LEUKOCYTESUR  Negative   RBCUA 1   WBCUA 1   BACTERIA Rare   HYALINECASTS 1     Wound Culture: No results for input(s): LABAERO in the last 4320 hours.    Significant Imaging: I have reviewed all pertinent imaging results/findings within the past 24 hours.

## 2017-09-01 NOTE — PROGRESS NOTES
[facility]  Infectious Disease  Progress Note    Patient Name: Emily Cook  MRN: [id number]  Admission Date: 8/29/2017  Length of Stay: 3 days  Attending Physician: Yves Ruth Jr.,*  Primary Care Provider: Kaylee Cazares MD    Isolation Status: Contact  Assessment/Plan:      Bacteremia    56 year-old female with history of pulmonary HTN on Veletri via brush catheter placed 6/2017 transferred from OSH with pneumonia and MRSA bacteremia. Blood cultures 8/26 and 8/30 are positive for MRSA. Sputum cx inadequate for culture. TTE negative for vegetations. CXR shows perihilar edema. Brush catheter remains in place. Patient is currently on IV Vancomycin and oral azithromycin.  Fevers and leukocytosis from admit have resolved. She is clinically improving on antibiotic therapy.     Plan  - Bacteremia likely from infected brush catheter. Recommend removal ASAP for adequate source control.   - Continue Vancomycin 1g IV q 12 hours. Vanc trough 21.4. Will repeat trough before morning dose and adjust antibiotics as needed with goal vanc trough 15-20.  - Discontinue Azithromycin as patient has completed a 7 day course of  therapy for pneumonia.   - Repeat blood cultures daily until clear  - ID will follow.             Please call for any questions. Thank you.  Mehnaz Hanson PA-C  Phone: [phone]  Pager: [phone]    Subjective:     Principal Problem:Respiratory failure with hypoxia    HPI: 55 y/o female with PHTN on veletri and lasix for pulmonary HTN, brush catheter placed 6/2017 here transferred from OSH after being admitted with acute respiratory failure 2/2 to PNA. Pt reports feeling unwell for the past few days with fevers and chills at home and neck pain. Pt febrile 100.4 on admit with leukocytosis of 14.20. Blood cultures 8/26, 8/30 +MRSA. Pt currently on azithromycin for PNA and vancomycin for MRSA bacteremia. 2D echo negative for vegetations. Respiratory cultures normal  respiratory maribel. Today pt reports much improvement since admission and breathing is back to baseline. Reports having intermittent cough. Afebrile, without a leukocytosis. Denies having any other acute symptoms.   Interval History: NAEON. Without new complaints today. Blood cultures from 8/30 returned positive. Repeated cultures today. Afebrile without a leukocytosis. Tolerating IV Vancomycin.     Review of Systems   Constitutional: Positive for chills and fatigue. Negative for diaphoresis and fever.   Respiratory: Positive for cough and shortness of breath.    Cardiovascular: Negative for chest pain and leg swelling.   Gastrointestinal: Negative for abdominal pain, diarrhea, nausea and vomiting.   Genitourinary: Negative for dysuria.   Musculoskeletal: Negative for back pain.   Skin: Negative for wound.   Neurological: Positive for weakness. Negative for headaches.     Objective:     Vital Signs (Most Recent):  Temp: 97.6 °F (36.4 °C) (09/01/17 0815)  Pulse: 101 (09/01/17 1454)  Resp: 19 (09/01/17 1217)  BP: (!) 94/49 (09/01/17 0815)  SpO2: 97 % (09/01/17 1217) Vital Signs (24h Range):  Temp:  [97.5 °F (36.4 °C)-98.6 °F (37 °C)] 97.6 °F (36.4 °C)  Pulse:  [] 101  Resp:  [16-22] 19  SpO2:  [84 %-97 %] 97 %  BP: (92-99)/(49-68) 94/49     Weight: 80.1 kg (176 lb 9.4 oz)  Body mass index is 31.28 kg/m².    Estimated Creatinine Clearance: 78.7 mL/min (based on SCr of 0.8 mg/dL).    Physical Exam   Constitutional: She is oriented to person, place, and time. She appears well-developed. No distress.   Cardiovascular: Normal rate, regular rhythm and normal heart sounds.  Exam reveals no friction rub.    No murmur heard.  Pulmonary/Chest: Effort normal. No respiratory distress. She has no wheezes. She has rales.   On O2 via nasal cannula    Abdominal: Soft. She exhibits no distension. There is no tenderness. There is no guarding.   Musculoskeletal: She exhibits no edema, tenderness or deformity.   Cardoso catheter  right upper chest dressed. Dressing c/d/i.   Neurological: She is alert and oriented to person, place, and time.   Skin: Skin is warm and dry. She is not diaphoretic.   Psychiatric: She has a normal mood and affect.   Nursing note and vitals reviewed.      Significant Labs:   Blood Culture:   Recent Labs  Lab 06/09/17  0913 08/26/17  0840 08/26/17  1028 08/30/17  0436 09/01/17  0423   LABBLOO No growth after 5 days. Gram stain peds bottle: Gram positive cocci in clusters resembling Staph  Positive results previously called ON DUPLICATE PEDIATRIC BOTTLE   (ORDER # [id number]) TO DINO ON 3N AT 23:13 8/26/2017 08/27/2017  01:20  METHICILLIN RESISTANT STAPHYLOCOCCUS AUREUSrefer to culture #[id number] Gram stain peds bottle: Gram positive cocci in clusters resembling Staph   Results called to and read back by:dino 3n 08/26/2017  23:13 cg  METHICILLIN RESISTANT STAPHYLOCOCCUS AUREUS Gram stain aer bottle: Gram positive cocci in clusters resembling Staph  Results called to and read back by: Steven Bland RN  08/30/2017  20:20  METHICILLIN RESISTANT STAPHYLOCOCCUS AUREUSID consult required at [facility] and [place] locations. No Growth to date  No Growth to date     CBC:   Recent Labs  Lab 08/31/17  0402 09/01/17  0424   WBC 6.79 6.22   HGB 10.4* 11.1*   HCT 30.9* 32.7*   * 146*     CMP:   Recent Labs  Lab 08/31/17  0402 09/01/17  0424    137   K 4.1 4.0   CL 99 96   CO2 29 32*   GLU 97 101   BUN 13 13   CREATININE 0.8 0.8   CALCIUM 8.5* 8.8   ANIONGAP 10 9   EGFRNONAA >60.0 >60.0     Respiratory Culture:   Recent Labs  Lab 08/30/17  0928   GSRESP >10epis/lfp and <than many WBC's   Predominance of oropharyngeal maribel. Please recollect.   RESPIRATORYC Specimen inadequate - culture not performed. Spoke with Valerie Mojica 08/30/2017  14:18     Urine Culture:   Recent Labs  Lab 06/09/17  0743   LABURIN ESCHERICHIA COLI>100,000 cfu/ml     Urine Studies:   Recent Labs  Lab 08/26/17  0904    COLORU Yellow   APPEARANCEUA Clear   PHUR 6.0   SPECGRAV 1.020   PROTEINUA 100*   GLUCUA Negative   KETONESU Negative   BILIRUBINUA Small*   OCCULTUA Moderate*   NITRITE Negative   UROBILINOGEN 4.0*   LEUKOCYTESUR Negative   RBCUA 1   WBCUA 1   BACTERIA Rare   HYALINECASTS 1     Wound Culture: No results for input(s): LABAERO in the last 4320 hours.    Significant Imaging: I have reviewed all pertinent imaging results/findings within the past 24 hours.

## 2017-09-01 NOTE — ASSESSMENT & PLAN NOTE
-continue IV Lasix.   -standing dose of K as ordered  -monitor K and Mg q12h  -daily I/O  -low Na diet

## 2017-09-01 NOTE — PROGRESS NOTES
"[facility]  Heart Transplant  Progress Note    Patient Name: Emily Cook  MRN: [id number]  Admission Date: 8/29/2017  Hospital Length of Stay: 3 days  Attending Physician: Yves Ruth Jr.,*  Primary Care Provider: Kaylee Cazares MD  Principal Problem:Respiratory failure with hypoxia    Subjective:     Interval History: No complaints this morning. States she is feeling a bit better although still on high flow O2 100%.     Continuous Infusions:   epoprostenol (VELETRI) infusion 24 ng/kg/min (08/31/17 1524)    furosemide (LASIX) 5 mg/mL infusion (non-titrating) 20 mg/hr (09/01/17 1012)    veletri/remodulin tubing      veletri/remodulin tubing       Scheduled Meds:   albuterol sulfate  2.5 mg Nebulization Q6H    azithromycin  500 mg Intravenous Q24H    duloxetine  60 mg Oral BID    gabapentin  300 mg Oral TID    macitentan  10 mg Oral Daily    polyethylene glycol  17 g Oral Daily    potassium chloride  20 mEq Oral TID    sildenafil  20 mg Oral TID    spironolactone  25 mg Oral Daily    vancomycin (VANCOCIN) IVPB  1,000 mg Intravenous Q12H     PRN Meds:acetaminophen, albuterol sulfate, hydrocodone-acetaminophen 10-325mg    Review of patient's allergies indicates:   Allergen Reactions    Chlorhexidine Itching and Rash     Patient had raised rash on neck following RHC procedure. She states she's never had a problem with the "prep" used until this time.     Adhesive Rash     Objective:     Vital Signs (Most Recent):  Temp: 97.6 °F (36.4 °C) (09/01/17 0815)  Pulse: 98 (09/01/17 1217)  Resp: 19 (09/01/17 1217)  BP: (!) 94/49 (09/01/17 0815)  SpO2: 97 % (09/01/17 1217) Vital Signs (24h Range):  Temp:  [97.5 °F (36.4 °C)-98.6 °F (37 °C)] 97.6 °F (36.4 °C)  Pulse:  [] 98  Resp:  [16-22] 19  SpO2:  [84 %-97 %] 97 %  BP: (92-99)/(49-68) 94/49     Weight: 80.1 kg (176 lb 9.4 oz)  Body mass index is 31.28 kg/m².      Intake/Output Summary (Last 24 hours) at 09/01/17 1303  Last " data filed at 09/01/17 0545   Gross per 24 hour   Intake           847.85 ml   Output              950 ml   Net          -102.15 ml       Hemodynamic Parameters:         Physical Exam   Constitutional: She is oriented to person, place, and time. She appears well-developed and well-nourished.   HENT:   Head: Normocephalic.   Eyes: Pupils are equal, round, and reactive to light.   Neck: Normal range of motion. Neck supple. JVD (improving) present.   Cardiovascular: Normal rate and regular rhythm.    Pulmonary/Chest: Effort normal. She has rales.   Abdominal: Soft. Bowel sounds are normal.   Musculoskeletal: Normal range of motion.   Neurological: She is alert and oriented to person, place, and time.   Skin: Skin is warm and dry.   Psychiatric: She has a normal mood and affect. Her behavior is normal.   Nursing note and vitals reviewed.      Significant Labs:  CBC:    Recent Labs  Lab 09/01/17 0424   WBC 6.22   RBC 4.39   HGB 11.1*   HCT 32.7*   *   MCV 75*   MCH 25.3*   MCHC 33.9     BNP:    Recent Labs  Lab 08/30/17  0047   *     CMP:    Recent Labs  Lab 08/30/17  0104  09/01/17 0424     < > 101   CALCIUM 8.8  < > 8.8   ALBUMIN 2.4*  --   --    PROT 6.1  --   --      < > 137   K 4.4  < > 4.0   CO2 29  < > 32*     < > 96   BUN 20  < > 13   CREATININE 0.8  < > 0.8   ALKPHOS 97  --   --    ALT 26  --   --    AST 19  --   --    BILITOT 1.0  --   --    < > = values in this interval not displayed.   Coagulation:     Recent Labs  Lab 09/01/17 0424   INR 4.4*     LDH:  No results for input(s): LDH in the last 72 hours.  Microbiology:  Microbiology Results (last 7 days)     Procedure Component Value Units Date/Time    Blood culture [[id number]]  (Susceptibility) Collected:  08/30/17 0436    Order Status:  Completed Specimen:  Blood Updated:  09/01/17 1050     Blood Culture, Routine Gram stain aer bottle: Gram positive cocci in clusters resembling Staph     Blood Culture, Routine Results  called to and read back by: Steven Bland RN     Blood Culture, Routine 08/30/2017  20:20     Blood Culture, Routine --     METHICILLIN RESISTANT STAPHYLOCOCCUS AUREUS  [facility]      Blood culture [[id number]] Collected:  09/01/17 0423    Order Status:  Sent Specimen:  Blood Updated:  09/01/17 0617    Blood culture [[id number]] Collected:  09/01/17 0423    Order Status:  Sent Specimen:  Blood Updated:  09/01/17 0453    Culture, Respiratory with Gram Stain [[id number]] Collected:  08/30/17 0928    Order Status:  Completed Specimen:  Respiratory from Sputum, Expectorated Updated:  08/30/17 1419     Respiratory Culture Specimen inadequate - culture not performed. Spoke with Valerie      Respiratory Culture Esquinance 08/30/2017  14:18     Gram Stain (Respiratory) >10epis/lfp and <than many WBC's      Gram Stain (Respiratory) Predominance of oropharyngeal maribel. Please recollect.    Culture, Respiratory with Gram Stain [[id number]]     Order Status:  No result Specimen:  Respiratory from Sputum, Expectorated     Blood culture [[id number]]     Order Status:  Canceled Specimen:  Blood           I have reviewed all pertinent labs within the past 24 hours.    Estimated Creatinine Clearance: 78.7 mL/min (based on SCr of 0.8 mg/dL).    Diagnostic Results:  I have reviewed all pertinent imaging results/findings within the past 24 hours.    Assessment and Plan:     * Respiratory failure with hypoxia    -Secondary to PNA vs volume overload.   -Continue HFNC. Wean to maintain sats >88% (home dose O2 5L)  -Continue Veletri 25ng/kg/mn  -Transition to continuous Lasix gtt 15mg/hr        Right-sided heart failure    -continue current lasix IVP  -standing dose of K as ordered  -monitor K and Mg q12h  -daily I/O  -low Na diet        Supratherapeutic INR    -trending down today.   -will hold coumadin for now  -no Vit K at this time        Pulmonary hypertension    -continue veletri,  sildenafil, macitentan   -NOTE: when pt arrived, nurse reported that her veletri pump was STOPPED.  She restarted it at a dose of 64 cc/24 h.          Bacteremia    -MRSA per cultures.  -Will get peripheral access and remove Cardoso(Will need INR to come down a bit).  -antibiotics as above        Pneumonia of right upper lobe due to infectious organism    -continue Vancomycin, azithromycin  -reordered blood cx, sputum cx  -CXR pending  -ID consult              Félix Calles, NP  Heart Transplant  [facility]

## 2017-09-02 NOTE — ASSESSMENT & PLAN NOTE
56 year-old female with history of pulmonary HTN on Veletri via cardoso catheter placed 6/2017 transferred from OSH with pneumonia and MRSA bacteremia. Blood cultures 8/26, 8/30 and 9/1 are positive for MRSA. TTE negative for vegetations. CXR shows perihilar edema. Cardoso catheter remains in place. Patient is currently on IV Vancomycin. Finished her course of azithromycin yesterday. Fevers and leukocytosis from admit have resolved. She is clinically improving on antibiotic therapy.     Plan  - Bacteremia likely from infected cardoso catheter. Recommend removal ASAP for adequate source control.  - Discontinue Vancomycin as trough returned at 27.9. Continue to hold until Vanc level is below 20. Random Vanc level ordered for tomorrow morning. MRSA will be covered until levels come down. Once levels down will adjust antibiotic accordingly.  - Repeat blood cultures daily until clear.  - ID will follow.

## 2017-09-02 NOTE — PROGRESS NOTES
Fléix Calles NP notified pt w/ vanc trough 27.9.  Orders updated by NP.  Pt to receive vancomycin 750 mg IVPB.      Update:  Vanc discontinued.      Will continue to monitor.

## 2017-09-02 NOTE — PROGRESS NOTES
[facility]  Infectious Disease  Progress Note    Patient Name: Emily Cook  MRN: [id number]  Admission Date: 8/29/2017  Length of Stay: 4 days  Attending Physician: Yves Ruth Jr.,*  Primary Care Provider: Kaylee Cazares MD    Isolation Status: Contact  Assessment/Plan:      Bacteremia    56 year-old female with history of pulmonary HTN on Veletri via brush catheter placed 6/2017 transferred from OSH with pneumonia and MRSA bacteremia. Blood cultures 8/26, 8/30 and 9/1 are positive for MRSA. TTE negative for vegetations. CXR shows perihilar edema. Brush catheter remains in place. Patient is currently on IV Vancomycin. Finished her course of azithromycin yesterday. Fevers and leukocytosis from admit have resolved. She is clinically improving on antibiotic therapy.     Plan  - Bacteremia likely from infected brush catheter. Recommend removal ASAP for adequate source control.  - Discontinue Vancomycin as trough returned at 27.9. Continue to hold until Vanc level is below 20. Random Vanc level ordered for tomorrow morning. MRSA will be covered until levels come down. Once levels down will adjust antibiotic accordingly.  - Repeat blood cultures daily until clear.  - ID will follow.             Please call for any questions. Thank you.  Mehnaz Hanson PA-C  Phone: [phone]  Pager: [phone]    Subjective:     Principal Problem:Respiratory failure with hypoxia    HPI: 57 y/o female with PHTN on veletri and lasix for pulmonary HTN, brush catheter placed 6/2017 here transferred from OSH after being admitted with acute respiratory failure 2/2 to PNA. Pt reports feeling unwell for the past few days with fevers and chills at home and neck pain. Pt febrile 100.4 on admit with leukocytosis of 14.20. Blood cultures 8/26, 8/30 +MRSA. Pt currently on azithromycin for PNA and vancomycin for MRSA bacteremia. 2D echo negative for vegetations. Respiratory cultures normal respiratory maribel.  Today pt reports much improvement since admission and breathing is back to baseline. Reports having intermittent cough. Afebrile, without a leukocytosis. Denies having any other acute symptoms.   Interval History: NAEON. Reports feeling somewhat better but still very weak. Without new complaints today. Blood cultures are persistently positive. Afebrile without a leukocytosis. Tolerating IV Vancomycin. Cardoso catheter to be removed next week.    Review of Systems   Constitutional: Positive for chills and fatigue. Negative for diaphoresis and fever.   Respiratory: Positive for cough and shortness of breath.    Cardiovascular: Negative for chest pain and leg swelling.   Gastrointestinal: Negative for abdominal pain, diarrhea, nausea and vomiting.   Genitourinary: Negative for dysuria.   Musculoskeletal: Negative for back pain.   Skin: Negative for wound.   Neurological: Positive for weakness. Negative for headaches.     Objective:     Vital Signs (Most Recent):  Temp: 98.1 °F (36.7 °C) (09/02/17 1117)  Pulse: 101 (09/02/17 1302)  Resp: 16 (09/02/17 1302)  BP: 108/65 (09/02/17 1117)  SpO2: (!) 94 % (09/02/17 1302) Vital Signs (24h Range):  Temp:  [97.6 °F (36.4 °C)-98.2 °F (36.8 °C)] 98.1 °F (36.7 °C)  Pulse:  [] 101  Resp:  [14-18] 16  SpO2:  [94 %-98 %] 94 %  BP: ()/(52-65) 108/65     Weight: 78.9 kg (173 lb 15.1 oz)  Body mass index is 30.81 kg/m².    Estimated Creatinine Clearance: 78.1 mL/min (based on SCr of 0.8 mg/dL).    Physical Exam   Constitutional: She is oriented to person, place, and time. She appears well-developed. No distress.   Cardiovascular: Normal rate, regular rhythm and normal heart sounds.  Exam reveals no friction rub.    No murmur heard.  Pulmonary/Chest: Effort normal. No respiratory distress. She has no wheezes. She has rales.   On O2 via nasal cannula    Abdominal: Soft. She exhibits no distension. There is no tenderness. There is no guarding.   Musculoskeletal: She exhibits no  edema, tenderness or deformity.   Cardoso catheter right upper chest dressed. Dressing c/d/i.   Neurological: She is alert and oriented to person, place, and time.   Skin: Skin is warm and dry. She is not diaphoretic.   Psychiatric: She has a normal mood and affect.   Nursing note and vitals reviewed.      Significant Labs:   Blood Culture:     Recent Labs  Lab 06/09/17  0913 08/26/17  0840 08/26/17  1028 08/30/17  0436 09/01/17  0423   LABBLOO No growth after 5 days. Gram stain peds bottle: Gram positive cocci in clusters resembling Staph  Positive results previously called ON DUPLICATE PEDIATRIC BOTTLE   (ORDER # [id number]) TO DYLAN ON 3N AT 23:13 8/26/2017 08/27/2017  01:20  METHICILLIN RESISTANT STAPHYLOCOCCUS AUREUSrefer to culture #[id number] Gram stain peds bottle: Gram positive cocci in clusters resembling Staph   Results called to and read back by:dylan 3n 08/26/2017  23:13 cg  METHICILLIN RESISTANT STAPHYLOCOCCUS AUREUS Gram stain aer bottle: Gram positive cocci in clusters resembling Staph  Results called to and read back by: Steven Bland RN  08/30/2017  20:20  METHICILLIN RESISTANT STAPHYLOCOCCUS AUREUSID consult required at [facility]. Gram stain aer bottle: Gram positive cocci in clusters resembling Staph   Results called to and read back by: Kandice Sears RN  09/02/2017  04:05  Gram stain aer bottle: Gram positive cocci in clusters resembling Staph   Results called to and read back by: Kandice Sears RN  09/02/2017  04:04     CBC:     Recent Labs  Lab 09/01/17 0424 09/02/17 0449   WBC 6.22 5.38   HGB 11.1* 11.5*   HCT 32.7* 35.0*   * 143*     CMP:     Recent Labs  Lab 09/01/17 0424 09/02/17 0449    137   K 4.0 3.4*   CL 96 90*   CO2 32* 35*    96   BUN 13 11   CREATININE 0.8 0.8   CALCIUM 8.8 9.2   ANIONGAP 9 12   EGFRNONAA >60.0 >60.0     Respiratory Culture:     Recent Labs  Lab 08/30/17 0928   GSRESP >10epis/lfp and <than many WBC's    Predominance of oropharyngeal maribel. Please recollect.   RESPIRATORYC Specimen inadequate - culture not performed. Spoke with Valerie Mojica 08/30/2017  14:18     Urine Culture:     Recent Labs  Lab 06/09/17  0743   LABURIN ESCHERICHIA COLI>100,000 cfu/ml     Urine Studies:     Recent Labs  Lab 08/26/17  0904   COLORU Yellow   APPEARANCEUA Clear   PHUR 6.0   SPECGRAV 1.020   PROTEINUA 100*   GLUCUA Negative   KETONESU Negative   BILIRUBINUA Small*   OCCULTUA Moderate*   NITRITE Negative   UROBILINOGEN 4.0*   LEUKOCYTESUR Negative   RBCUA 1   WBCUA 1   BACTERIA Rare   HYALINECASTS 1     Wound Culture: No results for input(s): LABAERO in the last 4320 hours.    Significant Imaging: I have reviewed all pertinent imaging results/findings within the past 24 hours.

## 2017-09-02 NOTE — PROGRESS NOTES
"[facility]  Heart Transplant  Progress Note    Patient Name: Emily Cook  MRN: [id number]  Admission Date: 8/29/2017  Hospital Length of Stay: 4 days  Attending Physician: Yves Ruth Jr.,*  Primary Care Provider: Kaylee Cazares MD  Principal Problem:Respiratory failure with hypoxia    Subjective:     Interval History: No complaints this morning. States she is feeling a bit better although still on high flow O2 100%.     Continuous Infusions:   epoprostenol (VELETRI) infusion 24 ng/kg/min (09/01/17 1506)    furosemide (LASIX) 5 mg/mL infusion (non-titrating) 20 mg/hr (09/01/17 2148)    veletri/remodulin tubing      veletri/remodulin tubing       Scheduled Meds:   albuterol sulfate  2.5 mg Nebulization Q6H    duloxetine  60 mg Oral BID    gabapentin  300 mg Oral TID    macitentan  10 mg Oral Daily    polyethylene glycol  17 g Oral Daily    potassium chloride  20 mEq Oral TID    sildenafil  20 mg Oral TID    spironolactone  25 mg Oral Daily    vancomycin (VANCOCIN) IVPB  750 mg Intravenous Q12H     PRN Meds:acetaminophen, albuterol sulfate, hydrocodone-acetaminophen 10-325mg    Review of patient's allergies indicates:   Allergen Reactions    Chlorhexidine Itching and Rash     Patient had raised rash on neck following RHC procedure. She states she's never had a problem with the "prep" used until this time.     Adhesive Rash     Objective:     Vital Signs (Most Recent):  Temp: 97.8 °F (36.6 °C) (09/02/17 0814)  Pulse: 90 (09/02/17 0828)  Resp: 16 (09/02/17 0828)  BP: (!) 101/57 (09/02/17 0814)  SpO2: 96 % (09/02/17 0828) Vital Signs (24h Range):  Temp:  [97.6 °F (36.4 °C)-98.2 °F (36.8 °C)] 97.8 °F (36.6 °C)  Pulse:  [] 90  Resp:  [14-19] 16  SpO2:  [95 %-98 %] 96 %  BP: ()/(52-60) 101/57     Weight: 78.9 kg (173 lb 15.1 oz)  Body mass index is 30.81 kg/m².      Intake/Output Summary (Last 24 hours) at 09/02/17 0910  Last data filed at 09/02/17 0400   Gross " per 24 hour   Intake          1785.24 ml   Output             2100 ml   Net          -314.76 ml       Hemodynamic Parameters:         Physical Exam   Constitutional: She is oriented to person, place, and time. She appears well-developed and well-nourished.   HENT:   Head: Normocephalic.   Eyes: Pupils are equal, round, and reactive to light.   Neck: Normal range of motion. Neck supple. JVD (improving) present.   Cardiovascular: Normal rate and regular rhythm.    Pulmonary/Chest: Effort normal. She has rales.   Abdominal: Soft. Bowel sounds are normal.   Musculoskeletal: Normal range of motion.   Neurological: She is alert and oriented to person, place, and time.   Skin: Skin is warm and dry.   Psychiatric: She has a normal mood and affect. Her behavior is normal.   Nursing note and vitals reviewed.      Significant Labs:  CBC:    Recent Labs  Lab 09/02/17 0449   WBC 5.38   RBC 4.65   HGB 11.5*   HCT 35.0*   *   MCV 75*   MCH 24.7*   MCHC 32.9     BNP:  No results for input(s): BNP in the last 72 hours.    Invalid input(s): BNPTRIAGELBLO  CMP:    Recent Labs  Lab 09/02/17 0449   GLU 96   CALCIUM 9.2      K 3.4*   CO2 35*   CL 90*   BUN 11   CREATININE 0.8      Coagulation:     Recent Labs  Lab 09/02/17 0449   INR 4.0*     LDH:  No results for input(s): LDH in the last 72 hours.  Microbiology:  Microbiology Results (last 7 days)     Procedure Component Value Units Date/Time    Blood culture [[id number]] Collected:  09/01/17 0423    Order Status:  Completed Specimen:  Blood Updated:  09/02/17 0405     Blood Culture, Routine Gram stain aer bottle: Gram positive cocci in clusters resembling Staph      Blood Culture, Routine Results called to and read back by: Kandice Sears RN  09/02/2017  04:05    Blood culture [[id number]] Collected:  09/01/17 0423    Order Status:  Completed Specimen:  Blood Updated:  09/02/17 0405     Blood Culture, Routine Gram stain aer bottle: Gram positive cocci in clusters resembling  Staph      Blood Culture, Routine Results called to and read back by: Kandice Sears RN  09/02/2017  04:04    Blood culture [[id number]]  (Susceptibility) Collected:  08/30/17 0436    Order Status:  Completed Specimen:  Blood Updated:  09/01/17 1050     Blood Culture, Routine Gram stain aer bottle: Gram positive cocci in clusters resembling Staph     Blood Culture, Routine Results called to and read back by: Steven Bland RN     Blood Culture, Routine 08/30/2017  20:20     Blood Culture, Routine --     METHICILLIN RESISTANT STAPHYLOCOCCUS AUREUS  ID consult required at [facility].Counts include 234 beds at the [facility] and [place] locations.      Culture, Respiratory with Gram Stain [[id number]] Collected:  08/30/17 0928    Order Status:  Completed Specimen:  Respiratory from Sputum, Expectorated Updated:  08/30/17 1419     Respiratory Culture Specimen inadequate - culture not performed. Spoke with Valerie      Respiratory Culture Esquinance 08/30/2017  14:18     Gram Stain (Respiratory) >10epis/lfp and <than many WBC's      Gram Stain (Respiratory) Predominance of oropharyngeal maribel. Please recollect.    Culture, Respiratory with Gram Stain [[id number]]     Order Status:  No result Specimen:  Respiratory from Sputum, Expectorated     Blood culture [[id number]]     Order Status:  Canceled Specimen:  Blood           I have reviewed all pertinent labs within the past 24 hours.    Estimated Creatinine Clearance: 78.1 mL/min (based on SCr of 0.8 mg/dL).    Diagnostic Results:  I have reviewed all pertinent imaging results/findings within the past 24 hours.    Assessment and Plan:     * Respiratory failure with hypoxia    -Secondary to PNA vs volume overload.   -Continue HFNC. Wean to maintain sats >/= 85% (home dose O2 5L)  -Continue Veletri 25ng/kg/mn  -Continue Lasix gtt for now.         Right-sided heart failure    -continue IV Lasix.   -standing dose of K as ordered  -monitor K and Mg q12h  -daily I/O  -low Na diet        Supratherapeutic INR    -trending down  today.   -will hold coumadin for now  -no Vit K at this time        Pulmonary hypertension    -continue veletri, sildenafil, macitentan   -NOTE: when pt arrived, nurse reported that her veletri pump was STOPPED.  She restarted it at a dose of 64 cc/24 h.          Bacteremia    -MRSA per cultures.  -Will get peripheral access and remove Cardoso(Will need INR to come down a bit).  -Continue Vanc.   -Appreciate ID Cx.         Pneumonia of right upper lobe due to infectious organism    -continue Vancomycin, azithromycin  -reordered blood cx, sputum cx  -CXR pending  -ID consult              Félix Calles, NP  Heart Transplant  [facility]

## 2017-09-02 NOTE — PROGRESS NOTES
Dr. Burnham notified pt w/ slow run of VT.  Now ST w/ .    Orders given to continue to monitor and notify physician of any changes.

## 2017-09-02 NOTE — PROGRESS NOTES
Pt noted to have a drop in SpO2 to 76%.  ST on Tele w/ HR 110s.  Comfort flow settings:  100% 25L.  Pt pumped up to 100% 28L.      Dr. Cherry notified.  Orders given to continue to monitor and notify physician if SpO2 continues to drop or HR is sustained ST.  Titrate to keep O2 >85%.      Will continue to monitor.

## 2017-09-02 NOTE — SUBJECTIVE & OBJECTIVE
Interval History: NAEON. Reports feeling somewhat better but still very weak. Without new complaints today. Blood cultures are persistently positive. Afebrile without a leukocytosis. Tolerating IV Vancomycin. Cardoso catheter to be removed next week.    Review of Systems   Constitutional: Positive for chills and fatigue. Negative for diaphoresis and fever.   Respiratory: Positive for cough and shortness of breath.    Cardiovascular: Negative for chest pain and leg swelling.   Gastrointestinal: Negative for abdominal pain, diarrhea, nausea and vomiting.   Genitourinary: Negative for dysuria.   Musculoskeletal: Negative for back pain.   Skin: Negative for wound.   Neurological: Positive for weakness. Negative for headaches.     Objective:     Vital Signs (Most Recent):  Temp: 98.1 °F (36.7 °C) (09/02/17 1117)  Pulse: 101 (09/02/17 1302)  Resp: 16 (09/02/17 1302)  BP: 108/65 (09/02/17 1117)  SpO2: (!) 94 % (09/02/17 1302) Vital Signs (24h Range):  Temp:  [97.6 °F (36.4 °C)-98.2 °F (36.8 °C)] 98.1 °F (36.7 °C)  Pulse:  [] 101  Resp:  [14-18] 16  SpO2:  [94 %-98 %] 94 %  BP: ()/(52-65) 108/65     Weight: 78.9 kg (173 lb 15.1 oz)  Body mass index is 30.81 kg/m².    Estimated Creatinine Clearance: 78.1 mL/min (based on SCr of 0.8 mg/dL).    Physical Exam   Constitutional: She is oriented to person, place, and time. She appears well-developed. No distress.   Cardiovascular: Normal rate, regular rhythm and normal heart sounds.  Exam reveals no friction rub.    No murmur heard.  Pulmonary/Chest: Effort normal. No respiratory distress. She has no wheezes. She has rales.   On O2 via nasal cannula    Abdominal: Soft. She exhibits no distension. There is no tenderness. There is no guarding.   Musculoskeletal: She exhibits no edema, tenderness or deformity.   Cardoso catheter right upper chest dressed. Dressing c/d/i.   Neurological: She is alert and oriented to person, place, and time.   Skin: Skin is warm and dry.  She is not diaphoretic.   Psychiatric: She has a normal mood and affect.   Nursing note and vitals reviewed.      Significant Labs:   Blood Culture:     Recent Labs  Lab 06/09/17  0913 08/26/17  0840 08/26/17  1028 08/30/17  0436 09/01/17  0423   LABBLOO No growth after 5 days. Gram stain peds bottle: Gram positive cocci in clusters resembling Staph  Positive results previously called ON DUPLICATE PEDIATRIC BOTTLE   (ORDER # [id number]) TO DINO ON 3N AT 23:13 8/26/2017 08/27/2017  01:20  METHICILLIN RESISTANT STAPHYLOCOCCUS AUREUSrefer to culture #[id number] Gram stain peds bottle: Gram positive cocci in clusters resembling Staph   Results called to and read back by:dino 3n 08/26/2017  23:13 cg  METHICILLIN RESISTANT STAPHYLOCOCCUS AUREUS Gram stain aer bottle: Gram positive cocci in clusters resembling Staph  Results called to and read back by: Steven Bland RN  08/30/2017  20:20  METHICILLIN RESISTANT STAPHYLOCOCCUS AUREUSID consult required at [facility] and [facility]. Gram stain aer bottle: Gram positive cocci in clusters resembling Staph   Results called to and read back by: Kandice Sears RN  09/02/2017  04:05  Gram stain aer bottle: Gram positive cocci in clusters resembling Staph   Results called to and read back by: Kandice Sears RN  09/02/2017  04:04     CBC:     Recent Labs  Lab 09/01/17  0424 09/02/17  0449   WBC 6.22 5.38   HGB 11.1* 11.5*   HCT 32.7* 35.0*   * 143*     CMP:     Recent Labs  Lab 09/01/17  0424 09/02/17  0449    137   K 4.0 3.4*   CL 96 90*   CO2 32* 35*    96   BUN 13 11   CREATININE 0.8 0.8   CALCIUM 8.8 9.2   ANIONGAP 9 12   EGFRNONAA >60.0 >60.0     Respiratory Culture:     Recent Labs  Lab 08/30/17  0928   GSRESP >10epis/lfp and <than many WBC's   Predominance of oropharyngeal maribel. Please recollect.   RESPIRATORYC Specimen inadequate - culture not performed. Spoke with Valerie Mojica 08/30/2017  14:18     Urine Culture:     Recent  Labs  Lab 06/09/17  0743   LABURIN ESCHERICHIA COLI>100,000 cfu/ml     Urine Studies:     Recent Labs  Lab 08/26/17  0904   COLORU Yellow   APPEARANCEUA Clear   PHUR 6.0   SPECGRAV 1.020   PROTEINUA 100*   GLUCUA Negative   KETONESU Negative   BILIRUBINUA Small*   OCCULTUA Moderate*   NITRITE Negative   UROBILINOGEN 4.0*   LEUKOCYTESUR Negative   RBCUA 1   WBCUA 1   BACTERIA Rare   HYALINECASTS 1     Wound Culture: No results for input(s): LABAERO in the last 4320 hours.    Significant Imaging: I have reviewed all pertinent imaging results/findings within the past 24 hours.

## 2017-09-02 NOTE — SUBJECTIVE & OBJECTIVE
"Interval History: No complaints this morning. States she is feeling a bit better although still on high flow O2 100%.     Continuous Infusions:   epoprostenol (VELETRI) infusion 24 ng/kg/min (09/01/17 1506)    furosemide (LASIX) 5 mg/mL infusion (non-titrating) 20 mg/hr (09/01/17 2148)    veletri/remodulin tubing      veletri/remodulin tubing       Scheduled Meds:   albuterol sulfate  2.5 mg Nebulization Q6H    duloxetine  60 mg Oral BID    gabapentin  300 mg Oral TID    macitentan  10 mg Oral Daily    polyethylene glycol  17 g Oral Daily    potassium chloride  20 mEq Oral TID    sildenafil  20 mg Oral TID    spironolactone  25 mg Oral Daily    vancomycin (VANCOCIN) IVPB  750 mg Intravenous Q12H     PRN Meds:acetaminophen, albuterol sulfate, hydrocodone-acetaminophen 10-325mg    Review of patient's allergies indicates:   Allergen Reactions    Chlorhexidine Itching and Rash     Patient had raised rash on neck following RHC procedure. She states she's never had a problem with the "prep" used until this time.     Adhesive Rash     Objective:     Vital Signs (Most Recent):  Temp: 97.8 °F (36.6 °C) (09/02/17 0814)  Pulse: 90 (09/02/17 0828)  Resp: 16 (09/02/17 0828)  BP: (!) 101/57 (09/02/17 0814)  SpO2: 96 % (09/02/17 0828) Vital Signs (24h Range):  Temp:  [97.6 °F (36.4 °C)-98.2 °F (36.8 °C)] 97.8 °F (36.6 °C)  Pulse:  [] 90  Resp:  [14-19] 16  SpO2:  [95 %-98 %] 96 %  BP: ()/(52-60) 101/57     Weight: 78.9 kg (173 lb 15.1 oz)  Body mass index is 30.81 kg/m².      Intake/Output Summary (Last 24 hours) at 09/02/17 0910  Last data filed at 09/02/17 0400   Gross per 24 hour   Intake          1785.24 ml   Output             2100 ml   Net          -314.76 ml       Hemodynamic Parameters:         Physical Exam   Constitutional: She is oriented to person, place, and time. She appears well-developed and well-nourished.   HENT:   Head: Normocephalic.   Eyes: Pupils are equal, round, and reactive to " light.   Neck: Normal range of motion. Neck supple. JVD (improving) present.   Cardiovascular: Normal rate and regular rhythm.    Pulmonary/Chest: Effort normal. She has rales.   Abdominal: Soft. Bowel sounds are normal.   Musculoskeletal: Normal range of motion.   Neurological: She is alert and oriented to person, place, and time.   Skin: Skin is warm and dry.   Psychiatric: She has a normal mood and affect. Her behavior is normal.   Nursing note and vitals reviewed.      Significant Labs:  CBC:    Recent Labs  Lab 09/02/17 0449   WBC 5.38   RBC 4.65   HGB 11.5*   HCT 35.0*   *   MCV 75*   MCH 24.7*   MCHC 32.9     BNP:  No results for input(s): BNP in the last 72 hours.    Invalid input(s): BNPTRIAGELBLO  CMP:    Recent Labs  Lab 09/02/17 0449   GLU 96   CALCIUM 9.2      K 3.4*   CO2 35*   CL 90*   BUN 11   CREATININE 0.8      Coagulation:     Recent Labs  Lab 09/02/17 0449   INR 4.0*     LDH:  No results for input(s): LDH in the last 72 hours.  Microbiology:  Microbiology Results (last 7 days)     Procedure Component Value Units Date/Time    Blood culture [[id number]] Collected:  09/01/17 0423    Order Status:  Completed Specimen:  Blood Updated:  09/02/17 0405     Blood Culture, Routine Gram stain aer bottle: Gram positive cocci in clusters resembling Staph      Blood Culture, Routine Results called to and read back by: Kandice Sears RN  09/02/2017  04:05    Blood culture [[id number]] Collected:  09/01/17 0423    Order Status:  Completed Specimen:  Blood Updated:  09/02/17 0405     Blood Culture, Routine Gram stain aer bottle: Gram positive cocci in clusters resembling Staph      Blood Culture, Routine Results called to and read back by: Kandice Sears RN  09/02/2017  04:04    Blood culture [[id number]]  (Susceptibility) Collected:  08/30/17 0436    Order Status:  Completed Specimen:  Blood Updated:  09/01/17 1050     Blood Culture, Routine Gram stain aer bottle: Gram positive cocci in clusters  resembling Staph     Blood Culture, Routine Results called to and read back by: Steven Bland RN     Blood Culture, Routine 08/30/2017  20:20     Blood Culture, Routine --     METHICILLIN RESISTANT STAPHYLOCOCCUS AUREUS  ID consult required at [facility] and [facility].      Culture, Respiratory with Gram Stain [[id number]] Collected:  08/30/17 0928    Order Status:  Completed Specimen:  Respiratory from Sputum, Expectorated Updated:  08/30/17 1419     Respiratory Culture Specimen inadequate - culture not performed. Spoke with Valerie      Respiratory Culture Esquinance 08/30/2017  14:18     Gram Stain (Respiratory) >10epis/lfp and <than many WBC's      Gram Stain (Respiratory) Predominance of oropharyngeal maribel. Please recollect.    Culture, Respiratory with Gram Stain [[id number]]     Order Status:  No result Specimen:  Respiratory from Sputum, Expectorated     Blood culture [[id number]]     Order Status:  Canceled Specimen:  Blood           I have reviewed all pertinent labs within the past 24 hours.    Estimated Creatinine Clearance: 78.1 mL/min (based on SCr of 0.8 mg/dL).    Diagnostic Results:  I have reviewed all pertinent imaging results/findings within the past 24 hours.

## 2017-09-02 NOTE — SIGNIFICANT EVENT
At around 5:30 PM Nurse called for slow run of VT with pt being asymptomatic and at baseline.   Pt seen and examined.   Denied any palpitations, CP or SOB.   Remained on NC supplemental oxygen at 100% FiO2 and at 25-30 l/miN.   However noted a small blood oozing and pt feeling uncomfortable with nasal canula.   Pt was switched to ventimask at maximum dialed oxygen/flow rate setting (55%/15LPM)   However her pulse ox was noated at 83 and heart rate at 105.     Then called the respiratory therapy to bridge with non-rebreather, and adjust for higher dial for Fio2/Flow rate adjustment.     Patient remained asymptomatic and BP remained stable.     Keep the pulse oxygen saturation at 85 plus.     Follow up in one hour.     Patient reported that she does not want to be intubated in case she needs to be on one.

## 2017-09-02 NOTE — PROGRESS NOTES
Notified Dr. Tidwell on call for [facility]TS of blood cx drawn on 9/1/17 now growing gram+ cocci resembling staph. No new orders at this time.

## 2017-09-02 NOTE — PLAN OF CARE
Problem: Patient Care Overview  Goal: Plan of Care Review  Outcome: Ongoing (interventions implemented as appropriate)  Pt is AAOx4 in bed wearing non-skid footwear, bed in low/locked position and with call bell within reach. Pt reminded to use call bell to call for assistance, pt verbalizes understanding. Pt is afebrile at this time. Proper hand hygiene performed before and after pt care activities. This shift notified of blood cx drawn on 9/1 now growing gram+ cocci resembling staph, Dr. Tidwell notified. Patient received IV Vancomycin for MRSA this shift. Vanc trough ordered before AM dose. Lasix gtt infusing at 20mg/hr (4cc/hr). Veletri infusing via CADD pump at 24ng/kg/min (50cc/24hrs). Patient remains on Bipap, O2 sats 98%. NSR in 90s on telemetry monitor. Denies any pain or discomfort at this time.

## 2017-09-02 NOTE — ASSESSMENT & PLAN NOTE
-MRSA per cultures.  -Will get peripheral access and remove Cardoso(Will need INR to come down a bit).  -Continue Vanc.   -Appreciate ID Spencer

## 2017-09-02 NOTE — PROGRESS NOTES
Dr. Wilder at bedside to examine pt.  Pt on Venti mask 15L/55%.  O2 sats 81-83%.  Pt placed on nonrebreather.  O2 sats up to 88%.  Respiratory called for further care per Dr. Serrano orders.      Will continue to monitor.

## 2017-09-03 NOTE — SUBJECTIVE & OBJECTIVE
"Interval History: No complaints this morning. Sats dropped yesterday afternoon and she was placed on NRB however pt states she was asymptomatic. She decided on full DNR and reiterated that to us this morning.     Continuous Infusions:   epoprostenol (VELETRI) infusion 24 ng/kg/min (09/02/17 1458)    furosemide (LASIX) 5 mg/mL infusion (non-titrating) 20 mg/hr (09/01/17 2148)    veletri/remodulin tubing      veletri/remodulin tubing       Scheduled Meds:   albuterol sulfate  2.5 mg Nebulization Q6H    duloxetine  60 mg Oral BID    gabapentin  300 mg Oral TID    macitentan  10 mg Oral Daily    magnesium oxide  400 mg Oral BID    polyethylene glycol  17 g Oral Daily    potassium chloride  20 mEq Oral TID    sildenafil  20 mg Oral TID    spironolactone  25 mg Oral Daily     PRN Meds:acetaminophen, albuterol sulfate, hydrocodone-acetaminophen 10-325mg    Review of patient's allergies indicates:   Allergen Reactions    Chlorhexidine Itching and Rash     Patient had raised rash on neck following RHC procedure. She states she's never had a problem with the "prep" used until this time.     Adhesive Rash     Objective:     Vital Signs (Most Recent):  Temp: 98.9 °F (37.2 °C) (09/03/17 0359)  Pulse: 106 (09/03/17 0700)  Resp: 20 (09/03/17 0359)  BP: 117/67 (09/03/17 0359)  SpO2: (!) 92 % (09/03/17 0359) Vital Signs (24h Range):  Temp:  [98.1 °F (36.7 °C)-98.9 °F (37.2 °C)] 98.9 °F (37.2 °C)  Pulse:  [] 106  Resp:  [16-22] 20  SpO2:  [86 %-98 %] 92 %  BP: ()/(51-67) 117/67     Weight: 79.1 kg (174 lb 6.1 oz)  Body mass index is 30.89 kg/m².      Intake/Output Summary (Last 24 hours) at 09/03/17 0834  Last data filed at 09/03/17 0500   Gross per 24 hour   Intake           693.32 ml   Output             2800 ml   Net         -2106.68 ml       Hemodynamic Parameters:         Physical Exam   Constitutional: She is oriented to person, place, and time. She appears well-developed and well-nourished.   HENT: "   Head: Normocephalic.   Eyes: Pupils are equal, round, and reactive to light.   Neck: Normal range of motion. Neck supple. JVD (improving) present.   Cardiovascular: Normal rate and regular rhythm.    Pulmonary/Chest: Effort normal. She has rales.   Abdominal: Soft. Bowel sounds are normal.   Musculoskeletal: Normal range of motion.   Neurological: She is alert and oriented to person, place, and time.   Skin: Skin is warm and dry.   Psychiatric: She has a normal mood and affect. Her behavior is normal.   Nursing note and vitals reviewed.      Significant Labs:  CBC:    Recent Labs  Lab 09/03/17 0543   WBC 7.63   RBC 5.17   HGB 12.9   HCT 38.1      MCV 74*   MCH 25.0*   MCHC 33.9     BNP:  No results for input(s): BNP in the last 72 hours.    Invalid input(s): BNPTRIAGELBLO  CMP:    Recent Labs  Lab 09/03/17 0543   GLU 95   CALCIUM 10.0      K 4.3   CO2 34*   CL 89*   BUN 14   CREATININE 1.1      Coagulation:     Recent Labs  Lab 09/03/17 0543   INR 2.8*     LDH:  No results for input(s): LDH in the last 72 hours.  Microbiology:  Microbiology Results (last 7 days)     Procedure Component Value Units Date/Time    Blood culture [[id number]] Collected:  09/03/17 0543    Order Status:  Sent Specimen:  Blood Updated:  09/03/17 0644    Blood culture [[id number]] Collected:  09/01/17 0423    Order Status:  Completed Specimen:  Blood Updated:  09/02/17 0405     Blood Culture, Routine Gram stain aer bottle: Gram positive cocci in clusters resembling Staph      Blood Culture, Routine Results called to and read back by: Kandice Sears RN  09/02/2017  04:05    Blood culture [[id number]] Collected:  09/01/17 0423    Order Status:  Completed Specimen:  Blood Updated:  09/02/17 0405     Blood Culture, Routine Gram stain aer bottle: Gram positive cocci in clusters resembling Staph      Blood Culture, Routine Results called to and read back by: aKndice Sears RN  09/02/2017  04:04    Blood culture [[id number]]   (Susceptibility) Collected:  08/30/17 0436    Order Status:  Completed Specimen:  Blood Updated:  09/01/17 1050     Blood Culture, Routine Gram stain aer bottle: Gram positive cocci in clusters resembling Staph     Blood Culture, Routine Results called to and read back by: Steven Bland RN     Blood Culture, Routine 08/30/2017  20:20     Blood Culture, Routine --     METHICILLIN RESISTANT STAPHYLOCOCCUS AUREUS  ID consult required at [facility] and [facility].      Culture, Respiratory with Gram Stain [[id number]] Collected:  08/30/17 0928    Order Status:  Completed Specimen:  Respiratory from Sputum, Expectorated Updated:  08/30/17 1419     Respiratory Culture Specimen inadequate - culture not performed. Spoke with Valerie      Respiratory Culture Esquinance 08/30/2017  14:18     Gram Stain (Respiratory) >10epis/lfp and <than many WBC's      Gram Stain (Respiratory) Predominance of oropharyngeal maribel. Please recollect.    Culture, Respiratory with Gram Stain [[id number]]     Order Status:  No result Specimen:  Respiratory from Sputum, Expectorated     Blood culture [[id number]]     Order Status:  Canceled Specimen:  Blood           I have reviewed all pertinent labs within the past 24 hours.    Estimated Creatinine Clearance: 56.9 mL/min (based on SCr of 1.1 mg/dL).    Diagnostic Results:  I have reviewed all pertinent imaging results/findings within the past 24 hours.

## 2017-09-03 NOTE — PROGRESS NOTES
[facility]  Infectious Disease  Progress Note    Patient Name: Emily Cook  MRN: [id number]  Admission Date: 8/29/2017  Length of Stay: 5 days  Attending Physician: Yves Ruth Jr.,*  Primary Care Provider: Kaylee Cazares MD    Isolation Status: Contact  Assessment/Plan:      Bacteremia    56 year-old female with history of pulmonary HTN on Veletri via brush catheter placed 6/2017 transferred from OSH with pneumonia and MRSA bacteremia. Blood cultures 8/26, 8/30 and 9/1 are positive for MRSA. TTE negative for vegetations. CXR shows perihilar edema. Brush catheter remains in place. Patient is currently on IV Vancomycin. She has finished her course of azithromycin. Fevers and leukocytosis from admit have resolved. She is clinically improving on antibiotic therapy.     Plan  - Bacteremia likely from infected brush catheter. Recommend removal ASAP for adequate source control.  - Re-start Vancomycin at 750 mg IV q 12 hours. Vanc Trough before 4th dose (ordered).   - Repeat blood cultures daily until clear.  - ID will follow.             Please call for any questions. Thank you.  Mehnaz Hanson PA-C  Phone: [phone]  Pager: [phone]      Subjective:     Principal Problem:Respiratory failure with hypoxia    HPI: 57 y/o female with PHTN on veletri and lasix for pulmonary HTN, brush catheter placed 6/2017 here transferred from OSH after being admitted with acute respiratory failure 2/2 to PNA. Pt reports feeling unwell for the past few days with fevers and chills at home and neck pain. Pt febrile 100.4 on admit with leukocytosis of 14.20. Blood cultures 8/26, 8/30 +MRSA. Pt currently on azithromycin for PNA and vancomycin for MRSA bacteremia. 2D echo negative for vegetations. Respiratory cultures normal respiratory maribel. Today pt reports much improvement since admission and breathing is back to baseline. Reports having intermittent cough. Afebrile, without a leukocytosis. Denies  having any other acute symptoms.   Interval History: NAEON. On o2 via non rebreather mask. Without new complaints today. Saddened she will be here on her birthday but coping well. Blood cultures are persistently positive. Repeated today. Afebrile without a leukocytosis. Tolerating IV Vancomycin. Cardoso catheter to be removed next week.     Review of Systems   Constitutional: Positive for fatigue. Negative for chills, diaphoresis and fever.   Respiratory: Positive for cough and shortness of breath.    Cardiovascular: Negative for chest pain and leg swelling.   Gastrointestinal: Negative for abdominal pain, diarrhea, nausea and vomiting.   Genitourinary: Negative for dysuria.   Musculoskeletal: Negative for back pain.   Skin: Negative for wound.   Neurological: Positive for weakness. Negative for headaches.     Objective:     Vital Signs (Most Recent):  Temp: 97.4 °F (36.3 °C) (09/03/17 1142)  Pulse: 102 (09/03/17 1340)  Resp: 18 (09/03/17 1340)  BP: (!) 132/58 (09/03/17 1142)  SpO2: (!) 94 % (09/03/17 1340) Vital Signs (24h Range):  Temp:  [97.4 °F (36.3 °C)-98.9 °F (37.2 °C)] 97.4 °F (36.3 °C)  Pulse:  [] 102  Resp:  [16-22] 18  SpO2:  [86 %-98 %] 94 %  BP: ()/(51-67) 132/58     Weight: 79.1 kg (174 lb 6.1 oz)  Body mass index is 30.89 kg/m².    Estimated Creatinine Clearance: 56.9 mL/min (based on SCr of 1.1 mg/dL).    Physical Exam   Constitutional: She is oriented to person, place, and time. She appears well-developed. No distress.   Cardiovascular: Normal rate, regular rhythm and normal heart sounds.  Exam reveals no friction rub.    No murmur heard.  Pulmonary/Chest: Effort normal. No respiratory distress. She has no wheezes. She has rales.   On O2 via non rebreather mask   Abdominal: Soft. She exhibits no distension. There is no tenderness. There is no guarding.   Musculoskeletal: She exhibits no edema, tenderness or deformity.   Cardoso catheter right upper chest dressed. Dressing c/d/i.    Neurological: She is alert and oriented to person, place, and time.   Skin: Skin is warm and dry. She is not diaphoretic.   Psychiatric: She has a normal mood and affect.   Nursing note and vitals reviewed.      Significant Labs:   Blood Culture:     Recent Labs  Lab 06/09/17  0913 08/26/17  0840 08/26/17  1028 08/30/17  0436 09/01/17  0423   LABBLOO No growth after 5 days. Gram stain peds bottle: Gram positive cocci in clusters resembling Staph  Positive results previously called ON DUPLICATE PEDIATRIC BOTTLE   (ORDER # [id number]) TO DYLAN ON 3N AT 23:13 8/26/2017 08/27/2017  01:20  METHICILLIN RESISTANT STAPHYLOCOCCUS AUREUSrefer to culture #[id number] Gram stain peds bottle: Gram positive cocci in clusters resembling Staph   Results called to and read back by:dylan 3n 08/26/2017  23:13 cg  METHICILLIN RESISTANT STAPHYLOCOCCUS AUREUS Gram stain aer bottle: Gram positive cocci in clusters resembling Staph  Results called to and read back by: Steven Bland RN  08/30/2017  20:20  METHICILLIN RESISTANT STAPHYLOCOCCUS AUREUSID consult required at [facility]. Gram stain aer bottle: Gram positive cocci in clusters resembling Staph   Results called to and read back by: Kandice Sears RN  09/02/2017  04:05  STAPHYLOCOCCUS AUREUSID consult required at [facility].For susceptibility see order #[id number]  Gram stain aer bottle: Gram positive cocci in clusters resembling Staph   Results called to and read back by: Kandice Sears RN  09/02/2017  04:04  STAPHYLOCOCCUS AUREUSSusceptibility pendingID consult required at [facility].     CBC:     Recent Labs  Lab 09/02/17 0449 09/03/17  0543   WBC 5.38 7.63   HGB 11.5* 12.9   HCT 35.0* 38.1   * 157     CMP:     Recent Labs  Lab 09/02/17 0449 09/02/17 2023 09/03/17  0543     --  136   K 3.4* 5.1 4.3   CL 90*  --  89*   CO2 35*  --  34*   GLU 96  --  95   BUN 11  --  14   CREATININE  0.8  --  1.1   CALCIUM 9.2  --  10.0   ANIONGAP 12  --  13   EGFRNONAA >60.0  --  56.3*     Respiratory Culture:     Recent Labs  Lab 08/30/17  0928   GSRESP >10epis/lfp and <than many WBC's   Predominance of oropharyngeal maribel. Please recollect.   RESPIRATORYC Specimen inadequate - culture not performed. Spoke with Valerie Mojica 08/30/2017  14:18     Urine Culture:     Recent Labs  Lab 06/09/17  0743   LABURIN ESCHERICHIA COLI>100,000 cfu/ml     Urine Studies:     Recent Labs  Lab 08/26/17  0904   COLORU Yellow   APPEARANCEUA Clear   PHUR 6.0   SPECGRAV 1.020   PROTEINUA 100*   GLUCUA Negative   KETONESU Negative   BILIRUBINUA Small*   OCCULTUA Moderate*   NITRITE Negative   UROBILINOGEN 4.0*   LEUKOCYTESUR Negative   RBCUA 1   WBCUA 1   BACTERIA Rare   HYALINECASTS 1     Wound Culture: No results for input(s): LABAERO in the last 4320 hours.    Significant Imaging: I have reviewed all pertinent imaging results/findings within the past 24 hours.

## 2017-09-03 NOTE — SUBJECTIVE & OBJECTIVE
Interval History: NAEON. On o2 via non rebreather mask. Without new complaints today. Saddened she will be here on her birthday but coping well. Blood cultures are persistently positive. Repeated today. Afebrile without a leukocytosis. Tolerating IV Vancomycin. Cardoso catheter to be removed next week.     Review of Systems   Constitutional: Positive for fatigue. Negative for chills, diaphoresis and fever.   Respiratory: Positive for cough and shortness of breath.    Cardiovascular: Negative for chest pain and leg swelling.   Gastrointestinal: Negative for abdominal pain, diarrhea, nausea and vomiting.   Genitourinary: Negative for dysuria.   Musculoskeletal: Negative for back pain.   Skin: Negative for wound.   Neurological: Positive for weakness. Negative for headaches.     Objective:     Vital Signs (Most Recent):  Temp: 97.4 °F (36.3 °C) (09/03/17 1142)  Pulse: 102 (09/03/17 1340)  Resp: 18 (09/03/17 1340)  BP: (!) 132/58 (09/03/17 1142)  SpO2: (!) 94 % (09/03/17 1340) Vital Signs (24h Range):  Temp:  [97.4 °F (36.3 °C)-98.9 °F (37.2 °C)] 97.4 °F (36.3 °C)  Pulse:  [] 102  Resp:  [16-22] 18  SpO2:  [86 %-98 %] 94 %  BP: ()/(51-67) 132/58     Weight: 79.1 kg (174 lb 6.1 oz)  Body mass index is 30.89 kg/m².    Estimated Creatinine Clearance: 56.9 mL/min (based on SCr of 1.1 mg/dL).    Physical Exam   Constitutional: She is oriented to person, place, and time. She appears well-developed. No distress.   Cardiovascular: Normal rate, regular rhythm and normal heart sounds.  Exam reveals no friction rub.    No murmur heard.  Pulmonary/Chest: Effort normal. No respiratory distress. She has no wheezes. She has rales.   On O2 via non rebreather mask   Abdominal: Soft. She exhibits no distension. There is no tenderness. There is no guarding.   Musculoskeletal: She exhibits no edema, tenderness or deformity.   Cardoso catheter right upper chest dressed. Dressing c/d/i.   Neurological: She is alert and oriented  to person, place, and time.   Skin: Skin is warm and dry. She is not diaphoretic.   Psychiatric: She has a normal mood and affect.   Nursing note and vitals reviewed.      Significant Labs:   Blood Culture:     Recent Labs  Lab 06/09/17  0913 08/26/17  0840 08/26/17  1028 08/30/17  0436 09/01/17  0423   LABBLOO No growth after 5 days. Gram stain peds bottle: Gram positive cocci in clusters resembling Staph  Positive results previously called ON DUPLICATE PEDIATRIC BOTTLE   (ORDER # [id number]) TO DYLAN ON 3N AT 23:13 8/26/2017 08/27/2017  01:20  METHICILLIN RESISTANT STAPHYLOCOCCUS AUREUSrefer to culture #[id number] Gram stain peds bottle: Gram positive cocci in clusters resembling Staph   Results called to and read back by:dylan 3n 08/26/2017  23:13 cg  METHICILLIN RESISTANT STAPHYLOCOCCUS AUREUS Gram stain aer bottle: Gram positive cocci in clusters resembling Staph  Results called to and read back by: Steven Bland RN  08/30/2017  20:20  METHICILLIN RESISTANT STAPHYLOCOCCUS AUREUSID consult required at [facility]. Gram stain aer bottle: Gram positive cocci in clusters resembling Staph   Results called to and read back by: Kandice Sears RN  09/02/2017  04:05  STAPHYLOCOCCUS AUREUSID consult required at [facility].For susceptibility see order #[id number]  Gram stain aer bottle: Gram positive cocci in clusters resembling Staph   Results called to and read back by: Kandice Sears RN  09/02/2017  04:04  STAPHYLOCOCCUS AUREUSSusceptibility pendingID consult required at [facility].     CBC:     Recent Labs  Lab 09/02/17 0449 09/03/17  0543   WBC 5.38 7.63   HGB 11.5* 12.9   HCT 35.0* 38.1   * 157     CMP:     Recent Labs  Lab 09/02/17 0449 09/02/17 2023 09/03/17  0543     --  136   K 3.4* 5.1 4.3   CL 90*  --  89*   CO2 35*  --  34*   GLU 96  --  95   BUN 11  --  14   CREATININE 0.8  --  1.1   CALCIUM 9.2  --  10.0    ANIONGAP 12  --  13   EGFRNONAA >60.0  --  56.3*     Respiratory Culture:     Recent Labs  Lab 08/30/17  0928   GSRESP >10epis/lfp and <than many WBC's   Predominance of oropharyngeal maribel. Please recollect.   RESPIRATORYC Specimen inadequate - culture not performed. Spoke with Valerie Mojica 08/30/2017  14:18     Urine Culture:     Recent Labs  Lab 06/09/17  0743   LABURIN ESCHERICHIA COLI>100,000 cfu/ml     Urine Studies:     Recent Labs  Lab 08/26/17  0904   COLORU Yellow   APPEARANCEUA Clear   PHUR 6.0   SPECGRAV 1.020   PROTEINUA 100*   GLUCUA Negative   KETONESU Negative   BILIRUBINUA Small*   OCCULTUA Moderate*   NITRITE Negative   UROBILINOGEN 4.0*   LEUKOCYTESUR Negative   RBCUA 1   WBCUA 1   BACTERIA Rare   HYALINECASTS 1     Wound Culture: No results for input(s): LABAERO in the last 4320 hours.    Significant Imaging: I have reviewed all pertinent imaging results/findings within the past 24 hours.

## 2017-09-03 NOTE — PROGRESS NOTES
Dr. Wilder notified pt w/ 30 beat run VT, asymptomatic.  No SOB, chest pain.  Pt comfortable in bed.  VSS.      New new orders given.  Will continue to monitor.

## 2017-09-03 NOTE — PLAN OF CARE
Problem: Patient Care Overview  Goal: Plan of Care Review  Outcome: Ongoing (interventions implemented as appropriate)  Pt AAOx4.  On TELE monitor, ST throughout the day HR low 100s - 120s w/ 38 beat run of Vtach this afternoon.  BPs on the lower side 80s-130s/50s.  Afebrile.  On Non-rebreather w/ O2 sats > 92%.  Veletri running @ 24ng/kg/min to R forearm 22g placed yesterday.  DW 86 kg.  50cc/24hrs.  Cassette changed around 3pm.  Backup CADD pump in room.  Backup cassette in [employer] refrigerator.  Lasix gtt running @ 4 cc/hr to L forearm 22g placed yesterday.  Good clear yellow urine output. Incontinence noted.  Pt w/ pure wick in place.  Pads changed PRN as needed.  No BM since last week.  Pt currently refusing Miralax.  Pt offered another type of stool softener or laxative and pt refusing.  Poor appetite noted, eating only foods brought in from outside the hospital.  Breakfast - 0%.  Lunch - popeyes box, 50%.  Contact precautions maintained for blood cultures positive for MRSA.  Repeat cultures drawn this am.  Vanc restarted this am after being held yesterday secondary to high vanc trough of 27.9.  Next vanc trough due before 4th dose tomorrow evening.  Bed lowered and locked.  Pt in bed all day, refusing to get out of bed.  Call bell in reach.  Pt instructed to call for assistance when needed. New DNR orders signed by physician and in chart.  No falls/injuries this shift.  See flowsheet for further assessment details.  Will continue to monitor.

## 2017-09-03 NOTE — PROGRESS NOTES
Got sign out that this patient was decompensating to the 70's.  Went to see patient and she was on a non-rebreather and O2 sats had improved to low 90's.  Pt was also having runs of SVT and also had a run of non-sustained VT.  Electrolytes have not been checked since morning labs.  Will order a stat K and Mg and will replete as needed.  If pt decompensates again to O2 sat<85, we will get a blood gas and potentially move to BIPAP.

## 2017-09-03 NOTE — ASSESSMENT & PLAN NOTE
-MRSA per cultures.  -Will get peripheral access and remove Cardoso([facility] requesting INR<2.5 so may be able to come out tomorrow).  -Appreciate ID Juaquin. Continue Vanc.

## 2017-09-03 NOTE — PLAN OF CARE
Problem: Patient Care Overview  Goal: Plan of Care Review  Outcome: Ongoing (interventions implemented as appropriate)  Pt continues on veletri. No issues throughout shift. Pt remains on nonrebreather at 15L sats 92%. Pt request no too wear bipap tonight. Informed pt we will continue to monitor . Pt free from falls. Pt wears non slip socks when ambulating. Pt bed low and locked position. Pt afebrile shift. Pt IV site without redness or edema. Educated pt on importance of hand washing. Pt has denied any pain or discomfort this shift.

## 2017-09-03 NOTE — PROGRESS NOTES
"[facility]  Heart Transplant  Progress Note    Patient Name: Emily Cook  MRN: [id number]  Admission Date: 8/29/2017  Hospital Length of Stay: 5 days  Attending Physician: Yves Ruth Jr.,*  Primary Care Provider: Kaylee Cazares MD  Principal Problem:Respiratory failure with hypoxia    Subjective:     Interval History: No complaints this morning. Sats dropped yesterday afternoon and she was placed on NRB however pt states she was asymptomatic. She decided on full DNR and reiterated that to us this morning.     Continuous Infusions:   epoprostenol (VELETRI) infusion 24 ng/kg/min (09/02/17 1458)    furosemide (LASIX) 5 mg/mL infusion (non-titrating) 20 mg/hr (09/01/17 2148)    veletri/remodulin tubing      veletri/remodulin tubing       Scheduled Meds:   albuterol sulfate  2.5 mg Nebulization Q6H    duloxetine  60 mg Oral BID    gabapentin  300 mg Oral TID    macitentan  10 mg Oral Daily    magnesium oxide  400 mg Oral BID    polyethylene glycol  17 g Oral Daily    potassium chloride  20 mEq Oral TID    sildenafil  20 mg Oral TID    spironolactone  25 mg Oral Daily     PRN Meds:acetaminophen, albuterol sulfate, hydrocodone-acetaminophen 10-325mg    Review of patient's allergies indicates:   Allergen Reactions    Chlorhexidine Itching and Rash     Patient had raised rash on neck following RHC procedure. She states she's never had a problem with the "prep" used until this time.     Adhesive Rash     Objective:     Vital Signs (Most Recent):  Temp: 98.9 °F (37.2 °C) (09/03/17 0359)  Pulse: 106 (09/03/17 0700)  Resp: 20 (09/03/17 0359)  BP: 117/67 (09/03/17 0359)  SpO2: (!) 92 % (09/03/17 0359) Vital Signs (24h Range):  Temp:  [98.1 °F (36.7 °C)-98.9 °F (37.2 °C)] 98.9 °F (37.2 °C)  Pulse:  [] 106  Resp:  [16-22] 20  SpO2:  [86 %-98 %] 92 %  BP: ()/(51-67) 117/67     Weight: 79.1 kg (174 lb 6.1 oz)  Body mass index is 30.89 kg/m².      Intake/Output Summary " (Last 24 hours) at 09/03/17 0834  Last data filed at 09/03/17 0500   Gross per 24 hour   Intake           693.32 ml   Output             2800 ml   Net         -2106.68 ml       Hemodynamic Parameters:         Physical Exam   Constitutional: She is oriented to person, place, and time. She appears well-developed and well-nourished.   HENT:   Head: Normocephalic.   Eyes: Pupils are equal, round, and reactive to light.   Neck: Normal range of motion. Neck supple. JVD (improving) present.   Cardiovascular: Normal rate and regular rhythm.    Pulmonary/Chest: Effort normal. She has rales.   Abdominal: Soft. Bowel sounds are normal.   Musculoskeletal: Normal range of motion.   Neurological: She is alert and oriented to person, place, and time.   Skin: Skin is warm and dry.   Psychiatric: She has a normal mood and affect. Her behavior is normal.   Nursing note and vitals reviewed.      Significant Labs:  CBC:    Recent Labs  Lab 09/03/17 0543   WBC 7.63   RBC 5.17   HGB 12.9   HCT 38.1      MCV 74*   MCH 25.0*   MCHC 33.9     BNP:  No results for input(s): BNP in the last 72 hours.    Invalid input(s): BNPTRIAGELBLO  CMP:    Recent Labs  Lab 09/03/17 0543   GLU 95   CALCIUM 10.0      K 4.3   CO2 34*   CL 89*   BUN 14   CREATININE 1.1      Coagulation:     Recent Labs  Lab 09/03/17 0543   INR 2.8*     LDH:  No results for input(s): LDH in the last 72 hours.  Microbiology:  Microbiology Results (last 7 days)     Procedure Component Value Units Date/Time    Blood culture [[id number]] Collected:  09/03/17 0543    Order Status:  Sent Specimen:  Blood Updated:  09/03/17 0644    Blood culture [[id number]] Collected:  09/01/17 0423    Order Status:  Completed Specimen:  Blood Updated:  09/02/17 0405     Blood Culture, Routine Gram stain aer bottle: Gram positive cocci in clusters resembling Staph      Blood Culture, Routine Results called to and read back by: Kandice Sears RN  09/02/2017  04:05    Blood culture  [[id number]] Collected:  09/01/17 0423    Order Status:  Completed Specimen:  Blood Updated:  09/02/17 0405     Blood Culture, Routine Gram stain aer bottle: Gram positive cocci in clusters resembling Staph      Blood Culture, Routine Results called to and read back by: Kandice Sears RN  09/02/2017  04:04    Blood culture [[id number]]  (Susceptibility) Collected:  08/30/17 0436    Order Status:  Completed Specimen:  Blood Updated:  09/01/17 1050     Blood Culture, Routine Gram stain aer bottle: Gram positive cocci in clusters resembling Staph     Blood Culture, Routine Results called to and read back by: Steven Bland RN     Blood Culture, Routine 08/30/2017  20:20     Blood Culture, Routine --     METHICILLIN RESISTANT STAPHYLOCOCCUS AUREUS  ID consult required at [facility].      Culture, Respiratory with Gram Stain [[id number]] Collected:  08/30/17 0928    Order Status:  Completed Specimen:  Respiratory from Sputum, Expectorated Updated:  08/30/17 1419     Respiratory Culture Specimen inadequate - culture not performed. Spoke with Valerie      Respiratory Culture Esquinance 08/30/2017  14:18     Gram Stain (Respiratory) >10epis/lfp and <than many WBC's      Gram Stain (Respiratory) Predominance of oropharyngeal maribel. Please recollect.    Culture, Respiratory with Gram Stain [[id number]]     Order Status:  No result Specimen:  Respiratory from Sputum, Expectorated     Blood culture [[id number]]     Order Status:  Canceled Specimen:  Blood           I have reviewed all pertinent labs within the past 24 hours.    Estimated Creatinine Clearance: 56.9 mL/min (based on SCr of 1.1 mg/dL).    Diagnostic Results:  I have reviewed all pertinent imaging results/findings within the past 24 hours.    Assessment and Plan:     * Respiratory failure with hypoxia    -Secondary to PNA vs volume overload.   -Continue HFNC. Wean to maintain sats >/= 85% (home dose O2 5L)  -Continue Veletri 25ng/kg/mn  -Continue  Lasix gtt for now.         Right-sided heart failure    -continue IV Lasix.   -standing dose of K as ordered  -monitor K and Mg q12h  -daily I/O  -low Na diet        Supratherapeutic INR    -trending down today.   -holding coumadin for now  -no Vit K at this time        Pulmonary hypertension    -continue veletri, sildenafil, macitentan   -NOTE: when pt arrived, nurse reported that her veletri pump was STOPPED.  She restarted it at a dose of 64 cc/24 h.          Bacteremia    -MRSA per cultures.  -Will get peripheral access and remove Cardoso([facility] requesting INR<2.5 so may be able to come out tomorrow).  -Appreciate ID Cx. Continue Vanc.         Pneumonia of right upper lobe due to infectious organism    -continue Vancomycin, azithromycin  -reordered blood cx, sputum cx  -CXR pending  -ID consult              Félix Calles NP  Heart Transplant  [facility]

## 2017-09-03 NOTE — ASSESSMENT & PLAN NOTE
56 year-old female with history of pulmonary HTN on Veletri via cardoso catheter placed 6/2017 transferred from OSH with pneumonia and MRSA bacteremia. Blood cultures 8/26, 8/30 and 9/1 are positive for MRSA. TTE negative for vegetations. CXR shows perihilar edema. Cardoso catheter remains in place. Patient is currently on IV Vancomycin. She has finished her course of azithromycin. Fevers and leukocytosis from admit have resolved. She is clinically improving on antibiotic therapy.     Plan  - Bacteremia likely from infected cardoso catheter. Recommend removal ASAP for adequate source control.  - Re-start Vancomycin at 750 mg IV q 12 hours. Vanc Trough before 4th dose (ordered).   - Repeat blood cultures daily until clear.  - ID will follow.

## 2017-09-04 NOTE — PROGRESS NOTES
"[facility]  Heart Transplant  Progress Note    Patient Name: Emily Cook  MRN: [id number]  Admission Date: 8/29/2017  Hospital Length of Stay: 6 days  Attending Physician: Yves Ruth Jr.,*  Primary Care Provider: Kaylee Cazares MD  Principal Problem:Respiratory failure with hypoxia    Subjective:     Interval History: No complaints this morning.     Continuous Infusions:   epoprostenol (VELETRI) infusion 24 ng/kg/min (09/03/17 1442)    furosemide (LASIX) 5 mg/mL infusion (non-titrating) 20 mg/hr (09/03/17 2057)    veletri/remodulin tubing      veletri/remodulin tubing       Scheduled Meds:   albuterol sulfate  2.5 mg Nebulization Q6H    duloxetine  60 mg Oral BID    gabapentin  300 mg Oral TID    macitentan  10 mg Oral Daily    magnesium oxide  400 mg Oral BID    polyethylene glycol  17 g Oral Daily    potassium chloride  20 mEq Oral TID    sildenafil  20 mg Oral TID    spironolactone  25 mg Oral Daily    vancomycin (VANCOCIN) IVPB  750 mg Intravenous Q12H     PRN Meds:acetaminophen, albuterol sulfate, hydrocodone-acetaminophen 10-325mg    Review of patient's allergies indicates:   Allergen Reactions    Chlorhexidine Itching and Rash     Patient had raised rash on neck following RHC procedure. She states she's never had a problem with the "prep" used until this time.     Adhesive Rash     Objective:     Vital Signs (Most Recent):  Temp: 97.6 °F (36.4 °C) (09/04/17 0745)  Pulse: 106 (09/04/17 0843)  Resp: 20 (09/04/17 0843)  BP: (!) 98/52 (09/04/17 0745)  SpO2: 95 % (09/04/17 0843) Vital Signs (24h Range):  Temp:  [97 °F (36.1 °C)-99 °F (37.2 °C)] 97.6 °F (36.4 °C)  Pulse:  [102-121] 106  Resp:  [18-20] 20  SpO2:  [85 %-95 %] 95 %  BP: ()/(50-58) 98/52     Weight: 79 kg (174 lb 2.6 oz)  Body mass index is 30.85 kg/m².      Intake/Output Summary (Last 24 hours) at 09/04/17 0950  Last data filed at 09/04/17 0900   Gross per 24 hour   Intake           1268.5 ml "   Output             1525 ml   Net           -256.5 ml       Hemodynamic Parameters:         Physical Exam   Constitutional: She is oriented to person, place, and time. She appears well-developed and well-nourished.   HENT:   Head: Normocephalic.   Eyes: Pupils are equal, round, and reactive to light.   Neck: Normal range of motion. Neck supple. JVD (improving) present.   Cardiovascular: Normal rate and regular rhythm.    Pulmonary/Chest: Effort normal. She has rales.   Abdominal: Soft. Bowel sounds are normal.   Musculoskeletal: Normal range of motion.   Neurological: She is alert and oriented to person, place, and time.   Skin: Skin is warm and dry.   Psychiatric: She has a normal mood and affect. Her behavior is normal.   Nursing note and vitals reviewed.      Significant Labs:  CBC:    Recent Labs  Lab 09/04/17 0439   WBC 10.82   RBC 5.07   HGB 12.7   HCT 37.7      MCV 74*   MCH 25.0*   MCHC 33.7     BNP:  No results for input(s): BNP in the last 72 hours.    Invalid input(s): BNPTRIAGELBLO  CMP:    Recent Labs  Lab 09/04/17 0438   *   CALCIUM 9.3   *   K 3.9   CO2 31*   CL 90*   BUN 19   CREATININE 1.4      Coagulation:     Recent Labs  Lab 09/04/17 0439   INR 2.6*     LDH:  No results for input(s): LDH in the last 72 hours.  Microbiology:  Microbiology Results (last 7 days)     Procedure Component Value Units Date/Time    Blood culture [[id number]] Collected:  09/03/17 0543    Order Status:  Completed Specimen:  Blood Updated:  09/04/17 0822     Blood Culture, Routine No Growth to date     Blood Culture, Routine No Growth to date    Blood culture [[id number]] Collected:  09/04/17 0438    Order Status:  Sent Specimen:  Blood Updated:  09/04/17 0454    Blood culture [[id number]] Collected:  09/01/17 0423    Order Status:  Completed Specimen:  Blood Updated:  09/03/17 1238     Blood Culture, Routine Gram stain aer bottle: Gram positive cocci in clusters resembling Staph      Blood Culture,  Routine Results called to and read back by: Kandice Sears RN  09/02/2017  04:05     Blood Culture, Routine --     STAPHYLOCOCCUS AUREUS  ID consult required at [facility].  For susceptibility see order #[id number]      Blood culture [[id number]] Collected:  09/01/17 0423    Order Status:  Completed Specimen:  Blood Updated:  09/03/17 1237     Blood Culture, Routine Gram stain aer bottle: Gram positive cocci in clusters resembling Staph      Blood Culture, Routine Results called to and read back by: Kandice Sears RN  09/02/2017  04:04     Blood Culture, Routine --     STAPHYLOCOCCUS AUREUS  Susceptibility pending  ID consult required at [facility].      Blood culture [[id number]]  (Susceptibility) Collected:  08/30/17 0436    Order Status:  Completed Specimen:  Blood Updated:  09/01/17 1050     Blood Culture, Routine Gram stain aer bottle: Gram positive cocci in clusters resembling Staph     Blood Culture, Routine Results called to and read back by: Steven Bland RN     Blood Culture, Routine 08/30/2017  20:20     Blood Culture, Routine --     METHICILLIN RESISTANT STAPHYLOCOCCUS AUREUS  ID consult required at [facility].      Culture, Respiratory with Gram Stain [[id number]] Collected:  08/30/17 0928    Order Status:  Completed Specimen:  Respiratory from Sputum, Expectorated Updated:  08/30/17 1419     Respiratory Culture Specimen inadequate - culture not performed. Spoke with Valerie      Respiratory Culture Esquinance 08/30/2017  14:18     Gram Stain (Respiratory) >10epis/lfp and <than many WBC's      Gram Stain (Respiratory) Predominance of oropharyngeal maribel. Please recollect.    Culture, Respiratory with Gram Stain [[id number]]     Order Status:  No result Specimen:  Respiratory from Sputum, Expectorated     Blood culture [[id number]]     Order Status:  Canceled Specimen:  Blood           I have reviewed all pertinent labs within the past 24  hours.    Estimated Creatinine Clearance: 44.6 mL/min (based on SCr of 1.4 mg/dL).    Diagnostic Results:  I have reviewed all pertinent imaging results/findings within the past 24 hours.    Assessment and Plan:     * Respiratory failure with hypoxia    -Secondary to PNA vs volume overload.   -Continue HFNC. Wean to maintain sats >/= 85% (home dose O2 5L)  -Continue Veletri 25ng/kg/mn  -Continue Lasix gtt for now.         Right-sided heart failure    -continue IV Lasix.   -standing dose of K as ordered  -monitor K and Mg q12h  -daily I/O  -low Na diet        Supratherapeutic INR    -trending down today.   -holding coumadin for now  -no Vit K at this time        Pulmonary hypertension    -continue veletri, sildenafil, macitentan   -NOTE: when pt arrived, nurse reported that her veletri pump was STOPPED.  She restarted it at a dose of 64 cc/24 h.          Bacteremia    -MRSA per cultures.  -Will get peripheral access and remove Cardoso([facility] requesting INR<2.5 so may be able to come out tomorrow).  -Appreciate ID Cx. Continue Vanc.         Pneumonia of right upper lobe due to infectious organism    -continue Vancomycin, azithromycin  -reordered blood cx, sputum cx  -CXR pending  -ID consult              Félix Calles NP  Heart Transplant  [facility]

## 2017-09-04 NOTE — SUBJECTIVE & OBJECTIVE
"Interval History: No complaints this morning.     Continuous Infusions:   epoprostenol (VELETRI) infusion 24 ng/kg/min (09/03/17 1442)    furosemide (LASIX) 5 mg/mL infusion (non-titrating) 20 mg/hr (09/03/17 2057)    veletri/remodulin tubing      veletri/remodulin tubing       Scheduled Meds:   albuterol sulfate  2.5 mg Nebulization Q6H    duloxetine  60 mg Oral BID    gabapentin  300 mg Oral TID    macitentan  10 mg Oral Daily    magnesium oxide  400 mg Oral BID    polyethylene glycol  17 g Oral Daily    potassium chloride  20 mEq Oral TID    sildenafil  20 mg Oral TID    spironolactone  25 mg Oral Daily    vancomycin (VANCOCIN) IVPB  750 mg Intravenous Q12H     PRN Meds:acetaminophen, albuterol sulfate, hydrocodone-acetaminophen 10-325mg    Review of patient's allergies indicates:   Allergen Reactions    Chlorhexidine Itching and Rash     Patient had raised rash on neck following RHC procedure. She states she's never had a problem with the "prep" used until this time.     Adhesive Rash     Objective:     Vital Signs (Most Recent):  Temp: 97.6 °F (36.4 °C) (09/04/17 0745)  Pulse: 106 (09/04/17 0843)  Resp: 20 (09/04/17 0843)  BP: (!) 98/52 (09/04/17 0745)  SpO2: 95 % (09/04/17 0843) Vital Signs (24h Range):  Temp:  [97 °F (36.1 °C)-99 °F (37.2 °C)] 97.6 °F (36.4 °C)  Pulse:  [102-121] 106  Resp:  [18-20] 20  SpO2:  [85 %-95 %] 95 %  BP: ()/(50-58) 98/52     Weight: 79 kg (174 lb 2.6 oz)  Body mass index is 30.85 kg/m².      Intake/Output Summary (Last 24 hours) at 09/04/17 0950  Last data filed at 09/04/17 0900   Gross per 24 hour   Intake           1268.5 ml   Output             1525 ml   Net           -256.5 ml       Hemodynamic Parameters:         Physical Exam   Constitutional: She is oriented to person, place, and time. She appears well-developed and well-nourished.   HENT:   Head: Normocephalic.   Eyes: Pupils are equal, round, and reactive to light.   Neck: Normal range of motion. Neck " supple. JVD (improving) present.   Cardiovascular: Normal rate and regular rhythm.    Pulmonary/Chest: Effort normal. She has rales.   Abdominal: Soft. Bowel sounds are normal.   Musculoskeletal: Normal range of motion.   Neurological: She is alert and oriented to person, place, and time.   Skin: Skin is warm and dry.   Psychiatric: She has a normal mood and affect. Her behavior is normal.   Nursing note and vitals reviewed.      Significant Labs:  CBC:    Recent Labs  Lab 09/04/17 0439   WBC 10.82   RBC 5.07   HGB 12.7   HCT 37.7      MCV 74*   MCH 25.0*   MCHC 33.7     BNP:  No results for input(s): BNP in the last 72 hours.    Invalid input(s): BNPTRIAGELBLO  CMP:    Recent Labs  Lab 09/04/17 0438   *   CALCIUM 9.3   *   K 3.9   CO2 31*   CL 90*   BUN 19   CREATININE 1.4      Coagulation:     Recent Labs  Lab 09/04/17 0439   INR 2.6*     LDH:  No results for input(s): LDH in the last 72 hours.  Microbiology:  Microbiology Results (last 7 days)     Procedure Component Value Units Date/Time    Blood culture [[id number]] Collected:  09/03/17 0543    Order Status:  Completed Specimen:  Blood Updated:  09/04/17 0822     Blood Culture, Routine No Growth to date     Blood Culture, Routine No Growth to date    Blood culture [[id number]] Collected:  09/04/17 0438    Order Status:  Sent Specimen:  Blood Updated:  09/04/17 0454    Blood culture [[id number]] Collected:  09/01/17 0423    Order Status:  Completed Specimen:  Blood Updated:  09/03/17 1238     Blood Culture, Routine Gram stain aer bottle: Gram positive cocci in clusters resembling Staph      Blood Culture, Routine Results called to and read back by: Kandice Sears RN  09/02/2017  04:05     Blood Culture, Routine --     STAPHYLOCOCCUS AUREUS  ID consult required at [facility].  For susceptibility see order #[id number]      Blood culture [[id number]] Collected:  09/01/17 0423    Order Status:  Completed Specimen:  Blood  Updated:  09/03/17 1237     Blood Culture, Routine Gram stain aer bottle: Gram positive cocci in clusters resembling Staph      Blood Culture, Routine Results called to and read back by: Kandice Sears RN  09/02/2017  04:04     Blood Culture, Routine --     STAPHYLOCOCCUS AUREUS  Susceptibility pending  ID consult required at [facility].      Blood culture [[id number]]  (Susceptibility) Collected:  08/30/17 0436    Order Status:  Completed Specimen:  Blood Updated:  09/01/17 1050     Blood Culture, Routine Gram stain aer bottle: Gram positive cocci in clusters resembling Staph     Blood Culture, Routine Results called to and read back by: Steven Bland RN     Blood Culture, Routine 08/30/2017  20:20     Blood Culture, Routine --     METHICILLIN RESISTANT STAPHYLOCOCCUS AUREUS  ID consult required at [facility].      Culture, Respiratory with Gram Stain [[id number]] Collected:  08/30/17 0928    Order Status:  Completed Specimen:  Respiratory from Sputum, Expectorated Updated:  08/30/17 1419     Respiratory Culture Specimen inadequate - culture not performed. Spoke with Valerie      Respiratory Culture Esquinance 08/30/2017  14:18     Gram Stain (Respiratory) >10epis/lfp and <than many WBC's      Gram Stain (Respiratory) Predominance of oropharyngeal maribel. Please recollect.    Culture, Respiratory with Gram Stain [[id number]]     Order Status:  No result Specimen:  Respiratory from Sputum, Expectorated     Blood culture [[id number]]     Order Status:  Canceled Specimen:  Blood           I have reviewed all pertinent labs within the past 24 hours.    Estimated Creatinine Clearance: 44.6 mL/min (based on SCr of 1.4 mg/dL).    Diagnostic Results:  I have reviewed all pertinent imaging results/findings within the past 24 hours.

## 2017-09-04 NOTE — PLAN OF CARE
Problem: Patient Care Overview  Goal: Plan of Care Review  Outcome: Ongoing (interventions implemented as appropriate)  Pt AAOx4.  On TELE monitor, ST throughout the day HR low 100s - 120s.  BPs on the lower side 80s-90s/50s.  Afebrile.  On Non-rebreather w/ O2 sats > 85%.  Veletri running @ 24ng/kg/min to L forearm 22g.  DW 86 kg.  50cc/24hrs.  Cassette changed around 3pm.  Backup CADD pump in room.  Backup cassette in [facility] refrigerator.  Lasix gtt running @ 4 cc/hr to R forearm 22g.  300cc clear yellow urine output w/ some unmeasured episodes. Incontinence noted.  Pt w/ pure wick in place.  Pads changed PRN as needed.  1 BM this am.  Pt refusing Miralax.   Contact precautions maintained for blood cultures positive for MRSA.  Repeat cultures from 9/3 NGTD.  Vanc on board.  Next vanc trough due before 4th dose this evening.  Bed lowered and locked.  Pt in bed all day, refusing to get out of bed.  Call bell in reach.  Pt instructed to call for assistance when needed. DNR orders signed by physician and in chart.  No falls/injuries this shift.  See flowsheet for further assessment details.  Will continue to monitor.

## 2017-09-04 NOTE — PLAN OF CARE
Problem: Patient Care Overview  Goal: Plan of Care Review  Outcome: Ongoing (interventions implemented as appropriate)  Pt free from falls. Pt wears non slip socks when ambulating. Pt bed low and locked position. Pt afebrile. Pt IV site without redness or edema.  Pt has denied any pain or discomfort this shift. Remains on nonrebreather at 15L.

## 2017-09-05 NOTE — PLAN OF CARE
Problem: Patient Care Overview  Goal: Plan of Care Review  Outcome: Ongoing (interventions implemented as appropriate)  Afebrile. INR=1.9. Cardoso removed by gen surg MD. No bleeding noted. Dressing remains dry and intact. Vanc discontinued. Trough=33.2 yesterday pm. Reinforced to wear non-skid socks and call for assistance OOB to prevent falling. Verbalized understanding. Skin remains intact. Lungs clear this am. O2 changed from NRB mask to O2 at 5L/NC. O2 sats maintained per parameters. RT continue. Lasix continues at 20mg/hr=4ml/hr. Veletri continues at 50ml/24hrs as ordered. Dr. Ruth shown strip from rapid tachycardia this am. MG+2=2.4. K+=4.3. Continuing to monitor. Denies pain.

## 2017-09-05 NOTE — PROGRESS NOTES
[facility]  Infectious Disease  Progress Note    Patient Name: Emily Cook  MRN: [id number]  Admission Date: 8/29/2017  Length of Stay: 7 days  Attending Physician: Yves Ruth Jr.,*  Primary Care Provider: Kaylee Cazares MD    Isolation Status: Contact  Assessment/Plan:      Bacteremia    56 year-old female with history of pulmonary HTN on Veletri via brush catheter placed 6/2017 transferred from OSH with pneumonia and MRSA bacteremia. Blood cultures 8/26, 8/30 and 9/1 are positive for MRSA. TTE negative for vegetations. CXR shows perihilar edema. Patient is currently on IV Vancomycin. She has finished her course of azithromycin for pneumonia. Fevers and leukocytosis from admit have resolved. Blood cultures from 9/3 and 9/4 are NGTD. Brush catheter removed today.     Plan  - Bacteremia likely from infected brush catheter. Removed today for adequate source control and cath tip sent for culture. Will follow.  - Discontinue Vancomycin as trough returned at 33.2. Continue to hold until Vanc level is below 20. Random Vanc level ordered for tomorrow morning. MRSA will be covered until levels come down. Once level is down will adjust antibiotic accordingly.  - Anticipate two weeks of IV antibiotics from date catheter is removed  - ID will follow.             Please call for any questions. Thank you.  Mehnaz Hanson PA-C  Phone: [phone]  Pager: [phone]    Subjective:     Principal Problem:Respiratory failure with hypoxia    HPI: 57 y/o female with PHTN on veletri and lasix for pulmonary HTN, brush catheter placed 6/2017 here transferred from OSH after being admitted with acute respiratory failure 2/2 to PNA. Pt reports feeling unwell for the past few days with fevers and chills at home and neck pain. Pt febrile 100.4 on admit with leukocytosis of 14.20. Blood cultures 8/26, 8/30 +MRSA. Pt currently on azithromycin for PNA and vancomycin for MRSA bacteremia. 2D echo negative for  vegetations. Respiratory cultures normal respiratory maribel. Today pt reports much improvement since admission and breathing is back to baseline. Reports having intermittent cough. Afebrile, without a leukocytosis. Denies having any other acute symptoms.   Interval History: NAEON. On O2 via non rebreather mask. Patient reports feeling better since admission. Without new complaints today. Blood cultures from 9/3 and 9/4 are NGTD. Afebrile without a leukocytosis. Cardoso catheter to be removed hopefully today.    Review of Systems   Constitutional: Positive for fatigue. Negative for chills, diaphoresis and fever.   Respiratory: Positive for cough and shortness of breath.    Cardiovascular: Negative for chest pain.   Gastrointestinal: Negative for abdominal pain, diarrhea and nausea.   Genitourinary: Negative for dysuria.   Musculoskeletal: Negative for back pain.   Skin: Negative for wound.   Neurological: Positive for weakness. Negative for headaches.     Objective:     Vital Signs (Most Recent):  Temp: 98.5 °F (36.9 °C) (09/05/17 0845)  Pulse: 108 (09/05/17 0845)  Resp: 20 (09/05/17 0845)  BP: (!) 92/51 (09/05/17 0845)  SpO2: 95 % (09/05/17 0845) Vital Signs (24h Range):  Temp:  [98 °F (36.7 °C)-98.5 °F (36.9 °C)] 98.5 °F (36.9 °C)  Pulse:  [] 108  Resp:  [16-20] 20  SpO2:  [91 %-98 %] 95 %  BP: ()/(40-61) 92/51     Weight: 79.1 kg (174 lb 6.1 oz)  Body mass index is 30.89 kg/m².    Estimated Creatinine Clearance: 44.7 mL/min (based on SCr of 1.4 mg/dL).    Physical Exam   Constitutional: She is oriented to person, place, and time. She appears well-developed. No distress.   Cardiovascular: Normal rate, regular rhythm and normal heart sounds.  Exam reveals no friction rub.    No murmur heard.  Pulmonary/Chest: Effort normal. No respiratory distress. She has no wheezes. She has rales.   On O2 via non rebreather mask   Abdominal: Soft. She exhibits no distension. There is no tenderness. There is no guarding.    Musculoskeletal: She exhibits no edema, tenderness or deformity.   Cardoso catheter right upper chest dressed. Dressing c/d/i.   Neurological: She is alert and oriented to person, place, and time.   Skin: Skin is warm and dry. She is not diaphoretic.   Psychiatric: She has a normal mood and affect.   Nursing note and vitals reviewed.      Significant Labs:   Blood Culture:     Recent Labs  Lab 08/26/17  1028 08/30/17  0436 09/01/17  0423 09/03/17  0543 09/04/17  0438   LABBLOO Gram stain peds bottle: Gram positive cocci in clusters resembling Staph   Results called to and read back by:dylan plascencia 08/26/2017  23:13 cg  METHICILLIN RESISTANT STAPHYLOCOCCUS AUREUS Gram stain aer bottle: Gram positive cocci in clusters resembling Staph  Results called to and read back by: Steven Bland RN  08/30/2017  20:20  METHICILLIN RESISTANT STAPHYLOCOCCUS AUREUSID consult required at [facility]. Gram stain aer bottle: Gram positive cocci in clusters resembling Staph   Results called to and read back by: Kandice Sears RN  09/02/2017  04:05  METHICILLIN RESISTANT STAPHYLOCOCCUS AUREUSID consult required at [facility].For susceptibility see order #[id number]  Gram stain aer bottle: Gram positive cocci in clusters resembling Staph   Results called to and read back by: Kandice Sears RN  09/02/2017  04:04  METHICILLIN RESISTANT STAPHYLOCOCCUS AUREUSID consult required at [facility]. No Growth to date  No Growth to date  No Growth to date No Growth to date  No Growth to date     CBC:     Recent Labs  Lab 09/04/17  0439 09/05/17  0536   WBC 10.82 6.79   HGB 12.7 12.5   HCT 37.7 36.6*    131*     CMP:     Recent Labs  Lab 09/04/17 0438 09/05/17  0536   * 131*   K 3.9 4.3   CL 90* 90*   CO2 31* 29   * 111*   BUN 19 24*   CREATININE 1.4 1.4   CALCIUM 9.3 9.2   ANIONGAP 12 12   EGFRNONAA 42.0* 42.0*     Respiratory Culture:     Recent Labs  Lab  08/30/17  0928   GSRESP >10epis/lfp and <than many WBC's   Predominance of oropharyngeal maribel. Please recollect.   RESPIRATORYC Specimen inadequate - culture not performed. Spoke with Valerie Mojica 08/30/2017  14:18     Urine Culture:     Recent Labs  Lab 06/09/17  0743   LABURIN ESCHERICHIA COLI>100,000 cfu/ml     Urine Studies:     Recent Labs  Lab 08/26/17  0904   COLORU Yellow   APPEARANCEUA Clear   PHUR 6.0   SPECGRAV 1.020   PROTEINUA 100*   GLUCUA Negative   KETONESU Negative   BILIRUBINUA Small*   OCCULTUA Moderate*   NITRITE Negative   UROBILINOGEN 4.0*   LEUKOCYTESUR Negative   RBCUA 1   WBCUA 1   BACTERIA Rare   HYALINECASTS 1     Wound Culture: No results for input(s): LABAERO in the last 4320 hours.    Significant Imaging: I have reviewed all pertinent imaging results/findings within the past 24 hours.

## 2017-09-05 NOTE — ASSESSMENT & PLAN NOTE
56 year-old female with history of pulmonary HTN on Veletri via cardoso catheter placed 6/2017 transferred from OSH with pneumonia and MRSA bacteremia. Blood cultures 8/26, 8/30 and 9/1 are positive for MRSA. TTE negative for vegetations. CXR shows perihilar edema. Patient is currently on IV Vancomycin. She has finished her course of azithromycin for pneumonia. Fevers and leukocytosis from admit have resolved. Blood cultures from 9/3 and 9/4 are NGTD. Cardoso catheter removed today.     Plan  - Bacteremia likely from infected cardoso catheter. Removed today for adequate source control and cath tip sent for culture. Will follow.  - Discontinue Vancomycin as trough returned at 33.2. Continue to hold until Vanc level is below 20. Random Vanc level ordered for tomorrow morning. MRSA will be covered until levels come down. Once level is down will adjust antibiotic accordingly.  - Anticipate two weeks of IV antibiotics from date catheter is removed  - ID will follow.

## 2017-09-05 NOTE — PROGRESS NOTES
Pt vancomycin trough result 33.2. Stopped infusion and notified Dr. Pascual Hositalist of critical result.

## 2017-09-05 NOTE — PROGRESS NOTES
"[facility]  Heart Transplant  Progress Note    Patient Name: Emily Cook  MRN: [id number]  Admission Date: 8/29/2017  Hospital Length of Stay: 7 days  Attending Physician: Yves Ruth Jr.,*  Primary Care Provider: Kaylee Cazares MD  Principal Problem:Respiratory failure with hypoxia    Subjective:     Interval History: No complaints this morning.     Continuous Infusions:   epoprostenol (VELETRI) infusion 24 ng/kg/min (09/04/17 1442)    furosemide (LASIX) 5 mg/mL infusion (non-titrating) 20 mg/hr (09/04/17 2153)    veletri/remodulin tubing      veletri/remodulin tubing       Scheduled Meds:   albuterol sulfate  2.5 mg Nebulization Q6H    duloxetine  60 mg Oral BID    gabapentin  300 mg Oral TID    macitentan  10 mg Oral Daily    magnesium oxide  400 mg Oral BID    polyethylene glycol  17 g Oral Daily    potassium chloride  20 mEq Oral TID    sildenafil  20 mg Oral TID    spironolactone  25 mg Oral Daily    vancomycin (VANCOCIN) IVPB  750 mg Intravenous Q12H     PRN Meds:acetaminophen, albuterol sulfate, hydrocodone-acetaminophen 10-325mg    Review of patient's allergies indicates:   Allergen Reactions    Chlorhexidine Itching and Rash     Patient had raised rash on neck following RHC procedure. She states she's never had a problem with the "prep" used until this time.     Adhesive Rash     Objective:     Vital Signs (Most Recent):  Temp: 98 °F (36.7 °C) (09/05/17 0400)  Pulse: 105 (09/05/17 0741)  Resp: 18 (09/05/17 0741)  BP: (!) 110/59 (09/05/17 0400)  SpO2: 97 % (09/05/17 0741) Vital Signs (24h Range):  Temp:  [98 °F (36.7 °C)-98.2 °F (36.8 °C)] 98 °F (36.7 °C)  Pulse:  [] 105  Resp:  [16-20] 18  SpO2:  [91 %-98 %] 97 %  BP: ()/(40-61) 110/59     Weight: 79.1 kg (174 lb 6.1 oz)  Body mass index is 30.89 kg/m².      Intake/Output Summary (Last 24 hours) at 09/05/17 0816  Last data filed at 09/05/17 0500   Gross per 24 hour   Intake           657.26 ml "   Output             1250 ml   Net          -592.74 ml       Hemodynamic Parameters:         Physical Exam   Constitutional: She is oriented to person, place, and time. She appears well-developed and well-nourished.   HENT:   Head: Normocephalic.   Eyes: Pupils are equal, round, and reactive to light.   Neck: Normal range of motion. Neck supple. JVD (improving) present.   Cardiovascular: Normal rate and regular rhythm.    Pulmonary/Chest: Effort normal. She has rales.   Abdominal: Soft. Bowel sounds are normal.   Musculoskeletal: Normal range of motion.   Neurological: She is alert and oriented to person, place, and time.   Skin: Skin is warm and dry.   Psychiatric: She has a normal mood and affect. Her behavior is normal.   Nursing note and vitals reviewed.      Significant Labs:  CBC:    Recent Labs  Lab 09/04/17 0439 09/05/17 0536   WBC 10.82  --    RBC 5.07 4.94   HGB 12.7 12.5   HCT 37.7 36.6*     --    MCV 74* 74*   MCH 25.0* 25.3*   MCHC 33.7 34.2     BNP:  No results for input(s): BNP in the last 72 hours.    Invalid input(s): BNPTRIAGELBLO  CMP:    Recent Labs  Lab 09/05/17 0536   *   CALCIUM 9.2   *   K 4.3   CO2 29   CL 90*   BUN 24*   CREATININE 1.4      Coagulation:     Recent Labs  Lab 09/05/17 0536   INR 1.9*     LDH:  No results for input(s): LDH in the last 72 hours.  Microbiology:  Microbiology Results (last 7 days)     Procedure Component Value Units Date/Time    Blood culture [[id number]] Collected:  09/04/17 0438    Order Status:  Completed Specimen:  Blood Updated:  09/05/17 0612     Blood Culture, Routine No Growth to date     Blood Culture, Routine No Growth to date    Blood culture [[id number]] Collected:  09/01/17 0423    Order Status:  Completed Specimen:  Blood Updated:  09/04/17 1042     Blood Culture, Routine Gram stain aer bottle: Gram positive cocci in clusters resembling Staph      Blood Culture, Routine Results called to and read back by: Kandice Sears RN   09/02/2017  04:05     Blood Culture, Routine --     METHICILLIN RESISTANT STAPHYLOCOCCUS AUREUS  ID consult required at [facility].  For susceptibility see order #[id number]      Blood culture [[id number]]  (Susceptibility) Collected:  09/01/17 0423    Order Status:  Completed Specimen:  Blood Updated:  09/04/17 1042     Blood Culture, Routine Gram stain aer bottle: Gram positive cocci in clusters resembling Staph      Blood Culture, Routine Results called to and read back by: Kandice Sears RN  09/02/2017  04:04     Blood Culture, Routine --     METHICILLIN RESISTANT STAPHYLOCOCCUS AUREUS  ID consult required at [facility].      Blood culture [[id number]] Collected:  09/03/17 0543    Order Status:  Completed Specimen:  Blood Updated:  09/04/17 0822     Blood Culture, Routine No Growth to date     Blood Culture, Routine No Growth to date    Blood culture [[id number]]  (Susceptibility) Collected:  08/30/17 0436    Order Status:  Completed Specimen:  Blood Updated:  09/01/17 1050     Blood Culture, Routine Gram stain aer bottle: Gram positive cocci in clusters resembling Staph     Blood Culture, Routine Results called to and read back by: Steven Bland RN     Blood Culture, Routine 08/30/2017  20:20     Blood Culture, Routine --     METHICILLIN RESISTANT STAPHYLOCOCCUS AUREUS  ID consult required at [facility].      Culture, Respiratory with Gram Stain [[id number]] Collected:  08/30/17 0928    Order Status:  Completed Specimen:  Respiratory from Sputum, Expectorated Updated:  08/30/17 1419     Respiratory Culture Specimen inadequate - culture not performed. Spoke with Valerie      Respiratory Culture Esquinance 08/30/2017  14:18     Gram Stain (Respiratory) >10epis/lfp and <than many WBC's      Gram Stain (Respiratory) Predominance of oropharyngeal maribel. Please recollect.    Culture, Respiratory with Gram Stain [[id number]]     Order Status:  No result  Specimen:  Respiratory from Sputum, Expectorated     Blood culture [[id number]]     Order Status:  Canceled Specimen:  Blood           I have reviewed all pertinent labs within the past 24 hours.    Estimated Creatinine Clearance: 44.7 mL/min (based on SCr of 1.4 mg/dL).    Diagnostic Results:  I have reviewed all pertinent imaging results/findings within the past 24 hours.    Assessment and Plan:     * Respiratory failure with hypoxia    -Secondary to PNA vs volume overload.   -Continue HFNC. Wean to maintain sats >/= 85% (home dose O2 5L)  -Continue Veletri 25ng/kg/mn  -Continue Lasix gtt for now.         Right-sided heart failure    -continue IV Lasix.   -standing dose of K as ordered  -monitor K and Mg q12h  -daily I/O  -low Na diet        Supratherapeutic INR    -trending down today.   -holding coumadin for now  -no Vit K at this time        Pulmonary hypertension    -continue veletri, sildenafil, macitentan         Bacteremia    -MRSA per cultures.  -Will remove Cardoso([facility] requesting INR<2.5 so may be able to come out today).  -Appreciate ID Cx. Continue Vanc.         Pneumonia of right upper lobe due to infectious organism    -continue Vancomycin.  -reordered blood cx, sputum cx  -CXR pending  -ID consult            Félix Calles NP  Heart Transplant  [facility]

## 2017-09-05 NOTE — ASSESSMENT & PLAN NOTE
-MRSA per cultures.  -Will get peripheral access and remove Cardoso([facility] requesting INR<2.5 so may be able to come out today).  -Appreciate ID Cx. Continue Vanc.

## 2017-09-05 NOTE — SUBJECTIVE & OBJECTIVE
Interval History: NAEON. On O2 via non rebreather mask. Patient reports feeling better since admission. Without new complaints today. Blood cultures from 9/3 and 9/4 are NGTD. Afebrile without a leukocytosis. Cardoso catheter to be removed hopefully today.    Review of Systems   Constitutional: Positive for fatigue. Negative for chills, diaphoresis and fever.   Respiratory: Positive for cough and shortness of breath.    Cardiovascular: Negative for chest pain.   Gastrointestinal: Negative for abdominal pain, diarrhea and nausea.   Genitourinary: Negative for dysuria.   Musculoskeletal: Negative for back pain.   Skin: Negative for wound.   Neurological: Positive for weakness. Negative for headaches.     Objective:     Vital Signs (Most Recent):  Temp: 98.5 °F (36.9 °C) (09/05/17 0845)  Pulse: 108 (09/05/17 0845)  Resp: 20 (09/05/17 0845)  BP: (!) 92/51 (09/05/17 0845)  SpO2: 95 % (09/05/17 0845) Vital Signs (24h Range):  Temp:  [98 °F (36.7 °C)-98.5 °F (36.9 °C)] 98.5 °F (36.9 °C)  Pulse:  [] 108  Resp:  [16-20] 20  SpO2:  [91 %-98 %] 95 %  BP: ()/(40-61) 92/51     Weight: 79.1 kg (174 lb 6.1 oz)  Body mass index is 30.89 kg/m².    Estimated Creatinine Clearance: 44.7 mL/min (based on SCr of 1.4 mg/dL).    Physical Exam   Constitutional: She is oriented to person, place, and time. She appears well-developed. No distress.   Cardiovascular: Normal rate, regular rhythm and normal heart sounds.  Exam reveals no friction rub.    No murmur heard.  Pulmonary/Chest: Effort normal. No respiratory distress. She has no wheezes. She has rales.   On O2 via non rebreather mask   Abdominal: Soft. She exhibits no distension. There is no tenderness. There is no guarding.   Musculoskeletal: She exhibits no edema, tenderness or deformity.   Cardoso catheter right upper chest dressed. Dressing c/d/i.   Neurological: She is alert and oriented to person, place, and time.   Skin: Skin is warm and dry. She is not diaphoretic.    Psychiatric: She has a normal mood and affect.   Nursing note and vitals reviewed.      Significant Labs:   Blood Culture:     Recent Labs  Lab 08/26/17  1028 08/30/17  0436 09/01/17  0423 09/03/17  0543 09/04/17  0438   LABBLOO Gram stain peds bottle: Gram positive cocci in clusters resembling Staph   Results called to and read back by:dylan plascencia 08/26/2017  23:13 cg  METHICILLIN RESISTANT STAPHYLOCOCCUS AUREUS Gram stain aer bottle: Gram positive cocci in clusters resembling Staph  Results called to and read back by: Steven Bland RN  08/30/2017  20:20  METHICILLIN RESISTANT STAPHYLOCOCCUS AUREUSID consult required at [facility] and [facility]. Gram stain aer bottle: Gram positive cocci in clusters resembling Staph   Results called to and read back by: Kandice Sears RN  09/02/2017  04:05  METHICILLIN RESISTANT STAPHYLOCOCCUS AUREUSID consult required at [facility] and [facility].For susceptibility see order #[id number]  Gram stain aer bottle: Gram positive cocci in clusters resembling Staph   Results called to and read back by: Kandice Sears RN  09/02/2017  04:04  METHICILLIN RESISTANT STAPHYLOCOCCUS AUREUSID consult required at [facility] and [facility]. No Growth to date  No Growth to date  No Growth to date No Growth to date  No Growth to date     CBC:     Recent Labs  Lab 09/04/17  0439 09/05/17  0536   WBC 10.82 6.79   HGB 12.7 12.5   HCT 37.7 36.6*    131*     CMP:     Recent Labs  Lab 09/04/17  0438 09/05/17  0536   * 131*   K 3.9 4.3   CL 90* 90*   CO2 31* 29   * 111*   BUN 19 24*   CREATININE 1.4 1.4   CALCIUM 9.3 9.2   ANIONGAP 12 12   EGFRNONAA 42.0* 42.0*     Respiratory Culture:     Recent Labs  Lab 08/30/17  0928   GSRESP >10epis/lfp and <than many WBC's   Predominance of oropharyngeal maribel. Please recollect.   RESPIRATORYC Specimen inadequate - culture not performed. Spoke with Valerie Mojica 08/30/2017  14:18     Urine Culture:      Recent Labs  Lab 06/09/17  0743   LABURIN ESCHERICHIA COLI>100,000 cfu/ml     Urine Studies:     Recent Labs  Lab 08/26/17  0904   COLORU Yellow   APPEARANCEUA Clear   PHUR 6.0   SPECGRAV 1.020   PROTEINUA 100*   GLUCUA Negative   KETONESU Negative   BILIRUBINUA Small*   OCCULTUA Moderate*   NITRITE Negative   UROBILINOGEN 4.0*   LEUKOCYTESUR Negative   RBCUA 1   WBCUA 1   BACTERIA Rare   HYALINECASTS 1     Wound Culture: No results for input(s): LABAERO in the last 4320 hours.    Significant Imaging: I have reviewed all pertinent imaging results/findings within the past 24 hours.

## 2017-09-05 NOTE — SUBJECTIVE & OBJECTIVE
"Interval History: No complaints this morning.     Continuous Infusions:   epoprostenol (VELETRI) infusion 24 ng/kg/min (09/04/17 1442)    furosemide (LASIX) 5 mg/mL infusion (non-titrating) 20 mg/hr (09/04/17 2153)    veletri/remodulin tubing      veletri/remodulin tubing       Scheduled Meds:   albuterol sulfate  2.5 mg Nebulization Q6H    duloxetine  60 mg Oral BID    gabapentin  300 mg Oral TID    macitentan  10 mg Oral Daily    magnesium oxide  400 mg Oral BID    polyethylene glycol  17 g Oral Daily    potassium chloride  20 mEq Oral TID    sildenafil  20 mg Oral TID    spironolactone  25 mg Oral Daily    vancomycin (VANCOCIN) IVPB  750 mg Intravenous Q12H     PRN Meds:acetaminophen, albuterol sulfate, hydrocodone-acetaminophen 10-325mg    Review of patient's allergies indicates:   Allergen Reactions    Chlorhexidine Itching and Rash     Patient had raised rash on neck following RHC procedure. She states she's never had a problem with the "prep" used until this time.     Adhesive Rash     Objective:     Vital Signs (Most Recent):  Temp: 98 °F (36.7 °C) (09/05/17 0400)  Pulse: 105 (09/05/17 0741)  Resp: 18 (09/05/17 0741)  BP: (!) 110/59 (09/05/17 0400)  SpO2: 97 % (09/05/17 0741) Vital Signs (24h Range):  Temp:  [98 °F (36.7 °C)-98.2 °F (36.8 °C)] 98 °F (36.7 °C)  Pulse:  [] 105  Resp:  [16-20] 18  SpO2:  [91 %-98 %] 97 %  BP: ()/(40-61) 110/59     Weight: 79.1 kg (174 lb 6.1 oz)  Body mass index is 30.89 kg/m².      Intake/Output Summary (Last 24 hours) at 09/05/17 0816  Last data filed at 09/05/17 0500   Gross per 24 hour   Intake           657.26 ml   Output             1250 ml   Net          -592.74 ml       Hemodynamic Parameters:         Physical Exam   Constitutional: She is oriented to person, place, and time. She appears well-developed and well-nourished.   HENT:   Head: Normocephalic.   Eyes: Pupils are equal, round, and reactive to light.   Neck: Normal range of motion. " Neck supple. JVD (improving) present.   Cardiovascular: Normal rate and regular rhythm.    Pulmonary/Chest: Effort normal. She has rales.   Abdominal: Soft. Bowel sounds are normal.   Musculoskeletal: Normal range of motion.   Neurological: She is alert and oriented to person, place, and time.   Skin: Skin is warm and dry.   Psychiatric: She has a normal mood and affect. Her behavior is normal.   Nursing note and vitals reviewed.      Significant Labs:  CBC:    Recent Labs  Lab 09/04/17 0439 09/05/17 0536   WBC 10.82  --    RBC 5.07 4.94   HGB 12.7 12.5   HCT 37.7 36.6*     --    MCV 74* 74*   MCH 25.0* 25.3*   MCHC 33.7 34.2     BNP:  No results for input(s): BNP in the last 72 hours.    Invalid input(s): BNPTRIAGELBLO  CMP:    Recent Labs  Lab 09/05/17 0536   *   CALCIUM 9.2   *   K 4.3   CO2 29   CL 90*   BUN 24*   CREATININE 1.4      Coagulation:     Recent Labs  Lab 09/05/17 0536   INR 1.9*     LDH:  No results for input(s): LDH in the last 72 hours.  Microbiology:  Microbiology Results (last 7 days)     Procedure Component Value Units Date/Time    Blood culture [[id number]] Collected:  09/04/17 0438    Order Status:  Completed Specimen:  Blood Updated:  09/05/17 0612     Blood Culture, Routine No Growth to date     Blood Culture, Routine No Growth to date    Blood culture [[id number]] Collected:  09/01/17 0423    Order Status:  Completed Specimen:  Blood Updated:  09/04/17 1042     Blood Culture, Routine Gram stain aer bottle: Gram positive cocci in clusters resembling Staph      Blood Culture, Routine Results called to and read back by: Kandice Sears RN  09/02/2017  04:05     Blood Culture, Routine --     METHICILLIN RESISTANT STAPHYLOCOCCUS AUREUS  [facility].  For susceptibility see order #[id number]      Blood culture [[id number]]  (Susceptibility) Collected:  09/01/17 0423    Order Status:  Completed Specimen:  Blood Updated:  09/04/17 1042      Blood Culture, Routine Gram stain aer bottle: Gram positive cocci in clusters resembling Staph      Blood Culture, Routine Results called to and read back by: Kandice Sears RN  09/02/2017  04:04     Blood Culture, Routine --     METHICILLIN RESISTANT STAPHYLOCOCCUS AUREUS  ID consult required at [facility].      Blood culture [[id number]] Collected:  09/03/17 0543    Order Status:  Completed Specimen:  Blood Updated:  09/04/17 0822     Blood Culture, Routine No Growth to date     Blood Culture, Routine No Growth to date    Blood culture [[id number]]  (Susceptibility) Collected:  08/30/17 0436    Order Status:  Completed Specimen:  Blood Updated:  09/01/17 1050     Blood Culture, Routine Gram stain aer bottle: Gram positive cocci in clusters resembling Staph     Blood Culture, Routine Results called to and read back by: Steven Bland RN     Blood Culture, Routine 08/30/2017  20:20     Blood Culture, Routine --     METHICILLIN RESISTANT STAPHYLOCOCCUS AUREUS  ID consult required at [facility].      Culture, Respiratory with Gram Stain [[id number]] Collected:  08/30/17 0928    Order Status:  Completed Specimen:  Respiratory from Sputum, Expectorated Updated:  08/30/17 1419     Respiratory Culture Specimen inadequate - culture not performed. Spoke with Valerie      Respiratory Culture Esquinance 08/30/2017  14:18     Gram Stain (Respiratory) >10epis/lfp and <than many WBC's      Gram Stain (Respiratory) Predominance of oropharyngeal maribel. Please recollect.    Culture, Respiratory with Gram Stain [[id number]]     Order Status:  No result Specimen:  Respiratory from Sputum, Expectorated     Blood culture [[id number]]     Order Status:  Canceled Specimen:  Blood           I have reviewed all pertinent labs within the past 24 hours.    Estimated Creatinine Clearance: 44.7 mL/min (based on SCr of 1.4 mg/dL).    Diagnostic Results:  I have reviewed all pertinent imaging  results/findings within the past 24 hours.

## 2017-09-05 NOTE — CONSULTS
History & Physical  Surgery      SUBJECTIVE:     Chief Complaint/Reason for Consult: Permacath removal    History of Present Illness: Emily Cook is a 56 y.o. female with PHTN on Veletri and lasix for pulmonary HTN, brush catheter placed 6/2017 here transferred from OSH after being admitted with acute respiratory failure 2/2 to [facility] with blood cultures 8/26 +MRSA.  Blood cultures continue to be positive for MRSA.  The catheter has been unable to be removed over the past several days due to supratherapeutic INR.  INR 1.9 today.      No current facility-administered medications on file prior to encounter.      Current Outpatient Prescriptions on File Prior to Encounter   Medication Sig    duloxetine (CYMBALTA) 60 MG capsule Take 1 capsule (60 mg total) by mouth 2 (two) times daily.    EPOPROSTENOL SODIUM, ARGININE, (VELETRI IV) Inject into the vein.    furosemide (LASIX) 80 MG tablet Take 1 tablet (80 mg total) by mouth 2 (two) times daily.    gabapentin (NEURONTIN) 300 MG capsule Take 1 capsule (300 mg total) by mouth As instructed. Take one capsule qam, 1 qpm, 2 qhs.    hydrocodone-acetaminophen 10-325mg (NORCO)  mg Tab Take 1 tablet by mouth 3 (three) times daily as needed.    macitentan (OPSUMIT) 10 mg Tab Take 1 tablet (10 mg total) by mouth once daily.    PATADAY 0.2 % Drop     potassium chloride (MICRO-K) 10 MEQ CpSR Take 2 capsules (20 mEq total) by mouth 3 (three) times daily.    spironolactone (ALDACTONE) 25 MG tablet Take 1 tablet (25 mg total) by mouth once daily.    warfarin (COUMADIN) 5 MG tablet TAKE 1 AND 1/2 TABLETS BY MOUTH DAILY EXCEPT 1 TABLET ON TUESDAY OR AS DIRECTED BY COUMADIN CLINIC    clobetasol (TEMOVATE) 0.05 % external solution     hydrOXYzine (VISTARIL) 50 MG Cap 50 mg daily as needed.        Review of patient's allergies indicates:   Allergen Reactions    Chlorhexidine Itching and Rash     Patient had raised rash on neck following RHC procedure. She states  "she's never had a problem with the "prep" used until this time.     Adhesive Rash       Past Medical History:   Diagnosis Date    Arrhythmia     Arthritis     Cardiac pacemaker in situ     Carpal tunnel syndrome, right     Cervical spondylosis     Cervicalgia     CHF (congestive heart failure)     Diverticulitis     Heart block AV third degree     Hyperlipidemia     ALFREDO on CPAP     Pulmonary hypertension     Subdeltoid bursitis      Past Surgical History:   Procedure Laterality Date    CARDIAC CATHETERIZATION      CARDIAC PACEMAKER PLACEMENT  7/29/13    [employer] DC PM    CARDIAC SURGERY      COLONOSCOPY N/A 9/28/2015    Procedure: COLONOSCOPY;  Surgeon: Jason Diaz MD;  Location: [facility] ([address] [place] [address]);  Service: Endoscopy;  Laterality: N/A;  Please schedule in 2-3 months with Dr Diaz  Pulmonary HTN, 2nd floor (off remodulin infusion as of "a few days" before 9/9/15)    3 day hold Coumadin, [facility]  PT/INR scheduled before procedure    ECTOPIC PREGNANCY SURGERY Left     HYSTERECTOMY      partial    knee arthroscopy       Family History   Problem Relation Age of Onset    Arthritis Mother     Diabetes Mother     Hyperlipidemia Mother     Arthritis Father     Hyperlipidemia Father      Social History   Substance Use Topics    Smoking status: Never Smoker    Smokeless tobacco: Never Used    Alcohol use No      Comment: rarely        Review of Systems   Constitutional: Positive for chills. Negative for diaphoresis, fatigue and fever.   Respiratory: Positive for cough and shortness of breath.    Gastrointestinal: Negative for abdominal pain, diarrhea, nausea and vomiting.   Genitourinary: Negative for dysuria.   Musculoskeletal: Negative for back pain.   Skin: Negative for wound.     OBJECTIVE:     Vital Signs (Most Recent)  Temp: 98.3 °F (36.8 °C) (09/05/17 1130)  Pulse: 106 (09/05/17 1130)  Resp: 20 (09/05/17 1130)  BP: 111/69 (09/05/17 1130)  SpO2: 95 % (09/05/17 " 1130)    Physical Exam   Constitutional: She is oriented to person, place, and time. She appears well-developed and well-nourished. No distress.   HENT:   Bandage overlying nose 2/2 nasal mask    Cardiovascular: Normal rate, regular rhythm and normal heart sounds.  Exam reveals no friction rub.    No murmur heard.  Pulmonary/Chest: Effort normal. No respiratory distress. She has no wheezes. She has no rales.   On O2 cannula    Abdominal: Soft. She exhibits no distension. There is no tenderness. There is no guarding.   Musculoskeletal: Normal range of motion.   Cardoso catheter right upper chest    Neurological: She is alert and oriented to person, place, and time.   Skin: Skin is warm and dry. She is not diaphoretic.   Psychiatric: She has a normal mood and affect.   Nursing note and vitals reviewed.    Laboratory  CBC:   Recent Labs  Lab 09/05/17 0536   WBC 6.79   RBC 4.94   HGB 12.5   HCT 36.6*   *   MCV 74*   MCH 25.3*   MCHC 34.2     CMP:   Recent Labs  Lab 08/30/17  0104  09/05/17 0536     < > 111*   CALCIUM 8.8  < > 9.2   ALBUMIN 2.4*  --   --    PROT 6.1  --   --      < > 131*   K 4.4  < > 4.3   CO2 29  < > 29     < > 90*   BUN 20  < > 24*   CREATININE 0.8  < > 1.4   ALKPHOS 97  --   --    ALT 26  --   --    AST 19  --   --    BILITOT 1.0  --   --    < > = values in this interval not displayed.  Coagulation:   Recent Labs  Lab 09/05/17 0536   LABPROT 19.4*   INR 1.9*     Microbiology Results (last 7 days)     Procedure Component Value Units Date/Time    Blood culture [[id number]] Collected:  09/03/17 0543    Order Status:  Completed Specimen:  Blood Updated:  09/05/17 0822     Blood Culture, Routine No Growth to date     Blood Culture, Routine No Growth to date     Blood Culture, Routine No Growth to date    Blood culture [[id number]] Collected:  09/04/17 0438    Order Status:  Completed Specimen:  Blood Updated:  09/05/17 0612     Blood Culture, Routine No Growth to date     Blood  Culture, Routine No Growth to date    Blood culture [[id number]] Collected:  09/01/17 0423    Order Status:  Completed Specimen:  Blood Updated:  09/04/17 1042     Blood Culture, Routine Gram stain aer bottle: Gram positive cocci in clusters resembling Staph      Blood Culture, Routine Results called to and read back by: Kandice Sears RN  09/02/2017  04:05     Blood Culture, Routine --     METHICILLIN RESISTANT STAPHYLOCOCCUS AUREUS  ID consult required at [facility].  For susceptibility see order #[id number]      Blood culture [[id number]]  (Susceptibility) Collected:  09/01/17 0423    Order Status:  Completed Specimen:  Blood Updated:  09/04/17 1042     Blood Culture, Routine Gram stain aer bottle: Gram positive cocci in clusters resembling Staph      Blood Culture, Routine Results called to and read back by: Kandice Sears RN  09/02/2017  04:04     Blood Culture, Routine --     METHICILLIN RESISTANT STAPHYLOCOCCUS AUREUS  ID consult required at [facility].      Blood culture [[id number]]  (Susceptibility) Collected:  08/30/17 0436    Order Status:  Completed Specimen:  Blood Updated:  09/01/17 1050     Blood Culture, Routine Gram stain aer bottle: Gram positive cocci in clusters resembling Staph     Blood Culture, Routine Results called to and read back by: Steven Bland RN     Blood Culture, Routine 08/30/2017  20:20     Blood Culture, Routine --     METHICILLIN RESISTANT STAPHYLOCOCCUS AUREUS  ID consult required at [facility].      Culture, Respiratory with Gram Stain [[id number]] Collected:  08/30/17 0928    Order Status:  Completed Specimen:  Respiratory from Sputum, Expectorated Updated:  08/30/17 1419     Respiratory Culture Specimen inadequate - culture not performed. Spoke with Valerie      Respiratory Culture Esquinance 08/30/2017  14:18     Gram Stain (Respiratory) >10epis/lfp and <than many WBC's      Gram Stain (Respiratory) Predominance of  oropharyngeal maribel. Please recollect.    Culture, Respiratory with Gram Stain [[id number]]     Order Status:  No result Specimen:  Respiratory from Sputum, Expectorated     Blood culture [[id number]]     Order Status:  Canceled Specimen:  Blood             ASSESSMENT/PLAN:     A/P:  55 yo F with pulmonary HTN on Veletri via R upper chest Cardoso and MRSA bacteremia    Cardoso removed at bedside  Catheter tip sent for culture  Please call with questions    Charly Tracy  General Surgery, PGY-5  Pager # [phone]    Procedure:    R upper chest Cardoso catheter removal  Prepped with chlorhexidine  Local anesthesia with 1% lidocaine without Epi  Catheter cuff  from surrounding tissues with scissors  Catheter removed and tip sent for culture  Pressure held for hemostasis  Dressing applied  No complications      I have personally performed a detailed history and physical examination on this patient. My findings are summarized in the resident's note included in the record.

## 2017-09-05 NOTE — NURSING
DANILO Argueta notified of run of rapid wide complex tachycardia resembling VT. No new orders received.

## 2017-09-05 NOTE — PLAN OF CARE
Problem: Patient Care Overview  Goal: Plan of Care Review  Outcome: Ongoing (interventions implemented as appropriate)  Pt free from falls. Pt wears non slip socks when ambulating. Pt bed low and locked position. Pt afebrile. Pt IV site without redness or edema.  Pt has denied any pain or discomfort this shift. Pt continues on veletri. Pt desats upon exertion after ambulating to bedside commode. Pt continues on nonrebreather. Pure wick inplace  For incontinence.

## 2017-09-06 PROBLEM — R79.1 SUPRATHERAPEUTIC INR: Status: RESOLVED | Noted: 2017-01-01 | Resolved: 2017-01-01

## 2017-09-06 NOTE — PROGRESS NOTES
[facility]  Infectious Disease  Progress Note    Patient Name: Emily Cook  MRN: [id number]  Admission Date: 8/29/2017  Length of Stay: 8 days  Attending Physician: Yves Ruth Jr.,*  Primary Care Provider: Kaylee Cazares MD    Isolation Status: Contact  Assessment/Plan:      Bacteremia    56 year-old female with history of pulmonary HTN on Veletri via brush catheter placed 6/2017 transferred from OSH with pneumonia and MRSA bacteremia likely from infected brush catheter. TTE negative for vegetations. Patient is currently on IV Vancomycin. She has finished her course of azithromycin for pneumonia. Fevers and leukocytosis from admit have resolved. Brush catheter removed on 9/5. Blood cultures from 9/3, 9/4 and 9/5 are NGTD. Vanc level this a.m. 21.4.     Plan  - Recommend Vancomycin 1250 mg IV q 24 hours x two weeks from date brush catheter was removed (end date 9/19/17)  - Patient will need CBC, CMP and Vanc trough to be drawn weekly with results faxed to the ID Department at [phone].   - Would check Vanc trough prior to 3rd dose. May need to adjust Vancomycin dose with goal trough 15-20.   - ID follow up scheduled in two weeks.  - Will sign off. Feel free to re-consult ID as needed.            Please call for any questions. Thank you.  Mehnaz Hanson PA-C  Phone: [phone]  Pager: [phone]    Subjective:     Principal Problem:Respiratory failure with hypoxia    HPI: 55 y/o female with PHTN on veletri and lasix for pulmonary HTN, brush catheter placed 6/2017 here transferred from OSH after being admitted with acute respiratory failure 2/2 to PNA. Pt reports feeling unwell for the past few days with fevers and chills at home and neck pain. Pt febrile 100.4 on admit with leukocytosis of 14.20. Blood cultures 8/26, 8/30 +MRSA. Pt currently on azithromycin for PNA and vancomycin for MRSA bacteremia. 2D echo negative for vegetations. Respiratory cultures normal respiratory  maribel. Today pt reports much improvement since admission and breathing is back to baseline. Reports having intermittent cough. Afebrile, without a leukocytosis. Denies having any other acute symptoms.   Interval History: NAEON. Now on O2 via NC. Patient reports feeling better since admission. Without new complaints today. Blood cultures from 9/3 and 9/4 are NGTD. Cardoso catheter now removed. Afebrile without a leukocytosis.      Review of Systems   Constitutional: Positive for fatigue. Negative for chills, diaphoresis and fever.   Respiratory: Positive for cough and shortness of breath.    Cardiovascular: Negative for chest pain.   Gastrointestinal: Negative for abdominal pain, diarrhea and nausea.   Genitourinary: Negative for dysuria.   Musculoskeletal: Negative for back pain.   Skin: Negative for wound.   Neurological: Positive for weakness. Negative for headaches.     Objective:     Vital Signs (Most Recent):  Temp: 97.5 °F (36.4 °C) (09/06/17 0730)  Pulse: 102 (09/06/17 0730)  Resp: 20 (09/06/17 0730)  BP: (!) 90/54 (09/06/17 0730)  SpO2: (!) 92 % (09/06/17 0730) Vital Signs (24h Range):  Temp:  [97.5 °F (36.4 °C)-98.6 °F (37 °C)] 97.5 °F (36.4 °C)  Pulse:  [] 102  Resp:  [16-20] 20  SpO2:  [75 %-96 %] 92 %  BP: ()/(50-69) 90/54     Weight: 72.1 kg (158 lb 15.2 oz)  Body mass index is 28.16 kg/m².    Estimated Creatinine Clearance: 39.4 mL/min (based on SCr of 1.5 mg/dL (H)).    Physical Exam   Constitutional: She is oriented to person, place, and time. She appears well-developed. No distress.   Cardiovascular: Normal rate, regular rhythm and normal heart sounds.  Exam reveals no friction rub.    No murmur heard.  Pulmonary/Chest: Effort normal. No respiratory distress. She has no wheezes. She has rales.   On O2 via NC   Abdominal: Soft. She exhibits no distension. There is no tenderness. There is no guarding.   Musculoskeletal: She exhibits no edema, tenderness or deformity.   Cardoso catheter  right upper chest now removed. Site dressed. Dressing c/d/i.   Neurological: She is alert and oriented to person, place, and time.   Skin: Skin is warm and dry. She is not diaphoretic.   Psychiatric: She has a normal mood and affect.   Nursing note and vitals reviewed.      Significant Labs:   Blood Culture:     Recent Labs  Lab 08/30/17  0436 09/01/17  0423 09/03/17  0543 09/04/17  0438 09/05/17  1405   LABBLOO Gram stain aer bottle: Gram positive cocci in clusters resembling Staph  Results called to and read back by: Steven Bland RN  08/30/2017  20:20  METHICILLIN RESISTANT STAPHYLOCOCCUS AUREUSID consult required at [facility]. Gram stain aer bottle: Gram positive cocci in clusters resembling Staph   Results called to and read back by: Kandice Sears RN  09/02/2017  04:05  METHICILLIN RESISTANT STAPHYLOCOCCUS AUREUSID consult required at [facility].For susceptibility see order #[id number]  Gram stain aer bottle: Gram positive cocci in clusters resembling Staph   Results called to and read back by: Kandice Sears RN  09/02/2017  04:04  METHICILLIN RESISTANT STAPHYLOCOCCUS AUREUSID consult required at [facility]. No Growth to date  No Growth to date  No Growth to date  No Growth to date No Growth to date  No Growth to date  No Growth to date No Growth to date     CBC:     Recent Labs  Lab 09/05/17 0536 09/06/17 0438   WBC 6.79 8.17   HGB 12.5 12.0   HCT 36.6* 35.0*   * 128*     CMP:     Recent Labs  Lab 09/05/17 0536 09/06/17 0438   * 130*   K 4.3 3.9   CL 90* 89*   CO2 29 29   * 109   BUN 24* 25*   CREATININE 1.4 1.5*   CALCIUM 9.2 9.3   ANIONGAP 12 12   EGFRNONAA 42.0* 38.7*     Respiratory Culture:     Recent Labs  Lab 08/30/17 0928   GSRESP >10epis/lfp and <than many WBC's   Predominance of oropharyngeal maribel. Please recollect.   RESPIRATORYC Specimen inadequate - culture not performed. Spoke with Valerie    Esquinance 08/30/2017  14:18     Urine Culture:     Recent Labs  Lab 06/09/17  0743   LABURIN ESCHERICHIA COLI>100,000 cfu/ml     Urine Studies:     Recent Labs  Lab 08/26/17  0904   COLORU Yellow   APPEARANCEUA Clear   PHUR 6.0   SPECGRAV 1.020   PROTEINUA 100*   GLUCUA Negative   KETONESU Negative   BILIRUBINUA Small*   OCCULTUA Moderate*   NITRITE Negative   UROBILINOGEN 4.0*   LEUKOCYTESUR Negative   RBCUA 1   WBCUA 1   BACTERIA Rare   HYALINECASTS 1     Wound Culture: No results for input(s): LABAERO in the last 4320 hours.    Significant Imaging: I have reviewed all pertinent imaging results/findings within the past 24 hours.

## 2017-09-06 NOTE — ASSESSMENT & PLAN NOTE
-Secondary to PNA and volume overload.   -Continue HFNC. Wean to maintain sats >/= 85% (home dose O2 5L)  -Continue Veletri 25 ng/kg/mn  -D/C Lasix and transition to PO Bumex

## 2017-09-06 NOTE — PROGRESS NOTES
"[facility]  Heart Transplant  Progress Note    Patient Name: Emily Cook  MRN: [id number]  Admission Date: 8/29/2017  Hospital Length of Stay: 8 days  Attending Physician: Yves Ruth Jr.,*  Primary Care Provider: Kaylee Cazares MD  Principal Problem:Respiratory failure with hypoxia    Subjective:     Interval History: Feeling better.    Continuous Infusions:   epoprostenol (VELETRI) infusion 24 ng/kg/min (09/06/17 1455)    veletri/remodulin tubing      veletri/remodulin tubing       Scheduled Meds:   albuterol sulfate  2.5 mg Nebulization Q6H    bumetanide  2 mg Oral BID    duloxetine  60 mg Oral BID    gabapentin  300 mg Oral TID    lidocaine HCL 10 mg/ml (1%)  10 mL Other Once    macitentan  10 mg Oral Daily    magnesium oxide  400 mg Oral BID    polyethylene glycol  17 g Oral Daily    potassium chloride  20 mEq Oral TID    sildenafil  20 mg Oral TID    spironolactone  25 mg Oral Daily    vancomycin (VANCOCIN) IVPB  1,250 mg Intravenous Q24H     PRN Meds:acetaminophen, albuterol sulfate, hydrocodone-acetaminophen 10-325mg    Review of patient's allergies indicates:   Allergen Reactions    Chlorhexidine Itching and Rash     Patient had raised rash on neck following RHC procedure. She states she's never had a problem with the "prep" used until this time.     Adhesive Rash     Objective:     Vital Signs (Most Recent):  Temp: 97.6 °F (36.4 °C) (09/06/17 1100)  Pulse: 108 (09/06/17 1500)  Resp: (!) 24 (09/06/17 1200)  BP: (!) 92/52 (09/06/17 1100)  SpO2: (!) 90 % (09/06/17 1200) Vital Signs (24h Range):  Temp:  [97.5 °F (36.4 °C)-98.6 °F (37 °C)] 97.6 °F (36.4 °C)  Pulse:  [] 108  Resp:  [16-24] 24  SpO2:  [75 %-96 %] 90 %  BP: ()/(50-60) 92/52     Weight: 72.1 kg (158 lb 15.2 oz)  Body mass index is 28.16 kg/m².      Intake/Output Summary (Last 24 hours) at 09/06/17 1548  Last data filed at 09/06/17 1400   Gross per 24 hour   Intake          1223.83 ml "   Output              650 ml   Net           573.83 ml       Hemodynamic Parameters:         Physical Exam   Constitutional: She is oriented to person, place, and time. She appears well-developed and well-nourished.   HENT:   Head: Normocephalic.   Eyes: Pupils are equal, round, and reactive to light.   Neck: Normal range of motion. Neck supple. No JVD (improving) present.   Cardiovascular: Normal rate and regular rhythm.    Pulmonary/Chest: Effort normal. She has no wheezes. She has no rales.   Abdominal: Soft. Bowel sounds are normal.   Musculoskeletal: Normal range of motion.   Neurological: She is alert and oriented to person, place, and time.   Skin: Skin is warm and dry.   Psychiatric: She has a normal mood and affect. Her behavior is normal.   Nursing note and vitals reviewed.      Significant Labs:  CBC:    Recent Labs  Lab 09/06/17 0438   WBC 8.17   RBC 4.84   HGB 12.0   HCT 35.0*   *   MCV 72*   MCH 24.8*   MCHC 34.3     BNP:  No results for input(s): BNP in the last 72 hours.    Invalid input(s): BNPTRIAGELBLO  CMP:    Recent Labs  Lab 09/06/17 0438      CALCIUM 9.3   *   K 3.9   CO2 29   CL 89*   BUN 25*   CREATININE 1.5*      Coagulation:     Recent Labs  Lab 09/06/17 0438   INR 1.5*     LDH:  No results for input(s): LDH in the last 72 hours.  Microbiology:  Microbiology Results (last 7 days)     Procedure Component Value Units Date/Time    IV catheter culture [[id number]] Collected:  09/05/17 1110    Order Status:  Completed Specimen:  Catheter Tip from Catheter Tip, Tunneled Updated:  09/06/17 1156     Aerobic Culture - Cath tip Insufficient incubation, culture in progress    Blood culture [[id number]] Collected:  09/03/17 0543    Order Status:  Completed Specimen:  Blood Updated:  09/06/17 0822     Blood Culture, Routine No Growth to date     Blood Culture, Routine No Growth to date     Blood Culture, Routine No Growth to date     Blood Culture, Routine No Growth to date     Blood culture [[id number]] Collected:  09/04/17 0438    Order Status:  Completed Specimen:  Blood Updated:  09/06/17 0612     Blood Culture, Routine No Growth to date     Blood Culture, Routine No Growth to date     Blood Culture, Routine No Growth to date    Blood culture [[id number]] Collected:  09/05/17 1405    Order Status:  Completed Specimen:  Blood Updated:  09/05/17 2145     Blood Culture, Routine No Growth to date    Narrative:       From 2 different sites 30 minutes apart    Blood culture [[id number]] Collected:  09/01/17 0423    Order Status:  Completed Specimen:  Blood Updated:  09/04/17 1042     Blood Culture, Routine Gram stain aer bottle: Gram positive cocci in clusters resembling Staph      Blood Culture, Routine Results called to and read back by: Kandice Sears RN  09/02/2017  04:05     Blood Culture, Routine --     METHICILLIN RESISTANT STAPHYLOCOCCUS AUREUS  ID consult required at [facility].  For susceptibility see order #[id number]      Blood culture [[id number]]  (Susceptibility) Collected:  09/01/17 0423    Order Status:  Completed Specimen:  Blood Updated:  09/04/17 1042     Blood Culture, Routine Gram stain aer bottle: Gram positive cocci in clusters resembling Staph      Blood Culture, Routine Results called to and read back by: Kandice Sears RN  09/02/2017  04:04     Blood Culture, Routine --     METHICILLIN RESISTANT STAPHYLOCOCCUS AUREUS  ID consult required at [facility].      Blood culture [[id number]]  (Susceptibility) Collected:  08/30/17 0436    Order Status:  Completed Specimen:  Blood Updated:  09/01/17 1050     Blood Culture, Routine Gram stain aer bottle: Gram positive cocci in clusters resembling Staph     Blood Culture, Routine Results called to and read back by: Steven Bland RN     Blood Culture, Routine 08/30/2017  20:20     Blood Culture, Routine --     METHICILLIN RESISTANT STAPHYLOCOCCUS AUREUS  ID consult required at [facility]  and [facility].            I have reviewed all pertinent labs within the past 24 hours.    Estimated Creatinine Clearance: 39.4 mL/min (based on SCr of 1.5 mg/dL (H)).    Diagnostic Results:  I have reviewed all pertinent imaging results/findings within the past 24 hours.    Assessment and Plan:     * Respiratory failure with hypoxia    -Secondary to PNA and volume overload.   -Continue HFNC. Wean to maintain sats >/= 85% (home dose O2 5L)  -Continue Veletri 25 ng/kg/mn  -D/C Lasix and transition to PO Bumex        Pulmonary hypertension    -continue veletri, sildenafil, macitentan           Right-sided heart failure    -Transition to PO Bumex today  -standing dose of K as ordered  -monitor K and Mg q12h  -daily I/O  -low Na diet        Bacteremia    -MRSA per cultures.  - Cardoso removed yesterday(SOPHIA requesting INR<2.5 so may be able to come out today).  -Appreciate ID Cx. Continue Vanc x 2 weeks from yesterday. PICC placement for Vanc. Cardoso placement for Veletri tomorrow         Pneumonia of right upper lobe due to infectious organism    -continue Vancomycin              Elena Raygoza PA-C  Heart Transplant  [facility]

## 2017-09-06 NOTE — PLAN OF CARE
AAO x 4. VSS, afebrile, SpO2 90% on 5L NC; NRB overnight for SpO2 75% on 5L NC. Telemetry monitoring-ST. 11-beat run of vtach. Veletri infusing at 24 kg/kg/min x 86 kg or 50 mL/24 hrs. Lasix gtt infusing at 20 mg/hr. R CW old brush site dressing CDI. Blood cx NGDT. Holding vanc due to elevated trough. Contact precautions maintained. UOP= 2 UM and 1 BM so far this shift. BS commode in use. Fall precautions maintained and pt repositions self. See flowsheet for assessment findings. Will continue to monitor.

## 2017-09-06 NOTE — SIGNIFICANT EVENT
Dr Mims notified of 11-beat run of Vtach. Returned to sinus tachycardia. Electrolytes in process with AM labs.

## 2017-09-06 NOTE — SUBJECTIVE & OBJECTIVE
"Interval History: Feeling better.    Continuous Infusions:   epoprostenol (VELETRI) infusion 24 ng/kg/min (09/06/17 1455)    veletri/remodulin tubing      veletri/remodulin tubing       Scheduled Meds:   albuterol sulfate  2.5 mg Nebulization Q6H    bumetanide  2 mg Oral BID    duloxetine  60 mg Oral BID    gabapentin  300 mg Oral TID    lidocaine HCL 10 mg/ml (1%)  10 mL Other Once    macitentan  10 mg Oral Daily    magnesium oxide  400 mg Oral BID    polyethylene glycol  17 g Oral Daily    potassium chloride  20 mEq Oral TID    sildenafil  20 mg Oral TID    spironolactone  25 mg Oral Daily    vancomycin (VANCOCIN) IVPB  1,250 mg Intravenous Q24H     PRN Meds:acetaminophen, albuterol sulfate, hydrocodone-acetaminophen 10-325mg    Review of patient's allergies indicates:   Allergen Reactions    Chlorhexidine Itching and Rash     Patient had raised rash on neck following RHC procedure. She states she's never had a problem with the "prep" used until this time.     Adhesive Rash     Objective:     Vital Signs (Most Recent):  Temp: 97.6 °F (36.4 °C) (09/06/17 1100)  Pulse: 108 (09/06/17 1500)  Resp: (!) 24 (09/06/17 1200)  BP: (!) 92/52 (09/06/17 1100)  SpO2: (!) 90 % (09/06/17 1200) Vital Signs (24h Range):  Temp:  [97.5 °F (36.4 °C)-98.6 °F (37 °C)] 97.6 °F (36.4 °C)  Pulse:  [] 108  Resp:  [16-24] 24  SpO2:  [75 %-96 %] 90 %  BP: ()/(50-60) 92/52     Weight: 72.1 kg (158 lb 15.2 oz)  Body mass index is 28.16 kg/m².      Intake/Output Summary (Last 24 hours) at 09/06/17 1548  Last data filed at 09/06/17 1400   Gross per 24 hour   Intake          1223.83 ml   Output              650 ml   Net           573.83 ml       Hemodynamic Parameters:         Physical Exam   Constitutional: She is oriented to person, place, and time. She appears well-developed and well-nourished.   HENT:   Head: Normocephalic.   Eyes: Pupils are equal, round, and reactive to light.   Neck: Normal range of motion. Neck " supple. No JVD (improving) present.   Cardiovascular: Normal rate and regular rhythm.    Pulmonary/Chest: Effort normal. She has no wheezes. She has no rales.   Abdominal: Soft. Bowel sounds are normal.   Musculoskeletal: Normal range of motion.   Neurological: She is alert and oriented to person, place, and time.   Skin: Skin is warm and dry.   Psychiatric: She has a normal mood and affect. Her behavior is normal.   Nursing note and vitals reviewed.      Significant Labs:  CBC:    Recent Labs  Lab 09/06/17 0438   WBC 8.17   RBC 4.84   HGB 12.0   HCT 35.0*   *   MCV 72*   MCH 24.8*   MCHC 34.3     BNP:  No results for input(s): BNP in the last 72 hours.    Invalid input(s): BNPTRIAGELBLO  CMP:    Recent Labs  Lab 09/06/17 0438      CALCIUM 9.3   *   K 3.9   CO2 29   CL 89*   BUN 25*   CREATININE 1.5*      Coagulation:     Recent Labs  Lab 09/06/17 0438   INR 1.5*     LDH:  No results for input(s): LDH in the last 72 hours.  Microbiology:  Microbiology Results (last 7 days)     Procedure Component Value Units Date/Time    IV catheter culture [[id number]] Collected:  09/05/17 1110    Order Status:  Completed Specimen:  Catheter Tip from Catheter Tip, Tunneled Updated:  09/06/17 1156     Aerobic Culture - Cath tip Insufficient incubation, culture in progress    Blood culture [[id number]] Collected:  09/03/17 0543    Order Status:  Completed Specimen:  Blood Updated:  09/06/17 0822     Blood Culture, Routine No Growth to date     Blood Culture, Routine No Growth to date     Blood Culture, Routine No Growth to date     Blood Culture, Routine No Growth to date    Blood culture [[id number]] Collected:  09/04/17 0438    Order Status:  Completed Specimen:  Blood Updated:  09/06/17 0612     Blood Culture, Routine No Growth to date     Blood Culture, Routine No Growth to date     Blood Culture, Routine No Growth to date    Blood culture [[id number]] Collected:  09/05/17 1405    Order Status:  Completed  Specimen:  Blood Updated:  09/05/17 2145     Blood Culture, Routine No Growth to date    Narrative:       From 2 different sites 30 minutes apart    Blood culture [[id number]] Collected:  09/01/17 0423    Order Status:  Completed Specimen:  Blood Updated:  09/04/17 1042     Blood Culture, Routine Gram stain aer bottle: Gram positive cocci in clusters resembling Staph      Blood Culture, Routine Results called to and read back by: Kandice Sears RN  09/02/2017  04:05     Blood Culture, Routine --     METHICILLIN RESISTANT STAPHYLOCOCCUS AUREUS  ID consult required at [facility].  For susceptibility see order #[id number]      Blood culture [[id number]]  (Susceptibility) Collected:  09/01/17 0423    Order Status:  Completed Specimen:  Blood Updated:  09/04/17 1042     Blood Culture, Routine Gram stain aer bottle: Gram positive cocci in clusters resembling Staph      Blood Culture, Routine Results called to and read back by: Kandice Sears RN  09/02/2017  04:04     Blood Culture, Routine --     METHICILLIN RESISTANT STAPHYLOCOCCUS AUREUS  ID consult required at [facility].      Blood culture [[id number]]  (Susceptibility) Collected:  08/30/17 0436    Order Status:  Completed Specimen:  Blood Updated:  09/01/17 1050     Blood Culture, Routine Gram stain aer bottle: Gram positive cocci in clusters resembling Staph     Blood Culture, Routine Results called to and read back by: Steven Bland RN     Blood Culture, Routine 08/30/2017  20:20     Blood Culture, Routine --     METHICILLIN RESISTANT STAPHYLOCOCCUS AUREUS  ID consult required at [facility].            I have reviewed all pertinent labs within the past 24 hours.    Estimated Creatinine Clearance: 39.4 mL/min (based on SCr of 1.5 mg/dL (H)).    Diagnostic Results:  I have reviewed all pertinent imaging results/findings within the past 24 hours.

## 2017-09-06 NOTE — PROGRESS NOTES
"D/C PLANNING UPDATE    SW to pt's room for update today.  Pt presents as sitting up in bed with son Jaden and family members at bedside.  Pt and family all present as aao x4, calm, cooperative, and asking and answering questions appropriately.  Pt reports feeling "fine" today.  SW discussed need for home IV abx with pt and family, and explained role of HH and infusion agencies.  SW explained that infusion agency for abx will be separate from infusion agency for Veletri, and that HTS team reports pt will use Cardoso catheter for Veletri and PICC line for abx.  Pt and family verbalize understanding.  Pt denies preference for infusion agency.  Pt requesting to resume services with [facility] (ph: [phone], f: [phone]) for HH.  SW referred pt to [facility] (ph: [phone], f; [phone]) for abx and granted Epic access.      Pt reports coping adequately at this time.  Pt verbalizes understanding that she may be ready for d/c by the end of this week.  Pt reports that her son Jaden will transport her home at time of d/c.  Son reports that he will bring portable O2 tanks on day of d/c for pt to use on drive home.  Pt and family deny any questions or concerns for SW at this time.  SW providing ongoing psychosocial and counseling support, education, resources, assistance, and discharge planning as indicated.  SW following and remains available.  "

## 2017-09-06 NOTE — ASSESSMENT & PLAN NOTE
56 year-old female with history of pulmonary HTN on Veletri via cardoso catheter placed 6/2017 transferred from OSH with pneumonia and MRSA bacteremia likely from infected cardoso catheter. TTE negative for vegetations. Patient is currently on IV Vancomycin. She has finished her course of azithromycin for pneumonia. Fevers and leukocytosis from admit have resolved. Cardoso catheter removed on 9/5. Blood cultures from 9/3, 9/4 and 9/5 are NGTD. Vanc level this a.m. 21.4.     Plan  - Recommend Vancomycin 1250 mg IV q 24 hours x two weeks from date cardoso catheter was removed (end date 9/19/17)  - Patient will need CBC, CMP and Vanc trough to be drawn weekly with results faxed to the ID Department at [phone].   - Would check Vanc trough prior to 3rd dose. May need to adjust Vancomycin dose with goal trough 15-20.   - ID follow up scheduled in two weeks.  - Will sign off. Feel free to re-consult ID as needed.

## 2017-09-06 NOTE — ASSESSMENT & PLAN NOTE
-MRSA per cultures.  - Cardoso removed yesterday([facility] requesting INR<2.5 so may be able to come out today).  -Appreciate ID Cx. Continue Vanc x 2 weeks from yesterday. PICC placement for Vanc. Cardoso placement for Veletri tomorrow

## 2017-09-06 NOTE — SUBJECTIVE & OBJECTIVE
Interval History: NAEON. Now on O2 via NC. Patient reports feeling better since admission. Without new complaints today. Blood cultures from 9/3 and 9/4 are NGTD. Cardoso catheter now removed. Afebrile without a leukocytosis.      Review of Systems   Constitutional: Positive for fatigue. Negative for chills, diaphoresis and fever.   Respiratory: Positive for cough and shortness of breath.    Cardiovascular: Negative for chest pain.   Gastrointestinal: Negative for abdominal pain, diarrhea and nausea.   Genitourinary: Negative for dysuria.   Musculoskeletal: Negative for back pain.   Skin: Negative for wound.   Neurological: Positive for weakness. Negative for headaches.     Objective:     Vital Signs (Most Recent):  Temp: 97.5 °F (36.4 °C) (09/06/17 0730)  Pulse: 102 (09/06/17 0730)  Resp: 20 (09/06/17 0730)  BP: (!) 90/54 (09/06/17 0730)  SpO2: (!) 92 % (09/06/17 0730) Vital Signs (24h Range):  Temp:  [97.5 °F (36.4 °C)-98.6 °F (37 °C)] 97.5 °F (36.4 °C)  Pulse:  [] 102  Resp:  [16-20] 20  SpO2:  [75 %-96 %] 92 %  BP: ()/(50-69) 90/54     Weight: 72.1 kg (158 lb 15.2 oz)  Body mass index is 28.16 kg/m².    Estimated Creatinine Clearance: 39.4 mL/min (based on SCr of 1.5 mg/dL (H)).    Physical Exam   Constitutional: She is oriented to person, place, and time. She appears well-developed. No distress.   Cardiovascular: Normal rate, regular rhythm and normal heart sounds.  Exam reveals no friction rub.    No murmur heard.  Pulmonary/Chest: Effort normal. No respiratory distress. She has no wheezes. She has rales.   On O2 via NC   Abdominal: Soft. She exhibits no distension. There is no tenderness. There is no guarding.   Musculoskeletal: She exhibits no edema, tenderness or deformity.   Cardoso catheter right upper chest now removed. Site dressed. Dressing c/d/i.   Neurological: She is alert and oriented to person, place, and time.   Skin: Skin is warm and dry. She is not diaphoretic.   Psychiatric: She  has a normal mood and affect.   Nursing note and vitals reviewed.      Significant Labs:   Blood Culture:     Recent Labs  Lab 08/30/17  0436 09/01/17  0423 09/03/17  0543 09/04/17  0438 09/05/17  1405   LABBLOO Gram stain aer bottle: Gram positive cocci in clusters resembling Staph  Results called to and read back by: Steven Bland RN  08/30/2017  20:20  METHICILLIN RESISTANT STAPHYLOCOCCUS AUREUSID consult required at [facility]. Gram stain aer bottle: Gram positive cocci in clusters resembling Staph   Results called to and read back by: Kandice Sears RN  09/02/2017  04:05  METHICILLIN RESISTANT STAPHYLOCOCCUS AUREUSID consult required at [facility].For susceptibility see order #[id number]  Gram stain aer bottle: Gram positive cocci in clusters resembling Staph   Results called to and read back by: Kandice Sears RN  09/02/2017  04:04  METHICILLIN RESISTANT STAPHYLOCOCCUS AUREUSID consult required at [facility]. No Growth to date  No Growth to date  No Growth to date  No Growth to date No Growth to date  No Growth to date  No Growth to date No Growth to date     CBC:     Recent Labs  Lab 09/05/17  0536 09/06/17  0438   WBC 6.79 8.17   HGB 12.5 12.0   HCT 36.6* 35.0*   * 128*     CMP:     Recent Labs  Lab 09/05/17  0536 09/06/17  0438   * 130*   K 4.3 3.9   CL 90* 89*   CO2 29 29   * 109   BUN 24* 25*   CREATININE 1.4 1.5*   CALCIUM 9.2 9.3   ANIONGAP 12 12   EGFRNONAA 42.0* 38.7*     Respiratory Culture:     Recent Labs  Lab 08/30/17  0928   GSRESP >10epis/lfp and <than many WBC's   Predominance of oropharyngeal maribel. Please recollect.   RESPIRATORYC Specimen inadequate - culture not performed. Spoke with Valerie Mojica 08/30/2017  14:18     Urine Culture:     Recent Labs  Lab 06/09/17  0743   LABURIN ESCHERICHIA COLI>100,000 cfu/ml     Urine Studies:     Recent Labs  Lab 08/26/17  0904   COLORU Yellow    APPEARANCEUA Clear   PHUR 6.0   SPECGRAV 1.020   PROTEINUA 100*   GLUCUA Negative   KETONESU Negative   BILIRUBINUA Small*   OCCULTUA Moderate*   NITRITE Negative   UROBILINOGEN 4.0*   LEUKOCYTESUR Negative   RBCUA 1   WBCUA 1   BACTERIA Rare   HYALINECASTS 1     Wound Culture: No results for input(s): LABAERO in the last 4320 hours.    Significant Imaging: I have reviewed all pertinent imaging results/findings within the past 24 hours.

## 2017-09-06 NOTE — PLAN OF CARE
Problem: Patient Care Overview  Goal: Plan of Care Review  Outcome: Ongoing (interventions implemented as appropriate)  Patient AAOx4 and VSS. She has remained afebrile. Telemetry monitor on and HR has been 90s-100s, NS. She has Veletri going through a peripheral IV at 24 ng/kg/min with a dosing weight of 86 kg (50 mL/24 hr). The cassette and tubing was changed at 15:00 today. Contact precautions are being maintained due to MRSA in her blood. She's getting IV vanc q24hr. PICC consult was placed today and she's to be NPO after midnight for brush placement tomorrow. She's on 6 liters of O2 NC during the day and wears a non re breather at night. She's able to ambulate with one person assist and has experienced no falls today. She has no questions or concerns at this time. Patient stable and will continue to monitor.

## 2017-09-06 NOTE — ASSESSMENT & PLAN NOTE
-Transition to PO Bumex today  -standing dose of K as ordered  -monitor K and Mg q12h  -daily I/O  -low Na diet

## 2017-09-07 NOTE — NURSING
MD notified of veletri PIV infiltrated. Obtained IV access x 2 for veletri continuation. MD will call hospitalist to inquire about EJ for tonight due to PIVs infiltrating and poor venous access. Plan for brush and PICC today.

## 2017-09-07 NOTE — PLAN OF CARE
AAO x 4. VSS, afebrile, SpO2>90% on 5L NC. Telemetry monitoring-ST. Run of Vtach overnight; electrolytes with AM labs. IV Veletri infusing at 24 ng/kg/min x 86 kg or 50 mL/24 hrs. Difficult to maintained IV access overnight with PIVs infiltrating several times. See nurse's notes. Pt off Veletri for apx 10 minutes while regaining IV access. MD aware; pt in NAD. Reinforced to pt the importance of maintaining 2 PIVs. Pt currently has 2 working IVs. NPO at 0000 for brush placement today. Plan for PICC placement today for IV ABX. Contact precautions maintained. WAGNER Bumex as ordered. GFP=704 mL this shift. Fall precautions maintained and pt repositions self. See flowsheet for assessment findings. Will continue to monitor.

## 2017-09-07 NOTE — NURSING
Notified MD of approximately 50-beat run Vtach. Returned to sinus tachycardia. Electrolytes scheduled with AM labs.

## 2017-09-07 NOTE — ASSESSMENT & PLAN NOTE
-Secondary to PNA and volume overload.   -Continue 5L NC. Wean to maintain sats >/= 85% (home dose O2 5L)  -Continue Veletri 25 ng/kg/mn  -D/C'ed IV Lasix and transitioned to PO Bumex yesterday

## 2017-09-07 NOTE — ASSESSMENT & PLAN NOTE
-MRSA per cultures.  - Cardoso removed 9/5/17  -Appreciate ID Cx. Continue Vanc x 2 weeks, end 9/19/17. PICC placement for Vanc today. Cardoso placement for Veletri today

## 2017-09-07 NOTE — PROGRESS NOTES
D/C PLANNING UPDATE    LYNNE notified by BRANDI Raygoza this afternoon that Cardoso was unable to be placed today and that surgery will need to be consulted for Cardoso.  LYNNE notified Fadi with [facility] that d/c was held for today and is pending schedule from surgery.  LYNNE faxed resume HH orders and hospital records to [facility] (ph: [phone], f: [phone]) and informed them that d/c is pending Cardoso placement.  LYNNE following and remains available.

## 2017-09-07 NOTE — PLAN OF CARE
Problem: Patient Care Overview  Goal: Plan of Care Review  Outcome: Ongoing (interventions implemented as appropriate)  Patient AAOx4 and VSS. She has remained afebrile. Telemetry monitor on and HR has been 90s-100s, NS. She has Veletri going through a peripheral IV at 24 ng/kg/min with a dosing weight of 86 kg (50 mL/24 hr). The cassette and tubing was changed at 16:00 today. Contact precautions are being maintained due to MRSA in her blood. She's getting IV vanc q24hr. PICC was placed this morning. She's on 5 liters of O2 NC during the day and wears a non re breather at night. She went to have a brush placed today but it was unable to be placed. Patient expressed her discontent with this and asked to know what the next step is. Nurse notified team of this. She's able to ambulate with one person assist and has experienced no falls today. Patient stable and will continue to monitor.

## 2017-09-07 NOTE — PROCEDURES
Surgery Date: 9/7/17     (s) and Role:   Cooper García MD     Indication: IV veletri     Pre-op Diagnosis:    Bacteremia  Pulmonary HTN     Post-op Diagnosis;  Bacteremia  Pulmonary HTN     Procedure:  1. Unsuccessful Insertion Tunneled, Cuffed, Single Lumen Cardoso      Anesthesia: IV Sedation + Local      PROCEDURE: After obtaining consent, the patient was taken to the [facility] suite. Sedation was administered. The right neck and chest were prepped and draped in usual sterile fashion. We began using ultrasound guidance to examine the right internal jugular vein. It appeared to be very small with multiple collateral veins. Right IJ vein was cannulated but unable to thread thin wire past the right IJ 2/2 to occlusion.  After multiple attempts, we were unable get access to right IJ and procedure was terminated.      Anesthesia:  Conscious sedation was achieved with 25 mcg of Fentanyl and 1 mg of Midazolam (Versed). Local anesthesia was achieved with 5 ml of Lidocaine 2%. Moderate conscious sedation was performed and cardiorespiratory functions were monitored the entire procedure by me. Sedation began at 1425pm and concluded at 1445m, totaling 20 minutes.

## 2017-09-07 NOTE — SUBJECTIVE & OBJECTIVE
"Interval History: Feeling better. No complaints.     Continuous Infusions:   epoprostenol (VELETRI) infusion 24 ng/kg/min (09/06/17 1455)    veletri/remodulin tubing      veletri/remodulin tubing       Scheduled Meds:   albuterol sulfate  2.5 mg Nebulization Q6H    bumetanide  2 mg Oral BID    duloxetine  60 mg Oral BID    gabapentin  300 mg Oral TID    lidocaine HCL 10 mg/ml (1%)  10 mL Other Once    macitentan  10 mg Oral Daily    magnesium oxide  400 mg Oral BID    polyethylene glycol  17 g Oral Daily    potassium chloride  20 mEq Oral TID    sildenafil  20 mg Oral TID    sodium chloride 0.9%  10 mL Intravenous Q6H    spironolactone  25 mg Oral Daily    vancomycin (VANCOCIN) IVPB  1,250 mg Intravenous Q24H     PRN Meds:acetaminophen, albuterol sulfate, hydrocodone-acetaminophen 10-325mg, Flushing PICC Protocol **AND** sodium chloride 0.9% **AND** sodium chloride 0.9%    Review of patient's allergies indicates:   Allergen Reactions    Chlorhexidine Itching and Rash     Patient had raised rash on neck following RHC procedure. She states she's never had a problem with the "prep" used until this time.     Adhesive Rash     Objective:     Vital Signs (Most Recent):  Temp: 97.9 °F (36.6 °C) (09/07/17 1203)  Pulse: 101 (09/07/17 1203)  Resp: 20 (09/07/17 1203)  BP: (!) 101/58 (09/07/17 1203)  SpO2: (!) 92 % (09/07/17 1203) Vital Signs (24h Range):  Temp:  [97.6 °F (36.4 °C)-98.7 °F (37.1 °C)] 97.9 °F (36.6 °C)  Pulse:  [100-118] 101  Resp:  [16-24] 20  SpO2:  [83 %-96 %] 92 %  BP: ()/(50-72) 101/58     Weight: 72.2 kg (159 lb 2.8 oz)  Body mass index is 28.2 kg/m².      Intake/Output Summary (Last 24 hours) at 09/07/17 1308  Last data filed at 09/07/17 0500   Gross per 24 hour   Intake           919.44 ml   Output              400 ml   Net           519.44 ml       Hemodynamic Parameters:         Physical Exam   Constitutional: She is oriented to person, place, and time. She appears " well-developed and well-nourished.   HENT:   Head: Normocephalic.   Eyes: Pupils are equal, round, and reactive to light.   Neck: Normal range of motion. Neck supple. No JVD present.   Cardiovascular: Normal rate and regular rhythm.    Pulmonary/Chest: Effort normal. She has no wheezes. She has no rales.   Abdominal: Soft. Bowel sounds are normal.   Musculoskeletal: Normal range of motion.   Neurological: She is alert and oriented to person, place, and time.   Skin: Skin is warm and dry.   Psychiatric: She has a normal mood and affect. Her behavior is normal.   Nursing note and vitals reviewed.      Significant Labs:  CBC:    Recent Labs  Lab 09/07/17 0440   WBC 8.05   RBC 4.81   HGB 11.8*   HCT 34.6*   *   MCV 72*   MCH 24.5*   MCHC 34.1     BNP:    Recent Labs  Lab 09/07/17 0440   *     CMP:    Recent Labs  Lab 09/07/17 0440   *   CALCIUM 8.8   *   K 4.0   CO2 29   CL 91*   BUN 29*   CREATININE 1.5*      Coagulation:     Recent Labs  Lab 09/07/17 0440   INR 1.3*     LDH:  No results for input(s): LDH in the last 72 hours.  Microbiology:  Microbiology Results (last 7 days)     Procedure Component Value Units Date/Time    Blood culture [[id number]] Collected:  09/03/17 0543    Order Status:  Completed Specimen:  Blood Updated:  09/07/17 0822     Blood Culture, Routine No Growth to date     Blood Culture, Routine No Growth to date     Blood Culture, Routine No Growth to date     Blood Culture, Routine No Growth to date     Blood Culture, Routine No Growth to date    IV catheter culture [[id number]] Collected:  09/05/17 1110    Order Status:  Completed Specimen:  Catheter Tip from Catheter Tip, Tunneled Updated:  09/07/17 0740     Aerobic Culture - Cath tip --     STAPHYLOCOCCUS AUREUS  > 15 colonies  Susceptibility pending      Blood culture [[id number]] Collected:  09/04/17 0438    Order Status:  Completed Specimen:  Blood Updated:  09/07/17 0612     Blood Culture, Routine No Growth to  date     Blood Culture, Routine No Growth to date     Blood Culture, Routine No Growth to date     Blood Culture, Routine No Growth to date    Blood culture [[id number]] Collected:  09/05/17 1405    Order Status:  Completed Specimen:  Blood Updated:  09/06/17 1612     Blood Culture, Routine No Growth to date     Blood Culture, Routine No Growth to date    Narrative:       From 2 different sites 30 minutes apart    Blood culture [[id number]] Collected:  09/01/17 0423    Order Status:  Completed Specimen:  Blood Updated:  09/04/17 1042     Blood Culture, Routine Gram stain aer bottle: Gram positive cocci in clusters resembling Staph      Blood Culture, Routine Results called to and read back by: Kandice Sears RN  09/02/2017  04:05     Blood Culture, Routine --     METHICILLIN RESISTANT STAPHYLOCOCCUS AUREUS  ID consult required at [facility].  For susceptibility see order #[id number]      Blood culture [[id number]]  (Susceptibility) Collected:  09/01/17 0423    Order Status:  Completed Specimen:  Blood Updated:  09/04/17 1042     Blood Culture, Routine Gram stain aer bottle: Gram positive cocci in clusters resembling Staph      Blood Culture, Routine Results called to and read back by: Kandice Sears RN  09/02/2017  04:04     Blood Culture, Routine --     METHICILLIN RESISTANT STAPHYLOCOCCUS AUREUS  ID consult required at [facility].      Blood culture [[id number]]  (Susceptibility) Collected:  08/30/17 0436    Order Status:  Completed Specimen:  Blood Updated:  09/01/17 1050     Blood Culture, Routine Gram stain aer bottle: Gram positive cocci in clusters resembling Staph     Blood Culture, Routine Results called to and read back by: Steven Bland RN     Blood Culture, Routine 08/30/2017  20:20     Blood Culture, Routine --     METHICILLIN RESISTANT STAPHYLOCOCCUS AUREUS  ID consult required at [facility].            I have reviewed all pertinent labs  within the past 24 hours.    Estimated Creatinine Clearance: 39.4 mL/min (based on SCr of 1.5 mg/dL (H)).    Diagnostic Results:  I have reviewed all pertinent imaging results/findings within the past 24 hours.

## 2017-09-07 NOTE — PROCEDURES
"Emily Cook is a 57 y.o. female patient.    Temp: 98.4 °F (36.9 °C) (09/07/17 0800)  Pulse: 106 (09/07/17 0800)  Resp: (!) 23 (09/07/17 0800)  BP: (!) 100/54 (09/07/17 0800)  SpO2: (!) 83 % (09/07/17 0800)  Weight: 72.2 kg (159 lb 2.8 oz) (09/07/17 0500)  Height: 5' 3" (160 cm) (08/29/17 3585)    PICC  Date/Time: 9/7/2017 9:20 AM  Performed by: DEBI YUSUF  Consent Done: Yes  Time out: Immediately prior to procedure a time out was called to verify the correct patient, procedure, equipment, support staff and site/side marked as required  Indications: med administration and vascular access  Anesthesia: local infiltration  Local anesthetic: lidocaine 1% without epinephrine  Anesthetic Total (mL): 3  Preparation: skin prepped with ChloraPrep  Skin prep agent dried: skin prep agent completely dried prior to procedure  Sterile barriers: all five maximum sterile barriers used - cap, mask, sterile gown, sterile gloves, and large sterile sheet  Hand hygiene: hand hygiene performed prior to central venous catheter insertion  Location details: left brachial  Catheter type: single lumen  Catheter size: 4 Fr  Catheter Length: 43cm    Ultrasound guidance: yes  Vessel Caliber: medium and patent, compressibility normal  Vascular Doppler: not done  Needle advanced into vessel with real time Ultrasound guidance.  Guidewire confirmed in vessel.  Image recorded and saved.  Sterile sheath used.  no esophageal manometryNumber of attempts: 1  Post-procedure: blood return through all ports, chlorhexidine patch and sterile dressing applied  Technical procedures used: 3cg  Specimens: No  Implants: No  Assessment: placement verified by x-ray  Complications: none        Karen Monsivais  9/7/2017  "

## 2017-09-07 NOTE — CONSULTS
Single lumen PICC placed in left brachial vein of the RUE, 43cm in length with 0cm exposed and 28cm arm circumference. Lot#[id number].

## 2017-09-07 NOTE — PLAN OF CARE
[facility]   Heart Transplant/[facility] Clinic   [address] [place] [address]   [phone] after hours [phone] fax   HOME HEALTH ORDERS     Admit to Home Health   Diagnosis:  Patient Active Problem List   Diagnosis    Long term current use of anticoagulant therapy    Heart block AV third degree    Cardiac pacemaker in situ    Cervical spondylosis    Cervicalgia    Chronic neck pain    Subdeltoid bursitis    Diverticulitis    Sleep apnea- on CPAP    Diverticulitis large intestine    Carpal tunnel syndrome, right (mild)    Primary pulmonary hypertension    Acute on chronic respiratory failure with hypoxia    Pneumonia of right upper lobe due to infectious organism    Acute diastolic heart failure    Acute respiratory failure with hypoxemia    Bacteremia    Hypokalemia    Pulmonary HTN    Coumadin toxicity    Tricuspid regurgitation    MRSA (methicillin resistant staph aureus) culture positive    Pulmonary hypertension    Respiratory failure with hypoxia    Right-sided heart failure    Staphylococcus aureus bacteremia       Patient is homebound due to: PH, IV Veletri    Diet: 2 gm Na, 1.5 L fluid restriction    Acitivities: As tolerated    Nursing:   SN to complete comprehensive assessment including routine vital signs. Instruct on disease process and s/s of complications to report to MD. Review/verify medication list sent home with the patient at time of discharge and instruct patient/caregiver as needed. Frequency may be adjusted depending on start of care date.     Notify MD if SBP > 160 or < 90; DBP > 90 or < 50; HR > 120 or < 50; Temp > 101; Weight gain >3lbs in 1 day or 5lbs in 1 week.      LABS: SN to perform labs:  CBC, CMP and Vanc trough (drawn 30 minutes before Vanc due) q week, starting Monday 9/11/17, with results faxed to the ID Department at [phone].     HOME INFUSION THERAPY: via PICC    SN to perform PICC Infusion  Therapy/Central Line Care.   Review PICC Central Line Care & Central Line Flush with patient.     Administer (drug and dose):     Vancomycin 1250 mg IV q 24 hours (end date 9/19/17) via PICC    **For questions or concerns, please call [phone] and ask for Pulmonary Hypertension clinic, M-F 8-5. After hours, weekends, call [phone] and ask for the Heart Transplant Cardiologist on call.**     Central line care: *PICC only  Scrub the Hub: Prior to accessing the line, always perform a 30 second alcohol scrub   Each lumen of the central line is to be flushed at least daily with 10 mL Normal Saline and 3 mL Heparin flush (100 units/mL)   Skilled Nurse (SN) may draw blood from IV access   Blood Draw Procedure:   - Aspirate at least 5 mL of blood   - Discard   - Obtain specimen   - Change posiflow cap   - Flush with 20 mL Normal Saline followed by a   3-5 mL Heparin flush (100 units/mL)   Central : *PICC only  - Sterile dressing changes are done weekly and as needed.   - Use chlor-hexadine scrub to cleanse site, apply Biopatch to insertion site,   apply securement device dressing   - Posi-flow caps are changed weekly and after EVERY lab draw.   - If sterile gauze is under dressing to control oozing,   dressing change must be performed every 24 hours until gauze is not needed.       CONSULTS:      Physical Therapy to evaluate and treat. Evaluate for home safety and equipment needs; Establish/upgrade home exercise program. Perform / instruct on therapeutic exercises, gait training, transfer training, and Range of Motion.     Occupational Therapy to evaluate and treat. Evaluate home environment for safety and equipment needs. Perform/Instruct on transfers, ADL training, ROM, and therapeutic exercises.         Send follow up questions to [phone] or [phone].

## 2017-09-07 NOTE — PROGRESS NOTES
"[facility]  Heart Transplant  Progress Note    Patient Name: Emily Cook  MRN: [id number]  Admission Date: 8/29/2017  Hospital Length of Stay: 9 days  Attending Physician: Yves Ruth Jr.,*  Primary Care Provider: Kaylee Cazares MD  Principal Problem:Respiratory failure with hypoxia    Subjective:     Interval History: Feeling better. No complaints.     Continuous Infusions:   epoprostenol (VELETRI) infusion 24 ng/kg/min (09/06/17 1455)    veletri/remodulin tubing      veletri/remodulin tubing       Scheduled Meds:   albuterol sulfate  2.5 mg Nebulization Q6H    bumetanide  2 mg Oral BID    duloxetine  60 mg Oral BID    gabapentin  300 mg Oral TID    lidocaine HCL 10 mg/ml (1%)  10 mL Other Once    macitentan  10 mg Oral Daily    magnesium oxide  400 mg Oral BID    polyethylene glycol  17 g Oral Daily    potassium chloride  20 mEq Oral TID    sildenafil  20 mg Oral TID    sodium chloride 0.9%  10 mL Intravenous Q6H    spironolactone  25 mg Oral Daily    vancomycin (VANCOCIN) IVPB  1,250 mg Intravenous Q24H     PRN Meds:acetaminophen, albuterol sulfate, hydrocodone-acetaminophen 10-325mg, Flushing PICC Protocol **AND** sodium chloride 0.9% **AND** sodium chloride 0.9%    Review of patient's allergies indicates:   Allergen Reactions    Chlorhexidine Itching and Rash     Patient had raised rash on neck following RHC procedure. She states she's never had a problem with the "prep" used until this time.     Adhesive Rash     Objective:     Vital Signs (Most Recent):  Temp: 97.9 °F (36.6 °C) (09/07/17 1203)  Pulse: 101 (09/07/17 1203)  Resp: 20 (09/07/17 1203)  BP: (!) 101/58 (09/07/17 1203)  SpO2: (!) 92 % (09/07/17 1203) Vital Signs (24h Range):  Temp:  [97.6 °F (36.4 °C)-98.7 °F (37.1 °C)] 97.9 °F (36.6 °C)  Pulse:  [100-118] 101  Resp:  [16-24] 20  SpO2:  [83 %-96 %] 92 %  BP: ()/(50-72) 101/58     Weight: 72.2 kg (159 lb 2.8 oz)  Body mass index is 28.2 " kg/m².      Intake/Output Summary (Last 24 hours) at 09/07/17 1308  Last data filed at 09/07/17 0500   Gross per 24 hour   Intake           919.44 ml   Output              400 ml   Net           519.44 ml       Hemodynamic Parameters:         Physical Exam   Constitutional: She is oriented to person, place, and time. She appears well-developed and well-nourished.   HENT:   Head: Normocephalic.   Eyes: Pupils are equal, round, and reactive to light.   Neck: Normal range of motion. Neck supple. No JVD present.   Cardiovascular: Normal rate and regular rhythm.    Pulmonary/Chest: Effort normal. She has no wheezes. She has no rales.   Abdominal: Soft. Bowel sounds are normal.   Musculoskeletal: Normal range of motion.   Neurological: She is alert and oriented to person, place, and time.   Skin: Skin is warm and dry.   Psychiatric: She has a normal mood and affect. Her behavior is normal.   Nursing note and vitals reviewed.      Significant Labs:  CBC:    Recent Labs  Lab 09/07/17  0440   WBC 8.05   RBC 4.81   HGB 11.8*   HCT 34.6*   *   MCV 72*   MCH 24.5*   MCHC 34.1     BNP:    Recent Labs  Lab 09/07/17  0440   *     CMP:    Recent Labs  Lab 09/07/17  0440   *   CALCIUM 8.8   *   K 4.0   CO2 29   CL 91*   BUN 29*   CREATININE 1.5*      Coagulation:     Recent Labs  Lab 09/07/17  0440   INR 1.3*     LDH:  No results for input(s): LDH in the last 72 hours.  Microbiology:  Microbiology Results (last 7 days)     Procedure Component Value Units Date/Time    Blood culture [[id number]] Collected:  09/03/17 0543    Order Status:  Completed Specimen:  Blood Updated:  09/07/17 0822     Blood Culture, Routine No Growth to date     Blood Culture, Routine No Growth to date     Blood Culture, Routine No Growth to date     Blood Culture, Routine No Growth to date     Blood Culture, Routine No Growth to date    IV catheter culture [[id number]] Collected:  09/05/17 1110    Order Status:  Completed Specimen:   Catheter Tip from Catheter Tip, Tunneled Updated:  09/07/17 0740     Aerobic Culture - Cath tip --     STAPHYLOCOCCUS AUREUS  > 15 colonies  Susceptibility pending      Blood culture [[id number]] Collected:  09/04/17 0438    Order Status:  Completed Specimen:  Blood Updated:  09/07/17 0612     Blood Culture, Routine No Growth to date     Blood Culture, Routine No Growth to date     Blood Culture, Routine No Growth to date     Blood Culture, Routine No Growth to date    Blood culture [[id number]] Collected:  09/05/17 1405    Order Status:  Completed Specimen:  Blood Updated:  09/06/17 1612     Blood Culture, Routine No Growth to date     Blood Culture, Routine No Growth to date    Narrative:       From 2 different sites 30 minutes apart    Blood culture [[id number]] Collected:  09/01/17 0423    Order Status:  Completed Specimen:  Blood Updated:  09/04/17 1042     Blood Culture, Routine Gram stain aer bottle: Gram positive cocci in clusters resembling Staph      Blood Culture, Routine Results called to and read back by: Kandice Sears RN  09/02/2017  04:05     Blood Culture, Routine --     METHICILLIN RESISTANT STAPHYLOCOCCUS AUREUS  ID consult required at [facility].  For susceptibility see order #[id number]      Blood culture [[id number]]  (Susceptibility) Collected:  09/01/17 0423    Order Status:  Completed Specimen:  Blood Updated:  09/04/17 1042     Blood Culture, Routine Gram stain aer bottle: Gram positive cocci in clusters resembling Staph      Blood Culture, Routine Results called to and read back by: Kandice Sears RN  09/02/2017  04:04     Blood Culture, Routine --     METHICILLIN RESISTANT STAPHYLOCOCCUS AUREUS  ID consult required at [facility].      Blood culture [[id number]]  (Susceptibility) Collected:  08/30/17 0436    Order Status:  Completed Specimen:  Blood Updated:  09/01/17 1050     Blood Culture, Routine Gram stain aer bottle: Gram positive cocci in clusters  resembling Staph     Blood Culture, Routine Results called to and read back by: Steven Bland RN     Blood Culture, Routine 08/30/2017  20:20     Blood Culture, Routine --     METHICILLIN RESISTANT STAPHYLOCOCCUS AUREUS  [facility].            I have reviewed all pertinent labs within the past 24 hours.    Estimated Creatinine Clearance: 39.4 mL/min (based on SCr of 1.5 mg/dL (H)).    Diagnostic Results:  I have reviewed all pertinent imaging results/findings within the past 24 hours.    Assessment and Plan:     * Respiratory failure with hypoxia    -Secondary to PNA and volume overload.   -Continue 5L NC. Wean to maintain sats >/= 85% (home dose O2 5L)  -Continue Veletri 25 ng/kg/mn  -D/C'ed IV Lasix and transitioned to PO Bumex yesterday        Pulmonary hypertension    -continue veletri, sildenafil, macitentan           Right-sided heart failure    -Transitioned to PO Bumex today  -daily I/O  -low Na diet        Bacteremia    -MRSA per cultures.  - Cardoso removed 9/5/17  -Appreciate ID Cx. Continue Vanc x 2 weeks, end 9/19/17. PICC placement for Vanc today. Cardoso placement for Veletri today        Pneumonia of right upper lobe due to infectious organism    -continue Vancomycin          D/C home today after PICC and Cardoso placed.    Elena Raygoza PAJasmeetC  Heart Transplant  [facility]

## 2017-09-07 NOTE — PROGRESS NOTES
"Nephrology H&P      Consult Requested By: Yves Ruth Jr.,*  Reason for Consult: brush    SUBJECTIVE:     History of Present Illness:  Patient is a 57 y.o. female presents for single lumen brush catheter placement for the treatment of pulm HTN. She is doing well and has no complaints.     PTA Medications   Medication Sig    duloxetine (CYMBALTA) 60 MG capsule Take 1 capsule (60 mg total) by mouth 2 (two) times daily.    EPOPROSTENOL SODIUM, ARGININE, (VELETRI IV) Inject into the vein.    furosemide (LASIX) 80 MG tablet Take 1 tablet (80 mg total) by mouth 2 (two) times daily.    gabapentin (NEURONTIN) 300 MG capsule Take 1 capsule (300 mg total) by mouth As instructed. Take one capsule qam, 1 qpm, 2 qhs.    hydrocodone-acetaminophen 10-325mg (NORCO)  mg Tab Take 1 tablet by mouth 3 (three) times daily as needed.    macitentan (OPSUMIT) 10 mg Tab Take 1 tablet (10 mg total) by mouth once daily.    PATADAY 0.2 % Drop     potassium chloride (MICRO-K) 10 MEQ CpSR Take 2 capsules (20 mEq total) by mouth 3 (three) times daily.    spironolactone (ALDACTONE) 25 MG tablet Take 1 tablet (25 mg total) by mouth once daily.    [DISCONTINUED] warfarin (COUMADIN) 5 MG tablet TAKE 1 AND 1/2 TABLETS BY MOUTH DAILY EXCEPT 1 TABLET ON TUESDAY OR AS DIRECTED BY COUMADIN CLINIC    clobetasol (TEMOVATE) 0.05 % external solution     hydrOXYzine (VISTARIL) 50 MG Cap 50 mg daily as needed.        Review of patient's allergies indicates:   Allergen Reactions    Chlorhexidine Itching and Rash     Patient had raised rash on neck following RHC procedure. She states she's never had a problem with the "prep" used until this time.     Adhesive Rash        Past Medical History:   Diagnosis Date    Arrhythmia     Arthritis     Cardiac pacemaker in situ     Carpal tunnel syndrome, right     Cervical spondylosis     Cervicalgia     CHF (congestive heart failure)     Diverticulitis     Heart block AV third " "degree     Hyperlipidemia     ALFREDO on CPAP     Pulmonary hypertension     Subdeltoid bursitis      Past Surgical History:   Procedure Laterality Date    CARDIAC CATHETERIZATION      CARDIAC PACEMAKER PLACEMENT  7/29/13    [employer] DC PM    CARDIAC SURGERY      COLONOSCOPY N/A 9/28/2015    Procedure: COLONOSCOPY;  Surgeon: Jason Diaz MD;  Location: [facility] ([address] [place] [address]);  Service: Endoscopy;  Laterality: N/A;  Please schedule in 2-3 months with Dr Diaz  Pulmonary HTN, 2nd floor (off remodulin infusion as of "a few days" before 9/9/15)    3 day hold Coumadin, [facility]  PT/INR scheduled before procedure    ECTOPIC PREGNANCY SURGERY Left     HYSTERECTOMY      partial    knee arthroscopy       Family History   Problem Relation Age of Onset    Arthritis Mother     Diabetes Mother     Hyperlipidemia Mother     Arthritis Father     Hyperlipidemia Father      Social History   Substance Use Topics    Smoking status: Never Smoker    Smokeless tobacco: Never Used    Alcohol use No      Comment: rarely       Review of Systems:  Constitutional: Negative for fatigue.   Eyes: Negative for discharge.   Respiratory: Negative for cough, shortness of breath and wheezing.   Cardiovascular: Negative for chest pain and palpitations.   Gastrointestinal: Negative for nausea, vomiting, abdominal pain and diarrhea.   Genitourinary: Negative for dysuria, urgency, frequency and hematuria.   Skin: Negative for color change and rash.   Psychiatric/Behavioral: Negative for confusion.    OBJECTIVE:     Vital Signs (Most Recent)  Temp: 97.9 °F (36.6 °C) (09/07/17 1203)  Pulse: 101 (09/07/17 1203)  Resp: 20 (09/07/17 1203)  BP: (!) 101/58 (09/07/17 1203)  SpO2: (!) 92 % (09/07/17 1203)         Intake/Output Summary (Last 24 hours) at 09/07/17 1353  Last data filed at 09/07/17 0500   Gross per 24 hour   Intake           919.44 ml   Output              400 ml   Net           519.44 ml       Physical Exam:  Gen: " AAOx3, NAD  HEENT: mmm  Neck: no bruit, no JVD  CV: RRR, no m/r  Resp: CTAx2, normal effort  GI: soft, ND, NTTP, +BS  Extr: no LE edema  Neuro: normal reflexes, no focal deficits    Laboratory:  CBC with Diff:     Recent Labs  Lab 09/05/17 0536 09/06/17  0438 09/07/17  0440   WBC 6.79 8.17 8.05   HGB 12.5 12.0 11.8*   HCT 36.6* 35.0* 34.6*   * 128* 127*   LYMPH 12.4*  1.1 16.3*  1.3 17.8*  1.4   MONO 8.1  0.7 7.5  0.6 6.1  0.5   EOSINOPHIL 1.5 1.8 2.6     COAG:    Recent Labs  Lab 09/05/17 0536 09/06/17  0438 09/07/17  0440   INR 1.9* 1.5* 1.3*       CMP:   Recent Labs  Lab 09/05/17 0536 09/06/17  0438 09/07/17  0440   * 109 112*   CALCIUM 9.2 9.3 8.8   * 130* 131*   K 4.3 3.9 4.0   CO2 29 29 29   CL 90* 89* 91*   BUN 24* 25* 29*   CREATININE 1.4 1.5* 1.5*   MG 2.4 2.6 2.4     Lab Results   Component Value Date    LABPROT 13.0 (H) 09/07/2017     Lab Results   Component Value Date    CALCIUM 8.8 09/07/2017    PHOS 3.9 06/20/2017         Diagnostic Results:  Labs: Reviewed      Scheduled Meds:   albuterol sulfate  2.5 mg Nebulization Q6H    bumetanide  2 mg Oral BID    duloxetine  60 mg Oral BID    gabapentin  300 mg Oral TID    lidocaine HCL 10 mg/ml (1%)  10 mL Other Once    macitentan  10 mg Oral Daily    magnesium oxide  400 mg Oral BID    polyethylene glycol  17 g Oral Daily    potassium chloride  20 mEq Oral TID    sildenafil  20 mg Oral TID    sodium chloride 0.9%  10 mL Intravenous Q6H    spironolactone  25 mg Oral Daily    vancomycin (VANCOCIN) IVPB  1,250 mg Intravenous Q24H     Continuous Infusions:   epoprostenol (VELETRI) infusion 24 ng/kg/min (09/06/17 3191)    veletri/remodulin tubing      veletri/remodulin tubing               ASSESSMENT/PLAN:     Patient Active Problem List   Diagnosis    Long term current use of anticoagulant therapy    Heart block AV third degree    Cardiac pacemaker in situ    Cervical spondylosis    Cervicalgia    Chronic neck  pain    Subdeltoid bursitis    Diverticulitis    Sleep apnea- on CPAP    Diverticulitis large intestine    Carpal tunnel syndrome, right (mild)    Primary pulmonary hypertension    Acute on chronic respiratory failure with hypoxia    Pneumonia of right upper lobe due to infectious organism    Acute diastolic heart failure    Acute respiratory failure with hypoxemia    Bacteremia    Hypokalemia    Pulmonary HTN    Coumadin toxicity    Tricuspid regurgitation    MRSA (methicillin resistant staph aureus) culture positive    Pulmonary hypertension    Respiratory failure with hypoxia    Right-sided heart failure    Staphylococcus aureus bacteremia       Plan:     Will place single lumen brush for the treatment of pulm HTN.

## 2017-09-07 NOTE — NURSING
Pt on IV Veletri infusing to PIV. Second PIV infiltrated. Pt refusing second PIV at this time and wants to wait until later in shift. MD aware.

## 2017-09-07 NOTE — NURSING
Patient returned to unit. Cardoso unable to be placed. Team notified. Patient stable and will continue to monitor.

## 2017-09-08 NOTE — ASSESSMENT & PLAN NOTE
-Secondary to PNA and volume overload.   -Continue 5L NC, home dose  -Continue Veletri 25 ng/kg/mn  -D/C'ed IV Lasix and transitioned to PO Bumex

## 2017-09-08 NOTE — NURSING
Pt switched over to home pump at home concentration. Old dosage removed and line primed with new concentration.

## 2017-09-08 NOTE — PHYSICIAN QUERY
PT Name: Emily Cook  MR #: [id number]    Physician Query Form -Present on Admission (POA) Diagnosis Clarification     CDS/: Michelle Silva RN, CCDS              Contact information: [email or web address]    This form is a permanent document in the medical record.     Query Date: September 8, 2017    By submitting this query, we are merely seeking further clarification of documentation. Please utilize your independent clinical judgment when addressing the question(s) below.       The Medical record contains the following:    Diagnosis      Supporting Clinical Information   Location in Medical Record   US showed partial thrombus of the R IJ         underwent attempt to place tunneled central venous catheter via RIJ but failed due to occlusion.    9/8 gen surg note           9/7 progress note          Doctor, Please specify Present On Admission (POA) status of _RIJ Thrombus ________.    [  ] Present on Admission   [  ] Not Present on Admission  [x  ] Clinically undetermined

## 2017-09-08 NOTE — SUBJECTIVE & OBJECTIVE
"No current facility-administered medications on file prior to encounter.      Current Outpatient Prescriptions on File Prior to Encounter   Medication Sig    duloxetine (CYMBALTA) 60 MG capsule Take 1 capsule (60 mg total) by mouth 2 (two) times daily.    EPOPROSTENOL SODIUM, ARGININE, (VELETRI IV) Inject into the vein.    furosemide (LASIX) 80 MG tablet Take 1 tablet (80 mg total) by mouth 2 (two) times daily.    gabapentin (NEURONTIN) 300 MG capsule Take 1 capsule (300 mg total) by mouth As instructed. Take one capsule qam, 1 qpm, 2 qhs.    hydrocodone-acetaminophen 10-325mg (NORCO)  mg Tab Take 1 tablet by mouth 3 (three) times daily as needed.    macitentan (OPSUMIT) 10 mg Tab Take 1 tablet (10 mg total) by mouth once daily.    PATADAY 0.2 % Drop     potassium chloride (MICRO-K) 10 MEQ CpSR Take 2 capsules (20 mEq total) by mouth 3 (three) times daily.    spironolactone (ALDACTONE) 25 MG tablet Take 1 tablet (25 mg total) by mouth once daily.    [DISCONTINUED] warfarin (COUMADIN) 5 MG tablet TAKE 1 AND 1/2 TABLETS BY MOUTH DAILY EXCEPT 1 TABLET ON TUESDAY OR AS DIRECTED BY COUMADIN CLINIC    clobetasol (TEMOVATE) 0.05 % external solution     hydrOXYzine (VISTARIL) 50 MG Cap 50 mg daily as needed.        Review of patient's allergies indicates:   Allergen Reactions    Chlorhexidine Itching and Rash     Patient had raised rash on neck following RHC procedure. She states she's never had a problem with the "prep" used until this time.     Adhesive Rash       Past Medical History:   Diagnosis Date    Arrhythmia     Arthritis     Cardiac pacemaker in situ     Carpal tunnel syndrome, right     Cervical spondylosis     Cervicalgia     CHF (congestive heart failure)     Diverticulitis     Heart block AV third degree     Hyperlipidemia     ALFREDO on CPAP     Pulmonary hypertension     Subdeltoid bursitis      Past Surgical History:   Procedure Laterality Date    CARDIAC CATHETERIZATION      " "CARDIAC PACEMAKER PLACEMENT  7/29/13    [employer] DC PM    CARDIAC SURGERY      COLONOSCOPY N/A 9/28/2015    Procedure: COLONOSCOPY;  Surgeon: Jason Diaz MD;  Location: [facility] ([address] [place] [address]);  Service: Endoscopy;  Laterality: N/A;  Please schedule in 2-3 months with Dr Diaz  Pulmonary HTN, 2nd floor (off remodulin infusion as of "a few days" before 9/9/15)    3 day hold Coumadin, [facility]  PT/INR scheduled before procedure    ECTOPIC PREGNANCY SURGERY Left     HYSTERECTOMY      partial    knee arthroscopy       Family History     Problem Relation (Age of Onset)    Arthritis Mother, Father    Diabetes Mother    Hyperlipidemia Mother, Father        Social History Main Topics    Smoking status: Never Smoker    Smokeless tobacco: Never Used    Alcohol use No      Comment: rarely    Drug use: No    Sexual activity: No     Review of Systems   Constitutional: Negative for fatigue.   Respiratory: Positive for chest tightness and shortness of breath.    Cardiovascular: Negative for chest pain.   Gastrointestinal: Negative for abdominal pain.     Objective:     Vital Signs (Most Recent):  Temp: 98.4 °F (36.9 °C) (09/07/17 1549)  Pulse: 104 (09/07/17 1549)  Resp: 20 (09/07/17 1549)  BP: 101/60 (09/07/17 1549)  SpO2: (!) 91 % (09/07/17 1549) Vital Signs (24h Range):  Temp:  [97.8 °F (36.6 °C)-98.7 °F (37.1 °C)] 98.4 °F (36.9 °C)  Pulse:  [] 104  Resp:  [16-24] 20  SpO2:  [83 %-96 %] 91 %  BP: ()/(52-72) 101/60     Weight: 72.2 kg (159 lb 2.8 oz)  Body mass index is 28.2 kg/m².    Physical Exam   Constitutional: She is oriented to person, place, and time. She appears well-developed.   HENT:   Head: Normocephalic.   Eyes: Conjunctivae are normal.   Cardiovascular: Normal rate.    Abdominal:   NC 5L   Neurological: She is alert and oriented to person, place, and time.   Skin: Skin is warm and dry.       Significant Labs:  CBC:   Recent Labs  Lab 09/07/17  0440   WBC 8.05   RBC 4.81 "   HGB 11.8*   HCT 34.6*   *   MCV 72*   MCH 24.5*   MCHC 34.1     CMP:   Recent Labs  Lab 09/07/17  0440   *   CALCIUM 8.8   *   K 4.0   CO2 29   CL 91*   BUN 29*   CREATININE 1.5*     Coagulation:   Recent Labs  Lab 09/07/17 0440   LABPROT 13.0*   INR 1.3*       Significant Diagnostics:  US of upper extremity/neck veins pending

## 2017-09-08 NOTE — SUBJECTIVE & OBJECTIVE
"Interval History:Pt seen after Cardoso placement. Feeling well. Has good UOP with Bumex    Continuous Infusions:   epoprostenol (VELETRI) infusion 24 ng/kg/min (09/07/17 1600)    veletri/remodulin tubing      veletri/remodulin tubing       Scheduled Meds:   albuterol sulfate  2.5 mg Nebulization Q6H    bumetanide  2 mg Oral BID    duloxetine  60 mg Oral BID    gabapentin  300 mg Oral TID    lidocaine HCL 10 mg/ml (1%)  10 mL Other Once    macitentan  10 mg Oral Daily    magnesium oxide  400 mg Oral BID    polyethylene glycol  17 g Oral Daily    potassium chloride  20 mEq Oral TID    sodium chloride 0.9%  10 mL Intravenous Q6H    spironolactone  25 mg Oral Daily    vancomycin (VANCOCIN) IVPB  1,250 mg Intravenous Q24H     PRN Meds:acetaminophen, albuterol sulfate, hydrocodone-acetaminophen 10-325mg, influenza, Flushing PICC Protocol **AND** sodium chloride 0.9% **AND** sodium chloride 0.9%    Review of patient's allergies indicates:   Allergen Reactions    Chlorhexidine Itching and Rash     Patient had raised rash on neck following RHC procedure. She states she's never had a problem with the "prep" used until this time.     Adhesive Rash     Objective:     Vital Signs (Most Recent):  Temp: 98.1 °F (36.7 °C) (09/08/17 1100)  Pulse: 77 (09/08/17 1259)  Resp: 16 (09/08/17 1259)  BP: (!) 95/57 (09/08/17 1131)  SpO2: (!) 94 % (09/08/17 1302) Vital Signs (24h Range):  Temp:  [97.7 °F (36.5 °C)-98.4 °F (36.9 °C)] 98.1 °F (36.7 °C)  Pulse:  [] 77  Resp:  [16-20] 16  SpO2:  [86 %-94 %] 94 %  BP: ()/(52-63) 95/57     Weight: 72.4 kg (159 lb 9.8 oz)  Body mass index is 28.27 kg/m².      Intake/Output Summary (Last 24 hours) at 09/08/17 1526  Last data filed at 09/08/17 0600   Gross per 24 hour   Intake            661.5 ml   Output              700 ml   Net            -38.5 ml       Hemodynamic Parameters:         Physical Exam   Constitutional: She is oriented to person, place, and time. She appears " well-developed and well-nourished.   HENT:   Head: Normocephalic.   Eyes: Pupils are equal, round, and reactive to light.   Neck: Normal range of motion. Neck supple. No JVD present.   Cardiovascular: Normal rate and regular rhythm.    Pulmonary/Chest: Effort normal. She has no wheezes. She has no rales.   On O2 via NC   Abdominal: Soft. Bowel sounds are normal.   Musculoskeletal: Normal range of motion.   Neurological: She is alert and oriented to person, place, and time.   Skin: Skin is warm and dry.   Psychiatric: She has a normal mood and affect. Her behavior is normal.   Nursing note and vitals reviewed.      Significant Labs:  CBC:    Recent Labs  Lab 09/08/17 0626   WBC 6.44   RBC 4.36   HGB 10.9*   HCT 31.4*   *   MCV 72*   MCH 25.0*   MCHC 34.7     BNP:    Recent Labs  Lab 09/07/17  0440   *     CMP:    Recent Labs  Lab 09/08/17 0626      CALCIUM 8.8   *   K 3.4*   CO2 24   CL 95   BUN 25*   CREATININE 1.4      Coagulation:     Recent Labs  Lab 09/08/17 0626   INR 1.3*     LDH:  No results for input(s): LDH in the last 72 hours.  Microbiology:  Microbiology Results (last 7 days)     Procedure Component Value Units Date/Time    IV catheter culture [[id number]]  (Susceptibility) Collected:  09/05/17 1110    Order Status:  Completed Specimen:  Catheter Tip from Catheter Tip, Tunneled Updated:  09/08/17 1243     Aerobic Culture - Cath tip --     METHICILLIN RESISTANT STAPHYLOCOCCUS AUREUS  > 15 colonies      Blood culture [[id number]] Collected:  09/03/17 0543    Order Status:  Completed Specimen:  Blood Updated:  09/08/17 0822     Blood Culture, Routine No growth after 5 days.    Blood culture [[id number]] Collected:  09/04/17 0438    Order Status:  Completed Specimen:  Blood Updated:  09/08/17 0612     Blood Culture, Routine No Growth to date     Blood Culture, Routine No Growth to date     Blood Culture, Routine No Growth to date     Blood Culture, Routine No Growth to date      Blood Culture, Routine No Growth to date    Blood culture [[id number]] Collected:  09/05/17 1405    Order Status:  Completed Specimen:  Blood Updated:  09/07/17 1612     Blood Culture, Routine No Growth to date     Blood Culture, Routine No Growth to date     Blood Culture, Routine No Growth to date    Narrative:       From 2 different sites 30 minutes apart    Blood culture [[id number]] Collected:  09/01/17 0423    Order Status:  Completed Specimen:  Blood Updated:  09/04/17 1042     Blood Culture, Routine Gram stain aer bottle: Gram positive cocci in clusters resembling Staph      Blood Culture, Routine Results called to and read back by: Kandice Sears RN  09/02/2017  04:05     Blood Culture, Routine --     METHICILLIN RESISTANT STAPHYLOCOCCUS AUREUS  ID consult required at [facility].  For susceptibility see order #[id number]      Blood culture [[id number]]  (Susceptibility) Collected:  09/01/17 0423    Order Status:  Completed Specimen:  Blood Updated:  09/04/17 1042     Blood Culture, Routine Gram stain aer bottle: Gram positive cocci in clusters resembling Staph      Blood Culture, Routine Results called to and read back by: Kandice Sears RN  09/02/2017  04:04     Blood Culture, Routine --     METHICILLIN RESISTANT STAPHYLOCOCCUS AUREUS  ID consult required at [facility].            I have reviewed all pertinent labs within the past 24 hours.    Estimated Creatinine Clearance: 42.3 mL/min (based on SCr of 1.4 mg/dL).    Diagnostic Results:  I have reviewed all pertinent imaging results/findings within the past 24 hours.

## 2017-09-08 NOTE — PROGRESS NOTES
"[facility]  Heart Transplant  Progress Note    Patient Name: Emily Cook  MRN: [id number]  Admission Date: 8/29/2017  Hospital Length of Stay: 10 days  Attending Physician: Yves Ruth Jr.,*  Primary Care Provider: Kaylee Cazares MD  Principal Problem:Respiratory failure with hypoxia    Subjective:     Interval History:Pt seen after Cardoso placement. Feeling well. Has good UOP with Bumex    Continuous Infusions:   epoprostenol (VELETRI) infusion 24 ng/kg/min (09/07/17 1600)    veletri/remodulin tubing      veletri/remodulin tubing       Scheduled Meds:   albuterol sulfate  2.5 mg Nebulization Q6H    bumetanide  2 mg Oral BID    duloxetine  60 mg Oral BID    gabapentin  300 mg Oral TID    lidocaine HCL 10 mg/ml (1%)  10 mL Other Once    macitentan  10 mg Oral Daily    magnesium oxide  400 mg Oral BID    polyethylene glycol  17 g Oral Daily    potassium chloride  20 mEq Oral TID    sodium chloride 0.9%  10 mL Intravenous Q6H    spironolactone  25 mg Oral Daily    vancomycin (VANCOCIN) IVPB  1,250 mg Intravenous Q24H     PRN Meds:acetaminophen, albuterol sulfate, hydrocodone-acetaminophen 10-325mg, influenza, Flushing PICC Protocol **AND** sodium chloride 0.9% **AND** sodium chloride 0.9%    Review of patient's allergies indicates:   Allergen Reactions    Chlorhexidine Itching and Rash     Patient had raised rash on neck following RHC procedure. She states she's never had a problem with the "prep" used until this time.     Adhesive Rash     Objective:     Vital Signs (Most Recent):  Temp: 98.1 °F (36.7 °C) (09/08/17 1100)  Pulse: 77 (09/08/17 1259)  Resp: 16 (09/08/17 1259)  BP: (!) 95/57 (09/08/17 1131)  SpO2: (!) 94 % (09/08/17 1302) Vital Signs (24h Range):  Temp:  [97.7 °F (36.5 °C)-98.4 °F (36.9 °C)] 98.1 °F (36.7 °C)  Pulse:  [] 77  Resp:  [16-20] 16  SpO2:  [86 %-94 %] 94 %  BP: ()/(52-63) 95/57     Weight: 72.4 kg (159 lb 9.8 oz)  Body mass index " is 28.27 kg/m².      Intake/Output Summary (Last 24 hours) at 09/08/17 1526  Last data filed at 09/08/17 0600   Gross per 24 hour   Intake            661.5 ml   Output              700 ml   Net            -38.5 ml       Hemodynamic Parameters:         Physical Exam   Constitutional: She is oriented to person, place, and time. She appears well-developed and well-nourished.   HENT:   Head: Normocephalic.   Eyes: Pupils are equal, round, and reactive to light.   Neck: Normal range of motion. Neck supple. No JVD present.   Cardiovascular: Normal rate and regular rhythm.    Pulmonary/Chest: Effort normal. She has no wheezes. She has no rales.   On O2 via NC   Abdominal: Soft. Bowel sounds are normal.   Musculoskeletal: Normal range of motion.   Neurological: She is alert and oriented to person, place, and time.   Skin: Skin is warm and dry.   Psychiatric: She has a normal mood and affect. Her behavior is normal.   Nursing note and vitals reviewed.      Significant Labs:  CBC:    Recent Labs  Lab 09/08/17  0626   WBC 6.44   RBC 4.36   HGB 10.9*   HCT 31.4*   *   MCV 72*   MCH 25.0*   MCHC 34.7     BNP:    Recent Labs  Lab 09/07/17  0440   *     CMP:    Recent Labs  Lab 09/08/17  0626      CALCIUM 8.8   *   K 3.4*   CO2 24   CL 95   BUN 25*   CREATININE 1.4      Coagulation:     Recent Labs  Lab 09/08/17  0626   INR 1.3*     LDH:  No results for input(s): LDH in the last 72 hours.  Microbiology:  Microbiology Results (last 7 days)     Procedure Component Value Units Date/Time    IV catheter culture [[id number]]  (Susceptibility) Collected:  09/05/17 1110    Order Status:  Completed Specimen:  Catheter Tip from Catheter Tip, Tunneled Updated:  09/08/17 1243     Aerobic Culture - Cath tip --     METHICILLIN RESISTANT STAPHYLOCOCCUS AUREUS  > 15 colonies      Blood culture [[id number]] Collected:  09/03/17 0543    Order Status:  Completed Specimen:  Blood Updated:  09/08/17 0822     Blood Culture,  Routine No growth after 5 days.    Blood culture [[id number]] Collected:  09/04/17 0438    Order Status:  Completed Specimen:  Blood Updated:  09/08/17 0612     Blood Culture, Routine No Growth to date     Blood Culture, Routine No Growth to date     Blood Culture, Routine No Growth to date     Blood Culture, Routine No Growth to date     Blood Culture, Routine No Growth to date    Blood culture [[id number]] Collected:  09/05/17 1405    Order Status:  Completed Specimen:  Blood Updated:  09/07/17 1612     Blood Culture, Routine No Growth to date     Blood Culture, Routine No Growth to date     Blood Culture, Routine No Growth to date    Narrative:       From 2 different sites 30 minutes apart    Blood culture [[id number]] Collected:  09/01/17 0423    Order Status:  Completed Specimen:  Blood Updated:  09/04/17 1042     Blood Culture, Routine Gram stain aer bottle: Gram positive cocci in clusters resembling Staph      Blood Culture, Routine Results called to and read back by: Kandice Sears RN  09/02/2017  04:05     Blood Culture, Routine --     METHICILLIN RESISTANT STAPHYLOCOCCUS AUREUS  ID consult required at [facility].  For susceptibility see order #[id number]      Blood culture [[id number]]  (Susceptibility) Collected:  09/01/17 0423    Order Status:  Completed Specimen:  Blood Updated:  09/04/17 1042     Blood Culture, Routine Gram stain aer bottle: Gram positive cocci in clusters resembling Staph      Blood Culture, Routine Results called to and read back by: Kandice Sears RN  09/02/2017  04:04     Blood Culture, Routine --     METHICILLIN RESISTANT STAPHYLOCOCCUS AUREUS  ID consult required at [facility].            I have reviewed all pertinent labs within the past 24 hours.    Estimated Creatinine Clearance: 42.3 mL/min (based on SCr of 1.4 mg/dL).    Diagnostic Results:  I have reviewed all pertinent imaging results/findings within the past 24 hours.    Assessment and  Plan:     * Respiratory failure with hypoxia    -Secondary to PNA and volume overload.   -Continue 5L NC, home dose  -Continue Veletri 25 ng/kg/mn  -D/C'ed IV Lasix and transitioned to PO Bumex         Pulmonary hypertension    -continue veletri,  macitentan           Right-sided heart failure    -Transitioned to PO Bumex  -daily I/O  -low Na diet        Bacteremia    -MRSA per cultures.  - Cardoso removed 9/5/17  -Appreciate ID Cx. Continue Vanc x 2 weeks, end 9/19/17. PICC placement for Vanc yesterday. Cardoso placement by Gen Surg for Veletri today        Pneumonia of right upper lobe due to infectious organism    -7 day Azithromycin course completed              Elena Raygoza [email or web address]  Heart Transplant  [facility]

## 2017-09-08 NOTE — PRE ADMISSION SCREENING
Pt transferred via stretcher from post-op to room 8084.  Transported by: PCT, Tocarra  Report given to RNAyah per Handoff on Doc Flowsheet  VSS per Doc Flowsheet  Medicines sent: continuous IV meds for Pulm htn  Chart sent with patient: Yes  Chest x-ray completed

## 2017-09-08 NOTE — CONSULTS
[facility]  General Surgery  Consult Note    Patient Name: Emily Cook  MRN: [id number]  Code Status: DNR  Admission Date: 8/29/2017  Hospital Length of Stay: 9 days  Attending Physician: Yves Ruth Jr.,*  Primary Care Provider: Kaylee Cazares MD    Patient information was obtained from patient and past medical records.     Inpatient consult to General Surgery  Consult performed by: ALEJA VALENTINE  Consult ordered by: LUIS ENRIQUE DEL TORO        Subjective:     Principal Problem: Respiratory failure with hypoxia    History of Present Illness: 57 y/o female with PHTN on veletri and lasix for pulmonary HTN, brush catheter placed 6/2017 here transferred from OSH after being admitted with acute respiratory failure 2/2 to PNA. Pt reports feeling unwell for the past few days with fevers and chills at home and neck pain. Pt febrile 100.4 on admit with leukocytosis of 14.20. Blood cultures 8/26, 8/30 +MRSA. Pt currently on azithromycin for PNA and vancomycin for MRSA bacteremia. 2D echo negative for vegetations. Brush catheter removed 9/5/17.    No current facility-administered medications on file prior to encounter.      Current Outpatient Prescriptions on File Prior to Encounter   Medication Sig    duloxetine (CYMBALTA) 60 MG capsule Take 1 capsule (60 mg total) by mouth 2 (two) times daily.    EPOPROSTENOL SODIUM, ARGININE, (VELETRI IV) Inject into the vein.    furosemide (LASIX) 80 MG tablet Take 1 tablet (80 mg total) by mouth 2 (two) times daily.    gabapentin (NEURONTIN) 300 MG capsule Take 1 capsule (300 mg total) by mouth As instructed. Take one capsule qam, 1 qpm, 2 qhs.    hydrocodone-acetaminophen 10-325mg (NORCO)  mg Tab Take 1 tablet by mouth 3 (three) times daily as needed.    macitentan (OPSUMIT) 10 mg Tab Take 1 tablet (10 mg total) by mouth once daily.    PATADAY 0.2 % Drop     potassium chloride (MICRO-K) 10 MEQ CpSR Take 2 capsules (20  "mEq total) by mouth 3 (three) times daily.    spironolactone (ALDACTONE) 25 MG tablet Take 1 tablet (25 mg total) by mouth once daily.    [DISCONTINUED] warfarin (COUMADIN) 5 MG tablet TAKE 1 AND 1/2 TABLETS BY MOUTH DAILY EXCEPT 1 TABLET ON TUESDAY OR AS DIRECTED BY COUMADIN CLINIC    clobetasol (TEMOVATE) 0.05 % external solution     hydrOXYzine (VISTARIL) 50 MG Cap 50 mg daily as needed.        Review of patient's allergies indicates:   Allergen Reactions    Chlorhexidine Itching and Rash     Patient had raised rash on neck following RHC procedure. She states she's never had a problem with the "prep" used until this time.     Adhesive Rash       Past Medical History:   Diagnosis Date    Arrhythmia     Arthritis     Cardiac pacemaker in situ     Carpal tunnel syndrome, right     Cervical spondylosis     Cervicalgia     CHF (congestive heart failure)     Diverticulitis     Heart block AV third degree     Hyperlipidemia     ALFREDO on CPAP     Pulmonary hypertension     Subdeltoid bursitis      Past Surgical History:   Procedure Laterality Date    CARDIAC CATHETERIZATION      CARDIAC PACEMAKER PLACEMENT  7/29/13    [employer] [place] PM    CARDIAC SURGERY      COLONOSCOPY N/A 9/28/2015    Procedure: COLONOSCOPY;  Surgeon: Jason Diaz MD;  Location: [facility] ([address] [place] [address]);  Service: Endoscopy;  Laterality: N/A;  Please schedule in 2-3 months with Dr Diaz  Pulmonary HTN, 2nd floor (off remodulin infusion as of "a few days" before 9/9/15)    3 day hold Coumadin, [facility]  PT/INR scheduled before procedure    ECTOPIC PREGNANCY SURGERY Left     HYSTERECTOMY      partial    knee arthroscopy       Family History     Problem Relation (Age of Onset)    Arthritis Mother, Father    Diabetes Mother    Hyperlipidemia Mother, Father        Social History Main Topics    Smoking status: Never Smoker    Smokeless tobacco: Never Used    Alcohol use No      Comment: rarely    Drug use: No    " Sexual activity: No     Review of Systems   Constitutional: Negative for fatigue.   Respiratory: Positive for chest tightness and shortness of breath.    Cardiovascular: Negative for chest pain.   Gastrointestinal: Negative for abdominal pain.     Objective:     Vital Signs (Most Recent):  Temp: 98.4 °F (36.9 °C) (09/07/17 1549)  Pulse: 104 (09/07/17 1549)  Resp: 20 (09/07/17 1549)  BP: 101/60 (09/07/17 1549)  SpO2: (!) 91 % (09/07/17 1549) Vital Signs (24h Range):  Temp:  [97.8 °F (36.6 °C)-98.7 °F (37.1 °C)] 98.4 °F (36.9 °C)  Pulse:  [] 104  Resp:  [16-24] 20  SpO2:  [83 %-96 %] 91 %  BP: ()/(52-72) 101/60     Weight: 72.2 kg (159 lb 2.8 oz)  Body mass index is 28.2 kg/m².    Physical Exam   Constitutional: She is oriented to person, place, and time. She appears well-developed.   HENT:   Head: Normocephalic.   Eyes: Conjunctivae are normal.   Cardiovascular: Normal rate.    Abdominal:   NC 5L   Neurological: She is alert and oriented to person, place, and time.   Skin: Skin is warm and dry.       Significant Labs:  CBC:   Recent Labs  Lab 09/07/17  0440   WBC 8.05   RBC 4.81   HGB 11.8*   HCT 34.6*   *   MCV 72*   MCH 24.5*   MCHC 34.1     CMP:   Recent Labs  Lab 09/07/17  0440   *   CALCIUM 8.8   *   K 4.0   CO2 29   CL 91*   BUN 29*   CREATININE 1.5*     Coagulation:   Recent Labs  Lab 09/07/17  0440   LABPROT 13.0*   INR 1.3*       Significant Diagnostics:  US of upper extremity/neck veins pending    Assessment/Plan:     Pulmonary hypertension    - INR 1.3 today  - Neurology access service unable to place brush  - US of upper extremity and neck veins  - Placement of brush in OR likely tomorrow 9/8/17  - NPO @midnight  - Care per primary team          VTE Risk Mitigation     None            Charlotte Gonzalez MD  General Surgery  [facility]    I have personally performed a detailed history and physical examination on this patient. My findings are summarized  in the resident's note included in the record.  Will place line today

## 2017-09-08 NOTE — ANESTHESIA PREPROCEDURE EVALUATION
"                                                                                                             09/08/2017  Pre-operative evaluation for Procedure(s) (LRB):  INSERTION-CATHETER-CARUSO (N/A)    Emily Cook is a 57 y.o. female AVB/syncope s/p PPM, pulmonary HTN, who was admitted for shortness of breath and management of pulmonary HTN in setting of current PNA of RUL (MRSA).. Her pHTN has been difficult to treat, and she has been intolerant of multiple medications in the past. Has been on home Veletri and had previous caruso placed that has since failed. Unable to place RIJ but occlusion noted so now subclavian access per gen surg is needed.     Veletri currently at 25 ng/kg/mn. On 5L NC as well that is being weaned for O2 sats >/= 85%. Was on lasix ggt as well but is now on Bumex PO.    Note from previous pre-op "Yesterday general surgery explained the risk of intubation with the inability to extubate due to her underlying pulmonary HTN. She is in agreement with Caruso placement for home Veletri at this time. She clearly stated today as she has in the past, that she would "not want to live attached to a machine." She is NOT in agreement with a long term ventilator or tracheostomy." She has been made DNR." - I had another conversation with her that mirrored this one and she is still in agreement with proceeding.     LDA: R forearm 22g, L brachial PICC    Drips: Veletri    Patient Active Problem List   Diagnosis    Long term current use of anticoagulant therapy    Heart block AV third degree    Cardiac pacemaker in situ    Cervical spondylosis    Cervicalgia    Chronic neck pain    Subdeltoid bursitis    Diverticulitis    Sleep apnea- on CPAP    Diverticulitis large intestine    Carpal tunnel syndrome, right (mild)    Primary pulmonary hypertension    Acute on chronic respiratory failure with hypoxia    Pneumonia of right upper lobe due to infectious organism    Acute diastolic " "heart failure    Acute respiratory failure with hypoxemia    Bacteremia    Hypokalemia    Pulmonary HTN    Coumadin toxicity    Tricuspid regurgitation    MRSA (methicillin resistant staph aureus) culture positive    Pulmonary hypertension    Respiratory failure with hypoxia    Right-sided heart failure    Staphylococcus aureus bacteremia       Review of patient's allergies indicates:   Allergen Reactions    Chlorhexidine Itching and Rash     Patient had raised rash on neck following RHC procedure. She states she's never had a problem with the "prep" used until this time.     Adhesive Rash        No current facility-administered medications on file prior to encounter.      Current Outpatient Prescriptions on File Prior to Encounter   Medication Sig Dispense Refill    duloxetine (CYMBALTA) 60 MG capsule Take 1 capsule (60 mg total) by mouth 2 (two) times daily. 180 capsule 1    EPOPROSTENOL SODIUM, ARGININE, (VELETRI IV) Inject into the vein.      furosemide (LASIX) 80 MG tablet Take 1 tablet (80 mg total) by mouth 2 (two) times daily. 60 tablet 3    gabapentin (NEURONTIN) 300 MG capsule Take 1 capsule (300 mg total) by mouth As instructed. Take one capsule qam, 1 qpm, 2 qhs. 360 capsule 2    hydrocodone-acetaminophen 10-325mg (NORCO)  mg Tab Take 1 tablet by mouth 3 (three) times daily as needed. 90 tablet 0    macitentan (OPSUMIT) 10 mg Tab Take 1 tablet (10 mg total) by mouth once daily. 30 tablet 11    PATADAY 0.2 % Drop   6    potassium chloride (MICRO-K) 10 MEQ CpSR Take 2 capsules (20 mEq total) by mouth 3 (three) times daily. 180 capsule 3    spironolactone (ALDACTONE) 25 MG tablet Take 1 tablet (25 mg total) by mouth once daily. 30 tablet 11    clobetasol (TEMOVATE) 0.05 % external solution   2    hydrOXYzine (VISTARIL) 50 MG Cap 50 mg daily as needed.          Past Surgical History:   Procedure Laterality Date    CARDIAC CATHETERIZATION      CARDIAC PACEMAKER PLACEMENT  " "13    [facility] PM    CARDIAC SURGERY      COLONOSCOPY N/A 2015    Procedure: COLONOSCOPY;  Surgeon: Jason Diaz MD;  Location: [facility] ([address] [place] [address]);  Service: Endoscopy;  Laterality: N/A;  Please schedule in 2-3 months with Dr Diaz  Pulmonary HTN, 2nd floor (off remodulin infusion as of "a few days" before 9/9/15)    3 day hold Coumadin, [facility]  PT/INR scheduled before procedure    ECTOPIC PREGNANCY SURGERY Left     HYSTERECTOMY      partial    knee arthroscopy         Social History     Social History    Marital status: Single     Spouse name: N/A    Number of children: N/A    Years of education: N/A     Occupational History    Not on file.     Social History Main Topics    Smoking status: Never Smoker    Smokeless tobacco: Never Used    Alcohol use No      Comment: rarely    Drug use: No    Sexual activity: No     Other Topics Concern    Not on file     Social History Narrative    No narrative on file         Vital Signs Range (Last 24H):  Temp:  [36.6 °C (97.9 °F)-36.9 °C (98.4 °F)]   Pulse:  []   Resp:  [16-24]   BP: ()/(52-60)   SpO2:  [83 %-94 %]       CBC:   Recent Labs      17   0438  17   0440   WBC  8.17  8.05   RBC  4.84  4.81   HGB  12.0  11.8*   HCT  35.0*  34.6*   PLT  128*  127*   MCV  72*  72*   MCH  24.8*  24.5*   MCHC  34.3  34.1       CMP:   Recent Labs      17   0438  17   0440   NA  130*  131*   K  3.9  4.0   CL  89*  91*   CO2  29  29   BUN  25*  29*   CREATININE  1.5*  1.5*   GLU  109  112*   MG  2.6  2.4   CALCIUM  9.3  8.8       INR  Recent Labs      17   0536  17   0438  17   0440   INR  1.9*  1.5*  1.3*           Diagnostic Studies:      EKD Echo:        Anesthesia Evaluation    I have reviewed the Patient Summary Reports.    I have reviewed the Nursing Notes.   I have reviewed the Medications.     Review of Systems  Anesthesia Hx:  History of prior surgery of interest to " airway management or planning: Denies Family Hx of Anesthesia complications.   Denies Personal Hx of Anesthesia complications.   Cardiovascular:   Exercise tolerance: poor Pacemaker Denies MI.   CHF Severe pulm HTN. Moderate depressed RV systolic function   Pulmonary:   Shortness of breath Severe pulm htn   Renal/:  Renal/ Normal     Hepatic/GI:  Hepatic/GI Normal    Musculoskeletal:  Musculoskeletal Normal    Neurological:   Denies TIA. Denies CVA. Denies Seizures.    Endocrine:  Endocrine Normal        Physical Exam  General:  Well nourished    Airway/Jaw/Neck:  Airway Findings: Mouth Opening: Normal Tongue: Normal  Mallampati: II  TM Distance: Normal, at least 6 cm  Jaw/Neck Findings:  Neck ROM: Normal ROM      Dental:  Dental Findings: In tact   Chest/Lungs:  Chest/Lungs Findings: Normal Respiratory Rate, Clear to auscultation     Heart/Vascular:  Heart Findings: Rate: Normal  Rhythm: Regular Rhythm        Mental Status:  Mental Status Findings:  Cooperative, Alert and Oriented         Anesthesia Plan  Type of Anesthesia, risks & benefits discussed:  Anesthesia Type:  general, MAC  Patient's Preference:   Intra-op Monitoring Plan: standard ASA monitors  Intra-op Monitoring Plan Comments:   Post Op Pain Control Plan: per primary service following discharge from PACU and IV/PO Opioids PRN  Post Op Pain Control Plan Comments:   Induction:   IV  Beta Blocker:  Patient is not currently on a Beta-Blocker (No further documentation required).       Informed Consent: Patient understands risks and agrees with Anesthesia plan.  Questions answered. Anesthesia consent signed with patient.  ASA Score: 4     Day of Surgery Review of History & Physical:    H&P update referred to the surgeon.         Ready For Surgery From Anesthesia Perspective.

## 2017-09-08 NOTE — TRANSFER OF CARE
"Anesthesia Transfer of Care Note    Patient: Emily Cook    Procedure(s) Performed: Procedure(s) (LRB):  INSERTION-CATHETER-CARUSO (Left)    Patient location: PACU    Anesthesia Type: MAC    Transport from OR: Transported from OR on 6-10 L/min O2 by face mask with adequate spontaneous ventilation    Post pain: adequate analgesia    Post assessment: no apparent anesthetic complications    Post vital signs: stable    Level of consciousness: awake and alert    Nausea/Vomiting: no nausea/vomiting    Complications: none    Transfer of care protocol was followed      Last vitals:   Visit Vitals  BP 97/63   Pulse 86   Temp 36.7 °C (98.1 °F) (Oral)   Resp 18   Ht 5' 3" (1.6 m)   Wt 72.4 kg (159 lb 9.8 oz)   SpO2 (!) 91%   Breastfeeding? No   BMI 28.27 kg/m²     "

## 2017-09-08 NOTE — PROGRESS NOTES
DISCHARGE    SW to pt's room for discharge today.  Pt presents as sitting up at edge of bed, aao x4, calm, cooperative, and asking and answering questions appropriately.  Pt verbalizes understanding and agreement with plan for d/c to home today with IV abx and HH.  SW notified Fadi with [employer] (ph: [phone], f: [phone]) of d/c today and confirmed [facility] has Epic access.  SW notified [facility] (ph: [phone], f: [phone]) of d/c today and faxed resume HH orders & hospital records.  Per [facility], their nurse will see pt at home tomorrow and will draw vanc trough on Monday.    Pt will continue IV Veletri at home via Cardoso catheter.  Pt's son Jaden reports he is educated on how to care for Cardoso.  Son has brought pt's home Veletri cassettes and portable O2 tanks to hospital for d/c today.  Son will transport pt home today.  Pt reports coping adequately at this time, and denies any needs or concerns to SW.  SW providing ongoing psychosocial and counseling support, education, resources, assistance, and discharge planning as indicated.  SW remains available.

## 2017-09-08 NOTE — ASSESSMENT & PLAN NOTE
-MRSA per cultures.  - Cardoso removed 9/5/17  -Appreciate ID Cx. Continue Vanc x 2 weeks, end 9/19/17. PICC placement for Vanc yesterday. Cardoso placement by Gen Surg for Veletri today

## 2017-09-08 NOTE — DISCHARGE SUMMARY
[facility]  Heart Transplant  Discharge Summary      Patient Name: Emily Cook  MRN: [id number]  Admission Date: 8/29/2017  Hospital Length of Stay: 10 days  Discharge Date and Time: 09/08/2017 3:33 PM  Attending Physician: Yves Ruth Jr.,*   Discharging Provider: Elena Raygoza PA-C  Primary Care Provider: Kaylee Cazares MD     HPI: 55 y/o F w PHTN  transferred from [facility] after being admitted w acute respiratory failure 2/2 to [facility] in setting of severe PHTN.  Found to have MRSA bacteremia and have supratherapeutic INR at OSH.  PMHx includes 3rd degree HB s/p pacemaker, tricuspid regurg, on chronic coumadin since 2011, Pulm HTN on IV Veletri.  Pt was admitted to the ER at OSH with complaints of neck pain and SOB. Upon admission, oxygen saturation was 60's. Placed on 100% bipap and maintained at O2 sat of 90's. CXR there showed hilar vascular congestion and a question of mild bilateral perihilar edema. On admission Tmax of 100.4 with complaints of dry nonproductive cough. BNP 38110 when admitted at outside facility.  BCx at previous hospital + for MRSA.  Started on azithromycin, vanc, rocephin but then rocephin d/cherry when + MRSA found.            Procedure(s) (LRB):  INSERTION-CATHETER-CARDOSO (Left)     Hospital Course: Pt was admitted to \[facility]\"" for acute on chronic respiratory failure due to volume overload and PNA. She was started on IV Lasix for diuresis. She was also started on broad spectrum Abx for PNA and MRSA bacteremia, and ID was consulted. They recommended Azithromycin ( which she completed a 7 day course for PNA) and IV Vanc for MRSA bacteremia. They also recommended getting patient's Cardoso out which was delayed due to supratherapeutic INR but Cardoso was eventually pulled on 9/5/17. IV Vanc should be continued 2 weeks from when Cardoso was pulled so end date is 9/19/17. Pt was transitioned from IV Lasix to PO Bumex (previously on PO Lasix at home).    Pt's symptoms  improved with diuresis and Abx. PICC was placed on 9/7/17 for home IV Vanc. Nephrology Access Service attempted to place a new Cardoso via RIJ on 9/7/17 but were unable to get access due to occlusion. General Surgery was consulted and placed L Cardoso on 9/8/17. Pt will be discharged home with  nursing on IV Vanc via PICC and IV Veletri via Cardoso. She will have f/u in PH clinic in 1 week with labs and will need repeat Bl Cxs once Vanc course is complete.     Consults         Status Ordering Provider     Inpatient consult to General Surgery  Once     Provider:  (Not yet assigned)    Completed MARILIA SHARMA     Inpatient consult to General Surgery  Once     Provider:  (Not yet assigned)    Completed LUIS ENRIQUE DEL TORO     Inpatient consult to Infectious Diseases  Once     Provider:  (Not yet assigned)    Completed MARILIA SHARMA     Inpatient consult to PICC team ([facility])  Once     Provider:  (Not yet assigned)    Completed LUIS ENRIQUE DEL TORO              Pending Diagnostic Studies:     None        Final Active Diagnoses:    Diagnosis Date Noted POA    PRINCIPAL PROBLEM:  Respiratory failure with hypoxia [J96.91] 08/30/2017 Yes    Pulmonary hypertension [I27.2] 08/29/2017 Yes    Right-sided heart failure [I50.9] 08/30/2017 Yes    Bacteremia [R78.81] 08/27/2017 Yes    Staphylococcus aureus bacteremia [R78.81]  Yes    Pneumonia of right upper lobe due to infectious organism [J18.1] 08/26/2017 Yes      Problems Resolved During this Admission:    Diagnosis Date Noted Date Resolved POA    Supratherapeutic INR [R79.1] 08/30/2017 09/06/2017 Yes      Discharged Condition: stable    Disposition: Home-Health Care Svc      Patient Instructions:     Diet Low Sodium, 2gm   Scheduling Instructions: 1.5 L fluid restriction     Activity as tolerated     Call MD for:  temperature >100.4     Call MD for:  persistent nausea and vomiting or diarrhea     Call MD for:  severe uncontrolled pain     Call MD for:   redness, tenderness, or signs of infection (pain, swelling, redness, odor or green/yellow discharge around incision site)     Call MD for:  difficulty breathing or increased cough     Call MD for:  persistent dizziness, light-headedness, or visual disturbances     Call MD for:  increased confusion or weakness     Call MD for:  severe persistent headache       Medications:  Reconciled Home Medications:   Current Discharge Medication List      START taking these medications    Details   bumetanide (BUMEX) 2 MG tablet Take 1 tablet (2 mg total) by mouth 2 (two) times daily.  Qty: 60 tablet, Refills: 5      dextrose 5 % SolP 250 mL with vancomycin 1,000 mg SolR 1,250 mg Inject 1,250 mg into the vein Daily.      magnesium oxide (MAG-OX) 400 mg tablet Take 1 tablet (400 mg total) by mouth 2 (two) times daily.  Refills: 0      sodium chloride 0.9% 0.9 % SolP 100 mL with epoprostenol arginine 0.5 mg SolR 6,000,000 ng Inject 2,064 ng/min into the vein continuous.         CONTINUE these medications which have CHANGED    Details   warfarin (COUMADIN) 5 MG tablet Take 1 tablet (5 mg total) by mouth Daily.  Qty: 150 tablet, Refills: 0         CONTINUE these medications which have NOT CHANGED    Details   duloxetine (CYMBALTA) 60 MG capsule Take 1 capsule (60 mg total) by mouth 2 (two) times daily.  Qty: 180 capsule, Refills: 1    Associated Diagnoses: Chronic neck pain; Right cervical radiculopathy      EPOPROSTENOL SODIUM, ARGININE, (VELETRI IV) Inject into the vein.      gabapentin (NEURONTIN) 300 MG capsule Take 1 capsule (300 mg total) by mouth As instructed. Take one capsule qam, 1 qpm, 2 qhs.  Qty: 360 capsule, Refills: 2    Associated Diagnoses: Chronic neck pain; Right cervical radiculopathy      hydrocodone-acetaminophen 10-325mg (NORCO)  mg Tab Take 1 tablet by mouth 3 (three) times daily as needed.  Qty: 90 tablet, Refills: 0    Associated Diagnoses: Chronic neck pain; Subdeltoid bursitis, right      macitentan  (OPSUMIT) 10 mg Tab Take 1 tablet (10 mg total) by mouth once daily.  Qty: 30 tablet, Refills: 11    Associated Diagnoses: Secondary pulmonary hypertension      PATADAY 0.2 % Drop Refills: 6      potassium chloride (MICRO-K) 10 MEQ CpSR Take 2 capsules (20 mEq total) by mouth 3 (three) times daily.  Qty: 180 capsule, Refills: 3      spironolactone (ALDACTONE) 25 MG tablet Take 1 tablet (25 mg total) by mouth once daily.  Qty: 30 tablet, Refills: 11      clobetasol (TEMOVATE) 0.05 % external solution Refills: 2      hydrOXYzine (VISTARIL) 50 MG Cap 50 mg daily as needed.          STOP taking these medications       furosemide (LASIX) 80 MG tablet Comments:   Reason for Stopping:               Elena Raygoza PA-C  Heart Transplant  [facility]

## 2017-09-08 NOTE — PROGRESS NOTES
General Surgery    Post procedure CXR reviewed.  No Pneumothorax    OK to use L IJ Cardoso catheter    Charly Tracy  General Surgery, PGY-5  Pager # [phone]

## 2017-09-08 NOTE — PLAN OF CARE
Pt aaox3.  Bed in low and locked position.  Nonskid socks in use.  Call bell, phone, drink within reach in bed or on bedside table.  Denies pain.  Bedside commode with walking distance.  Pt able to ambulate to and from without assistance.  R FA piv infusing veletri @ 24ng/kg/min, dw 86kg, rate 50cc/24hrs.  MRSA in blood - IVPB of vanc to Right picc placed yest. Last day of vanc 19th.  Cardoso unable to be placed yest for home veletri.  US of neck and arms ordered.  Plan for OR to place today.  5LNC sating 91%.  See flowsheet for full assessment and details.

## 2017-09-08 NOTE — NURSING
Pt discharged to home with son. Pt given discharge paperwork. Pt denied any requests, complaints, questions or concerns.

## 2017-09-08 NOTE — PROGRESS NOTES
General Surgery    NAEON.  NPO since midnight. Vitals stable.  US showed partial thrombus of the R IJ    NAD  RRR  R chest with brush removal site dressed    Plan:  To OR today for L IJ vs subclavian single lumen catheter placement  The procedure was described in detail to the patient and all questions were answered.  The risks and benefits of the procedure were discussed and the patient wishes to proceed with surgery.    Charly Tracy  General Surgery, PGY-5  Pager # [phone]

## 2017-09-08 NOTE — ANESTHESIA POSTPROCEDURE EVALUATION
"Anesthesia Post Evaluation    Patient: Emily Cook    Procedure(s) Performed: Procedure(s) (LRB):  INSERTION-CATHETER-CARUSO (Left)    Final Anesthesia Type: general  Patient location during evaluation: PACU  Patient participation: Yes- Able to Participate  Level of consciousness: awake and alert  Post-procedure vital signs: reviewed and stable  Pain management: adequate  Airway patency: patent  PONV status at discharge: No PONV  Anesthetic complications: no      Cardiovascular status: stable  Respiratory status: unassisted, spontaneous ventilation and room air  Hydration status: euvolemic  Follow-up not needed.        Visit Vitals  BP (!) 95/57 (BP Location: Right arm, Patient Position: Lying)   Pulse 77   Temp 36.7 °C (98.1 °F) (Temporal)   Resp 16   Ht 5' 3" (1.6 m)   Wt 72.4 kg (159 lb 9.8 oz)   SpO2 (!) 94%   Breastfeeding? No   BMI 28.27 kg/m²       Pain/Rancho Score: Pain Assessment Performed: Yes (9/8/2017 11:31 AM)  Presence of Pain: denies (9/8/2017 11:31 AM)  Rancho Score: 10 (9/8/2017 11:15 AM)      "

## 2017-09-08 NOTE — HPI
55 y/o female with PHTN on veletri and lasix for pulmonary HTN, cardoso catheter placed 6/2017 here transferred from OSH after being admitted with acute respiratory failure 2/2 to PNA. Pt reports feeling unwell for the past few days with fevers and chills at home and neck pain. Pt febrile 100.4 on admit with leukocytosis of 14.20. Blood cultures 8/26, 8/30 +MRSA. Pt currently on azithromycin for PNA and vancomycin for MRSA bacteremia. 2D echo negative for vegetations. Cardoso catheter removed 9/5/17.

## 2017-09-08 NOTE — ASSESSMENT & PLAN NOTE
- INR 1.3 today  - Neurology access service unable to place brush  - US of upper extremity and neck veins  - Placement of brush in OR likely tomorrow 9/8/17  - NPO @midnight  - Care per primary team

## 2017-09-08 NOTE — HOSPITAL COURSE
Pt was admitted to [facility] for acute on chronic respiratory failure due to volume overload and PNA. She was started on IV Lasix for diuresis. She was also started on broad spectrum Abx for PNA and MRSA bacteremia, and ID was consulted. They recommended Azithromycin ( which she completed a 7 day course for PNA) and IV Vanc for MRSA bacteremia. They also recommended getting patient's Cardoso out which was delayed due to supratherapeutic INR but Cardoso was eventually pulled on 9/5/17. IV Vanc should be continued 2 weeks from when Cardoso was pulled so end date is 9/19/17. Pt was transitioned from IV Lasix to PO Bumex (previously on PO Lasix at home).    Pt's symptoms improved with diuresis and Abx. PICC was placed on 9/7/17 for home IV Vanc. Nephrology Access Service attempted to place a new Cardoso via RIJ on 9/7/17 but were unable to get access due to occlusion. General Surgery was consulted and placed L Cardoso on 9/8/17. Pt will be discharged home with  nursing on IV Vanc via PICC and IV Veletri via Cardoso. She will have f/u in PH clinic in 1 week with labs and will need repeat Bl Cxs once Vanc course is complete.

## 2017-09-11 NOTE — PHYSICIAN QUERY
PT Name: Emily Cook  MR #: [id number]     Physician Query Form - Documentation Clarification      CDS/: Michelle Silva RN, CCDS              Contact information: [email or web address]    This form is a permanent document in the medical record.     Query Date: September 11, 2017    By submitting this query, we are merely seeking further clarification of documentation. Please utilize your independent clinical judgment when addressing the question(s) below.    The Medical record reflects the following:    Supporting Clinical Findings Location in Medical Record     cardoso catheter placed 6/2017     Bacteremia, MRSA per cultures  -Will get peripheral access and remove Cardoso.     MRSA- cath tip    Cardoso removed 9/5/17  -Appreciate ID Cx. Continue Vanc x 2 weeks, end 9/19/17.   8/31 ID note    8/30 progress notes        9/5 lab    9/7 progress note      Bacteremia likely from infected cardoso catheter     Staphylococcus aureus bacteremia         9/1, 9/2, 9/3, 9/5, 9/6        D/c summary 9/8                                                                            Doctor, Please specify diagnosis or diagnoses associated with above clinical findings.    Provider Use Only      (  x  )  MRSA Bacteremia due to infected cardoso catheter, poa    (    )  MRSA Bacteremia due to Other, please specify_______________                                                                                                                         [  ] Clinically undetermined

## 2017-09-11 NOTE — TELEPHONE ENCOUNTER
Received call from Chacha with [facility] to notify us that patients potassium is 2.8.  Jalyn Fierro. RN notified.  Note routed to Maggie Hu.

## 2017-09-11 NOTE — PROGRESS NOTES
INR lab draw was due 8/28, Belinda with the Medical Team called to report that the Patient was discharged from the hospital, home health was resumed, SN will be seeing the Patient today and wanted know if an INR is needed today, also reports that Patient did not take coumadin Saturday or Sunday, also reports that the Patient is on Vancomycin, as per [place] -PharmD asked Belinda to please get an INR today -9/11/17, order faxed to The Medical Team

## 2017-09-11 NOTE — PROGRESS NOTES
The pt was recently admitted to [facility] from 8/26 through 9/8 for PNA/MRSA bacteremia.  See calendar for recent INRs and warfarin doses while admitted.  She was discharged with bumetanide and mag/ox.

## 2017-09-11 NOTE — TELEPHONE ENCOUNTER
Called patient, who reports she did not take any of her scheduled potassium today. Asked patient to take 40 meq now (morning and afternoon dose), and then take remaining 20meq (evening dose), at scheduled time in evening. Pt verbalized understanding. Dr Cazares notified of same.

## 2017-09-12 NOTE — TELEPHONE ENCOUNTER
Infectious Disease - Lab follow up     Dx: MRSA bacteremia 2/2 infected brush catheter  Antibiotics: Vancomycin 1250 q 24 hours  Estimated End Date: 9/19/17    Labs 9/11/17  WBC - 7.10  H/H - 11.3/35.1  Platelets - 185  Creatinine - 1.2  Potassium - 2.8  Sodium - 138  ESR - 47  CRP - 24.6  AST/ALT - 36/24  Vancomycin Trough - 16.3      Vanc trough therapeutic. Potassium low at 2.8. Per chart review, Dr. Cazares has contacted patient and instructed her to increase K to 40 meq TID. Repeat potassium level to be drawn tomorrow.    ID follow up scheduled for 9/20

## 2017-09-12 NOTE — PROGRESS NOTES
C3 nurse attempted to contact patient. No answer. The following message was left for the patient to return the call:  Good Afternoon,  I am a nurse calling on behalf of [facility] from the [facility].  This is a Transitional Care Call for Emliy Cook . When you have a moment please contact us at [phone] or [phone] Monday through Friday, between the hours of 8 am to 4 pm. We look forward to speaking with you. On behalf of [facility] have a nice day.    The patient has a scheduled [facility] appointment with Mehnaz Hanson PA-C on 9/20/17 @ 1615. Message not sent to Physician staff.

## 2017-09-12 NOTE — PATIENT INSTRUCTIONS
Discharge Instructions: Caring for Your Central Line  You are going home with a central line. Its also called a central venous access device (CVAD) or central venous catheter (CVC). A small, soft tube (catheter) has been put in a vein that leads to your heart. This provides medicine during your treatment. It is taken out when you no longer need it. At home, you need to take care of your central line to keep it working. A central line has a high infection risk. So you must take extra care washing your hands and preventing the spread of germs. This sheet will help you remember what to do at home.  Understanding your role  · A nurse or other healthcare provider will teach you and your caregivers how to care for the central line. Before leaving the hospital, make sure you understand what to do at home, how long you may need the central line, and when to have a follow-up visit.  · You will likely be told to flush the central line with saline or heparin solution. You may also be told to change the catheters injection caps and change the bandage (dressing). Or, a nurse may do this for you during a follow-up visit. Only do these things if youre told to do so. Follow the instructions you were given.  Protecting the central line  If the central line gets damaged, it wont work right and could raise your chance of infection. Call your healthcare team right away if any damage occurs. To protect the central line at home:  · Prevent infection. Use good hand hygiene by following the guidelines on this sheet. Dont touch the catheter or dressing unless you need to. And always clean your hands before and after you come in contact with any part of the central line. Your caregivers, family members, and any visitors should use good hand hygiene, too.  · Keep the central line dry. The catheter and dressing must stay dry. Dont take baths, go swimming, use a hot tub, or do other activities that could get the central line wet. Take a  sponge bath to avoid getting the central line wet, unless your healthcare provider tells you otherwise. Ask your provider about the best way to keep the line dry when bathing or showering. If the dressing does get wet, change it only if you have been shown how. Otherwise, call your healthcare team right away for help.  · Avoid damage. Dont use any sharp or pointy objects around the catheter. This includes scissors, pins, knives, razors, or anything else that could cut it or put a hole in it (puncture it). Also, dont let anything pull or rub on the catheter, such as clothing.  · Watch for signs of problems. Pay attention to how much of the catheter sticks out from your skin. If this changes at all, let your healthcare provider know. Also watch for cracks, leaks, or other damage. If the dressing becomes dirty, loose, or wet, change it (if you have been instructed to). Or call your healthcare team right away.  · Avoid lowering your chest below your waist. This includes bending at the waist to do things like tying your shoes. When your chest is below your waist, especially for a long time, the catheters internal tip could slip out of place in the vein.  · Tell your healthcare team if you vomit or have severe coughing. This can also make the catheter slip out of place.  Risk of blood clot  If a blood clot forms it can block blood flow through the vein where the catheter is placed. Signs of a blood clot include pain or swelling in your neck, face, chest or arm. If you have any of these symptoms, call your healthcare provider right away. You may need an ultrasound exam to find the blood clot. You may also be treated with a blood thinner.    Prevent infection with good hand hygiene  A central line can let germs into your body. This can lead to serious and sometimes deadly infections. To prevent infection, its very important that you, your caregivers, and others around you use good hand hygiene. This means washing your  hands well with soap and water, and cleaning them with alcohol-based hand gel as directed. Never touch the central line or dressing without first using one of these methods.  To wash your hands with soap and water:  1. Wet your hands with warm water. (Avoid hot water. It can cause skin irritation when you wash your hands often.)  2. Apply enough soap to cover the entire surface of your hands, including your fingers.  3. Rub your hands together briskly for at least 15 seconds. Make sure to rub the front and back of each hand up to the wrist, your fingers and fingernails, between the fingers, and each thumb.  4. Rinse your hands with warm water.  5. Dry your hands completely with a new, unused paper towel. Dont use a cloth towel or other reusable towel. These can harbor germs.  6. Use the paper towel to turn off the faucet, then throw it away. If youre in a bathroom, also use a paper towel to open the door instead of touching the handle.  When you dont have access to soap and water: Use alcohol-based hand gel to clean your hands. The gel should have at least 60% alcohol. Follow the instructions on the package. Your healthcare team can answer any questions you have about when to use hand gel, or when its better to wash with soap and water.   When to seek medical care  Call your healthcare provider right away if you have any of the following:  · Pain or burning in your shoulder, chest, back, arm, or leg  · Fever of 100.4°F (38.0°C) or higher  · Chills  · Signs of infection at the catheter site (pain, redness, drainage, burning, or stinging)  · Coughing, wheezing, or shortness of breath  · A racing or irregular heartbeat  · Muscle stiffness or trouble moving  · Gurgling noises coming from the catheter  · The catheter falls out, breaks, cracks, leaks, or has other damage   Date Last Reviewed: 7/1/2016  © 9762-3780 [employer]. [address] [place] [address], [place] [address]. All rights reserved. This  information is not intended as a substitute for professional medical care. Always follow your healthcare professional's instructions.

## 2017-09-12 NOTE — TELEPHONE ENCOUNTER
Contacted patient to give instructions, per Dr. Cazares, to increase K to 40meq, TID. Confirmed with patient that it would be 4 of her Micro-K, 10 meq tablets. Stressed the importance of taking her potassium Patient verbalized understanding but has been typically non-adherent to her medical regimen. This was confirmed by the HH RN.  will draw repeat labs tomorrow, Wednesday.    Notified Dr. Cazares

## 2017-09-12 NOTE — OP NOTE
DATE OF PROCEDURE:  09/08/2017.    PREOPERATIVE DIAGNOSIS:  IV access requirement for management of pulmonary   hypertension.    POSTOPERATIVE DIAGNOSIS:  IV access requirement for management of pulmonary   hypertension.    PROCEDURE:  Insertion of left internal jugular single lumen Cardoso.    SURGEON:  Naman Simental M.D.    ASSISTANT:  Dain Ford M.D. (RES).    BLOOD LOSS:  Minimal.    COMPLICATIONS:  None.    INDICATIONS:  A 57-year-old consult from primary team, requesting long-term IV   access for medications and fusion to manage pulmonary hypertension.    OPERATIVE REPORT IN DETAIL:  The patient was brought to the Operating Room,   placed in the supine position, and prepped and draped in sterile fashion once   satisfactory intravenous sedation was achieved.  Ultrasound was utilized to   identify the left internal jugular vein, which was accessed with a needle and a   wire was introduced through the needle into the internal jugular vein and   advanced into the superior vena cava under fluoroscopic guidance.  Next, a   single-lumen Cardoso catheter was utilized.  It was brought in through a stab   incision in the left chest previous to the incision.  Local anesthetic was   utilized to infiltrate the tissues of the left chest extending over the clavicle   to the internal jugular site.  The dilators were then used to progressively   dilate a tunnel from the access site to the internal jugular vein.  The largest   dilator included an overlying peel-away sheath.  This was introduced under   fluoroscopic guidance and then the Cardoso was introduced into the vein through   the peel-away catheter, advanced to the superior vena cava, and again this was   confirmed with fluoroscopy.  The catheter was noted to aspirate blood freely and   then was flushed with heparinized solution.  Cardoso was secured to the skin   with a 2-0 Prolene suture.  The venous access site was closed with a single   interrupted absorbable  suture.  Needle, sponge, and instrument counts were   correct.  The patient tolerated the procedure well and was stable at the   completion of the operation.      LAURA/SHIRLEY  dd: 09/12/2017 16:44:53 (CDT)  td: 09/12/2017 17:05:34 (CDT)  Doc ID   #[id number]  Job ID #[id number]    CC:

## 2017-09-13 NOTE — TELEPHONE ENCOUNTER
"Contacted patient to report potassium has continued to drop. Dr. Cazares wants patient to take 60 meq now. Patient states that she already took 60 of potassium, she said, "two in the morning, noon and afternoon." Informed patient that her instructions were to take 40 mEq (4 tablets), three times a day. Again advised patient to take 6 tablets (10 meq) now. Advised patient that her heart could stop. Patient responded with, "uh huh." Went over instructions again and said that I would call  to repeat labs.   Notified Dr. Cazares of patient's response to instructions.  "

## 2017-09-15 NOTE — TELEPHONE ENCOUNTER
"Contacted patient about potassium level. Today is 2.5. Per Dr. Cazares, patient is to continue to taking Potassium, 40 mEq, TID. Asked patient if she needed a refill on potassium and she said, "not right now."  Notified  agency of instructions. They will repeat lab work on Monday.  "

## 2017-09-19 NOTE — TELEPHONE ENCOUNTER
Contacted STEPHEN RN. They will see patient today and reinforce the need for patient to continue taking her potassium, 40 mEq, TID. Will check Magnesium level today as well and recheck CMP on Thursday of this week.

## 2017-09-20 PROBLEM — T80.211A CENTRAL LINE-ASSOCIATED BLOODSTREAM INFECTION: Status: ACTIVE | Noted: 2017-01-01

## 2017-09-20 NOTE — PROGRESS NOTES
Subjective:      Patient ID: Emily Cook is a 57 y.o. female.    Chief Complaint:Hospital Follow Up      History of Present Illness  Emily Cook is a 57 year-old female with history of pulmonary HTN on home Veletri recently admitted to [facility] with MRSA bacteremia secondary to an infected brush catheter (placed 6/2017). Patient was treated with IV Vancomycin during admission. Her brush catheter was removed on 9/5. Catheter tip cultures also grew MRSA. TTE was negative for vegetations. Blood cultures cleared 9/3. A new brush catheter was placed on 9/8 for Veletri along with a PICC line and patient was discharged home with a plan to complete 2 weeks of IV Vancomycin.     Patient is seen today for follow up. She reports feeling much better since hospital discharge. No fevers, chills or sweats at home. No problems with her PICC line or brush catheter. She has completed her Vancomycin. Tolerated well. Still hypokalemic per lab work. She reports taking 3 potassium tablets 3x a day. Potassium 2.8 today. Magnesium WNL. Patient states she is weak but this is unchanged/at her baseline. No N/V, diarrhea, constipation or abdominal pain. No worsening respiratory distress or changes in urination. No new complaints today.        Review of Systems   Constitution: Negative for chills, decreased appetite, fever, weakness, malaise/fatigue, night sweats, weight gain and weight loss.   HENT: Negative for congestion, ear pain, hearing loss, hoarse voice, sore throat and tinnitus.    Eyes: Negative for blurred vision, redness and visual disturbance.   Cardiovascular: Positive for leg swelling. Negative for chest pain and palpitations.   Respiratory: Positive for shortness of breath. Negative for cough, hemoptysis and sputum production.    Hematologic/Lymphatic: Negative for adenopathy. Does not bruise/bleed easily.   Skin: Positive for itching. Negative for dry skin, rash and suspicious lesions.   Musculoskeletal: Positive  for joint pain and neck pain. Negative for back pain and myalgias.   Gastrointestinal: Negative for abdominal pain, constipation, diarrhea, heartburn, nausea and vomiting.   Genitourinary: Negative for dysuria, flank pain, frequency, hematuria, hesitancy and urgency.   Neurological: Positive for dizziness. Negative for headaches, numbness and paresthesias.   Psychiatric/Behavioral: Negative for depression and memory loss. The patient does not have insomnia and is not nervous/anxious.      Objective:   Physical Exam   Constitutional: She is oriented to person, place, and time. She appears well-developed and well-nourished. No distress.   HENT:   Head: Normocephalic and atraumatic.   Mouth/Throat: No oropharyngeal exudate.   Eyes: Conjunctivae are normal. No scleral icterus.   Neck: Normal range of motion. Neck supple.   Cardiovascular: Normal rate and regular rhythm.    No murmur heard.  Pulmonary/Chest: Effort normal and breath sounds normal. No respiratory distress. She has no wheezes.   Abdominal: Soft. Bowel sounds are normal. She exhibits no distension. There is no tenderness. There is no guarding.   Musculoskeletal: She exhibits no edema.   Lymphadenopathy:     She has no cervical adenopathy.   Neurological: She is alert and oriented to person, place, and time.   Skin: Skin is warm and dry. No rash noted. She is not diaphoretic.   Brush site without erythema, warmth, induration or drainage. LUE PICC line c/d/i.   Psychiatric: She has a normal mood and affect. Her behavior is normal. Judgment and thought content normal.   Vitals reviewed.    Assessment:       1. Staphylococcus aureus bacteremia    2. Central line-associated bloodstream infection        Patient has completed two weeks of Vancomycin. Doing well. No systemic signs of infection. Brush site without erythema, warmth, induration or drainage. WBC WNL.  Plan:   - PICC line to be pulled today in [facility]fusion [facility]  - Continue Veletri via tunneled brush  catheter  - Staph decolonization protocol reviewed with patient  - Follow up with cardiology as schedule  - Continue potassium supplementation. Concerned her potassium level has been persistently low. Will discuss with cardiology regarding K replacement.  - Advised patient to call for any fevers, chills, sweats, or increase in pain, swelling, drainage from catheter site. Business card provided.   - Follow up with ID as needed.

## 2017-09-20 NOTE — PROGRESS NOTES
Pt here for picc line d/c. Pt instructed to keep pressure dressing on for 24hrs then to cover site with a band aid until healed . Pt tolerated well and left in NAD

## 2017-09-22 PROBLEM — R09.02 HYPOXIA: Status: ACTIVE | Noted: 2017-01-01

## 2017-09-22 PROBLEM — I50.9 ACUTE DECOMPENSATED HEART FAILURE: Status: ACTIVE | Noted: 2017-01-01

## 2017-09-22 NOTE — SUBJECTIVE & OBJECTIVE
"Past Medical History:   Diagnosis Date    Arrhythmia     Arthritis     Cardiac pacemaker in situ     Carpal tunnel syndrome, right     Cervical spondylosis     Cervicalgia     CHF (congestive heart failure)     Diverticulitis     Heart block AV third degree     Hyperlipidemia     ALFREDO on CPAP     Pulmonary hypertension     Subdeltoid bursitis        Past Surgical History:   Procedure Laterality Date    CARDIAC CATHETERIZATION      CARDIAC PACEMAKER PLACEMENT  7/29/13    [employer] DC PM    CARDIAC SURGERY      COLONOSCOPY N/A 9/28/2015    Procedure: COLONOSCOPY;  Surgeon: Jason Diaz MD;  Location: [facility] ([address] [place] [address]);  Service: Endoscopy;  Laterality: N/A;  Please schedule in 2-3 months with Dr Diaz  Pulmonary HTN, 2nd floor (off remodulin infusion as of "a few days" before 9/9/15)    3 day hold Coumadin, [facility]  PT/INR scheduled before procedure    ECTOPIC PREGNANCY SURGERY Left     HYSTERECTOMY      partial    knee arthroscopy         Review of patient's allergies indicates:   Allergen Reactions    Chlorhexidine Itching and Rash     Patient had raised rash on neck following RHC procedure. She states she's never had a problem with the "prep" used until this time.     Adhesive Rash       Current Facility-Administered Medications   Medication    duloxetine DR capsule 60 mg    [START ON 9/23/2017] furosemide injection 80 mg    gabapentin capsule 300 mg    hydrOXYzine pamoate capsule 50 mg    [START ON 9/23/2017] macitentan tablet 10 mg    potassium chloride SA CR tablet 40 mEq    sodium chloride 0.9% flush 3 mL    [START ON 9/23/2017] spironolactone tablet 25 mg    [START ON 9/23/2017] warfarin (COUMADIN) tablet 5 mg     Current Outpatient Prescriptions   Medication Sig    bumetanide (BUMEX) 2 MG tablet Take 1 tablet (2 mg total) by mouth 2 (two) times daily.    clobetasol (TEMOVATE) 0.05 % external solution     duloxetine (CYMBALTA) 60 MG capsule Take 1 " capsule (60 mg total) by mouth 2 (two) times daily.    EPOPROSTENOL SODIUM, ARGININE, (VELETRI IV) Inject into the vein.    gabapentin (NEURONTIN) 300 MG capsule Take 1 capsule (300 mg total) by mouth As instructed. Take one capsule qam, 1 qpm, 2 qhs.    hydrocodone-acetaminophen 10-325mg (NORCO)  mg Tab Take 1 tablet by mouth 3 (three) times daily as needed.    hydrOXYzine (VISTARIL) 50 MG Cap 50 mg daily as needed.     macitentan (OPSUMIT) 10 mg Tab Take 1 tablet (10 mg total) by mouth once daily.    magnesium oxide (MAG-OX) 400 mg tablet Take 1 tablet (400 mg total) by mouth 2 (two) times daily.    PATADAY 0.2 % Drop     potassium chloride (MICRO-K) 10 MEQ CpSR Take 2 capsules (20 mEq total) by mouth 3 (three) times daily.    sodium chloride 0.9% 0.9 % SolP 100 mL with epoprostenol arginine 0.5 mg SolR 6,000,000 ng Inject 2,064 ng/min into the vein continuous.    spironolactone (ALDACTONE) 25 MG tablet Take 1 tablet (25 mg total) by mouth once daily.    warfarin (COUMADIN) 5 MG tablet Take 1 tablet (5 mg total) by mouth Daily.     Family History     Problem Relation (Age of Onset)    Arthritis Mother, Father    Diabetes Mother    Hyperlipidemia Mother, Father        Social History Main Topics    Smoking status: Never Smoker    Smokeless tobacco: Never Used    Alcohol use No      Comment: rarely    Drug use: No    Sexual activity: No     Review of Systems   Constitutional: Positive for activity change. Negative for fatigue.   HENT: Negative for congestion.    Eyes: Negative for visual disturbance.   Respiratory: Positive for shortness of breath. Negative for chest tightness.    Cardiovascular: Positive for leg swelling. Negative for chest pain and palpitations.   Gastrointestinal: Positive for nausea. Negative for abdominal distention, abdominal pain and vomiting.   Musculoskeletal: Negative for arthralgias.   Neurological: Negative for dizziness and light-headedness.     Objective:     Vital  Signs (Most Recent):  Temp: 99.3 °F (37.4 °C) (09/22/17 1411)  Pulse: 80 (09/22/17 1731)  Resp: (!) 21 (09/22/17 1631)  BP: 114/70 (09/22/17 1731)  SpO2: 96 % (09/22/17 1731) Vital Signs (24h Range):  Temp:  [99.3 °F (37.4 °C)] 99.3 °F (37.4 °C)  Pulse:  [78-85] 80  Resp:  [18-21] 21  SpO2:  [84 %-96 %] 96 %  BP: (100-114)/(55-70) 114/70     Weight: 74.8 kg (165 lb)  Body mass index is 29.23 kg/m².      Intake/Output Summary (Last 24 hours) at 09/22/17 1852  Last data filed at 09/22/17 1748   Gross per 24 hour   Intake                0 ml   Output              150 ml   Net             -150 ml       Physical Exam   Constitutional: She is oriented to person, place, and time. She appears well-developed and well-nourished.   HENT:   Head: Normocephalic and atraumatic.   Neck: JVD (to jaw better appreciated on the left side. ) present.   Cardiovascular: Normal rate, regular rhythm and normal heart sounds.  Exam reveals no friction rub.    No murmur heard.  Pulmonary/Chest: Effort normal. She has wheezes. She has no rales.   Abdominal: Soft. Bowel sounds are normal.   Musculoskeletal: She exhibits edema (2+ to mid shins ).   Neurological: She is alert and oriented to person, place, and time.   Skin: Skin is warm and dry.       Significant Labs:  CBC:    Recent Labs  Lab 09/22/17  1446   WBC 3.92   RBC 3.73*   HGB 9.2*   HCT 28.2*      MCV 76*   MCH 24.7*   MCHC 32.6     BNP:    Recent Labs  Lab 09/22/17  1446   BNP 1,492*     CMP:    Recent Labs  Lab 09/22/17  1446   GLU 94   CALCIUM 8.5*   ALBUMIN 2.9*   PROT 7.1      K 2.9*   CO2 23      BUN 18   CREATININE 1.4   ALKPHOS 112   ALT 10   AST 15   BILITOT 0.6      Coagulation:     Recent Labs  Lab 09/22/17  1446   INR 2.5*     LDH:  No results for input(s): LDH in the last 72 hours.  Microbiology:  Microbiology Results (last 7 days)     ** No results found for the last 168 hours. **          I have reviewed all pertinent labs within the past 24  hours.    Diagnostic Results:  I have reviewed and interpreted all pertinent imaging results/findings within the past 24 hours.

## 2017-09-22 NOTE — ASSESSMENT & PLAN NOTE
Likely 2/2 to non adherence not been taking bumex at home.  Severely reduced RV function with enlargement on echo 8/31so there may be ventricular interdependence.  Given 80 of lasix in the ED   - Will start on lasix 80 IV BID monitor urine output with low threshold to start lasix gtt if not diuresing.  - Will need to keep a close eye on potassium as she is chronically low.   - Strict I/Os  - Counseled on compliance   - Continue O2   - Cardiac diet

## 2017-09-22 NOTE — PROGRESS NOTES
Study Sponsor: [employer]     Study Title: INGEVITY     Study Visit Type: 4 Year     Patient assessment done according to protocol: YES     Drug / Device / Intervention education continued.     Patient expresses willingness to continue in study: YES     Patient presents for 4 year follow-up; PPM interrogated by Joss Fang with [employer]. Patient had multiple hospitalizations, reported to sponsor per protocol.   Device functioning appropriately.

## 2017-09-22 NOTE — ED NOTES
Appearance:  Pt awake, alert & oriented to person, place & time.  Pt in mild distress  Skin:  Skin warm, dry & intact.  Mucous membranes moist.  Skin turgor normal.  Respiratory:  Respirations shallow, tachypnea.     Neurologic:  Pt moving all extremities without difficulty.  Sensation intact.     Peripheral Vascular:  All peripheral pulses present.  Bilateral lower extremity edema noted.  Abdomen:  Abdomen soft, non-tender to palpation.

## 2017-09-22 NOTE — ED PROVIDER NOTES
"Encounter Date: 9/22/2017    SCRIBE #1 NOTE: I, Daniel Stern, am scribing for, and in the presence of,  Dr. Duenas. I have scribed the entire note.       History     Chief Complaint   Patient presents with    Shortness of Breath     referred from pulmonologyb clinic for low PO - 83-85 on 6l/NC.      Time seen by provider: 2:39 PM    This is a 57 y.o. female with PMH of severe pulmonary HTN on Veletri, recently admitted 2 weeks ago for PNA, MRSA Bacteremia and mild CHF. She was diuresed with broad spectrum antibiotics and discharged with 2 weeks of vancomycin which she finished 2 days ago. Also on Coumadin. Sent from pulmonary clinic today for low O2 sats and SOB. Pt states O2 sat normally runs around 85-90, on 5L at home. Reports she woke up this morning feeling fine and started doing her house work, went to Pulmonology clinic and sent here due to low O2 sat there. She does endorse some LE swelling since arriving to the ER, persistent neck pain and cough since last admission. No changes in medications, compliant with Lasix. Denies any recent fevers including during last admission. No further complaints or concerns at this time.         The history is provided by the patient and medical records.     Review of patient's allergies indicates:   Allergen Reactions    Chlorhexidine Itching and Rash     Patient had raised rash on neck following RHC procedure. She states she's never had a problem with the "prep" used until this time.     Adhesive Rash     Past Medical History:   Diagnosis Date    Arrhythmia     Arthritis     Cardiac pacemaker in situ     Carpal tunnel syndrome, right     Cervical spondylosis     Cervicalgia     CHF (congestive heart failure)     Diverticulitis     Heart block AV third degree     Hyperlipidemia     ALFREDO on CPAP     Pulmonary hypertension     Subdeltoid bursitis      Past Surgical History:   Procedure Laterality Date    CARDIAC CATHETERIZATION      CARDIAC PACEMAKER PLACEMENT  " "7/29/13    [facility] PM    CARDIAC SURGERY      COLONOSCOPY N/A 9/28/2015    Procedure: COLONOSCOPY;  Surgeon: Jason Diaz MD;  Location: [facility] ([address] [place] [address]);  Service: Endoscopy;  Laterality: N/A;  Please schedule in 2-3 months with Dr Diaz  Pulmonary HTN, 2nd floor (off remodulin infusion as of "a few days" before 9/9/15)    3 day hold Coumadin, [facility]  PT/INR scheduled before procedure    ECTOPIC PREGNANCY SURGERY Left     HYSTERECTOMY      partial    knee arthroscopy       Family History   Problem Relation Age of Onset    Arthritis Mother     Diabetes Mother     Hyperlipidemia Mother     Arthritis Father     Hyperlipidemia Father      Social History   Substance Use Topics    Smoking status: Never Smoker    Smokeless tobacco: Never Used    Alcohol use No      Comment: rarely     Review of Systems   Constitutional: Negative for fever.   HENT: Negative for sore throat.    Eyes: Negative for visual disturbance.   Respiratory: Positive for cough and shortness of breath.    Cardiovascular: Positive for leg swelling. Negative for chest pain.   Gastrointestinal: Negative for nausea.   Genitourinary: Negative for dysuria.   Musculoskeletal: Positive for neck pain. Negative for back pain.   Skin: Negative for rash.   Neurological: Negative for weakness.       Physical Exam     Initial Vitals [09/22/17 1411]   BP Pulse Resp Temp SpO2   (!) 100/55 85 18 99.3 °F (37.4 °C) (!) 84 %      MAP       70         Physical Exam    Nursing note and vitals reviewed.  Constitutional: She appears well-developed and well-nourished. No distress.   HENT:   Head: Normocephalic and atraumatic.   Mouth/Throat: Oropharynx is clear and moist. No oropharyngeal exudate.   Neck: Normal range of motion. Neck supple.   Cardiovascular: Normal rate and regular rhythm.   Murmur (2/6 systolic) heard.  Pulmonary/Chest: She has wheezes. She has rales.   Left basilar rales and expiratory wheezing noted.  "   Abdominal: Soft. There is no tenderness. There is no rebound and no guarding.   Musculoskeletal: She exhibits edema (Bilateral 1+ pitting pedal edma).   Neurological: She is alert and oriented to person, place, and time. She has normal strength. No cranial nerve deficit or sensory deficit.   Skin: No rash noted.         ED Course   Procedures  Labs Reviewed   COMPREHENSIVE METABOLIC PANEL - Abnormal; Notable for the following:        Result Value    Potassium 2.9 (*)     Calcium 8.5 (*)     Albumin 2.9 (*)     eGFR if  48.1 (*)     eGFR if non  41.7 (*)     All other components within normal limits   CBC W/ AUTO DIFFERENTIAL - Abnormal; Notable for the following:     RBC 3.73 (*)     Hemoglobin 9.2 (*)     Hematocrit 28.2 (*)     MCV 76 (*)     MCH 24.7 (*)     RDW 20.7 (*)     MPV 8.6 (*)     Lymph # 0.9 (*)     All other components within normal limits   B-TYPE NATRIURETIC PEPTIDE - Abnormal; Notable for the following:     BNP 1,492 (*)     All other components within normal limits   URINALYSIS, REFLEX TO URINE CULTURE - Abnormal; Notable for the following:     Appearance, UA Hazy (*)     Protein, UA 2+ (*)     Occult Blood UA 1+ (*)     Leukocytes, UA Trace (*)     All other components within normal limits    Narrative:     Preferred Collection Type->Urine, Clean Catch   PROTIME-INR - Abnormal; Notable for the following:     Prothrombin Time 25.3 (*)     INR 2.5 (*)     All other components within normal limits   URINALYSIS MICROSCOPIC - Abnormal; Notable for the following:     WBC, UA 6 (*)     Hyaline Casts, UA 4 (*)     All other components within normal limits    Narrative:     Preferred Collection Type->Urine, Clean Catch   MAGNESIUM     EKG Readings: (Independently Interpreted)   Normal sinus at 92 bpm. RVH. Diffuse down sloping ST segments, unchanged from previous.        X-Rays:   Independently Interpreted Readings:   Chest X-Ray: Opacification of left base but no  definite PNA     Medical Decision Making:   History:   Old Medical Records: I decided to obtain old medical records.  Initial Assessment:       57 year old female with Hx of severe pulmonary HTN on Veletri, sent from pulmonary clinic for worsening hypoxia. States baseline is around 85% on 5L home O2 but in low 80s in clinic and on ED arrival. She was just admitted for PNA and MRSA bacteremia and finished 2 weeks Vancomycin, and was feeling well this morning with no SANCHEZ. She did note edema that developed today and persistent neck pain and cough that she had with PNA but no fevers or CP. She had outpatient labs done earlier today that showed chronic hypokalemia and elevated BNP above baseline. Will evaluate for CHF or residual PNA with xray and repeat labs. She required venti mask in ER to maintain baseline O2. Will consult cardiology as well.     5:15 PM:   Repeat labs today confirmed hypokalemia with K 2.9, and elevated BNP above baseline. CXR with opacification of left base but no definite PNA. Pt does have persistent cough and neck pain from last admission when diagnosed with PNA but my review of previous xrays with no definite infiltrate. She has no fever or elevated WBC now so will hold empiric Abx. Opacity could be due to CHF, which is c/w worsening hypoxia and edema. Will need admission for inpatient diuresis and consider repeat xray afterwards to ensure no persistent opacity that could be PNA. K chronically low, 2.9 today which is above baseline, likely due to diuretic use. Cardiology consulted and will admit, given PO KCl and IV Lasix in ED.      Independently Interpreted Test(s):   I have ordered and independently interpreted X-rays - see prior notes.  I have ordered and independently interpreted EKG Reading(s) - see prior notes  Clinical Tests:   Lab Tests: Ordered and Reviewed  Radiological Study: Ordered and Reviewed  Medical Tests: Ordered and Reviewed  Other:   I have discussed this case with another  health care provider.       <> Summary of the Discussion: Cardiology            Scribe Attestation:   Scribe #1: I performed the above scribed service and the documentation accurately describes the services I performed. I attest to the accuracy of the note.    Attending Attestation:         Attending Critical Care:   Critical Care Times:   Direct Patient Care (initial evaluation, reassessments, and time considering the case)................................................................18 minutes.   Additional History from reviewing old medical records or taking additional history from the family, EMS, PCP, etc.......................6 minutes.   Ordering, Reviewing, and Interpreting Diagnostic Studies...............................................................................................................5 minutes.   Documentation..................................................................................................................................................................................5 minutes.   Consultation with other Physicians. .................................................................................................................................................5 minutes.   ==============================================================  · Total Critical Care Time - exclusive of procedural time: 39 minutes.  ==============================================================  Critical Care Condition: life-threatening   Critical Care Comments: Severe hypoxia, CHF exacerbation     Physician Attestation for Scribe:  Physician Attestation Statement for Scribe #1: I, Dr. Duenas, reviewed documentation, as scribed by Daniel Stern in my presence, and it is both accurate and complete.                 ED Course      Clinical Impression:   The encounter diagnosis was Hypoxia.    Disposition:   Disposition: Admitted                        Chidi Lino MD  09/23/17 0044

## 2017-09-22 NOTE — HPI
"57 y.o. female with hx of pulmonary HTN WHO group 1 with 5L O2 at home on Macitentan, Veletri and Coumadin who was recently admitted 2 weeks ago for MRSA Bacteremia 2/2 PNA and ADHF. She was diuresed and discharged with vanc which she finished 2 days ago. She presented to the ED with shortness of breath and hypoxia from her Dr. Bae today. Not been taking her bumex at home over the past several days d/t "laziness" and noticed that she had leg swelling yesterday so she did take a dose then. This morning she felt fine but then started to develop shortness of breath with ambulation. Denies any chest pain, palpitations, but has had nausea. On arrival to ED BP in 100s systolic, hypoxia of 84%, Pulse 85 on exam he had JVD to jaw and bilateral LE edema. EKG with right axis deviation, CXR with enlarged pulm vessels but no edema noted. BNP 1600 was 340 2 weeks ago.     "

## 2017-09-22 NOTE — ED NOTES
Upon arrival to room, pt placed on pulse ox-O2 sat was 70.  Pt placed on 5L nasal cannula without any improvement in oxygenation.  Pt then placed on 100% NRB.  O2 sats improved to 100%.

## 2017-09-22 NOTE — CONSULTS
[facility]  Cardiology  Consult Note    Patient Name: Emily Cook  MRN: [id number]  Admission Date: 9/22/2017  Hospital Length of Stay: 0 days  Code Status: Prior   Attending Provider: Nu Matos MD  Consulting Provider: Jim Santiago MD  Primary Care Physician: Kaylee Cazares MD  Principal Problem: Pul HTN  Patient information was obtained from patient and past medical records.     Consults  Subjective:     Chief Complaint:  Worsening SOB     HPI: Ms Cook is a 57 y.o. female with PMH of severe pulmonary HTN on Veletri, recently admitted 2 weeks ago for PNA, MRSA Bacteremia and mild CHF. She was diuresed with broad spectrum antibiotics and discharged with 2 weeks of vancomycin which she finished 2 days ago. She reports not feeling good since discharge. She went in for a routine hospital discharge follow up visit to Pulmonary clinic. She was found to be more hypoxic than at baseline.  Pt states O2 sat normally runs around 85-90, on 5L at home. Patient reports increasing leg swelling for couple of days , neck pain since last admission and cough.  Denies any recent fevers since last admission. Denies any dietary or medication noncompliance.    Past Medical History:   Diagnosis Date    Arrhythmia     Arthritis     Cardiac pacemaker in situ     Carpal tunnel syndrome, right     Cervical spondylosis     Cervicalgia     CHF (congestive heart failure)     Diverticulitis     Heart block AV third degree     Hyperlipidemia     ALFREDO on CPAP     Pulmonary hypertension     Subdeltoid bursitis        Past Surgical History:   Procedure Laterality Date    CARDIAC CATHETERIZATION      CARDIAC PACEMAKER PLACEMENT  7/29/13    [facility] PM    CARDIAC SURGERY      COLONOSCOPY N/A 9/28/2015    Procedure: COLONOSCOPY;  Surgeon: Jason Diaz MD;  Location: [facility] ([address] [place] [address]);  Service: Endoscopy;  Laterality: N/A;  Please schedule in 2-3 months with   "Joe  Pulmonary HTN, 2nd floor (off remodulin infusion as of "a few days" before 9/9/15)    3 day hold Coumadin, [facility]  PT/INR scheduled before procedure    ECTOPIC PREGNANCY SURGERY Left     HYSTERECTOMY      partial    knee arthroscopy         Review of patient's allergies indicates:   Allergen Reactions    Chlorhexidine Itching and Rash     Patient had raised rash on neck following RHC procedure. She states she's never had a problem with the "prep" used until this time.     Adhesive Rash       No current facility-administered medications on file prior to encounter.      Current Outpatient Prescriptions on File Prior to Encounter   Medication Sig    bumetanide (BUMEX) 2 MG tablet Take 1 tablet (2 mg total) by mouth 2 (two) times daily.    clobetasol (TEMOVATE) 0.05 % external solution     duloxetine (CYMBALTA) 60 MG capsule Take 1 capsule (60 mg total) by mouth 2 (two) times daily.    EPOPROSTENOL SODIUM, ARGININE, (VELETRI IV) Inject into the vein.    gabapentin (NEURONTIN) 300 MG capsule Take 1 capsule (300 mg total) by mouth As instructed. Take one capsule qam, 1 qpm, 2 qhs.    hydrocodone-acetaminophen 10-325mg (NORCO)  mg Tab Take 1 tablet by mouth 3 (three) times daily as needed.    hydrOXYzine (VISTARIL) 50 MG Cap 50 mg daily as needed.     macitentan (OPSUMIT) 10 mg Tab Take 1 tablet (10 mg total) by mouth once daily.    magnesium oxide (MAG-OX) 400 mg tablet Take 1 tablet (400 mg total) by mouth 2 (two) times daily.    PATADAY 0.2 % Drop     potassium chloride (MICRO-K) 10 MEQ CpSR Take 2 capsules (20 mEq total) by mouth 3 (three) times daily.    sodium chloride 0.9% 0.9 % SolP 100 mL with epoprostenol arginine 0.5 mg SolR 6,000,000 ng Inject 2,064 ng/min into the vein continuous.    spironolactone (ALDACTONE) 25 MG tablet Take 1 tablet (25 mg total) by mouth once daily.    warfarin (COUMADIN) 5 MG tablet Take 1 tablet (5 mg total) by mouth Daily.     Family " History     Problem Relation (Age of Onset)    Arthritis Mother, Father    Diabetes Mother    Hyperlipidemia Mother, Father        Social History Main Topics    Smoking status: Never Smoker    Smokeless tobacco: Never Used    Alcohol use No      Comment: rarely    Drug use: No    Sexual activity: No     ROS     Constitutional: Negative for fever.   HENT: Negative for sore throat.    Eyes: Negative for visual disturbance.   Respiratory: Positive for cough and shortness of breath.    Cardiovascular: Positive for leg swelling. Negative for chest pain.   Gastrointestinal: Negative for nausea.   Genitourinary: Negative for dysuria.   Musculoskeletal: Positive for neck pain. Negative for back pain.   Skin: Negative for rash.   Neurological: Negative for weakness.    Objective:     Vital Signs (Most Recent):  Temp: 99.3 °F (37.4 °C) (09/22/17 1411)  Pulse: 80 (09/22/17 1731)  Resp: (!) 21 (09/22/17 1631)  BP: 114/70 (09/22/17 1731)  SpO2: 96 % (09/22/17 1731) Vital Signs (24h Range):  Temp:  [99.3 °F (37.4 °C)] 99.3 °F (37.4 °C)  Pulse:  [78-85] 80  Resp:  [18-21] 21  SpO2:  [84 %-96 %] 96 %  BP: (100-114)/(55-70) 114/70     Weight: 74.8 kg (165 lb)  Body mass index is 29.23 kg/m².    SpO2: 96 %  O2 Device (Oxygen Therapy): nasal cannula      Intake/Output Summary (Last 24 hours) at 09/22/17 1819  Last data filed at 09/22/17 1748   Gross per 24 hour   Intake                0 ml   Output              150 ml   Net             -150 ml       Physical Exam    Nursing note and vitals reviewed.  Constitutional: She appears well-developed and well-nourished. No distress.   HENT: Head: Normocephalic and atraumatic.   Mouth/Throat: Oropharynx is clear and moist. No oropharyngeal exudate.   Neck: Normal range of motion. Neck supple. JVD 12 cm  Cardiovascular: Normal rate and regular rhythm. Murmur (2/6 systolic) heard.  Pulmonary/Chest: . She has rales.   bibasilar rales and expiratory wheezing noted.    Abdominal: Soft. There  is no tenderness. There is no rebound and no guarding.   Musculoskeletal: She exhibits edema .   Neurological: She is alert and oriented to person, place, and time. She has normal strength. No cranial nerve deficit or sensory deficit.   Skin: No rash noted  Significant Labs:   CMP   Recent Labs  Lab 09/22/17  1317 09/22/17  1446    141   K 2.7* 2.9*    106   CO2 23 23   GLU 99 94   BUN 17 18   CREATININE 1.5* 1.4   CALCIUM 8.7 8.5*   PROT 7.4 7.1   ALBUMIN 3.1* 2.9*   BILITOT 0.6 0.6   ALKPHOS 115 112   AST 15 15   ALT 10 10   ANIONGAP 11 12   ESTGFRAFRICA 44.3* 48.1*   EGFRNONAA 38.4* 41.7*    and CBC   Recent Labs  Lab 09/22/17  1446   WBC 3.92   HGB 9.2*   HCT 28.2*          Significant Imaging: TTE 8/30/17   CONCLUSIONS     1 - Right ventricular enlargement with severely depressed systolic function.     2 - Right atrial enlargement.     3 - Moderate tricuspid regurgitation.     4 - Pulmonary hypertension. The estimated PA systolic pressure is 96 mmHg.     5 - Increased central venous pressure.     6 - Normal left ventricular systolic function (EF 60-65%); reduced SVI.     7 - Trivial pericardial effusion.     Assessment and Plan:     57 year old female patient with Pul HTN on Veletri and opsumit presenting with increasing SOB and worsening hypoxia. She is fluid overloaded - warm and wet at this time. Given IV lasix 80 mgs push in ED. Will initiate lasix infusion. Hypokalemia secondary to diuretics. Repleted in ED.  Frequent electrolyte monitoring and I/Os.  Admit to TSU for IV diuresis.    D/w Dr Matos.   Thank you for your consult.     Jim Santiago MD  Cardiology   [facility]

## 2017-09-22 NOTE — PROCEDURES
Emily Cook is a 57 y.o.  female patient, who presents for a 6 minute walk test ordered by MD Carmelo.  The diagnosis is Pulmonary Hypertension.  The patient's BMI is 28.4kg/m2.    Test Results:    The test was not attempted (decreased SAO2 level) (RA sats 59%, patient wears 6 lpm at home.  Patient arrived to 6MWT without 02.  Placed pateint on 6lpm o2 increased to 84%,  aware, Jalyn Fierro, RN spoke with patient in [facility].).  Prior to walking, the patient reported:   (NAUSEATED).      09/22/2017     O2 Sat % Supplemental Oxygen Heart Rate Blood Pressure Fatou Scale   Pre-exercise  (Resting) 59 % Room Air 89 bpm 100/58 5-6     Performing nurse/tech: ANTIONE Leonard      CLINICAL INTERPRETATION:  Testing was not performed due to concern for patient safety.

## 2017-09-22 NOTE — ED TRIAGE NOTES
Pt transported from pulmonary clinic for low O2 sat.  Pt states she was there for a routine appt and did not have her oxygen with her.  Pt states she usually sats around 85-90%.

## 2017-09-23 PROBLEM — I50.9 ACUTE DECOMPENSATED HEART FAILURE: Status: ACTIVE | Noted: 2017-01-01

## 2017-09-23 NOTE — PROGRESS NOTES
Patient insist she stays on her home dose of veletri. Cadd pump is infusing at 64ml/24hrs. This is not listed on her current dosing sheet from [facility]. Son dropped off THREE backup cassettes, none of them labelled, unable to determine what the concentration is. Patient states son will be back tomorrow, attempting to call son now to get verification of how many vials he used per cassettee. According to [facility] nurse León, pt should not be on a 64 rate per 24 hours.  Will continue to monitor.  64ml/24 hrs= 23.40 ng/kg/min IF current concentration is 45,000 ng per ml

## 2017-09-23 NOTE — PROGRESS NOTES
"[facility]  Heart Transplant  Progress Note    Patient Name: Emily Cook  MRN: [id number]  Admission Date: 9/22/2017  Hospital Length of Stay: 1 days  Attending Physician: Piero Matos MD  Primary Care Provider: Kaylee Cazares MD  Principal Problem:Acute decompensated heart failure    Subjective:     Interval History: Feels a little better but still with abd distension.  Net neg 735mL overnight    Continuous Infusions:   furosemide (LASIX) 5 mg/mL infusion (non-titrating) 10 mg/hr (09/23/17 0919)    veletri/remodulin cassette      veletri/remodulin cassette      veletri/remodulin tubing      veletri/remodulin tubing       Scheduled Meds:   duloxetine  60 mg Oral BID    macitentan  10 mg Oral Daily    magnesium sulfate IVPB  2 g Intravenous Once    potassium chloride  10 mEq Intravenous Q1H    potassium chloride  60 mEq Oral TID    sodium chloride 0.9%  3 mL Intravenous Q8H    spironolactone  25 mg Oral Daily    warfarin  5 mg Oral Daily     PRN Meds:acetaminophen, gabapentin, hydrOXYzine pamoate, loperamide, metoclopramide HCl, ondansetron, ondansetron, ondansetron, oxycodone-acetaminophen    Review of patient's allergies indicates:   Allergen Reactions    Chlorhexidine Itching and Rash     Patient had raised rash on neck following RHC procedure. She states she's never had a problem with the "prep" used until this time.     Adhesive Rash     Objective:     Vital Signs (Most Recent):  Temp: 96.7 °F (35.9 °C) (09/23/17 1015)  Pulse: 82 (09/23/17 1015)  Resp: (!) 29 (09/23/17 1015)  BP: (!) 109/57 (09/23/17 1015)  SpO2: (!) 92 % (09/23/17 1015) Vital Signs (24h Range):  Temp:  [96.7 °F (35.9 °C)-99.3 °F (37.4 °C)] 96.7 °F (35.9 °C)  Pulse:  [] 82  Resp:  [18-37] 29  SpO2:  [75 %-96 %] 92 %  BP: ()/(51-70) 109/57     Weight: 78.9 kg (174 lb 0.9 oz)  Body mass index is 30.83 kg/m².      Intake/Output Summary (Last 24 hours) at 09/23/17 1120  Last data filed at " 09/23/17 1000   Gross per 24 hour   Intake           601.37 ml   Output             1395 ml   Net          -793.63 ml       Hemodynamic Parameters:         Physical Exam   Constitutional: She is oriented to person, place, and time. She appears well-developed and well-nourished.   Neck: Normal range of motion. Neck supple. JVD present.   Cardiovascular: Normal rate and regular rhythm.  Exam reveals no gallop and no friction rub.    No murmur heard.  Pulmonary/Chest: Effort normal and breath sounds normal. She has no wheezes. She has no rales.   On O2 via NRB mask   Abdominal: Soft. Bowel sounds are normal. There is no tenderness.   Musculoskeletal: She exhibits no edema.   Neurological: She is alert and oriented to person, place, and time.   Skin: Skin is warm and dry.       Significant Labs:  CBC:    Recent Labs  Lab 09/22/17  1446   WBC 3.92   RBC 3.73*   HGB 9.2*   HCT 28.2*      MCV 76*   MCH 24.7*   MCHC 32.6     BNP:    Recent Labs  Lab 09/22/17  1446   BNP 1,492*     CMP:    Recent Labs  Lab 09/22/17  1446 09/23/17  0513   GLU 94 95   CALCIUM 8.5* 8.3*   ALBUMIN 2.9*  --    PROT 7.1  --     143   K 2.9* 2.9*   CO2 23 30*    105   BUN 18 16   CREATININE 1.4 1.4   ALKPHOS 112  --    ALT 10  --    AST 15  --    BILITOT 0.6  --       Coagulation:     Recent Labs  Lab 09/23/17  0554   INR 2.8*     LDH:  No results for input(s): LDH in the last 72 hours.  Microbiology:  Microbiology Results (last 7 days)     Procedure Component Value Units Date/Time    Urine culture [[id number]] Collected:  09/23/17 0938    Order Status:  Sent Specimen:  Urine from Urine, Clean Catch Updated:  09/23/17 1001    Blood culture [[id number]] Collected:  09/23/17 0859    Order Status:  Sent Specimen:  Blood Updated:  09/23/17 0956    Blood culture [[id number]] Collected:  09/23/17 0859    Order Status:  Sent Specimen:  Blood Updated:  09/23/17 0956          I have reviewed all pertinent labs within the past 24  hours.    Estimated Creatinine Clearance: 44.1 mL/min (based on SCr of 1.4 mg/dL).    Diagnostic Results:  I have reviewed and interpreted all pertinent imaging results/findings within the past 24 hours.    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 Currently on 15 L O2 via NRB. Will wean with diuresis           Moderate to severe pulmonary hypertension    -WHO group 1  -Last ECHO with Severely reduced RV function and RVSP 96  -On 5L O2 at home  - Cont IV Veletri (pt insists that son brings home med and doses while she is here). Continue Macitentan  - INR therapeutic. Hold coumadin in case of need for RHC next week          Hypokalemia    Chronic issue   -Replace K  - Continue spironolactone        Acute on chronic respiratory failure with hypoxia    2/2 ADHF as above             Elena Raygoza PA-C  Heart Transplant  [facility]

## 2017-09-23 NOTE — SUBJECTIVE & OBJECTIVE
"Interval History: Feels a little better but still with abd distension.  Net neg 735mL overnight    Continuous Infusions:   furosemide (LASIX) 5 mg/mL infusion (non-titrating) 10 mg/hr (09/23/17 0919)    veletri/remodulin cassette      veletri/remodulin cassette      veletri/remodulin tubing      veletri/remodulin tubing       Scheduled Meds:   duloxetine  60 mg Oral BID    macitentan  10 mg Oral Daily    magnesium sulfate IVPB  2 g Intravenous Once    potassium chloride  10 mEq Intravenous Q1H    potassium chloride  60 mEq Oral TID    sodium chloride 0.9%  3 mL Intravenous Q8H    spironolactone  25 mg Oral Daily    warfarin  5 mg Oral Daily     PRN Meds:acetaminophen, gabapentin, hydrOXYzine pamoate, loperamide, metoclopramide HCl, ondansetron, ondansetron, ondansetron, oxycodone-acetaminophen    Review of patient's allergies indicates:   Allergen Reactions    Chlorhexidine Itching and Rash     Patient had raised rash on neck following RHC procedure. She states she's never had a problem with the "prep" used until this time.     Adhesive Rash     Objective:     Vital Signs (Most Recent):  Temp: 96.7 °F (35.9 °C) (09/23/17 1015)  Pulse: 82 (09/23/17 1015)  Resp: (!) 29 (09/23/17 1015)  BP: (!) 109/57 (09/23/17 1015)  SpO2: (!) 92 % (09/23/17 1015) Vital Signs (24h Range):  Temp:  [96.7 °F (35.9 °C)-99.3 °F (37.4 °C)] 96.7 °F (35.9 °C)  Pulse:  [] 82  Resp:  [18-37] 29  SpO2:  [75 %-96 %] 92 %  BP: ()/(51-70) 109/57     Weight: 78.9 kg (174 lb 0.9 oz)  Body mass index is 30.83 kg/m².      Intake/Output Summary (Last 24 hours) at 09/23/17 1124  Last data filed at 09/23/17 1000   Gross per 24 hour   Intake           601.37 ml   Output             1395 ml   Net          -793.63 ml       Hemodynamic Parameters:         Physical Exam   Constitutional: She is oriented to person, place, and time. She appears well-developed and well-nourished.   Neck: Normal range of motion. Neck supple. JVD present. "   Cardiovascular: Normal rate and regular rhythm.  Exam reveals no gallop and no friction rub.    No murmur heard.  Pulmonary/Chest: Effort normal and breath sounds normal. She has no wheezes. She has no rales.   On O2 via NRB mask   Abdominal: Soft. Bowel sounds are normal. There is no tenderness.   Musculoskeletal: She exhibits no edema.   Neurological: She is alert and oriented to person, place, and time.   Skin: Skin is warm and dry.       Significant Labs:  CBC:    Recent Labs  Lab 09/22/17  1446   WBC 3.92   RBC 3.73*   HGB 9.2*   HCT 28.2*      MCV 76*   MCH 24.7*   MCHC 32.6     BNP:    Recent Labs  Lab 09/22/17  1446   BNP 1,492*     CMP:    Recent Labs  Lab 09/22/17  1446 09/23/17  0513   GLU 94 95   CALCIUM 8.5* 8.3*   ALBUMIN 2.9*  --    PROT 7.1  --     143   K 2.9* 2.9*   CO2 23 30*    105   BUN 18 16   CREATININE 1.4 1.4   ALKPHOS 112  --    ALT 10  --    AST 15  --    BILITOT 0.6  --       Coagulation:     Recent Labs  Lab 09/23/17  0554   INR 2.8*     LDH:  No results for input(s): LDH in the last 72 hours.  Microbiology:  Microbiology Results (last 7 days)     Procedure Component Value Units Date/Time    Urine culture [[id number]] Collected:  09/23/17 0938    Order Status:  Sent Specimen:  Urine from Urine, Clean Catch Updated:  09/23/17 1001    Blood culture [[id number]] Collected:  09/23/17 0859    Order Status:  Sent Specimen:  Blood Updated:  09/23/17 0956    Blood culture [[id number]] Collected:  09/23/17 0859    Order Status:  Sent Specimen:  Blood Updated:  09/23/17 0956          I have reviewed all pertinent labs within the past 24 hours.    Estimated Creatinine Clearance: 44.1 mL/min (based on SCr of 1.4 mg/dL).    Diagnostic Results:  I have reviewed and interpreted all pertinent imaging results/findings within the past 24 hours.

## 2017-09-23 NOTE — ASSESSMENT & PLAN NOTE
-WHO group 1  -Last ECHO with Severely reduced RV function and RVSP 96  -On 5L O2 at home  - Cont IV Veletri (pt insists that son brings home med and doses while she is here). Continue Macitentan  - INR therapeutic. Hold coumadin in case of need for RHC next week

## 2017-09-23 NOTE — PROGRESS NOTES
Attempted to wean pt off 15 L O2 via non re breather mask to 10 L O2 High Flow NC. Pt not sustaining O2 sats above 90; no complaints of SOB. MD Rodriguez notifed. MD came to bedside. Ordered to put pt back on non re breather at 15 L O2. Orders to be carried out. Will continue to monitor.

## 2017-09-23 NOTE — PROGRESS NOTES
Pt is being transferred to room 1052. Pt informed. Report given to CONCETTA Carrizales. Waiting for a medication Macitentan from pharmacy. Sent a msg again to send the medication to room 1052. Pt  Denies any questions or concerns at this time. Will transfer the patient shortly.

## 2017-09-23 NOTE — ASSESSMENT & PLAN NOTE
WHO group 1, last RHC above   ECHO with Severely reduced RV function and RVSP 96  On 5L O2 at home as well as Coumadin, Macitentan, and Veletri   - Continue Macitentan   - Will get pharmacy consult to dose Veletri   - Will get INR and continue Coumadin

## 2017-09-23 NOTE — PROGRESS NOTES
Pt K+ 2.9. MD notified and stated that he will put in orders to replace it. Orders to be carried out. Will continue to monitor.

## 2017-09-23 NOTE — H&P
"[facility]  Heart Transplant  H&P    Patient Name: Emily Cook  MRN: [id number]  Admission Date: 9/22/2017  Attending Physician: Chidi Lino MD  Primary Care Provider: Kaylee Cazares MD  Principal Problem:Acute decompensated heart failure    Subjective:     History of Present Illness:  57 y.o. female with hx of pulmonary HTN WHO group 1 with 5L O2 at home on Macitentan, Veletri and Coumadin who was recently admitted 2 weeks ago for MRSA Bacteremia 2/2 PNA and ADHF. She was diuresed and discharged with vanc which she finished 2 days ago. She presented to the ED with shortness of breath and hypoxia from her Dr. Lovet today. Not been taking her bumex at home over the past several days d/t "laziness" and noticed that she had leg swelling yesterday so she did take a dose then. This morning she felt fine but then started to develop shortness of breath with ambulation. Denies any chest pain, palpitations, but has had nausea. On arrival to ED BP in 100s systolic, hypoxia of 84%, Pulse 85 on exam he had JVD to jaw and bilateral LE edema. EKG with right axis deviation, CXR with enlarged pulm vessels but no edema noted. BNP 1600 was 340 2 weeks ago.       Past Medical History:   Diagnosis Date    Arrhythmia     Arthritis     Cardiac pacemaker in situ     Carpal tunnel syndrome, right     Cervical spondylosis     Cervicalgia     CHF (congestive heart failure)     Diverticulitis     Heart block AV third degree     Hyperlipidemia     ALFREDO on CPAP     Pulmonary hypertension     Subdeltoid bursitis        Past Surgical History:   Procedure Laterality Date    CARDIAC CATHETERIZATION      CARDIAC PACEMAKER PLACEMENT  7/29/13    [employer] [place] PM    CARDIAC SURGERY      COLONOSCOPY N/A 9/28/2015    Procedure: COLONOSCOPY;  Surgeon: Jason Diaz MD;  Location: [facility] ([address] [place] [address]);  Service: Endoscopy;  Laterality: N/A;  Please schedule in 2-3 months with Dr Diaz  Pulmonary " "HTN, 2nd floor (off remodulin infusion as of "a few days" before 9/9/15)    3 day [facility]  PT/INR scheduled before procedure    ECTOPIC PREGNANCY SURGERY Left     HYSTERECTOMY      partial    knee arthroscopy         Review of patient's allergies indicates:   Allergen Reactions    Chlorhexidine Itching and Rash     Patient had raised rash on neck following RHC procedure. She states she's never had a problem with the "prep" used until this time.     Adhesive Rash       Current Facility-Administered Medications   Medication    duloxetine DR capsule 60 mg    [START ON 9/23/2017] furosemide injection 80 mg    gabapentin capsule 300 mg    hydrOXYzine pamoate capsule 50 mg    [START ON 9/23/2017] macitentan tablet 10 mg    potassium chloride SA CR tablet 40 mEq    sodium chloride 0.9% flush 3 mL    [START ON 9/23/2017] spironolactone tablet 25 mg    [START ON 9/23/2017] warfarin (COUMADIN) tablet 5 mg     Current Outpatient Prescriptions   Medication Sig    bumetanide (BUMEX) 2 MG tablet Take 1 tablet (2 mg total) by mouth 2 (two) times daily.    clobetasol (TEMOVATE) 0.05 % external solution     duloxetine (CYMBALTA) 60 MG capsule Take 1 capsule (60 mg total) by mouth 2 (two) times daily.    EPOPROSTENOL SODIUM, ARGININE, (VELETRI IV) Inject into the vein.    gabapentin (NEURONTIN) 300 MG capsule Take 1 capsule (300 mg total) by mouth As instructed. Take one capsule qam, 1 qpm, 2 qhs.    hydrocodone-acetaminophen 10-325mg (NORCO)  mg Tab Take 1 tablet by mouth 3 (three) times daily as needed.    hydrOXYzine (VISTARIL) 50 MG Cap 50 mg daily as needed.     macitentan (OPSUMIT) 10 mg Tab Take 1 tablet (10 mg total) by mouth once daily.    magnesium oxide (MAG-OX) 400 mg tablet Take 1 tablet (400 mg total) by mouth 2 (two) times daily.    PATADAY 0.2 % Drop     potassium chloride (MICRO-K) 10 MEQ CpSR Take 2 capsules (20 mEq total) by mouth 3 (three) times daily.    " sodium chloride 0.9% 0.9 % SolP 100 mL with epoprostenol arginine 0.5 mg SolR 6,000,000 ng Inject 2,064 ng/min into the vein continuous.    spironolactone (ALDACTONE) 25 MG tablet Take 1 tablet (25 mg total) by mouth once daily.    warfarin (COUMADIN) 5 MG tablet Take 1 tablet (5 mg total) by mouth Daily.     Family History     Problem Relation (Age of Onset)    Arthritis Mother, Father    Diabetes Mother    Hyperlipidemia Mother, Father        Social History Main Topics    Smoking status: Never Smoker    Smokeless tobacco: Never Used    Alcohol use No      Comment: rarely    Drug use: No    Sexual activity: No     Review of Systems   Constitutional: Positive for activity change. Negative for fatigue.   HENT: Negative for congestion.    Eyes: Negative for visual disturbance.   Respiratory: Positive for shortness of breath. Negative for chest tightness.    Cardiovascular: Positive for leg swelling. Negative for chest pain and palpitations.   Gastrointestinal: Positive for nausea. Negative for abdominal distention, abdominal pain and vomiting.   Musculoskeletal: Negative for arthralgias.   Neurological: Negative for dizziness and light-headedness.     Objective:     Vital Signs (Most Recent):  Temp: 99.3 °F (37.4 °C) (09/22/17 1411)  Pulse: 80 (09/22/17 1731)  Resp: (!) 21 (09/22/17 1631)  BP: 114/70 (09/22/17 1731)  SpO2: 96 % (09/22/17 1731) Vital Signs (24h Range):  Temp:  [99.3 °F (37.4 °C)] 99.3 °F (37.4 °C)  Pulse:  [78-85] 80  Resp:  [18-21] 21  SpO2:  [84 %-96 %] 96 %  BP: (100-114)/(55-70) 114/70     Weight: 74.8 kg (165 lb)  Body mass index is 29.23 kg/m².      Intake/Output Summary (Last 24 hours) at 09/22/17 1852  Last data filed at 09/22/17 1748   Gross per 24 hour   Intake                0 ml   Output              150 ml   Net             -150 ml       Physical Exam   Constitutional: She is oriented to person, place, and time. She appears well-developed and well-nourished.   HENT:   Head:  Normocephalic and atraumatic.   Neck: JVD (to jaw better appreciated on the left side. ) present.   Cardiovascular: Normal rate, regular rhythm and normal heart sounds.  Exam reveals no friction rub.    No murmur heard.  Pulmonary/Chest: Effort normal. She has wheezes. She has no rales.   Abdominal: Soft. Bowel sounds are normal.   Musculoskeletal: She exhibits edema (2+ to mid shins ).   Neurological: She is alert and oriented to person, place, and time.   Skin: Skin is warm and dry.       Significant Labs:  CBC:    Recent Labs  Lab 09/22/17  1446   WBC 3.92   RBC 3.73*   HGB 9.2*   HCT 28.2*      MCV 76*   MCH 24.7*   MCHC 32.6     BNP:    Recent Labs  Lab 09/22/17  1446   BNP 1,492*     CMP:    Recent Labs  Lab 09/22/17  1446   GLU 94   CALCIUM 8.5*   ALBUMIN 2.9*   PROT 7.1      K 2.9*   CO2 23      BUN 18   CREATININE 1.4   ALKPHOS 112   ALT 10   AST 15   BILITOT 0.6      Coagulation:     Recent Labs  Lab 09/22/17  1446   INR 2.5*     LDH:  No results for input(s): LDH in the last 72 hours.  Microbiology:  Microbiology Results (last 7 days)     ** No results found for the last 168 hours. **          I have reviewed all pertinent labs within the past 24 hours.    Diagnostic Results:  I have reviewed and interpreted all pertinent imaging results/findings within the past 24 hours.      RHC 06/01/2017  Summary/Post-Operative Diagnosis       Severe Pulmonary Hypertension.    RHC demonstrates increased right and left-sided filling pressures.    Severely decreased CI by Eliu Method indicative of Low output state.    Compared to prior RHC of  10/16 ,   no significant change in PAP, CO is alittle lower and filling pressures are higher today.    D. Hemodynamic Results  Condition 1:  AOPRES: 102/68 (79)  AOSAT: 95  FICKCI: 1.81  FICKCO: 3.44  PAPRES: 90/38 (57)  PASAT: 58  PVR: 12.21  PWPRES: 18/19 (15)  RAPRES: 18/15 (13)  RVPRES: 103/6, 20    Echo 8/30  Technical Quality: This is a technically  adequate study.     General: A catheter is present in the right-sided cardiac chambers.     Aorta: The aortic root is normal in size, measuring 2.6 cm at sinotubular junction and 3.2 cm at Sinuses of Valsalva. The proximal ascending aorta is normal in size, measuring 2.7 cm across.     Left Atrium: The left atrial volume index is normal, measuring 20.54 cc/m2.     Left Ventricle: The left ventricle is normal in size, with an end-diastolic diameter of 4.0 cm, and an end-systolic diameter of 2.9 cm. LV wall thickness is normal, with the septum and the posterior wall each measuring 0.8 cm across. Relative wall   thickness was normal at 0.40, and the LV mass index was 55.4 g/m2 consistent with normal left ventricular mass. There are no regional wall motion abnormalities. Left ventricular systolic function appears normal. Visually estimated ejection fraction is   60-65%. The LV Doppler derived stroke volume equals 60.0 ccs.     Diastolic indices: E wave velocity 1.0 m/s, E/A ratio 0.9,  msec., E/e' ratio(avg) 11. Diastolic function is normal.     Right Atrium: The right atrium is enlarged, measuring 5.8 cm in length and 4.4 cm in width in the apical view.     Right Ventricle: The right ventricle is enlarged measuring 5.0 cm at the base in the apical right ventricle-focused view. Global right ventricular systolic function appears severely depressed. There is abnormal septal motion consistent with RV   pressure/volume overload. Global longitudinal speckle-derived RV 2D strain is -7%. RV fractional area change (FAC) is 20%. Tissue Doppler-derived tricuspid annular peak systolic velocity (S prime) is 9.0 cm/s. The estimated PA systolic pressure is 96   mmHg.     Aortic Valve:  The aortic valve is normal in structure.     Mitral Valve:  The mitral valve is normal in structure.     Tricuspid Valve:  The tricuspid valve is normal in structure. There is moderate tricuspid regurgitation.     Pulmonary Valve:  The pulmonic  valve is normal in structure. There is mild pulmonic regurgitation.     Pericardium: There is evidence of a trivial posterior pericardial effusion.     IVC: IVC is enlarged and collapses < 50% with a sniff, suggesting high right atrial pressure of 15 mmHg.     Atrial Septum: The atrial septum is bulging to the left.     Intracavitary: There is no evidence of intracavity mass, thrombi, or vegetation.     CONCLUSIONS     1 - Right ventricular enlargement with severely depressed systolic function.     2 - Right atrial enlargement.     3 - Moderate tricuspid regurgitation.     4 - Pulmonary hypertension. The estimated PA systolic pressure is 96 mmHg.     5 - Increased central venous pressure.     6 - Normal left ventricular systolic function (EF 60-65%); reduced SVI.     7 - Trivial pericardial effusion.     Assessment/Plan:     57 y.o. female with hx of pulmonary HTN WHO group 1 with 5L O2 at home on Macitentan, Veletri and Coumadin who was recently admitted 2 weeks ago for MRSA Bacteremia 2/2 PNA and ADHF. Diuresed and d/c'd with vanc(completed 9/20). Presented to the ED from Dr. gipson with SOB and hypoxia. Nonadherent with bumex. On arrival to ED BP in 100s systolic, hypoxia of 84%, + JVD and B/L LE edema. BNP 1600 was 340 2 weeks ago.       * Acute decompensated heart failure    Likely 2/2 to non adherence not been taking bumex at home.  Severely reduced RV function with enlargement on echo 8/31so there may be ventricular interdependence.  Given 80 of lasix in the ED   - Will start on lasix 80 IV BID monitor urine output with low threshold to start lasix gtt if not diuresing.  - Will need to keep a close eye on potassium as she is chronically low.   - Strict I/Os  - Counseled on compliance   - Continue O2   - Cardiac diet         Moderate to severe pulmonary hypertension    WHO group 1, last RHC above   ECHO with Severely reduced RV function and RVSP 96  On 5L O2 at home as well as Coumadin, Macitentan, and Veletri    - Continue Macitentan   - Will get pharmacy consult to dose Veletri   - Will get INR and continue Coumadin           Hypokalemia    Chronic issue   - F/U BMP   - Will start on 40 Kcl   - Continue spironolactone        Acute on chronic respiratory failure with hypoxia    2/2 ADHF as above             Jacinda Fernandes MD  Heart Transplant  [facility]

## 2017-09-23 NOTE — PROGRESS NOTES
Pt complaining of nausea and currently vomiting clear with some yellow fluid at the bedside. MD Rodriguez notified. MD came to bedside; ordered to give PRN zofran IV. Orders to be carried out. Will continue to monitor.

## 2017-09-23 NOTE — PLAN OF CARE
Problem: Patient Care Overview  Goal: Plan of Care Review  Outcome: Ongoing (interventions implemented as appropriate)  Pt is aaox3. Pt arrived to unit at 1040 showing no signs of distress. Pt is on a nonrebreather on 12.5L sating 88%-94%- pt denies and SOB. Pt uses her home daryl cassette and changes it at 4pm daily. Daryl is going at 64cc/24hrs to her left CW port.  Back up cassette in refrigerator. Mg 1.6- 1 magnesium sulfate given. K+2.9- 2 potassium chloride riders given. Tele on pt- NSR 70s-80s. Son at bedside. Bed locked and lowered to lowest position. Non skid socks on while out of bed. Will continue to monitor.

## 2017-09-23 NOTE — PLAN OF CARE
Problem: Patient Care Overview  Goal: Plan of Care Review  Outcome: Ongoing (interventions implemented as appropriate)  Patient complained of nausea and started to vomit at the bedside; nausea relieved with PRN meds; denies chest pain, SOB, or other discomfort. Pt has home Veletrie IV pump infusing at bedside, MD aware. Pt remains free of falls or injuries. Pt verbalizes complete understanding of plan of care. Will continue to monitor.

## 2017-09-23 NOTE — PROGRESS NOTES
This nurse asked the pt's son to mix up 1 daryl cassette but instead the son mixed up 3. All 3 cassettes are in refrigerator with pt sticker. Daryl checked off with 2 nurses, myself and CONCETTA Aldridge. Will address daryl concentration with son tomorrow when he arrives to the unit.

## 2017-09-23 NOTE — PROGRESS NOTES
Central line dressing changed. Site assessed, no erythema, swelling or pain noted.  Pt tolerated well. Will continue to monitor.

## 2017-09-23 NOTE — NURSING
Pt arrived on the floor via stretcher, on 16 L O2 via Non-re breather mask. Belongings at bedside. VSS. Bed in lowest locked position, call bell in reach, non skid socks on, bedside commode at bedside. Pt understands to press call light when she needs to get out of me . Will continue to monitor.

## 2017-09-23 NOTE — PROGRESS NOTES
Pt has home Veletri cassette going to single lumen picc. Pt stated that she manages this at home with the help of her son. Pt also stated that she has enough medicine to last her until tomorrow and that her son will bring in the medicine from home tomorrow. MD Rodriguez notified and stated that he is okay with keeping her on her home pump and to make sure that she doesn't run out of medicine and that her son brings in new bag of medicine tomorrow. Orders to be carried out. Will continue to monitor.

## 2017-09-23 NOTE — ASSESSMENT & PLAN NOTE
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 Currently on 15 L O2 via NRB. Will wean with diuresis

## 2017-09-24 PROBLEM — E87.6 HYPOKALEMIA: Status: RESOLVED | Noted: 2017-01-01 | Resolved: 2017-01-01

## 2017-09-24 NOTE — PROGRESS NOTES
O2 dropped to 81%. Pt reports just moving her arms to adjust the covers. Will continue to monitor.

## 2017-09-24 NOTE — SUBJECTIVE & OBJECTIVE
"Interval History: Feeling better today. Now on HFNC.  Net neg 2.6L overnight    Continuous Infusions:   furosemide (LASIX) 5 mg/mL infusion (non-titrating) 10 mg/hr (09/23/17 0919)    veletri/remodulin cassette      veletri/remodulin tubing       Scheduled Meds:   duloxetine  60 mg Oral BID    macitentan  10 mg Oral Daily    magnesium sulfate IVPB  2 g Intravenous Once    potassium chloride  60 mEq Oral TID    sodium chloride 0.9%  3 mL Intravenous Q8H    spironolactone  25 mg Oral Daily     PRN Meds:acetaminophen, gabapentin, hydrOXYzine pamoate, loperamide, metoclopramide HCl, ondansetron, ondansetron, ondansetron, oxycodone-acetaminophen    Review of patient's allergies indicates:   Allergen Reactions    Chlorhexidine Itching and Rash     Patient had raised rash on neck following RHC procedure. She states she's never had a problem with the "prep" used until this time.     Adhesive Rash     Objective:     Vital Signs (Most Recent):  Temp: 98.3 °F (36.8 °C) (09/24/17 0730)  Pulse: 88 (09/24/17 0900)  Resp: (!) 22 (09/24/17 0730)  BP: 107/63 (09/24/17 0730)  SpO2: (!) 92 % (09/24/17 0730) Vital Signs (24h Range):  Temp:  [96.3 °F (35.7 °C)-98.4 °F (36.9 °C)] 98.3 °F (36.8 °C)  Pulse:  [80-94] 88  Resp:  [17-29] 22  SpO2:  [77 %-99 %] 92 %  BP: ()/(48-64) 107/63     Weight: 80.2 kg (176 lb 11.2 oz)  Body mass index is 31.3 kg/m².      Intake/Output Summary (Last 24 hours) at 09/24/17 1008  Last data filed at 09/24/17 0700   Gross per 24 hour   Intake              453 ml   Output             3050 ml   Net            -2597 ml       Hemodynamic Parameters:         Physical Exam   Constitutional: She is oriented to person, place, and time. She appears well-developed and well-nourished.   Neck: Normal range of motion. Neck supple. JVD (but appears to be occluded on both sides) present.   Cardiovascular: Normal rate and regular rhythm.  Exam reveals no gallop and no friction rub.    Murmur " heard.  Pulmonary/Chest: Effort normal. She has decreased breath sounds in the right lower field and the left lower field. She has no wheezes. She has no rales.   On O2 via HFNC   Abdominal: Soft. Bowel sounds are normal. There is no tenderness.   Musculoskeletal: She exhibits no edema.   Neurological: She is alert and oriented to person, place, and time.   Skin: Skin is warm and dry.       Significant Labs:  CBC:    Recent Labs  Lab 09/24/17 0443   WBC 4.60   RBC 3.76*   HGB 9.2*   HCT 28.9*      MCV 77*   MCH 24.5*   MCHC 31.8*     BNP:    Recent Labs  Lab 09/22/17  1446   BNP 1,492*     CMP:    Recent Labs  Lab 09/22/17 1446  09/24/17 0443   GLU 94  < > 98   CALCIUM 8.5*  < > 8.7   ALBUMIN 2.9*  --   --    PROT 7.1  --   --      < > 141   K 2.9*  < > 4.1   CO2 23  < > 27     < > 103   BUN 18  < > 16   CREATININE 1.4  < > 1.3   ALKPHOS 112  --   --    ALT 10  --   --    AST 15  --   --    BILITOT 0.6  --   --    < > = values in this interval not displayed.   Coagulation:     Recent Labs  Lab 09/24/17 0443   INR 3.2*     LDH:  No results for input(s): LDH in the last 72 hours.  Microbiology:  Microbiology Results (last 7 days)     Procedure Component Value Units Date/Time    Urine culture [[id number]] Collected:  09/23/17 0938    Order Status:  Completed Specimen:  Urine from Urine, Clean Catch Updated:  09/24/17 0655     Urine Culture, Routine --     PRESUMPTIVE PSEUDOMONAS SPECIES  50,000 - 99,999 cfu/ml  Identification and susceptibility pending      Blood culture [[id number]] Collected:  09/23/17 0859    Order Status:  Completed Specimen:  Blood Updated:  09/23/17 1715     Blood Culture, Routine No Growth to date    Blood culture [[id number]] Collected:  09/23/17 0859    Order Status:  Completed Specimen:  Blood Updated:  09/23/17 1715     Blood Culture, Routine No Growth to date          I have reviewed all pertinent labs within the past 24 hours.    Estimated Creatinine Clearance: 47.9  mL/min (based on SCr of 1.3 mg/dL).    Diagnostic Results:  I have reviewed and interpreted all pertinent imaging results/findings within the past 24 hours.

## 2017-09-24 NOTE — PROGRESS NOTES
Changed jose cassette at 1545. 45,000ng/kg/ml; 63cc/24hr; dosing at 23ng/kg/min. Witness by CONCETTA Aldridge.

## 2017-09-24 NOTE — ASSESSMENT & PLAN NOTE
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 Net neg 2.6L in 24 hours.  -On O2 5L via NC at home. Currently HFNC. Will wean with diuresis

## 2017-09-24 NOTE — PROGRESS NOTES
Pt requiring more O2. Taking increased time to rebound. Stats 81 to 94 on comfort flow(see flow sheet for details) Mentation is WNL. Blood Gas sample was not obtained. Pt reports that she has  Cpap at home but can not tolerate the mask MD aware. Will continue to monitor.

## 2017-09-24 NOTE — PLAN OF CARE
Problem: Patient Care Overview  Goal: Plan of Care Review  Outcome: Ongoing (interventions implemented as appropriate)  Pt is aaox3.Pt was on 22L comfort flow NC sating 92%- MD wants pt  >88%. Pt weaned down to 20L pt sating 91%. Will continue to wean. Home daryl casseette will be changed this afternoon at 1530. Daryl concentration still unverified by son. Pt's metz catheter is to gravity- UOP >1L. Pt's magnesium 1.8- one dose of magnesium sulfate given. Pt's appetite is good. Pt has been in the bed all day due to SOB on exertion. Pt is able to shift weight in bed. No signs of skin breakdown present. VSS. Call bell within reach. Non skid socks on. Bed locked and lowered to lowest position. Will continue to monitor.

## 2017-09-24 NOTE — NURSING
Notified Charge Nurse HYACINTH Diaz RN of vital signs and patient status. Notified MD on call of current oxygen saturation and blood pressure on room air and slow increase in oxygen saturation after re-application of NRB. MD to come to bedside. Will monitor.

## 2017-09-24 NOTE — PROGRESS NOTES
Called by Nurse to assess patient who was desaturating. She had removed her non re-breather mask for a period of time. Sats were 88% and RR was at 29. She was AAOx3 and expressing no chest pain, cough or fever.    Exam:    Chest- bilateral bibasilar crackles. No wheezes  CV- RRR    Labs-     Ref Range & Units 09/22/17 1446   WBC 3.90 - 12.70 K/uL 3.92    RBC 4.00 - 5.40 M/uL 3.73     Hemoglobin 12.0 - 16.0 g/dL 9.2     Hematocrit 37.0 - 48.5 % 28.2       INR- 2.8     Ref Range & Units 09/23/17 1842 09/23/17 1053 09/23/17 0513   Sodium 136 - 145 mmol/L 142  143  143    Potassium 3.5 - 5.1 mmol/L 3.8  3.1   2.9     Chloride 95 - 110 mmol/L 103  103  105    CO2 23 - 29 mmol/L 30   28  30     Glucose 70 - 110 mg/dL 121   134   95      A/P    1. Acute hypoxia- secondary to CHF and PAHTN.   - change oxygen to high flow and high concentration via nasal mask.  - Keep sats > 91%  - If BP does not improve ? Start dobutamine. Will discuss with Dr. Matos if necessary tonight.    Addendum- patient desaturating on walking to the bathroom. In mild distress. Advised to place metz. Please d/c metz soonest possible once sats improves with rx

## 2017-09-24 NOTE — PROGRESS NOTES
"[facility]  Heart Transplant  Progress Note    Patient Name: Emily Cook  MRN: [id number]  Admission Date: 9/22/2017  Hospital Length of Stay: 2 days  Attending Physician: Piero Matos MD  Primary Care Provider: Kaylee Cazares MD  Principal Problem:Acute decompensated heart failure    Subjective:     Interval History: Feeling better today. Now on HFNC.  Net neg 2.6L overnight    Continuous Infusions:   furosemide (LASIX) 5 mg/mL infusion (non-titrating) 10 mg/hr (09/23/17 0919)    veletri/remodulin cassette      veletri/remodulin tubing       Scheduled Meds:   duloxetine  60 mg Oral BID    macitentan  10 mg Oral Daily    magnesium sulfate IVPB  2 g Intravenous Once    potassium chloride  60 mEq Oral TID    sodium chloride 0.9%  3 mL Intravenous Q8H    spironolactone  25 mg Oral Daily     PRN Meds:acetaminophen, gabapentin, hydrOXYzine pamoate, loperamide, metoclopramide HCl, ondansetron, ondansetron, ondansetron, oxycodone-acetaminophen    Review of patient's allergies indicates:   Allergen Reactions    Chlorhexidine Itching and Rash     Patient had raised rash on neck following RHC procedure. She states she's never had a problem with the "prep" used until this time.     Adhesive Rash     Objective:     Vital Signs (Most Recent):  Temp: 98.3 °F (36.8 °C) (09/24/17 0730)  Pulse: 88 (09/24/17 0900)  Resp: (!) 22 (09/24/17 0730)  BP: 107/63 (09/24/17 0730)  SpO2: (!) 92 % (09/24/17 0730) Vital Signs (24h Range):  Temp:  [96.3 °F (35.7 °C)-98.4 °F (36.9 °C)] 98.3 °F (36.8 °C)  Pulse:  [80-94] 88  Resp:  [17-29] 22  SpO2:  [77 %-99 %] 92 %  BP: ()/(48-64) 107/63     Weight: 80.2 kg (176 lb 11.2 oz)  Body mass index is 31.3 kg/m².      Intake/Output Summary (Last 24 hours) at 09/24/17 1008  Last data filed at 09/24/17 0700   Gross per 24 hour   Intake              453 ml   Output             3050 ml   Net            -2597 ml       Hemodynamic Parameters:         Physical " Exam   Constitutional: She is oriented to person, place, and time. She appears well-developed and well-nourished.   Neck: Normal range of motion. Neck supple. JVD (but appears to be occluded on both sides) present.   Cardiovascular: Normal rate and regular rhythm.  Exam reveals no gallop and no friction rub.    Murmur heard.  Pulmonary/Chest: Effort normal. She has decreased breath sounds in the right lower field and the left lower field. She has no wheezes. She has no rales.   On O2 via HFNC   Abdominal: Soft. Bowel sounds are normal. There is no tenderness.   Musculoskeletal: She exhibits no edema.   Neurological: She is alert and oriented to person, place, and time.   Skin: Skin is warm and dry.       Significant Labs:  CBC:    Recent Labs  Lab 09/24/17 0443   WBC 4.60   RBC 3.76*   HGB 9.2*   HCT 28.9*      MCV 77*   MCH 24.5*   MCHC 31.8*     BNP:    Recent Labs  Lab 09/22/17  1446   BNP 1,492*     CMP:    Recent Labs  Lab 09/22/17  1446  09/24/17 0443   GLU 94  < > 98   CALCIUM 8.5*  < > 8.7   ALBUMIN 2.9*  --   --    PROT 7.1  --   --      < > 141   K 2.9*  < > 4.1   CO2 23  < > 27     < > 103   BUN 18  < > 16   CREATININE 1.4  < > 1.3   ALKPHOS 112  --   --    ALT 10  --   --    AST 15  --   --    BILITOT 0.6  --   --    < > = values in this interval not displayed.   Coagulation:     Recent Labs  Lab 09/24/17 0443   INR 3.2*     LDH:  No results for input(s): LDH in the last 72 hours.  Microbiology:  Microbiology Results (last 7 days)     Procedure Component Value Units Date/Time    Urine culture [[id number]] Collected:  09/23/17 0938    Order Status:  Completed Specimen:  Urine from Urine, Clean Catch Updated:  09/24/17 0655     Urine Culture, Routine --     PRESUMPTIVE PSEUDOMONAS SPECIES  50,000 - 99,999 cfu/ml  Identification and susceptibility pending      Blood culture [[id number]] Collected:  09/23/17 0859    Order Status:  Completed Specimen:  Blood Updated:  09/23/17 2191      Blood Culture, Routine No Growth to date    Blood culture [[id number]] Collected:  09/23/17 0859    Order Status:  Completed Specimen:  Blood Updated:  09/23/17 3103     Blood Culture, Routine No Growth to date          I have reviewed all pertinent labs within the past 24 hours.    Estimated Creatinine Clearance: 47.9 mL/min (based on SCr of 1.3 mg/dL).    Diagnostic Results:  I have reviewed and interpreted all pertinent imaging results/findings within the past 24 hours.    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 Net neg 2.6L in 24 hours.  -On O2 5L via NC at home. Currently HFNC. Will wean with diuresis           Moderate to severe pulmonary hypertension    -WHO group 1  -Last ECHO with Severely reduced RV function and RVSP 96  -On 5L O2 at home  - Cont IV Veletri (pt insists that son brings home med and doses while she is here). Continue Macitentan. Consider RHC next week once euvolemmic  - INR supratherapeutic. Hold coumadin in case of need for RHC next week          Acute on chronic respiratory failure with hypoxia    2/2 ADHF as above             Elena Raygoza PA-C  Heart Transplant  [facility]

## 2017-09-24 NOTE — ASSESSMENT & PLAN NOTE
-WHO group 1  -Last ECHO with Severely reduced RV function and RVSP 96  -On 5L O2 at home  - Cont IV Veletri (pt insists that son brings home med and doses while she is here). Continue Macitentan. Consider RHC next week once euvolemmic  - INR supratherapeutic. Hold coumadin in case of need for RHC next week

## 2017-09-25 NOTE — PROGRESS NOTES
Admit Note     Met with patient to assess needs. Patient is a 57 y.o.  female, admitted for Pulmonary HTN, with Veletri IV at home.      Patient admitted from emergency on 9/22/2017 .  At this time, patient presents as alert and oriented x 4, pleasant, good eye contact and calm.  At this time, patients caregiver is not in attendance.    Household/Family Systems     Patient resides with patient's son, at     [address] [id number].      Support system includes adult sons and mother.    Patient does not have dependents that are need of being cared for.     Patients primary caregiver is self and son.  Pt's home phone:  [phone]  Pt's cell:  [phone]  Emergency contacts  Geeta Cook (mother) [phone]  Jaden Cook (son,lives with pt) [phone]  Malini Naidu Jr (son, lives in [place]) [phone]    During admission, patient's caregiver plans to stay at home.  Confirmed patient and patients caregivers do have access to reliable transportation.    Cognitive Status/Learning     Patient reports reading ability as 10th grade and states patient does not have difficulty with reading, writing, seeing, hearing, comprehension, learning and memory. Pt does wear glasses.  Patient reports patient learns best by one on one.    Needed: No.   Highest education level: High School (9-12) or GED    Vocation/Disability   .  Working for Income: No  If no, reason not working: Disability    Patient is disabled due to pulmonary hypertension since 2011.  Prior to disability, patient  was employed as a cook.    Adherence     Patient reports a high level of adherence to patients health care regimen. Pt stated she takes all her prescribed medication.   Adherence counseling and education provided. Patient verbalizes understanding.    Substance Use    Patient reports the following substance usage.    Tobacco: none, patient denies any use.  Alcohol: none, patient denies any use.  Illicit  Drugs/Non-prescribed Medications: none.  Pt has used marijuana in the past.  Patient states clear understanding of the potential impact of substance use.  Substance abstinence/cessation counseling, education and resources provided and reviewed.     Services Utilizing/ADLS    Infusion Service: Prior to admission, patient utilizing? yes IV Veletri.  Pt just ended a course of IV ABX via [facility].  Home Health: Prior to admission, patient utilizing? yes [facility] [phone], fax [phone].  Pt was seen for lab work. Pt would like to use same [employer] [facility] again if HH is needed at discharge..  Pt also has a PCA-22 hours per week.  The pt's son Jaden is the pt's PCA.   DME: Prior to admission, yes 02 set up with portables. Pt at 5LPM. DME co is [facility].  Pt also has a walker.   Pulmonary/Cardiac Rehab: Prior to admission, no  Dialysis:  Prior to admission, no  Transplant Specialty Pharmacy:  Prior to admission, no.   Home health companies not available to pt:  [facility] -[facility] P[facility] not [facility]  [facility]-not in network with pt's insurance  [facility]--not in network with pt's insurance  [facility]--not in network  [facility] --not in net work.     Prior to admission, patient reports patient is more  independent with ADLS then last admission a few weeks ago.  Pt reports she can do her own selfcare, however her son assists as needed.   Pt  was driving. Pt's son also drives. Patient reports patient is not able to care for self at this time due to compromised medical condition (as documented in medical record) and physical weakness..  Patient indicates a willingness to care for self once medically cleared to do so.    Insurance/Medications    Insured by   Payor/Plan Subscr  Sex Relation Sub. Ins. ID Effective Group Num   1. [employer] MANAGE* SHALA CONN MA* 1960 Female  [id number] 10/1/16 [id number]                                   [address] [address]   2. [place]* SHALA CONN [place]* 1960 Female   [id number]* 4/1/12                                    [address] [address]      Primary Insurance (for UNOS reporting): Public Insurance - Medicare FFS (Fee For Service)  Secondary Insurance (for UNOS reporting): Public Insurance - Medicaid    Patient reports patient is able to obtain and afford medications at this time and at time of discharge.    Living Will/Healthcare Power of     Patient states patient does not have a LW and/or HCPA.   provided education regarding LW and HCPA and the completion of forms.    Coping/Mental Health    Patient is coping adequately with the aid of  family members. .  Patient denies mental health difficulties.  Pt reports no needs at this time.  General support provided.     Discharge Planning    At time of discharge, patient plans to return to patient's home under the care of self and son .  Patients son will transport patient.  Per rounds today, expected discharge date has not been medically determined at this time. Patient and patients caregiver  verbalize understanding and are involved in treatment planning and discharge process.    Additional Concerns    Patient is being followed for needs, education, resources, information, emotional support, supportive counseling, and for supportive and skilled discharge plan of care.  providing ongoing psychosocial support, education, resources and d/c planning as needed.  SW remains available. Patient denies additional needs and/or concerns at this time. Patient verbalizes understanding and agreement with information reviewed, social work availability, and how to access available resources as needed.

## 2017-09-25 NOTE — NURSING
"1804: Patient called RN to bedside, states "I don't feel very good".  On assessment, patient is flushed, Sats 81% BP 66/39 & .  1805: Veletri paused, & fluid bolus placed at bedside in case of urgent need.  Dr. Fernandes notified & presented to bedside.  During this time, patient became restless, more flushed and removed O2 (sats dropped to 73%)  1819: BP recovered to 123/61, HR 93, O2 94% & veletri restarted at same rate  1822: Patient appearing flushed again, BP 87/21 so veletri again paused.  Lasix gtt also paused to prevent further volume loss.  1847: VS continue to be stable, patient states she feels well, veletri still paused.  Spoke to Dr. Fernandes via phone, he will confer with Dr. Rhodes re: whether to adjust restart rate lower for safety.    Waiting for MD to return call with further instructions.  Veletri is still paused    1938: Dr. Fernandes returned call & per Dr. Rhodes restart Veletri at 22 ng/kg/min.  Night nurse updated.    "

## 2017-09-25 NOTE — NURSING
Changed patient's veletri cassette (home mixed solution, patient refusing hospital mix) on daily schedule per hospital protocol.  Batteries also changed.  23 ng/kg/min at 77722 concentration = 63 mL/day, which matches currently running pump.  Patient verifies that this is her home cassette.  Verified with HYACINTH Alexander RN at beside.  Next change tomorrow

## 2017-09-25 NOTE — PROGRESS NOTES
Per [facility] dosing sheet. Changed cassettee, and cadd pump (added new batteries), changed rate to 63ml/24 hours, with a dose of 23ng/kg/min, used home cassettee made by nadeen Medina.

## 2017-09-25 NOTE — PLAN OF CARE
Problem: Patient Care Overview  Goal: Plan of Care Review  Outcome: Ongoing (interventions implemented as appropriate)  - Last O2 sats 92% on 15 LPM 70% comfort flow, continue to titrate  - LSC brush with veletri home dose infusing  - Lasix gtt infusing, 2L output so far this shift  - Coumadin on hold for potential RHC

## 2017-09-25 NOTE — ASSESSMENT & PLAN NOTE
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 Net neg 4.8L in 24 hours.  -Cont spironlactone  -On O2 5L via NC at home. Currently HFNC. Will wean O2 with diuresis

## 2017-09-25 NOTE — NURSING
On AM assessment, patient's sats 80% on 25LPM 100% comfort flow.  After several minutes, HOB increased, repositioned & O2 rate increase to 32 LPM, sats increased to >86%.  Patient reports no SOB or discomfort, other VS stable.  CN notified.  Will continue to monitor.

## 2017-09-25 NOTE — PROGRESS NOTES
"[facility]  Heart Transplant  Progress Note    Patient Name: Emily Cook  MRN: [id number]  Admission Date: 9/22/2017  Hospital Length of Stay: 3 days  Attending Physician: Piero Matos MD  Primary Care Provider: Kaylee Cazares MD  Principal Problem:Acute decompensated heart failure    Subjective:     Interval History: Continues to feel better. Remains on HFNC    Continuous Infusions:   epoprostenol (VELETRI) infusion      furosemide (LASIX) 5 mg/mL infusion (non-titrating) 10 mg/hr (09/25/17 1320)    veletri/remodulin cassette      veletri/remodulin tubing       Scheduled Meds:   duloxetine  60 mg Oral BID    macitentan  10 mg Oral Daily    potassium chloride  60 mEq Oral TID    sodium chloride 0.9%  3 mL Intravenous Q8H    spironolactone  25 mg Oral Daily     PRN Meds:acetaminophen, epoprostenol (VELETRI) infusion, gabapentin, hydrOXYzine pamoate, loperamide, metoclopramide HCl, ondansetron, ondansetron, ondansetron, oxycodone-acetaminophen    Review of patient's allergies indicates:   Allergen Reactions    Chlorhexidine Itching and Rash     Patient had raised rash on neck following RHC procedure. She states she's never had a problem with the "prep" used until this time.     Adhesive Rash     Objective:     Vital Signs (Most Recent):  Temp: 97.8 °F (36.6 °C) (09/25/17 1152)  Pulse: 78 (09/25/17 1152)  Resp: 16 (09/25/17 1152)  BP: 109/69 (09/25/17 1152)  SpO2: (!) 94 % (09/25/17 1430) Vital Signs (24h Range):  Temp:  [97.8 °F (36.6 °C)-99 °F (37.2 °C)] 97.8 °F (36.6 °C)  Pulse:  [77-87] 78  Resp:  [16-20] 16  SpO2:  [80 %-98 %] 94 %  BP: (109-113)/(65-74) 109/69     Weight: 77.9 kg (171 lb 10.4 oz)  Body mass index is 30.41 kg/m².      Intake/Output Summary (Last 24 hours) at 09/25/17 1542  Last data filed at 09/25/17 1400   Gross per 24 hour   Intake             1838 ml   Output             7250 ml   Net            -5412 ml       Hemodynamic Parameters:         Physical " Exam   Constitutional: She is oriented to person, place, and time. She appears well-developed and well-nourished.   Neck: Normal range of motion. Neck supple. JVD present.   Cardiovascular: Normal rate and regular rhythm.  Exam reveals no gallop and no friction rub.    Murmur heard.  Pulmonary/Chest: Effort normal. She has no decreased breath sounds. She has no wheezes. She has no rales.   On O2 via HFNC   Abdominal: Soft. Bowel sounds are normal. There is no tenderness.   Musculoskeletal: She exhibits no edema.   Neurological: She is alert and oriented to person, place, and time.   Skin: Skin is warm and dry.       Significant Labs:  CBC:    Recent Labs  Lab 09/25/17 0451   WBC 4.57   RBC 3.68*   HGB 8.8*   HCT 27.9*      MCV 76*   MCH 23.9*   MCHC 31.5*     BNP:  No results for input(s): BNP in the last 72 hours.    Invalid input(s): BNPTRIAGELBLO  CMP:    Recent Labs  Lab 09/25/17 0451   GLU 74   CALCIUM 8.8      K 4.3   CO2 31*   CL 98   BUN 15   CREATININE 1.4      Coagulation:     Recent Labs  Lab 09/25/17 0451   INR 3.2*     LDH:  No results for input(s): LDH in the last 72 hours.  Microbiology:  Microbiology Results (last 7 days)     Procedure Component Value Units Date/Time    Blood culture [[id number]] Collected:  09/23/17 0859    Order Status:  Completed Specimen:  Blood Updated:  09/25/17 1022     Blood Culture, Routine No Growth to date     Blood Culture, Routine No Growth to date     Blood Culture, Routine No Growth to date    Blood culture [[id number]] Collected:  09/23/17 0859    Order Status:  Completed Specimen:  Blood Updated:  09/25/17 1022     Blood Culture, Routine No Growth to date     Blood Culture, Routine No Growth to date     Blood Culture, Routine No Growth to date    Urine culture [[id number]]  (Susceptibility) Collected:  09/23/17 0938    Order Status:  Completed Specimen:  Urine from Urine, Clean Catch Updated:  09/25/17 0933     Urine Culture, Routine --     PSEUDOMONAS  AERUGINOSA  50,000 - 99,999 cfu/ml            I have reviewed all pertinent labs within the past 24 hours.    Estimated Creatinine Clearance: 43.8 mL/min (based on SCr of 1.4 mg/dL).    Diagnostic Results:  I have reviewed and interpreted all pertinent imaging results/findings within the past 24 hours.    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 Net neg 4.8L in 24 hours.  -Cont spironlactone  -On O2 5L via NC at home. Currently HFNC. Will wean O2 with diuresis           Moderate to severe pulmonary hypertension    -WHO group 1  -Last ECHO with Severely reduced RV function and RVSP 96  -On 5L O2 at home. Wean O2 as above  - Cont IV Veletri (pt insists that son brings home med and doses while she is here). Continue Macitentan. Consider uptitrating Veletri once euvolemic  - INR supratherapeutic. Hold coumadin in case of need for RHC           Acute on chronic respiratory failure with hypoxia    2/2 ADHF as above             Elena Raygoza PA-C  Heart Transplant  [facility]

## 2017-09-25 NOTE — SUBJECTIVE & OBJECTIVE
"Interval History: Continues to feel better. Remains on HFNC    Continuous Infusions:   epoprostenol (VELETRI) infusion      furosemide (LASIX) 5 mg/mL infusion (non-titrating) 10 mg/hr (09/25/17 1320)    veletri/remodulin cassette      veletri/remodulin tubing       Scheduled Meds:   duloxetine  60 mg Oral BID    macitentan  10 mg Oral Daily    potassium chloride  60 mEq Oral TID    sodium chloride 0.9%  3 mL Intravenous Q8H    spironolactone  25 mg Oral Daily     PRN Meds:acetaminophen, epoprostenol (VELETRI) infusion, gabapentin, hydrOXYzine pamoate, loperamide, metoclopramide HCl, ondansetron, ondansetron, ondansetron, oxycodone-acetaminophen    Review of patient's allergies indicates:   Allergen Reactions    Chlorhexidine Itching and Rash     Patient had raised rash on neck following RHC procedure. She states she's never had a problem with the "prep" used until this time.     Adhesive Rash     Objective:     Vital Signs (Most Recent):  Temp: 97.8 °F (36.6 °C) (09/25/17 1152)  Pulse: 78 (09/25/17 1152)  Resp: 16 (09/25/17 1152)  BP: 109/69 (09/25/17 1152)  SpO2: (!) 94 % (09/25/17 1430) Vital Signs (24h Range):  Temp:  [97.8 °F (36.6 °C)-99 °F (37.2 °C)] 97.8 °F (36.6 °C)  Pulse:  [77-87] 78  Resp:  [16-20] 16  SpO2:  [80 %-98 %] 94 %  BP: (109-113)/(65-74) 109/69     Weight: 77.9 kg (171 lb 10.4 oz)  Body mass index is 30.41 kg/m².      Intake/Output Summary (Last 24 hours) at 09/25/17 1542  Last data filed at 09/25/17 1400   Gross per 24 hour   Intake             1838 ml   Output             7250 ml   Net            -5412 ml       Hemodynamic Parameters:         Physical Exam   Constitutional: She is oriented to person, place, and time. She appears well-developed and well-nourished.   Neck: Normal range of motion. Neck supple. JVD present.   Cardiovascular: Normal rate and regular rhythm.  Exam reveals no gallop and no friction rub.    Murmur heard.  Pulmonary/Chest: Effort normal. She has no " decreased breath sounds. She has no wheezes. She has no rales.   On O2 via HFNC   Abdominal: Soft. Bowel sounds are normal. There is no tenderness.   Musculoskeletal: She exhibits no edema.   Neurological: She is alert and oriented to person, place, and time.   Skin: Skin is warm and dry.       Significant Labs:  CBC:    Recent Labs  Lab 09/25/17 0451   WBC 4.57   RBC 3.68*   HGB 8.8*   HCT 27.9*      MCV 76*   MCH 23.9*   MCHC 31.5*     BNP:  No results for input(s): BNP in the last 72 hours.    Invalid input(s): BNPTRIAGELBLO  CMP:    Recent Labs  Lab 09/25/17 0451   GLU 74   CALCIUM 8.8      K 4.3   CO2 31*   CL 98   BUN 15   CREATININE 1.4      Coagulation:     Recent Labs  Lab 09/25/17 0451   INR 3.2*     LDH:  No results for input(s): LDH in the last 72 hours.  Microbiology:  Microbiology Results (last 7 days)     Procedure Component Value Units Date/Time    Blood culture [[id number]] Collected:  09/23/17 0859    Order Status:  Completed Specimen:  Blood Updated:  09/25/17 1022     Blood Culture, Routine No Growth to date     Blood Culture, Routine No Growth to date     Blood Culture, Routine No Growth to date    Blood culture [[id number]] Collected:  09/23/17 0859    Order Status:  Completed Specimen:  Blood Updated:  09/25/17 1022     Blood Culture, Routine No Growth to date     Blood Culture, Routine No Growth to date     Blood Culture, Routine No Growth to date    Urine culture [[id number]]  (Susceptibility) Collected:  09/23/17 0938    Order Status:  Completed Specimen:  Urine from Urine, Clean Catch Updated:  09/25/17 0933     Urine Culture, Routine --     PSEUDOMONAS AERUGINOSA  50,000 - 99,999 cfu/ml            I have reviewed all pertinent labs within the past 24 hours.    Estimated Creatinine Clearance: 43.8 mL/min (based on SCr of 1.4 mg/dL).    Diagnostic Results:  I have reviewed and interpreted all pertinent imaging results/findings within the past 24 hours.

## 2017-09-25 NOTE — ASSESSMENT & PLAN NOTE
-WHO group 1  -Last ECHO with Severely reduced RV function and RVSP 96  -On 5L O2 at home. Wean O2 as above  - Cont IV Veletri (pt insists that son brings home med and doses while she is here). Continue Macitentan. Consider uptitrating Veletri once euvolemic  - INR supratherapeutic. Hold coumadin in case of need for RHC

## 2017-09-26 NOTE — PROGRESS NOTES
"[facility]  Heart Transplant  Progress Note    Patient Name: Emily Cook  MRN: [id number]  Admission Date: 9/22/2017  Hospital Length of Stay: 4 days  Attending Physician: Piero Matos MD  Primary Care Provider: Kaylee Cazares MD  Principal Problem:Acute decompensated heart failure    Subjective:     Interval History: Continues to feel better. Remains on HFNC. Overnight cassette was changed(mixed at home) and pt became symptomatic. Rate was dropped and her symptoms improved.     Continuous Infusions:   epoprostenol (VELETRI) infusion      furosemide (LASIX) 5 mg/mL infusion (non-titrating) 10 mg/hr (09/26/17 1145)    veletri/remodulin cassette      veletri/remodulin tubing       Scheduled Meds:   duloxetine  60 mg Oral BID    macitentan  10 mg Oral Daily    potassium chloride  60 mEq Oral TID    sodium chloride 0.9%  3 mL Intravenous Q8H    spironolactone  25 mg Oral Daily     PRN Meds:acetaminophen, gabapentin, hydrOXYzine pamoate, loperamide, metoclopramide HCl, ondansetron, ondansetron, ondansetron, oxycodone-acetaminophen    Review of patient's allergies indicates:   Allergen Reactions    Chlorhexidine Itching and Rash     Patient had raised rash on neck following RHC procedure. She states she's never had a problem with the "prep" used until this time.     Adhesive Rash     Objective:     Vital Signs (Most Recent):  Temp: 97.6 °F (36.4 °C) (09/26/17 1142)  Pulse: 82 (09/26/17 1142)  Resp: 18 (09/26/17 1142)  BP: (!) 90/53 (09/26/17 1142)  SpO2: (!) 93 % (09/26/17 1142) Vital Signs (24h Range):  Temp:  [97.6 °F (36.4 °C)-98.7 °F (37.1 °C)] 97.6 °F (36.4 °C)  Pulse:  [] 82  Resp:  [16-25] 18  SpO2:  [78 %-98 %] 93 %  BP: ()/(39-86) 90/53     Weight: 77.3 kg (170 lb 6.4 oz)  Body mass index is 30.19 kg/m².      Intake/Output Summary (Last 24 hours) at 09/26/17 1202  Last data filed at 09/26/17 0510   Gross per 24 hour   Intake           874.16 ml   Output      "        2025 ml   Net         -1150.84 ml       Hemodynamic Parameters:         Physical Exam   Constitutional: She is oriented to person, place, and time. She appears well-developed and well-nourished.   Neck: Normal range of motion. Neck supple. JVD present.   Cardiovascular: Normal rate and regular rhythm.  Exam reveals no gallop and no friction rub.    Murmur heard.  Pulmonary/Chest: Effort normal. She has no decreased breath sounds. She has no wheezes. She has no rales.   On O2 via HFNC   Abdominal: Soft. Bowel sounds are normal. There is no tenderness.   Musculoskeletal: She exhibits no edema.   Neurological: She is alert and oriented to person, place, and time.   Skin: Skin is warm and dry.       Significant Labs:  CBC:    Recent Labs  Lab 09/26/17 0417   WBC 5.99   RBC 4.29   HGB 10.2*   HCT 32.4*      MCV 76*   MCH 23.8*   MCHC 31.5*     BNP:  No results for input(s): BNP in the last 72 hours.    Invalid input(s): BNPTRIAGELBLO  CMP:    Recent Labs  Lab 09/26/17 0417   GLU 96   CALCIUM 8.6*      K 3.8   CO2 33*   CL 92*   BUN 18   CREATININE 1.7*      Coagulation:     Recent Labs  Lab 09/26/17 0417   INR 2.2*     LDH:  No results for input(s): LDH in the last 72 hours.  Microbiology:  Microbiology Results (last 7 days)     Procedure Component Value Units Date/Time    Blood culture [[id number]] Collected:  09/23/17 0859    Order Status:  Completed Specimen:  Blood Updated:  09/26/17 1022     Blood Culture, Routine No Growth to date     Blood Culture, Routine No Growth to date     Blood Culture, Routine No Growth to date     Blood Culture, Routine No Growth to date    Blood culture [[id number]] Collected:  09/23/17 0859    Order Status:  Completed Specimen:  Blood Updated:  09/26/17 1022     Blood Culture, Routine No Growth to date     Blood Culture, Routine No Growth to date     Blood Culture, Routine No Growth to date     Blood Culture, Routine No Growth to date    Urine culture [[id number]]   (Susceptibility) Collected:  09/23/17 0934    Order Status:  Completed Specimen:  Urine from Urine, Clean Catch Updated:  09/25/17 0933     Urine Culture, Routine --     PSEUDOMONAS AERUGINOSA  50,000 - 99,999 cfu/ml            I have reviewed all pertinent labs within the past 24 hours.    Estimated Creatinine Clearance: 36 mL/min (based on SCr of 1.7 mg/dL (H)).    Diagnostic Results:  I have reviewed and interpreted all pertinent imaging results/findings within the past 24 hours.    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 Net neg in 24 hours.  -Cont spironlactone  -On O2 5L via NC at home. Currently HFNC. Will wean O2 with diuresis           Moderate to severe pulmonary hypertension    -WHO group 1  -Last ECHO with Severely reduced RV function and RVSP 96  -On 5L O2 at home. Wean O2 as above  - Cont IV Veletri (transitioned to hospital mix due to issues overnight with home mixed cassette). Continue Macitentan. Consider transitioning to SC Remodulin in the future?  - INR therapeutic. Hold coumadin in case of need for RHC           Acute on chronic respiratory failure with hypoxia    2/2 ADHF as above             Félix Calles NP  Heart Transplant  [facility]

## 2017-09-26 NOTE — PLAN OF CARE
Problem: Patient Care Overview  Goal: Plan of Care Review  Outcome: Ongoing (interventions implemented as appropriate)  Veletri continued at 41 ng/kg/min. Dosage calculation verified with NAKIA Jefferson RN. Patient continues on CF at 20L, 100%. Lasix gtt still infusing at 10mg/hr. Vasquez to gravity with clear yellow urine. Palliative care discussion initiated per HTS but patient not receptive at this time. Mother at bedside.

## 2017-09-26 NOTE — NURSING
Notified MD Fernandes of BP 84/54 MAP 62 mm Hg. SpO2 > 94% on comfort flow. MD would like MAP > 65 mm Hg. Lasix gtt still on hold. MD states check BP in 1 hour then call resident at [id number] results for further orders.

## 2017-09-26 NOTE — NURSING
Pt called RN stating she did not feel well. BP and MAP dropped. Pt now flushed. Notified MD Mor Pascual - new order to drop veletri dose to 20 ng/kg/min. Dose verified with NAKIA Murrieta RN. See flowsheet and MAR for details.

## 2017-09-26 NOTE — NURSING
SpO2 > 95%, comfort flow weaned down to 22 liters at 100% FiO2. MAP > 65 mm Hg. Lasix gtt restarted. Pt denies discomfort or dyspnea. Will cont to monitor.

## 2017-09-26 NOTE — ASSESSMENT & PLAN NOTE
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 Net neg in 24 hours.  -Cont spironlactone  -On O2 5L via NC at home. Currently HFNC. Will wean O2 with diuresis

## 2017-09-26 NOTE — NURSING
SpO2 sustained at 80%. Comfort flow increased to 25 liters from 22L, FiO2 100%  No distress reported from pt. Denies shortness of breath. Notified Dr. MIGUEL Pascual - no new orders, continue to monitor. Charge nurse aware.

## 2017-09-26 NOTE — PROGRESS NOTES
PIV placed per PICC team. Unable to flush newly placed IV to restart Lasix gtt. MARLA Card RN with PICC team notified - to follow-up and replace.

## 2017-09-26 NOTE — SUBJECTIVE & OBJECTIVE
"Interval History: Continues to feel better. Remains on HFNC. Overnight cassette was changed(mixed at home) and pt became symptomatic. Rate was dropped and her symptoms improved.     Continuous Infusions:   epoprostenol (VELETRI) infusion      furosemide (LASIX) 5 mg/mL infusion (non-titrating) 10 mg/hr (09/26/17 1145)    veletri/remodulin cassette      veletri/remodulin tubing       Scheduled Meds:   duloxetine  60 mg Oral BID    macitentan  10 mg Oral Daily    potassium chloride  60 mEq Oral TID    sodium chloride 0.9%  3 mL Intravenous Q8H    spironolactone  25 mg Oral Daily     PRN Meds:acetaminophen, gabapentin, hydrOXYzine pamoate, loperamide, metoclopramide HCl, ondansetron, ondansetron, ondansetron, oxycodone-acetaminophen    Review of patient's allergies indicates:   Allergen Reactions    Chlorhexidine Itching and Rash     Patient had raised rash on neck following RHC procedure. She states she's never had a problem with the "prep" used until this time.     Adhesive Rash     Objective:     Vital Signs (Most Recent):  Temp: 97.6 °F (36.4 °C) (09/26/17 1142)  Pulse: 82 (09/26/17 1142)  Resp: 18 (09/26/17 1142)  BP: (!) 90/53 (09/26/17 1142)  SpO2: (!) 93 % (09/26/17 1142) Vital Signs (24h Range):  Temp:  [97.6 °F (36.4 °C)-98.7 °F (37.1 °C)] 97.6 °F (36.4 °C)  Pulse:  [] 82  Resp:  [16-25] 18  SpO2:  [78 %-98 %] 93 %  BP: ()/(39-86) 90/53     Weight: 77.3 kg (170 lb 6.4 oz)  Body mass index is 30.19 kg/m².      Intake/Output Summary (Last 24 hours) at 09/26/17 1202  Last data filed at 09/26/17 0525   Gross per 24 hour   Intake           874.16 ml   Output             2025 ml   Net         -1150.84 ml       Hemodynamic Parameters:         Physical Exam   Constitutional: She is oriented to person, place, and time. She appears well-developed and well-nourished.   Neck: Normal range of motion. Neck supple. JVD present.   Cardiovascular: Normal rate and regular rhythm.  Exam reveals no gallop " and no friction rub.    Murmur heard.  Pulmonary/Chest: Effort normal. She has no decreased breath sounds. She has no wheezes. She has no rales.   On O2 via HFNC   Abdominal: Soft. Bowel sounds are normal. There is no tenderness.   Musculoskeletal: She exhibits no edema.   Neurological: She is alert and oriented to person, place, and time.   Skin: Skin is warm and dry.       Significant Labs:  CBC:    Recent Labs  Lab 09/26/17 0417   WBC 5.99   RBC 4.29   HGB 10.2*   HCT 32.4*      MCV 76*   MCH 23.8*   MCHC 31.5*     BNP:  No results for input(s): BNP in the last 72 hours.    Invalid input(s): BNPTRIAGELBLO  CMP:    Recent Labs  Lab 09/26/17 0417   GLU 96   CALCIUM 8.6*      K 3.8   CO2 33*   CL 92*   BUN 18   CREATININE 1.7*      Coagulation:     Recent Labs  Lab 09/26/17 0417   INR 2.2*     LDH:  No results for input(s): LDH in the last 72 hours.  Microbiology:  Microbiology Results (last 7 days)     Procedure Component Value Units Date/Time    Blood culture [[id number]] Collected:  09/23/17 0859    Order Status:  Completed Specimen:  Blood Updated:  09/26/17 1022     Blood Culture, Routine No Growth to date     Blood Culture, Routine No Growth to date     Blood Culture, Routine No Growth to date     Blood Culture, Routine No Growth to date    Blood culture [[id number]] Collected:  09/23/17 0859    Order Status:  Completed Specimen:  Blood Updated:  09/26/17 1022     Blood Culture, Routine No Growth to date     Blood Culture, Routine No Growth to date     Blood Culture, Routine No Growth to date     Blood Culture, Routine No Growth to date    Urine culture [[id number]]  (Susceptibility) Collected:  09/23/17 0938    Order Status:  Completed Specimen:  Urine from Urine, Clean Catch Updated:  09/25/17 0933     Urine Culture, Routine --     PSEUDOMONAS AERUGINOSA  50,000 - 99,999 cfu/ml            I have reviewed all pertinent labs within the past 24 hours.    Estimated Creatinine Clearance: 36 mL/min  (based on SCr of 1.7 mg/dL (H)).    Diagnostic Results:  I have reviewed and interpreted all pertinent imaging results/findings within the past 24 hours.

## 2017-09-26 NOTE — PROGRESS NOTES
At bedside with pharmacy mix concentration cassette of veletri  - Line Aspirated of 20cc of blood and slowly flushed. 0.8cc of the cassette veletri slowly primed into the pt's line - pt tolerated prime well- VSS - see chart.  Awaiting order from MD on dose for new cassette.  Staying with patient as nurses in report.

## 2017-09-26 NOTE — PROGRESS NOTES
SKYLAR Calles NP at bedside and aware of VS - BP 89/52, 86% on comfort flow 20L, 100% fiO2. Goal is to keep patient's sats >85%. Patient currently lying in bed, states she is feeling better today.

## 2017-09-26 NOTE — NURSING
Pt's SpO2 dropped to 85% and sustained for ~ 10 minutes. Pt states she feels good - no apparent distress noted. Other VSS. Comfort flow increased from 20 liters to 22 liters and FiO2 remains at 100%

## 2017-09-26 NOTE — ASSESSMENT & PLAN NOTE
-WHO group 1  -Last ECHO with Severely reduced RV function and RVSP 96  -On 5L O2 at home. Wean O2 as above  - Cont IV Veletri (transitioned to hospital mix due to issues overnight with home mixed cassette). Continue Macitentan. Consider transitioning to SC Remodulin in the future?  - INR therapeutic. Hold coumadin in case of need for RHC

## 2017-09-26 NOTE — PROGRESS NOTES
New PIV placed per PICC team. Lasix gtt restarted at 10mg/hr (2cc/hr).    CADD pump verified to be running at 41cc/24 hours.

## 2017-09-27 NOTE — PROGRESS NOTES
"SKYLAR Calles, NP notified of patient's BP 85/48. Also discussed patient's c/o nausea all morning - patient received both Zofran and Reglan. Now states nausea is better but is still refusing to take PO meds at this time (all am meds as well as additional potassium and magnesium ordered this am) - states "I need to eat first." Will try to administer pills once patient receives lunch tray. No additional orders received.    "

## 2017-09-27 NOTE — ASSESSMENT & PLAN NOTE
-WHO group 1  -Last ECHO with Severely reduced RV function and RVSP 96  -On 5L O2 at home. Wean O2 as above  - Cont IV Veletri (transitioned to hospital mix due to issues overnight with home mixed cassette). Continue Macitentan. Consider transitioning to SC Remodulin in the future?  - INR therapeutic. Will restart.  - Pt not interested in palliative care discussions at this time.

## 2017-09-27 NOTE — PLAN OF CARE
Problem: Patient Care Overview  Goal: Plan of Care Review  Outcome: Ongoing (interventions implemented as appropriate)  Pt is AAOx4 in bed wearing non-skid footwear, bed in low/locked position and with call bell within reach. Pt reminded to use call bell to call for assistance, pt verbalizes understanding. Mother at bedside. Pt is afebrile at this time. Proper hand hygiene performed before and after pt care activities. Lasix gtt infusing at 10mg/hr (2cc/hr). Urine output of 575cc via metz catheter so far this shift. Patient % on comfort flow 20L 100%. Denies any pain or discomfort at this time.

## 2017-09-27 NOTE — PHYSICIAN QUERY
"PT Name: Emily Cook  MR #: [id number]    Physician Query Form - Heart  Condition Clarification     CDS/: Reyna Brown               Contact information:[email or web address]  This form is a permanent document in the medical record.     Query Date: September 27, 2017    By submitting this query, we are merely seeking further clarification of documentation. Please utilize your independent clinical judgment when addressing the question(s) below.    The medical record contains the following   Indicators     Supporting Clinical Findings Location in Medical Record   x BNP BNP 1600 was 340 2 weeks ago H&P   x EF (EF 60-65%)   Consult note 9-22    Radiology findings     x Echo Results Significant Imaging: TTE 8/30/17    CONCLUSIONS :          1 - Right ventricular enlargement with severely depressed systolic function.     2 - Right atrial enlargement.     3 - Moderate tricuspid regurgitation.      4 - Pulmonary hypertension. The estimated PA systolic pressure is 96 mmHg.     5 - Increased central venous pressure.     6 - Normal left ventricular systolic function (EF 60-65%); reduced SVI.      7 - Trivial pericardial effusion.   Consult note 9-22    "Ascites" documented     x "SOB" or "SANCHEZ" documented SOB H&P   x "Hypoxia" documented Hypoxia H&P   x Heart Failure documented Acute decompensated heart failure  H&P    "Edema" documented     x Diuretics/Meds lasix 80 IV BID  H&P    Treatment:      Other:          Provider, please specify diagnosis or diagnoses associated with above clinical findings.                              [ X ] Acute on Chronic Diastolic Heart Failure( EF > 40)*    [ X ] Other Type of Heart Failure (please specify type): __Right Heart Failure_______________________     [  ] Clinically Undetermined        *American Heart Association                                                                                                          Please document in your progress notes " daily for the duration of treatment until resolved and include in your discharge summary.

## 2017-09-27 NOTE — PLAN OF CARE
Problem: Patient Care Overview  Goal: Plan of Care Review  Outcome: Ongoing (interventions implemented as appropriate)  Patient weaned from comfort flow to 7L high flow NC - sats 86 to 91% this afternoon on high flow (goal is to keep sats > 82%). Lasix gtt continued at 10mg/hr. Veletri also continued at 20ng/kg/min. INR 2.0 - Coumadin restarted. Hospice again discussed with patient by HTS team.

## 2017-09-27 NOTE — PROGRESS NOTES
"Patient's HR noted to be 120s ST on telemetry. Upon entering room, patient found to be sitting on toilet without oxygen (patient still on CF at 20L, 100%), emptying Vasquez catheter. Pulse ox disconnected from tele box. Patient asked "do I need oxygen while I'm in the bathroom?" Patient placed on NRB via portable O2 tank, reconnected to pulse ox. Instructed to pull bathroom cord when she is finished for assistance getting back to bed. Stated understanding.   "

## 2017-09-27 NOTE — PROGRESS NOTES
"[facility]  Heart Transplant  Progress Note    Patient Name: Emily Cook  MRN: [id number]  Admission Date: 9/22/2017  Hospital Length of Stay: 5 days  Attending Physician: Piero Matos MD  Primary Care Provider: Kaylee Cazares MD  Principal Problem:Acute decompensated heart failure    Subjective:     Interval History: Continues to feel better. Remains on HFNC.     Continuous Infusions:   epoprostenol (VELETRI) infusion 20 ng/kg/min (09/26/17 1835)    furosemide (LASIX) 5 mg/mL infusion (non-titrating) 10 mg/hr (09/26/17 2337)    veletri/remodulin cassette      veletri/remodulin tubing       Scheduled Meds:   duloxetine  60 mg Oral BID    macitentan  10 mg Oral Daily    magnesium oxide  400 mg Oral BID    potassium chloride SA  40 mEq Oral Once    potassium chloride  60 mEq Oral TID    sodium chloride 0.9%  3 mL Intravenous Q8H    spironolactone  25 mg Oral Daily     PRN Meds:acetaminophen, gabapentin, hydrOXYzine pamoate, loperamide, metoclopramide HCl, ondansetron, ondansetron, ondansetron, oxycodone-acetaminophen    Review of patient's allergies indicates:   Allergen Reactions    Chlorhexidine Itching and Rash     Patient had raised rash on neck following RHC procedure. She states she's never had a problem with the "prep" used until this time.     Adhesive Rash     Objective:     Vital Signs (Most Recent):  Temp: 98.2 °F (36.8 °C) (09/27/17 0837)  Pulse: 91 (09/27/17 0837)  Resp: 20 (09/27/17 0837)  BP: (!) 97/57 (09/27/17 0837)  SpO2: (!) 88 % (09/27/17 0837) Vital Signs (24h Range):  Temp:  [97.4 °F (36.3 °C)-99 °F (37.2 °C)] 98.2 °F (36.8 °C)  Pulse:  [] 91  Resp:  [16-20] 20  SpO2:  [86 %-100 %] 88 %  BP: (90-97)/(50-57) 97/57     Weight: 73.6 kg (162 lb 4.8 oz)  Body mass index is 28.75 kg/m².      Intake/Output Summary (Last 24 hours) at 09/27/17 0850  Last data filed at 09/27/17 0800   Gross per 24 hour   Intake            191.7 ml   Output             " 2425 ml   Net          -2233.3 ml       Hemodynamic Parameters:         Physical Exam   Constitutional: She is oriented to person, place, and time. She appears well-developed and well-nourished.   Neck: Normal range of motion. Neck supple. JVD (improving. Just above clavicle. ) present.   Cardiovascular: Normal rate and regular rhythm.  Exam reveals no gallop and no friction rub.    Murmur heard.  Pulmonary/Chest: Effort normal. She has no decreased breath sounds. She has no wheezes. She has no rales.   On O2 via HFNC   Abdominal: Soft. Bowel sounds are normal. There is no tenderness.   Musculoskeletal: She exhibits no edema.   Neurological: She is alert and oriented to person, place, and time.   Skin: Skin is warm and dry.       Significant Labs:  CBC:    Recent Labs  Lab 09/27/17 0526   WBC 5.41   RBC 4.84   HGB 11.7*   HCT 36.1*      MCV 75*   MCH 24.2*   MCHC 32.4     BNP:  No results for input(s): BNP in the last 72 hours.    Invalid input(s): BNPTRIAGELBLO  CMP:    Recent Labs  Lab 09/27/17 0526      CALCIUM 9.0      K 3.2*   CO2 35*   CL 92*   BUN 16   CREATININE 1.6*      Coagulation:     Recent Labs  Lab 09/27/17 0526   INR 2.0*     LDH:  No results for input(s): LDH in the last 72 hours.  Microbiology:  Microbiology Results (last 7 days)     Procedure Component Value Units Date/Time    Blood culture [[id number]] Collected:  09/23/17 0859    Order Status:  Completed Specimen:  Blood Updated:  09/26/17 1022     Blood Culture, Routine No Growth to date     Blood Culture, Routine No Growth to date     Blood Culture, Routine No Growth to date     Blood Culture, Routine No Growth to date    Blood culture [[id number]] Collected:  09/23/17 0859    Order Status:  Completed Specimen:  Blood Updated:  09/26/17 1022     Blood Culture, Routine No Growth to date     Blood Culture, Routine No Growth to date     Blood Culture, Routine No Growth to date     Blood Culture, Routine No Growth to date     Urine culture [[id number]]  (Susceptibility) Collected:  09/23/17 0964    Order Status:  Completed Specimen:  Urine from Urine, Clean Catch Updated:  09/25/17 0933     Urine Culture, Routine --     PSEUDOMONAS AERUGINOSA  50,000 - 99,999 cfu/ml            I have reviewed all pertinent labs within the past 24 hours.    Estimated Creatinine Clearance: 37.3 mL/min (based on SCr of 1.6 mg/dL (H)).    Diagnostic Results:  I have reviewed and interpreted all pertinent imaging results/findings within the past 24 hours.    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 Net neg in 24 hours.  -Cont spironlactone  -On O2 5L via NC at home. Currently HFNC. Will wean O2 with diuresis           Moderate to severe pulmonary hypertension    -WHO group 1  -Last ECHO with Severely reduced RV function and RVSP 96  -On 5L O2 at home. Wean O2 as above  - Cont IV Veletri (transitioned to hospital mix due to issues overnight with home mixed cassette). Continue Macitentan. Consider transitioning to SC Remodulin in the future?  - INR therapeutic. Will restart.  - Pt not interested in palliative care discussions at this time.           Acute on chronic respiratory failure with hypoxia    2/2 ADHF as above             Félix Calles NP  Heart Transplant  [facility]

## 2017-09-27 NOTE — PROGRESS NOTES
Patient was on NRB when I entered room because patient just came back from restroom. Placed patient back on comfort flow at 15L 80% O2 per oxygen orders. Nurse notified.

## 2017-09-27 NOTE — SUBJECTIVE & OBJECTIVE
"Interval History: Continues to feel better. Remains on HFNC.     Continuous Infusions:   epoprostenol (VELETRI) infusion 20 ng/kg/min (09/26/17 1835)    furosemide (LASIX) 5 mg/mL infusion (non-titrating) 10 mg/hr (09/26/17 2337)    veletri/remodulin cassette      veletri/remodulin tubing       Scheduled Meds:   duloxetine  60 mg Oral BID    macitentan  10 mg Oral Daily    magnesium oxide  400 mg Oral BID    potassium chloride SA  40 mEq Oral Once    potassium chloride  60 mEq Oral TID    sodium chloride 0.9%  3 mL Intravenous Q8H    spironolactone  25 mg Oral Daily     PRN Meds:acetaminophen, gabapentin, hydrOXYzine pamoate, loperamide, metoclopramide HCl, ondansetron, ondansetron, ondansetron, oxycodone-acetaminophen    Review of patient's allergies indicates:   Allergen Reactions    Chlorhexidine Itching and Rash     Patient had raised rash on neck following RHC procedure. She states she's never had a problem with the "prep" used until this time.     Adhesive Rash     Objective:     Vital Signs (Most Recent):  Temp: 98.2 °F (36.8 °C) (09/27/17 0837)  Pulse: 91 (09/27/17 0837)  Resp: 20 (09/27/17 0837)  BP: (!) 97/57 (09/27/17 0837)  SpO2: (!) 88 % (09/27/17 0837) Vital Signs (24h Range):  Temp:  [97.4 °F (36.3 °C)-99 °F (37.2 °C)] 98.2 °F (36.8 °C)  Pulse:  [] 91  Resp:  [16-20] 20  SpO2:  [86 %-100 %] 88 %  BP: (90-97)/(50-57) 97/57     Weight: 73.6 kg (162 lb 4.8 oz)  Body mass index is 28.75 kg/m².      Intake/Output Summary (Last 24 hours) at 09/27/17 0850  Last data filed at 09/27/17 0800   Gross per 24 hour   Intake            191.7 ml   Output             2425 ml   Net          -2233.3 ml       Hemodynamic Parameters:         Physical Exam   Constitutional: She is oriented to person, place, and time. She appears well-developed and well-nourished.   Neck: Normal range of motion. Neck supple. JVD (improving. Just above clavicle. ) present.   Cardiovascular: Normal rate and regular rhythm.  " Exam reveals no gallop and no friction rub.    Murmur heard.  Pulmonary/Chest: Effort normal. She has no decreased breath sounds. She has no wheezes. She has no rales.   On O2 via HFNC   Abdominal: Soft. Bowel sounds are normal. There is no tenderness.   Musculoskeletal: She exhibits no edema.   Neurological: She is alert and oriented to person, place, and time.   Skin: Skin is warm and dry.       Significant Labs:  CBC:    Recent Labs  Lab 09/27/17 0526   WBC 5.41   RBC 4.84   HGB 11.7*   HCT 36.1*      MCV 75*   MCH 24.2*   MCHC 32.4     BNP:  No results for input(s): BNP in the last 72 hours.    Invalid input(s): BNPTRIAGELBLO  CMP:    Recent Labs  Lab 09/27/17 0526      CALCIUM 9.0      K 3.2*   CO2 35*   CL 92*   BUN 16   CREATININE 1.6*      Coagulation:     Recent Labs  Lab 09/27/17 0526   INR 2.0*     LDH:  No results for input(s): LDH in the last 72 hours.  Microbiology:  Microbiology Results (last 7 days)     Procedure Component Value Units Date/Time    Blood culture [[id number]] Collected:  09/23/17 0859    Order Status:  Completed Specimen:  Blood Updated:  09/26/17 1022     Blood Culture, Routine No Growth to date     Blood Culture, Routine No Growth to date     Blood Culture, Routine No Growth to date     Blood Culture, Routine No Growth to date    Blood culture [[id number]] Collected:  09/23/17 0859    Order Status:  Completed Specimen:  Blood Updated:  09/26/17 1022     Blood Culture, Routine No Growth to date     Blood Culture, Routine No Growth to date     Blood Culture, Routine No Growth to date     Blood Culture, Routine No Growth to date    Urine culture [[id number]]  (Susceptibility) Collected:  09/23/17 0938    Order Status:  Completed Specimen:  Urine from Urine, Clean Catch Updated:  09/25/17 0933     Urine Culture, Routine --     PSEUDOMONAS AERUGINOSA  50,000 - 99,999 cfu/ml            I have reviewed all pertinent labs within the past 24 hours.    Estimated Creatinine  Clearance: 37.3 mL/min (based on SCr of 1.6 mg/dL (H)).    Diagnostic Results:  I have reviewed and interpreted all pertinent imaging results/findings within the past 24 hours.

## 2017-09-28 PROBLEM — Z51.5 PALLIATIVE CARE ENCOUNTER: Status: ACTIVE | Noted: 2017-01-01

## 2017-09-28 NOTE — ASSESSMENT & PLAN NOTE
-WHO group 1  -Last ECHO with Severely reduced RV function and RVSP 96  -On 5L O2 at home. Wean O2 as above  - Cont IV Veletri. Continue Macitentan. Consider transitioning to Remodulin as she is considering hospice care at [place].   - INR sub therapeutic. Restarted warfarin.    - Pt now interested in talking to palliative care. We will make DNR as well after discussion today.

## 2017-09-28 NOTE — SUBJECTIVE & OBJECTIVE
"Interval History: Continues to feel better. O2 weaned to 7L(on 5L at home). After much discussion, pt is now interested in considering palliative care/hospice. Yuniel understands that her PH may be worsening and she states that she would rather be at home than in the hospital.     Continuous Infusions:   epoprostenol (VELETRI) infusion 20 ng/kg/min (09/27/17 1810)    furosemide (LASIX) 5 mg/mL infusion (non-titrating) 10 mg/hr (09/27/17 2342)    veletri/remodulin cassette      veletri/remodulin tubing       Scheduled Meds:   duloxetine  60 mg Oral BID    macitentan  10 mg Oral Daily    magnesium oxide  400 mg Oral BID    potassium chloride  60 mEq Oral TID    sodium chloride 0.9%  3 mL Intravenous Q8H    spironolactone  25 mg Oral Daily    warfarin  5 mg Oral Daily     PRN Meds:acetaminophen, gabapentin, hydrOXYzine pamoate, loperamide, metoclopramide HCl, ondansetron, ondansetron, ondansetron, oxycodone-acetaminophen    Review of patient's allergies indicates:   Allergen Reactions    Chlorhexidine Itching and Rash     Patient had raised rash on neck following RHC procedure. She states she's never had a problem with the "prep" used until this time.     Adhesive Rash     Objective:     Vital Signs (Most Recent):  Temp: 98.2 °F (36.8 °C) (09/28/17 0800)  Pulse: 95 (09/28/17 0800)  Resp: 18 (09/28/17 0800)  BP: (!) 99/55 (09/28/17 0800)  SpO2: 95 % (09/28/17 0800) Vital Signs (24h Range):  Temp:  [98.2 °F (36.8 °C)-98.8 °F (37.1 °C)] 98.2 °F (36.8 °C)  Pulse:  [86-99] 95  Resp:  [16-18] 18  SpO2:  [85 %-96 %] 95 %  BP: ()/(48-58) 99/55     Weight: 73.3 kg (161 lb 9.6 oz)  Body mass index is 28.63 kg/m².      Intake/Output Summary (Last 24 hours) at 09/28/17 1105  Last data filed at 09/28/17 1000   Gross per 24 hour   Intake           837.36 ml   Output             1875 ml   Net         -1037.64 ml       Hemodynamic Parameters:         Physical Exam   Constitutional: She is oriented to person, place, and " time. She appears well-developed and well-nourished.   Neck: Normal range of motion. Neck supple. JVD (improving. Just above clavicle. ) present.   Cardiovascular: Normal rate and regular rhythm.  Exam reveals no gallop and no friction rub.    Murmur heard.  Pulmonary/Chest: Effort normal. She has no decreased breath sounds. She has no wheezes. She has no rales.   On O2 via HFNC   Abdominal: Soft. Bowel sounds are normal. There is no tenderness.   Musculoskeletal: She exhibits no edema.   Neurological: She is alert and oriented to person, place, and time.   Skin: Skin is warm and dry.       Significant Labs:  CBC:    Recent Labs  Lab 09/28/17 0546   WBC 5.15   RBC 5.11   HGB 12.4   HCT 38.3      MCV 75*   MCH 24.3*   MCHC 32.4     BNP:    Recent Labs  Lab 09/28/17 0546   *     CMP:    Recent Labs  Lab 09/28/17 0546      CALCIUM 9.5      K 3.8   CO2 30*   CL 94*   BUN 15   CREATININE 1.7*      Coagulation:     Recent Labs  Lab 09/28/17 0546   INR 1.8*     LDH:  No results for input(s): LDH in the last 72 hours.  Microbiology:  Microbiology Results (last 7 days)     Procedure Component Value Units Date/Time    Blood culture [[id number]] Collected:  09/23/17 0859    Order Status:  Completed Specimen:  Blood Updated:  09/28/17 1022     Blood Culture, Routine No growth after 5 days.    Blood culture [[id number]] Collected:  09/23/17 0859    Order Status:  Completed Specimen:  Blood Updated:  09/28/17 1022     Blood Culture, Routine No growth after 5 days.    Urine culture [[id number]]  (Susceptibility) Collected:  09/23/17 0938    Order Status:  Completed Specimen:  Urine from Urine, Clean Catch Updated:  09/25/17 0933     Urine Culture, Routine --     PSEUDOMONAS AERUGINOSA  50,000 - 99,999 cfu/ml            I have reviewed all pertinent labs within the past 24 hours.    Estimated Creatinine Clearance: 35 mL/min (based on SCr of 1.7 mg/dL (H)).    Diagnostic Results:  I have reviewed and  interpreted all pertinent imaging results/findings within the past 24 hours.

## 2017-09-28 NOTE — PLAN OF CARE
"Problem: Spiritual Distress, Risk/Actual (Adult,Obstetrics,Pediatric)  Intervention: Facilitate Personal Exploration/Expression of Spirituality  F - Itzel and Belief: Pt of Yarsani itzel and requested prayer support. When asked how she coping spiritually pt responded, "I'm not coping. I turn it over to God to work out." Pt's mom shared she, too, "gives it [concerns] to God."    I - Importance: Pt appeared withdrawn, and open to prayer support. As  spoke with pt's mom, pt was attentive though quiet.     C - Community: Pt's mom bedside. Pt's Voodoo is aware and involved in care. Mom reflected on multiple health issues facing family. She also sadly remembered the tragic loss of one of her sons @ 6 years ago.    A - Address in Care: Introduced and offered pastoral support with reflective listening and prayer upon request. Will continue to follow. Family aware of 's presence as needed.           "

## 2017-09-28 NOTE — SUBJECTIVE & OBJECTIVE
"Interval History: There were no acute events overnight.    Past Medical History:   Diagnosis Date    Arrhythmia     Arthritis     Cardiac pacemaker in situ     Carpal tunnel syndrome, right     Cervical spondylosis     Cervicalgia     CHF (congestive heart failure)     Diverticulitis     Heart block AV third degree     Hyperlipidemia     ALFREDO on CPAP     Pulmonary hypertension     Subdeltoid bursitis        Past Surgical History:   Procedure Laterality Date    CARDIAC CATHETERIZATION      CARDIAC PACEMAKER PLACEMENT  7/29/13    [employer] [place] PM    CARDIAC SURGERY      COLONOSCOPY N/A 9/28/2015    Procedure: COLONOSCOPY;  Surgeon: Jason Diaz MD;  Location: [facility] ([address] [place] [address]);  Service: Endoscopy;  Laterality: N/A;  Please schedule in 2-3 months with Dr Diaz  Pulmonary HTN, 2nd floor (off remodulin infusion as of "a few days" before 9/9/15)    3 day hold Coumadin, [facility]  PT/INR scheduled before procedure    ECTOPIC PREGNANCY SURGERY Left     HYSTERECTOMY      partial    knee arthroscopy         Review of patient's allergies indicates:   Allergen Reactions    Chlorhexidine Itching and Rash     Patient had raised rash on neck following RHC procedure. She states she's never had a problem with the "prep" used until this time.     Adhesive Rash       Medications:  Continuous Infusions:   epoprostenol (VELETRI) infusion 20 ng/kg/min (09/27/17 1810)    furosemide (LASIX) 5 mg/mL infusion (non-titrating) 10 mg/hr (09/27/17 2342)    veletri/remodulin cassette      veletri/remodulin tubing       Scheduled Meds:   duloxetine  60 mg Oral BID    macitentan  10 mg Oral Daily    magnesium oxide  400 mg Oral BID    potassium chloride  60 mEq Oral TID    sodium chloride 0.9%  3 mL Intravenous Q8H    spironolactone  25 mg Oral Daily    warfarin  5 mg Oral Daily     PRN Meds:acetaminophen, gabapentin, hydrOXYzine pamoate, loperamide, metoclopramide HCl, ondansetron, " ondansetron, ondansetron, oxycodone-acetaminophen    Family History     Problem Relation (Age of Onset)    Arthritis Mother, Father    Diabetes Mother    Hyperlipidemia Mother, Father        Social History Main Topics    Smoking status: Never Smoker    Smokeless tobacco: Never Used    Alcohol use No      Comment: rarely    Drug use: No    Sexual activity: No       Review of Systems   Constitutional: Positive for activity change and fatigue.   HENT: Negative for congestion and facial swelling.    Respiratory: Positive for shortness of breath. Negative for cough.    Cardiovascular: Positive for leg swelling.   Gastrointestinal: Positive for constipation and nausea. Negative for abdominal pain.   Musculoskeletal: Negative for back pain and neck pain.   Skin: Negative for rash and wound.   Neurological: Negative for dizziness and speech difficulty.   Psychiatric/Behavioral: Negative for agitation and confusion.     Objective:     Vital Signs (Most Recent):  Temp: 97.7 °F (36.5 °C) (09/28/17 1139)  Pulse: 91 (09/28/17 1139)  Resp: 17 (09/28/17 1139)  BP: (!) 104/57 (09/28/17 1139)  SpO2: (!) 92 % (09/28/17 1139) Vital Signs (24h Range):  Temp:  [97.7 °F (36.5 °C)-98.8 °F (37.1 °C)] 97.7 °F (36.5 °C)  Pulse:  [88-99] 91  Resp:  [16-18] 17  SpO2:  [85 %-96 %] 92 %  BP: ()/(51-58) 104/57     Weight: 73.3 kg (161 lb 9.6 oz)  Body mass index is 28.63 kg/m².    Review of Symptoms  Symptom Assessment (ESAS 0-10 scale)   ESAS 0 1 2 3 4 5 6 7 8 9 10   Pain x             Dyspnea    x          Anxiety x             Nausea    x          Depression  x             Anorexia x             Fatigue x             Insomnia x             Restlessness  x             Agitation x             CAM / Delirium -  Constipation     +  Diarrhea           -  Bowel Management Plan (BMP): No    Comments: Last BM 9/25/17    OME in 24 hours: 0    Performance Status: 60    ECOG Performance Status Grade: 1 - Ambulates, capable of light  work    Physical Exam   Constitutional: She is oriented to person, place, and time. She appears well-developed and well-nourished.   HENT:   Head: Normocephalic and atraumatic.   Cardiovascular: Regular rhythm.  Tachycardia present.    Pulmonary/Chest: Effort normal. No respiratory distress.   ON NC oxygen   Abdominal: Soft. Bowel sounds are normal. There is no tenderness.   Musculoskeletal: She exhibits edema.   1+ BLE edema   Neurological: She is alert and oriented to person, place, and time.   Skin: Skin is warm and dry.   Psychiatric: Thought content normal.   Flat affect   Nursing note and vitals reviewed.      Significant Labs: All pertinent labs within the past 24 hours have been reviewed.  CBC:     Recent Labs  Lab 09/28/17  0546   WBC 5.15   HGB 12.4   HCT 38.3   MCV 75*        BMP:    Recent Labs  Lab 09/28/17  0546         K 3.8   CL 94*   CO2 30*   BUN 15   CREATININE 1.7*   CALCIUM 9.5   MG 1.9     LFT:  Lab Results   Component Value Date    AST 15 09/22/2017    ALKPHOS 112 09/22/2017    BILITOT 0.6 09/22/2017     Albumin:   Albumin   Date Value Ref Range Status   09/22/2017 2.9 (L) 3.5 - 5.2 g/dL Final   01/27/2017 3.6 3.5 - 5.0 G/DL Final     Protein:   Total Protein   Date Value Ref Range Status   09/22/2017 7.1 6.0 - 8.4 g/dL Final     Lactic acid:   Lab Results   Component Value Date    LACTATE 1.0 08/26/2017       Significant Imaging: I have reviewed all pertinent imaging results/findings within the past 24 hours.    Advanced Directives::  Living Will: No  LaPOST: No  Do Not Resuscitate Status: full code  Medical Power of : No    Decision-Making Capacity: Patient answered questions    Living Arrangements: son lives with patient    Psychosocial/Cultural:  Ms. Cook lives in [place], [place]. She is not  but has a partner. She has 3 adult sons. One son lives with her; her mother lives down the street. 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 Hobbies include  Jose.      Spiritual:     F- Itzel and Belief: Restoration    I - Importance: attends service regularly  .  C - Community: no mention of community involvement    A - Address in Care: asked spiritual care to see patient

## 2017-09-28 NOTE — PLAN OF CARE
Problem: Patient Care Overview  Goal: Plan of Care Review  Outcome: Ongoing (interventions implemented as appropriate)  Patient aaox4. Bed kept locked, lowest position while in bed. nonslip footwear when out of bed. nonslip footwear when of out of bed. Ambulates with 1 assistance. Cardiac diet. Telemetry monitoring SR with continuous pulse ox.  Left chest brush cdi. veletri infusing at 20ng/kg/min, DW 86kg.  Weaning highflow nasal canula. Lasix gtts infusin at lasix gtts infusing to right UA PIV cdi.  Vasquez in place. Clear yellow output. Patient reports  BP so far his shift.  Palliative/hispice care  Consulted. Plan to transition from veletri to remodulan. DNR signed and in chart. Standard precautions maintained.

## 2017-09-28 NOTE — CONSULTS
"[facility]  Palliative Medicine  Consult Note    Patient Name: Emily Cook  MRN: [id number]  Admission Date: 9/22/2017  Hospital Length of Stay: 6 days  Code Status: Full Code   Attending Provider: Piero Matos MD  Consulting Provider: Jade Alvarado PA-C  Primary Care Physician: Kaylee Cazares MD  Principal Problem:Acute decompensated heart failure    Patient information was obtained from patient.      Inpatient consult to Palliative Care  Consult performed by: JADE ALVARADO  Consult ordered by: MARILIA SHARMA  Reason for consult: end of life/hospice        Assessment/Plan:   Emily Cook is a 57 y.o. female with pulmonary hypertension on 5L oxygen, Macitentan, and Veletri. She was admitted for acute decompensated heart failure. Palliative medicine was consulted for end of life/hospice.    Palliative care encounter    -Patient is full code  -Next of kin for decision making would be a majority decision amongst her 3 sons.  -The patient was seen this afternoon. Her mother was present. Introduction to palliative medicine was made.  -It was difficult to assess Ms. Cook's insight into her current condition and prognosis as she was unwilling to contribute much. She stated, "I try not to think about it" when asked how she was coping.  -She mentioned that hospice had been brought up by the primary team. Hospice was discussed, including philosophy, services provided, and locations. Ms. Cook is agreeable to discharge home with hospice. Her son lives with her and her mother lives down the street; she seems to have a good support system.  -Contacted spiritual care to see patient as  would greatly benefit from their service.  -Emotional support provided.  -Bereavement tray ordered.  -Agree with discharge home with a hospice company who can provide Remodulin once patient is medically stable. Because the approval process for Remodulin could take some time, it may be reasonable " "to discharge the patient with home health advanced illness management (AIM) who can transition to hospice once the Remodulin has been approved.  -Plan to address code status and work on advance directives with the patient during the next visit.    -Ms. Cook has not had a bowel movement since 9/25/17. Recommend adding docusate sodium 100 mg PO BID.          Thank you for your consult. I will follow-up with patient. Please contact us if you have any additional questions.    Subjective:     HPI:   Emily Cook is a 57 y.o. female with pulmonary hypertension on 5L oxygen, Macitentan, and Veletri. She was admitted for acute decompensated heart failure. Palliative medicine was consulted for end of life/hospice.    Interval History: There were no acute events overnight.    Past Medical History:   Diagnosis Date    Arrhythmia     Arthritis     Cardiac pacemaker in situ     Carpal tunnel syndrome, right     Cervical spondylosis     Cervicalgia     CHF (congestive heart failure)     Diverticulitis     Heart block AV third degree     Hyperlipidemia     ALFREDO on CPAP     Pulmonary hypertension     Subdeltoid bursitis        Past Surgical History:   Procedure Laterality Date    CARDIAC CATHETERIZATION      CARDIAC PACEMAKER PLACEMENT  7/29/13    [employer] [place] PM    CARDIAC SURGERY      COLONOSCOPY N/A 9/28/2015    Procedure: COLONOSCOPY;  Surgeon: Jason Diaz MD;  Location: [facility] ([address] [place] [address]);  Service: Endoscopy;  Laterality: N/A;  Please schedule in 2-3 months with Dr Diaz  Pulmonary HTN, 2nd floor (off remodulin infusion as of "a few days" before 9/9/15)    3 day hold Coumadin, [facility]  PT/INR scheduled before procedure    ECTOPIC PREGNANCY SURGERY Left     HYSTERECTOMY      partial    knee arthroscopy         Review of patient's allergies indicates:   Allergen Reactions    Chlorhexidine Itching and Rash     Patient had raised rash on neck following RHC procedure. She states " "she's never had a problem with the "prep" used until this time.     Adhesive Rash       Medications:  Continuous Infusions:   epoprostenol (VELETRI) infusion 20 ng/kg/min (09/27/17 1810)    furosemide (LASIX) 5 mg/mL infusion (non-titrating) 10 mg/hr (09/27/17 2342)    veletri/remodulin cassette      veletri/remodulin tubing       Scheduled Meds:   duloxetine  60 mg Oral BID    macitentan  10 mg Oral Daily    magnesium oxide  400 mg Oral BID    potassium chloride  60 mEq Oral TID    sodium chloride 0.9%  3 mL Intravenous Q8H    spironolactone  25 mg Oral Daily    warfarin  5 mg Oral Daily     PRN Meds:acetaminophen, gabapentin, hydrOXYzine pamoate, loperamide, metoclopramide HCl, ondansetron, ondansetron, ondansetron, oxycodone-acetaminophen    Family History     Problem Relation (Age of Onset)    Arthritis Mother, Father    Diabetes Mother    Hyperlipidemia Mother, Father        Social History Main Topics    Smoking status: Never Smoker    Smokeless tobacco: Never Used    Alcohol use No      Comment: rarely    Drug use: No    Sexual activity: No       Review of Systems   Constitutional: Positive for activity change and fatigue.   HENT: Negative for congestion and facial swelling.    Respiratory: Positive for shortness of breath. Negative for cough.    Cardiovascular: Positive for leg swelling.   Gastrointestinal: Positive for constipation and nausea. Negative for abdominal pain.   Musculoskeletal: Negative for back pain and neck pain.   Skin: Negative for rash and wound.   Neurological: Negative for dizziness and speech difficulty.   Psychiatric/Behavioral: Negative for agitation and confusion.     Objective:     Vital Signs (Most Recent):  Temp: 97.7 °F (36.5 °C) (09/28/17 1139)  Pulse: 91 (09/28/17 1139)  Resp: 17 (09/28/17 1139)  BP: (!) 104/57 (09/28/17 1139)  SpO2: (!) 92 % (09/28/17 1139) Vital Signs (24h Range):  Temp:  [97.7 °F (36.5 °C)-98.8 °F (37.1 °C)] 97.7 °F (36.5 °C)  Pulse:  [88-99] " 91  Resp:  [16-18] 17  SpO2:  [85 %-96 %] 92 %  BP: ()/(51-58) 104/57     Weight: 73.3 kg (161 lb 9.6 oz)  Body mass index is 28.63 kg/m².    Review of Symptoms  Symptom Assessment (ESAS 0-10 scale)   ESAS 0 1 2 3 4 5 6 7 8 9 10   Pain x             Dyspnea    x          Anxiety x             Nausea    x          Depression  x             Anorexia x             Fatigue x             Insomnia x             Restlessness  x             Agitation x             CAM / Delirium -  Constipation     +  Diarrhea           -  Bowel Management Plan (BMP): No    Comments: Last BM 9/25/17    OME in 24 hours: 0    Performance Status: 60    ECOG Performance Status Grade: 1 - Ambulates, capable of light work    Physical Exam   Constitutional: She is oriented to person, place, and time. She appears well-developed and well-nourished.   HENT:   Head: Normocephalic and atraumatic.   Cardiovascular: Regular rhythm.  Tachycardia present.    Pulmonary/Chest: Effort normal. No respiratory distress.   ON NC oxygen   Abdominal: Soft. Bowel sounds are normal. There is no tenderness.   Musculoskeletal: She exhibits edema.   1+ BLE edema   Neurological: She is alert and oriented to person, place, and time.   Skin: Skin is warm and dry.   Psychiatric: Thought content normal.   Flat affect   Nursing note and vitals reviewed.      Significant Labs: All pertinent labs within the past 24 hours have been reviewed.  CBC:     Recent Labs  Lab 09/28/17  0546   WBC 5.15   HGB 12.4   HCT 38.3   MCV 75*        BMP:    Recent Labs  Lab 09/28/17  0546         K 3.8   CL 94*   CO2 30*   BUN 15   CREATININE 1.7*   CALCIUM 9.5   MG 1.9     LFT:  Lab Results   Component Value Date    AST 15 09/22/2017    ALKPHOS 112 09/22/2017    BILITOT 0.6 09/22/2017     Albumin:   Albumin   Date Value Ref Range Status   09/22/2017 2.9 (L) 3.5 - 5.2 g/dL Final   01/27/2017 3.6 3.5 - 5.0 G/DL Final     Protein:   Total Protein   Date Value Ref Range  Status   09/22/2017 7.1 6.0 - 8.4 g/dL Final     Lactic acid:   Lab Results   Component Value Date    LACTATE 1.0 08/26/2017       Significant Imaging: I have reviewed all pertinent imaging results/findings within the past 24 hours.    Advanced Directives::  Living Will: No  LaPOST: No  Do Not Resuscitate Status: full code  Medical Power of : No    Decision-Making Capacity: Patient answered questions    Living Arrangements: son lives with patient    Psychosocial/Cultural:  Ms. Cook lives in [place], [place]. She is not  but has a partner. She has 3 adult sons. One son lives with her; her mother lives down the street. 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 Hobbies include Bingo.      Spiritual:     F- Itzel and Belief: Mandaen    I - Importance: attends service regularly  .  C - Community: no mention of community involvement    A - Address in Care: asked spiritual care to see patient      > 50% of 75 min visit spent in chart review, face to face discussion of goals of care,  symptom assessment, coordination of care and emotional support.    KOKO DelaneyC  Palliative Medicine  [facility]

## 2017-09-28 NOTE — HPI
Emily Cook is a 57 y.o. female with pulmonary hypertension on 5L oxygen, Macitentan, and Veletri. She was admitted for acute decompensated heart failure. Palliative medicine was consulted for end of life/hospice.

## 2017-09-28 NOTE — ASSESSMENT & PLAN NOTE
"-Patient is full code  -Next of kin for decision making would be a majority decision amongst her 3 sons.  -The patient was seen this afternoon. Her mother was present. Introduction to palliative medicine was made.  -It was difficult to assess Ms. Cook's insight into her current condition and prognosis as she was unwilling to contribute much. She stated, "I try not to think about it" when asked how she was coping.  -She mentioned that hospice had been brought up by the primary team. Hospice was discussed, including philosophy, services provided, and locations. Ms. Cook is agreeable to discharge home with hospice. Her son lives with her and her mother lives down the street; she seems to have a good support system.  -Contacted spiritual care to see patient as  would greatly benefit from their service.  -Emotional support provided.  -Bereavement tray ordered.  -Agree with discharge home with a hospice company who can provide Remodulin once patient is medically stable. Because the approval process for Remodulin could take some time, it may be reasonable to discharge the patient with home health advanced illness management (AIM) who can transition to hospice once the Remodulin has been approved.  -Plan to address code status and work on advance directives with the patient during the next visit.  "

## 2017-09-28 NOTE — ASSESSMENT & PLAN NOTE
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 Net neg in 24 hours. Will likely transition to PO soon.   -Cont spironlactone  -On O2 5L via NC at home. Currently on 7L NC. Will wean O2 with diuresis

## 2017-09-28 NOTE — PLAN OF CARE
Problem: Patient Care Overview  Goal: Plan of Care Review  Outcome: Ongoing (interventions implemented as appropriate)  -AAOx4  -Rt upper arm 20g PIV  -Lt subclavian brush  -pt on tele SR-ST and continuous pulse Ox  -7L HFNC maintain sats > 82%  -lasix @ 2cc/hr, 10mg/hr  -metz catheter, clear yellow urine  -veletri @ 41cc/day DW 86kg, 20ng/kg/min  -plan to continue to monitor     Problem: Infection, Risk/Actual (Adult)  Goal: Identify Related Risk Factors and Signs and Symptoms  Related risk factors and signs and symptoms are identified upon initiation of Human Response Clinical Practice Guideline (CPG)   Outcome: Ongoing (interventions implemented as appropriate)  -Pt is afebrile with Tmax of 98.6    Problem: Fall Risk (Adult)  Goal: Absence of Falls  Patient will demonstrate the desired outcomes by discharge/transition of care.   Outcome: Ongoing (interventions implemented as appropriate)  -pt is free of falls/injuries during shift  -pt ambulates with 1x assist  -mother is at bedside and assists pt as needed  -call light is within reach     Problem: Pressure Ulcer Risk (Rex Scale) (Adult,Obstetrics,Pediatric)  Goal: Skin Integrity  Patient will demonstrate the desired outcomes by discharge/transition of care.   Outcome: Ongoing (interventions implemented as appropriate)  -pt is free of skin breakdown  -pt positions self independently

## 2017-09-28 NOTE — PROGRESS NOTES
"[facility]  Heart Transplant  Progress Note    Patient Name: Emily Cook  MRN: [id number]  Admission Date: 9/22/2017  Hospital Length of Stay: 6 days  Attending Physician: Piero Matos MD  Primary Care Provider: Kaylee Cazares MD  Principal Problem:Acute decompensated heart failure    Subjective:     Interval History: Continues to feel better. O2 weaned to 7L(on 5L at home). After much discussion, pt is now interested in considering palliative care/hospice. Seh understands that her PH may be worsening and she states that she would rather be at home than in the hospital.     Continuous Infusions:   epoprostenol (VELETRI) infusion 20 ng/kg/min (09/27/17 1810)    furosemide (LASIX) 5 mg/mL infusion (non-titrating) 10 mg/hr (09/27/17 2342)    veletri/remodulin cassette      veletri/remodulin tubing       Scheduled Meds:   duloxetine  60 mg Oral BID    macitentan  10 mg Oral Daily    magnesium oxide  400 mg Oral BID    potassium chloride  60 mEq Oral TID    sodium chloride 0.9%  3 mL Intravenous Q8H    spironolactone  25 mg Oral Daily    warfarin  5 mg Oral Daily     PRN Meds:acetaminophen, gabapentin, hydrOXYzine pamoate, loperamide, metoclopramide HCl, ondansetron, ondansetron, ondansetron, oxycodone-acetaminophen    Review of patient's allergies indicates:   Allergen Reactions    Chlorhexidine Itching and Rash     Patient had raised rash on neck following RHC procedure. She states she's never had a problem with the "prep" used until this time.     Adhesive Rash     Objective:     Vital Signs (Most Recent):  Temp: 98.2 °F (36.8 °C) (09/28/17 0800)  Pulse: 95 (09/28/17 0800)  Resp: 18 (09/28/17 0800)  BP: (!) 99/55 (09/28/17 0800)  SpO2: 95 % (09/28/17 0800) Vital Signs (24h Range):  Temp:  [98.2 °F (36.8 °C)-98.8 °F (37.1 °C)] 98.2 °F (36.8 °C)  Pulse:  [86-99] 95  Resp:  [16-18] 18  SpO2:  [85 %-96 %] 95 %  BP: ()/(48-58) 99/55     Weight: 73.3 kg (161 lb 9.6 " oz)  Body mass index is 28.63 kg/m².      Intake/Output Summary (Last 24 hours) at 09/28/17 1105  Last data filed at 09/28/17 1000   Gross per 24 hour   Intake           837.36 ml   Output             1875 ml   Net         -1037.64 ml       Hemodynamic Parameters:         Physical Exam   Constitutional: She is oriented to person, place, and time. She appears well-developed and well-nourished.   Neck: Normal range of motion. Neck supple. JVD (improving. Just above clavicle. ) present.   Cardiovascular: Normal rate and regular rhythm.  Exam reveals no gallop and no friction rub.    Murmur heard.  Pulmonary/Chest: Effort normal. She has no decreased breath sounds. She has no wheezes. She has no rales.   On O2 via HFNC   Abdominal: Soft. Bowel sounds are normal. There is no tenderness.   Musculoskeletal: She exhibits no edema.   Neurological: She is alert and oriented to person, place, and time.   Skin: Skin is warm and dry.       Significant Labs:  CBC:    Recent Labs  Lab 09/28/17  0546   WBC 5.15   RBC 5.11   HGB 12.4   HCT 38.3      MCV 75*   MCH 24.3*   MCHC 32.4     BNP:    Recent Labs  Lab 09/28/17  0546   *     CMP:    Recent Labs  Lab 09/28/17  0546      CALCIUM 9.5      K 3.8   CO2 30*   CL 94*   BUN 15   CREATININE 1.7*      Coagulation:     Recent Labs  Lab 09/28/17  0546   INR 1.8*     LDH:  No results for input(s): LDH in the last 72 hours.  Microbiology:  Microbiology Results (last 7 days)     Procedure Component Value Units Date/Time    Blood culture [[id number]] Collected:  09/23/17 0859    Order Status:  Completed Specimen:  Blood Updated:  09/28/17 1022     Blood Culture, Routine No growth after 5 days.    Blood culture [[id number]] Collected:  09/23/17 0859    Order Status:  Completed Specimen:  Blood Updated:  09/28/17 1022     Blood Culture, Routine No growth after 5 days.    Urine culture [[id number]]  (Susceptibility) Collected:  09/23/17 0938    Order Status:  Completed  Specimen:  Urine from Urine, Clean Catch Updated:  09/25/17 0933     Urine Culture, Routine --     PSEUDOMONAS AERUGINOSA  50,000 - 99,999 cfu/ml            I have reviewed all pertinent labs within the past 24 hours.    Estimated Creatinine Clearance: 35 mL/min (based on SCr of 1.7 mg/dL (H)).    Diagnostic Results:  I have reviewed and interpreted all pertinent imaging results/findings within the past 24 hours.    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 Net neg in 24 hours. Will likely transition to PO soon.   -Cont spironlactone  -On O2 5L via NC at home. Currently on 7L NC. Will wean O2 with diuresis           Moderate to severe pulmonary hypertension    -WHO group 1  -Last ECHO with Severely reduced RV function and RVSP 96  -On 5L O2 at home. Wean O2 as above  - Cont IV Veletri. Continue Macitentan. Consider transitioning to Remodulin as she is considering hospice care at [place].   - INR sub therapeutic. Restarted warfarin.    - Pt now interested in talking to palliative care. We will make DNR as well after discussion today.         Acute on chronic respiratory failure with hypoxia    2/2 ADHF as above             Félix Calles NP  Heart Transplant  [facility]

## 2017-09-29 NOTE — PLAN OF CARE
Problem: Patient Care Overview  Goal: Plan of Care Review  Outcome: Ongoing (interventions implemented as appropriate)  Patient aaox4. Bed kept locked, lowest position while in bed. nonslip footwear when out of bed. nonslip footwear when of out of bed. Ambulates with 1 assistance. Cardiac diet. Telemetry monitoring SR with continuous pulse ox.  Left chest brush cdi. veletri infusing at 20ng/kg/min, DW 86kg.  Weaning highflow nasal canula; now on 6L . Lasix gtts infusin discontinued per order, plan to start PO diuretic tonight; right UA PIV cdi.  Vasquez removed per order, patient verbalized understanding to call for assistance to bathroom.. Clear yellow output.  Palliative/hispice care  Consulted. Plan to transition from veletri to remodulan. DNR signed and in chart. Standard precautions maintained.

## 2017-09-29 NOTE — SUBJECTIVE & OBJECTIVE
"Interval History: There were no acute events overnight. The patient is being transitioned to Remodulin in preparation for discharge home.    Past Medical History:   Diagnosis Date    Arrhythmia     Arthritis     Cardiac pacemaker in situ     Carpal tunnel syndrome, right     Cervical spondylosis     Cervicalgia     CHF (congestive heart failure)     Diverticulitis     Heart block AV third degree     Hyperlipidemia     ALFREDO on CPAP     Pulmonary hypertension     Subdeltoid bursitis        Past Surgical History:   Procedure Laterality Date    CARDIAC CATHETERIZATION      CARDIAC PACEMAKER PLACEMENT  7/29/13    [employer] DC PM    CARDIAC SURGERY      COLONOSCOPY N/A 9/28/2015    Procedure: COLONOSCOPY;  Surgeon: Jason Diaz MD;  [facility]  Service: Endoscopy;  Laterality: N/A;  Please schedule in 2-3 months with Dr Diaz  Pulmonary HTN, 2nd floor (off remodulin infusion as of "a few days" before 9/9/15)    3 day hold Coumadin, [facility]  PT/INR scheduled before procedure    ECTOPIC PREGNANCY SURGERY Left     HYSTERECTOMY      partial    knee arthroscopy         Review of patient's allergies indicates:   Allergen Reactions    Chlorhexidine Itching and Rash     Patient had raised rash on neck following RHC procedure. She states she's never had a problem with the "prep" used until this time.     Adhesive Rash       Medications:  Continuous Infusions:   epoprostenol (VELETRI) infusion 20 ng/kg/min (09/28/17 1812)    furosemide (LASIX) 5 mg/mL infusion (non-titrating) 10 mg/hr (09/28/17 1707)    veletri/remodulin cassette      veletri/remodulin tubing       Scheduled Meds:   duloxetine  60 mg Oral BID    macitentan  10 mg Oral Daily    magnesium oxide  400 mg Oral BID    potassium chloride  60 mEq Oral TID    senna-docusate 8.6-50 mg  2 tablet Oral BID    sodium chloride 0.9%  3 mL Intravenous Q8H    spironolactone  25 mg Oral Daily    warfarin  5 " mg Oral Daily     PRN Meds:acetaminophen, gabapentin, hydrOXYzine pamoate, loperamide, metoclopramide HCl, ondansetron, ondansetron, ondansetron, oxycodone-acetaminophen    Family History     Problem Relation (Age of Onset)    Arthritis Mother, Father    Diabetes Mother    Hyperlipidemia Mother, Father        Social History Main Topics    Smoking status: Never Smoker    Smokeless tobacco: Never Used    Alcohol use No      Comment: rarely    Drug use: No    Sexual activity: No       Review of Systems   Constitutional: Positive for activity change and fatigue.   HENT: Negative for congestion and facial swelling.    Respiratory: Positive for shortness of breath. Negative for cough.    Cardiovascular: Positive for leg swelling.   Gastrointestinal: Positive for constipation and nausea. Negative for abdominal pain.   Musculoskeletal: Negative for back pain and neck pain.   Skin: Negative for rash and wound.   Neurological: Negative for dizziness and speech difficulty.   Psychiatric/Behavioral: Negative for agitation and confusion.     Objective:     Vital Signs (Most Recent):  Temp: 97.8 °F (36.6 °C) (09/29/17 0800)  Pulse: 98 (09/29/17 0800)  Resp: 18 (09/29/17 0800)  BP: (!) 96/59 (09/29/17 0800)  SpO2: (!) 90 % (09/29/17 0800) Vital Signs (24h Range):  Temp:  [97.7 °F (36.5 °C)-98.9 °F (37.2 °C)] 97.8 °F (36.6 °C)  Pulse:  [84-99] 98  Resp:  [17-18] 18  SpO2:  [89 %-95 %] 90 %  BP: ()/(50-62) 96/59     Weight: 71.1 kg (156 lb 12 oz)  Body mass index is 27.77 kg/m².    Review of Symptoms  Symptom Assessment (ESAS 0-10 scale)   ESAS 0 1 2 3 4 5 6 7 8 9 10   Pain x             Dyspnea    x          Anxiety x             Nausea    x          Depression  x             Anorexia x             Fatigue x             Insomnia x             Restlessness  x             Agitation x             CAM / Delirium -  Constipation     +  Diarrhea           -  Bowel Management Plan (BMP): yes    Comments: Last BM 9/25/17    OME  in 24 hours: 0    Performance Status: 60    ECOG Performance Status Grade: 1 - Ambulates, capable of light work    Physical Exam   Constitutional: She is oriented to person, place, and time. She appears well-developed and well-nourished.   HENT:   Head: Normocephalic and atraumatic.   Cardiovascular: Regular rhythm.  Tachycardia present.    Pulmonary/Chest: Effort normal. No respiratory distress.   ON NC oxygen   Abdominal: Soft. Bowel sounds are normal. There is no tenderness.   Musculoskeletal: She exhibits edema.   1+ BLE edema   Neurological: She is alert and oriented to person, place, and time.   Skin: Skin is warm and dry.   Psychiatric: Thought content normal.   Flat affect   Nursing note and vitals reviewed.      Significant Labs: All pertinent labs within the past 24 hours have been reviewed.  CBC:     Recent Labs  Lab 09/29/17  0409   WBC 5.52   HGB 13.4   HCT 41.2   MCV 75*        BMP:    Recent Labs  Lab 09/29/17  0409         K 3.8   CL 94*   CO2 31*   BUN 15   CREATININE 1.7*   CALCIUM 9.9   MG 2.0     LFT:  Lab Results   Component Value Date    AST 15 09/22/2017    ALKPHOS 112 09/22/2017    BILITOT 0.6 09/22/2017     Albumin:   Albumin   Date Value Ref Range Status   09/22/2017 2.9 (L) 3.5 - 5.2 g/dL Final   01/27/2017 3.6 3.5 - 5.0 G/DL Final     Protein:   Total Protein   Date Value Ref Range Status   09/22/2017 7.1 6.0 - 8.4 g/dL Final     Lactic acid:   Lab Results   Component Value Date    LACTATE 1.0 08/26/2017       Significant Imaging: I have reviewed all pertinent imaging results/findings within the past 24 hours.    Advanced Directives::  Living Will: No  LaPOST: No  Do Not Resuscitate Status: DNR  Medical Power of : No    Decision-Making Capacity: Patient answered questions    Living Arrangements: son lives with patient    Psychosocial/Cultural:  Ms. Cook lives in [place], [place]. She is not  but has a partner. She has 3 adult sons. One son lives with her; her  mother lives down the street. 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 Hobbies include Bingo.      Spiritual:     F- Itzel and Belief: Amish    I - Importance: attends service regularly  .  C - Community: no mention of community involvement    A - Address in Care: spiritual care is following the patient

## 2017-09-29 NOTE — PLAN OF CARE
Problem: Patient Care Overview  Goal: Plan of Care Review  Outcome: Ongoing (interventions implemented as appropriate)  -AAOx4  -Rt upper arm 20g PIV  -Lt subclavian brush  -pt on tele SR-ST and continuous pulse Ox  -6L HFNC maintain sats > 82%  -lasix @ 2cc/hr, 10mg/hr  -metz catheter, clear yellow urine  -veletri @ 41cc/day DW 86kg, 20ng/kg/min  -pt consented to making self DNR during day shift  -plan to continue to monitor, possible DC to hospice soon          Problem: Infection, Risk/Actual (Adult)  Goal: Identify Related Risk Factors and Signs and Symptoms  Related risk factors and signs and symptoms are identified upon initiation of Human Response Clinical Practice Guideline (CPG)   Outcome: Ongoing (interventions implemented as appropriate)  -pt is afebrile with Tmax of 98.5    Problem: Fall Risk (Adult)  Goal: Absence of Falls  Patient will demonstrate the desired outcomes by discharge/transition of care.   Outcome: Ongoing (interventions implemented as appropriate)  -pt is free of falls/injuries during shift  -pt ambulates with 1x assist  -mother is at bedside and assists pt as needed  -call light is within reach          Problem: Pressure Ulcer Risk (Rex Scale) (Adult,Obstetrics,Pediatric)  Goal: Skin Integrity  Patient will demonstrate the desired outcomes by discharge/transition of care.   Outcome: Ongoing (interventions implemented as appropriate)  -pt is free of skin breakdown  -pt positions self independently

## 2017-09-29 NOTE — PROGRESS NOTES
"[facility]  Palliative Medicine  Progress Note    Patient Name: Emily Cook  MRN: [id number]  Admission Date: 9/22/2017  Hospital Length of Stay: 7 days  Code Status: DNR   Attending Provider: Augustine Rhodes MD  Consulting Provider: Vania Alvarado PA-C  Primary Care Physician: Kaylee Cazares MD  Principal Problem:Acute decompensated heart failure    Patient information was obtained from patient and relative(s).      Assessment/Plan:   Emily Cook is a 57 y.o. female with pulmonary hypertension on 5L oxygen, Macitentan, and Veletri. She was admitted for acute decompensated heart failure. Palliative medicine was consulted for end of life/hospice.  Palliative care encounter    -Patient is DNR  -Next of kin for decision making would be a majority decision amongst her 3 sons.  -The patient was seen this morning with medical student, Taty Pritchard. Her mother, son, and niece were present.   -Ms. Cook remains reluctant to discuss her current condition and future plans. She stated, "I am putting it in God's hands." Chaplain Godinez met with the patient yesterday afternoon. His assistance is appreciated.   -Followed up with Ms. Cook regarding hospice discussion yesterday. She remains agreeable to discharge home with hospice. It was explained that it may take some time for the Remodulin approval. She is agreeable to discharge with home health AIM with transition to hospice.  -Discussion of advance directives occurred. Ms. Cook was given the paperwork and would like to review it before completing it. Plan to return to her room this afternoon to assist with its completion.  -Code status was discussed. The patient does NOT want any aggressive life sustaining interventions, including CPR or intubation.  -Spoke with LYNNE Navarro who is currently working on the Remodulin approval and subsequent patient assistance with hospice. Her assistance is appreciated.   -Agree with discharge home with a hospice " "company who can provide Remodulin once patient is medically stable. Because the approval process for Remodulin with hospice could take some time, it is reasonable to discharge the patient with home health advanced illness management (AIM) who can transition to hospice once the Remodulin has been approved.            The patient was discussed with attending, Dr. Baldwin.    I will follow-up with patient. Please contact us if you have any additional questions.    Subjective:     Chief Complaint:   Chief Complaint   Patient presents with    Shortness of Breath     referred from pulmonologyb clinic for low PO - 83-85 on 6l/NC.        HPI:   Emily Cook is a 57 y.o. female with pulmonary hypertension on 5L oxygen, Macitentan, and Veletri. She was admitted for acute decompensated heart failure. Palliative medicine was consulted for end of life/hospice.    Interval History: There were no acute events overnight. The patient is being transitioned to Remodulin in preparation for discharge home.    Past Medical History:   Diagnosis Date    Arrhythmia     Arthritis     Cardiac pacemaker in situ     Carpal tunnel syndrome, right     Cervical spondylosis     Cervicalgia     CHF (congestive heart failure)     Diverticulitis     Heart block AV third degree     Hyperlipidemia     ALFREDO on CPAP     Pulmonary hypertension     Subdeltoid bursitis        Past Surgical History:   Procedure Laterality Date    CARDIAC CATHETERIZATION      CARDIAC PACEMAKER PLACEMENT  7/29/13    [employer] DC PM    CARDIAC SURGERY      COLONOSCOPY N/A 9/28/2015    Procedure: COLONOSCOPY;  Surgeon: Jason Diaz MD;  [facility] [place] [address]);  Service: Endoscopy;  Laterality: N/A;  Please schedule in 2-3 months with Dr Diaz  Pulmonary HTN, 2nd floor (off remodulin infusion as of "a few days" before 9/9/15)    3 day hold Coumadin, [facility]  PT/INR scheduled before procedure    ECTOPIC PREGNANCY SURGERY Left     " "HYSTERECTOMY      partial    knee arthroscopy         Review of patient's allergies indicates:   Allergen Reactions    Chlorhexidine Itching and Rash     Patient had raised rash on neck following RHC procedure. She states she's never had a problem with the "prep" used until this time.     Adhesive Rash       Medications:  Continuous Infusions:   epoprostenol (VELETRI) infusion 20 ng/kg/min (09/28/17 1812)    furosemide (LASIX) 5 mg/mL infusion (non-titrating) 10 mg/hr (09/28/17 4594)    veletri/remodulin cassette      veletri/remodulin tubing       Scheduled Meds:   duloxetine  60 mg Oral BID    macitentan  10 mg Oral Daily    magnesium oxide  400 mg Oral BID    potassium chloride  60 mEq Oral TID    senna-docusate 8.6-50 mg  2 tablet Oral BID    sodium chloride 0.9%  3 mL Intravenous Q8H    spironolactone  25 mg Oral Daily    warfarin  5 mg Oral Daily     PRN Meds:acetaminophen, gabapentin, hydrOXYzine pamoate, loperamide, metoclopramide HCl, ondansetron, ondansetron, ondansetron, oxycodone-acetaminophen    Family History     Problem Relation (Age of Onset)    Arthritis Mother, Father    Diabetes Mother    Hyperlipidemia Mother, Father        Social History Main Topics    Smoking status: Never Smoker    Smokeless tobacco: Never Used    Alcohol use No      Comment: rarely    Drug use: No    Sexual activity: No       Review of Systems   Constitutional: Positive for activity change and fatigue.   HENT: Negative for congestion and facial swelling.    Respiratory: Positive for shortness of breath. Negative for cough.    Cardiovascular: Positive for leg swelling.   Gastrointestinal: Positive for constipation and nausea. Negative for abdominal pain.   Musculoskeletal: Negative for back pain and neck pain.   Skin: Negative for rash and wound.   Neurological: Negative for dizziness and speech difficulty.   Psychiatric/Behavioral: Negative for agitation and confusion.     Objective:     Vital Signs (Most " Recent):  Temp: 97.8 °F (36.6 °C) (09/29/17 0800)  Pulse: 98 (09/29/17 0800)  Resp: 18 (09/29/17 0800)  BP: (!) 96/59 (09/29/17 0800)  SpO2: (!) 90 % (09/29/17 0800) Vital Signs (24h Range):  Temp:  [97.7 °F (36.5 °C)-98.9 °F (37.2 °C)] 97.8 °F (36.6 °C)  Pulse:  [84-99] 98  Resp:  [17-18] 18  SpO2:  [89 %-95 %] 90 %  BP: ()/(50-62) 96/59     Weight: 71.1 kg (156 lb 12 oz)  Body mass index is 27.77 kg/m².    Review of Symptoms  Symptom Assessment (ESAS 0-10 scale)   ESAS 0 1 2 3 4 5 6 7 8 9 10   Pain x             Dyspnea    x          Anxiety x             Nausea    x          Depression  x             Anorexia x             Fatigue x             Insomnia x             Restlessness  x             Agitation x             CAM / Delirium -  Constipation     +  Diarrhea           -  Bowel Management Plan (BMP): yes    Comments: Last BM 9/25/17    OME in 24 hours: 0    Performance Status: 60    ECOG Performance Status Grade: 1 - Ambulates, capable of light work    Physical Exam   Constitutional: She is oriented to person, place, and time. She appears well-developed and well-nourished.   HENT:   Head: Normocephalic and atraumatic.   Cardiovascular: Regular rhythm.  Tachycardia present.    Pulmonary/Chest: Effort normal. No respiratory distress.   ON NC oxygen   Abdominal: Soft. Bowel sounds are normal. There is no tenderness.   Musculoskeletal: She exhibits edema.   1+ BLE edema   Neurological: She is alert and oriented to person, place, and time.   Skin: Skin is warm and dry.   Psychiatric: Thought content normal.   Flat affect   Nursing note and vitals reviewed.      Significant Labs: All pertinent labs within the past 24 hours have been reviewed.  CBC:     Recent Labs  Lab 09/29/17  0409   WBC 5.52   HGB 13.4   HCT 41.2   MCV 75*        BMP:    Recent Labs  Lab 09/29/17  0409         K 3.8   CL 94*   CO2 31*   BUN 15   CREATININE 1.7*   CALCIUM 9.9   MG 2.0     LFT:  Lab Results   Component  Value Date    AST 15 09/22/2017    ALKPHOS 112 09/22/2017    BILITOT 0.6 09/22/2017     Albumin:   Albumin   Date Value Ref Range Status   09/22/2017 2.9 (L) 3.5 - 5.2 g/dL Final   01/27/2017 3.6 3.5 - 5.0 G/DL Final     Protein:   Total Protein   Date Value Ref Range Status   09/22/2017 7.1 6.0 - 8.4 g/dL Final     Lactic acid:   Lab Results   Component Value Date    LACTATE 1.0 08/26/2017       Significant Imaging: I have reviewed all pertinent imaging results/findings within the past 24 hours.    Advanced Directives::  Living Will: No  LaPOST: No  Do Not Resuscitate Status: DNR  Medical Power of : No    Decision-Making Capacity: Patient answered questions    Living Arrangements: son lives with patient    Psychosocial/Cultural:  Ms. Cook lives in [place], [place]. She is not  but has a partner. She has 3 adult sons. One son lives with her; her mother lives down the street. 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 Hobbies include Bingo.      Spiritual:     F- Itzel and Belief: Taoism    I - Importance: attends service regularly  .  C - Community: no mention of community involvement    A - Address in Care: spiritual care is following the patient      > 50% of 35 min visit spent in chart review, face to face discussion of goals of care,  symptom assessment, coordination of care and emotional support.    KOKO DelaneyC  Palliative Medicine  [facility]

## 2017-09-29 NOTE — PROGRESS NOTES
"[facility]  Heart Transplant  Progress Note    Patient Name: Emily Cook  MRN: [id number]  Admission Date: 9/22/2017  Hospital Length of Stay: 7 days  Attending Physician: Augustine Rhodes MD  Primary Care Provider: Kaylee Cazares MD  Principal Problem:Acute decompensated heart failure    Subjective:     Interval History: Continues to feel better.     Continuous Infusions:   epoprostenol (VELETRI) infusion 20 ng/kg/min (09/28/17 1812)    furosemide (LASIX) 5 mg/mL infusion (non-titrating) 10 mg/hr (09/28/17 2354)    veletri/remodulin cassette      veletri/remodulin tubing       Scheduled Meds:   duloxetine  60 mg Oral BID    macitentan  10 mg Oral Daily    magnesium oxide  400 mg Oral BID    potassium chloride  60 mEq Oral TID    senna-docusate 8.6-50 mg  2 tablet Oral BID    sodium chloride 0.9%  3 mL Intravenous Q8H    spironolactone  25 mg Oral Daily    warfarin  5 mg Oral Daily     PRN Meds:acetaminophen, gabapentin, hydrOXYzine pamoate, loperamide, metoclopramide HCl, ondansetron, ondansetron, ondansetron, oxycodone-acetaminophen    Review of patient's allergies indicates:   Allergen Reactions    Chlorhexidine Itching and Rash     Patient had raised rash on neck following RHC procedure. She states she's never had a problem with the "prep" used until this time.     Adhesive Rash     Objective:     Vital Signs (Most Recent):  Temp: 97.8 °F (36.6 °C) (09/29/17 0800)  Pulse: 98 (09/29/17 0800)  Resp: 18 (09/29/17 0800)  BP: (!) 96/59 (09/29/17 0800)  SpO2: (!) 90 % (09/29/17 0800) Vital Signs (24h Range):  Temp:  [97.7 °F (36.5 °C)-98.9 °F (37.2 °C)] 97.8 °F (36.6 °C)  Pulse:  [84-99] 98  Resp:  [17-18] 18  SpO2:  [89 %-95 %] 90 %  BP: ()/(50-62) 96/59     Weight: 71.1 kg (156 lb 12 oz)  Body mass index is 27.77 kg/m².      Intake/Output Summary (Last 24 hours) at 09/29/17 0906  Last data filed at 09/29/17 0545   Gross per 24 hour   Intake          1120.91 ml   Output  "            1600 ml   Net          -479.09 ml       Hemodynamic Parameters:         Physical Exam   Constitutional: She is oriented to person, place, and time. She appears well-developed and well-nourished.   Neck: Normal range of motion. Neck supple. JVD (improving. Just above clavicle. ) present.   Cardiovascular: Normal rate and regular rhythm.  Exam reveals no gallop and no friction rub.    Murmur heard.  Pulmonary/Chest: Effort normal. She has no decreased breath sounds. She has no wheezes. She has no rales.   On O2 via HFNC   Abdominal: Soft. Bowel sounds are normal. There is no tenderness.   Musculoskeletal: She exhibits no edema.   Neurological: She is alert and oriented to person, place, and time.   Skin: Skin is warm and dry.       Significant Labs:  CBC:    Recent Labs  Lab 09/29/17 0409   WBC 5.52   RBC 5.51*   HGB 13.4   HCT 41.2      MCV 75*   MCH 24.3*   MCHC 32.5     BNP:    Recent Labs  Lab 09/28/17  0546   *     CMP:    Recent Labs  Lab 09/29/17 0409      CALCIUM 9.9      K 3.8   CO2 31*   CL 94*   BUN 15   CREATININE 1.7*      Coagulation:     Recent Labs  Lab 09/29/17 0409   INR 2.3*     LDH:  No results for input(s): LDH in the last 72 hours.  Microbiology:  Microbiology Results (last 7 days)     Procedure Component Value Units Date/Time    Blood culture [[id number]] Collected:  09/23/17 0859    Order Status:  Completed Specimen:  Blood Updated:  09/28/17 1022     Blood Culture, Routine No growth after 5 days.    Blood culture [[id number]] Collected:  09/23/17 0859    Order Status:  Completed Specimen:  Blood Updated:  09/28/17 1022     Blood Culture, Routine No growth after 5 days.    Urine culture [[id number]]  (Susceptibility) Collected:  09/23/17 0938    Order Status:  Completed Specimen:  Urine from Urine, Clean Catch Updated:  09/25/17 0933     Urine Culture, Routine --     PSEUDOMONAS AERUGINOSA  50,000 - 99,999 cfu/ml            I have reviewed all pertinent  "labs within the past 24 hours.    Estimated Creatinine Clearance: 34.5 mL/min (based on SCr of 1.7 mg/dL (H)).    Diagnostic Results:  I have reviewed and interpreted all pertinent imaging results/findings within the past 24 hours.    Assessment and Plan:     57 y.o. female with hx of pulmonary HTN WHO group 1 with 5L O2 at home on Macitentan, Veletri and Coumadin who was recently admitted 2 weeks ago for MRSA Bacteremia 2/2 PNA and ADHF. She was diuresed and discharged with vanc which she finished 2 days ago. She presented to the ED with shortness of breath and hypoxia from her Dr. Bae today. Not been taking her bumex at home over the past several days d/t "laziness" and noticed that she had leg swelling yesterday so she did take a dose then. This morning she felt fine but then started to develop shortness of breath with ambulation. Denies any chest pain, palpitations, but has had nausea. On arrival to ED BP in 100s systolic, hypoxia of 84%, Pulse 85 on exam he had JVD to jaw and bilateral LE edema. EKG with right axis deviation, CXR with enlarged pulm vessels but no edema noted. BNP 1600 was 340 2 weeks ago.       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 Net neg in 24 hours. Will likely transition to PO soon.   -Cont spironlactone  -On O2 5L via NC at home. Currently on 7L NC. Will wean O2 with diuresis           Moderate to severe pulmonary hypertension    -WHO group 1  -Last ECHO with Severely reduced RV function and RVSP 96  -On 5L O2 at home. Wean O2 as above  - Cont IV Veletri. Continue Macitentan. Consider transitioning to Remodulin as she is considering hospice care at [place].   - INR sub therapeutic. Restarted warfarin.    - Appreciate Palliative care cx. Currently DNR. Plan to transition to IV Treprostinil and eventually DC with home hospice once medically ready.         Acute on chronic respiratory failure " with hypoxia    2/2 ADHF as above             Félix Calles NP  Heart Transplant  [facility]

## 2017-09-29 NOTE — SUBJECTIVE & OBJECTIVE
"Interval History: Continues to feel better.     Continuous Infusions:   epoprostenol (VELETRI) infusion 20 ng/kg/min (09/28/17 1812)    furosemide (LASIX) 5 mg/mL infusion (non-titrating) 10 mg/hr (09/28/17 2354)    veletri/remodulin cassette      veletri/remodulin tubing       Scheduled Meds:   duloxetine  60 mg Oral BID    macitentan  10 mg Oral Daily    magnesium oxide  400 mg Oral BID    potassium chloride  60 mEq Oral TID    senna-docusate 8.6-50 mg  2 tablet Oral BID    sodium chloride 0.9%  3 mL Intravenous Q8H    spironolactone  25 mg Oral Daily    warfarin  5 mg Oral Daily     PRN Meds:acetaminophen, gabapentin, hydrOXYzine pamoate, loperamide, metoclopramide HCl, ondansetron, ondansetron, ondansetron, oxycodone-acetaminophen    Review of patient's allergies indicates:   Allergen Reactions    Chlorhexidine Itching and Rash     Patient had raised rash on neck following RHC procedure. She states she's never had a problem with the "prep" used until this time.     Adhesive Rash     Objective:     Vital Signs (Most Recent):  Temp: 97.8 °F (36.6 °C) (09/29/17 0800)  Pulse: 98 (09/29/17 0800)  Resp: 18 (09/29/17 0800)  BP: (!) 96/59 (09/29/17 0800)  SpO2: (!) 90 % (09/29/17 0800) Vital Signs (24h Range):  Temp:  [97.7 °F (36.5 °C)-98.9 °F (37.2 °C)] 97.8 °F (36.6 °C)  Pulse:  [84-99] 98  Resp:  [17-18] 18  SpO2:  [89 %-95 %] 90 %  BP: ()/(50-62) 96/59     Weight: 71.1 kg (156 lb 12 oz)  Body mass index is 27.77 kg/m².      Intake/Output Summary (Last 24 hours) at 09/29/17 0906  Last data filed at 09/29/17 0545   Gross per 24 hour   Intake          1120.91 ml   Output             1600 ml   Net          -479.09 ml       Hemodynamic Parameters:         Physical Exam   Constitutional: She is oriented to person, place, and time. She appears well-developed and well-nourished.   Neck: Normal range of motion. Neck supple. JVD (improving. Just above clavicle. ) present.   Cardiovascular: Normal rate and " regular rhythm.  Exam reveals no gallop and no friction rub.    Murmur heard.  Pulmonary/Chest: Effort normal. She has no decreased breath sounds. She has no wheezes. She has no rales.   On O2 via HFNC   Abdominal: Soft. Bowel sounds are normal. There is no tenderness.   Musculoskeletal: She exhibits no edema.   Neurological: She is alert and oriented to person, place, and time.   Skin: Skin is warm and dry.       Significant Labs:  CBC:    Recent Labs  Lab 09/29/17 0409   WBC 5.52   RBC 5.51*   HGB 13.4   HCT 41.2      MCV 75*   MCH 24.3*   MCHC 32.5     BNP:    Recent Labs  Lab 09/28/17  0546   *     CMP:    Recent Labs  Lab 09/29/17 0409      CALCIUM 9.9      K 3.8   CO2 31*   CL 94*   BUN 15   CREATININE 1.7*      Coagulation:     Recent Labs  Lab 09/29/17 0409   INR 2.3*     LDH:  No results for input(s): LDH in the last 72 hours.  Microbiology:  Microbiology Results (last 7 days)     Procedure Component Value Units Date/Time    Blood culture [[id number]] Collected:  09/23/17 0859    Order Status:  Completed Specimen:  Blood Updated:  09/28/17 1022     Blood Culture, Routine No growth after 5 days.    Blood culture [[id number]] Collected:  09/23/17 0859    Order Status:  Completed Specimen:  Blood Updated:  09/28/17 1022     Blood Culture, Routine No growth after 5 days.    Urine culture [[id number]]  (Susceptibility) Collected:  09/23/17 0938    Order Status:  Completed Specimen:  Urine from Urine, Clean Catch Updated:  09/25/17 0933     Urine Culture, Routine --     PSEUDOMONAS AERUGINOSA  50,000 - 99,999 cfu/ml            I have reviewed all pertinent labs within the past 24 hours.    Estimated Creatinine Clearance: 34.5 mL/min (based on SCr of 1.7 mg/dL (H)).    Diagnostic Results:  I have reviewed and interpreted all pertinent imaging results/findings within the past 24 hours.

## 2017-09-29 NOTE — ASSESSMENT & PLAN NOTE
"-Patient is DNR  -Next of kin for decision making would be a majority decision amongst her 3 sons.  -The patient was seen this morning with medical student, Taty Pritchard. Her mother, son, and niece were present.   -Ms. Cook remains reluctant to discuss her current condition and future plans. She stated, "I am putting it in God's hands." zi Godinez met with the patient yesterday afternoon. His assistance is appreciated.   -Discussion of advance directives occurred. Ms. Cook was given the paperwork and would like to review it before completing it. Plan to return to her room this afternoon to assist with its completion.  -Code status was discussed. The patient does NOT want any aggressive life sustaining interventions, including CPR or intubation.  -Spoke with LYNNE Navarro who is currently working on the Remodulin approval and subsequent patient assistance with hospice. Her assistance is appreciated.   -Agree with discharge home with a hospice company who can provide Remodulin once patient is medically stable. Because the approval process for Remodulin with hospice could take some time, it is reasonable to discharge the patient with home health advanced illness management (AIM) who can transition to hospice once the Remodulin has been approved.    "

## 2017-09-29 NOTE — ASSESSMENT & PLAN NOTE
-WHO group 1  -Last ECHO with Severely reduced RV function and RVSP 96  -On 5L O2 at home. Wean O2 as above  - Cont IV Veletri. Continue Macitentan. Consider transitioning to Remodulin as she is considering hospice care at [place].   - INR sub therapeutic. Restarted warfarin.    - Appreciate Palliative care cx. Currently DNR. Plan to transition to IV Treprostinil and eventually DC with home hospice once medically ready.

## 2017-09-30 NOTE — PLAN OF CARE
Problem: Patient Care Overview  Goal: Plan of Care Review  Outcome: Ongoing (interventions implemented as appropriate)  Pt is AAOx3.Pt needs stand by assistance only. Pt is a DNR. NAD noted.Pt denies pian and/or discomfort at this time.Standard precautions maintained.  Telly monitoring on going. Pt requires total care. Pt not able to perform ADL's. Family at bedside. Pt remains injury and fall free, non skid footwear donned, call light within reach, personal items within reach, bed in low/locked position, pt able to voice needs all needs voiced have been met at this time.

## 2017-09-30 NOTE — PLAN OF CARE
Problem: Patient Care Overview  Goal: Plan of Care Review  Outcome: Ongoing (interventions implemented as appropriate)  Patient AAOx4 and VSS. She has remained afebrile. Telemetry monitor on and HR has been in the 90s, NS. Continuous pulse ox in place and high flow to be weaned so that O2 sats >82%. Veletri is going at 20 ng/kg/min with a dosing wt of 86 kg (41 mL/24hr) through a left chest wall perm cath. Backup cassette is at the bedside. The dressing to the perm cath was changed today per RN. She's able to ambulate with standby assistance and has experienced no falls today. Her mom remains at the bedside and they have no questions or concerns at this time. Patient stable and will continue to monitor.

## 2017-10-01 NOTE — ASSESSMENT & PLAN NOTE
- WHO Group 1, last echo with severely reduced RV function with RVSP at 96  - Continue on supplemental oxygen via NC   - Cont IV Veletri (Epoprostinel) and Macitentan,   - Plan to D/C on Remoduline (Treprostinil)

## 2017-10-01 NOTE — SUBJECTIVE & OBJECTIVE
Interval History:     Review of Systems   Constitution: Negative.   HENT: Negative.    Cardiovascular: Negative.    Respiratory: Negative.    Skin: Negative.    Musculoskeletal: Negative.    Gastrointestinal: Negative.    Genitourinary: Negative.    Neurological: Negative.    Psychiatric/Behavioral: Negative.      Objective:     Vital Signs (Most Recent):  Temp: 98.5 °F (36.9 °C) (10/01/17 1203)  Pulse: 89 (10/01/17 1203)  Resp: 18 (10/01/17 1203)  BP: 109/62 (10/01/17 1203)  SpO2: (!) 90 % (10/01/17 1203) Vital Signs (24h Range):  Temp:  [97.8 °F (36.6 °C)-98.6 °F (37 °C)] 98.5 °F (36.9 °C)  Pulse:  [] 89  Resp:  [17-18] 18  SpO2:  [87 %-100 %] 90 %  BP: ()/(54-62) 109/62     Weight: 71.6 kg (157 lb 13.6 oz)  Body mass index is 27.96 kg/m².     SpO2: (!) 90 %  O2 Device (Oxygen Therapy): High Flow nasal Cannula      Intake/Output Summary (Last 24 hours) at 10/01/17 1210  Last data filed at 09/30/17 1700   Gross per 24 hour   Intake            10.32 ml   Output                0 ml   Net            10.32 ml       Lines/Drains/Airways     Central Venous Catheter Line                 Tunneled Central Line Insertion/Assessment - Single Lumen  left subclavian -- days         Tunneled Central Line Insertion/Assessment - Double Lumen  09/08/17 0905 left internal jugular 23 days          Airway                 Airway - Non-Surgical Mask -- days          Peripheral Intravenous Line                 Peripheral IV - Single Lumen 09/26/17 1150 Right Upper Arm 5 days                Physical Exam   Constitutional: She is oriented to person, place, and time. She appears well-developed and well-nourished.   HENT:   Head: Normocephalic.   Left Ear: External ear normal.   Nose: Nose normal.   Mouth/Throat: Oropharynx is clear and moist. No oropharyngeal exudate.   Eyes: Conjunctivae and EOM are normal. Pupils are equal, round, and reactive to light. Right eye exhibits no discharge. Left eye exhibits no discharge. No  scleral icterus.   Neck: Normal range of motion. Neck supple. No JVD present. No tracheal deviation present. No thyromegaly present.   Cardiovascular: Normal rate and regular rhythm.  Exam reveals no gallop and no friction rub.    No murmur heard.  Pulmonary/Chest: Effort normal and breath sounds normal.   Abdominal: Soft. Bowel sounds are normal.   Musculoskeletal: Normal range of motion. She exhibits no edema, tenderness or deformity.   Lymphadenopathy:     She has no cervical adenopathy.   Neurological: She is alert and oriented to person, place, and time. She has normal reflexes. No cranial nerve deficit. Coordination normal.   Skin: Skin is warm and dry.   Psychiatric: She has a normal mood and affect. Thought content normal.   Nursing note and vitals reviewed.      Significant Labs:   CMP   Recent Labs  Lab 09/30/17  0340 10/01/17  0548   * 136   K 3.2* 3.4*   CL 92* 93*   CO2 31* 32*    101   BUN 20 19   CREATININE 1.8* 1.6*   CALCIUM 9.4 9.5   ANIONGAP 12 11   ESTGFRAFRICA 35.5* 40.9*   EGFRNONAA 30.8* 35.5*   , CBC   Recent Labs  Lab 09/30/17  0340 10/01/17  0548   WBC 5.30 4.93   HGB 12.8 13.5   HCT 39.0 40.8    300    and INR   Recent Labs  Lab 09/30/17  0339 10/01/17  0548   INR 2.8* 2.9*       Significant Imaging: X-Ray: CXR: X-Ray Chest 1 View (CXR): No results found for this visit on 09/22/17.

## 2017-10-01 NOTE — PLAN OF CARE
"Doing well  BP (!) 103/58 (Patient Position: Sitting)   Pulse 90   Temp 98.6 °F (37 °C) (Oral)   Resp 17   Ht 5' 3" (1.6 m)   Wt 71.1 kg (156 lb 12 oz)   SpO2 (!) 91%   Breastfeeding? No   BMI 27.77 kg/m²   Plan   Wean oxygen as tolerated   Waiting on remodulin approval   "

## 2017-10-01 NOTE — PLAN OF CARE
Problem: Patient Care Overview  Goal: Plan of Care Review  Outcome: Ongoing (interventions implemented as appropriate)  Patient AAOx4 and VSS. She has remained afebrile. Telemetry monitor on and HR has been in the 90s, NS. Continuous pulse ox in place and high flow to be weaned so that O2 sats >82%. Veletri is going at 20 ng/kg/min with a dosing wt of 86 kg (41 mL/24hr) through a left chest wall perm cath. Backup cassette is at the bedside. She's able to ambulate with standby assistance and has experienced no falls today. Her mom remains at the bedside and they have no questions or concerns at this time. Patient stable and will continue to monitor.

## 2017-10-01 NOTE — ASSESSMENT & PLAN NOTE
- WHO Group 1, last echo with severely reduced RV function with RVSP at 96  - Continue on supplemental oxygen via NC   - Cont IV Veletri (Epoprostinel) and Macitentan,   - Plan to D/C on Remoduline (Treprostinil)   - Discuss this in the AM on Monday with staff as per patient request.

## 2017-10-01 NOTE — HOSPITAL COURSE
-Remained stable overnight   - Supplemental oxygen level reduced to 4 LPM   - Denied CP, SOB or palpitation.

## 2017-10-01 NOTE — PLAN OF CARE
Problem: Patient Care Overview  Goal: Plan of Care Review  Outcome: Ongoing (interventions implemented as appropriate)  Pt is AAOx3.Pt is ambulatory and independent.Pt able to perform all ADL's without assistance. Pt is a DNR. NAD noted.No breakdown noted, po fluids encouraged.Pt denies pian and/or discomfort at this time.Standard precautions maintained.  Pt turns independently, pt is aware of bony area and pressure reduction positions Pt remains injury and fall free, non skid footwear donned, call light within reach, personal items within reach, bed in low/locked position, pt able to voice needs all needs voiced have been met at this time.

## 2017-10-01 NOTE — SUBJECTIVE & OBJECTIVE
Interval History: Patient reported feeling OK  Remained on nasal oxygen and all her PH medications.     Review of Systems   Constitution: Negative.   HENT: Negative.    Cardiovascular: Negative.    Respiratory: Negative.    Endocrine: Negative.      Objective:     Vital Signs (Most Recent):  Temp: 98.3 °F (36.8 °C) (10/01/17 0401)  Pulse: 92 (10/01/17 0527)  Resp: 18 (10/01/17 0527)  BP: (!) 92/54 (10/01/17 0401)  SpO2: (!) 91 % (10/01/17 0527) Vital Signs (24h Range):  Temp:  [98 °F (36.7 °C)-98.6 °F (37 °C)] 98.3 °F (36.8 °C)  Pulse:  [] 92  Resp:  [17-18] 18  SpO2:  [87 %-96 %] 91 %  BP: ()/(52-59) 92/54     Weight: 71.6 kg (157 lb 13.6 oz)  Body mass index is 27.96 kg/m².     SpO2: (!) 91 %  O2 Device (Oxygen Therapy): High Flow nasal Cannula      Intake/Output Summary (Last 24 hours) at 10/01/17 0656  Last data filed at 09/30/17 1700   Gross per 24 hour   Intake           320.64 ml   Output                0 ml   Net           320.64 ml       Lines/Drains/Airways     Central Venous Catheter Line                 Tunneled Central Line Insertion/Assessment - Single Lumen  left subclavian -- days         Tunneled Central Line Insertion/Assessment - Double Lumen  09/08/17 0905 left internal jugular 22 days          Airway                 Airway - Non-Surgical Mask -- days          Peripheral Intravenous Line                 Peripheral IV - Single Lumen 09/26/17 1150 Right Upper Arm 4 days                Physical Exam   Constitutional: She is oriented to person, place, and time. She appears well-developed and well-nourished. No distress.   HENT:   Head: Normocephalic and atraumatic.   Mouth/Throat: Oropharynx is clear and moist.   Nasal Canula both nostrils   Eyes: Pupils are equal, round, and reactive to light. Right eye exhibits no discharge. Left eye exhibits no discharge. No scleral icterus.   Neck: Normal range of motion. Neck supple. No JVD present. No tracheal deviation present. No thyromegaly  present.   Cardiovascular: Normal rate, regular rhythm and intact distal pulses.  Exam reveals no gallop and no friction rub.    Pulmonary/Chest: Effort normal and breath sounds normal. No respiratory distress. She has no wheezes. She exhibits no tenderness.   Abdominal: Soft. Bowel sounds are normal. She exhibits no distension and no mass. There is no tenderness. There is no rebound and no guarding.   Musculoskeletal: Normal range of motion. She exhibits no edema, tenderness or deformity.   Lymphadenopathy:     She has no cervical adenopathy.   Neurological: She is alert and oriented to person, place, and time. She has normal reflexes. She displays normal reflexes. No cranial nerve deficit. She exhibits normal muscle tone. Coordination normal.   Skin: Skin is warm and dry. She is not diaphoretic.   Psychiatric: She has a normal mood and affect. Her behavior is normal. Thought content normal.   Nursing note and vitals reviewed.      Significant Labs:   CMP   Recent Labs  Lab 09/30/17  0340   *   K 3.2*   CL 92*   CO2 31*      BUN 20   CREATININE 1.8*   CALCIUM 9.4   ANIONGAP 12   ESTGFRAFRICA 35.5*   EGFRNONAA 30.8*   , CBC   Recent Labs  Lab 09/30/17  0340   WBC 5.30   HGB 12.8   HCT 39.0       and INR   Recent Labs  Lab 09/30/17  0339   INR 2.8*       Significant Imaging: X-Ray: CXR: X-Ray Chest 1 View (CXR): No results found for this visit on 09/22/17.

## 2017-10-01 NOTE — ASSESSMENT & PLAN NOTE
- Complicated by RV failure.   - Continue oral Bumex, and is making Urine.   - Asymptomatic - titration of the supplemental oxygen down; pt is on 4 LPM since yesterday, baseline is at 5

## 2017-10-02 NOTE — PROGRESS NOTES
[facility]  Palliative Medicine  Progress Note    Patient Name: Emily Cook  MRN: [id number]  Admission Date: 9/22/2017  Hospital Length of Stay: 10 days  Code Status: DNR   Attending Provider: Augustine Rhodes MD  Consulting Provider: Vania Alvarado PA-C  Primary Care Physician: Kaylee Cazares MD  Principal Problem:Acute decompensated heart failure    Patient information was obtained from patient.      Assessment/Plan:   Emily Cook is a 57 y.o. female with pulmonary hypertension on 5L oxygen, Macitentan, and Veletri. She was admitted for acute decompensated heart failure. Palliative medicine was consulted for end of life/hospice  Palliative care encounter    -Patient is DNR  -Next of kin for decision making would be a majority decision amongst her 3 sons.  -The patient was seen this morning. Her mother was present.  -Ms. Cook remains agreeable to discharge home with hospice and is in agreement with utilizing [facility] until the approval process for Remodulin with hospice is complete. She is eager to return home.  -Reviewed the advanced directive paperwork provided to her last visit. She stated that she did review it, but does not feel like completing it at this time. The importance of this documentation was stressed.   -Agree with discharge home with a hospice company who can provide Remodulin once patient is medically stable. Because the approval process for Remodulin with hospice could take some time, it is reasonable to discharge the patient with home health advanced illness management ([facility]) who can transition to hospice once the Remodulin has been approved.              I will follow-up with patient. Please contact us if you have any additional questions.    Subjective:     Chief Complaint:   Chief Complaint   Patient presents with    Shortness of Breath     referred from pulmonologyb clinic for low PO - 83-85 on 6l/NC.        HPI:   Emily Cook is a 57 y.o. female with  "pulmonary hypertension on 5L oxygen, Macitentan, and Veletri. She was admitted for acute decompensated heart failure. Palliative medicine was consulted for end of life/hospice.    Hospital Course:  No notes on file    Interval History: There were no acute events overnight. The patient is being transitioned to Remodulin in preparation for discharge home.    Past Medical History:   Diagnosis Date    Arrhythmia     Arthritis     Cardiac pacemaker in situ     Carpal tunnel syndrome, right     Cervical spondylosis     Cervicalgia     CHF (congestive heart failure)     Diverticulitis     Heart block AV third degree     Hyperlipidemia     ALFREDO on CPAP     Pulmonary hypertension     Subdeltoid bursitis        Past Surgical History:   Procedure Laterality Date    CARDIAC CATHETERIZATION      CARDIAC PACEMAKER PLACEMENT  7/29/13    [employer] DC PM    CARDIAC SURGERY      COLONOSCOPY N/A 9/28/2015    Procedure: COLONOSCOPY;  Surgeon: Jason Diaz MD;  Location: [facility] ([address] [place] [address]);  Service: Endoscopy;  Laterality: N/A;  Please schedule in 2-3 months with Dr Diaz  Pulmonary HTN, 2nd floor (off remodulin infusion as of "a few days" before 9/9/15)    3 day hold Coumadin, [facility]  PT/INR scheduled before procedure    ECTOPIC PREGNANCY SURGERY Left     HYSTERECTOMY      partial    knee arthroscopy         Review of patient's allergies indicates:   Allergen Reactions    Chlorhexidine Itching and Rash     Patient had raised rash on neck following RHC procedure. She states she's never had a problem with the "prep" used until this time.     Adhesive Rash       Medications:  Continuous Infusions:   epoprostenol (VELETRI) infusion 20 ng/kg/min (10/01/17 7683)    veletri/remodulin cassette      veletri/remodulin tubing       Scheduled Meds:   bumetanide  2 mg Oral BID    duloxetine  60 mg Oral BID    macitentan  10 mg Oral Daily    magnesium oxide  400 mg Oral BID    potassium chloride "  60 mEq Oral TID    senna-docusate 8.6-50 mg  2 tablet Oral BID    sodium chloride 0.9%  3 mL Intravenous Q8H    spironolactone  25 mg Oral Daily    warfarin  5 mg Oral Daily     PRN Meds:acetaminophen, gabapentin, hydrOXYzine pamoate, loperamide, metoclopramide HCl, ondansetron, ondansetron, ondansetron, oxycodone-acetaminophen    Family History     Problem Relation (Age of Onset)    Arthritis Mother, Father    Diabetes Mother    Hyperlipidemia Mother, Father        Social History Main Topics    Smoking status: Never Smoker    Smokeless tobacco: Never Used    Alcohol use No      Comment: rarely    Drug use: No    Sexual activity: No       Review of Systems   Constitutional: Positive for activity change and fatigue.   HENT: Negative for congestion and facial swelling.    Respiratory: Positive for shortness of breath. Negative for cough.    Cardiovascular: Positive for leg swelling.   Gastrointestinal: Positive for constipation and nausea. Negative for abdominal pain.   Musculoskeletal: Negative for back pain and neck pain.   Skin: Negative for rash and wound.   Neurological: Negative for dizziness and speech difficulty.   Psychiatric/Behavioral: Negative for agitation and confusion.     Objective:     Vital Signs (Most Recent):  Temp: 97.3 °F (36.3 °C) (10/02/17 1118)  Pulse: 99 (10/02/17 1118)  Resp: 15 (10/02/17 1118)  BP: (!) 97/53 (10/02/17 1118)  SpO2: 95 % (10/02/17 1118) Vital Signs (24h Range):  Temp:  [97.3 °F (36.3 °C)-99.3 °F (37.4 °C)] 97.3 °F (36.3 °C)  Pulse:  [] 99  Resp:  [15-18] 15  SpO2:  [91 %-97 %] 95 %  BP: ()/(52-70) 97/53     Weight: 71.6 kg (157 lb 13.6 oz)  Body mass index is 27.96 kg/m².    Review of Symptoms  Symptom Assessment (ESAS 0-10 scale)   ESAS 0 1 2 3 4 5 6 7 8 9 10   Pain x             Dyspnea    x          Anxiety x             Nausea x             Depression  x             Anorexia x             Fatigue x             Insomnia x             Restlessness  x              Agitation x             CAM / Delirium -  Constipation     -  Diarrhea           -  Bowel Management Plan (BMP): yes    Comments: Last BM 10/2/17    OME in 24 hours: 0    Performance Status: 60    ECOG Performance Status Grade: 1 - Ambulates, capable of light work    Physical Exam   Constitutional: She is oriented to person, place, and time. She appears well-developed and well-nourished.   HENT:   Head: Normocephalic and atraumatic.   Cardiovascular: Regular rhythm.  Tachycardia present.    Pulmonary/Chest: Effort normal. No respiratory distress.   ON NC oxygen   Abdominal: Soft. Bowel sounds are normal. There is no tenderness.   Musculoskeletal: She exhibits edema.   1+ BLE edema   Neurological: She is alert and oriented to person, place, and time.   Skin: Skin is warm and dry.   Psychiatric: Thought content normal.   Flat affect   Nursing note and vitals reviewed.      Significant Labs: All pertinent labs within the past 24 hours have been reviewed.  CBC:     Recent Labs  Lab 10/02/17  0520   WBC 4.68   HGB 13.1   HCT 40.0   MCV 73*        BMP:    Recent Labs  Lab 10/02/17  0520      *   K 4.0   CL 96   CO2 27   BUN 19   CREATININE 1.5*   CALCIUM 9.3   MG 2.5     LFT:  Lab Results   Component Value Date    AST 15 09/22/2017    ALKPHOS 112 09/22/2017    BILITOT 0.6 09/22/2017     Albumin:   Albumin   Date Value Ref Range Status   09/22/2017 2.9 (L) 3.5 - 5.2 g/dL Final   01/27/2017 3.6 3.5 - 5.0 G/DL Final     Protein:   Total Protein   Date Value Ref Range Status   09/22/2017 7.1 6.0 - 8.4 g/dL Final     Lactic acid:   Lab Results   Component Value Date    LACTATE 1.0 08/26/2017       Significant Imaging: I have reviewed all pertinent imaging results/findings within the past 24 hours.    Advanced Directives::  Living Will: No  LaPOST: No  Do Not Resuscitate Status: DNR  Medical Power of : No    Decision-Making Capacity: Patient answered questions    Living Arrangements: son  lives with patient    Psychosocial/Cultural:  Ms. Cook lives in [place], [place]. She is not  but has a partner. She has 3 adult sons. One son lives with her; her mother lives down the street. 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 Hobbies include Bingo.      Spiritual:     F- Itzel and Belief: Church    I - Importance: attends service regularly  .  C - Community: no mention of community involvement    A - Address in Care: spiritual care is following the patient      > 50% of 25 min visit spent in chart review, face to face discussion of goals of care,  symptom assessment, coordination of care and emotional support.    KOKO DelaneyC  Palliative Medicine  [facility]

## 2017-10-02 NOTE — PROGRESS NOTES
"[facility]  Heart Transplant  Progress Note    Patient Name: Emily Cook  MRN: [id number]  Admission Date: 9/22/2017  Hospital Length of Stay: 10 days  Attending Physician: Augustine Rhodes MD  Primary Care Provider: Kaylee Cazares MD  Principal Problem:Acute decompensated heart failure    Subjective:     Interval History: Continues to feel better.     Continuous Infusions:   epoprostenol (VELETRI) infusion 20 ng/kg/min (10/01/17 1833)    veletri/remodulin cassette      veletri/remodulin tubing       Scheduled Meds:   bumetanide  2 mg Oral BID    duloxetine  60 mg Oral BID    macitentan  10 mg Oral Daily    magnesium oxide  400 mg Oral BID    potassium chloride  60 mEq Oral TID    senna-docusate 8.6-50 mg  2 tablet Oral BID    sodium chloride 0.9%  3 mL Intravenous Q8H    spironolactone  25 mg Oral Daily    warfarin  5 mg Oral Daily     PRN Meds:acetaminophen, gabapentin, hydrOXYzine pamoate, loperamide, metoclopramide HCl, ondansetron, ondansetron, ondansetron, oxycodone-acetaminophen    Review of patient's allergies indicates:   Allergen Reactions    Chlorhexidine Itching and Rash     Patient had raised rash on neck following RHC procedure. She states she's never had a problem with the "prep" used until this time.     Adhesive Rash     Objective:     Vital Signs (Most Recent):  Temp: 98.6 °F (37 °C) (10/02/17 0446)  Pulse: 94 (10/02/17 0446)  Resp: 18 (10/02/17 0446)  BP: (!) 104/58 (10/02/17 0446)  SpO2: (!) 94 % (10/02/17 0446) Vital Signs (24h Range):  Temp:  [97.7 °F (36.5 °C)-98.6 °F (37 °C)] 98.6 °F (37 °C)  Pulse:  [] 94  Resp:  [16-18] 18  SpO2:  [90 %-97 %] 94 %  BP: ()/(52-70) 104/58     Weight: 71.6 kg (157 lb 13.6 oz)  Body mass index is 27.96 kg/m².      Intake/Output Summary (Last 24 hours) at 10/02/17 5992  Last data filed at 10/02/17 0500   Gross per 24 hour   Intake              240 ml   Output              500 ml   Net             -260 ml "       Hemodynamic Parameters:         Physical Exam   Constitutional: She is oriented to person, place, and time. She appears well-developed and well-nourished.   Neck: Normal range of motion. Neck supple. JVD (improving. Just above clavicle. ) present.   Cardiovascular: Normal rate and regular rhythm.  Exam reveals no gallop and no friction rub.    Murmur heard.  Pulmonary/Chest: Effort normal. She has no decreased breath sounds. She has no wheezes. She has no rales.   On O2 via HFNC   Abdominal: Soft. Bowel sounds are normal. There is no tenderness.   Musculoskeletal: She exhibits no edema.   Neurological: She is alert and oriented to person, place, and time.   Skin: Skin is warm and dry.       Significant Labs:  CBC:    Recent Labs  Lab 10/01/17  0548 10/02/17  0520   WBC 4.93  --    RBC 5.53* 5.45*   HGB 13.5 13.1   HCT 40.8 40.0    281   MCV 74* 73*   MCH 24.4* 24.0*   MCHC 33.1 32.8     BNP:  No results for input(s): BNP in the last 72 hours.    Invalid input(s): BNPTRIAGELBLO  CMP:    Recent Labs  Lab 10/02/17  0520      CALCIUM 9.3   *   K 4.0   CO2 27   CL 96   BUN 19   CREATININE 1.5*      Coagulation:     Recent Labs  Lab 10/02/17  0520   INR 3.1*     LDH:  No results for input(s): LDH in the last 72 hours.  Microbiology:  Microbiology Results (last 7 days)     Procedure Component Value Units Date/Time    Blood culture [[id number]] Collected:  09/23/17 0859    Order Status:  Completed Specimen:  Blood Updated:  09/28/17 1022     Blood Culture, Routine No growth after 5 days.    Blood culture [[id number]] Collected:  09/23/17 0859    Order Status:  Completed Specimen:  Blood Updated:  09/28/17 1022     Blood Culture, Routine No growth after 5 days.    Urine culture [[id number]]  (Susceptibility) Collected:  09/23/17 0938    Order Status:  Completed Specimen:  Urine from Urine, Clean Catch Updated:  09/25/17 0933     Urine Culture, Routine --     PSEUDOMONAS AERUGINOSA  50,000 - 99,999  "cfu/ml            I have reviewed all pertinent labs within the past 24 hours.    Estimated Creatinine Clearance: 39.3 mL/min (based on SCr of 1.5 mg/dL (H)).    Diagnostic Results:  I have reviewed and interpreted all pertinent imaging results/findings within the past 24 hours.    Assessment and Plan:     57 y.o. female with hx of pulmonary HTN WHO group 1 with 5L O2 at home on Macitentan, Veletri and Coumadin who was recently admitted 2 weeks ago for MRSA Bacteremia 2/2 PNA and ADHF. She was diuresed and discharged with vanc which she finished 2 days ago. She presented to the ED with shortness of breath and hypoxia from her Dr. Bae today. Not been taking her bumex at home over the past several days d/t "laziness" and noticed that she had leg swelling yesterday so she did take a dose then. This morning she felt fine but then started to develop shortness of breath with ambulation. Denies any chest pain, palpitations, but has had nausea. On arrival to ED BP in 100s systolic, hypoxia of 84%, Pulse 85 on exam he had JVD to jaw and bilateral LE edema. EKG with right axis deviation, CXR with enlarged pulm vessels but no edema noted. BNP 1600 was 340 2 weeks ago.       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 Net neg in 24 hours. Will likely transition to PO soon.   -Cont spironlactone  -On O2 5L via NC at home. Currently on 7L NC. Will wean O2 with diuresis           Moderate to severe pulmonary hypertension    -WHO group 1  -Last ECHO with Severely reduced RV function and RVSP 96  -On 5L O2 at home. Wean O2 as above  - Cont IV Veletri. Continue Macitentan. Consider transitioning to Remodulin as she is considering hospice care at [place].   - INR sub therapeutic. Restarted warfarin.    - Appreciate Palliative care cx. Currently DNR. Plan to transition to IV Treprostinil and eventually DC with home hospice once medically ready. "         Acute on chronic respiratory failure with hypoxia    2/2 ADHF as above             Félix Calles NP  Heart Transplant  [facility]

## 2017-10-02 NOTE — PLAN OF CARE
Problem: Patient Care Overview  Goal: Plan of Care Review  Outcome: Ongoing (interventions implemented as appropriate)  Pt is AAOx3.Pt is ambulatory and independent.Pt able to perform all ADL's without assistance. Pt is in good spirits.  Telly monitoring on going. Pt turns independently, pt is aware of bony area and pressure reduction positions Standard precautions maintained.  Family at bedside. Pt remains injury and fall free, non skid footwear donned, call light within reach, personal items within reach, bed in low/locked position, pt able to voice needs all needs voiced have been met at this time.

## 2017-10-02 NOTE — SUBJECTIVE & OBJECTIVE
"Interval History: There were no acute events overnight. The patient is being transitioned to Remodulin in preparation for discharge home.    Past Medical History:   Diagnosis Date    Arrhythmia     Arthritis     Cardiac pacemaker in situ     Carpal tunnel syndrome, right     Cervical spondylosis     Cervicalgia     CHF (congestive heart failure)     Diverticulitis     Heart block AV third degree     Hyperlipidemia     ALFREDO on CPAP     Pulmonary hypertension     Subdeltoid bursitis        Past Surgical History:   Procedure Laterality Date    CARDIAC CATHETERIZATION      CARDIAC PACEMAKER PLACEMENT  7/29/13    [employer] DC PM    CARDIAC SURGERY      COLONOSCOPY N/A 9/28/2015    Procedure: COLONOSCOPY;  Surgeon: Jason Diaz MD;  [facility]  Service: Endoscopy;  Laterality: N/A;  Please schedule in 2-3 months with Dr Diaz  Pulmonary HTN, 2nd floor (off remodulin infusion as of "a few days" before 9/9/15)    3 day hold Coumadin, [facility]  PT/INR scheduled before procedure    ECTOPIC PREGNANCY SURGERY Left     HYSTERECTOMY      partial    knee arthroscopy         Review of patient's allergies indicates:   Allergen Reactions    Chlorhexidine Itching and Rash     Patient had raised rash on neck following RHC procedure. She states she's never had a problem with the "prep" used until this time.     Adhesive Rash       Medications:  Continuous Infusions:   epoprostenol (VELETRI) infusion 20 ng/kg/min (10/01/17 7733)    veletri/remodulin cassette      veletri/remodulin tubing       Scheduled Meds:   bumetanide  2 mg Oral BID    duloxetine  60 mg Oral BID    macitentan  10 mg Oral Daily    magnesium oxide  400 mg Oral BID    potassium chloride  60 mEq Oral TID    senna-docusate 8.6-50 mg  2 tablet Oral BID    sodium chloride 0.9%  3 mL Intravenous Q8H    spironolactone  25 mg Oral Daily    warfarin  5 mg Oral Daily     PRN Meds:acetaminophen, " gabapentin, hydrOXYzine pamoate, loperamide, metoclopramide HCl, ondansetron, ondansetron, ondansetron, oxycodone-acetaminophen    Family History     Problem Relation (Age of Onset)    Arthritis Mother, Father    Diabetes Mother    Hyperlipidemia Mother, Father        Social History Main Topics    Smoking status: Never Smoker    Smokeless tobacco: Never Used    Alcohol use No      Comment: rarely    Drug use: No    Sexual activity: No       Review of Systems   Constitutional: Positive for activity change and fatigue.   HENT: Negative for congestion and facial swelling.    Respiratory: Positive for shortness of breath. Negative for cough.    Cardiovascular: Positive for leg swelling.   Gastrointestinal: Positive for constipation and nausea. Negative for abdominal pain.   Musculoskeletal: Negative for back pain and neck pain.   Skin: Negative for rash and wound.   Neurological: Negative for dizziness and speech difficulty.   Psychiatric/Behavioral: Negative for agitation and confusion.     Objective:     Vital Signs (Most Recent):  Temp: 97.3 °F (36.3 °C) (10/02/17 1118)  Pulse: 99 (10/02/17 1118)  Resp: 15 (10/02/17 1118)  BP: (!) 97/53 (10/02/17 1118)  SpO2: 95 % (10/02/17 1118) Vital Signs (24h Range):  Temp:  [97.3 °F (36.3 °C)-99.3 °F (37.4 °C)] 97.3 °F (36.3 °C)  Pulse:  [] 99  Resp:  [15-18] 15  SpO2:  [91 %-97 %] 95 %  BP: ()/(52-70) 97/53     Weight: 71.6 kg (157 lb 13.6 oz)  Body mass index is 27.96 kg/m².    Review of Symptoms  Symptom Assessment (ESAS 0-10 scale)   ESAS 0 1 2 3 4 5 6 7 8 9 10   Pain x             Dyspnea    x          Anxiety x             Nausea x             Depression  x             Anorexia x             Fatigue x             Insomnia x             Restlessness  x             Agitation x             CAM / Delirium -  Constipation     -  Diarrhea           -  Bowel Management Plan (BMP): yes    Comments: Last BM 10/2/17    OME in 24 hours: 0    Performance Status:  60    ECOG Performance Status Grade: 1 - Ambulates, capable of light work    Physical Exam   Constitutional: She is oriented to person, place, and time. She appears well-developed and well-nourished.   HENT:   Head: Normocephalic and atraumatic.   Cardiovascular: Regular rhythm.  Tachycardia present.    Pulmonary/Chest: Effort normal. No respiratory distress.   ON NC oxygen   Abdominal: Soft. Bowel sounds are normal. There is no tenderness.   Musculoskeletal: She exhibits edema.   1+ BLE edema   Neurological: She is alert and oriented to person, place, and time.   Skin: Skin is warm and dry.   Psychiatric: Thought content normal.   Flat affect   Nursing note and vitals reviewed.      Significant Labs: All pertinent labs within the past 24 hours have been reviewed.  CBC:     Recent Labs  Lab 10/02/17  0520   WBC 4.68   HGB 13.1   HCT 40.0   MCV 73*        BMP:    Recent Labs  Lab 10/02/17  0520      *   K 4.0   CL 96   CO2 27   BUN 19   CREATININE 1.5*   CALCIUM 9.3   MG 2.5     LFT:  Lab Results   Component Value Date    AST 15 09/22/2017    ALKPHOS 112 09/22/2017    BILITOT 0.6 09/22/2017     Albumin:   Albumin   Date Value Ref Range Status   09/22/2017 2.9 (L) 3.5 - 5.2 g/dL Final   01/27/2017 3.6 3.5 - 5.0 G/DL Final     Protein:   Total Protein   Date Value Ref Range Status   09/22/2017 7.1 6.0 - 8.4 g/dL Final     Lactic acid:   Lab Results   Component Value Date    LACTATE 1.0 08/26/2017       Significant Imaging: I have reviewed all pertinent imaging results/findings within the past 24 hours.    Advanced Directives::  Living Will: No  LaPOST: No  Do Not Resuscitate Status: DNR  Medical Power of : No    Decision-Making Capacity: Patient answered questions    Living Arrangements: son lives with patient    Psychosocial/Cultural:  Ms. Cook lives in [place], [place]. She is not  but has a partner. She has 3 adult sons. One son lives with her; her mother lives down the street. 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 Hobbies include Bingo.      Spiritual:     F- Itzel and Belief: Baptism    I - Importance: attends service regularly  .  C - Community: no mention of community involvement    A - Address in Care: spiritual care is following the patient

## 2017-10-02 NOTE — PROGRESS NOTES
UPDATE    Pt presents in room with mother. Pt aaox4 with neutral affect. Pt states understanding of plan of care. Pt states in agreement with same. Pt reports coping well and denies further needs, questions, concerns at this time and none indicated. Providing ongoing psychosocial and counseling support, education, resources, assistance and discharge planning as indicated. Following and available.

## 2017-10-02 NOTE — PLAN OF CARE
Problem: Patient Care Overview  Goal: Plan of Care Review  Outcome: Ongoing (interventions implemented as appropriate)  Pt AAOx4, VSS.  TELE monitoring in progress, pt NSR w/ HR 90s.  Continuous pulse ox monitoring in place.  O2 sats > 92% on 5L nasal cannula.  Veletri continued @ 20 ng/kg/min, 41 ml/24hrs, DW 86 kg.  Backup cassette in refrigerator.  Backup pump and battery at the bedside. Cassette to be changed this afternoon.  L chest wall permacath CDI.  Pt refusing to have another PIV placed.  No accuchecks.  Pt on low sodium diet w/ 2L FR.  Eating most of all meals. 350 cc cloudy yellow urine output.  1 BM so far this shift. Pt up and ambulatory.  Bed lowered and locked in place.  Nonskid socks on feet.  Call bell in reach at all times. No acute events/falls/injuries this shift.  See flowsheet for further assessment findings.  Will continue to monitor.

## 2017-10-02 NOTE — SUBJECTIVE & OBJECTIVE
"Interval History: Continues to feel better.     Continuous Infusions:   epoprostenol (VELETRI) infusion 20 ng/kg/min (10/01/17 1833)    veletri/remodulin cassette      veletri/remodulin tubing       Scheduled Meds:   bumetanide  2 mg Oral BID    duloxetine  60 mg Oral BID    macitentan  10 mg Oral Daily    magnesium oxide  400 mg Oral BID    potassium chloride  60 mEq Oral TID    senna-docusate 8.6-50 mg  2 tablet Oral BID    sodium chloride 0.9%  3 mL Intravenous Q8H    spironolactone  25 mg Oral Daily    warfarin  5 mg Oral Daily     PRN Meds:acetaminophen, gabapentin, hydrOXYzine pamoate, loperamide, metoclopramide HCl, ondansetron, ondansetron, ondansetron, oxycodone-acetaminophen    Review of patient's allergies indicates:   Allergen Reactions    Chlorhexidine Itching and Rash     Patient had raised rash on neck following RHC procedure. She states she's never had a problem with the "prep" used until this time.     Adhesive Rash     Objective:     Vital Signs (Most Recent):  Temp: 98.6 °F (37 °C) (10/02/17 0446)  Pulse: 94 (10/02/17 0446)  Resp: 18 (10/02/17 0446)  BP: (!) 104/58 (10/02/17 0446)  SpO2: (!) 94 % (10/02/17 0446) Vital Signs (24h Range):  Temp:  [97.7 °F (36.5 °C)-98.6 °F (37 °C)] 98.6 °F (37 °C)  Pulse:  [] 94  Resp:  [16-18] 18  SpO2:  [90 %-97 %] 94 %  BP: ()/(52-70) 104/58     Weight: 71.6 kg (157 lb 13.6 oz)  Body mass index is 27.96 kg/m².      Intake/Output Summary (Last 24 hours) at 10/02/17 0729  Last data filed at 10/02/17 0500   Gross per 24 hour   Intake              240 ml   Output              500 ml   Net             -260 ml       Hemodynamic Parameters:         Physical Exam   Constitutional: She is oriented to person, place, and time. She appears well-developed and well-nourished.   Neck: Normal range of motion. Neck supple. JVD (improving. Just above clavicle. ) present.   Cardiovascular: Normal rate and regular rhythm.  Exam reveals no gallop and no " friction rub.    Murmur heard.  Pulmonary/Chest: Effort normal. She has no decreased breath sounds. She has no wheezes. She has no rales.   On O2 via HFNC   Abdominal: Soft. Bowel sounds are normal. There is no tenderness.   Musculoskeletal: She exhibits no edema.   Neurological: She is alert and oriented to person, place, and time.   Skin: Skin is warm and dry.       Significant Labs:  CBC:    Recent Labs  Lab 10/01/17  0548 10/02/17  0520   WBC 4.93  --    RBC 5.53* 5.45*   HGB 13.5 13.1   HCT 40.8 40.0    281   MCV 74* 73*   MCH 24.4* 24.0*   MCHC 33.1 32.8     BNP:  No results for input(s): BNP in the last 72 hours.    Invalid input(s): BNPTRIAGELBLO  CMP:    Recent Labs  Lab 10/02/17  0520      CALCIUM 9.3   *   K 4.0   CO2 27   CL 96   BUN 19   CREATININE 1.5*      Coagulation:     Recent Labs  Lab 10/02/17  0520   INR 3.1*     LDH:  No results for input(s): LDH in the last 72 hours.  Microbiology:  Microbiology Results (last 7 days)     Procedure Component Value Units Date/Time    Blood culture [[id number]] Collected:  09/23/17 0859    Order Status:  Completed Specimen:  Blood Updated:  09/28/17 1022     Blood Culture, Routine No growth after 5 days.    Blood culture [[id number]] Collected:  09/23/17 0859    Order Status:  Completed Specimen:  Blood Updated:  09/28/17 1022     Blood Culture, Routine No growth after 5 days.    Urine culture [[id number]]  (Susceptibility) Collected:  09/23/17 0938    Order Status:  Completed Specimen:  Urine from Urine, Clean Catch Updated:  09/25/17 0933     Urine Culture, Routine --     PSEUDOMONAS AERUGINOSA  50,000 - 99,999 cfu/ml            I have reviewed all pertinent labs within the past 24 hours.    Estimated Creatinine Clearance: 39.3 mL/min (based on SCr of 1.5 mg/dL (H)).    Diagnostic Results:  I have reviewed and interpreted all pertinent imaging results/findings within the past 24 hours.

## 2017-10-02 NOTE — ASSESSMENT & PLAN NOTE
-Patient is DNR  -Next of kin for decision making would be a majority decision amongst her 3 sons.  -The patient was seen this morning. Her mother was present.  -Ms. Cook remains agreeable to discharge home with hospice and is in agreement with utilizing [facility] until the approval process for Remodulin with hospice is complete. She is eager to return home.  -Reviewed the advanced directive paperwork provided to her last visit. She stated that she did review it, but does not feel like completing it at this time. The importance of this documentation was stressed.   -Agree with discharge home with a hospice company who can provide Remodulin once patient is medically stable. Because the approval process for Remodulin with hospice could take some time, it is reasonable to discharge the patient with home health advanced illness management ([facility]) who can transition to hospice once the Remodulin has been approved.

## 2017-10-02 NOTE — PROGRESS NOTES
"Pt requesting to take shower this am.  Pt instructed about her line and how she could not get it wet. Pt stated she "forgot".  Chest wall permacath CDI, dressing covered with plastic bag.  Pt instructed several times if she was to get in the shower that the plastic had to stay over the dressing and that she should not get her upper body wet.  Pt also instructed to keep the Veletri pump dry and out of the shower. All understanding verbalized by pt.  Pt refusing help in the shower.  Mother at bedside for support.      Pt also noted to have removed peripheral IV herself stating "it was hurting".  Pt refusing to have another line placed at this time.       Tele monitor off pt at this time. Will continue to monitor closely.        "

## 2017-10-02 NOTE — PROGRESS NOTES
"  [facility]  Adult Nutrition  Consult Note    SUMMARY     Recommendations    1. Continue current 2 gram sodium diet, fluid restriction per MD.   2. RD to monitor & follow-up.    Goals: PO intake >50%  Nutrition Goal Status: new  Communication of RD Recs: reviewed with RN    Reason for Assessment    Reason for Assessment: length of stay  Diagnosis: other (see comments) (HF)  Relevent Medical History: CHF   Interdisciplinary Rounds: did not attend     General Information Comments: Pt with good appetite, consuming 100% of meals.  Nutrition Discharge Planning: Adequate PO intake    Nutrition Prescription Ordered    Current Diet Order: 2 gram Na  Nutrition Order Comments: 2000 mL FR    Nutrition/Diet History    Patient Reported Diet/Restrictions/Preferences: general, low salt     Factors Affecting Nutritional Intake: other (see comments) (None)    Labs/Tests/Procedures/Meds    Pertinent Labs Reviewed: reviewed, pertinent  Pertinent Labs Comments: Na 135, Creat 1.5  Pertinent Medications Reviewed: reviewed, pertinent    Physical Findings    Overall Physical Appearance: nourished  Oral/Mouth Cavity: WDL  Skin: intact    Anthropometrics    Height: 5' 3" (160 cm)  Weight Method: Bed Scale  Weight: 71.6 kg (157 lb 13.6 oz)     Ideal Body Weight (IBW), Female: 115 lb  % Ideal Body Weight, Female (lb): 137.26 lb     BMI (Calculated): 28  BMI Grade: 25 - 29.9 - overweight    Assessment and Plan    No nutritional dx at this time.    Monitor and Evaluation    Food and Nutrient Intake: energy intake, food and beverage intake  Food and Nutrient Adminstration: diet order     Physical Activity and Function: nutrition-related ADLs and IADLs  Anthropometric Measurements: weight, weight change, body mass index  Biochemical Data, Medical Tests and Procedures: lipid profile, inflammatory profile, glucose/endocrine profile, gastrointestinal profile, electrolyte and renal panel  Nutrition-Focused Physical Findings: " overall appearance    Nutrition Risk    Level of Risk: other (see comments) (1x/week)    Nutrition Follow-Up    RD Follow-up?: Yes

## 2017-10-02 NOTE — ASSESSMENT & PLAN NOTE
-WHO group 1  -Last ECHO with Severely reduced RV function and RVSP 96  -On 5L O2 at home. Wean O2 as above  - Cont IV Veletri. Continue Macitentan. Plan to transition to Remodulin as she is considering hospice care at [place].   - Continue warfarin.    - Appreciate Palliative care cx. Currently DNR. Plan to transition to IV Treprostinil and eventually DC with home hospice once medically ready.

## 2017-10-02 NOTE — PROGRESS NOTES
Veletri cassette and tubing changed out.  Tubing primed and settings volume on pump cleared out.  Pump settings checked and signed off by CONCETTA Merino.    Settings:  20 ng/kg/min, DW 86 kg, 41 ml/24 hrs    No issues.  Backup cassette in refrigerator.  Second pump, battery, and tubing set at the bedside. Will continue to monitor.

## 2017-10-03 NOTE — PROGRESS NOTES
[facility]  Palliative Medicine  Progress Note    Patient Name: Emily Cook  MRN: [id number]  Admission Date: 9/22/2017  Hospital Length of Stay: 11 days  Code Status: DNR   Attending Provider: Tony Anglin MD  Consulting Provider: Vania Alvarado PA-C  Primary Care Physician: Kyalee Cazares MD  Principal Problem:Acute decompensated heart failure      Primary team is working on discharge home with hospice pending Remodulin approval. Palliative medicine will sign off. Please re consult if assistance is needed in the future. Thank you for allowing us to participate in the care of this patient.      Vania Alvarado PA-C  Palliative Medicine  [facility]

## 2017-10-03 NOTE — ASSESSMENT & PLAN NOTE
-WHO group 1  -Last ECHO with Severely reduced RV function and RVSP 96  -On 5L O2 at home.  - Cont IV Veletri. Continue Macitentan. Plan to transition to Remodulin(awaiting insurance approval).  - Continue warfarin.    - Appreciate Palliative care cx. Currently DNR. Plan to transition to IV Treprostinil and eventually DC with home hospice once medically ready.

## 2017-10-03 NOTE — PLAN OF CARE
Problem: Patient Care Overview  Goal: Plan of Care Review  Outcome: Ongoing (interventions implemented as appropriate)  Pt has call bell in reach, non slip socks on, and bedrails up x2. Pt has been encouraged to wash hands. Pt has telemetry monitoring. Pt can sleep on side in bed. Pt has family at bedside. Pt has veletri cassette and backup in fridge.

## 2017-10-03 NOTE — PROGRESS NOTES
"[facility]  Heart Transplant  Progress Note    Patient Name: Emily Cook  MRN: [id number]  Admission Date: 9/22/2017  Hospital Length of Stay: 11 days  Attending Physician: Augustine Rhodes MD  Primary Care Provider: Kaylee Cazares MD  Principal Problem:Acute decompensated heart failure    Subjective:     Interval History: Continues to feel better. Ready to get home.     Continuous Infusions:   epoprostenol (VELETRI) infusion 20 ng/kg/min (10/02/17 1650)    veletri/remodulin cassette      veletri/remodulin tubing       Scheduled Meds:   bumetanide  2 mg Oral BID    duloxetine  60 mg Oral BID    macitentan  10 mg Oral Daily    magnesium oxide  400 mg Oral BID    potassium chloride  60 mEq Oral TID    senna-docusate 8.6-50 mg  2 tablet Oral BID    sodium chloride 0.9%  3 mL Intravenous Q8H    spironolactone  25 mg Oral Daily    warfarin  2.5 mg Oral Daily     PRN Meds:acetaminophen, gabapentin, hydrOXYzine pamoate, loperamide, metoclopramide HCl, ondansetron, ondansetron, ondansetron, oxycodone-acetaminophen    Review of patient's allergies indicates:   Allergen Reactions    Chlorhexidine Itching and Rash     Patient had raised rash on neck following RHC procedure. She states she's never had a problem with the "prep" used until this time.     Adhesive Rash     Objective:     Vital Signs (Most Recent):  Temp: 98.3 °F (36.8 °C) (10/03/17 0519)  Pulse: 94 (10/03/17 0700)  Resp: 18 (10/03/17 0519)  BP: (!) 93/53 (10/03/17 0519)  SpO2: 95 % (10/03/17 0519) Vital Signs (24h Range):  Temp:  [97.2 °F (36.2 °C)-98.3 °F (36.8 °C)] 98.3 °F (36.8 °C)  Pulse:  [] 94  Resp:  [15-18] 18  SpO2:  [92 %-96 %] 95 %  BP: ()/(53-64) 93/53     Weight: 70 kg (154 lb 5.2 oz)  Body mass index is 27.34 kg/m².      Intake/Output Summary (Last 24 hours) at 10/03/17 0809  Last data filed at 10/03/17 0600   Gross per 24 hour   Intake           937.84 ml   Output              900 ml   Net         "    37.84 ml       Hemodynamic Parameters:         Physical Exam   Constitutional: She is oriented to person, place, and time. She appears well-developed and well-nourished.   Neck: Normal range of motion. Neck supple. JVD: improving. Just above clavicle.    Cardiovascular: Normal rate and regular rhythm.  Exam reveals no gallop and no friction rub.    Murmur heard.  Pulmonary/Chest: Effort normal. She has no decreased breath sounds. She has no wheezes. She has no rales.   Abdominal: Soft. Bowel sounds are normal. There is no tenderness.   Musculoskeletal: She exhibits no edema.   Neurological: She is alert and oriented to person, place, and time.   Skin: Skin is warm and dry.       Significant Labs:  CBC:    Recent Labs  Lab 10/03/17  0345   WBC 4.68   RBC 5.33   HGB 13.0   HCT 39.1      MCV 73*   MCH 24.4*   MCHC 33.2     BNP:  No results for input(s): BNP in the last 72 hours.    Invalid input(s): BNPTRIAGELBLO  CMP:    Recent Labs  Lab 10/03/17  0345      CALCIUM 9.4   *   K 3.7   CO2 26   CL 96   BUN 20   CREATININE 1.5*      Coagulation:     Recent Labs  Lab 10/03/17  0345   INR 3.0*     LDH:  No results for input(s): LDH in the last 72 hours.  Microbiology:  Microbiology Results (last 7 days)     Procedure Component Value Units Date/Time    Blood culture [[id number]] Collected:  09/23/17 0859    Order Status:  Completed Specimen:  Blood Updated:  09/28/17 1022     Blood Culture, Routine No growth after 5 days.    Blood culture [[id number]] Collected:  09/23/17 0859    Order Status:  Completed Specimen:  Blood Updated:  09/28/17 1022     Blood Culture, Routine No growth after 5 days.          I have reviewed all pertinent labs within the past 24 hours.    Estimated Creatinine Clearance: 38.8 mL/min (based on SCr of 1.5 mg/dL (H)).    Diagnostic Results:  I have reviewed and interpreted all pertinent imaging results/findings within the past 24 hours.    Assessment and Plan:     57 y.o. female  "with hx of pulmonary HTN WHO group 1 with 5L O2 at home on Macitentan, Veletri and Coumadin who was recently admitted 2 weeks ago for MRSA Bacteremia 2/2 PNA and ADHF. She was diuresed and discharged with vanc which she finished 2 days ago. She presented to the ED with shortness of breath and hypoxia from her Dr. Bae today. Not been taking her bumex at home over the past several days d/t "laziness" and noticed that she had leg swelling yesterday so she did take a dose then. This morning she felt fine but then started to develop shortness of breath with ambulation. Denies any chest pain, palpitations, but has had nausea. On arrival to ED BP in 100s systolic, hypoxia of 84%, Pulse 85 on exam he had JVD to jaw and bilateral LE edema. EKG with right axis deviation, CXR with enlarged pulm vessels but no edema noted. BNP 1600 was 340 2 weeks ago.       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 Currently on home dose.            Moderate to severe pulmonary hypertension    -WHO group 1  -Last ECHO with Severely reduced RV function and RVSP 96  -On 5L O2 at home.  - Cont IV Veletri. Continue Macitentan. Plan to transition to Remodulin(awaiting insurance approval).  - Continue warfarin.    - Appreciate Palliative care cx. Currently DNR. Plan to transition to IV Treprostinil and eventually DC with home hospice once medically ready.         Acute on chronic respiratory failure with hypoxia    2/2 ADHF as above             Félix Calles NP  Heart Transplant  [facility]  "

## 2017-10-03 NOTE — SUBJECTIVE & OBJECTIVE
"Interval History: Continues to feel better. Ready to get home.     Continuous Infusions:   epoprostenol (VELETRI) infusion 20 ng/kg/min (10/02/17 1650)    veletri/remodulin cassette      veletri/remodulin tubing       Scheduled Meds:   bumetanide  2 mg Oral BID    duloxetine  60 mg Oral BID    macitentan  10 mg Oral Daily    magnesium oxide  400 mg Oral BID    potassium chloride  60 mEq Oral TID    senna-docusate 8.6-50 mg  2 tablet Oral BID    sodium chloride 0.9%  3 mL Intravenous Q8H    spironolactone  25 mg Oral Daily    warfarin  2.5 mg Oral Daily     PRN Meds:acetaminophen, gabapentin, hydrOXYzine pamoate, loperamide, metoclopramide HCl, ondansetron, ondansetron, ondansetron, oxycodone-acetaminophen    Review of patient's allergies indicates:   Allergen Reactions    Chlorhexidine Itching and Rash     Patient had raised rash on neck following RHC procedure. She states she's never had a problem with the "prep" used until this time.     Adhesive Rash     Objective:     Vital Signs (Most Recent):  Temp: 98.3 °F (36.8 °C) (10/03/17 0519)  Pulse: 94 (10/03/17 0700)  Resp: 18 (10/03/17 0519)  BP: (!) 93/53 (10/03/17 0519)  SpO2: 95 % (10/03/17 0519) Vital Signs (24h Range):  Temp:  [97.2 °F (36.2 °C)-98.3 °F (36.8 °C)] 98.3 °F (36.8 °C)  Pulse:  [] 94  Resp:  [15-18] 18  SpO2:  [92 %-96 %] 95 %  BP: ()/(53-64) 93/53     Weight: 70 kg (154 lb 5.2 oz)  Body mass index is 27.34 kg/m².      Intake/Output Summary (Last 24 hours) at 10/03/17 0809  Last data filed at 10/03/17 0600   Gross per 24 hour   Intake           937.84 ml   Output              900 ml   Net            37.84 ml       Hemodynamic Parameters:         Physical Exam   Constitutional: She is oriented to person, place, and time. She appears well-developed and well-nourished.   Neck: Normal range of motion. Neck supple. JVD: improving. Just above clavicle.    Cardiovascular: Normal rate and regular rhythm.  Exam reveals no gallop and " no friction rub.    Murmur heard.  Pulmonary/Chest: Effort normal. She has no decreased breath sounds. She has no wheezes. She has no rales.   Abdominal: Soft. Bowel sounds are normal. There is no tenderness.   Musculoskeletal: She exhibits no edema.   Neurological: She is alert and oriented to person, place, and time.   Skin: Skin is warm and dry.       Significant Labs:  CBC:    Recent Labs  Lab 10/03/17  0345   WBC 4.68   RBC 5.33   HGB 13.0   HCT 39.1      MCV 73*   MCH 24.4*   MCHC 33.2     BNP:  No results for input(s): BNP in the last 72 hours.    Invalid input(s): BNPTRIAGELBLO  CMP:    Recent Labs  Lab 10/03/17  0345      CALCIUM 9.4   *   K 3.7   CO2 26   CL 96   BUN 20   CREATININE 1.5*      Coagulation:     Recent Labs  Lab 10/03/17  0345   INR 3.0*     LDH:  No results for input(s): LDH in the last 72 hours.  Microbiology:  Microbiology Results (last 7 days)     Procedure Component Value Units Date/Time    Blood culture [[id number]] Collected:  09/23/17 0859    Order Status:  Completed Specimen:  Blood Updated:  09/28/17 1022     Blood Culture, Routine No growth after 5 days.    Blood culture [[id number]] Collected:  09/23/17 0859    Order Status:  Completed Specimen:  Blood Updated:  09/28/17 1022     Blood Culture, Routine No growth after 5 days.          I have reviewed all pertinent labs within the past 24 hours.    Estimated Creatinine Clearance: 38.8 mL/min (based on SCr of 1.5 mg/dL (H)).    Diagnostic Results:  I have reviewed and interpreted all pertinent imaging results/findings within the past 24 hours.

## 2017-10-03 NOTE — ASSESSMENT & PLAN NOTE
-Agree with discharge home with a hospice company who can provide Remodulin once patient is medically stable. Because the approval process for Remodulin with hospice could take some time, it is reasonable to discharge the patient with home health advanced illness management ([facility]) who can transition to hospice once the Remodulin has been approved.

## 2017-10-03 NOTE — ASSESSMENT & PLAN NOTE
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 Currently on home dose.

## 2017-10-03 NOTE — PLAN OF CARE
Problem: Patient Care Overview  Goal: Plan of Care Review  Pt AAox4. VSS on 5L nasal cannula. Pt on veletri cassette to L chest brush. Will switch to Remodulin in the morning at 10am. Possible d/c after. Denies pain. Will continue to monitor.

## 2017-10-04 NOTE — PLAN OF CARE
[facility]   Heart Transplant/P[facility]N Clinic   [address] [place] [address]   [phone] after hours [phone] fax   HOME HEALTH ORDERS     Admit to Home Health   Diagnosis:  Patient Active Problem List   Diagnosis    Long term current use of anticoagulant therapy    Heart block AV third degree    Cardiac pacemaker in situ    Cervical spondylosis    Cervicalgia    Chronic neck pain    Subdeltoid bursitis    Diverticulitis    Sleep apnea- on CPAP    Diverticulitis large intestine    Carpal tunnel syndrome, right (mild)    Primary pulmonary hypertension    Acute on chronic respiratory failure with hypoxia    Pneumonia of right upper lobe due to infectious organism    Acute diastolic heart failure    Acute respiratory failure with hypoxemia    Bacteremia    Moderate to severe pulmonary hypertension    Coumadin toxicity    Tricuspid regurgitation    MRSA (methicillin resistant staph aureus) culture positive    Pulmonary hypertension    Respiratory failure with hypoxia    Right-sided heart failure    Staphylococcus aureus bacteremia    Central line-associated bloodstream infection    Hypoxia    Acute decompensated heart failure    Palliative care encounter       Patient is homebound due to:  Pulm HTN, on IV Remodulin    Diet: 2 gm Na, 1.5L fluid restriction    Acitivities: As tolerated    Nursing:   SN to complete comprehensive assessment including routine vital signs. Instruct on disease process and s/s of complications to report to MD. Review/verify medication list sent home with the patient at time of discharge and instruct patient/caregiver as needed. Frequency may be adjusted depending on start of care date.     Notify MD if SBP > 160 or < 90; DBP > 90 or < 50; HR > 120 or < 50; Temp > 101; Weight gain >3lbs in 1 day or 5lbs in 1 week.  Other:       LABS: SN to perform labs:     CMP, BNP, Mg, CBC q week starting 10/9/17 with results to  PH clinic     INR Monday 10/9/17, results to coumadin clinic    **For questions or concerns, please call [phone] and ask for Pulmonary Hypertension clinic, M-F 8-5. After hours, weekends, call [phone] and ask for the Heart Transplant Cardiologist on call.**           CONSULTS:      Physical Therapy to evaluate and treat. Evaluate for home safety and equipment needs; Establish/upgrade home exercise program. Perform / instruct on therapeutic exercises, gait training, transfer training, and Range of Motion.     Occupational Therapy to evaluate and treat. Evaluate home environment for safety and equipment needs. Perform/Instruct on transfers, ADL training, ROM, and therapeutic exercises.     Aide to provide assistance with personal care, ADLs, and vital signs      Send follow up questions to [phone] or [phone].

## 2017-10-04 NOTE — PROGRESS NOTES
Efren at bedside re-teaching family how to mix medication.  Pt to be switched over to home remodulin dose per primary team.  CN at bedside to assist in priming brush cath per policy and restarting Remodulin.

## 2017-10-04 NOTE — ASSESSMENT & PLAN NOTE
-WHO group 1  -Last ECHO with Severely reduced RV function and RVSP 96  -On 5L O2 at home.  - Cont IV Veletri- plan to transition to Remodulin today . Continue Macitentan.   - Continue warfarin.    - Appreciate Palliative care cx. Currently DNR. Plan was to transition to IV Treprostinil and eventually DC with home hospice but pt now wants home with Home Health

## 2017-10-04 NOTE — PROGRESS NOTES
"Pt being discharged home.  Spoke w/ MD on call for primary team, pt able to leave after 4 hours of starting IV remodulin if no s/s of intolerance.  Pt's VSS, pt has no health related complaints since start of IV remodulin.  Pt's discharge information given and reviewed, answered all questions to patient's satisfaction.  Pt refused prescription for PO lortab - family states "she has pain medicine at home if she needs it."  Pt leaving floor via wheelchair accompanied by family member x 3.  "

## 2017-10-04 NOTE — PROGRESS NOTES
"[facility]  Heart Transplant  Progress Note    Patient Name: Emily Cook  MRN: [id number]  Admission Date: 9/22/2017  Hospital Length of Stay: 12 days  Attending Physician: Tony Anglin MD  Primary Care Provider: Kaylee Cazares MD  Principal Problem:Acute decompensated heart failure    Subjective:     Interval History: Continues to feel better. Will switch to Remodulin today from Veletri    Continuous Infusions:   epoprostenol (VELETRI) infusion 20 ng/kg/min (10/03/17 1705)    treprostinil (REMODULIN) infusion      veletri/remodulin cassette      veletri/remodulin tubing       Scheduled Meds:   bumetanide  2 mg Oral BID    duloxetine  60 mg Oral BID    macitentan  10 mg Oral Daily    magnesium oxide  400 mg Oral BID    potassium chloride  60 mEq Oral TID    senna-docusate 8.6-50 mg  2 tablet Oral BID    sodium chloride 0.9%  3 mL Intravenous Q8H    spironolactone  25 mg Oral Daily    warfarin  2.5 mg Oral Daily     PRN Meds:acetaminophen, gabapentin, hydrOXYzine pamoate, loperamide, metoclopramide HCl, ondansetron, ondansetron, ondansetron, oxycodone-acetaminophen    Review of patient's allergies indicates:   Allergen Reactions    Chlorhexidine Itching and Rash     Patient had raised rash on neck following RHC procedure. She states she's never had a problem with the "prep" used until this time.     Adhesive Rash     Objective:     Vital Signs (Most Recent):  Temp: 97.9 °F (36.6 °C) (10/04/17 1243)  Pulse: 94 (10/04/17 1345)  Resp: 18 (10/04/17 1345)  BP: 107/71 (10/04/17 1345)  SpO2: 96 % (10/04/17 1345) Vital Signs (24h Range):  Temp:  [97.7 °F (36.5 °C)-98.6 °F (37 °C)] 97.9 °F (36.6 °C)  Pulse:  [] 94  Resp:  [16-18] 18  SpO2:  [92 %-97 %] 96 %  BP: ()/(54-74) 107/71     Weight: 70.4 kg (155 lb 3.3 oz)  Body mass index is 27.49 kg/m².      Intake/Output Summary (Last 24 hours) at 10/04/17 1404  Last data filed at 10/04/17 0600   Gross per 24 hour "   Intake           281.28 ml   Output             1400 ml   Net         -1118.72 ml       Hemodynamic Parameters:         Physical Exam   Constitutional: She is oriented to person, place, and time. She appears well-developed and well-nourished.   Neck: Normal range of motion. Neck supple. JVD: improving. Just above clavicle.    Cardiovascular: Normal rate and regular rhythm.  Exam reveals no gallop and no friction rub.    Murmur heard.  Pulmonary/Chest: Effort normal. She has no decreased breath sounds. She has no wheezes. She has no rales.   Abdominal: Soft. Bowel sounds are normal. There is no tenderness.   Musculoskeletal: She exhibits no edema.   Neurological: She is alert and oriented to person, place, and time.   Skin: Skin is warm and dry.       Significant Labs:  CBC:    Recent Labs  Lab 10/04/17  0426   WBC 4.74   RBC 5.35   HGB 12.9   HCT 39.1      MCV 73*   MCH 24.1*   MCHC 33.0     BNP:  No results for input(s): BNP in the last 72 hours.    Invalid input(s): BNPTRIAGELBLO  CMP:    Recent Labs  Lab 10/04/17  0426      CALCIUM 9.4   *   K 4.1   CO2 23      BUN 17   CREATININE 1.3      Coagulation:     Recent Labs  Lab 10/04/17  0426   INR 3.0*     LDH:  No results for input(s): LDH in the last 72 hours.  Microbiology:  Microbiology Results (last 7 days)     Procedure Component Value Units Date/Time    Blood culture [[id number]] Collected:  09/23/17 0859    Order Status:  Completed Specimen:  Blood Updated:  09/28/17 1022     Blood Culture, Routine No growth after 5 days.    Blood culture [[id number]] Collected:  09/23/17 0859    Order Status:  Completed Specimen:  Blood Updated:  09/28/17 1022     Blood Culture, Routine No growth after 5 days.          I have reviewed all pertinent labs within the past 24 hours.    Estimated Creatinine Clearance: 44.9 mL/min (based on SCr of 1.3 mg/dL).    Diagnostic Results:  I have reviewed and interpreted all pertinent imaging results/findings  "within the past 24 hours.    Assessment and Plan:     57 y.o. female with hx of pulmonary HTN WHO group 1 with 5L O2 at home on Macitentan, Veletri and Coumadin who was recently admitted 2 weeks ago for MRSA Bacteremia 2/2 PNA and ADHF. She was diuresed and discharged with vanc which she finished 2 days ago. She presented to the ED with shortness of breath and hypoxia from her DrMaggie Lovet today. Not been taking her bumex at home over the past several days d/t "laziness" and noticed that she had leg swelling yesterday so she did take a dose then. This morning she felt fine but then started to develop shortness of breath with ambulation. Denies any chest pain, palpitations, but has had nausea. On arrival to ED BP in 100s systolic, hypoxia of 84%, Pulse 85 on exam he had JVD to jaw and bilateral LE edema. EKG with right axis deviation, CXR with enlarged pulm vessels but no edema noted. BNP 1600 was 340 2 weeks ago.       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 Currently on home dose.            Moderate to severe pulmonary hypertension    -WHO group 1  -Last ECHO with Severely reduced RV function and RVSP 96  -On 5L O2 at home.  - Cont IV Veletri- plan to transition to Remodulin today . Continue Macitentan.   - Continue warfarin.    - Appreciate Palliative care cx. Currently DNR. Plan was to transition to IV Treprostinil and eventually DC with home hospice but pt now wants home with Home Health        Acute on chronic respiratory failure with hypoxia    2/2 ADHF as above           D/C home today with HH on IV Remodulin if she tolerates transition.    Elena Raygoza, PAJUNI  Heart Transplant  [facility]  "

## 2017-10-04 NOTE — PROGRESS NOTES
"DISCHARGE    Pt presents in room with pt's mother and pt's 2 sons. Pt aaox4 with neutral affect. Pt states she does not recall speaking to Palliative Care about AIM as a transition to hospice. One son states "she isn't going to hospice." SW attempted to explain AIM. Pt's mother denies memory of speaking with Palliative Care. Pt states she does not want to discharge to [facility] but wants to resume with her previous  company. SW provided education and support. Pt in agreement to discharge to home today with [facility]  ph [phone], fax [phone]. Orders faxed and confirmed received. Pt reports coping adequately and denies further needs, questions, concerns at this time and none indicated. Providing psychosocial and counseling support, education, resources, assistance and discharge planning as indicated. SW remains available.    "

## 2017-10-04 NOTE — SUBJECTIVE & OBJECTIVE
"Interval History: Continues to feel better. Will switch to Remodulin today from Veletri    Continuous Infusions:   epoprostenol (VELETRI) infusion 20 ng/kg/min (10/03/17 1705)    treprostinil (REMODULIN) infusion      veletri/remodulin cassette      veletri/remodulin tubing       Scheduled Meds:   bumetanide  2 mg Oral BID    duloxetine  60 mg Oral BID    macitentan  10 mg Oral Daily    magnesium oxide  400 mg Oral BID    potassium chloride  60 mEq Oral TID    senna-docusate 8.6-50 mg  2 tablet Oral BID    sodium chloride 0.9%  3 mL Intravenous Q8H    spironolactone  25 mg Oral Daily    warfarin  2.5 mg Oral Daily     PRN Meds:acetaminophen, gabapentin, hydrOXYzine pamoate, loperamide, metoclopramide HCl, ondansetron, ondansetron, ondansetron, oxycodone-acetaminophen    Review of patient's allergies indicates:   Allergen Reactions    Chlorhexidine Itching and Rash     Patient had raised rash on neck following RHC procedure. She states she's never had a problem with the "prep" used until this time.     Adhesive Rash     Objective:     Vital Signs (Most Recent):  Temp: 97.9 °F (36.6 °C) (10/04/17 1243)  Pulse: 94 (10/04/17 1345)  Resp: 18 (10/04/17 1345)  BP: 107/71 (10/04/17 1345)  SpO2: 96 % (10/04/17 1345) Vital Signs (24h Range):  Temp:  [97.7 °F (36.5 °C)-98.6 °F (37 °C)] 97.9 °F (36.6 °C)  Pulse:  [] 94  Resp:  [16-18] 18  SpO2:  [92 %-97 %] 96 %  BP: ()/(54-74) 107/71     Weight: 70.4 kg (155 lb 3.3 oz)  Body mass index is 27.49 kg/m².      Intake/Output Summary (Last 24 hours) at 10/04/17 1404  Last data filed at 10/04/17 0600   Gross per 24 hour   Intake           281.28 ml   Output             1400 ml   Net         -1118.72 ml       Hemodynamic Parameters:         Physical Exam   Constitutional: She is oriented to person, place, and time. She appears well-developed and well-nourished.   Neck: Normal range of motion. Neck supple. JVD: improving. Just above clavicle.  "   Cardiovascular: Normal rate and regular rhythm.  Exam reveals no gallop and no friction rub.    Murmur heard.  Pulmonary/Chest: Effort normal. She has no decreased breath sounds. She has no wheezes. She has no rales.   Abdominal: Soft. Bowel sounds are normal. There is no tenderness.   Musculoskeletal: She exhibits no edema.   Neurological: She is alert and oriented to person, place, and time.   Skin: Skin is warm and dry.       Significant Labs:  CBC:    Recent Labs  Lab 10/04/17  0426   WBC 4.74   RBC 5.35   HGB 12.9   HCT 39.1      MCV 73*   MCH 24.1*   MCHC 33.0     BNP:  No results for input(s): BNP in the last 72 hours.    Invalid input(s): BNPTRIAGELBLO  CMP:    Recent Labs  Lab 10/04/17  0426      CALCIUM 9.4   *   K 4.1   CO2 23      BUN 17   CREATININE 1.3      Coagulation:     Recent Labs  Lab 10/04/17  0426   INR 3.0*     LDH:  No results for input(s): LDH in the last 72 hours.  Microbiology:  Microbiology Results (last 7 days)     Procedure Component Value Units Date/Time    Blood culture [[id number]] Collected:  09/23/17 0859    Order Status:  Completed Specimen:  Blood Updated:  09/28/17 1022     Blood Culture, Routine No growth after 5 days.    Blood culture [[id number]] Collected:  09/23/17 0859    Order Status:  Completed Specimen:  Blood Updated:  09/28/17 1022     Blood Culture, Routine No growth after 5 days.          I have reviewed all pertinent labs within the past 24 hours.    Estimated Creatinine Clearance: 44.9 mL/min (based on SCr of 1.3 mg/dL).    Diagnostic Results:  I have reviewed and interpreted all pertinent imaging results/findings within the past 24 hours.

## 2017-10-05 NOTE — HOSPITAL COURSE
Pt was admitted to [facility]. She was started on IV Lasix for diuresis. Symptoms improved and pt was able to be weaned back to her home O2 5L via NC with diuresis. Pt with worsening RV failure and has had multiple hospital admissions recently and so Palliative care was consulted to discuss goals of care. Pt decided to be DNR. She considered hospice care and so was transitioned to IV Remodulin for this possibility, as hospice dose not provide IV Veletri at home. She tolerated the switch to Remodulin. Pt ultimately decided not to go home with hospice. She was d/c'ed home with Home Health on IV Remodulin. She will have f/u in PH clinic in 1-2 weeks. Pt was reminded on the importance of taking her meds as prescribed and wearing O2 at all times.

## 2017-10-05 NOTE — DISCHARGE SUMMARY
"[facility]  Heart Transplant  Discharge Summary      Patient Name: Emily Cook  MRN: [id number]  Admission Date: 9/22/2017  Hospital Length of Stay: 12 days  Discharge Date and Time: 10/05/2017 4:33 PM  Attending Physician: No att. providers found   Discharging Provider: Elena Raygoza PA-C  Primary Care Provider: Kaylee Cazares MD     HPI: 57 y.o. female with hx of pulmonary HTN WHO group 1 with 5L O2 at home on Macitentan, Veletri and Coumadin who was recently admitted 2 weeks ago for MRSA Bacteremia 2/2 PNA and ADHF. She was diuresed and discharged with vanc which she finished 2 days ago. She presented to the ED with shortness of breath and hypoxia from her Dr. Lovet today. Not been taking her bumex at home over the past several days d/t "laziness" and noticed that she had leg swelling yesterday so she did take a dose then. This morning she felt fine but then started to develop shortness of breath with ambulation. Denies any chest pain, palpitations, but has had nausea. On arrival to ED BP in 100s systolic, hypoxia of 84%, Pulse 85 on exam he had JVD to jaw and bilateral LE edema. EKG with right axis deviation, CXR with enlarged pulm vessels but no edema noted. BNP 1600 was 340 2 weeks ago.       * No surgery found *     Hospital Course: Pt was admitted to [facility]". She was started on IV Lasix for diuresis. Symptoms improved and pt was able to be weaned back to her home O2 5L via NC with diuresis. Pt with worsening RV failure and has had multiple hospital admissions recently and so Palliative care was consulted to discuss goals of care. Pt decided to be DNR. She considered hospice care and so was transitioned to IV Remodulin for this possibility, as hospice dose not provide IV Veletri at home. She tolerated the switch to Remodulin. Pt ultimately decided not to go home with hospice. She was d/c'ed home with Home Health on IV Remodulin. She will have f/u in PH clinic in 1-2 weeks. Pt was " reminded on the importance of taking her meds as prescribed and wearing O2 at all times.    Consults         Status Ordering Provider     Inpatient consult to Cardiology  Once     Provider:  (Not yet assigned)    Completed MARKO KELLY     Inpatient consult to Palliative Care  Once     Provider:  (Not yet assigned)    Completed MARILIA SHARMA     Inpatient consult to PICC team ([facility])  Once     Provider:  (Not yet assigned)    Completed EREN LORA              Pending Diagnostic Studies:     None        Final Active Diagnoses:    Diagnosis Date Noted POA    PRINCIPAL PROBLEM:  Acute decompensated heart failure [I50.9] 09/23/2017 Yes    Moderate to severe pulmonary hypertension [I27.20] 08/29/2017 Yes    Palliative care encounter [Z51.5] 09/28/2017 Not Applicable    Hypoxia [R09.02] 09/22/2017 Yes    Acute on chronic respiratory failure with hypoxia [J96.21] 06/03/2017 Yes      Problems Resolved During this Admission:    Diagnosis Date Noted Date Resolved POA    Hypokalemia [E87.6] 08/27/2017 09/24/2017 Yes      Discharged Condition: stable    Disposition: Home-Health Care c    Follow Up:    Patient Instructions:     Diet general   Order Specific Question Answer Comments   Na restriction, if any: 2gNa    Fluid restriction: Fluid - 1500mL      Activity as tolerated     Call MD for:  temperature >100.4     Call MD for:  persistent nausea and vomiting or diarrhea     Call MD for:  severe uncontrolled pain     Call MD for:  redness, tenderness, or signs of infection (pain, swelling, redness, odor or green/yellow discharge around incision site)     Call MD for:  difficulty breathing or increased cough     Call MD for:  severe persistent headache     Call MD for:  persistent dizziness, light-headedness, or visual disturbances     Call MD for:  increased confusion or weakness       Medications:  Reconciled Home Medications:   Discharge Medication List as of 10/4/2017  3:45 PM      START taking these  medications    Details   sodium chloride 0.9% 0.9 % SolP 100 mL with treprostinil 1 mg/mL Soln 6,000,000 ng Inject 2,580 ng/min into the vein continuous., Starting Wed 10/4/2017, No Print         CONTINUE these medications which have CHANGED    Details   warfarin (COUMADIN) 5 MG tablet Take 0.5 tablets (2.5 mg total) by mouth Daily., Starting Wed 10/4/2017, No Print         CONTINUE these medications which have NOT CHANGED    Details   bumetanide (BUMEX) 2 MG tablet Take 1 tablet (2 mg total) by mouth 2 (two) times daily., Starting Fri 9/8/2017, Until Sat 9/8/2018, Normal      clobetasol (TEMOVATE) 0.05 % external solution Starting 1/23/2016, Until Discontinued, Historical Med      duloxetine (CYMBALTA) 60 MG capsule Take 1 capsule (60 mg total) by mouth 2 (two) times daily., Starting Mon 7/17/2017, Until Sun 10/15/2017, Normal      gabapentin (NEURONTIN) 300 MG capsule Take 1 capsule (300 mg total) by mouth As instructed. Take one capsule qam, 1 qpm, 2 qhs., Starting Mon 7/17/2017, Normal      hydrocodone-acetaminophen 10-325mg (NORCO)  mg Tab Take 1 tablet by mouth 3 (three) times daily as needed., Starting Fri 9/22/2017, Until Sun 10/22/2017, Normal      hydrOXYzine (VISTARIL) 50 MG Cap 50 mg daily as needed. , Starting 5/16/2014, Until Discontinued, Historical Med      macitentan (OPSUMIT) 10 mg Tab Take 1 tablet (10 mg total) by mouth once daily., Starting Tue 9/19/2017, Normal      magnesium oxide (MAG-OX) 400 mg tablet Take 1 tablet (400 mg total) by mouth 2 (two) times daily., Starting Fri 9/8/2017, OTC      PATADAY 0.2 % Drop Starting 12/28/2015, Until Discontinued, Historical Med      potassium chloride (MICRO-K) 10 MEQ CpSR Take 2 capsules (20 mEq total) by mouth 3 (three) times daily., Starting Mon 6/26/2017, Normal      spironolactone (ALDACTONE) 25 MG tablet Take 1 tablet (25 mg total) by mouth once daily., Starting Mon 6/26/2017, Normal         STOP taking these medications       EPOPROSTENOL  SODIUM, ARGININE, (VELETRI IV) Comments:   Reason for Stopping:         sodium chloride 0.9% 0.9 % SolP 100 mL with epoprostenol arginine 0.5 mg SolR 6,000,000 ng Comments:   Reason for Stopping:               Elena Raygoza PA-C  Heart Transplant  [facility]

## 2017-10-05 NOTE — PATIENT INSTRUCTIONS
Discharge Instructions for Heart Failure  The heart is a muscle that pumps oxygen-rich blood to all parts of the body. When you have heart failure, the heart is not able to pump as well as it should. Blood and fluid may back up into the lungs (congestive heart failure), and some parts of the body dont get enough oxygen-rich blood to work normally. These problems lead to the symptoms of heart failure. Heart failure can occur due to an injury to the heart or from natural processes.  You can control symptoms of heart failure with some lifestyle changes and by following your doctor's advice.  Home care  Activity  Ask your healthcare provider about an exercise program. You can benefit from simple activities such as walking or gardening. Exercising most days of the week can make you feel better. Don't be discouraged if your progress is slow at first. Rest as needed. Stop activity if you develop symptoms such as chest pain, lightheadedness, or significant shortness of breath. Find activities that you enjoy, such as brisk walking, dancing, swimming, or gardening. These will help you stay active and strengthen your heart.  Diet  Follow a heart healthy diet. And make sure to limit the salt (sodium) in your diet. Salt causes your body to hold water. This makes your heart work harder as there is more fluid for the heart to pump. Limit your salt by doing the following:  · Limit canned, dried, packaged, and fast foods.  · Don't add salt to your food.  · Season foods with herbs instead of salt.  · Watch how much liquids you drink. Drinking too much can make heart failure worse. Talk with your health care provider about how much you should drink each day.  · Limit the amount of alcohol you drink. It may harm your heart. Women should have no more than 1 drink a day and men should have no more than 2.  · When you eat out, request that your meals have no added salt.  Tobacco  If you smoke, it's very important to quit. Smoking  increases your chances of having a heart attack by harming the blood vessels that provide oxygen to your heart. This makes heart failure worse. Quitting smoking is the number one thing you can do to improve your health. Enroll in a stop-smoking program to improve your chances of success. Talk with your healthcare provider about medicines or nicotine replacement therapy to help you quit smoking. Ask your healthcare provider about smoking cessation support groups.  Medicine  Take your medicines exactly as prescribed. Learn the names and purpose of each of your medicines. Keep an accurate medicine list and current dosages with you at all times. Don't skip doses. If you miss a dose of your medicine, take it as soon as you remember. If you miss a dose and it's almost time for your next dose, just wait and take your next dose at the normal time. Don't take a double dose. If you are unsure, call your doctor's office. Make sure not to mix up your medicines or forget what you've taken the same day.  Weight monitoring  Weigh yourself every day. A sudden weight gain can mean your heart failure is getting worse. Weigh yourself at the same time of day and in the same kind of clothes. Ideally, weigh yourself first thing in the morning after you empty your bladder, but before you eat breakfast. Your healthcare provider will show you how to track your weight. He or she will also discuss with you when you should call if you have a sudden, unexpected increase in your weight.  In general, your healthcare provider may ask you to report if your weight goes up by more than 2 pounds in 1 day,  5 pounds in 1 week, or whatever weight gain you were told by your doctor. This is a sign that you are retaining more fluid than you should be. Clues to weight gain include checking your ankles for swelling, or noticing you are short of breath when you lie down.  Follow-up care  Make a follow-up appointment as directed. Depending on the type and  severity of heart failure you have, you may need follow-up as early as 7 days from hospital discharge. Keep appointments for checkups and lab tests that are needed to check your medicines and condition.  Recognize that your health and even survival depend on your following medical recommendations.  Symptoms  Heart failure can cause a variety of symptoms, including:  · Shortness of breath  · Trouble breathing at night, especially when you lie down  · Swelling in the legs and feet or in the belly (abdomen)  · Becoming easily fatigued  · Irregular or rapid heartbeat  · Weakness or lightheadedness  · Swelling of the neck veins  It is important to know what to do if symptoms get worse or if you develop signs of worsening heart failure.     When to see your healthcare provider  Call your doctor right away if you have any of these signs of worsening heart failure:  · Sudden weight gain (more than 2 pounds in 1 day or 5 pounds in 1 week, or whatever weight gain you were told to report by your doctor)  · Trouble breathing not related to being active  · New or increased swelling of your legs or ankles  · Swelling or pain in your abdomen  · Breathing trouble at night (waking up short of breath, needing more pillows to breathe)  · Frequent coughing that doesn't go away  · Feeling much more tired than usual  Call 911  Call 911 right away if you have:  · Severe shortness of breath, such that you can't catch your breath even while resting  · Severe chest pain that does not resolve with rest or nitroglycerin  · Pink, foamy mucus with cough and shortness of breath  · A continuous rapid or irregular heartbeat  · Passing out or fainting  · Stroke symptoms such as sudden numbness or weakness on one side of your face, arm, or leg or sudden confusion, trouble speaking or vision changes   Date Last Reviewed: 3/21/2016  © [address] [employer] [address]. [address] [place] [address], [place] [address]. All rights reserved. This information  is not intended as a substitute for professional medical care. Always follow your healthcare professional's instructions.        Pneumonia (Adult)  Pneumonia is an infection deep within the lungs. It is in the small air sacs (alveoli). Pneumonia may be caused by a virus or bacteria. Pneumonia caused by bacteria is usually treated with an antibiotic. Severe cases may need to be treated in the hospital. Milder cases can be treated at home. Symptoms usually start to get better during the first 2 days of treatment.    Home care  Follow these guidelines when caring for yourself at home:  · Rest at home for the first 2 to 3 days, or until you feel stronger. Dont let yourself get overly tired when you go back to your activities.  · Stay away from cigarette smoke - yours or other peoples.  · You may use acetaminophen or ibuprofen to control fever or pain, unless another medicine was prescribed. If you have chronic liver or kidney disease, talk with your healthcare provider before using these medicines. Also talk with your provider if youve had a stomach ulcer or gastrointestinal bleeding. Dont give aspirin to anyone younger than 18 years of age who is ill with a fever. It may cause severe liver damage.  · Your appetite may be poor, so a light diet is fine.  · Drink 6 to 8 glasses of fluids every day to make sure you are getting enough fluids. Beverages can include water, sport drinks, sodas without caffeine, juices, tea, or soup. Fluids will help loosen secretions in the lung. This will make it easier for you to cough up the phlegm (sputum). If you also have heart or kidney disease, check with your healthcare provider before you drink extra fluids.  · Take antibiotic medicine prescribed until it is all gone, even if you are feeling better after a few days.  Follow-up care  Follow up with your healthcare provider in the next 2 to 3 days, or as advised. This is to be sure the medicine is helping you get better.  If you  are 65 or older, you should get a pneumococcal vaccine and a yearly flu (influenza) shot. You should also get these vaccines if you have chronic lung disease like asthma, emphysema, or COPD. Recently, a second type of pneumonia vaccine has become available for everyone over 65 years old. This is in addition to the previous vaccine. Ask your provider about this.  When to seek medical advice  Call your healthcare provider right away if any of these occur:  · You dont get better within the first 48 hours of treatment  · Shortness of breath gets worse  · Rapid breathing (more than 25 breaths per minute)  · Coughing up blood  · Chest pain gets worse with breathing  · Fever of 100.4°F (38°C) or higher that doesnt get better with fever medicine  · Weakness, dizziness, or fainting that gets worse  · Thirst or dry mouth that gets worse  · Sinus pain, headache, or a stiff neck  · Chest pain not caused by coughing  Date Last Reviewed: 1/1/2017  © 5829-9440 [employer]. [address] [place] [address], [place] [address]. All rights reserved. This information is not intended as a substitute for professional medical care. Always follow your healthcare professional's instructions.

## 2017-10-05 NOTE — PROGRESS NOTES
The pt was recently admitted from 9/22 through 10/4 for volume overload.  See calendar for recent INRs and warfarin doses.  Her home health agency was asked to check an INR on 10/9 and she will take the dose in the calendar over the weekend.  As her INRs have been towards the higher part of her range, I am advising a slight dose decrease for tonight.

## 2017-10-05 NOTE — PROGRESS NOTES
Not noted in chart that pt was told to f\u with Dr. Cazares. She stated that no one told her to f\u with her. I routed a  Message to her staff to see if Dr. Cazares wants her to follow up in clinic.

## 2017-10-09 NOTE — ED NOTES
Patient identifiers verified and correct for Emily Cook.    LOC: The patient is awake, alert and aware of environment with an appropriate affect, the patient is oriented x 3 and speaking appropriately.  APPEARANCE: Patient resting comfortably and in no acute distress, patient is clean and well groomed, patient's clothing is properly fastened.  SKIN: The skin is warm and dry, color consistent with ethnicity, patient has normal skin turgor and moist mucus membranes, skin intact, no breakdown or bruising noted, port noted to left upper chest.  MUSCULOSKELETAL: Patient moving all extremities spontaneously, no obvious swelling or deformities noted.  RESPIRATORY: Airway is open and patent, respirations are spontaneous, patient has a normal effort and rate, no accessory muscle use noted.  CARDIAC: Patient has a rate of 79 and regular rhythm, no peripheral edema noted, capillary refill < 3 seconds.  ABDOMEN: Soft and non tender to palpation, no distention noted.  NEUROLOGIC: Eyes open spontaneously, behavior appropriate to situation, follows commands, facial expression symmetrical, bilateral hand grasp equal and even, purposeful motor response noted, normal sensation in all extremities when touched with a finger.

## 2017-10-09 NOTE — DISCHARGE INSTRUCTIONS
Be sure to take all your medications as prescribed. Return to the ED should you encounter any further complications of your remodulin pump.

## 2017-10-09 NOTE — ED TRIAGE NOTES
Pt presents to the ED c/o vascular access problem that started leaking 2 hours ago. Pt states she takes remodulin and noticed the site began to leak. Pt reports she called the pulmonary HTN clinic and the doctor on call said to disconnect the remodulin and come to the ED.

## 2017-10-09 NOTE — PROGRESS NOTES
Meter received. Referral entered. Called for co-pay quote from [employer]. No co-pay associated with consult. Pt is in e/r today. Asked to be called back for scheduling next Monday.

## 2017-10-09 NOTE — ED PROVIDER NOTES
"Encounter Date: 10/9/2017    SCRIBE #1 NOTE: I, Oxana Regalado, am scribing for, and in the presence of,  Dr. Olson. I have scribed the entire note.       History     Chief Complaint   Patient presents with    Vascular Access Problem     on remodulin called my dr and told to turn pump off 2 hrs ago due to leaking ,      Time patient was seen by the provider: 1:29 PM      The patient is a 57 y.o. female with hx of: HLD, arrhythmia, pulmonary HTN, CHF, cardiac pacemaker in situ, diverticulitis, and arthritis that presents to the ED with a complaint of vascular access disconnection around 12 today.  Pt notes leaking around 9:30 this morning when she woke up, but no damage.  Upon arrival here, pt noticed a crack.  Denies CP, SOB, and missed medication doses, but she has not taken any medications today.      The history is provided by the patient, a relative and medical records.     Review of patient's allergies indicates:   Allergen Reactions    Chlorhexidine Itching and Rash     Patient had raised rash on neck following RHC procedure. She states she's never had a problem with the "prep" used until this time.     Adhesive Rash     Past Medical History:   Diagnosis Date    Arrhythmia     Arthritis     Cardiac pacemaker in situ     Carpal tunnel syndrome, right     Cervical spondylosis     Cervicalgia     CHF (congestive heart failure)     Diverticulitis     Heart block AV third degree     Hyperlipidemia     ALFREDO on CPAP     Pulmonary hypertension     Subdeltoid bursitis      Past Surgical History:   Procedure Laterality Date    CARDIAC CATHETERIZATION      CARDIAC PACEMAKER PLACEMENT  7/29/13    [employer] [place] PM    CARDIAC SURGERY      COLONOSCOPY N/A 9/28/2015    Procedure: COLONOSCOPY;  Surgeon: Jason Diaz MD;  [facility] ([address] [place] [address]);  Service: Endoscopy;  Laterality: N/A;  Please schedule in 2-3 months with Dr Diaz  Pulmonary HTN, 2nd floor (off remodulin infusion as of "a few " "days" before 9/9/15)    3 day hold Coumadin, [facility]  PT/INR scheduled before procedure    ECTOPIC PREGNANCY SURGERY Left     HYSTERECTOMY      partial    knee arthroscopy       Family History   Problem Relation Age of Onset    Arthritis Mother     Diabetes Mother     Hyperlipidemia Mother     Arthritis Father     Hyperlipidemia Father      Social History   Substance Use Topics    Smoking status: Never Smoker    Smokeless tobacco: Never Used    Alcohol use No      Comment: rarely     Review of Systems   Constitutional: Negative for chills and fever.   HENT: Negative for nosebleeds and sore throat.    Eyes: Negative for visual disturbance.   Respiratory: Negative for shortness of breath.    Cardiovascular: Negative for chest pain.        + brush line leak   Gastrointestinal: Negative for nausea.   Genitourinary: Negative for dysuria.   Musculoskeletal: Negative for back pain.   Skin: Negative for rash.   Neurological: Negative for weakness and headaches.   Hematological: Does not bruise/bleed easily.       Physical Exam     Initial Vitals [10/09/17 1144]   BP Pulse Resp Temp SpO2   118/69 86 18 98.5 °F (36.9 °C) 96 %      MAP       85.33         Physical Exam    Nursing note and vitals reviewed.  Constitutional: She appears well-developed and well-nourished. She is not diaphoretic.  Non-toxic appearance. She does not appear ill. No distress.   HENT:   Head: Normocephalic and atraumatic.   Neck: Neck supple.   Cardiovascular: Normal rate and regular rhythm. Exam reveals no gallop and no friction rub.    No murmur heard.  Left chest wall brush catheter in place, previous adapter with small clot and fracture   Pulmonary/Chest: Effort normal and breath sounds normal. No accessory muscle usage. No tachypnea. No respiratory distress. She has no decreased breath sounds. She has no wheezes. She has no rhonchi. She has no rales.   Abdominal: Normal appearance. She exhibits no distension. "   Musculoskeletal: Normal range of motion.   Neurological: She is alert and oriented to person, place, and time.   Skin: Skin is warm and dry. No rash noted. No pallor.   Psychiatric: She has a normal mood and affect. Her behavior is normal. Judgment and thought content normal.         ED Course   Procedures  Labs Reviewed   CBC W/ AUTO DIFFERENTIAL - Abnormal; Notable for the following:        Result Value    Hemoglobin 11.8 (*)     Hematocrit 35.5 (*)     MCV 74 (*)     MCH 24.4 (*)     RDW 21.9 (*)     Platelets 391 (*)     All other components within normal limits   COMPREHENSIVE METABOLIC PANEL - Abnormal; Notable for the following:     Potassium 3.0 (*)     Albumin 3.2 (*)     eGFR if  48.1 (*)     eGFR if non  41.7 (*)     All other components within normal limits   PROTIME-INR - Abnormal; Notable for the following:     Prothrombin Time 16.4 (*)     INR 1.6 (*)     All other components within normal limits   TROPONIN I   B-TYPE NATRIURETIC PEPTIDE             Medical Decision Making:   History:   Old Medical Records: I decided to obtain old medical records.  Initial Assessment:   57 y.o. Female on remodulin pump for severe pulmonary HTN who presents with dislodged pump.  She has been off of drip for approximately one hour PTA.  She is asymptomatic at this time.  We were able to replace the adapter.  Discussed with Pulmonary Hypertension on call who recommended restarting remodulin through peripheral Iv.  Pulmonary Hypertension will be down to ER for further evaluation.    2:45 PM Pt was evaluated by pulmonary hypertension ROBBY at bedside who restarted her remodulin at previous site.  She was observed to monitor for hypotension and will be discharged for outpatient follow-up as instructed.  Labs revealed no significant anemia or leukocytosis.  Hypokalemia of 3.0, which we replaced.  Creatinine at baseline.  Troponin negative.  BMP best in record at 93.  INR at 1.6.   Pt informed  the importance of compliance with her coumadin, and instructed to take an extra dose this evening.  CXR with cardiomegaly but no other acute process. Pt consulted the importance of taking medications as prescribed and consult indications to return to ED.  Follow-up in pulmonary hypertension clinic.    Independently Interpreted Test(s):   I have ordered and independently interpreted X-rays - see prior notes.  I have ordered and independently interpreted EKG Reading(s) - see prior notes  Clinical Tests:   Lab Tests: Ordered and Reviewed  Radiological Study: Ordered and Reviewed  Medical Tests: Ordered and Reviewed            Scribe Attestation:   Scribe #1: I performed the above scribed service and the documentation accurately describes the services I performed. I attest to the accuracy of the note.  Comments: I, Dr. Mike Olson, personally performed the services described in this documentation. All medical record entries made by the scribe were at my direction and in my presence.  I have reviewed the chart and agree that the record reflects my personal performance and is accurate and complete. Mike Olson MD.  3:12 PM 10/09/2017              ED Course      Clinical Impression:   The primary encounter diagnosis was Complication of infusion, initial encounter. Diagnoses of Pulmonary hypertension, Hypokalemia, and Subtherapeutic international normalized ratio (INR) were also pertinent to this visit.    Disposition:   Disposition: Discharged  Condition: Stable                        Mike Olson MD  10/09/17 1729

## 2017-10-10 NOTE — TELEPHONE ENCOUNTER
"Called patient about her lab results and low potassium. Patient states that she take one potassium (10 meq), three times a day because "that is what it says on the box." Instructed patient to increase to 4 tablets (40meq), three times a day. Stressed the importance of taking her potassium and suggested potassium rich food. Will recheck labs on Friday, per MD.   Patient seemed engaged in the conversation at the beginning but then responded to directions with "uh huh" with no other comment.    Notified The [facility] Team about new orders.  "

## 2017-10-10 NOTE — PROGRESS NOTES
[facility] called 10/10/17 to reschedule appointment 10/12/17 to 10/13/17. Other labs that day.

## 2017-10-11 NOTE — PROGRESS NOTES
Sai Trevino from the medical team  called and reports that patient was taking dose of 5 mg daily after being discharged on 10/9.She may have gave coumadin clinic the wrong information.  She can be reached at [phone].

## 2017-10-13 NOTE — TELEPHONE ENCOUNTER
Patient's potassium has improved. She is to continue taking Potassium 40 mEq, TID. Weekly labs will change to every other week, per Dr. Cazares.

## 2017-10-23 NOTE — TELEPHONE ENCOUNTER
----- Message from Merrill Lou sent at 10/23/2017 12:25 PM CDT -----  Contact: Patient   Patient called in requesting to get a medication refill. [facility]. Please contact Patient for further questions at [phone]. Thank You.

## 2017-10-24 PROBLEM — I27.20 PULMONARY HYPERTENSION: Status: RESOLVED | Noted: 2017-01-01 | Resolved: 2017-01-01

## 2017-10-24 PROBLEM — I27.20 MODERATE TO SEVERE PULMONARY HYPERTENSION: Status: RESOLVED | Noted: 2017-01-01 | Resolved: 2017-01-01

## 2017-10-24 PROBLEM — I50.31 ACUTE DIASTOLIC HEART FAILURE: Status: RESOLVED | Noted: 2017-01-01 | Resolved: 2017-01-01

## 2017-10-24 NOTE — TELEPHONE ENCOUNTER
PATIENT WALK INTO CLINIC AREA    09/22/17 last Rx refill  07/17/17 last office visit  11/02/17 RTC

## 2017-10-24 NOTE — PROGRESS NOTES
Subjective:     HPI:  Ms. Cook is a 57 y.o. year old Black or  female who presents for hospital discharge followup. She has a PMH of AVB/syncope s/p PPM, pulmonary HTN WHO group 1, who was transferred from [facility] for shortness of breath and management of patient's pulmonary HTN. Patient admitted to the [facility] service and placed on 24 hour telemetry.  On admit, we continued home dose of Macitentan.  RHC 6/1/17 showed RA 13, PCWP 15, PAP 90/38, CO/CI- 3.4/1.8; therefor, patient was started on Veletri with plan to up titrate as tolerated.  She had to be moved to the ICU secondary to need for high flow oxygen.  She was started on Dopamine and diuresed.  CT of chest negative for PVOD. Dopamine changed to ;  turned off 6/9 2/2 hypotension. Palliative Care consulted and pt made DNR/DNI secondary to advance class 4 PAH.  We were able up titrate Veteltri to 22ng/kg/mn.  Cardoso line was placed and pt was set up for home infusion. Since then her son has been uptitrating her dose every other day as directed.   Was most recently admitted 9/22-10/5/17- She was started on IV Lasix for diuresis. Symptoms improved and pt was able to be weaned back to her home O2 5L via NC with diuresis. Pt with worsening RV failure and has had multiple hospital admissions recently and so Palliative care was consulted to discuss goals of care. Pt decided to be DNR. She considered hospice care and so was transitioned to IV Remodulin for this possibility, as hospice dose not provide IV Veletri at home. She tolerated the switch to Remodulin. Pt ultimately decided not to go home with hospice. She was d/c'ed home with Home Health on IV Remodulin. She will have f/u in PH clinic in 1-2 weeks. Pt was reminded on the importance of taking her meds as prescribed and wearing O2 at all times.  Now returns for hosp f/u- did have an ER visit in interim as her line was leaking-     Since dc home pt has been feeling much better- able  "to walk without getting winded, much more active around the house- no fluid retention. Line is clean, no drainage. Currently on rate of 35  Does complain of irritation of the skin around the line which causes her to scratch- home health has not started yet and she has a bandaid over her line today (see below)    6mw today 305  m (182   m in  July  )                                              O2 sat   96-> 86 %       4LNC                                                    HR 84  -> 98                                                                 BP  128 /73   ->124 / 67                                                        Fatou    0.5 -> 3          Past Medical History:   Diagnosis Date    Arrhythmia     Arthritis     Cardiac pacemaker in situ     Carpal tunnel syndrome, right     Cervical spondylosis     Cervicalgia     CHF (congestive heart failure)     Diverticulitis     Heart block AV third degree     Hyperlipidemia     ALFREDO on CPAP     Pulmonary hypertension     Subdeltoid bursitis      Past Surgical History:   Procedure Laterality Date    CARDIAC CATHETERIZATION      CARDIAC PACEMAKER PLACEMENT  13    [employer] DC PM    CARDIAC SURGERY      COLONOSCOPY N/A 2015    Procedure: COLONOSCOPY;  Surgeon: Jason Diaz MD;  Location: [facility] ([address] [place] [address]);  Service: Endoscopy;  Laterality: N/A;  Please schedule in 2-3 months with Dr Diaz  Pulmonary HTN, 2nd floor (off remodulin infusion as of "a few days" before 9/9/15)    3 day hold Coumadin, [facility]  PT/INR scheduled before procedure    ECTOPIC PREGNANCY SURGERY Left     HYSTERECTOMY      partial    knee arthroscopy       OB History      Para Term  AB Living    5 4 4   1 3    SAB TAB Ectopic Multiple Live Births        1            Review of Systems   Constitution: Positive for diaphoresis and malaise/fatigue. Negative for chills, decreased appetite, fever, night sweats and weight gain.   Eyes: " "Negative for blurred vision.   Cardiovascular: Positive for dyspnea on exertion. Negative for chest pain, claudication, leg swelling, near-syncope, orthopnea and palpitations.   Respiratory: Positive for shortness of breath. Negative for sleep disturbances due to breathing and snoring.    Skin: Positive for color change, dry skin, flushing, itching and rash. Negative for poor wound healing.   Musculoskeletal: Negative for back pain.   Gastrointestinal: Positive for nausea. Negative for melena.       Objective:   Blood pressure 105/63, pulse 75, height 5' 3" (1.6 m), weight 72.4 kg (159 lb 9.8 oz), SpO2 (!) 94 %.body mass index is 28.27 kg/m².  Physical Exam  Constitutional: She is oriented to person, place, and time. She appears well-developed and well-nourished.   HENT:   Head: Normocephalic and atraumatic.   Eyes: Right eye exhibits no discharge. Left eye exhibits no discharge.   Neck: Neck supple. No JVD present. No thyromegaly present.   Cardiovascular: Normal rate and regular rhythm.  Exam reveals no gallop and no friction rub.    No murmur heard.  Pulmonary/Chest: Effort normal and breath sounds normal. No respiratory distress. She has no wheezes. She has no rales.   Abdominal: Soft. Bowel sounds are normal. She exhibits no distension. There is no tenderness.   Musculoskeletal: Normal range of motion. She exhibits no edema or tenderness.   Neurological: She is alert and oriented to person, place, and time. No cranial nerve deficit. Coordination normal.   Skin: Skin is warm and dry. No rash noted.   Psychiatric: She has a normal mood and affect. Judgment and thought content normal.       Labs:    Chemistry        Component Value Date/Time     10/24/2017 1423    K 3.9 10/24/2017 1423     10/24/2017 1423    CO2 24 10/24/2017 1423    BUN 13 10/24/2017 1423    CREATININE 1.1 10/24/2017 1423    GLU 90 10/24/2017 1423     01/27/2017 2130        Component Value Date/Time    CALCIUM 9.5 10/24/2017 " 1423    ALKPHOS 106 10/24/2017 1423    AST 16 10/24/2017 1423    ALT 9 (L) 10/24/2017 1423    BILITOT 0.6 10/24/2017 1423    ESTGFRAFRICA >60.0 10/24/2017 1423    EGFRNONAA 55.9 (A) 10/24/2017 1423          Magnesium   Date Value Ref Range Status   10/24/2017 1.7 1.6 - 2.6 mg/dL Final     Lab Results   Component Value Date    WBC 4.52 10/24/2017    HGB 12.6 10/24/2017    HCT 39.5 10/24/2017     (H) 10/24/2017     Lab Results   Component Value Date    INR 3.1 10/17/2017    INR 2.5 (H) 10/13/2017    INR 1.6 (H) 10/10/2017     BNP   Date Value Ref Range Status   10/24/2017 87 0 - 99 pg/mL Final     Comment:     Values of less than 100 pg/ml are consistent with non-CHF populations.   10/09/2017 93 0 - 99 pg/mL Final     Comment:     Values of less than 100 pg/ml are consistent with non-CHF populations.   09/28/2017 212 (H) 0 - 99 pg/mL Final     Comment:     Values of less than 100 pg/ml are consistent with non-CHF populations.     No results found for: LDH  No results found for this or any previous visit.  No results found for this or any previous visit.    Assessment:      1. Primary pulmonary hypertension    2. Chronic respiratory failure with hypoxia    3. Long term current use of anticoagulant therapy    4. Cardiac pacemaker in situ    5. Chronic pulmonary heart disease    6. Acute on chronic respiratory failure with hypoxia        Plan:   Can try hydrocortisone cream to the area around the line  Would like home health started ASAP for vitals checks, prn labs and IV lasix, and line care (filled out paperwork for this today)  Cont current dose of remodulin for now    Needs flu shot    Patient is now NYHA III  Recommend 2 gram sodium restriction and 1500cc fluid restriction.  Encourage physical activity with graded exercise program.  Requested patient to weigh themselves daily, and to notify us if their weight increases by more than 3 lbs in 1 day or 5 lbs in 1 week.     F/u 4 wk with labs walk and  echo

## 2017-10-24 NOTE — PATIENT INSTRUCTIONS
Try the hydrocortisone cream to the irritated skin around your line    Keep salt intake to under 2000 mg sodium, fluids to under 2 L (64 oz)    Call us if you find yourself getting more short of breath, have more swelling or unexpected weight changes, fever, chills or problems with your line    Flu shot (high dose)

## 2017-10-25 NOTE — TELEPHONE ENCOUNTER
Contacted Medical Team [facility] about patient's central line dressing. Informed them that patient came to clinic appt without a dressing, but with a band-aid, on the site. HH RN will make sure to check site and dressing.   Advised that PH coordinator would reach out to [facility] and have them send a different dressing that might be less irritating to the patient.    RN contacted [facility] who will send a new dressing to patient.

## 2017-10-25 NOTE — PROGRESS NOTES
Yary/Medical Team called 10/25/17 to reschdule appointment 10/24/17 to 10/25/17. Nurse could not contact patient on 10/24/17.

## 2017-10-26 NOTE — PROCEDURES
Emily Cook is a 57 y.o.  female patient, who presents for a 6 minute walk test ordered by Kaylee Cazares MD.  The diagnosis is Pulmonary Hypertension.  The patient's BMI is 29.3 kg/m2.  Predicted distance (lower limit of normal) is 362.86 meters.      Test Results:    The test was completed without stopping.  The total time walked was 360 seconds.  During walking, the patient reported:  Dyspnea.  The patient used supplemental oxygen during testing.     10/24/2017---------Distance: 304.8 meters (1000 feet)     O2 Sat % Supplemental Oxygen Heart Rate Blood Pressure Fatou Scale   Pre-exercise  (Resting) 96 % 4 L/M 84 bpm 128/73 mmHg 0.5   During Exercise 86 % 4 L/M 98 bpm 124/67 mmHg 3   Post-exercise  (Recovery) 95 % 4 L/M  84 bpm       Recovery Time:  96 seconds    Performing nurse/tech:  ANTIONE Diaz.      PREVIOUS STUDY:   07/17/2017---------Distance: 182.88 meters (600 feet)       O2 Sat % Supplemental Oxygen Heart Rate Blood Pressure Fatou Scale   Pre-exercise  (Resting) 92 % 6 L/M 100 bpm 102/58 mmHg 2   During Exercise 69 % 6 L/M 122 bpm 109/67 mmHg 5-6   Post-exercise  (Recovery) 91 % 6 L/M  96 bpm   mmHg         CLINICAL INTERPRETATION:  Six minute walk distance is 304.8 meters (1000 feet) with moderate dyspnea.  During exercise, there was significant desaturation while breathing supplemental oxygen.  Both blood pressure and heart rate remained stable with walking.  The patient did not report non-pulmonary symptoms during exercise.  Since the previous study in July 2017, exercise capacity is significantly improved.  Based upon age and body mass index, exercise capacity is less than predicted.

## 2017-10-30 PROBLEM — E78.5 HLD (HYPERLIPIDEMIA): Status: ACTIVE | Noted: 2017-01-01

## 2017-10-31 PROBLEM — I47.20 V TACH: Status: ACTIVE | Noted: 2017-01-01

## 2017-10-31 NOTE — TELEPHONE ENCOUNTER
Notified via EPIC that Mrs. Cook was hospitalized at [facility]. Sent message to provider taking care of patient at 1:00pm to inform her that Mrs. Cook is one of our PH patients and to offer assistance with information on medication and line management and transfer to [facility]. No response received from this message.   Called patient nurse, Renetta, and discussed patient care. Was informed that per MD, the nurse had stopped the remodulin to draw blood cultures off of the Cardoso. Explained to the nurse that there should be no blood draws from the Cardoso and that it is a dedicated line - No other meds should run through it and it should not be flushed.  The RN also stated that patient's son had mixed two remodulin cassettes. She labeled one and sent to pharmacy. She said that she would call for the second cassette when due and have the patient connect the medication herself.   The RN does not know of any orders to transfer patient to [facility] where we have the medication on hand and trained staff.   Educated RN and asked her to use the [facility] for any concerns or questions pertaining to the pump. Specifically asked RN to give information to the night nurse.  Notified Dr. Cazares of all.

## 2017-11-01 PROBLEM — R78.81 MRSA BACTEREMIA: Status: ACTIVE | Noted: 2017-01-01

## 2017-11-01 PROBLEM — B95.62 MRSA BACTEREMIA: Status: ACTIVE | Noted: 2017-01-01

## 2017-11-01 NOTE — TELEPHONE ENCOUNTER
Called [facility] after noting that one of patient's PH medications was not listed on her MAR and that MD notes suggest that they would be removing patient's Brush. Spoke to Dr. Cam and explained that patient takes Opsumit, daily for PH. Dr. Cam said that they would add to the MAR and confirm that patient is taking her own medication. Also, explained that we usually have our patients who are on IV remodulin transfer to [facility] for care because we have medication on formulary and training provider and nursing staff. Dr. Cam confirmed that they were consulting surgery to take out the patient's brush due to bacteremia. Advised that we would prefer to do that at [facility]. Dr. Cam said he would ask the patient if she was amenable to a transfer and set it in motion if agreed. Gave Dr. Cam the name of the admitting physicians and HTS.    Notified MD of all.

## 2017-11-02 PROBLEM — R78.81 BACTEREMIA: Status: ACTIVE | Noted: 2017-01-01

## 2017-11-02 NOTE — H&P
[facility]  Heart Transplant  H&P    Patient Name: Emily Cook  MRN: [id number]  Admission Date: 11/1/2017  Attending Physician: Kaylee Cazares MD  Primary Care Provider: Kaylee Cazares MD  Principal Problem:MRSA bacteremia    Subjective:     History of Present Illness:  Emily Cook is a 58 yo female with a PMHx of medical non adherence, ALFREDO (non adherent to CPAP), PAH (group 1) on IV remodulin/warfarin/macitentan, chronic respiratory failure on 5L ATC of home oxygen, 3rd degree AV block s/p pacemaker implantation, diverticulitis and previous episode of bacteremia with home PICC line who is being transferred for MRSA bacteremia.     She originally presented on 10/29/17 to [facility] with complaint of right arm pain and fever. Blood cultures grew MRSA and she has been treated with vancomycin with subsequent blood cultures being negative. She had plan to remove her permacath at [facility] however her INR remained slightly supratherapeutic. She is transferred here for continuation of care. Remains afebrile with stable vitals. Upon review of systems she has minimal complaints and states explicitly that she feels her LE edema is the best its been in a month. SOB/SANCHEZ stable on home 5 liters.     Past Medical History:   Diagnosis Date    Arrhythmia     Arthritis     Cardiac pacemaker in situ     Carpal tunnel syndrome, right     Cervical spondylosis     Cervicalgia     CHF (congestive heart failure)     Diverticulitis     Heart block AV third degree     Hyperlipidemia     ALFREDO on CPAP     Pulmonary hypertension     Subdeltoid bursitis      Past Surgical History:   Procedure Laterality Date    CARDIAC CATHETERIZATION      CARDIAC PACEMAKER PLACEMENT  7/29/13    [employer] DC PM    CARDIAC SURGERY      COLONOSCOPY N/A 9/28/2015    Procedure: COLONOSCOPY;  Surgeon: Jason Diaz MD;  Location: [facility] ([address] [place] [address]);  Service: Endoscopy;  Laterality: N/A;  Please  "schedule in 2-3 months with Dr Diaz  Pulmonary HTN, 2nd floor (off remodulin infusion as of "a few days" before 9/9/15)    3 day hold Coumadin, [facility]  PT/INR scheduled before procedure    ECTOPIC PREGNANCY SURGERY Left     HYSTERECTOMY      partial    knee arthroscopy       Review of patient's allergies indicates:   Allergen Reactions    Chlorhexidine Itching and Rash     Patient had raised rash on neck following RHC procedure. She states she's never had a problem with the "prep" used until this time.     Adhesive Rash     Current Facility-Administered Medications   Medication    acetaminophen tablet 650 mg    bumetanide tablet 2 mg    gabapentin capsule 300 mg    [START ON 11/2/2017] macitentan tablet 10 mg    magnesium oxide tablet 400 mg    ondansetron injection 4 mg    [START ON 11/2/2017] pantoprazole EC tablet 40 mg    sodium chloride 0.9% flush 3 mL    treprostinil (REMODULIN) 6,000,000 ng in sodium chloride 0.9% 100 mL infusion    [START ON 11/2/2017] vancomycin 1 g in dextrose 5 % 250 mL IVPB (ready to mix system)     Family History     Problem Relation (Age of Onset)    Arthritis Mother, Father    Diabetes Mother    Hyperlipidemia Mother, Father        Social History Main Topics    Smoking status: Never Smoker    Smokeless tobacco: Never Used    Alcohol use No      Comment: rarely    Drug use: No    Sexual activity: No     Review of Systems   Constitutional: Negative for chills, fatigue and fever.   HENT: Negative for ear pain, sinus pressure and sore throat.    Eyes: Negative.    Respiratory:        SOB is stable.    Cardiovascular: Positive for leg swelling.   Gastrointestinal: Negative.    Musculoskeletal: Negative.         Pain in R shoulder    Skin: Negative.    Neurological: Negative.    Psychiatric/Behavioral: Negative.      Objective:     Vital Signs (Most Recent):    Vital Signs (24h Range):  Temp:  [96.9 °F (36.1 °C)-98.1 °F (36.7 °C)] 98 °F (36.7 °C)  Pulse:  " [] 90  Resp:  [16-20] 18  SpO2:  [82 %-94 %] 94 %  BP: ()/(52-59) 114/52     Physical Exam   Constitutional: She is oriented to person, place, and time. She appears well-developed and well-nourished.   HENT:   Head: Normocephalic and atraumatic.   Eyes: EOM are normal. Pupils are equal, round, and reactive to light. No scleral icterus.   Neck: Neck supple. No JVD present.   Cardiovascular: Normal rate, regular rhythm and normal heart sounds.    + tunneled line over left chest. Dressing appears C/D/I w/o tenderness or drainage.    Pulmonary/Chest: Effort normal and breath sounds normal. She has no wheezes. She has no rales.   Abdominal: Soft. She exhibits no distension.   Musculoskeletal: She exhibits no edema.   Neurological: She is alert and oriented to person, place, and time.   Skin: Skin is warm and dry. Capillary refill takes less than 2 seconds.   Vitals reviewed.    Significant Labs:  CBC:    Recent Labs  Lab 10/30/17  0529 10/31/17  0543 11/01/17  0524   WBC 9.70 12.30 9.30   RBC 4.87 5.09 5.10   HGB 11.9* 12.4 12.4   HCT 36.6* 37.7 37.6    322 361*   MCV 75* 74* 74*   MCH 24.4* 24.3* 24.3*   MCHC 32.5 32.9 33.0     CMP:    Recent Labs  Lab 10/30/17  0529 10/31/17  0543 11/01/17  0524   * 140* 137*   CALCIUM 9.1 8.4 9.1   ALBUMIN 3.9 4.0 4.2   PROT 7.5 7.9 8.2    135* 136   K 4.0 3.3* 3.2*   CO2 24 25 31*    100 93*   BUN 13 12 12   CREATININE 0.80 0.90 0.90   ALKPHOS 101 99 103   ALT 25 24 24   AST 27 28 26   BILITOT 0.9 1.1 1.0      Coagulation:     Recent Labs  Lab 10/29/17  2305 10/31/17  0543 11/01/17  0524   INR 2.1* 2.5* 2.3*     Blood Cultures:   10/29/17: + MRSA  10/30/17: No growth to date     I have reviewed all pertinent labs within the past 24 hours.    Diagnostic Results:  I have reviewed all pertinent imaging results/findings within the past 24 hours.    Assessment/Plan:     MRSA bacteremia    Blood cultures positive for MRSA with repeat cultures showing  no growth  TTE done without concern for vegetation.   Will not pursue AMIE given bacterial clearance demonstrated by negative repeat cultures.   Continue Vancomycin   ID consulted to give recs regarding management.   General surgery consulted for possible removal of tunnelled line (likely source)   Holding Coumadin for now. Will repeat INR in the AM.         Primary pulmonary hypertension    Continue IV remodulin and macitentan   Holding coumadin given possible need for line removal.   DNR         Right-sided heart failure    Continue treatment for her PAH as above  Continue Bumex 2mg BID  Strict I/Os and daily weights.         Acute on chronic respiratory failure with hypoxia    Pulmonary toilet (incentive spirometry / flutter)   5L O2 around the clock  No increase in oxygen requirements.         Sleep apnea- on CPAP    Continue PTA CPAP           Patient Discussed with Dr. Debora mcqueen, DO  Heart Transplant  [facility]

## 2017-11-02 NOTE — ANESTHESIA POSTPROCEDURE EVALUATION
"Anesthesia Post Evaluation    Patient: Emily Cook    Procedure(s) Performed: Procedure(s) (LRB):  TRANSESOPHAGEAL ECHOCARDIOGRAM (AMIE) (N/A)    Final Anesthesia Type: general  Patient location during evaluation: PACU  Patient participation: Yes- Able to Participate  Level of consciousness: awake and alert and oriented  Post-procedure vital signs: reviewed and stable  Pain management: adequate  Airway patency: patent  PONV status at discharge: No PONV  Anesthetic complications: no      Cardiovascular status: stable  Respiratory status: unassisted, spontaneous ventilation and face mask  Hydration status: euvolemic  Follow-up not needed.        Visit Vitals  BP (!) 88/51   Pulse 76   Temp 37.1 °C (98.8 °F) (Axillary)   Resp 16   Ht 5' 3" (1.6 m)   Wt 74.8 kg (164 lb 14.5 oz)   SpO2 (!) 94%   Breastfeeding? No   BMI 29.21 kg/m²       Pain/Rancho Score: Pain Assessment Performed: Yes (11/2/2017 12:30 PM)  Presence of Pain: denies (11/2/2017 12:30 PM)  Rancho Score: 8 (11/2/2017 12:30 PM)      "

## 2017-11-02 NOTE — ASSESSMENT & PLAN NOTE
-Pulmonary toilet (incentive spirometry / flutter)   -5L O2 around the clock  -No increase in oxygen requirements.

## 2017-11-02 NOTE — HPI
Emily Cook is a 58 yo female with a PMHx of medical non adherence, ALFREDO (non adherent to CPAP), PAH (group 1) on IV remodulin/warfarin/macitentan, chronic respiratory failure on 5L ATC of home oxygen, 3rd degree AV block s/p pacemaker implantation, diverticulitis and previous episode of bacteremia with home PICC line who is being transferred for MRSA bacteremia.     She originally presented on 10/29/17 to [facility] with complaint of right arm pain and fever. Blood cultures grew MRSA and she has been treated with vancomycin with subsequent blood cultures being negative. She had plan to remove her permacath at [facility] however her INR remained slightly supratherapeutic. She is transferred here for continuation of care. Remains afebrile with stable vitals. Upon review of systems she has minimal complaints and states explicitly that she feels her LE edema is the best its been in a month. SOB/SANCHEZ stable on home 5 liters.

## 2017-11-02 NOTE — ASSESSMENT & PLAN NOTE
-Continue IV remodulin and macitentan   -Holding coumadin given possible need for line removal.  Resume depending on surgery recomendations  -DNR

## 2017-11-02 NOTE — TRANSFER OF CARE
"Anesthesia Transfer of Care Note    Patient: Emily Cook    Procedure(s) Performed: Procedure(s) (LRB):  TRANSESOPHAGEAL ECHOCARDIOGRAM (AMIE) (N/A)    Patient location: PACU    Anesthesia Type: general    Transport from OR: Transported from OR on 6-10 L/min O2 by face mask with adequate spontaneous ventilation    Post pain: adequate analgesia    Post assessment: no apparent anesthetic complications    Post vital signs: stable    Level of consciousness: awake and alert    Nausea/Vomiting: no nausea/vomiting    Complications: none    Transfer of care protocol was followed      Last vitals:   Visit Vitals  /62 (BP Location: Left arm, Patient Position: Lying)   Pulse 81   Temp 37.2 °C (98.9 °F) (Oral)   Resp 18   Ht 5' 3" (1.6 m)   Wt 74.8 kg (164 lb 14.5 oz)   SpO2 (!) 94%   Breastfeeding? No   BMI 29.21 kg/m²     "

## 2017-11-02 NOTE — ASSESSMENT & PLAN NOTE
-Most likely tunned cath infection as a source  -Blood cultures positive for MRSA with repeat cultures showing no growth  -TTE done without concern for vegetation.  - AMIE negative for vegetation or line infection per this morning 11/2/2017    - general surgery to review need for catheter removal  -  Continue vancomycin as current. Trough check ongoing

## 2017-11-02 NOTE — CONSULTS
[facility]  Infectious Disease  Consult Note    Patient Name: Emily Cook  MRN: [id number]  Admission Date: 11/1/2017  Hospital Length of Stay: 1 days  Attending Physician: Kaylee Cazares MD  Primary Care Provider: Kaylee Cazares MD       Inpatient consult to Infectious Diseases  Consult performed by: MEHNAZ HANSON  Consult ordered by: EREN LORA consult received. Patient known to service. Continue Vancomycin. Full consult note with recommendations to follow.      Thank you,  Mehnza Hanson PA-C

## 2017-11-02 NOTE — PROGRESS NOTES
"Patient arrived to the PACU with home remodulin infusing at a rate of 35ml/24 hours via CADD Legacy pump to L subclavian tunneled catheter. Patient states that she changes her cassette every other day and that it is not due until tomorrow. Pump indicates that remaining volume is 26.1ml, batteries are charged, and no active alarms noted. PACU RN clearly labeled patient's central line with "dedicated Remodulin line, do not flush"  "

## 2017-11-02 NOTE — NURSING TRANSFER
Nursing Transfer Note      11/2/2017     Transfer To: 1054    Transfer via stretcher    Transfer with 5L O2 per NC, CADD pump with remodulin infusing, bag for CADD pump, cardiac monitoring and continuous pulse oximetry monitoring per centralized telemetry    Transported by PCT    Medicines sent: none    Chart send with patient: Yes    Notified: no family in waiting area, receiving RN notified of transfer    Patient reassessed at: 11/2/2017 2:30 PM

## 2017-11-02 NOTE — PROGRESS NOTES
Pt arrived to unit floor.  Oriented pt to room. Educated pt on use of call bell.  Pt verbalize understanding to call when needed assistance. Notified MD of pt arrival to unit floor. VSS Admission documents completed. Will continue to monitor.

## 2017-11-02 NOTE — ASSESSMENT & PLAN NOTE
Continue IV remodulin and macitentan   Holding coumadin given possible need for line removal.   DNR

## 2017-11-02 NOTE — SUBJECTIVE & OBJECTIVE
"Past Medical History:   Diagnosis Date    Arrhythmia     Arthritis     Cardiac pacemaker in situ     Carpal tunnel syndrome, right     Cervical spondylosis     Cervicalgia     CHF (congestive heart failure)     Diverticulitis     Heart block AV third degree     Hyperlipidemia     ALFREDO on CPAP     Pulmonary hypertension     Subdeltoid bursitis      Past Surgical History:   Procedure Laterality Date    CARDIAC CATHETERIZATION      CARDIAC PACEMAKER PLACEMENT  7/29/13    [employer] DC PM    CARDIAC SURGERY      COLONOSCOPY N/A 9/28/2015    Procedure: COLONOSCOPY;  Surgeon: Jason Diaz MD;  Location: [facility] ([address] [place] [address]);  Service: Endoscopy;  Laterality: N/A;  Please schedule in 2-3 months with Dr Diaz  Pulmonary HTN, 2nd floor (off remodulin infusion as of "a few days" before 9/9/15)    3 day hold Coumadin, [facility]  PT/INR scheduled before procedure    ECTOPIC PREGNANCY SURGERY Left     HYSTERECTOMY      partial    knee arthroscopy       Review of patient's allergies indicates:   Allergen Reactions    Chlorhexidine Itching and Rash     Patient had raised rash on neck following RHC procedure. She states she's never had a problem with the "prep" used until this time.     Adhesive Rash     Current Facility-Administered Medications   Medication    acetaminophen tablet 650 mg    bumetanide tablet 2 mg    gabapentin capsule 300 mg    [START ON 11/2/2017] macitentan tablet 10 mg    magnesium oxide tablet 400 mg    ondansetron injection 4 mg    [START ON 11/2/2017] pantoprazole EC tablet 40 mg    sodium chloride 0.9% flush 3 mL    treprostinil (REMODULIN) 6,000,000 ng in sodium chloride 0.9% 100 mL infusion    [START ON 11/2/2017] vancomycin 1 g in dextrose 5 % 250 mL IVPB (ready to mix system)     Family History     Problem Relation (Age of Onset)    Arthritis Mother, Father    Diabetes Mother    Hyperlipidemia Mother, Father        Social History Main Topics    Smoking " status: Never Smoker    Smokeless tobacco: Never Used    Alcohol use No      Comment: rarely    Drug use: No    Sexual activity: No     Review of Systems   Constitutional: Negative for chills, fatigue and fever.   HENT: Negative for ear pain, sinus pressure and sore throat.    Eyes: Negative.    Respiratory:        SOB is stable.    Cardiovascular: Positive for leg swelling.   Gastrointestinal: Negative.    Musculoskeletal: Negative.         Pain in R shoulder    Skin: Negative.    Neurological: Negative.    Psychiatric/Behavioral: Negative.      Objective:     Vital Signs (Most Recent):    Vital Signs (24h Range):  Temp:  [96.9 °F (36.1 °C)-98.1 °F (36.7 °C)] 98 °F (36.7 °C)  Pulse:  [] 90  Resp:  [16-20] 18  SpO2:  [82 %-94 %] 94 %  BP: ()/(52-59) 114/52     Physical Exam   Constitutional: She is oriented to person, place, and time. She appears well-developed and well-nourished.   HENT:   Head: Normocephalic and atraumatic.   Eyes: EOM are normal. Pupils are equal, round, and reactive to light. No scleral icterus.   Neck: Neck supple. No JVD present.   Cardiovascular: Normal rate, regular rhythm and normal heart sounds.    + tunneled line over left chest. Dressing appears C/D/I w/o tenderness or drainage.    Pulmonary/Chest: Effort normal and breath sounds normal. She has no wheezes. She has no rales.   Abdominal: Soft. She exhibits no distension.   Musculoskeletal: She exhibits no edema.   Neurological: She is alert and oriented to person, place, and time.   Skin: Skin is warm and dry. Capillary refill takes less than 2 seconds.   Vitals reviewed.    Significant Labs:  CBC:    Recent Labs  Lab 10/30/17  0529 10/31/17  0543 11/01/17  0524   WBC 9.70 12.30 9.30   RBC 4.87 5.09 5.10   HGB 11.9* 12.4 12.4   HCT 36.6* 37.7 37.6    322 361*   MCV 75* 74* 74*   MCH 24.4* 24.3* 24.3*   MCHC 32.5 32.9 33.0     CMP:    Recent Labs  Lab 10/30/17  0529 10/31/17  0543 11/01/17  0524   * 140* 137*    CALCIUM 9.1 8.4 9.1   ALBUMIN 3.9 4.0 4.2   PROT 7.5 7.9 8.2    135* 136   K 4.0 3.3* 3.2*   CO2 24 25 31*    100 93*   BUN 13 12 12   CREATININE 0.80 0.90 0.90   ALKPHOS 101 99 103   ALT 25 24 24   AST 27 28 26   BILITOT 0.9 1.1 1.0      Coagulation:     Recent Labs  Lab 10/29/17  2305 10/31/17  0543 11/01/17  0524   INR 2.1* 2.5* 2.3*     Blood Cultures:   10/29/17: + MRSA  10/30/17: No growth to date     I have reviewed all pertinent labs within the past 24 hours.    Diagnostic Results:  I have reviewed all pertinent imaging results/findings within the past 24 hours.

## 2017-11-02 NOTE — PLAN OF CARE
Problem: Patient Care Overview  Goal: Plan of Care Review  Outcome: Ongoing (interventions implemented as appropriate)  Pt tolerating all treatments an therapies well, pt is AAOx4 and VSS, pt did not report any pain overnight, pt bed is low and locked and call bell is in reach, pt family is at the bedside, pt remained afebrile and showed no new signs of infection, no acute events noted or reported, will continue to monitor.

## 2017-11-02 NOTE — CONSULTS
[facility]  Infectious Disease  Consult Note    Patient Name: Emily Cook  MRN: [id number]  Admission Date: 11/1/2017  Hospital Length of Stay: 1 days  Attending Physician: Kaylee Cazares MD  Primary Care Provider: Kaylee Cazares MD     Isolation Status: Contact    Patient information was obtained from patient and past medical records.      Consults  Assessment/Plan:     Staphylococcus aureus bacteremia    57 year-old female with history of pulmonary HTN on home Remodulin recently treated for MRSA bacteremia secondary to an infected brush catheter in September with Vanc x 2 weeks now admitted with fevers, right arm pain and numbness.    Blood cx 10/29 are positive for MRSA (4/4 bottles). Repeat blood cx 10/30 are NGTD. TTE and AMIE negative for vegetations. Patient is currently on Vancomycin. Afebrile over last 24 hours. No leukocytosis. VSS.    Plan  - Continue IV Vancomycin. Vanc trough before 4th dose.  - Recommend Brush removal ASAP and send tip for culture  - Repeat blood cultures once line is removed  - Anticipate antibiotic treatment for at least two weeks.   - ID will follow.            Thank you for the consult. Please call for any questions.  Mehnaz Hanson PA-C  Phone: [phone]  Pager: [phone]    Subjective:     Principal Problem: MRSA bacteremia    HPI: Emily Cook is a 57 year-old female with history of pulmonary HTN on home Veletri recently admitted [place] [facility] with MRSA bacteremia secondary to an infected brush catheter (placed 6/2017). Her brush catheter was removed on 9/5. Catheter tip cultures also grew MRSA. TTE was negative for vegetations. Blood cultures cleared 9/3. A new brush catheter was placed on 9/8 for Veletri along with a PICC line and patient was discharged home with a plan to complete 2 weeks of IV Vancomycin. At follow up patient was doing well. No systemic signs of infection. She completed 2 weeks of IV Vancomycin without difficulty.     Patient  "now presents with fevers up to 101.4, right arm pain and numbness. Blood cx 10/29 are positive for MRSA (4/4 bottles). Repeat blood cx 10/30 are NGTD. TTE and AMIE negative for vegetations. Patient is on Vancomycin.    Past Medical History:   Diagnosis Date    Arrhythmia     Arthritis     Cardiac pacemaker in situ     Carpal tunnel syndrome, right     Cervical spondylosis     Cervicalgia     CHF (congestive heart failure)     Diverticulitis     Heart block AV third degree     Hyperlipidemia     ALFREDO on CPAP     Pulmonary hypertension     Subdeltoid bursitis        Past Surgical History:   Procedure Laterality Date    CARDIAC CATHETERIZATION      CARDIAC PACEMAKER PLACEMENT  7/29/13    [employer] DC PM    CARDIAC SURGERY      COLONOSCOPY N/A 9/28/2015    Procedure: COLONOSCOPY;  Surgeon: Jason Diaz MD;  Location: [facility] ([address] [place] [address]);  Service: Endoscopy;  Laterality: N/A;  Please schedule in 2-3 months with Dr Diaz  Pulmonary HTN, 2nd floor (off remodulin infusion as of "a few days" before 9/9/15)    3 day hold Coumadin, [facility]  PT/INR scheduled before procedure    ECTOPIC PREGNANCY SURGERY Left     HYSTERECTOMY      partial    knee arthroscopy         Review of patient's allergies indicates:   Allergen Reactions    Chlorhexidine Itching and Rash     Patient had raised rash on neck following RHC procedure. She states she's never had a problem with the "prep" used until this time.     Adhesive Rash       Medications:  Prescriptions Prior to Admission   Medication Sig    bumetanide (BUMEX) 2 MG tablet Take 1 tablet (2 mg total) by mouth 2 (two) times daily.    clobetasol (TEMOVATE) 0.05 % external solution     gabapentin (NEURONTIN) 300 MG capsule Take 1 capsule (300 mg total) by mouth As instructed. Take one capsule qam, 1 qpm, 2 qhs.    hydrocodone-acetaminophen 10-325mg (NORCO)  mg Tab Take 1 tablet by mouth 3 (three) times daily as needed.    hydrOXYzine " (VISTARIL) 50 MG Cap 50 mg daily as needed.     macitentan (OPSUMIT) 10 mg Tab Take 1 tablet (10 mg total) by mouth once daily.    magnesium oxide (MAG-OX) 400 mg tablet Take 1 tablet (400 mg total) by mouth 2 (two) times daily.    PATADAY 0.2 % Drop     potassium chloride (MICRO-K) 10 MEQ CpSR Take 4 capsules (40 mEq total) by mouth 3 (three) times daily.    sodium chloride 0.9% 0.9 % SolP 100 mL with treprostinil 1 mg/mL Soln 6,000,000 ng Inject 2,580 ng/min into the vein continuous.    spironolactone (ALDACTONE) 25 MG tablet Take 1 tablet (25 mg total) by mouth once daily.    vancomycin (VANCOCIN) 1 gram/200 mL PgBk 1000 mg in dextrose 5 % 200 mL IVPB Inject 1 g into the vein every 12 (twelve) hours.    warfarin (COUMADIN) 2.5 MG tablet Take 1 tablet (2.5 mg total) by mouth Daily.    duloxetine (CYMBALTA) 60 MG capsule Take 1 capsule (60 mg total) by mouth 2 (two) times daily.    [DISCONTINUED] bumetanide (BUMEX) 2 MG tablet Take 1 tablet (2 mg total) by mouth 2 (two) times daily.     Antibiotics     Start     Stop Route Frequency Ordered    11/02/17 0230  vancomycin 1 g in dextrose 5 % 250 mL IVPB (ready to mix system)  (Vancomycin IVPB with levels panel)      -- IV Every 12 hours (non-standard times) 11/01/17 2202        Antifungals     None        Antivirals     None           Immunization History   Administered Date(s) Administered    Influenza 10/18/2013       Family History     Problem Relation (Age of Onset)    Arthritis Mother, Father    Diabetes Mother    Hyperlipidemia Mother, Father        Social History     Social History    Marital status: Single     Spouse name: N/A    Number of children: N/A    Years of education: N/A     Social History Main Topics    Smoking status: Never Smoker    Smokeless tobacco: Never Used    Alcohol use No      Comment: rarely    Drug use: No    Sexual activity: No     Other Topics Concern    None     Social History Narrative    None     Review of  Systems   Constitutional: Positive for chills. Negative for diaphoresis, fatigue and fever.   HENT: Negative for congestion.    Eyes: Negative for visual disturbance.   Respiratory: Positive for shortness of breath (stable). Negative for cough, wheezing and stridor.    Cardiovascular: Negative for chest pain, palpitations and leg swelling.   Gastrointestinal: Negative for abdominal pain, constipation, diarrhea, nausea and vomiting.   Genitourinary: Negative for difficulty urinating, dyspareunia, dysuria and urgency.   Musculoskeletal: Positive for arthralgias. Negative for back pain, gait problem, joint swelling and myalgias.   Neurological: Positive for numbness (resolved). Negative for dizziness, weakness and headaches.   Psychiatric/Behavioral: Negative for agitation and confusion.     Objective:     Vital Signs (Most Recent):  Temp: 98.8 °F (37.1 °C) (11/02/17 1230)  Pulse: 76 (11/02/17 1300)  Resp: 16 (11/02/17 1300)  BP: (!) 88/51 (11/02/17 1300)  SpO2: (!) 94 % (11/02/17 1300) Vital Signs (24h Range):  Temp:  [98 °F (36.7 °C)-98.9 °F (37.2 °C)] 98.8 °F (37.1 °C)  Pulse:  [] 76  Resp:  [15-20] 16  SpO2:  [87 %-94 %] 94 %  BP: ()/(51-65) 88/51     Weight: 74.8 kg (164 lb 14.5 oz)  Body mass index is 29.21 kg/m².    Estimated Creatinine Clearance: 75.2 mL/min (based on SCr of 0.8 mg/dL).    Physical Exam   Constitutional: She is oriented to person, place, and time. She appears well-developed and well-nourished. No distress.   HENT:   Head: Normocephalic and atraumatic.   Eyes: Conjunctivae are normal. No scleral icterus.   Cardiovascular: Normal rate and regular rhythm.    No murmur heard.  Pulmonary/Chest: Effort normal and breath sounds normal. No respiratory distress.   Abdominal: Soft. Bowel sounds are normal. She exhibits no distension. There is no tenderness.   Neurological: She is alert and oriented to person, place, and time.   Skin: Skin is warm and dry. No rash noted. She is not  diaphoretic.   Left chest catheter insertion site c/d/i. No purulent drainage.  Mild induration. No edema. Non tender. Small area of skin breakdown inferiorly.   Psychiatric: She has a normal mood and affect. Her behavior is normal.   Vitals reviewed.      Significant Labs:   Blood Culture:   Recent Labs  Lab 09/05/17  1405 09/23/17  0859 10/29/17  2305 10/29/17  2330 10/30/17  1022   LABBLOO No growth after 5 days. No growth after 5 days.  No growth after 5 days. Gram stain hilton bottle: Gram positive cocci in clusters resembling Staph  Results called to and read back by: Renetta (RN 4S) 10/30/2017  12:31   KLS2  Gram stain aer bottle: Gram positive cocci in clusters resembling Staph  METHICILLIN RESISTANT STAPHYLOCOCCUS AUREUSphoned [facility]/4S with MRSA  11/01/2017  06:37 Gram stain hilton bottle: Gram positive cocci in clusters resembling Staph   Positive results previously called refer to culture #[id number]  Gram stain aer bottle: Gram positive cocci in clusters resembling Staph   Positive results previously called  METHICILLIN RESISTANT STAPHYLOCOCCUS AUREUSRefer to culture #[id number] No Growth to date  No Growth to date  No Growth to date  No Growth to date     CBC:   Recent Labs  Lab 11/01/17  0524 11/02/17  0455   WBC 9.30 6.55   HGB 12.4 11.0*   HCT 37.6 33.8*   * 344     CMP:   Recent Labs  Lab 11/01/17  0524 11/02/17  0455    133*   K 3.2* 2.6*   CL 93* 90*   CO2 31* 30*   * 136*   BUN 12 12   CREATININE 0.90 0.8   CALCIUM 9.1 9.0   PROT 8.2 7.3   ALBUMIN 4.2 2.7*   BILITOT 1.0 0.4   ALKPHOS 103 95   AST 26 13   ALT 24 11   ANIONGAP  --  13   EGFRNONAA >60 >60.0     Lactic Acid: No results for input(s): LACTATE in the last 48 hours.  Procalcitonin: No results for input(s): PROCAL in the last 48 hours.  Respiratory Culture:   Recent Labs  Lab 08/30/17  0928   GSRESP >10epis/lfp and <than many WBC's   Predominance of oropharyngeal maribel. Please recollect.   RESPIRATORYC Specimen  inadequate - culture not performed. Spoke with Valerie Mojica 08/30/2017  14:18     Urine Culture:   Recent Labs  Lab 06/09/17  0743 09/23/17  0938 10/29/17  2253   LABURIN ESCHERICHIA COLI>100,000 cfu/ml PSEUDOMONAS [id number],000 - 99,999 cfu/ml No growth     Urine Studies:   Recent Labs  Lab 09/22/17  1524 09/23/17  0936 10/29/17  2253   COLORU Yellow Yellow Yellow   APPEARANCEUA Hazy* Hazy* Clear   PHUR 6.0 5.0 6.0   SPECGRAV 1.010 1.010 1.020   PROTEINUA 2+* Negative Negative   GLUCUA Negative Negative Negative   KETONESU Negative Negative Negative   BILIRUBINUA Negative Negative Negative   OCCULTUA 1+* 1+* Trace*   NITRITE Negative Negative Negative   UROBILINOGEN Negative Negative 0.2   LEUKOCYTESUR Trace* Negative Negative   RBCUA 4 1  --    WBCUA 6* 4  --    BACTERIA Occasional  --   --    SQUAMEPITHEL 6 1  --    HYALINECASTS 4*  --   --      Wound Culture: No results for input(s): LABAERO in the last 4320 hours.    Significant Imaging: I have reviewed all pertinent imaging results/findings within the past 24 hours.

## 2017-11-02 NOTE — SUBJECTIVE & OBJECTIVE
"Interval History:   Admitted last night from [facility] after two days of admission for infected tunneled catheter. Concern was for remodulin use and possible need for catheter removal with high INR. She uses coumadin for pulmonary HTN.  Seen at bedside this morning. Arm pain, fever and numbness have resolved. She denies SOB, cough or chills. Seen By ID this morning and recommended AMIE.     Continuous Infusions:   treprostinil (REMODULIN) infusion       Scheduled Meds:   bumetanide  2 mg Oral BID    gabapentin  300 mg Oral TID    macitentan  10 mg Oral Daily    magnesium oxide  400 mg Oral BID    pantoprazole  40 mg Oral Daily    sodium chloride 0.9%  3 mL Intravenous Q8H    vancomycin (VANCOCIN) IVPB  15 mg/kg Intravenous Q12H     PRN Meds:acetaminophen, HYDROmorphone, ondansetron, promethazine (PHENERGAN) IVPB, sodium chloride 0.9%    Review of patient's allergies indicates:   Allergen Reactions    Chlorhexidine Itching and Rash     Patient had raised rash on neck following RHC procedure. She states she's never had a problem with the "prep" used until this time.     Adhesive Rash     Objective:     Vital Signs (Most Recent):  Temp: 98.8 °F (37.1 °C) (11/02/17 1230)  Pulse: 71 (11/02/17 1245)  Resp: 15 (11/02/17 1245)  BP: (!) 83/51 (11/02/17 1245)  SpO2: (!) 94 % (11/02/17 1245) Vital Signs (24h Range):  Temp:  [98 °F (36.7 °C)-98.9 °F (37.2 °C)] 98.8 °F (37.1 °C)  Pulse:  [] 71  Resp:  [15-20] 15  SpO2:  [87 %-94 %] 94 %  BP: ()/(51-65) 83/51     Patient Vitals for the past 72 hrs (Last 3 readings):   Weight   11/01/17 2110 74.8 kg (164 lb 14.5 oz)     Body mass index is 29.21 kg/m².      Intake/Output Summary (Last 24 hours) at 11/02/17 1254  Last data filed at 11/02/17 1125   Gross per 24 hour   Intake             1220 ml   Output              900 ml   Net              320 ml       Hemodynamic Parameters:       Telemetry:     Physical Exam   Constitutional: She is oriented to " person, place, and time. She appears well-developed.   HENT:   Head: Normocephalic and atraumatic.   Eyes: Pupils are equal, round, and reactive to light.   Neck: Normal range of motion. Neck supple. No JVD present.   Cardiovascular: Normal rate.  Exam reveals no gallop.    Pulmonary/Chest: Effort normal and breath sounds normal. She exhibits no tenderness.   Abdominal: Soft. Bowel sounds are normal.   Musculoskeletal: Normal range of motion. She exhibits no edema.   Neurological: She is alert and oriented to person, place, and time.   Skin: Skin is warm and dry. Capillary refill takes 2 to 3 seconds.   Left chest catheter insertion site has mild induration, small areas of skin break that are erythematous without drainage   Psychiatric: She has a normal mood and affect.   Nursing note and vitals reviewed.      Significant Labs:  CBC:    Recent Labs  Lab 10/31/17  0543 11/01/17  0524 11/02/17  0455   WBC 12.30 9.30 6.55   RBC 5.09 5.10 4.54   HGB 12.4 12.4 11.0*   HCT 37.7 37.6 33.8*    361* 344   MCV 74* 74* 74*   MCH 24.3* 24.3* 24.2*   MCHC 32.9 33.0 32.5     BNP:  No results for input(s): BNP in the last 168 hours.    Invalid input(s): BNPTRIAGELBLO  CMP:    Recent Labs  Lab 10/31/17  0543 11/01/17  0524 11/02/17  0455   * 137* 136*   CALCIUM 8.4 9.1 9.0   ALBUMIN 4.0 4.2 2.7*   PROT 7.9 8.2 7.3   * 136 133*   K 3.3* 3.2* 2.6*   CO2 25 31* 30*    93* 90*   BUN 12 12 12   CREATININE 0.90 0.90 0.8   ALKPHOS 99 103 95   ALT 24 24 11   AST 28 26 13   BILITOT 1.1 1.0 0.4      Coagulation:     Recent Labs  Lab 10/31/17  0543 11/01/17  0524 11/02/17  0455   INR 2.5* 2.3* 2.4*     LDH:  No results for input(s): LDH in the last 72 hours.  Microbiology:  Microbiology Results (last 7 days)     ** No results found for the last 168 hours. **          I have reviewed all pertinent labs within the past 24 hours.      Estimated Creatinine Clearance: 75.2 mL/min (based on SCr of 0.8 mg/dL).    Diagnostic  Results:  I have reviewed and interpreted all pertinent imaging results/findings within the past 24 hours.

## 2017-11-02 NOTE — ASSESSMENT & PLAN NOTE
57 year-old female with history of pulmonary HTN on home Remodulin recently treated for MRSA bacteremia secondary to an infected cardoso catheter in September with Vanc x 2 weeks now admitted with fevers, right arm pain and numbness.    Blood cx 10/29 are positive for MRSA (4/4 bottles). Repeat blood cx 10/30 are NGTD. TTE and AMIE negative for vegetations. Patient is currently on Vancomycin. Afebrile over last 24 hours. No leukocytosis. VSS.    Plan  - Continue IV Vancomycin. Vanc trough before 4th dose.  - Recommend Cardoso removal ASAP and send tip for culture  - Repeat blood cultures once line is removed  - Anticipate antibiotic treatment for at least two weeks.   - ID will follow.

## 2017-11-02 NOTE — H&P
TRANSESOPHAGEAL ECHOCARDIOGRAPHY   PRE-PROCEDURE NOTE    11/02/2017    HPI:     Emily Cook is a 57 y.o. og Group A PHTN on IV Remodulin, warfarin and macitentan on 5L home O2, CHB s/p PPM implantation, ALFREDO, who presents with recurrent MRSA bacteremia. She has had multiple blood cultures positive for MRSA over the past few months that have been attributed to permcath infection where she gets her IV remodulin. She presented to [facility] with right arm pain and fever and blood cultures were again positive for MRSA. [facility] has requested AMIE to rule out infective endocarditis or lead infection.    Dysphagia or odynophagia:  NO  Liver Disease, esophageal disease, or known varices:  NO  Upper GI Bleeding: NO  Snoring:  YES  +1  Sleep Apnea:  YES  +1  Prior neck surgery or radiation:  NO  Able to move neck in all directions:  YES  +1  History of anesthetic difficulties:  NO  Family history of anesthetic difficulties:  NO  Last oral intake:  11/01/2017 at 6:00pm    Mallampati Class:  2  ASA Score:  3      Meds:     Scheduled Meds:   bumetanide  2 mg Oral BID    gabapentin  300 mg Oral TID    macitentan  10 mg Oral Daily    magnesium oxide  400 mg Oral BID    pantoprazole  40 mg Oral Daily    potassium chloride  40 mEq Oral Once    sodium chloride 0.9%  3 mL Intravenous Q8H    vancomycin (VANCOCIN) IVPB  15 mg/kg Intravenous Q12H     PRN Meds:acetaminophen, ondansetron  Continuous Infusions:   treprostinil (REMODULIN) infusion         Physical Exam:     Vitals:  Temp:  [98 °F (36.7 °C)-98.9 °F (37.2 °C)]   Pulse:  []   Resp:  [16-20]   BP: ()/(52-65)   SpO2:  [87 %-94 %]        Constitutional:  NAD, conversant  HEENT:   Sclera anicteric, Uvula midline, EOMI, OP clear  Neck:   No JVD, moves to all direction without any limitations  CV:   RRR, no murmurs / rubs / gallops, normal S1/S2  Pulm:   CTAB, no wheezes, rales, or ronchi  GI:   Abdomen soft, NTND, +BS  Extremities:  No LE edema, warm  with palpable pulses  Neuro:   AAOX3, no focal motor deficits      Labs:     Recent Results (from the past 336 hour(s))   CBC auto differential    Collection Time: 17  4:55 AM   Result Value Ref Range    WBC 6.55 3.90 - 12.70 K/uL    Hemoglobin 11.0 (L) 12.0 - 16.0 g/dL    Hematocrit 33.8 (L) 37.0 - 48.5 %    Platelets 344 150 - 350 K/uL   CBC auto differential    Collection Time: 17  5:24 AM   Result Value Ref Range    WBC 9.30 3.90 - 12.70 K/uL    Hemoglobin 12.4 12.0 - 16.0 g/dL    Hematocrit 37.6 37.0 - 48.5 %    Platelets 361 (H) 150 - 350 K/uL   CBC auto differential    Collection Time: 10/31/17  5:43 AM   Result Value Ref Range    WBC 12.30 3.90 - 12.70 K/uL    Hemoglobin 12.4 12.0 - 16.0 g/dL    Hematocrit 37.7 37.0 - 48.5 %    Platelets 322 150 - 350 K/uL       No results found for this or any previous visit (from the past 336 hour(s)).    Estimated Creatinine Clearance: 75.2 mL/min (based on SCr of 0.8 mg/dL).    INR: 2.5    ImaginD Echo (10/30/2017):  1. The study quality is good.   2. Global left ventricular systolic function is normal. The left   ventricular ejection fraction is 68%.  Intraventricular septum is   flattened is systole suggesting RV pressure  elevated.   3. Left ventricular diastolic function is abnormal (stage I impaired   relaxation).   4. Pulmonary artery measures 3.64 cms.  5. Mild calcification of the mitral valve is noted.  6. The right atrium is mildly enlarged. Right atrial diameter is 4.93   cms. The inferior vena cava is mildly increased in size.    7. Moderate (2+) tricuspid regurgitation. Mild (1+) pulmonic   regurgitation. Trace mitral regurgitation.   8. The pulmonary artery systolic pressure is 68 mmHg.     EKG (10/31/2017):   Date: NSR with incomplete RBBB      Telemetry:  Telemetry currently shows: NSR      Assessment & Plan:     PLAN:  1. AMIE for evaluation of infective endocarditis or lead infection    -The risks, benefits & alternatives of the  procedure were explained to the patient.    -The risks of transesophageal echo include but are not limited to:  Dental trauma, esophageal trauma/perforation, bleeding, laryngospasm/brochospasm, aspiration, sore throat/hoarseness, & dislodgement of the endotracheal tube/nasogastric tube (where applicable).    -The risks of moderate sedation include hypotension, respiratory depression, arrhythmias, bronchospasm, & death.    -Informed consent was obtained & the patient is agreeable to proceed with the procedure.    I will discuss with the attending physician. Attending addendum is to follow.    Signed:  Latanya Arana MD  Cardiovascular Disease Fellow, PGY-V  Pager: [phone]  11/2/2017 9:25 AM    Attending Addendum:

## 2017-11-02 NOTE — PROGRESS NOTES
"[facility]  Heart Transplant  Progress Note    Patient Name: Emily Cook  MRN: [id number]  Admission Date: 11/1/2017  Hospital Length of Stay: 1 days  Attending Physician: Kaylee Cazares MD  Primary Care Provider: Kaylee Cazares MD  Principal Problem:MRSA bacteremia    Subjective:     Interval History:   Admitted last night from [facility] after two days of admission for infected tunneled catheter. Concern was for remodulin use and possible need for catheter removal with high INR. She uses coumadin for pulmonary HTN.  Seen at bedside this morning. Arm pain, fever and numbness have resolved. She denies SOB, cough or chills. Seen By ID this morning and recommended AMIE.     Continuous Infusions:   treprostinil (REMODULIN) infusion       Scheduled Meds:   bumetanide  2 mg Oral BID    gabapentin  300 mg Oral TID    macitentan  10 mg Oral Daily    magnesium oxide  400 mg Oral BID    pantoprazole  40 mg Oral Daily    sodium chloride 0.9%  3 mL Intravenous Q8H    vancomycin (VANCOCIN) IVPB  15 mg/kg Intravenous Q12H     PRN Meds:acetaminophen, HYDROmorphone, ondansetron, promethazine (PHENERGAN) IVPB, sodium chloride 0.9%    Review of patient's allergies indicates:   Allergen Reactions    Chlorhexidine Itching and Rash     Patient had raised rash on neck following RHC procedure. She states she's never had a problem with the "prep" used until this time.     Adhesive Rash     Objective:     Vital Signs (Most Recent):  Temp: 98.8 °F (37.1 °C) (11/02/17 1230)  Pulse: 71 (11/02/17 1245)  Resp: 15 (11/02/17 1245)  BP: (!) 83/51 (11/02/17 1245)  SpO2: (!) 94 % (11/02/17 1245) Vital Signs (24h Range):  Temp:  [98 °F (36.7 °C)-98.9 °F (37.2 °C)] 98.8 °F (37.1 °C)  Pulse:  [] 71  Resp:  [15-20] 15  SpO2:  [87 %-94 %] 94 %  BP: ()/(51-65) 83/51     Patient Vitals for the past 72 hrs (Last 3 readings):   Weight   11/01/17 2110 74.8 kg (164 lb 14.5 oz)     Body mass index is " 29.21 kg/m².      Intake/Output Summary (Last 24 hours) at 11/02/17 1254  Last data filed at 11/02/17 1125   Gross per 24 hour   Intake             1220 ml   Output              900 ml   Net              320 ml       Hemodynamic Parameters:       Telemetry:     Physical Exam   Constitutional: She is oriented to person, place, and time. She appears well-developed.   HENT:   Head: Normocephalic and atraumatic.   Eyes: Pupils are equal, round, and reactive to light.   Neck: Normal range of motion. Neck supple. No JVD present.   Cardiovascular: Normal rate.  Exam reveals no gallop.    Pulmonary/Chest: Effort normal and breath sounds normal. She exhibits no tenderness.   Abdominal: Soft. Bowel sounds are normal.   Musculoskeletal: Normal range of motion. She exhibits no edema.   Neurological: She is alert and oriented to person, place, and time.   Skin: Skin is warm and dry. Capillary refill takes 2 to 3 seconds.   Left chest catheter insertion site has mild induration, small areas of skin break that are erythematous without drainage   Psychiatric: She has a normal mood and affect.   Nursing note and vitals reviewed.      Significant Labs:  CBC:    Recent Labs  Lab 10/31/17  0543 11/01/17  0524 11/02/17  0455   WBC 12.30 9.30 6.55   RBC 5.09 5.10 4.54   HGB 12.4 12.4 11.0*   HCT 37.7 37.6 33.8*    361* 344   MCV 74* 74* 74*   MCH 24.3* 24.3* 24.2*   MCHC 32.9 33.0 32.5     BNP:  No results for input(s): BNP in the last 168 hours.    Invalid input(s): BNPTRIAGELBLO  CMP:    Recent Labs  Lab 10/31/17  0543 11/01/17  0524 11/02/17  0455   * 137* 136*   CALCIUM 8.4 9.1 9.0   ALBUMIN 4.0 4.2 2.7*   PROT 7.9 8.2 7.3   * 136 133*   K 3.3* 3.2* 2.6*   CO2 25 31* 30*    93* 90*   BUN 12 12 12   CREATININE 0.90 0.90 0.8   ALKPHOS 99 103 95   ALT 24 24 11   AST 28 26 13   BILITOT 1.1 1.0 0.4      Coagulation:     Recent Labs  Lab 10/31/17  0543 11/01/17  0524 11/02/17  0455   INR 2.5* 2.3* 2.4*      LDH:  No results for input(s): LDH in the last 72 hours.  Microbiology:  Microbiology Results (last 7 days)     ** No results found for the last 168 hours. **          I have reviewed all pertinent labs within the past 24 hours.      Estimated Creatinine Clearance: 75.2 mL/min (based on SCr of 0.8 mg/dL).    Diagnostic Results:  I have reviewed and interpreted all pertinent imaging results/findings within the past 24 hours.    Assessment and Plan:     Emily Cook is a 56 yo female with a PMHx of medical non adherence, ALFREDO (non adherent to CPAP), PAH (group 1) on IV remodulin/warfarin/macitentan, chronic respiratory failure on 5L ATC of home oxygen, 3rd degree AV block s/p pacemaker implantation, diverticulitis and previous episode of bacteremia with home PICC line who is being transferred for MRSA bacteremia.     She originally presented on 10/29/17 to [facility] with complaint of right arm pain and fever. Blood cultures grew MRSA and she has been treated with vancomycin with subsequent blood cultures being negative. She had plan to remove her permacath at [facility] however her INR remained slightly supratherapeutic. She is transferred here for continuation of care. Remains afebrile with stable vitals. Upon review of systems she has minimal complaints and states explicitly that she feels her LE edema is the best its been in a month. SOB/SANCHEZ stable on home 5 liters.     * MRSA bacteremia    -Most likely tunned cath infection as a source  -Blood cultures positive for MRSA with repeat cultures showing no growth  -TTE done without concern for vegetation.  - AMIE negative for vegetation or line infection per this morning 11/2/2017    - general surgery to review need for catheter removal  -  Continue vancomycin as current. Trough check ongoing          Right-sided heart failure    Continue treatment for her PAH as above  Continue Bumex 2mg BID  Strict I/Os and daily weights.         Acute on chronic  respiratory failure with hypoxia    -Pulmonary toilet (incentive spirometry / flutter)   -5L O2 around the clock  -No increase in oxygen requirements.         Primary pulmonary hypertension    -Continue IV remodulin and macitentan   -Holding coumadin given possible need for line removal.  Resume depending on surgery recomendations  -DNR         Sleep apnea- on CPAP    Continue PTA CPAP           Discussed plan of care with dr. Stern the attending on service    Dain Rodriguez MD  Heart Transplant  [facility]

## 2017-11-02 NOTE — PROGRESS NOTES
to see pt in order to assess needs.  Worker attempted to see pt in order to complete assessment, however pt was not in attendance.  Worker spoke with the pt's mother.  Transplant  will follow.

## 2017-11-02 NOTE — HOSPITAL COURSE
Seen at bedside this morning. Arm pain, fever and numbness have resolved. She denies SOB, cough or chills

## 2017-11-02 NOTE — ASSESSMENT & PLAN NOTE
Blood cultures positive for MRSA with repeat cultures showing no growth  TTE done without concern for vegetation.   Will not pursue AMIE given bacterial clearance demonstrated by negative repeat cultures.   Continue Vancomycin   ID consulted to give recs regarding management.   General surgery consulted for possible removal of tunnelled line (likely source)   Holding Coumadin for now. Will repeat INR in the AM.

## 2017-11-02 NOTE — ASSESSMENT & PLAN NOTE
Blood cultures positive for MRSA with repeat cultures showing no growth  Will not pursue AMIE given bacterial clearance demonstrated by negative repeat cultures.   Continue Vancomycin   ID consulted to give recs regarding management.   General surgery consulted for possible removal of tunnelled line (likely source)   Holding Coumadin for now. Will repeat INR in the AM.

## 2017-11-02 NOTE — HPI
Emily Cook is a 57 year-old female with history of pulmonary HTN on home Veletri recently admitted to [facility] with MRSA bacteremia secondary to an infected brush catheter (placed 6/2017). Her brush catheter was removed on 9/5. Catheter tip cultures also grew MRSA. TTE was negative for vegetations. Blood cultures cleared 9/3. A new brush catheter was placed on 9/8 for Veletri along with a PICC line and patient was discharged home with a plan to complete 2 weeks of IV Vancomycin. At follow up patient was doing well. No systemic signs of infection. She completed 2 weeks of IV Vancomycin without difficulty.     Patient now presents with fevers up to 101.4, right arm pain and numbness. Blood cx 10/29 are positive for MRSA (4/4 bottles). Repeat blood cx 10/30 are NGTD. TTE and AMIE negative for vegetations. Patient is on Vancomycin.

## 2017-11-02 NOTE — SUBJECTIVE & OBJECTIVE
"Past Medical History:   Diagnosis Date    Arrhythmia     Arthritis     Cardiac pacemaker in situ     Carpal tunnel syndrome, right     Cervical spondylosis     Cervicalgia     CHF (congestive heart failure)     Diverticulitis     Heart block AV third degree     Hyperlipidemia     ALFREDO on CPAP     Pulmonary hypertension     Subdeltoid bursitis        Past Surgical History:   Procedure Laterality Date    CARDIAC CATHETERIZATION      CARDIAC PACEMAKER PLACEMENT  7/29/13    [employer] DC PM    CARDIAC SURGERY      COLONOSCOPY N/A 9/28/2015    Procedure: COLONOSCOPY;  Surgeon: Jason Diaz MD;  Location: [facility] ([address] [place] [address]);  Service: Endoscopy;  Laterality: N/A;  Please schedule in 2-3 months with Dr Diaz  Pulmonary HTN, 2nd floor (off remodulin infusion as of "a few days" before 9/9/15)    3 day hold Coumadin, [facility]  PT/INR scheduled before procedure    ECTOPIC PREGNANCY SURGERY Left     HYSTERECTOMY      partial    knee arthroscopy         Review of patient's allergies indicates:   Allergen Reactions    Chlorhexidine Itching and Rash     Patient had raised rash on neck following RHC procedure. She states she's never had a problem with the "prep" used until this time.     Adhesive Rash       Medications:  Prescriptions Prior to Admission   Medication Sig    bumetanide (BUMEX) 2 MG tablet Take 1 tablet (2 mg total) by mouth 2 (two) times daily.    clobetasol (TEMOVATE) 0.05 % external solution     gabapentin (NEURONTIN) 300 MG capsule Take 1 capsule (300 mg total) by mouth As instructed. Take one capsule qam, 1 qpm, 2 qhs.    hydrocodone-acetaminophen 10-325mg (NORCO)  mg Tab Take 1 tablet by mouth 3 (three) times daily as needed.    hydrOXYzine (VISTARIL) 50 MG Cap 50 mg daily as needed.     macitentan (OPSUMIT) 10 mg Tab Take 1 tablet (10 mg total) by mouth once daily.    magnesium oxide (MAG-OX) 400 mg tablet Take 1 tablet (400 mg total) by mouth 2 (two) " times daily.    PATADAY 0.2 % Drop     potassium chloride (MICRO-K) 10 MEQ CpSR Take 4 capsules (40 mEq total) by mouth 3 (three) times daily.    sodium chloride 0.9% 0.9 % SolP 100 mL with treprostinil 1 mg/mL Soln 6,000,000 ng Inject 2,580 ng/min into the vein continuous.    spironolactone (ALDACTONE) 25 MG tablet Take 1 tablet (25 mg total) by mouth once daily.    vancomycin (VANCOCIN) 1 gram/200 mL PgBk 1000 mg in dextrose 5 % 200 mL IVPB Inject 1 g into the vein every 12 (twelve) hours.    warfarin (COUMADIN) 2.5 MG tablet Take 1 tablet (2.5 mg total) by mouth Daily.    duloxetine (CYMBALTA) 60 MG capsule Take 1 capsule (60 mg total) by mouth 2 (two) times daily.    [DISCONTINUED] bumetanide (BUMEX) 2 MG tablet Take 1 tablet (2 mg total) by mouth 2 (two) times daily.     Antibiotics     Start     Stop Route Frequency Ordered    11/02/17 0230  vancomycin 1 g in dextrose 5 % 250 mL IVPB (ready to mix system)  (Vancomycin IVPB with levels panel)      -- IV Every 12 hours (non-standard times) 11/01/17 2202        Antifungals     None        Antivirals     None           Immunization History   Administered Date(s) Administered    Influenza 10/18/2013       Family History     Problem Relation (Age of Onset)    Arthritis Mother, Father    Diabetes Mother    Hyperlipidemia Mother, Father        Social History     Social History    Marital status: Single     Spouse name: N/A    Number of children: N/A    Years of education: N/A     Social History Main Topics    Smoking status: Never Smoker    Smokeless tobacco: Never Used    Alcohol use No      Comment: rarely    Drug use: No    Sexual activity: No     Other Topics Concern    None     Social History Narrative    None     Review of Systems   Constitutional: Positive for chills. Negative for diaphoresis, fatigue and fever.   HENT: Negative for congestion.    Eyes: Negative for visual disturbance.   Respiratory: Positive for shortness of breath (stable).  Negative for cough, wheezing and stridor.    Cardiovascular: Negative for chest pain, palpitations and leg swelling.   Gastrointestinal: Negative for abdominal pain, constipation, diarrhea, nausea and vomiting.   Genitourinary: Negative for difficulty urinating, dyspareunia, dysuria and urgency.   Musculoskeletal: Positive for arthralgias. Negative for back pain, gait problem, joint swelling and myalgias.   Neurological: Positive for numbness (resolved). Negative for dizziness, weakness and headaches.   Psychiatric/Behavioral: Negative for agitation and confusion.     Objective:     Vital Signs (Most Recent):  Temp: 98.8 °F (37.1 °C) (11/02/17 1230)  Pulse: 76 (11/02/17 1300)  Resp: 16 (11/02/17 1300)  BP: (!) 88/51 (11/02/17 1300)  SpO2: (!) 94 % (11/02/17 1300) Vital Signs (24h Range):  Temp:  [98 °F (36.7 °C)-98.9 °F (37.2 °C)] 98.8 °F (37.1 °C)  Pulse:  [] 76  Resp:  [15-20] 16  SpO2:  [87 %-94 %] 94 %  BP: ()/(51-65) 88/51     Weight: 74.8 kg (164 lb 14.5 oz)  Body mass index is 29.21 kg/m².    Estimated Creatinine Clearance: 75.2 mL/min (based on SCr of 0.8 mg/dL).    Physical Exam   Constitutional: She is oriented to person, place, and time. She appears well-developed and well-nourished. No distress.   HENT:   Head: Normocephalic and atraumatic.   Eyes: Conjunctivae are normal. No scleral icterus.   Cardiovascular: Normal rate and regular rhythm.    No murmur heard.  Pulmonary/Chest: Effort normal and breath sounds normal. No respiratory distress.   Abdominal: Soft. Bowel sounds are normal. She exhibits no distension. There is no tenderness.   Neurological: She is alert and oriented to person, place, and time.   Skin: Skin is warm and dry. No rash noted. She is not diaphoretic.   Left chest catheter insertion site c/d/i. No purulent drainage.  Mild induration. No edema. Non tender. Small area of skin breakdown inferiorly.   Psychiatric: She has a normal mood and affect. Her behavior is normal.    Vitals reviewed.      Significant Labs:   Blood Culture:   Recent Labs  Lab 09/05/17  1405 09/23/17  0859 10/29/17  2305 10/29/17  2330 10/30/17  1022   LABBLOO No growth after 5 days. No growth after 5 days.  No growth after 5 days. Gram stain hilton bottle: Gram positive cocci in clusters resembling Staph  Results called to and read back by: Renetta (RN 4S) 10/30/2017  12:31   KLS2  Gram stain aer bottle: Gram positive cocci in clusters resembling Staph  METHICILLIN RESISTANT STAPHYLOCOCCUS AUREUSphoned [facility]/4S with MRSA  11/01/2017  06:37 Gram stain hilton bottle: Gram positive cocci in clusters resembling Staph   Positive results previously called refer to culture #[id number]  Gram stain aer bottle: Gram positive cocci in clusters resembling Staph   Positive results previously called  METHICILLIN RESISTANT STAPHYLOCOCCUS AUREUSRefer to culture #[id number] No Growth to date  No Growth to date  No Growth to date  No Growth to date     CBC:   Recent Labs  Lab 11/01/17  0524 11/02/17  0455   WBC 9.30 6.55   HGB 12.4 11.0*   HCT 37.6 33.8*   * 344     CMP:   Recent Labs  Lab 11/01/17  0524 11/02/17  0455    133*   K 3.2* 2.6*   CL 93* 90*   CO2 31* 30*   * 136*   BUN 12 12   CREATININE 0.90 0.8   CALCIUM 9.1 9.0   PROT 8.2 7.3   ALBUMIN 4.2 2.7*   BILITOT 1.0 0.4   ALKPHOS 103 95   AST 26 13   ALT 24 11   ANIONGAP  --  13   EGFRNONAA >60 >60.0     Lactic Acid: No results for input(s): LACTATE in the last 48 hours.  Procalcitonin: No results for input(s): PROCAL in the last 48 hours.  Respiratory Culture:   Recent Labs  Lab 08/30/17  0928   GSRESP >10epis/lfp and <than many WBC's   Predominance of oropharyngeal maribel. Please recollect.   RESPIRATORYC Specimen inadequate - culture not performed. Spoke with Valerie Mojica 08/30/2017  14:18     Urine Culture:   Recent Labs  Lab 06/09/17  0743 09/23/17  0938 10/29/17  2253   LABURIN ESCHERICHIA COLI>100,000 cfu/ml PSEUDOMONAS  YNTRECTTZQ77,000 - 99,999 cfu/ml No growth     Urine Studies:   Recent Labs  Lab 09/22/17  1524 09/23/17  0936 10/29/17  2253   COLORU Yellow Yellow Yellow   APPEARANCEUA Hazy* Hazy* Clear   PHUR 6.0 5.0 6.0   SPECGRAV 1.010 1.010 1.020   PROTEINUA 2+* Negative Negative   GLUCUA Negative Negative Negative   KETONESU Negative Negative Negative   BILIRUBINUA Negative Negative Negative   OCCULTUA 1+* 1+* Trace*   NITRITE Negative Negative Negative   UROBILINOGEN Negative Negative 0.2   LEUKOCYTESUR Trace* Negative Negative   RBCUA 4 1  --    WBCUA 6* 4  --    BACTERIA Occasional  --   --    SQUAMEPITHEL 6 1  --    HYALINECASTS 4*  --   --      Wound Culture: No results for input(s): LABAERO in the last 4320 hours.    Significant Imaging: I have reviewed all pertinent imaging results/findings within the past 24 hours.

## 2017-11-02 NOTE — ASSESSMENT & PLAN NOTE
Pulmonary toilet (incentive spirometry / flutter)   5L O2 around the clock  No increase in oxygen requirements.

## 2017-11-02 NOTE — PLAN OF CARE
Problem: Patient Care Overview  Goal: Plan of Care Review  -Pt free from fall or injury so far this shift. Instructed to call if assistance needed, verbalized understanding.   -Cardiac monitoring in progress, currently SR. AMIE today, no vegetation noted.   -Sats low to high 90's on 5 L NC.   -Remodulin infusing at 58 cc/ 24 hrs. Pt changed over from home pump to hospital pump today. Rate verified by this RN and Elizabeth, charge RN.   -INR 2.4 today, plan to remove LCW brush and place new line once INR subtherapeutic.   -Afebrile. Hand hygiene reinforced. Vancomycin continued. Daily labs monitored.

## 2017-11-02 NOTE — ANESTHESIA PREPROCEDURE EVALUATION
11/02/2017  Emily Cook is a 57 y.o., female here for AMIE to evaluate for endocarditis. Hx of pulm HTN on Remodulin and home O2    Patient Active Problem List   Diagnosis    Chronic pulmonary heart disease    Long term current use of anticoagulant therapy    Heart block AV third degree    Cardiac pacemaker in situ    Cervical spondylosis    Cervicalgia    Chronic neck pain    Subdeltoid bursitis    Diverticulitis    Sleep apnea- on CPAP    Diverticulitis large intestine    Carpal tunnel syndrome, right (mild)    Primary pulmonary hypertension    Acute on chronic respiratory failure with hypoxia    Pneumonia of right upper lobe due to infectious organism    Acute respiratory failure with hypoxemia    Hypokalemia    Coumadin toxicity    Tricuspid regurgitation    MRSA (methicillin resistant staph aureus) culture positive    Respiratory failure with hypoxia    Right-sided heart failure    Staphylococcus aureus bacteremia    Central line-associated bloodstream infection    Hypoxia    Acute decompensated heart failure    Palliative care encounter    MRSA bacteremia    HLD (hyperlipidemia)    V tach    Fever without sepsis         Anesthesia Evaluation    I have reviewed the Patient Summary Reports.    I have reviewed the Nursing Notes.   I have reviewed the Medications.     Review of Systems  Anesthesia Hx:  No problems with previous Anesthesia    Cardiovascular:   Dysrhythmias (heart block s/p PPM) CHF pulm HTN   Pulmonary:   Sleep Apnea    Renal/:  Renal/ Normal     Hepatic/GI:  Hepatic/GI Normal    Neurological:  Neurology Normal    Endocrine:  Endocrine Normal        Physical Exam  General:  Well nourished    Airway/Jaw/Neck:  Airway Findings: Mouth Opening: Normal Tongue: Normal  General Airway Assessment: Adult  Mallampati: II  TM Distance: Normal, at least 6 cm        Chest/Lungs:  Chest/Lungs Findings: Clear to auscultation, Normal Respiratory Rate     Heart/Vascular:  Heart Findings: Rate: Normal             Anesthesia Plan  Type of Anesthesia, risks & benefits discussed:  Anesthesia Type:  general, MAC  Patient's Preference:   Intra-op Monitoring Plan: standard ASA monitors  Intra-op Monitoring Plan Comments:   Post Op Pain Control Plan: IV/PO Opioids PRN  Post Op Pain Control Plan Comments:   Induction:   IV  Beta Blocker:  Patient is not currently on a Beta-Blocker (No further documentation required).       Informed Consent: Patient understands risks and agrees with Anesthesia plan.  Questions answered. Anesthesia consent signed with patient.  ASA Score: 4     Day of Surgery Review of History & Physical:            Ready For Surgery From Anesthesia Perspective.

## 2017-11-03 NOTE — PLAN OF CARE
Problem: Patient Care Overview  Goal: Plan of Care Review  Outcome: Ongoing (interventions implemented as appropriate)  Pt aao x 4. Vss. Bed in low locked pos, call light within reach. Remodulin infusing to L chest wall brush @ 58 cc/24hr. Iv abx continued as ordered. Miralax started. Pt independent, ambulates and voids w/o difficulty.

## 2017-11-03 NOTE — PROGRESS NOTES
[facility]  Infectious Disease  Progress Note    Patient Name: Emily Cook  MRN: [id number]  Admission Date: 11/1/2017  Length of Stay: 2 days  Attending Physician: Kaylee Cazares MD  Primary Care Provider: Kaylee Cazares MD    Isolation Status: Contact  Assessment/Plan:      Staphylococcus aureus bacteremia    57 year-old female with history of pulmonary HTN on home Remodulin recently treated for MRSA bacteremia secondary to an infected brush catheter in September with Vanc x 2 weeks now admitted with fevers, right arm pain and numbness.    Blood cx 10/29 are positive for MRSA (4/4 bottles). Repeat blood cx 10/30 are NGTD. TTE and AMIE negative for vegetations. Patient is currently on Vancomycin. Afebrile over last 24 hours. No leukocytosis. VSS. Central line remains in place. Right arm symptoms resolved.    Plan  - Hold Vancomycin given supratherapeutic trough. Would check a Vanc level in the morning and re-dose once below 20. Goal trough 15-20.   - Recommend catheter removal asap for adequate source control. Please send tip for culture.  - Repeat blood cultures once line is removed  - Anticipate antibiotic treatment for at least two weeks from day of line removal.   - ID will follow.            Please call for any questions. Thank you.  Mehnaz Hanson PA-C  Phone: [phone]  Pager: [phone]    Subjective:     Principal Problem:MRSA bacteremia    HPI: mEily Cook is a 57 year-old female with history of pulmonary HTN on home Veletri recently [place] [facility] [place] bacteremia secondary to an infected brush catheter (placed 6/2017). Her brush catheter was removed on 9/5. Catheter tip cultures also grew MRSA. TTE was negative for vegetations. Blood cultures cleared 9/3. A new brush catheter was placed on 9/8 for Veletri along with a PICC line and patient was discharged home with a plan to complete 2 weeks of IV Vancomycin. At follow up patient was doing well. No systemic signs  of infection. She completed 2 weeks of IV Vancomycin without difficulty.     Patient now presents with fevers up to 101.4, right arm pain and numbness. Blood cx 10/29 are positive for MRSA (4/4 bottles). Repeat blood cx 10/30 are NGTD. TTE and AMIE negative for vegetations. Patient is on Vancomycin.  Interval History: NAEON. Afebrile. No leukocytosis. Feeling well today. Eager to have her catheter removed. No new complaints.     Review of Systems   Constitutional: Positive for chills. Negative for diaphoresis, fatigue and fever.   Respiratory: Positive for shortness of breath (stable). Negative for cough, wheezing and stridor.    Cardiovascular: Negative for chest pain and palpitations.   Gastrointestinal: Negative for abdominal pain, constipation, diarrhea, nausea and vomiting.   Genitourinary: Negative for difficulty urinating, dyspareunia, dysuria and urgency.   Musculoskeletal: Positive for arthralgias. Negative for back pain and myalgias.   Neurological: Negative for dizziness, weakness and headaches.   Psychiatric/Behavioral: Negative for agitation and confusion.     Objective:     Vital Signs (Most Recent):  Temp: 98.6 °F (37 °C) (11/03/17 0812)  Pulse: 92 (11/03/17 0812)  Resp: 18 (11/03/17 0812)  BP: (!) 98/57 (11/03/17 0812)  SpO2: (!) 92 % (11/03/17 0812) Vital Signs (24h Range):  Temp:  [98.4 °F (36.9 °C)-99 °F (37.2 °C)] 98.6 °F (37 °C)  Pulse:  [71-99] 92  Resp:  [13-20] 18  SpO2:  [88 %-98 %] 92 %  BP: ()/(51-68) 98/57     Weight: 71.2 kg (156 lb 15.5 oz)  Body mass index is 27.81 kg/m².    Estimated Creatinine Clearance: 73.4 mL/min (based on SCr of 0.8 mg/dL).    Physical Exam   Constitutional: She is oriented to person, place, and time. She appears well-developed and well-nourished. No distress.   HENT:   Head: Normocephalic and atraumatic.   Eyes: Conjunctivae are normal. No scleral icterus.   Cardiovascular: Normal rate and regular rhythm.    No murmur heard.  Pulmonary/Chest: Effort normal  and breath sounds normal. No respiratory distress.   Abdominal: Soft. Bowel sounds are normal. She exhibits no distension. There is no tenderness.   Neurological: She is alert and oriented to person, place, and time.   Skin: Skin is warm and dry. No rash noted. She is not diaphoretic.   Left chest catheter insertion site c/d/i. No erythema or  purulent drainage. Non tender. Mild swelling.   Psychiatric: She has a normal mood and affect. Her behavior is normal.   Vitals reviewed.      Significant Labs:   Blood Culture:   Recent Labs  Lab 09/05/17  1405 09/23/17  0859 10/29/17  2305 10/29/17  2330 10/30/17  1022   LABBLOO No growth after 5 days. No growth after 5 days.  No growth after 5 days. Gram stain hilton bottle: Gram positive cocci in clusters resembling Staph  Results called to and read back by: Renetta (RN 4S) 10/30/2017  12:31   KLS2  Gram stain aer bottle: Gram positive cocci in clusters resembling Staph  METHICILLIN RESISTANT STAPHYLOCOCCUS AUREUSphoned [facility]/4S with MRSA  11/01/2017  06:37 Gram stain hilton bottle: Gram positive cocci in clusters resembling Staph   Positive results previously called refer to culture #[id number]  Gram stain aer bottle: Gram positive cocci in clusters resembling Staph   Positive results previously called  METHICILLIN RESISTANT STAPHYLOCOCCUS AUREUSRefer to culture #[id number] No Growth to date  No Growth to date  No Growth to date  No Growth to date  No Growth to date     CBC:   Recent Labs  Lab 11/02/17  0455 11/03/17  0538   WBC 6.55 5.89   HGB 11.0* 11.1*   HCT 33.8* 35.5*    411*     CMP:   Recent Labs  Lab 11/02/17  0455 11/03/17  0538   * 136   K 2.6* 3.4*   CL 90* 96   CO2 30* 29   * 101   BUN 12 10   CREATININE 0.8 0.8   CALCIUM 9.0 9.2   PROT 7.3 6.9   ALBUMIN 2.7* 2.7*   BILITOT 0.4 0.3   ALKPHOS 95 94   AST 13 11   ALT 11 7*   ANIONGAP 13 11   EGFRNONAA >60.0 >60.0     Lactic Acid: No results for input(s): LACTATE in the last 48  hours.  Procalcitonin: No results for input(s): PROCAL in the last 48 hours.  Urine Culture:   Recent Labs  Lab 06/09/17  0743 09/23/17  0938 10/29/17  2253   LABURIN ESCHERICHIA COLI>100,000 cfu/ml PSEUDOMONAS [id number],000 - 99,999 cfu/ml No growth     Urine Studies:   Recent Labs  Lab 09/22/17  1524 09/23/17  0936 10/29/17  2253   COLORU Yellow Yellow Yellow   APPEARANCEUA Hazy* Hazy* Clear   PHUR 6.0 5.0 6.0   SPECGRAV 1.010 1.010 1.020   PROTEINUA 2+* Negative Negative   GLUCUA Negative Negative Negative   KETONESU Negative Negative Negative   BILIRUBINUA Negative Negative Negative   OCCULTUA 1+* 1+* Trace*   NITRITE Negative Negative Negative   UROBILINOGEN Negative Negative 0.2   LEUKOCYTESUR Trace* Negative Negative   RBCUA 4 1  --    WBCUA 6* 4  --    BACTERIA Occasional  --   --    SQUAMEPITHEL 6 1  --    HYALINECASTS 4*  --   --      Wound Culture: No results for input(s): LABAERO in the last 4320 hours.    Significant Imaging: I have reviewed all pertinent imaging results/findings within the past 24 hours.

## 2017-11-03 NOTE — PROGRESS NOTES
Admit Note     Met with patient and mother to assess needs. Patient is a 57 y.o.  female, admitted for PHTN. Pt on IV Remodulin at home.      Patient admitted from the ED on 11/1/2017 .  At this time, patient presents as alert and oriented x 4, pleasant, good eye contact and calm.  At this time, patients caregiver is alert/oriented x4 and pleasant.    Household/Family Systems     Patient resides with patient's son, at:     [address] [address].      Support system includes adult sons and mother.    Patient does not have dependents that are need of being cared for.     Patients primary caregiver is self and son.  Pt's home phone:  [phone]  Pt's cell:  [phone]  Emergency contacts  Geeta Cook (mother) [phone]  Jaden Cook (son,lives with pt) [phone]  Malini Naidu Jr (son, lives in [place]) [phone]    During admission, patient's caregiver plans to stay in patient's room.  Confirmed patient and patients caregivers do have access to reliable transportation.    Cognitive Status/Learning     Patient reports reading ability as 10th grade and states patient does not have difficulty with reading, writing, seeing, hearing, comprehension, learning and memory. Pt does wear glasses.  Patient reports patient learns best by one on one.    Needed: No.   Highest education level: High School (9-12) or GED    Vocation/Disability     Working for Income: No  If no, reason not working: Disability    Patient is disabled due to pulmonary hypertension since 2011.  Prior to disability, patient  was employed as a cook.    Adherence     Patient reports a high level of adherence to patients health care regimen, however, per her medical record she has a history of non-compliance. Pt stated she takes all her prescribed medication.   Adherence counseling and education provided. Patient verbalizes understanding.    Substance Use    Patient reports the following substance usage.    Tobacco:  none, patient denies any use.  Alcohol: none, patient denies any use.  Illicit Drugs/Non-prescribed Medications: none.  Pt has used marijuana in the past.  Patient states clear understanding of the potential impact of substance use.  Substance abstinence/cessation counseling, education and resources provided and reviewed.     Services Utilizing/ADLS    Infusion Service: Prior to admission, patient utilizing? Pt on IV Remodulin  Home Health: Prior to admission, patient utilizing? [facility], ph: [phone], fx; [phone]. Pt's son is her PCA through a Medicaid program and is approved for 22hrs a week.   DME: Prior to admission, yes 02 set up with portables. Pt at 5LPM. DME co is [facility].  Pt also has a walker.   Pulmonary/Cardiac Rehab: Prior to admission, no  Dialysis:  Prior to admission, no  Transplant Specialty Pharmacy:  Prior to admission, no.     Prior to admission, patient reports patient is somewhat independent with ADLS.  Pt reports she can do her own selfcare, however her son assists as needed.   Pt was not driving. Pt's son drives. Patient reports patient is not able to care for self at this time due to compromised medical condition (as documented in medical record) and physical weakness.  Patient indicates a willingness to care for self once medically cleared to do so.    Insurance/Medications    Insured by   Payor/Plan Subscr  Sex Relation Sub. Ins. ID Effective Group Num   1. HU[employer]NA MANAGE* SHALA CONN MA* 1960 Female  [id number] 10/1/16 [id number]                                   [address] [address]   2. [place]* SHALA CONN [place]* 1960 Female  [id number]* 12                                    [address] [address]      Primary Insurance (for UNOS reporting): Public Insurance - Medicare FFS (Fee For Service)  Secondary Insurance (for UNOS reporting): Public Insurance - Medicaid    Patient reports patient is able to obtain and afford medications at this time and at time of  discharge.    Living Will/Healthcare Power of     Patient states patient does not have a LW and/or HCPA.   provided education regarding LW and HCPA and the completion of forms.    Coping/Mental Health    Patient is coping adequately with the aid of  family members.  Patient denies mental health difficulties. General support provided.     Discharge Planning    At time of discharge, patient plans to return to patient's home under the care of self and son.  Patients son will transport patient and will bring a portable oxygen tank.  Per rounds today, expected discharge date has not been medically determined at this time. Patient and patients caregiver  verbalize understanding and are involved in treatment planning and discharge process.    Additional Concerns    Patient is being followed for needs, education, resources, information, emotional support, supportive counseling, and for supportive and skilled discharge plan of care.  providing ongoing psychosocial support, education, resources and d/c planning as needed.  SW remains available. Patient denies additional needs and/or concerns at this time. Patient verbalizes understanding and agreement with information reviewed, social work availability, and how to access available resources as needed.

## 2017-11-03 NOTE — SUBJECTIVE & OBJECTIVE
"Interval History: doing well but concerned about PICC line. She would like it removed    Continuous Infusions:   treprostinil (REMODULIN) infusion 28 ng/kg/min (11/02/17 1703)     Scheduled Meds:   bumetanide  2 mg Oral BID    gabapentin  300 mg Oral TID    macitentan  10 mg Oral Daily    magnesium oxide  400 mg Oral BID    pantoprazole  40 mg Oral Daily    polyethylene glycol  17 g Oral BID    sodium chloride 0.9%  3 mL Intravenous Q8H    vancomycin (VANCOCIN) IVPB  15 mg/kg Intravenous Q12H     PRN Meds:acetaminophen, HYDROmorphone, ondansetron, promethazine (PHENERGAN) IVPB, sodium chloride 0.9%    Review of patient's allergies indicates:   Allergen Reactions    Chlorhexidine Itching and Rash     Patient had raised rash on neck following RHC procedure. She states she's never had a problem with the "prep" used until this time.     Adhesive Rash     Objective:     Vital Signs (Most Recent):  Temp: 98.6 °F (37 °C) (11/03/17 0812)  Pulse: 92 (11/03/17 0812)  Resp: 18 (11/03/17 0812)  BP: (!) 98/57 (11/03/17 0812)  SpO2: (!) 92 % (11/03/17 0812) Vital Signs (24h Range):  Temp:  [98.4 °F (36.9 °C)-99 °F (37.2 °C)] 98.6 °F (37 °C)  Pulse:  [71-99] 92  Resp:  [13-20] 18  SpO2:  [88 %-98 %] 92 %  BP: ()/(51-68) 98/57     Patient Vitals for the past 72 hrs (Last 3 readings):   Weight   11/03/17 0454 71.2 kg (156 lb 15.5 oz)   11/01/17 2110 74.8 kg (164 lb 14.5 oz)     Body mass index is 27.81 kg/m².      Intake/Output Summary (Last 24 hours) at 11/03/17 1228  Last data filed at 11/03/17 1200   Gross per 24 hour   Intake             1400 ml   Output             2800 ml   Net            -1400 ml       Hemodynamic Parameters:       Telemetry:     Physical Exam   Constitutional: She is oriented to person, place, and time.   HENT:   Head: Normocephalic and atraumatic.   Eyes: Pupils are equal, round, and reactive to light.   Neck: Normal range of motion.   Cardiovascular: Normal rate.  Exam reveals no gallop " and no friction rub.    No murmur heard.  Pulmonary/Chest: Effort normal and breath sounds normal.   Abdominal: Soft. Bowel sounds are normal.   Musculoskeletal: Normal range of motion.   Neurological: She is alert and oriented to person, place, and time.   Skin: Skin is warm and dry. Capillary refill takes 2 to 3 seconds. Rash (induration and erythme of the catheter entry site) noted.   Psychiatric: She has a normal mood and affect.   Vitals reviewed.      Significant Labs:  CBC:    Recent Labs  Lab 11/01/17 0524 11/02/17 0455 11/03/17 0538   WBC 9.30 6.55 5.89   RBC 5.10 4.54 4.79   HGB 12.4 11.0* 11.1*   HCT 37.6 33.8* 35.5*   * 344 411*   MCV 74* 74* 74*   MCH 24.3* 24.2* 23.2*   MCHC 33.0 32.5 31.3*     BNP:  No results for input(s): BNP in the last 168 hours.    Invalid input(s): BNPTRIAGELBLO  CMP:    Recent Labs  Lab 11/01/17 0524 11/02/17 0455 11/03/17 0538   * 136* 101   CALCIUM 9.1 9.0 9.2   ALBUMIN 4.2 2.7* 2.7*   PROT 8.2 7.3 6.9    133* 136   K 3.2* 2.6* 3.4*   CO2 31* 30* 29   CL 93* 90* 96   BUN 12 12 10   CREATININE 0.90 0.8 0.8   ALKPHOS 103 95 94   ALT 24 11 7*   AST 26 13 11   BILITOT 1.0 0.4 0.3      Coagulation:     Recent Labs  Lab 11/01/17 0524 11/02/17 0455 11/03/17 0538   INR 2.3* 2.4* 1.9*     LDH:  No results for input(s): LDH in the last 72 hours.  Microbiology:  Microbiology Results (last 7 days)     Procedure Component Value Units Date/Time    Blood culture [[id number]] Collected:  11/03/17 1151    Order Status:  Sent Specimen:  Blood Updated:  11/03/17 1151          I have reviewed all pertinent labs within the past 24 hours.      Estimated Creatinine Clearance: 73.4 mL/min (based on SCr of 0.8 mg/dL).    Diagnostic Results:

## 2017-11-03 NOTE — PROGRESS NOTES
"[facility]  Heart Transplant  Progress Note    Patient Name: Emily Cook  MRN: [id number]  Admission Date: 11/1/2017  Hospital Length of Stay: 2 days  Attending Physician: Kaylee Cazares MD  Primary Care Provider: Kaylee Cazares MD  Principal Problem:MRSA bacteremia    Subjective:     Interval History: doing well but concerned about PICC line. She would like it removed    Continuous Infusions:   treprostinil (REMODULIN) infusion 28 ng/kg/min (11/02/17 1703)     Scheduled Meds:   bumetanide  2 mg Oral BID    gabapentin  300 mg Oral TID    macitentan  10 mg Oral Daily    magnesium oxide  400 mg Oral BID    pantoprazole  40 mg Oral Daily    polyethylene glycol  17 g Oral BID    sodium chloride 0.9%  3 mL Intravenous Q8H    vancomycin (VANCOCIN) IVPB  15 mg/kg Intravenous Q12H     PRN Meds:acetaminophen, HYDROmorphone, ondansetron, promethazine (PHENERGAN) IVPB, sodium chloride 0.9%    Review of patient's allergies indicates:   Allergen Reactions    Chlorhexidine Itching and Rash     Patient had raised rash on neck following RHC procedure. She states she's never had a problem with the "prep" used until this time.     Adhesive Rash     Objective:     Vital Signs (Most Recent):  Temp: 98.6 °F (37 °C) (11/03/17 0812)  Pulse: 92 (11/03/17 0812)  Resp: 18 (11/03/17 0812)  BP: (!) 98/57 (11/03/17 0812)  SpO2: (!) 92 % (11/03/17 0812) Vital Signs (24h Range):  Temp:  [98.4 °F (36.9 °C)-99 °F (37.2 °C)] 98.6 °F (37 °C)  Pulse:  [71-99] 92  Resp:  [13-20] 18  SpO2:  [88 %-98 %] 92 %  BP: ()/(51-68) 98/57     Patient Vitals for the past 72 hrs (Last 3 readings):   Weight   11/03/17 0454 71.2 kg (156 lb 15.5 oz)   11/01/17 2110 74.8 kg (164 lb 14.5 oz)     Body mass index is 27.81 kg/m².      Intake/Output Summary (Last 24 hours) at 11/03/17 1228  Last data filed at 11/03/17 1200   Gross per 24 hour   Intake             1400 ml   Output             2800 ml   Net            -1400 ml "       Hemodynamic Parameters:       Telemetry:     Physical Exam   Constitutional: She is oriented to person, place, and time.   HENT:   Head: Normocephalic and atraumatic.   Eyes: Pupils are equal, round, and reactive to light.   Neck: Normal range of motion.   Cardiovascular: Normal rate.  Exam reveals no gallop and no friction rub.    No murmur heard.  Pulmonary/Chest: Effort normal and breath sounds normal.   Abdominal: Soft. Bowel sounds are normal.   Musculoskeletal: Normal range of motion.   Neurological: She is alert and oriented to person, place, and time.   Skin: Skin is warm and dry. Capillary refill takes 2 to 3 seconds. Rash (induration and erythme of the catheter entry site) noted.   Psychiatric: She has a normal mood and affect.   Vitals reviewed.      Significant Labs:  CBC:    Recent Labs  Lab 11/01/17 0524 11/02/17 0455 11/03/17 0538   WBC 9.30 6.55 5.89   RBC 5.10 4.54 4.79   HGB 12.4 11.0* 11.1*   HCT 37.6 33.8* 35.5*   * 344 411*   MCV 74* 74* 74*   MCH 24.3* 24.2* 23.2*   MCHC 33.0 32.5 31.3*     BNP:  No results for input(s): BNP in the last 168 hours.    Invalid input(s): BNPTRIAGELBLO  CMP:    Recent Labs  Lab 11/01/17 0524 11/02/17 0455 11/03/17 0538   * 136* 101   CALCIUM 9.1 9.0 9.2   ALBUMIN 4.2 2.7* 2.7*   PROT 8.2 7.3 6.9    133* 136   K 3.2* 2.6* 3.4*   CO2 31* 30* 29   CL 93* 90* 96   BUN 12 12 10   CREATININE 0.90 0.8 0.8   ALKPHOS 103 95 94   ALT 24 11 7*   AST 26 13 11   BILITOT 1.0 0.4 0.3      Coagulation:     Recent Labs  Lab 11/01/17 0524 11/02/17 0455 11/03/17  0538   INR 2.3* 2.4* 1.9*     LDH:  No results for input(s): LDH in the last 72 hours.  Microbiology:  Microbiology Results (last 7 days)     Procedure Component Value Units Date/Time    Blood culture [[id number]] Collected:  11/03/17 1151    Order Status:  Sent Specimen:  Blood Updated:  11/03/17 1151          I have reviewed all pertinent labs within the past 24 hours.      Estimated  Creatinine Clearance: 73.4 mL/min (based on SCr of 0.8 mg/dL).    Diagnostic Results:      Assessment and Plan:     Emily Cook is a 58 yo female with a PMHx of medical non adherence, ALFREDO (non adherent to CPAP), PAH (group 1) on IV remodulin/warfarin/macitentan, chronic respiratory failure on 5L ATC of home oxygen, 3rd degree AV block s/p pacemaker implantation, diverticulitis and previous episode of bacteremia with home PICC line who is being transferred for MRSA bacteremia.     She originally presented on 10/29/17 to [facility] with complaint of right arm pain and fever. Blood cultures grew MRSA and she has been treated with vancomycin with subsequent blood cultures being negative. She had plan to remove her permacath at [facility] however her INR remained slightly supratherapeutic. She is transferred here for continuation of care. Remains afebrile with stable vitals. Upon review of systems she has minimal complaints and states explicitly that she feels her LE edema is the best its been in a month. SOB/SANCHEZ stable on home 5 liters.     * MRSA bacteremia    - Most likely tunned cath infection as a source  - Blood cultures positive for MRSA with repeat cultures showing no growth  - TTE done without concern for vegetation.  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  - ID to give recommendations on abx treatment option            Right-sided heart failure    Continue treatment for her PAH as above  Continue Bumex 2mg BID  Strict I/Os and daily weights.         Acute on chronic respiratory failure with hypoxia    -Pulmonary toilet (incentive spirometry / flutter)   -5L O2 around the clock  -No increase in oxygen requirements.         Primary pulmonary hypertension    -Continue IV remodulin and macitentan   -? Start coumadin if no surgical intervention expecte        Sleep apnea- on CPAP    Continue PTA MARV PADILLA  MD Jennifer  Heart Transplant  [facility]

## 2017-11-03 NOTE — ASSESSMENT & PLAN NOTE
- Most likely tunned cath infection as a source  - Blood cultures positive for MRSA with repeat cultures showing no growth  - TTE done without concern for vegetation.  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  - ID to give recommendations on abx treatment option

## 2017-11-03 NOTE — PROGRESS NOTES
Vanc trough 33.8. Notified Dr. Jennifer MD says to hold 1430 dose and to repeat a Vanc trough at midnight before giving 0230 dose.

## 2017-11-03 NOTE — ASSESSMENT & PLAN NOTE
57 year-old female with history of pulmonary HTN on home Remodulin recently treated for MRSA bacteremia secondary to an infected brush catheter in September with Vanc x 2 weeks now admitted with fevers, right arm pain and numbness.    Blood cx 10/29 are positive for MRSA (4/4 bottles). Repeat blood cx 10/30 are NGTD. TTE and AMIE negative for vegetations. Patient is currently on Vancomycin. Afebrile over last 24 hours. No leukocytosis. VSS. Central line remains in place. Right arm symptoms resolved.    Plan  - Continue IV Vancomycin. Vanc trough before 4th dose (ordered for today)  - Recommend catheter removal asap for adequate source control. Please send tip for culture.  - Repeat blood cultures once line is removed  - Anticipate antibiotic treatment for at least two weeks from day of line removal.   - ID will follow.

## 2017-11-03 NOTE — SUBJECTIVE & OBJECTIVE
Interval History: NAEON. Afebrile. No leukocytosis. Feeling well today. Eager to have her catheter removed. No new complaints.     Review of Systems   Constitutional: Positive for chills. Negative for diaphoresis, fatigue and fever.   Respiratory: Positive for shortness of breath (stable). Negative for cough, wheezing and stridor.    Cardiovascular: Negative for chest pain and palpitations.   Gastrointestinal: Negative for abdominal pain, constipation, diarrhea, nausea and vomiting.   Genitourinary: Negative for difficulty urinating, dyspareunia, dysuria and urgency.   Musculoskeletal: Positive for arthralgias. Negative for back pain and myalgias.   Neurological: Negative for dizziness, weakness and headaches.   Psychiatric/Behavioral: Negative for agitation and confusion.     Objective:     Vital Signs (Most Recent):  Temp: 98.6 °F (37 °C) (11/03/17 0812)  Pulse: 92 (11/03/17 0812)  Resp: 18 (11/03/17 0812)  BP: (!) 98/57 (11/03/17 0812)  SpO2: (!) 92 % (11/03/17 0812) Vital Signs (24h Range):  Temp:  [98.4 °F (36.9 °C)-99 °F (37.2 °C)] 98.6 °F (37 °C)  Pulse:  [71-99] 92  Resp:  [13-20] 18  SpO2:  [88 %-98 %] 92 %  BP: ()/(51-68) 98/57     Weight: 71.2 kg (156 lb 15.5 oz)  Body mass index is 27.81 kg/m².    Estimated Creatinine Clearance: 73.4 mL/min (based on SCr of 0.8 mg/dL).    Physical Exam   Constitutional: She is oriented to person, place, and time. She appears well-developed and well-nourished. No distress.   HENT:   Head: Normocephalic and atraumatic.   Eyes: Conjunctivae are normal. No scleral icterus.   Cardiovascular: Normal rate and regular rhythm.    No murmur heard.  Pulmonary/Chest: Effort normal and breath sounds normal. No respiratory distress.   Abdominal: Soft. Bowel sounds are normal. She exhibits no distension. There is no tenderness.   Neurological: She is alert and oriented to person, place, and time.   Skin: Skin is warm and dry. No rash noted. She is not diaphoretic.   Left chest  catheter insertion site c/d/i. No erythema or  purulent drainage. Non tender. Mild swelling.   Psychiatric: She has a normal mood and affect. Her behavior is normal.   Vitals reviewed.      Significant Labs:   Blood Culture:   Recent Labs  Lab 09/05/17  1405 09/23/17  0859 10/29/17  2305 10/29/17  2330 10/30/17  1022   LABBLOO No growth after 5 days. No growth after 5 days.  No growth after 5 days. Gram stain hilton bottle: Gram positive cocci in clusters resembling Staph  Results called to and read back by: Renetta (RN 4S) 10/30/2017  12:31   KLS2  Gram stain aer bottle: Gram positive cocci in clusters resembling Staph  METHICILLIN RESISTANT STAPHYLOCOCCUS AUREUSphoned [facility]/4S with MRSA  11/01/2017  06:37 Gram stain hilton bottle: Gram positive cocci in clusters resembling Staph   Positive results previously called refer to culture #[id number]  Gram stain aer bottle: Gram positive cocci in clusters resembling Staph   Positive results previously called  METHICILLIN RESISTANT STAPHYLOCOCCUS AUREUSRefer to culture #[id number] No Growth to date  No Growth to date  No Growth to date  No Growth to date  No Growth to date     CBC:   Recent Labs  Lab 11/02/17  0455 11/03/17  0538   WBC 6.55 5.89   HGB 11.0* 11.1*   HCT 33.8* 35.5*    411*     CMP:   Recent Labs  Lab 11/02/17  0455 11/03/17  0538   * 136   K 2.6* 3.4*   CL 90* 96   CO2 30* 29   * 101   BUN 12 10   CREATININE 0.8 0.8   CALCIUM 9.0 9.2   PROT 7.3 6.9   ALBUMIN 2.7* 2.7*   BILITOT 0.4 0.3   ALKPHOS 95 94   AST 13 11   ALT 11 7*   ANIONGAP 13 11   EGFRNONAA >60.0 >60.0     Lactic Acid: No results for input(s): LACTATE in the last 48 hours.  Procalcitonin: No results for input(s): PROCAL in the last 48 hours.  Urine Culture:   Recent Labs  Lab 06/09/17  0743 09/23/17  0938 10/29/17  2253   LABURIN ESCHERICHIA COLI>100,000 cfu/ml PSEUDOMONAS [id number],000 - 99,999 cfu/ml No growth     Urine Studies:   Recent Labs  Lab 09/22/17  7291  09/23/17  0936 10/29/17  2253   COLORU Yellow Yellow Yellow   APPEARANCEUA Hazy* Hazy* Clear   PHUR 6.0 5.0 6.0   SPECGRAV 1.010 1.010 1.020   PROTEINUA 2+* Negative Negative   GLUCUA Negative Negative Negative   KETONESU Negative Negative Negative   BILIRUBINUA Negative Negative Negative   OCCULTUA 1+* 1+* Trace*   NITRITE Negative Negative Negative   UROBILINOGEN Negative Negative 0.2   LEUKOCYTESUR Trace* Negative Negative   RBCUA 4 1  --    WBCUA 6* 4  --    BACTERIA Occasional  --   --    SQUAMEPITHEL 6 1  --    HYALINECASTS 4*  --   --      Wound Culture: No results for input(s): LABAERO in the last 4320 hours.    Significant Imaging: I have reviewed all pertinent imaging results/findings within the past 24 hours.

## 2017-11-03 NOTE — PLAN OF CARE
Problem: Patient Care Overview  Goal: Plan of Care Review  Pt free from fall or injury so far this shift. Instructed to call if assistance needed, verbalized understanding.   Cardiac monitoring in progress, currently SR.  Sats 92-94% on 5 L NC. Remodulin infusing at 58 cc/ 24 hrs, rate verified by this RN. Cassette due to be changed today before 1700.   Potassium level 3.4, PO Potassium ordered daily.   Afebrile. Vancomycin DC'd. Blood cx sent today, results pending. Daily labs monitored.

## 2017-11-04 NOTE — PROGRESS NOTES
"[facility]  Heart Transplant  Progress Note    Patient Name: Emily Cook  MRN: [id number]  Admission Date: 11/1/2017  Hospital Length of Stay: 3 days  Attending Physician: Kaylee Cazares MD  Primary Care Provider: Kaylee Cazares MD  Principal Problem:MRSA bacteremia    Subjective:     Interval History:     Seen at bedside. No acute overnight event. She wants the brush cath out. Denies fever or chills    Continuous Infusions:   treprostinil (REMODULIN) infusion 28 ng/kg/min (11/03/17 1626)     Scheduled Meds:   bumetanide  2 mg Oral BID    gabapentin  300 mg Oral TID    lidocaine HCL 10 mg/ml (1%)        lidocaine HCL 10 mg/ml (1%)  10 mL Other Once    macitentan  10 mg Oral Daily    magnesium oxide  400 mg Oral BID    pantoprazole  40 mg Oral Daily    polyethylene glycol  17 g Oral BID    potassium chloride  20 mEq Oral Daily    sodium chloride 0.9%  10 mL Intravenous Q6H    sodium chloride 0.9%  3 mL Intravenous Q8H    vancomycin (VANCOCIN) IVPB  1,250 mg Intravenous Q24H     PRN Meds:acetaminophen, HYDROmorphone, ondansetron, promethazine (PHENERGAN) IVPB, Flushing PICC Protocol **AND** sodium chloride 0.9% **AND** sodium chloride 0.9%, sodium chloride 0.9%    Review of patient's allergies indicates:   Allergen Reactions    Chlorhexidine Itching and Rash     Patient had raised rash on neck following RHC procedure. She states she's never had a problem with the "prep" used until this time.     Adhesive Rash     Objective:     Vital Signs (Most Recent):  Temp: 98.2 °F (36.8 °C) (11/04/17 1131)  Pulse: 84 (11/04/17 1500)  Resp: 18 (11/04/17 1131)  BP: (!) 96/57 (11/04/17 1131)  SpO2: 96 % (11/04/17 1131) Vital Signs (24h Range):  Temp:  [98.2 °F (36.8 °C)-99.9 °F (37.7 °C)] 98.2 °F (36.8 °C)  Pulse:  [79-94] 84  Resp:  [16-18] 18  SpO2:  [93 %-97 %] 96 %  BP: ()/(53-68) 96/57     Patient Vitals for the past 72 hrs (Last 3 readings):   Weight   11/04/17 0600 69 kg " (152 lb 1.9 oz)   11/03/17 0454 71.2 kg (156 lb 15.5 oz)   11/01/17 2110 74.8 kg (164 lb 14.5 oz)     Body mass index is 26.95 kg/m².      Intake/Output Summary (Last 24 hours) at 11/04/17 1600  Last data filed at 11/04/17 1439   Gross per 24 hour   Intake             1440 ml   Output             2300 ml   Net             -860 ml       Hemodynamic Parameters:       Telemetry:     Physical Exam   Constitutional: She is oriented to person, place, and time. She appears well-developed.   Eyes: Pupils are equal, round, and reactive to light.   Neck: Normal range of motion.   Cardiovascular: Normal rate, regular rhythm and normal heart sounds.    Pulmonary/Chest: Effort normal. She has rales (mild fine rales bilaterally).   Abdominal: Soft. Bowel sounds are normal.   Musculoskeletal: Normal range of motion.   Neurological: She is alert and oriented to person, place, and time.   Skin: Skin is warm and dry. Rash (rash at the brush entry site has less erythema and no induration) noted.   Psychiatric: She has a normal mood and affect.   Vitals reviewed.      Significant Labs:  CBC:    Recent Labs  Lab 11/02/17 0455 11/03/17 0538 11/04/17  0624   WBC 6.55 5.89 7.00   RBC 4.54 4.79 4.81   HGB 11.0* 11.1* 11.2*   HCT 33.8* 35.5* 35.5*    411* 426*   MCV 74* 74* 74*   MCH 24.2* 23.2* 23.3*   MCHC 32.5 31.3* 31.5*     BNP:  No results for input(s): BNP in the last 168 hours.    Invalid input(s): BNPTRIAGELBLO  CMP:    Recent Labs  Lab 11/02/17 0455 11/03/17  0538 11/04/17  0624   * 101 104   CALCIUM 9.0 9.2 9.0   ALBUMIN 2.7* 2.7* 2.6*   PROT 7.3 6.9 6.7   * 136 136   K 2.6* 3.4* 3.7   CO2 30* 29 29   CL 90* 96 96   BUN 12 10 12   CREATININE 0.8 0.8 0.8   ALKPHOS 95 94 102   ALT 11 7* 8*   AST 13 11 13   BILITOT 0.4 0.3 0.5      Coagulation:     Recent Labs  Lab 11/02/17  0455 11/03/17  0538 11/04/17  0624   INR 2.4* 1.9* 1.3*     LDH:  No results for input(s): LDH in the last 72  hours.  Microbiology:  Microbiology Results (last 7 days)     Procedure Component Value Units Date/Time    Aerobic culture [[id number]]     Order Status:  No result Specimen:  Catheter Tip     Blood culture [[id number]] Collected:  11/03/17 1151    Order Status:  Completed Specimen:  Blood Updated:  11/04/17 0515     Blood Culture, Routine No Growth to date          I have reviewed all pertinent labs within the past 24 hours.    Estimated Creatinine Clearance: 72.3 mL/min (based on SCr of 0.8 mg/dL).    Diagnostic Results:      Assessment and Plan:     Emily Cook is a 56 yo female with a PMHx of medical non adherence, ALFREDO (non adherent to CPAP), PAH (group 1) on IV remodulin/warfarin/macitentan, chronic respiratory failure on 5L ATC of home oxygen, 3rd degree AV block s/p pacemaker implantation, diverticulitis and previous episode of bacteremia with home PICC line who is being transferred for MRSA bacteremia.     She originally presented on 10/29/17 to [facility] with complaint of right arm pain and fever. Blood cultures grew MRSA and she has been treated with vancomycin with subsequent blood cultures being negative. She had plan to remove her permacath at [facility] however her INR remained slightly supratherapeutic. She is transferred here for continuation of care. Remains afebrile with stable vitals. Upon review of systems she has minimal complaints and states explicitly that she feels her LE edema is the best its been in a month. SOB/SANCHEZ stable on home 5 liters.     * MRSA bacteremia    - from cardoso cath infection. Continue vancomycin per ID (1250 Q24) via peripheral  - agreed to picc placement. Will will use PICC for remodulin  - to place a midline tomorrow to the left arm for abx. Total course is two weeks per ID before another attempt for Cardoso replacement  - repeat blood cultures pending  - monitor vanco trough.            Right-sided heart failure    Continue treatment for her PAH as  above  Continue Bumex 2mg BID  Strict I/Os and daily weights.         Acute on chronic respiratory failure with hypoxia    -Pulmonary toilet (incentive spirometry / flutter)   -5L O2 around the clock  -No increase in oxygen requirements.         Primary pulmonary hypertension    -Continue IV remodulin and macitentan   - resume coumadin. Cardoso cath to be placed in two weeks        Sleep apnea- on CPAP    Continue PTA CPAP           Discussed plan of care with Dr. jazmine Rodriguez MD  Heart Transplant  [facility]

## 2017-11-04 NOTE — SUBJECTIVE & OBJECTIVE
"Interval History:     Seen at bedside. No acute overnight event. She wants the brush cath out. Denies fever or chills    Continuous Infusions:   treprostinil (REMODULIN) infusion 28 ng/kg/min (11/03/17 1626)     Scheduled Meds:   bumetanide  2 mg Oral BID    gabapentin  300 mg Oral TID    lidocaine HCL 10 mg/ml (1%)        lidocaine HCL 10 mg/ml (1%)  10 mL Other Once    macitentan  10 mg Oral Daily    magnesium oxide  400 mg Oral BID    pantoprazole  40 mg Oral Daily    polyethylene glycol  17 g Oral BID    potassium chloride  20 mEq Oral Daily    sodium chloride 0.9%  10 mL Intravenous Q6H    sodium chloride 0.9%  3 mL Intravenous Q8H    vancomycin (VANCOCIN) IVPB  1,250 mg Intravenous Q24H     PRN Meds:acetaminophen, HYDROmorphone, ondansetron, promethazine (PHENERGAN) IVPB, Flushing PICC Protocol **AND** sodium chloride 0.9% **AND** sodium chloride 0.9%, sodium chloride 0.9%    Review of patient's allergies indicates:   Allergen Reactions    Chlorhexidine Itching and Rash     Patient had raised rash on neck following RHC procedure. She states she's never had a problem with the "prep" used until this time.     Adhesive Rash     Objective:     Vital Signs (Most Recent):  Temp: 98.2 °F (36.8 °C) (11/04/17 1131)  Pulse: 84 (11/04/17 1500)  Resp: 18 (11/04/17 1131)  BP: (!) 96/57 (11/04/17 1131)  SpO2: 96 % (11/04/17 1131) Vital Signs (24h Range):  Temp:  [98.2 °F (36.8 °C)-99.9 °F (37.7 °C)] 98.2 °F (36.8 °C)  Pulse:  [79-94] 84  Resp:  [16-18] 18  SpO2:  [93 %-97 %] 96 %  BP: ()/(53-68) 96/57     Patient Vitals for the past 72 hrs (Last 3 readings):   Weight   11/04/17 0600 69 kg (152 lb 1.9 oz)   11/03/17 0454 71.2 kg (156 lb 15.5 oz)   11/01/17 2110 74.8 kg (164 lb 14.5 oz)     Body mass index is 26.95 kg/m².      Intake/Output Summary (Last 24 hours) at 11/04/17 1600  Last data filed at 11/04/17 1439   Gross per 24 hour   Intake             1440 ml   Output             2300 ml   Net        "      -860 ml       Hemodynamic Parameters:       Telemetry:     Physical Exam   Constitutional: She is oriented to person, place, and time. She appears well-developed.   Eyes: Pupils are equal, round, and reactive to light.   Neck: Normal range of motion.   Cardiovascular: Normal rate, regular rhythm and normal heart sounds.    Pulmonary/Chest: Effort normal. She has rales (mild fine rales bilaterally).   Abdominal: Soft. Bowel sounds are normal.   Musculoskeletal: Normal range of motion.   Neurological: She is alert and oriented to person, place, and time.   Skin: Skin is warm and dry. Rash (rash at the brush entry site has less erythema and no induration) noted.   Psychiatric: She has a normal mood and affect.   Vitals reviewed.      Significant Labs:  CBC:    Recent Labs  Lab 11/02/17 0455 11/03/17 0538 11/04/17 0624   WBC 6.55 5.89 7.00   RBC 4.54 4.79 4.81   HGB 11.0* 11.1* 11.2*   HCT 33.8* 35.5* 35.5*    411* 426*   MCV 74* 74* 74*   MCH 24.2* 23.2* 23.3*   MCHC 32.5 31.3* 31.5*     BNP:  No results for input(s): BNP in the last 168 hours.    Invalid input(s): BNPTRIAGELBLO  CMP:    Recent Labs  Lab 11/02/17 0455 11/03/17 0538 11/04/17  0624   * 101 104   CALCIUM 9.0 9.2 9.0   ALBUMIN 2.7* 2.7* 2.6*   PROT 7.3 6.9 6.7   * 136 136   K 2.6* 3.4* 3.7   CO2 30* 29 29   CL 90* 96 96   BUN 12 10 12   CREATININE 0.8 0.8 0.8   ALKPHOS 95 94 102   ALT 11 7* 8*   AST 13 11 13   BILITOT 0.4 0.3 0.5      Coagulation:     Recent Labs  Lab 11/02/17 0455 11/03/17 0538 11/04/17  0624   INR 2.4* 1.9* 1.3*     LDH:  No results for input(s): LDH in the last 72 hours.  Microbiology:  Microbiology Results (last 7 days)     Procedure Component Value Units Date/Time    Aerobic culture [[id number]]     Order Status:  No result Specimen:  Catheter Tip     Blood culture [[id number]] Collected:  11/03/17 1151    Order Status:  Completed Specimen:  Blood Updated:  11/04/17 0515     Blood Culture, Routine No  Growth to date          I have reviewed all pertinent labs within the past 24 hours.    Estimated Creatinine Clearance: 72.3 mL/min (based on SCr of 0.8 mg/dL).    Diagnostic Results:

## 2017-11-04 NOTE — PLAN OF CARE
Problem: Patient Care Overview  Goal: Plan of Care Review  Pt free from fall or injury so far this shift. Instructed to call if assistance needed, verbalized understanding.   Sats mid to high 90's on 5 L NC.   Cardiac monitoring in progress, currently SR.   Remodulin infusing at 58 cc/ 24 hr. Cardoso to be pulled today or tomorrow. PICC line placed, Remodulin to be switched over to PICC line per HTS notes.  Vanc trough 12.6, Vancomycin restarted, trough to be checked before 3rd dose. ML to be placed Monday for home IV Abx.   Afebrile. Hand hygiene reinforced. Blood cx show NGTD. Cath tip to be cultured once Cardoso is removed. Daily labs monitored.   Will not replace Carodso for 2 weeks, so Coumadin restarted.

## 2017-11-04 NOTE — PROGRESS NOTES
[facility]  Infectious Disease  Progress Note    Patient Name: Emily Cook  MRN: [id number]  Admission Date: 11/1/2017  Length of Stay: 3 days  Attending Physician: Kaylee Cazares MD  Primary Care Provider: Kaylee Cazares MD    Isolation Status: Contact  Assessment/Plan:      Staphylococcus aureus bacteremia    57 year-old female with history of pulmonary HTN on home Remodulin recently treated for MRSA bacteremia secondary to an infected brush catheter in September with Vanc x 2 weeks now admitted with fevers, right arm pain and numbness.    Blood cx 10/29 are positive for MRSA (4/4 bottles). Repeat blood cx 10/30 and 11/3 are NGTD. TTE and AMIE negative for vegetations. Patient is currently on Vancomycin. Afebrile over last 24 hours. No leukocytosis. VSS. Central line remains in place. Right arm symptoms resolved.    Plan  - Continue Vancomycin 1250 mg IV q 24 hours. Will need vanc trough prior to 3rd dose.  - Recommend catheter removal asap for adequate source control. Please send tip for culture.  - Repeat blood cultures once line is removed  - Anticipate antibiotic treatment for at least two weeks from day of line removal.   - Ok to place PICC line as repeat blood cultures have remained negative for > 48 hours  - ID will follow.          Please call for any questions. Thank you.  Mehnaz Hanson PA-C  Phone: [phone]  Pager: [phone]    Subjective:     Principal Problem:MRSA bacteremia    HPI: Emily Cook is a 57 year-old female with history of pulmonary HTN on home Veletri [place] [facility] [place]eremia secondary to an infected brush catheter (placed 6/2017). Her brush catheter was removed on 9/5. Catheter tip cultures also grew MRSA. TTE was negative for vegetations. Blood cultures cleared 9/3. A new brush catheter was placed on 9/8 for Veletri along with a PICC line and patient was discharged home with a plan to complete 2 weeks of IV Vancomycin. At follow  up patient was doing well. No systemic signs of infection. She completed 2 weeks of IV Vancomycin without difficulty.     Patient now presents with fevers up to 101.4, right arm pain and numbness. Blood cx 10/29 are positive for MRSA (4/4 bottles). Repeat blood cx 10/30 are NGTD. TTE and AMIE negative for vegetations. Patient is on Vancomycin.  Interval History: NAEON. Afebrile. No leukocytosis. Feeling well today. Eager to have her catheter removed. No new complaints. General surgery plans to remove line today.     Review of Systems   Constitutional: Positive for chills. Negative for diaphoresis, fatigue and fever.   Respiratory: Positive for shortness of breath (stable). Negative for cough, wheezing and stridor.    Cardiovascular: Negative for chest pain and palpitations.   Gastrointestinal: Negative for abdominal pain, constipation, diarrhea, nausea and vomiting.   Genitourinary: Negative for difficulty urinating, dyspareunia, dysuria and urgency.   Musculoskeletal: Positive for arthralgias. Negative for back pain and myalgias.   Neurological: Negative for dizziness, weakness and headaches.   Psychiatric/Behavioral: Negative for agitation and confusion.     Objective:     Vital Signs (Most Recent):  Temp: 98.2 °F (36.8 °C) (11/04/17 1131)  Pulse: 90 (11/04/17 1131)  Resp: 18 (11/04/17 1131)  BP: (!) 96/57 (11/04/17 1131)  SpO2: 96 % (11/04/17 1131) Vital Signs (24h Range):  Temp:  [98.2 °F (36.8 °C)-99.9 °F (37.7 °C)] 98.2 °F (36.8 °C)  Pulse:  [79-94] 90  Resp:  [16-18] 18  SpO2:  [93 %-97 %] 96 %  BP: ()/(53-68) 96/57     Weight: 69 kg (152 lb 1.9 oz)  Body mass index is 26.95 kg/m².    Estimated Creatinine Clearance: 72.3 mL/min (based on SCr of 0.8 mg/dL).    Physical Exam   Constitutional: She is oriented to person, place, and time. She appears well-developed and well-nourished. No distress.   HENT:   Head: Normocephalic and atraumatic.   Eyes: Conjunctivae are normal. No scleral icterus.    Cardiovascular: Normal rate and regular rhythm.    No murmur heard.  Pulmonary/Chest: Effort normal and breath sounds normal. No respiratory distress.   Abdominal: Soft. Bowel sounds are normal. She exhibits no distension. There is no tenderness.   Neurological: She is alert and oriented to person, place, and time.   Skin: Skin is warm and dry. No rash noted. She is not diaphoretic.   Left chest catheter insertion site c/d/i. No erythema or  purulent drainage. Non tender. Mild swelling.   Psychiatric: She has a normal mood and affect. Her behavior is normal.   Vitals reviewed.      Significant Labs:   Blood Culture:     Recent Labs  Lab 09/23/17  0859 10/29/17  2305 10/29/17  2330 10/30/17  1022 11/03/17  1151   LABBLOO No growth after 5 days.  No growth after 5 days. Gram stain hilton bottle: Gram positive cocci in clusters resembling Staph  Results called to and read back by: Renetta (RN 4S) 10/30/2017  12:31   KLS2  Gram stain aer bottle: Gram positive cocci in clusters resembling Staph  METHICILLIN RESISTANT STAPHYLOCOCCUS AUREUSphoned [facility]/4S with MRSA  11/01/2017  06:37 Gram stain hilton bottle: Gram positive cocci in clusters resembling Staph   Positive results previously called refer to culture #[id number]  Gram stain aer bottle: Gram positive cocci in clusters resembling Staph   Positive results previously called  METHICILLIN RESISTANT STAPHYLOCOCCUS AUREUSRefer to culture #[id number] No growth after 5 days. No Growth to date     CBC:     Recent Labs  Lab 11/03/17  0538 11/04/17  0624   WBC 5.89 7.00   HGB 11.1* 11.2*   HCT 35.5* 35.5*   * 426*     CMP:     Recent Labs  Lab 11/03/17  0538 11/04/17  0624    136   K 3.4* 3.7   CL 96 96   CO2 29 29    104   BUN 10 12   CREATININE 0.8 0.8   CALCIUM 9.2 9.0   PROT 6.9 6.7   ALBUMIN 2.7* 2.6*   BILITOT 0.3 0.5   ALKPHOS 94 102   AST 11 13   ALT 7* 8*   ANIONGAP 11 11   EGFRNONAA >60.0 >60.0     Lactic Acid: No results for input(s):  LACTATE in the last 48 hours.  Procalcitonin: No results for input(s): PROCAL in the last 48 hours.  Urine Culture:     Recent Labs  Lab 06/09/17  0743 09/23/17  0938 10/29/17  2253   LABURIN ESCHERICHIA COLI>100,000 cfu/ml PSEUDOMONAS [id number],000 - 99,999 cfu/ml No growth     Urine Studies:     Recent Labs  Lab 09/22/17  1524 09/23/17  0936 10/29/17  2253   COLORU Yellow Yellow Yellow   APPEARANCEUA Hazy* Hazy* Clear   PHUR 6.0 5.0 6.0   SPECGRAV 1.010 1.010 1.020   PROTEINUA 2+* Negative Negative   GLUCUA Negative Negative Negative   KETONESU Negative Negative Negative   BILIRUBINUA Negative Negative Negative   OCCULTUA 1+* 1+* Trace*   NITRITE Negative Negative Negative   UROBILINOGEN Negative Negative 0.2   LEUKOCYTESUR Trace* Negative Negative   RBCUA 4 1  --    WBCUA 6* 4  --    BACTERIA Occasional  --   --    SQUAMEPITHEL 6 1  --    HYALINECASTS 4*  --   --      Wound Culture: No results for input(s): LABAERO in the last 4320 hours.    Significant Imaging: I have reviewed all pertinent imaging results/findings within the past 24 hours.

## 2017-11-04 NOTE — SUBJECTIVE & OBJECTIVE
Interval History: NAEON. Afebrile. No leukocytosis. Feeling well today. Eager to have her catheter removed. No new complaints. General surgery plans to remove line today.     Review of Systems   Constitutional: Positive for chills. Negative for diaphoresis, fatigue and fever.   Respiratory: Positive for shortness of breath (stable). Negative for cough, wheezing and stridor.    Cardiovascular: Negative for chest pain and palpitations.   Gastrointestinal: Negative for abdominal pain, constipation, diarrhea, nausea and vomiting.   Genitourinary: Negative for difficulty urinating, dyspareunia, dysuria and urgency.   Musculoskeletal: Positive for arthralgias. Negative for back pain and myalgias.   Neurological: Negative for dizziness, weakness and headaches.   Psychiatric/Behavioral: Negative for agitation and confusion.     Objective:     Vital Signs (Most Recent):  Temp: 98.2 °F (36.8 °C) (11/04/17 1131)  Pulse: 90 (11/04/17 1131)  Resp: 18 (11/04/17 1131)  BP: (!) 96/57 (11/04/17 1131)  SpO2: 96 % (11/04/17 1131) Vital Signs (24h Range):  Temp:  [98.2 °F (36.8 °C)-99.9 °F (37.7 °C)] 98.2 °F (36.8 °C)  Pulse:  [79-94] 90  Resp:  [16-18] 18  SpO2:  [93 %-97 %] 96 %  BP: ()/(53-68) 96/57     Weight: 69 kg (152 lb 1.9 oz)  Body mass index is 26.95 kg/m².    Estimated Creatinine Clearance: 72.3 mL/min (based on SCr of 0.8 mg/dL).    Physical Exam   Constitutional: She is oriented to person, place, and time. She appears well-developed and well-nourished. No distress.   HENT:   Head: Normocephalic and atraumatic.   Eyes: Conjunctivae are normal. No scleral icterus.   Cardiovascular: Normal rate and regular rhythm.    No murmur heard.  Pulmonary/Chest: Effort normal and breath sounds normal. No respiratory distress.   Abdominal: Soft. Bowel sounds are normal. She exhibits no distension. There is no tenderness.   Neurological: She is alert and oriented to person, place, and time.   Skin: Skin is warm and dry. No rash  noted. She is not diaphoretic.   Left chest catheter insertion site c/d/i. No erythema or  purulent drainage. Non tender. Mild swelling.   Psychiatric: She has a normal mood and affect. Her behavior is normal.   Vitals reviewed.      Significant Labs:   Blood Culture:     Recent Labs  Lab 09/23/17  0859 10/29/17  2305 10/29/17  2330 10/30/17  1022 11/03/17  1151   LABBLOO No growth after 5 days.  No growth after 5 days. Gram stain hilton bottle: Gram positive cocci in clusters resembling Staph  Results called to and read back by: Renetta (RN 4S) 10/30/2017  12:31   KLS2  Gram stain aer bottle: Gram positive cocci in clusters resembling Staph  METHICILLIN RESISTANT STAPHYLOCOCCUS AUREUSphoned [facility]/4S with MRSA  11/01/2017  06:37 Gram stain hilton bottle: Gram positive cocci in clusters resembling Staph   Positive results previously called refer to culture #[id number]  Gram stain aer bottle: Gram positive cocci in clusters resembling Staph   Positive results previously called  METHICILLIN RESISTANT STAPHYLOCOCCUS AUREUSRefer to culture #[id number] No growth after 5 days. No Growth to date     CBC:     Recent Labs  Lab 11/03/17  0538 11/04/17  0624   WBC 5.89 7.00   HGB 11.1* 11.2*   HCT 35.5* 35.5*   * 426*     CMP:     Recent Labs  Lab 11/03/17  0538 11/04/17  0624    136   K 3.4* 3.7   CL 96 96   CO2 29 29    104   BUN 10 12   CREATININE 0.8 0.8   CALCIUM 9.2 9.0   PROT 6.9 6.7   ALBUMIN 2.7* 2.6*   BILITOT 0.3 0.5   ALKPHOS 94 102   AST 11 13   ALT 7* 8*   ANIONGAP 11 11   EGFRNONAA >60.0 >60.0     Lactic Acid: No results for input(s): LACTATE in the last 48 hours.  Procalcitonin: No results for input(s): PROCAL in the last 48 hours.  Urine Culture:     Recent Labs  Lab 06/09/17  0743 09/23/17  0938 10/29/17  2253   LABURIN ESCHERICHIA COLI>100,000 cfu/ml PSEUDOMONAS [id number],000 - 99,999 cfu/ml No growth     Urine Studies:     Recent Labs  Lab 09/22/17  1524 09/23/17  0936 10/29/17  3874    COLORU Yellow Yellow Yellow   APPEARANCEUA Hazy* Hazy* Clear   PHUR 6.0 5.0 6.0   SPECGRAV 1.010 1.010 1.020   PROTEINUA 2+* Negative Negative   GLUCUA Negative Negative Negative   KETONESU Negative Negative Negative   BILIRUBINUA Negative Negative Negative   OCCULTUA 1+* 1+* Trace*   NITRITE Negative Negative Negative   UROBILINOGEN Negative Negative 0.2   LEUKOCYTESUR Trace* Negative Negative   RBCUA 4 1  --    WBCUA 6* 4  --    BACTERIA Occasional  --   --    SQUAMEPITHEL 6 1  --    HYALINECASTS 4*  --   --      Wound Culture: No results for input(s): LABAERO in the last 4320 hours.    Significant Imaging: I have reviewed all pertinent imaging results/findings within the past 24 hours.

## 2017-11-04 NOTE — PROCEDURES
"Emily Cook is a 57 y.o. female patient.    Temp: 98.2 °F (36.8 °C) (11/04/17 1131)  Pulse: 90 (11/04/17 1131)  Resp: 18 (11/04/17 1131)  BP: (!) 96/57 (11/04/17 1131)  SpO2: 96 % (11/04/17 1131)  Weight: 69 kg (152 lb 1.9 oz) (11/04/17 0600)  Height: 5' 3" (160 cm) (11/01/17 2110)    PICC  Date/Time: 11/4/2017 2:00 PM  Performed by: PRASHANT MOYA  Consent Done: Yes  Time out: Immediately prior to procedure a time out was called to verify the correct patient, procedure, equipment, support staff and site/side marked as required  Indications: med administration and vascular access  Anesthesia: local infiltration  Local anesthetic: lidocaine 1% without epinephrine  Anesthetic Total (mL): 2  Preparation: skin prepped with ChloraPrep  Skin prep agent dried: skin prep agent completely dried prior to procedure  Sterile barriers: all five maximum sterile barriers used - cap, mask, sterile gown, sterile gloves, and large sterile sheet  Hand hygiene: hand hygiene performed prior to central venous catheter insertion  Location details: right brachial  Catheter type: double lumen  Catheter size: 5 Fr  Catheter Length: 36cm    Ultrasound guidance: yes  Vessel Caliber: medium and patent, compressibility normal  Vascular Doppler: not done  Needle advanced into vessel with real time Ultrasound guidance.  Guidewire confirmed in vessel.  Image recorded and saved.  Sterile sheath used.  no esophageal manometryNumber of attempts: 1  Post-procedure: blood return through all ports, chlorhexidine patch and sterile dressing applied  Technical procedures used: 3cg  Specimens: No  Implants: No  Assessment: placement verified by x-ray  Complications: none        Karen Monsivais  11/4/2017  "

## 2017-11-04 NOTE — CONSULTS
Double lumen PICC placed in right brachial vein of KEV, 36cm in length with 0cm exposed and 30cm arm circumference. Lot#[id number].

## 2017-11-04 NOTE — ASSESSMENT & PLAN NOTE
- from cardoso cath infection. Continue vancomycin per ID (1250 Q24) via peripheral  - agreed to picc placement. Will will use PICC for remodulin  - to place a midline tomorrow to the left arm for abx. Total course is two weeks per ID before another attempt for Cardoso replacement  - repeat blood cultures pending  - monitor vanco trough.

## 2017-11-04 NOTE — ASSESSMENT & PLAN NOTE
57 year-old female with history of pulmonary HTN on home Remodulin recently treated for MRSA bacteremia secondary to an infected brush catheter in September with Vanc x 2 weeks now admitted with fevers, right arm pain and numbness.    Blood cx 10/29 are positive for MRSA (4/4 bottles). Repeat blood cx 10/30 and 11/3 are NGTD. TTE and AMIE negative for vegetations. Patient is currently on Vancomycin. Afebrile over last 24 hours. No leukocytosis. VSS. Central line remains in place. Right arm symptoms resolved.    Plan  - Continue Vancomycin 1250 mg IV q 24 hours. Will need vanc trough prior to 3rd dose.  - Recommend catheter removal asap for adequate source control. Please send tip for culture.  - Repeat blood cultures once line is removed  - Anticipate antibiotic treatment for at least two weeks from day of line removal.   - Ok to place PICC line as repeat blood cultures have remained negative for > 48 hours  - ID will follow.

## 2017-11-04 NOTE — PLAN OF CARE
Problem: Patient Care Overview  Goal: Plan of Care Review  Outcome: Ongoing (interventions implemented as appropriate)  Pt has call bell in reach, non slip socks on, and bedrails up x2. Pt has been encouraged to wash hands. Pt on [facility] pump.

## 2017-11-05 NOTE — PROGRESS NOTES
RN called to room. Pt's PIV that remodulin was running to fall out. Remodulin moved to secondary PIV site. Charge nurse Oly called. Additional PIV will be placed.

## 2017-11-05 NOTE — ASSESSMENT & PLAN NOTE
- Continue IV remodulin and macitentan as current   - R- PICC to be used for remodulin  - resume coumadin.   -Cardoso cath to be attempted after one week of abx and clear bcx

## 2017-11-05 NOTE — PROGRESS NOTES
- Pt has Do-Not-Resuscitate orders; made routine check of pt's paper chart for DNR form and did not find it there nor was it stapled to the back of any other papers.  - Pt has a scan of DNR documentation dated 10/2/17 in Epic under Chart Review, but clicking on the link for the scan currently produces an error.  - Pt has been marked as DNR since 9/28/17 - informed charge nurse of the current lack of DNR form.    Addendum 0440: Scan of DNR form is accessible now. Printed a copy and placed in pt's paper chart.

## 2017-11-05 NOTE — CONSULTS
GENERAL SURGERY  Consultation      REASON FOR CONSULT:  Removal of left IJ Carodso     SUBJECTIVE:     HISTORY OF PRESENT ILLNESS:  Emily Cook is a 57 y.o. female who was currently admitted to [facility] on 11/1/2017 for recurrent MRSA bacteremia.    The patient is a 57 y.o. female with PMH of pulmonary HTN (on continuous home Remodulin infusion), 3rd degree AV block (s/p dual-chamber pacemaker), ALFREDO, tricuspid regurgitation, diverticulitis, and recent prior history of MRSA+ bacteremia requiring Cardoso catheter removal with temporary PICC line then subsequent replacement of Cardoso.    She initially presented to [facility] on 10/29/17 with complaint of right arm pain and fever.  Blood cultures grew MRSA and she has been treated with vancomycin with subsequent blood cultures being negative. She is on Coumadin and her INR remained slightly supratherapeutic therefore patient was transferred to [facility] for further care.  Remains afebrile with stable vitals.  No acute issues or complaints.  SOB/SANCHEZ stable on home 5 liters.     General Surgery has been consulted for removal of left IJ tunneled Cardoso catheter in setting of recurrent MRSA+ bacteremia at the request of ID.    The patient's past surgical line history is pertinent for prior:    6/16/2017 - Placement of right subclavian tunneled Cardoso catheter by Dr. Pride (removed 09/05/17).  9/8/2017 - Placement of left IJ tunneled Cardoso catheter by Dr. Simental.  7/29/2013 - Placement of left sided dual chamber pacemaker (still in place).        MEDICATIONS:  Home Medications:  No current facility-administered medications on file prior to encounter.      Current Outpatient Prescriptions on File Prior to Encounter   Medication Sig Dispense Refill    bumetanide (BUMEX) 2 MG tablet Take 1 tablet (2 mg total) by mouth 2 (two) times daily. 60 tablet 11    clobetasol (TEMOVATE) 0.05 % external solution   2    gabapentin (NEURONTIN) 300 MG  capsule Take 1 capsule (300 mg total) by mouth As instructed. Take one capsule qam, 1 qpm, 2 qhs. 360 capsule 2    hydrocodone-acetaminophen 10-325mg (NORCO)  mg Tab Take 1 tablet by mouth 3 (three) times daily as needed. 90 tablet 0    hydrOXYzine (VISTARIL) 50 MG Cap 50 mg daily as needed.       macitentan (OPSUMIT) 10 mg Tab Take 1 tablet (10 mg total) by mouth once daily. 30 tablet 11    magnesium oxide (MAG-OX) 400 mg tablet Take 1 tablet (400 mg total) by mouth 2 (two) times daily.  0    PATADAY 0.2 % Drop   6    potassium chloride (MICRO-K) 10 MEQ CpSR Take 4 capsules (40 mEq total) by mouth 3 (three) times daily. 360 capsule 3    sodium chloride 0.9% 0.9 % SolP 100 mL with treprostinil 1 mg/mL Soln 6,000,000 ng Inject 2,580 ng/min into the vein continuous.      spironolactone (ALDACTONE) 25 MG tablet Take 1 tablet (25 mg total) by mouth once daily. 30 tablet 11    vancomycin (VANCOCIN) 1 gram/200 mL PgBk 1000 mg in dextrose 5 % 200 mL IVPB Inject 1 g into the vein every 12 (twelve) hours.      warfarin (COUMADIN) 2.5 MG tablet Take 1 tablet (2.5 mg total) by mouth Daily. 30 tablet 11    duloxetine (CYMBALTA) 60 MG capsule Take 1 capsule (60 mg total) by mouth 2 (two) times daily. 180 capsule 1     Inpatient Medications:   bumetanide  2 mg Oral BID    gabapentin  300 mg Oral TID    macitentan  10 mg Oral Daily    magnesium oxide  400 mg Oral BID    pantoprazole  40 mg Oral Daily    polyethylene glycol  17 g Oral BID    potassium chloride  20 mEq Oral Daily    sodium chloride 0.9%  10 mL Intravenous Q6H    sodium chloride 0.9%  3 mL Intravenous Q8H    vancomycin (VANCOCIN) IVPB  1,250 mg Intravenous Q24H    warfarin  2.5 mg Oral Daily     Infusions:   treprostinil (REMODULIN) infusion 28 ng/kg/min (11/04/17 1705)     PRN Medications:acetaminophen, HYDROmorphone, ondansetron, promethazine (PHENERGAN) IVPB, Flushing PICC Protocol **AND** sodium chloride 0.9% **AND** sodium chloride  "0.9%, sodium chloride 0.9%    ALLERGIES:    Review of patient's allergies indicates:   Allergen Reactions    Chlorhexidine Itching and Rash     Patient had raised rash on neck following RHC procedure. She states she's never had a problem with the "prep" used until this time.     Adhesive Rash       PAST MEDICAL HISTORY:    Past Medical History:   Diagnosis Date    Arrhythmia     Arthritis     Cardiac pacemaker in situ     Carpal tunnel syndrome, right     Cervical spondylosis     Cervicalgia     CHF (congestive heart failure)     Diverticulitis     Heart block AV third degree     Hyperlipidemia     ALFREDO on CPAP     Pulmonary hypertension     Subdeltoid bursitis        SURGICAL HISTORY:  Past Surgical History:   Procedure Laterality Date    CARDIAC CATHETERIZATION      CARDIAC PACEMAKER PLACEMENT  7/29/13    [employer] DC PM    CARDIAC SURGERY      COLONOSCOPY N/A 9/28/2015    Procedure: COLONOSCOPY;  Surgeon: Jason Diaz MD;  Location: [facility] ([address] [place] [address]);  Service: Endoscopy;  Laterality: N/A;  Please schedule in 2-3 months with Dr Diaz  Pulmonary HTN, 2nd floor (off remodulin infusion as of "a few days" before 9/9/15)    3 day hold Coumadin, [facility]  PT/INR scheduled before procedure    ECTOPIC PREGNANCY SURGERY Left     HYSTERECTOMY      partial    knee arthroscopy         FAMILY HISTORY:  Family History   Problem Relation Age of Onset    Arthritis Mother     Diabetes Mother     Hyperlipidemia Mother     Arthritis Father     Hyperlipidemia Father        SOCIAL HISTORY:  Social History   Substance Use Topics    Smoking status: Never Smoker    Smokeless tobacco: Never Used    Alcohol use No      Comment: rarely        REVIEW OF SYSTEMS:  A 10-point review of systems is negative except for the above mentioned in the HPI.    OBJECTIVE:     Most Recent Vitals:  Temp: 98.4 °F (36.9 °C) (11/04/17 2007)  Pulse: 107 (11/04/17 2023)  Resp: 20 (11/04/17 2007)  BP: 97/71 " (11/04/17 2007)  SpO2: 97 % (11/04/17 2007)    24-Hour Vitals:  Temp:  [98.2 °F (36.8 °C)-99.2 °F (37.3 °C)]   Pulse:  []   Resp:  [18-20]   BP: ()/(53-71)   SpO2:  [93 %-97 %]     24-Hour I&O:  Intake/Output Summary (Last 24 hours) at 11/04/17 2353  Last data filed at 11/04/17 2330   Gross per 24 hour   Intake             1320 ml   Output             3425 ml   Net            -2105 ml       PHYSICAL EXAM:  AAO, NAD, well developed and well nourished.  Head normocephalic, atraumatic.  Trachea midline, neck supple.  Respirations unlabored with good inspiratory effort.  Heart regular rate and rhythm.  Abdomen soft, nondistended, nontender to palpation.  Left IJ tunneled Cardoso catheter in place, no outward obvious signs of infection, purulence, erythema or swelling, with only some mild skin irritation immediately surrounding catheter region related to irritation form adhesive dressings to cover catheter site.       LABORATORY VALUES:    Recent Labs  Lab 11/02/17 0455 11/03/17  0538 11/04/17  0624   WBC 6.55 5.89 7.00   HGB 11.0* 11.1* 11.2*   HCT 33.8* 35.5* 35.5*    411* 426*     Recent Labs  Lab 11/02/17 0455 11/03/17  0538 11/04/17  0624   * 136 136   K 2.6* 3.4* 3.7   CL 90* 96 96   CO2 30* 29 29   BUN 12 10 12   CREATININE 0.8 0.8 0.8   * 101 104   CALCIUM 9.0 9.2 9.0   AST 13 11 13   ALT 11 7* 8*   ALKPHOS 95 94 102   BILITOT 0.4 0.3 0.5   PROT 7.3 6.9 6.7   ALBUMIN 2.7* 2.7* 2.6*     Recent Labs  Lab 11/02/17 0455 11/03/17  0538 11/04/17  0624   INR 2.4* 1.9* 1.3*   No results for input(s): PH, PCO2, PO2, HCO3 in the last 72 hours.    DIAGNOSTIC STUDIES:  Catheter tip culture pending    ASSESSMENT:     Emily Cook is a 57 y.o. female admitted to [facility] on 11/1/2017 for recurrent MRSA bacteremia in setting of tunneled Cardoso catheter for home Remodulin infusions.      PLAN:  · Left IJ tunneled Cardoso catheter removed at bedside without complication.  · Catheter tip  sent for culture.  · Continued IV antibiotics per ID.  · Would consider extending length of planned treatment duration with IV Vancomycin for up to 6 weeks despite negative AMIE for vegetations, given the early onset and close suspicious timeline of recurrent catheter infection growing identical MRSA+ bacteremia / same sensitivities.       Thank you, and please call back if/when General Surgery can be of any further assistance in the future care of this patient.      Sharon Hawkins MD  General Surgery, PGY-5  Pager: [phone]  Cell: [phone]

## 2017-11-05 NOTE — SUBJECTIVE & OBJECTIVE
Interval History: Afebrile. No leukocytosis. Catheter removed overnight. No new complaints today. New PICC line has been placed.     Review of Systems   Constitutional: Positive for chills. Negative for diaphoresis, fatigue and fever.   Respiratory: Positive for shortness of breath (stable). Negative for cough, wheezing and stridor.    Cardiovascular: Negative for chest pain and palpitations.   Gastrointestinal: Negative for abdominal pain, constipation, diarrhea, nausea and vomiting.   Genitourinary: Negative for difficulty urinating, dyspareunia, dysuria and urgency.   Musculoskeletal: Positive for arthralgias. Negative for back pain and myalgias.   Neurological: Negative for dizziness, weakness and headaches.   Psychiatric/Behavioral: Negative for agitation and confusion.     Objective:     Vital Signs (Most Recent):  Temp: 97.9 °F (36.6 °C) (11/05/17 1150)  Pulse: 81 (11/05/17 1150)  Resp: 18 (11/05/17 1150)  BP: (!) 95/59 (11/05/17 1150)  SpO2: 98 % (11/05/17 1150) Vital Signs (24h Range):  Temp:  [97.4 °F (36.3 °C)-98.7 °F (37.1 °C)] 97.9 °F (36.6 °C)  Pulse:  [] 81  Resp:  [18-20] 18  SpO2:  [90 %-98 %] 98 %  BP: ()/(56-71) 95/59     Weight: 69.7 kg (153 lb 10.6 oz)  Body mass index is 27.22 kg/m².    Estimated Creatinine Clearance: 58.1 mL/min (based on SCr of 1 mg/dL).    Physical Exam   Constitutional: She is oriented to person, place, and time. She appears well-developed and well-nourished. No distress.   HENT:   Head: Normocephalic and atraumatic.   Eyes: Conjunctivae are normal. No scleral icterus.   Cardiovascular: Normal rate and regular rhythm.    No murmur heard.  Pulmonary/Chest: Effort normal and breath sounds normal. No respiratory distress.   Abdominal: Soft. Bowel sounds are normal. She exhibits no distension. There is no tenderness.   Neurological: She is alert and oriented to person, place, and time.   Skin: Skin is warm and dry. No rash noted. She is not diaphoretic.   Left chest  catheter prior incision site c/d/i. Catheter now removed.   Psychiatric: She has a normal mood and affect. Her behavior is normal.   Vitals reviewed.      Significant Labs:   Blood Culture:     Recent Labs  Lab 09/23/17  0859 10/29/17  2305 10/29/17  2330 10/30/17  1022 11/03/17  1151   LABBLOO No growth after 5 days.  No growth after 5 days. Gram stain hilton bottle: Gram positive cocci in clusters resembling Staph  Results called to and read back by: Renetta (RN 4S) 10/30/2017  12:31   KLS2  Gram stain aer bottle: Gram positive cocci in clusters resembling Staph  METHICILLIN RESISTANT STAPHYLOCOCCUS AUREUSphoned [facility]/4S with MRSA  11/01/2017  06:37 Gram stain hilton bottle: Gram positive cocci in clusters resembling Staph   Positive results previously called refer to culture #[id number]  Gram stain aer bottle: Gram positive cocci in clusters resembling Staph   Positive results previously called  METHICILLIN RESISTANT STAPHYLOCOCCUS AUREUSRefer to culture #[id number] No growth after 5 days. No Growth to date  No Growth to date     CBC:     Recent Labs  Lab 11/04/17  0624 11/05/17  0357   WBC 7.00 7.91   HGB 11.2* 11.0*   HCT 35.5* 34.2*   * 385*     CMP:     Recent Labs  Lab 11/04/17  0624 11/05/17  0357    138   K 3.7 3.1*   CL 96 96   CO2 29 31*    103   BUN 12 17   CREATININE 0.8 1.0   CALCIUM 9.0 9.1   PROT 6.7 7.2   ALBUMIN 2.6* 2.7*   BILITOT 0.5 0.4   ALKPHOS 102 105   AST 13 11   ALT 8* 8*   ANIONGAP 11 11   EGFRNONAA >60.0 >60.0     Lactic Acid: No results for input(s): LACTATE in the last 48 hours.  Procalcitonin: No results for input(s): PROCAL in the last 48 hours.  Urine Culture:     Recent Labs  Lab 06/09/17  0743 09/23/17  0938 10/29/17  2253   LABURIN ESCHERICHIA COLI>100,000 cfu/ml PSEUDOMONAS STSIOPZYWD97,000 - 99,999 cfu/ml No growth     Urine Studies:     Recent Labs  Lab 09/22/17  1524 09/23/17  0936 10/29/17  2253   COLORU Yellow Yellow Yellow   APPEARANCEUA Hazy* Hazy*  Clear   PHUR 6.0 5.0 6.0   SPECGRAV 1.010 1.010 1.020   PROTEINUA 2+* Negative Negative   GLUCUA Negative Negative Negative   KETONESU Negative Negative Negative   BILIRUBINUA Negative Negative Negative   OCCULTUA 1+* 1+* Trace*   NITRITE Negative Negative Negative   UROBILINOGEN Negative Negative 0.2   LEUKOCYTESUR Trace* Negative Negative   RBCUA 4 1  --    WBCUA 6* 4  --    BACTERIA Occasional  --   --    SQUAMEPITHEL 6 1  --    HYALINECASTS 4*  --   --      Wound Culture: No results for input(s): LABAERO in the last 4320 hours.    Significant Imaging: I have reviewed all pertinent imaging results/findings within the past 24 hours.

## 2017-11-05 NOTE — PROGRESS NOTES
Spoke with Dr. Cazares concerning Pt's picc line. Dr. Cazares okayed use of double lumen picc for remodulin as long as second port is clearly labeled DO NOT USE. Will transfer Remdoulin to PICC line. Dr. Cazares Okay'd to use PIV for IV antibiotics after drawing back 10 ccs. When picc team is consulted for midline tomorrow will see about the possibility of changing double lumen picc to single lumen.

## 2017-11-05 NOTE — SUBJECTIVE & OBJECTIVE
"Interval History:     Seen at bedside this morning. Report no overnight issue. Dual lumen picc placed yesterday and Cardoso removed. remodulin infusing through the PICC. Getting vancomycin through peripheral. She has one week course remaining per ID before another Cardoso can be attempted. She need a midline or single port PICC to the left for IV abx. Patient agreeable but service not present today. Call in for midline tomorrow before d/c. Vancomycin at 1250mg Q24.     Continuous Infusions:   treprostinil (REMODULIN) infusion 28 ng/kg/min (11/04/17 1705)     Scheduled Meds:   bumetanide  2 mg Oral BID    gabapentin  300 mg Oral TID    macitentan  10 mg Oral Daily    magnesium oxide  400 mg Oral BID    pantoprazole  40 mg Oral Daily    polyethylene glycol  17 g Oral BID    potassium chloride  20 mEq Oral Daily    potassium chloride  60 mEq Oral Once    sodium chloride 0.9%  10 mL Intravenous Q6H    sodium chloride 0.9%  3 mL Intravenous Q8H    vancomycin (VANCOCIN) IVPB  1,250 mg Intravenous Q24H    warfarin  2.5 mg Oral Daily     PRN Meds:acetaminophen, HYDROmorphone, ondansetron, promethazine (PHENERGAN) IVPB, Flushing PICC Protocol **AND** sodium chloride 0.9% **AND** sodium chloride 0.9%, sodium chloride 0.9%    Review of patient's allergies indicates:   Allergen Reactions    Chlorhexidine Itching and Rash     Patient had raised rash on neck following RHC procedure. She states she's never had a problem with the "prep" used until this time.     Adhesive Rash     Objective:     Vital Signs (Most Recent):  Temp: 97.9 °F (36.6 °C) (11/05/17 1150)  Pulse: 81 (11/05/17 1150)  Resp: 18 (11/05/17 1150)  BP: (!) 95/59 (11/05/17 1150)  SpO2: 98 % (11/05/17 1150) Vital Signs (24h Range):  Temp:  [97.4 °F (36.3 °C)-98.7 °F (37.1 °C)] 97.9 °F (36.6 °C)  Pulse:  [] 81  Resp:  [18-20] 18  SpO2:  [90 %-98 %] 98 %  BP: ()/(56-71) 95/59     Patient Vitals for the past 72 hrs (Last 3 readings):   Weight "   11/05/17 0600 69.7 kg (153 lb 10.6 oz)   11/04/17 0600 69 kg (152 lb 1.9 oz)   11/03/17 0454 71.2 kg (156 lb 15.5 oz)     Body mass index is 27.22 kg/m².      Intake/Output Summary (Last 24 hours) at 11/05/17 1457  Last data filed at 11/05/17 1400   Gross per 24 hour   Intake             1520 ml   Output             2975 ml   Net            -1455 ml       Hemodynamic Parameters:       Telemetry:     Physical Exam   Constitutional: She is oriented to person, place, and time. She appears well-developed.   HENT:   Head: Normocephalic and atraumatic.   Eyes: Pupils are equal, round, and reactive to light.   Neck: Normal range of motion.   Cardiovascular: Normal rate.    Pulmonary/Chest: Effort normal. She has rales (bibasilar crackles).   Abdominal: Soft. Bowel sounds are normal.   Musculoskeletal: Normal range of motion.   Neurological: She is alert and oriented to person, place, and time.   Skin: Skin is warm and dry. Capillary refill takes 2 to 3 seconds.   Nursing note and vitals reviewed.      Significant Labs:  CBC:    Recent Labs  Lab 11/03/17 0538 11/04/17 0624 11/05/17 0357   WBC 5.89 7.00 7.91   RBC 4.79 4.81 4.58   HGB 11.1* 11.2* 11.0*   HCT 35.5* 35.5* 34.2*   * 426* 385*   MCV 74* 74* 75*   MCH 23.2* 23.3* 24.0*   MCHC 31.3* 31.5* 32.2     BNP:  No results for input(s): BNP in the last 168 hours.    Invalid input(s): BNPTRIAGELBLO  CMP:    Recent Labs  Lab 11/03/17 0538 11/04/17 0624 11/05/17 0357    104 103   CALCIUM 9.2 9.0 9.1   ALBUMIN 2.7* 2.6* 2.7*   PROT 6.9 6.7 7.2    136 138   K 3.4* 3.7 3.1*   CO2 29 29 31*   CL 96 96 96   BUN 10 12 17   CREATININE 0.8 0.8 1.0   ALKPHOS 94 102 105   ALT 7* 8* 8*   AST 11 13 11   BILITOT 0.3 0.5 0.4      Coagulation:     Recent Labs  Lab 11/03/17  0538 11/04/17  0624 11/05/17  0357   INR 1.9* 1.3* 1.1     LDH:  No results for input(s): LDH in the last 72 hours.  Microbiology:  Microbiology Results (last 7 days)     Procedure Component  Value Units Date/Time    IV catheter culture [[id number]] Collected:  11/04/17 2354    Order Status:  No result Specimen:  Catheter Tip from Chest, Left Updated:  11/05/17 0117    Aerobic culture [[id number]] Collected:  11/04/17 2354    Order Status:  Canceled Specimen:  Catheter Tip from Chest, Left Updated:  11/05/17 0017    Blood culture [[id number]] Collected:  11/03/17 1151    Order Status:  Completed Specimen:  Blood Updated:  11/04/17 2312     Blood Culture, Routine No Growth to date     Blood Culture, Routine No Growth to date          Access- R arm PICC and L- peripheral    I have reviewed all pertinent labs within the past 24 hours.      Estimated Creatinine Clearance: 58.1 mL/min (based on SCr of 1 mg/dL).    Diagnostic Results:

## 2017-11-05 NOTE — PROGRESS NOTES
Per nursing handoff report, pt was supposed to have old Cardoso removed tonight by General Surgery (pt is on Heart Transplant Service). Consult to General Surgery for Cardoso removal was entered on 11/1/17. Paged General Surgery; MD on call was not aware of the need to see the pt tonight. He suggested paging HTS, but they are not supposed to be the service removing the Cardoso. Informed pt it does not appear that the line will be removed tonight. Will continue to monitor.    Addendum 2340: Dr. Hawkins at bedside to remove left subclavian Cardoso at this time.

## 2017-11-05 NOTE — ASSESSMENT & PLAN NOTE
- from brush cath infection. Continue vancomycin per ID (1250 Q24) via peripheral  - PICC in place, to use it for remodulin ONLY  -  Midline/single lumen picc tomorrow for abx for the next six days  - repeat blood cultures pending  -  Continue trough monitoring.

## 2017-11-05 NOTE — PROGRESS NOTES
"RN called to room. Pt's piv bleeding where remodulin is running to. Remodulin moved to secondary PIV.  Old PIV site labeled "do not flush".    "

## 2017-11-05 NOTE — PLAN OF CARE
Problem: Infection, Risk/Actual (Adult)  Goal: Infection Prevention/Resolution  Patient will demonstrate the desired outcomes by discharge/transition of care.   Outcome: Ongoing (interventions implemented as appropriate)  Pt being treated for infection. Pt's cardoso was infected. Cardoso removed last night. Pt receiving IV antibiotics.     Problem: Patient Care Overview  Goal: Plan of Care Review  Outcome: Ongoing (interventions implemented as appropriate)  Pt AAO x 4. Pt independent. Pt receiving remoduilin to PIV. This morning the PIV that the patient was receiving remodulin to fell out. Remodulin moved to second PIV site. New PIV placed in L FA 22 gauge. Potassium replaced orally today. Midline to be placed tomorrow. Pt receiving IV vancomycin every 24 hours. Trough due before tomorrows dose.   Urine output 900 thus far this shift.   Pt on 5 L NC. O2 sat > 92%.   Pt sinus rhythm on tele. Pt free of falls. Pt wearing nonslip socks when out of bed.     Problem: Fall Risk (Adult)  Goal: Absence of Falls  Patient will demonstrate the desired outcomes by discharge/transition of care.   Outcome: Ongoing (interventions implemented as appropriate)  Pt free of falls. Pt wearing nonslip socks when out of bed. Bed in lowest position. Pt to call for help if needed when getting out of bed.

## 2017-11-05 NOTE — PROGRESS NOTES
[facility]  Infectious Disease  Progress Note    Patient Name: Emily Cook  MRN: [id number]  Admission Date: 11/1/2017  Length of Stay: 4 days  Attending Physician: Kaylee Cazares MD  Primary Care Provider: Kaylee Cazares MD    Isolation Status: Contact  Assessment/Plan:      Staphylococcus aureus bacteremia    57 year-old female with history of pulmonary HTN on home Remodulin recently treated for MRSA bacteremia secondary to an infected brush catheter in September with Vanc x 2 weeks now admitted with fevers, right arm pain and numbness.    Blood cx 10/29 are positive for MRSA (4/4 bottles). Repeat blood cx 10/30 and 11/3 are NGTD. TTE and AMIE negative for vegetations. Patient is currently on Vancomycin. Afebrile over last 24 hours. No leukocytosis. VSS. Brush catheter has been removed and tip sent for culture.     Plan  - Continue Vancomycin 1250 mg IV q 24 hours. Will need vanc trough prior to 3rd dose (ordered for tomorrow).  - Anticipate antibiotic treatment for at least 2 weeks from day of line removal.   - Would hold off on placing new tunneled catheter until patient has completed two weeks of antibiotics.  - ID will follow.            Please call for any questions. Thank you.  Mehnaz Hanson PA-C  Phone: [phone]  Pager: [phone]    Subjective:     Principal Problem:MRSA bacteremia    HPI: Emily Cook is a 57 year-old female with history of pulmonary HTN on home Veletri recently admitted to [facility] with MRSA bacteremia secondary to an infected brush catheter (placed 6/2017). Her brush catheter was removed on 9/5. Catheter tip cultures also grew MRSA. TTE was negative for vegetations. Blood cultures cleared 9/3. A new brush catheter was placed on 9/8 for Veletri along with a PICC line and patient was discharged home with a plan to complete 2 weeks of IV Vancomycin. At follow up patient was doing well. No systemic signs of infection. She completed 2 weeks of IV Vancomycin  without difficulty.     Patient now presents with fevers up to 101.4, right arm pain and numbness. Blood cx 10/29 are positive for MRSA (4/4 bottles). Repeat blood cx 10/30 are NGTD. TTE and AMIE negative for vegetations. Patient is on Vancomycin.  Interval History: Afebrile. No leukocytosis. Catheter removed overnight. No new complaints today. New PICC line has been placed.     Review of Systems   Constitutional: Positive for chills. Negative for diaphoresis, fatigue and fever.   Respiratory: Positive for shortness of breath (stable). Negative for cough, wheezing and stridor.    Cardiovascular: Negative for chest pain and palpitations.   Gastrointestinal: Negative for abdominal pain, constipation, diarrhea, nausea and vomiting.   Genitourinary: Negative for difficulty urinating, dyspareunia, dysuria and urgency.   Musculoskeletal: Positive for arthralgias. Negative for back pain and myalgias.   Neurological: Negative for dizziness, weakness and headaches.   Psychiatric/Behavioral: Negative for agitation and confusion.     Objective:     Vital Signs (Most Recent):  Temp: 97.9 °F (36.6 °C) (11/05/17 1150)  Pulse: 81 (11/05/17 1150)  Resp: 18 (11/05/17 1150)  BP: (!) 95/59 (11/05/17 1150)  SpO2: 98 % (11/05/17 1150) Vital Signs (24h Range):  Temp:  [97.4 °F (36.3 °C)-98.7 °F (37.1 °C)] 97.9 °F (36.6 °C)  Pulse:  [] 81  Resp:  [18-20] 18  SpO2:  [90 %-98 %] 98 %  BP: ()/(56-71) 95/59     Weight: 69.7 kg (153 lb 10.6 oz)  Body mass index is 27.22 kg/m².    Estimated Creatinine Clearance: 58.1 mL/min (based on SCr of 1 mg/dL).    Physical Exam   Constitutional: She is oriented to person, place, and time. She appears well-developed and well-nourished. No distress.   HENT:   Head: Normocephalic and atraumatic.   Eyes: Conjunctivae are normal. No scleral icterus.   Cardiovascular: Normal rate and regular rhythm.    No murmur heard.  Pulmonary/Chest: Effort normal and breath sounds normal. No respiratory distress.    Abdominal: Soft. Bowel sounds are normal. She exhibits no distension. There is no tenderness.   Neurological: She is alert and oriented to person, place, and time.   Skin: Skin is warm and dry. No rash noted. She is not diaphoretic.   Left chest catheter prior incision site c/d/i. Catheter now removed.   Psychiatric: She has a normal mood and affect. Her behavior is normal.   Vitals reviewed.      Significant Labs:   Blood Culture:     Recent Labs  Lab 09/23/17  0859 10/29/17  2305 10/29/17  2330 10/30/17  1022 11/03/17  1151   LABBLOO No growth after 5 days.  No growth after 5 days. Gram stain hilton bottle: Gram positive cocci in clusters resembling Staph  Results called to and read back by: Renetta (RN 4S) 10/30/2017  12:31   KLS2  Gram stain aer bottle: Gram positive cocci in clusters resembling Staph  METHICILLIN RESISTANT STAPHYLOCOCCUS AUREUSphoned [facility]/4S with MRSA  11/01/2017  06:37 Gram stain hilton bottle: Gram positive cocci in clusters resembling Staph   Positive results previously called refer to culture #[id number]  Gram stain aer bottle: Gram positive cocci in clusters resembling Staph   Positive results previously called  METHICILLIN RESISTANT STAPHYLOCOCCUS AUREUSRefer to culture #[id number] No growth after 5 days. No Growth to date  No Growth to date     CBC:     Recent Labs  Lab 11/04/17  0624 11/05/17  0357   WBC 7.00 7.91   HGB 11.2* 11.0*   HCT 35.5* 34.2*   * 385*     CMP:     Recent Labs  Lab 11/04/17  0624 11/05/17  0357    138   K 3.7 3.1*   CL 96 96   CO2 29 31*    103   BUN 12 17   CREATININE 0.8 1.0   CALCIUM 9.0 9.1   PROT 6.7 7.2   ALBUMIN 2.6* 2.7*   BILITOT 0.5 0.4   ALKPHOS 102 105   AST 13 11   ALT 8* 8*   ANIONGAP 11 11   EGFRNONAA >60.0 >60.0     Lactic Acid: No results for input(s): LACTATE in the last 48 hours.  Procalcitonin: No results for input(s): PROCAL in the last 48 hours.  Urine Culture:     Recent Labs  Lab 06/09/17  0743 09/23/17  0938  10/29/17  2253   LABURIN ESCHERICHIA COLI>100,000 cfu/ml PSEUDOMONAS [id number],000 - 99,999 cfu/ml No growth     Urine Studies:     Recent Labs  Lab 09/22/17  1524 09/23/17  0936 10/29/17  2253   COLORU Yellow Yellow Yellow   APPEARANCEUA Hazy* Hazy* Clear   PHUR 6.0 5.0 6.0   SPECGRAV 1.010 1.010 1.020   PROTEINUA 2+* Negative Negative   GLUCUA Negative Negative Negative   KETONESU Negative Negative Negative   BILIRUBINUA Negative Negative Negative   OCCULTUA 1+* 1+* Trace*   NITRITE Negative Negative Negative   UROBILINOGEN Negative Negative 0.2   LEUKOCYTESUR Trace* Negative Negative   RBCUA 4 1  --    WBCUA 6* 4  --    BACTERIA Occasional  --   --    SQUAMEPITHEL 6 1  --    HYALINECASTS 4*  --   --      Wound Culture: No results for input(s): LABAERO in the last 4320 hours.    Significant Imaging: I have reviewed all pertinent imaging results/findings within the past 24 hours.

## 2017-11-05 NOTE — PROGRESS NOTES
"[facility]  Heart Transplant  Progress Note    Patient Name: Emily Cook  MRN: [id number]  Admission Date: 11/1/2017  Hospital Length of Stay: 4 days  Attending Physician: Kaylee Cazares MD  Primary Care Provider: Kaylee Cazares MD  Principal Problem:MRSA bacteremia    Subjective:     Interval History:     Seen at bedside this morning. Report no overnight issue. Dual lumen picc placed yesterday and Cardoso removed. remodulin infusing through the PICC. Getting vancomycin through peripheral. She has one week course remaining per ID before another Cardoso can be attempted. She need a midline or single port PICC to the left for IV abx. Patient agreeable but service not present today. Call in PICC service for midline tomorrow before d/c. Vancomycin at 1250mg Q24.     Continuous Infusions:   treprostinil (REMODULIN) infusion 28 ng/kg/min (11/04/17 1705)     Scheduled Meds:   bumetanide  2 mg Oral BID    gabapentin  300 mg Oral TID    macitentan  10 mg Oral Daily    magnesium oxide  400 mg Oral BID    pantoprazole  40 mg Oral Daily    polyethylene glycol  17 g Oral BID    potassium chloride  20 mEq Oral Daily    potassium chloride  60 mEq Oral Once    sodium chloride 0.9%  10 mL Intravenous Q6H    sodium chloride 0.9%  3 mL Intravenous Q8H    vancomycin (VANCOCIN) IVPB  1,250 mg Intravenous Q24H    warfarin  2.5 mg Oral Daily     PRN Meds:acetaminophen, HYDROmorphone, ondansetron, promethazine (PHENERGAN) IVPB, Flushing PICC Protocol **AND** sodium chloride 0.9% **AND** sodium chloride 0.9%, sodium chloride 0.9%    Review of patient's allergies indicates:   Allergen Reactions    Chlorhexidine Itching and Rash     Patient had raised rash on neck following RHC procedure. She states she's never had a problem with the "prep" used until this time.     Adhesive Rash     Objective:     Vital Signs (Most Recent):  Temp: 97.9 °F (36.6 °C) (11/05/17 1150)  Pulse: 81 (11/05/17 1150)  Resp: " 18 (11/05/17 1150)  BP: (!) 95/59 (11/05/17 1150)  SpO2: 98 % (11/05/17 1150) Vital Signs (24h Range):  Temp:  [97.4 °F (36.3 °C)-98.7 °F (37.1 °C)] 97.9 °F (36.6 °C)  Pulse:  [] 81  Resp:  [18-20] 18  SpO2:  [90 %-98 %] 98 %  BP: ()/(56-71) 95/59     Patient Vitals for the past 72 hrs (Last 3 readings):   Weight   11/05/17 0600 69.7 kg (153 lb 10.6 oz)   11/04/17 0600 69 kg (152 lb 1.9 oz)   11/03/17 0454 71.2 kg (156 lb 15.5 oz)     Body mass index is 27.22 kg/m².      Intake/Output Summary (Last 24 hours) at 11/05/17 1457  Last data filed at 11/05/17 1400   Gross per 24 hour   Intake             1520 ml   Output             2975 ml   Net            -1455 ml       Hemodynamic Parameters:       Telemetry:     Physical Exam   Constitutional: She is oriented to person, place, and time. She appears well-developed.   HENT:   Head: Normocephalic and atraumatic.   Eyes: Pupils are equal, round, and reactive to light.   Neck: Normal range of motion.   Cardiovascular: Normal rate.    Pulmonary/Chest: Effort normal. She has rales (bibasilar crackles).   Abdominal: Soft. Bowel sounds are normal.   Musculoskeletal: Normal range of motion.   Neurological: She is alert and oriented to person, place, and time.   Skin: Skin is warm and dry. Capillary refill takes 2 to 3 seconds.   Nursing note and vitals reviewed.      Significant Labs:  CBC:    Recent Labs  Lab 11/03/17  0538 11/04/17  0624 11/05/17  0357   WBC 5.89 7.00 7.91   RBC 4.79 4.81 4.58   HGB 11.1* 11.2* 11.0*   HCT 35.5* 35.5* 34.2*   * 426* 385*   MCV 74* 74* 75*   MCH 23.2* 23.3* 24.0*   MCHC 31.3* 31.5* 32.2     BNP:  No results for input(s): BNP in the last 168 hours.    Invalid input(s): BNPTRIAGELBLO  CMP:    Recent Labs  Lab 11/03/17  0538 11/04/17  0624 11/05/17  0357    104 103   CALCIUM 9.2 9.0 9.1   ALBUMIN 2.7* 2.6* 2.7*   PROT 6.9 6.7 7.2    136 138   K 3.4* 3.7 3.1*   CO2 29 29 31*   CL 96 96 96   BUN 10 12 17   CREATININE  0.8 0.8 1.0   ALKPHOS 94 102 105   ALT 7* 8* 8*   AST 11 13 11   BILITOT 0.3 0.5 0.4      Coagulation:     Recent Labs  Lab 11/03/17  0538 11/04/17  0624 11/05/17  0357   INR 1.9* 1.3* 1.1     LDH:  No results for input(s): LDH in the last 72 hours.  Microbiology:  Microbiology Results (last 7 days)     Procedure Component Value Units Date/Time    IV catheter culture [[id number]] Collected:  11/04/17 2354    Order Status:  No result Specimen:  Catheter Tip from Chest, Left Updated:  11/05/17 0117    Aerobic culture [[id number]] Collected:  11/04/17 2354    Order Status:  Canceled Specimen:  Catheter Tip from Chest, Left Updated:  11/05/17 0017    Blood culture [[id number]] Collected:  11/03/17 1151    Order Status:  Completed Specimen:  Blood Updated:  11/04/17 2312     Blood Culture, Routine No Growth to date     Blood Culture, Routine No Growth to date          Access- R arm PICC and L- peripheral    I have reviewed all pertinent labs within the past 24 hours.      Estimated Creatinine Clearance: 58.1 mL/min (based on SCr of 1 mg/dL).    Diagnostic Results:        Assessment and Plan:     Emily Cook is a 56 yo female with a PMHx of medical non adherence, ALFREDO (non adherent to CPAP), PAH (group 1) on IV remodulin/warfarin/macitentan, chronic respiratory failure on 5L ATC of home oxygen, 3rd degree AV block s/p pacemaker implantation, diverticulitis and previous episode of bacteremia with home PICC line who is being transferred for MRSA bacteremia.     She originally presented on 10/29/17 to [facility] with complaint of right arm pain and fever. Blood cultures grew MRSA and she has been treated with vancomycin with subsequent blood cultures being negative. She had plan to remove her permacath at [facility] however her INR remained slightly supratherapeutic. She is transferred here for continuation of care. Remains afebrile with stable vitals. Upon review of systems she has minimal complaints and states  explicitly that she feels her LE edema is the best its been in a month. SOB/SANCHEZ stable on home 5 liters.     * MRSA bacteremia    - from cardoso cath infection. Continue vancomycin per ID (1250 Q24) via peripheral  - PICC in place, to use it for remodulin ONLY  -  Midline/single lumen picc tomorrow for abx for the next six days  - repeat blood cultures pending  -  Continue trough monitoring.            Right-sided heart failure    Continue treatment for her PAH as above  Continue Bumex 2mg BID  Strict I/Os and daily weights.         Acute on chronic respiratory failure with hypoxia    -Pulmonary toilet (incentive spirometry / flutter)   -5L O2 around the clock  -No increase in oxygen requirements.         Primary pulmonary hypertension    - Continue IV remodulin and macitentan as current   - R- PICC to be used for remodulin  - resume coumadin.   -Cardoso cath to be attempted after one week of abx and clear bcx        Sleep apnea- on CPAP    -Continue PTA CPAP             Dain Rodriguez MD  Heart Transplant  [facility]

## 2017-11-05 NOTE — PROGRESS NOTES
- Left subclavian Cardoso removed 11/4/17 9285 at bedside by Dr. Hawkins.  - Pt was given subcutaneous lidocaine injections by MD and 0.2 mg IV Dilaudid by RN prior to procedure.  - Cardoos removed under sterile technique. Pressure held to site; site dressed with gauze and paper tape (pt is allergic to Tegaderm). Pt tolerated well.  - Catheter tip collected for aerobic culture. Hand delivered to .

## 2017-11-06 NOTE — ASSESSMENT & PLAN NOTE
57 year-old female with history of pulmonary HTN on home Remodulin recently treated for MRSA bacteremia secondary to an infected cardoso catheter in September with Vanc x 2 weeks now admitted with fevers, right arm pain and numbness.    Blood cx 10/29 positive for MRSA (4/4 bottles). Repeat blood cx 10/30 and 11/3 are NGTD. TTE and AMIE negative for vegetations. Patient is currently on Vancomycin.  Afebrile since admission.   No leukocytosis. VSS. Cardoso catheter has been removed and tip sent for culture which has NGTD.       Plan  - Continue Vancomycin 1250 mg IV q 24 hours for now while inpatient.     Vancomycin trough pending for today.  Trough goal 15-20  - Would hold off on placing new tunneled catheter until patient has completed two weeks of antibiotics from date of line removal on 11/4.  Estimated end date 11/18/17.  -  In order to avoid need for two central PICC lines at discharge in order to accommodate remodulin and IV abx, and given that source has been removed, may consider oral linezolid 600 mg orally twice a day to complete therapy for MRSA bacteremia.  Case Management to determine if linezolid is covered.  Will need to monitor INR (patient on coumadin) if transitioned to linezolid.   - Will follow up tomorrow with final recommendations.     Patient seen by, and plan discussed with, ID staff  Discussed with Primary Team

## 2017-11-06 NOTE — PLAN OF CARE
[facility]   Heart Transplant Clinic  [address] [place] [address]   [phone] after hours        HOME  HEALTH ORDERS      Admit to Home Health    Diagnosis:   Patient Active Problem List   Diagnosis    Chronic pulmonary heart disease    Long term current use of anticoagulant therapy    Heart block AV third degree    Cardiac pacemaker in situ    Cervical spondylosis    Cervicalgia    Chronic neck pain    Subdeltoid bursitis    Diverticulitis    Sleep apnea- on CPAP    Diverticulitis large intestine    Carpal tunnel syndrome, right (mild)    Primary pulmonary hypertension    Acute on chronic respiratory failure with hypoxia    Pneumonia of right upper lobe due to infectious organism    Acute respiratory failure with hypoxemia    Hypokalemia    Coumadin toxicity    Tricuspid regurgitation    MRSA (methicillin resistant staph aureus) culture positive    Respiratory failure with hypoxia    Right-sided heart failure    Staphylococcus aureus bacteremia    Central line-associated bloodstream infection    Hypoxia    Acute decompensated heart failure    Palliative care encounter    MRSA bacteremia    HLD (hyperlipidemia)    V tach    Fever without sepsis    Bacteremia       Diet: cardiac diet    Acitivities: as tolerated    Nursing:   SN to complete comprehensive assessment including routine vital signs. Instruct on disease process and s/s of complications to report to MD. Review/verify medication list sent home with the patient at time of discharge  and instruct patient/caregiver as needed. Frequency may be adjusted depending on start of care date.    Notify MD if SBP > 160 or < 90; DBP > 90 or < 50; HR > 120 or < 50; Temp > 101; Weight gain >3lbs in 1 day or 5lbs in 1 week.    LABS:  SN to perform labs: PT/INR per Coumadin clinic [phone].     1.  PT/INR  Follow up INR date: 11/10/2017  No Finger Sticks    2.  K and Mg   Next  lab draw: 11/15/2017 then every 2 weeks thereafter    HOME INFUSION THERAPY:   SN to perform Central Line Care.  Review Central Line Care & Central Line Flush with patient.    **For questions or concerns, please call [phone] and ask for Pre-Heart transplant clinic, M-F 8-5. After hours, weekends, call [phone] and ask for the Heart Transplant Cardiologist on call.**    PICC line care:  Scrub the Hub: Prior to accessing the line, always perform a 30 second alcohol scrub  Each lumen of the central line is to be flushed at least daily with 10 mL Normal Saline and 3 mL Heparin flush (100 units/mL)  Skilled Nurse (SN) may draw blood from IV access  Blood Draw Procedure:   - Aspirate at least 5 mL of blood   - Discard   - Obtain specimen   - Change posiflow cap   - Flush with 20 mL Normal Saline followed by a                 3-5 mL Heparin flush (100 units/mL)    PICC :   - Sterile dressing changes are done weekly and as needed.   - Use chlor-hexadine scrub to cleanse site, apply Biopatch to insertion site,       apply securement device dressing   - Posi-flow caps are changed weekly and after EVERY lab draw.   - If sterile gauze is under dressing to control oozing,                 dressing change must be performed every 24 hours until gauze is not needed.      CONSULTS:   Physical Therapy to evaluate and treat. Evaluate for home safety and equipment needs; Establish/upgrade home exercise program. Perform / instruct on therapeutic exercises, gait training, transfer training, and Range of Motion.    Occupational Therapy to evaluate and treat. Evaluate home environment for safety and equipment needs. Perform/Instruct on transfers, ADL training, ROM, and therapeutic exercises.    Send initial Home Health orders to HTS attending physician on call.   Send follow up questions to [phone] or fax:                        Heart Failure:      [phone]

## 2017-11-06 NOTE — SUBJECTIVE & OBJECTIVE
Interval History:  No acute events overnight.  Afebrile. No leukocytosis. Reports feeling well.       Review of Systems   Constitutional: Negative for chills, diaphoresis, fatigue and fever.   Respiratory: Positive for shortness of breath (stable). Negative for cough, wheezing and stridor.    Cardiovascular: Negative for chest pain and palpitations.   Gastrointestinal: Negative for abdominal pain, constipation, diarrhea, nausea and vomiting.   Genitourinary: Negative for difficulty urinating, dyspareunia, dysuria and urgency.   Musculoskeletal: Positive for arthralgias. Negative for back pain and myalgias.   Neurological: Negative for dizziness, weakness and headaches.   Psychiatric/Behavioral: Negative for agitation and confusion.     Objective:     Vital Signs (Most Recent):  Temp: 98 °F (36.7 °C) (11/06/17 0845)  Pulse: 89 (11/06/17 0845)  Resp: 18 (11/06/17 0845)  BP: 124/74 (11/06/17 0845)  SpO2: 96 % (11/06/17 0845) Vital Signs (24h Range):  Temp:  [97.9 °F (36.6 °C)-99.2 °F (37.3 °C)] 98 °F (36.7 °C)  Pulse:  [] 89  Resp:  [18-20] 18  SpO2:  [90 %-98 %] 96 %  BP: ()/(54-74) 124/74     Weight: 65.9 kg (145 lb 4.5 oz)  Body mass index is 25.74 kg/m².    Estimated Creatinine Clearance: 62.9 mL/min (based on SCr of 0.9 mg/dL).    Physical Exam   Constitutional: She is oriented to person, place, and time. She appears well-developed and well-nourished. No distress.   HENT:   Head: Normocephalic and atraumatic.   Eyes: Conjunctivae are normal. No scleral icterus.   Cardiovascular: Normal rate and regular rhythm.    No murmur heard.  Pulmonary/Chest: Effort normal and breath sounds normal. No respiratory distress.   Abdominal: Soft. Bowel sounds are normal. She exhibits no distension. There is no tenderness.   Neurological: She is alert and oriented to person, place, and time.   Skin: Skin is warm and dry. No rash noted. She is not diaphoretic.   Left chest catheter prior incision site c/d/i. Catheter  now removed.   Psychiatric: She has a normal mood and affect. Her behavior is normal.   Vitals reviewed.      Significant Labs:   Blood Culture:   Recent Labs  Lab 09/23/17  0859 10/29/17  2305 10/29/17  2330 10/30/17  1022 11/03/17  1151   LABBLOO No growth after 5 days.  No growth after 5 days. Gram stain hilton bottle: Gram positive cocci in clusters resembling Staph  Results called to and read back by: Renetta (RN 4S) 10/30/2017  12:31   KLS2  Gram stain aer bottle: Gram positive cocci in clusters resembling Staph  METHICILLIN RESISTANT STAPHYLOCOCCUS AUREUSphoned [facility]/4S with MRSA  11/01/2017  06:37 Gram stain hilton bottle: Gram positive cocci in clusters resembling Staph   Positive results previously called refer to culture #[id number]  Gram stain aer bottle: Gram positive cocci in clusters resembling Staph   Positive results previously called  METHICILLIN RESISTANT STAPHYLOCOCCUS AUREUSRefer to culture #[id number] No growth after 5 days. No Growth to date  No Growth to date  No Growth to date     CBC:   Recent Labs  Lab 11/05/17  0357 11/06/17  0406   WBC 7.91 8.34   HGB 11.0* 10.6*   HCT 34.2* 32.6*   * 381*     CMP:   Recent Labs  Lab 11/05/17  0357 11/06/17  0406    136   K 3.1* 3.3*   CL 96 98   CO2 31* 30*    119*   BUN 17 19   CREATININE 1.0 0.9   CALCIUM 9.1 8.9   PROT 7.2 7.1   ALBUMIN 2.7* 2.7*   BILITOT 0.4 0.4   ALKPHOS 105 102   AST 11 10   ALT 8* 7*   ANIONGAP 11 8   EGFRNONAA >60.0 >60.0       Significant Imaging: I have reviewed all pertinent imaging results/findings within the past 24 hours.

## 2017-11-06 NOTE — PLAN OF CARE
Problem: Patient Care Overview  Goal: Plan of Care Review  Outcome: Ongoing (interventions implemented as appropriate)  - Remodulin infusion: 28 ng/kg/min; dosing weight 86 kg; CADD rate: 58 mL/24 hr.  - Pt's Remodulin is currently infusing to a peripheral IV; HTS team aware. Plan is for pt to have a single lumen PICC line placed today and have Remodulin moved over to that. Pt will also have a midline placed for home IV antibiotic administration. Pt cannot have another Cardoso placed for at least 2 weeks.  - Pt receiving vancomycin q24h. Pt is due for a vanc trough today.  - Pt is on 5 L humidified nasal cannula. Will continue to monitor.

## 2017-11-06 NOTE — CONSULTS
Spoke to RN about line placement. Please re consult once team is sure of what type of line is necessary.

## 2017-11-06 NOTE — PROGRESS NOTES
Heart Failure Progress Note  Attending Physician: Kaylee Cazares MD  Hospital Day: 6    Subjective:   Interval History: No overnight events.  Pt feels well.  Afebrile since 11/3 when cardoso removed and pt's bcx redrawn.      Overall plan: Discuss line issue with ID-pt to go home with PICCx2 vs. PICC + Cardoso vs. PICC + midline in 1 week so can complete 2 week course of abx (can only use midline for 7 days with Vancomycin)    Medications:   Continuous Infusions:   treprostinil (REMODULIN) infusion 28 ng/kg/min (11/05/17 1323)       Scheduled Meds:   bumetanide  2 mg Oral BID    gabapentin  300 mg Oral TID    macitentan  10 mg Oral Daily    magnesium oxide  400 mg Oral BID    pantoprazole  40 mg Oral Daily    polyethylene glycol  17 g Oral BID    potassium chloride  20 mEq Oral Daily    potassium chloride  60 mEq Oral Once    sodium chloride 0.9%  10 mL Intravenous Q6H    sodium chloride 0.9%  3 mL Intravenous Q8H    vancomycin (VANCOCIN) IVPB  1,250 mg Intravenous Q24H    warfarin  2.5 mg Oral Daily     PRN Meds:acetaminophen, HYDROmorphone, ondansetron, promethazine (PHENERGAN) IVPB, Flushing PICC Protocol **AND** sodium chloride 0.9% **AND** sodium chloride 0.9%, sodium chloride 0.9%    ----------------------------------------------------------------------------------------------------------------------  Home medications:   No current facility-administered medications on file prior to encounter.      Current Outpatient Prescriptions on File Prior to Encounter   Medication Sig Dispense Refill    bumetanide (BUMEX) 2 MG tablet Take 1 tablet (2 mg total) by mouth 2 (two) times daily. 60 tablet 11    clobetasol (TEMOVATE) 0.05 % external solution   2    gabapentin (NEURONTIN) 300 MG capsule Take 1 capsule (300 mg total) by mouth As instructed. Take one capsule qam, 1 qpm, 2 qhs. 360 capsule 2    hydrocodone-acetaminophen 10-325mg (NORCO)  mg Tab Take 1 tablet by mouth 3 (three) times  "daily as needed. 90 tablet 0    hydrOXYzine (VISTARIL) 50 MG Cap 50 mg daily as needed.       macitentan (OPSUMIT) 10 mg Tab Take 1 tablet (10 mg total) by mouth once daily. 30 tablet 11    magnesium oxide (MAG-OX) 400 mg tablet Take 1 tablet (400 mg total) by mouth 2 (two) times daily.  0    PATADAY 0.2 % Drop   6    potassium chloride (MICRO-K) 10 MEQ CpSR Take 4 capsules (40 mEq total) by mouth 3 (three) times daily. 360 capsule 3    sodium chloride 0.9% 0.9 % SolP 100 mL with treprostinil 1 mg/mL Soln 6,000,000 ng Inject 2,580 ng/min into the vein continuous.      spironolactone (ALDACTONE) 25 MG tablet Take 1 tablet (25 mg total) by mouth once daily. 30 tablet 11    vancomycin (VANCOCIN) 1 gram/200 mL PgBk 1000 mg in dextrose 5 % 200 mL IVPB Inject 1 g into the vein every 12 (twelve) hours.      warfarin (COUMADIN) 2.5 MG tablet Take 1 tablet (2.5 mg total) by mouth Daily. 30 tablet 11    duloxetine (CYMBALTA) 60 MG capsule Take 1 capsule (60 mg total) by mouth 2 (two) times daily. 180 capsule 1         Objective:     Vitals:  Temp:  [97.9 °F (36.6 °C)-99.2 °F (37.3 °C)]   Pulse:  []   Resp:  [18-20]   BP: ()/(54-59)   SpO2:  [90 %-98 %]     BP (!) 94/54 (BP Location: Left arm, Patient Position: Lying)   Pulse 89   Temp 98.3 °F (36.8 °C) (Oral)   Resp 20   Ht 5' 3" (1.6 m)   Wt 65.9 kg (145 lb 4.5 oz)   SpO2 (!) 90%   Breastfeeding? No   BMI 25.74 kg/m²  I/O's:    Intake/Output Summary (Last 24 hours) at 11/06/17 0848  Last data filed at 11/06/17 0610   Gross per 24 hour   Intake             1775 ml   Output             2125 ml   Net             -350 ml       Wt Readings from Last 10 Encounters:   11/06/17 65.9 kg (145 lb 4.5 oz)   10/31/17 71.8 kg (158 lb 3.2 oz)   10/24/17 74.8 kg (165 lb)   10/24/17 72.4 kg (159 lb 9.8 oz)   10/09/17 74.8 kg (165 lb)   10/04/17 70.4 kg (155 lb 3.3 oz)   09/22/17 74.8 kg (165 lb)   09/20/17 74.8 kg (165 lb)   09/08/17 72.4 kg (159 lb 9.8 oz) "   08/29/17 81.6 kg (180 lb)        General Appearance:    Alert, cooperative, no distress   Lungs:     Clear to auscultation bilaterally, respirations unlabored    Heart:    No JVP, Regular rate and rhythm, HS S1+S2+no added sounds   Abdomen:     Soft, non-tender, bowel sounds active, no masses, no organomegaly   Extremities:   no edema b/l, pulses +2 b/l     Labs:       Recent Labs  Lab 11/04/17 0624 11/05/17 0357 11/06/17  0406    138 136   K 3.7 3.1* 3.3*   CL 96 96 98   CO2 29 31* 30*   BUN 12 17 19   CREATININE 0.8 1.0 0.9    103 119*   ANIONGAP 11 11 8       Recent Labs  Lab 11/04/17 0624 11/05/17 0357 11/06/17  0406   AST 13 11 10   ALT 8* 8* 7*   ALKPHOS 102 105 102   BILITOT 0.5 0.4 0.4   ALBUMIN 2.6* 2.7* 2.7*     No results for input(s): PH, PCO2, PO2, HCO3, POCSATURATED in the last 168 hours.     Recent Labs  Lab 11/04/17 0624 11/05/17 0357 11/06/17  0406   WBC 7.00 7.91 8.34   HGB 11.2* 11.0* 10.6*   HCT 35.5* 34.2* 32.6*   * 385* 381*   GRAN 56.2  3.9 61.2  4.8 60.4  5.0       Recent Labs  Lab 11/04/17 0624 11/05/17 0357 11/06/17  0406   INR 1.3* 1.1 1.1     No results for input(s): TROPONINI, BNP in the last 168 hours.   Micro:   Blood Cultures  Lab Results   Component Value Date    LABBLOO No Growth to date 11/03/2017    LABBLOO No Growth to date 11/03/2017    LABBLOO No Growth to date 11/03/2017    LABBLOO No growth after 5 days. 10/30/2017    LABBLOO  10/29/2017     Gram stain hilton bottle: Gram positive cocci in clusters resembling Staph     LABBLOO  10/29/2017     Positive results previously called refer to culture #[id number]    LABBLOO  10/29/2017     Gram stain aer bottle: Gram positive cocci in clusters resembling Staph     LABBLOO Positive results previously called 10/29/2017    LABBLOO  10/29/2017     METHICILLIN RESISTANT STAPHYLOCOCCUS AUREUS  Refer to culture #[id number]       Urine Cultures  Lab Results   Component Value Date    LABURIN No growth 10/29/2017     LABURIN PSEUDOMONAS AERUGINOSA  50,000 - 99,999 cfu/ml   09/23/2017    LABURIN ESCHERICHIA COLI  >100,000 cfu/ml   06/09/2017    LABURIN  07/15/2011     MULTIPLE ORGANISMS ISOLATED. NONE IN PREDOMINANCE  REPEAT IF CLINICALLY NECESSARY.    LABURIN  06/20/2011     MULTIPLE ORGANISMS ISOLATED. NONE IN PREDOMINANCE  REPEAT IF CLINICALLY NECESSARY.       EF   Date Value Ref Range Status   11/02/2017 60 55 - 65    08/30/2017 60 55 - 65    06/05/2017 60 55 - 65    06/01/2017 55 55 - 65    09/27/2016 55 55 - 65        * MRSA bacteremia     - from cardoso cath infection. Continue vancomycin per ID (1250 Q24) via peripheral  - PICC in place, to use it for remodulin ONLY;  removed cardoso and unfortunately double lumen PICC was placed- may use this line for her remodulin for now (tape up second port so it cant be used  - Discuss line issue with ID-pt to go home with PICCx2 vs. PICC + Cardoso vs. PICC + midline in 1 week so can complete 2 week course of abx (can only use midline for 7 days with Vancomycin).  Would prefer not to send her home with 2xPICC as she has known poor line care management at home  - repeat blood cultures negative 5 days from 10/30 and also negative 3 days from 11/3  -  Continue trough monitoring  -will need 2 weeks vancomycin therapy since Cardoso removal       Right-sided heart failure     Continue treatment for her PAH as above  Continue Bumex 2mg BID  Strict I/Os and daily weights.        Primary pulmonary hypertension     - Continue IV remodulin and macitentan   - For remodulin line: Discussing with ID-pt to go home with PICCx2 vs. PICC + Cardoso vs. PICC + midline in 1 week so can complete 2 week course of abx (can only use midline for 7 days with Vancomycin)       Sleep apnea- on CPAP     -Continue PTA CPAP            Signed:  Sonia Lewis MD  Cardiology Hospitalist  Pager: [phone]  11/6/2017 8:48 AM

## 2017-11-06 NOTE — PLAN OF CARE
Problem: Infection, Risk/Actual (Adult)  Goal: Infection Prevention/Resolution  Patient will demonstrate the desired outcomes by discharge/transition of care.   Outcome: Ongoing (interventions implemented as appropriate)  Pt receiving iv antibiotics. Pt afebrile during shift.     Problem: Patient Care Overview  Goal: Plan of Care Review  Outcome: Ongoing (interventions implemented as appropriate)  Pt AAO x 4. Pt independent. Pt receiving remoduilin to PIV.  PICC or midline to be placed tomorrow.. Pt receiving IV vancomycin every 24 hours. Vanc trough done today 12.3.   Pt on 4 L NC. O2 sat > 92%.   Pt sinus rhythm on tele. Pt free of falls. Pt wearing nonslip socks when out of bed.     Problem: Fall Risk (Adult)  Goal: Absence of Falls  Patient will demonstrate the desired outcomes by discharge/transition of care.   Outcome: Ongoing (interventions implemented as appropriate)  Pt free of falls. Pt wearing nonslip socks when out of bed. Pt calling for help when needing help ambulating.

## 2017-11-07 NOTE — SUBJECTIVE & OBJECTIVE
Interval History:  No acute events overnight.  Afebrile. No leukocytosis. Reports feeling well.  Linezolid covered by insurance.    Review of Systems   Constitutional: Negative for chills, diaphoresis, fatigue and fever.   Respiratory: Positive for shortness of breath (stable). Negative for cough, wheezing and stridor.    Cardiovascular: Negative for chest pain and palpitations.   Gastrointestinal: Negative for abdominal pain, constipation, diarrhea, nausea and vomiting.   Genitourinary: Negative for difficulty urinating, dyspareunia, dysuria and urgency.   Musculoskeletal: Positive for arthralgias. Negative for back pain and myalgias.   Neurological: Negative for dizziness, weakness and headaches.   Psychiatric/Behavioral: Negative for agitation and confusion.     Objective:     Vital Signs (Most Recent):  Temp: 99.4 °F (37.4 °C) (11/07/17 1149)  Pulse: (!) 123 (11/07/17 1225)  Resp: 16 (11/07/17 1149)  BP: (!) 125/58 (11/07/17 1149)  SpO2: 96 % (11/07/17 1149) Vital Signs (24h Range):  Temp:  [97.4 °F (36.3 °C)-99.4 °F (37.4 °C)] 99.4 °F (37.4 °C)  Pulse:  [] 123  Resp:  [16-18] 16  SpO2:  [91 %-97 %] 96 %  BP: ()/(52-65) 125/58     Weight: 65.9 kg (145 lb 4.5 oz)  Body mass index is 25.74 kg/m².    Estimated Creatinine Clearance: 62.9 mL/min (based on SCr of 0.9 mg/dL).    Physical Exam   Constitutional: She is oriented to person, place, and time. She appears well-developed and well-nourished. No distress.   HENT:   Head: Normocephalic and atraumatic.   Eyes: Conjunctivae are normal. No scleral icterus.   Cardiovascular: Normal rate and regular rhythm.    No murmur heard.  Pulmonary/Chest: Effort normal and breath sounds normal. No respiratory distress.   Abdominal: Soft. Bowel sounds are normal. She exhibits no distension. There is no tenderness.   Neurological: She is alert and oriented to person, place, and time.   Skin: Skin is warm and dry. No rash noted. She is not diaphoretic.   Left chest  catheter prior incision site c/d/i. Catheter now removed.   Psychiatric: She has a normal mood and affect. Her behavior is normal.   Vitals reviewed.      Significant Labs:   Blood Culture:     Recent Labs  Lab 09/23/17  0859 10/29/17  2305 10/29/17  2330 10/30/17  1022 11/03/17  1151   LABBLOO No growth after 5 days.  No growth after 5 days. Gram stain hilton bottle: Gram positive cocci in clusters resembling Staph  Results called to and read back by: Renetta (RN 4S) 10/30/2017  12:31   KLS2  Gram stain aer bottle: Gram positive cocci in clusters resembling Staph  METHICILLIN RESISTANT STAPHYLOCOCCUS AUREUSphoned [facility]/4S with MRSA  11/01/2017  06:37 Gram stain hilton bottle: Gram positive cocci in clusters resembling Staph   Positive results previously called refer to culture #[id number]  Gram stain aer bottle: Gram positive cocci in clusters resembling Staph   Positive results previously called  METHICILLIN RESISTANT STAPHYLOCOCCUS AUREUSRefer to culture #[id number] No growth after 5 days. No Growth to date  No Growth to date  No Growth to date  No Growth to date     CBC:     Recent Labs  Lab 11/06/17  0406 11/07/17  0500   WBC 8.34 6.64   HGB 10.6* 10.3*   HCT 32.6* 32.2*   * 398*     CMP:     Recent Labs  Lab 11/06/17  0406 11/07/17  0500    135*   K 3.3* 3.1*   CL 98 97   CO2 30* 28   * 111*   BUN 19 14   CREATININE 0.9 0.9   CALCIUM 8.9 8.9   PROT 7.1 7.1   ALBUMIN 2.7* 2.6*   BILITOT 0.4 0.7   ALKPHOS 102 105   AST 10 12   ALT 7* 7*   ANIONGAP 8 10   EGFRNONAA >60.0 >60.0       Significant Imaging: I have reviewed all pertinent imaging results/findings within the past 24 hours.

## 2017-11-07 NOTE — PROGRESS NOTES
Heart Failure Progress Note  Attending Physician: Augustine Rhodes MD  Hospital Day: 7    Subjective:   Interval History: No events overnight.    Plan: will change pt to (oral) linezolid per ID so that she can go home with 1 PICC only.  However, this is dependent on insurance approval which will be determined today  -if approved by insurance will discharge home today after pt gets PICC (currently has double lumen PICC but will need single lumen for Remodulin)    Medications:   Continuous Infusions:   treprostinil (REMODULIN) infusion 28 ng/kg/min (11/06/17 1943)       Scheduled Meds:   bumetanide  2 mg Oral BID    gabapentin  300 mg Oral TID    macitentan  10 mg Oral Daily    magnesium oxide  400 mg Oral BID    pantoprazole  40 mg Oral Daily    polyethylene glycol  17 g Oral BID    potassium chloride  20 mEq Oral Daily    sodium chloride 0.9%  10 mL Intravenous Q6H    sodium chloride 0.9%  3 mL Intravenous Q8H    vancomycin (VANCOCIN) IVPB  1,500 mg Intravenous Q24H    warfarin  5 mg Oral Daily     PRN Meds:acetaminophen, HYDROmorphone, ondansetron, promethazine (PHENERGAN) IVPB, Flushing PICC Protocol **AND** sodium chloride 0.9% **AND** sodium chloride 0.9%, sodium chloride 0.9%    ----------------------------------------------------------------------------------------------------------------------  Home medications:   No current facility-administered medications on file prior to encounter.      Current Outpatient Prescriptions on File Prior to Encounter   Medication Sig Dispense Refill    bumetanide (BUMEX) 2 MG tablet Take 1 tablet (2 mg total) by mouth 2 (two) times daily. 60 tablet 11    clobetasol (TEMOVATE) 0.05 % external solution   2    gabapentin (NEURONTIN) 300 MG capsule Take 1 capsule (300 mg total) by mouth As instructed. Take one capsule qam, 1 qpm, 2 qhs. 360 capsule 2    hydrocodone-acetaminophen 10-325mg (NORCO)  mg Tab Take 1 tablet by mouth 3 (three) times daily as  "needed. 90 tablet 0    hydrOXYzine (VISTARIL) 50 MG Cap 50 mg daily as needed.       macitentan (OPSUMIT) 10 mg Tab Take 1 tablet (10 mg total) by mouth once daily. 30 tablet 11    magnesium oxide (MAG-OX) 400 mg tablet Take 1 tablet (400 mg total) by mouth 2 (two) times daily.  0    PATADAY 0.2 % Drop   6    potassium chloride (MICRO-K) 10 MEQ CpSR Take 4 capsules (40 mEq total) by mouth 3 (three) times daily. 360 capsule 3    sodium chloride 0.9% 0.9 % SolP 100 mL with treprostinil 1 mg/mL Soln 6,000,000 ng Inject 2,580 ng/min into the vein continuous.      spironolactone (ALDACTONE) 25 MG tablet Take 1 tablet (25 mg total) by mouth once daily. 30 tablet 11    vancomycin (VANCOCIN) 1 gram/200 mL PgBk 1000 mg in dextrose 5 % 200 mL IVPB Inject 1 g into the vein every 12 (twelve) hours.      warfarin (COUMADIN) 2.5 MG tablet Take 1 tablet (2.5 mg total) by mouth Daily. 30 tablet 11    duloxetine (CYMBALTA) 60 MG capsule Take 1 capsule (60 mg total) by mouth 2 (two) times daily. 180 capsule 1         Objective:     Vitals:  Temp:  [97.4 °F (36.3 °C)-99.3 °F (37.4 °C)]   Pulse:  []   Resp:  [16-18]   BP: ()/(52-74)   SpO2:  [91 %-97 %]     BP (!) 102/57 (Patient Position: Lying)   Pulse 95   Temp 99 °F (37.2 °C) (Oral)   Resp 18   Ht 5' 3" (1.6 m)   Wt 65.9 kg (145 lb 4.5 oz)   SpO2 (!) 91%   Breastfeeding? No   BMI 25.74 kg/m²  I/O's:    Intake/Output Summary (Last 24 hours) at 11/07/17 0822  Last data filed at 11/07/17 0000   Gross per 24 hour   Intake             1010 ml   Output             1500 ml   Net             -490 ml       Wt Readings from Last 10 Encounters:   11/07/17 65.9 kg (145 lb 4.5 oz)   10/31/17 71.8 kg (158 lb 3.2 oz)   10/24/17 74.8 kg (165 lb)   10/24/17 72.4 kg (159 lb 9.8 oz)   10/09/17 74.8 kg (165 lb)   10/04/17 70.4 kg (155 lb 3.3 oz)   09/22/17 74.8 kg (165 lb)   09/20/17 74.8 kg (165 lb)   09/08/17 72.4 kg (159 lb 9.8 oz)   08/29/17 81.6 kg (180 lb)    "     General Appearance:    Alert, cooperative, no distress   Lungs:     Clear to auscultation bilaterally, respirations unlabored    Heart:    No JVP, Regular rate and rhythm, S1+S2+no added sounds   Abdomen:     Soft, non-tender, bowel sounds active, no masses, no organomegaly   Extremities:   no edema b/l, pulses +2 b/l     Labs:       Recent Labs  Lab 11/05/17 0357 11/06/17  0406 11/07/17  0500    136 135*   K 3.1* 3.3* 3.1*   CL 96 98 97   CO2 31* 30* 28   BUN 17 19 14   CREATININE 1.0 0.9 0.9    119* 111*   ANIONGAP 11 8 10       Recent Labs  Lab 11/05/17 0357 11/06/17 0406 11/07/17  0500   AST 11 10 12   ALT 8* 7* 7*   ALKPHOS 105 102 105   BILITOT 0.4 0.4 0.7   ALBUMIN 2.7* 2.7* 2.6*     No results for input(s): PH, PCO2, PO2, HCO3, POCSATURATED in the last 168 hours.     Recent Labs  Lab 11/05/17 0357 11/06/17 0406 11/07/17  0500   WBC 7.91 8.34 6.64   HGB 11.0* 10.6* 10.3*   HCT 34.2* 32.6* 32.2*   * 381* 398*   GRAN 61.2  4.8 60.4  5.0 69.3  4.6       Recent Labs  Lab 11/05/17 0357 11/06/17  0406 11/07/17  0500   INR 1.1 1.1 1.1     No results for input(s): TROPONINI, BNP in the last 168 hours.   Micro:   Blood Cultures  Lab Results   Component Value Date    LABBLOO No Growth to date 11/03/2017    LABBLOO No Growth to date 11/03/2017    LABBLOO No Growth to date 11/03/2017    LABBLOO No Growth to date 11/03/2017    LABBLOO No growth after 5 days. 10/30/2017     Urine Cultures  Lab Results   Component Value Date    LABURIN No growth 10/29/2017    LABURIN PSEUDOMONAS AERUGINOSA  50,000 - 99,999 cfu/ml   09/23/2017    LABURIN ESCHERICHIA COLI  >100,000 cfu/ml   06/09/2017    LABURIN  07/15/2011     MULTIPLE ORGANISMS ISOLATED. NONE IN PREDOMINANCE  REPEAT IF CLINICALLY NECESSARY.    LABURIN  06/20/2011     MULTIPLE ORGANISMS ISOLATED. NONE IN PREDOMINANCE  REPEAT IF CLINICALLY NECESSARY.       EF   Date Value Ref Range Status   11/02/2017 60 55 - 65    08/30/2017 60 55 - 65     06/05/2017 60 55 - 65    06/01/2017 55 55 - 65    09/27/2016 55 55 - 65        Assessment:   56 yo female with a PMHx of medical non adherence, ALFREDO (non adherent to CPAP), PAH (group 1) on IV remodulin/warfarin/macitentan, chronic respiratory failure on 5L ATC of home oxygen, 3rd degree AV block s/p pacemaker implantation, diverticulitis and previous episode of bacteremia with home PICC line who is being transferred for MRSA bacteremia.     Plan:            * MRSA bacteremia     - will change pt to linezolid per ID so that she can go home with 1 PICC only.  However, this is dependent on insurance approval which will be determined today  -if approved by insurance will discharge home today  -currently continuing vancomycin, dose adjusted for therapeutic range  - blood cx negative and complete for 10/30; 4 days no growth from 11/3       Right-sided heart failure     Continue treatment for her PAH as above  Continue Bumex 2mg BID  Strict I/Os and daily weights.        Primary pulmonary hypertension     - Continue IV remodulin and macitentan   - For remodulin line: Discussing with ID-pt to go home as above       Sleep apnea- on CPAP     -Continue PTA CPAP          Signed:  Sonia Lewis MD  Cardiology Hospitalist  Pager: [phone]  11/7/2017 8:22 AM

## 2017-11-07 NOTE — PROGRESS NOTES
Spoke to Olamide at [facility] in [place] and linezolid is covered with no copay.

## 2017-11-07 NOTE — PROGRESS NOTES
[facility]  Infectious Disease  Progress Note    Patient Name: Emily Cook  MRN: [id number]  Admission Date: 11/1/2017  Length of Stay: 6 days  Attending Physician: Augustine Rhodes MD  Primary Care Provider: Kaylee Cazares MD    Isolation Status: Contact  Assessment/Plan:      Staphylococcus aureus bacteremia    57 year-old female with history of pulmonary HTN on home Remodulin recently treated for MRSA bacteremia secondary to an infected brush catheter in September with Vanc x 2 weeks now admitted with fevers, right arm pain and numbness.    Blood cx 10/29 positive for MRSA (4/4 bottles). Repeat blood cx 10/30 and 11/3 are NGTD. TTE and AMIE negative for vegetations.  Afebrile since admission. No leukocytosis. VSS. Brush catheter has been removed and tip sent for culture which has NGTD.       Plan  - Recommend Linezolid 600 mg orally twice a day to complete therapy for MRSA bacteremia in order to avoid need for two central PICC lines and given that source has been removed. Will need to monitor INR (patient on coumadin) and platelet count while on Linezolid.  - Would hold off on placing new tunneled catheter until patient has completed two weeks of antibiotics from date of line removal on 11/4.  (Estimated end date 11/18/17)  - Will schedule patient an ID follow up after discharge.  - ID will sign off.            Please call for any questions. Thank you.  Mehnaz Hanson PA-C  Phone: [phone]  Pager: [phone]      Subjective:     Principal Problem:MRSA bacteremia    HPI: Emily Cook is a 57 year-old female with history of pulmonary HTN on home Veletri recently admitted to [facility] with MRSA bacteremia secondary to an infected brush catheter (placed 6/2017). Her brush catheter was removed on 9/5. Catheter tip cultures also grew MRSA. TTE was negative for vegetations. Blood cultures cleared 9/3. A new brush catheter was placed on 9/8 for Veletri along with a PICC line and patient was  discharged home with a plan to complete 2 weeks of IV Vancomycin. At follow up patient was doing well. No systemic signs of infection. She completed 2 weeks of IV Vancomycin without difficulty.     Patient now presents with fevers up to 101.4, right arm pain and numbness. Blood cx 10/29 are positive for MRSA (4/4 bottles). Repeat blood cx 10/30 are NGTD. TTE and AMIE negative for vegetations. Patient is on Vancomycin.    Interval History:  No acute events overnight.  Afebrile. No leukocytosis. Reports feeling well.  Linezolid covered by insurance.    Review of Systems   Constitutional: Negative for chills, diaphoresis, fatigue and fever.   Respiratory: Positive for shortness of breath (stable). Negative for cough, wheezing and stridor.    Cardiovascular: Negative for chest pain and palpitations.   Gastrointestinal: Negative for abdominal pain, constipation, diarrhea, nausea and vomiting.   Genitourinary: Negative for difficulty urinating, dyspareunia, dysuria and urgency.   Musculoskeletal: Positive for arthralgias. Negative for back pain and myalgias.   Neurological: Negative for dizziness, weakness and headaches.   Psychiatric/Behavioral: Negative for agitation and confusion.     Objective:     Vital Signs (Most Recent):  Temp: 99.4 °F (37.4 °C) (11/07/17 1149)  Pulse: (!) 123 (11/07/17 1225)  Resp: 16 (11/07/17 1149)  BP: (!) 125/58 (11/07/17 1149)  SpO2: 96 % (11/07/17 1149) Vital Signs (24h Range):  Temp:  [97.4 °F (36.3 °C)-99.4 °F (37.4 °C)] 99.4 °F (37.4 °C)  Pulse:  [] 123  Resp:  [16-18] 16  SpO2:  [91 %-97 %] 96 %  BP: ()/(52-65) 125/58     Weight: 65.9 kg (145 lb 4.5 oz)  Body mass index is 25.74 kg/m².    Estimated Creatinine Clearance: 62.9 mL/min (based on SCr of 0.9 mg/dL).    Physical Exam   Constitutional: She is oriented to person, place, and time. She appears well-developed and well-nourished. No distress.   HENT:   Head: Normocephalic and atraumatic.   Eyes: Conjunctivae are normal.  No scleral icterus.   Cardiovascular: Normal rate and regular rhythm.    No murmur heard.  Pulmonary/Chest: Effort normal and breath sounds normal. No respiratory distress.   Abdominal: Soft. Bowel sounds are normal. She exhibits no distension. There is no tenderness.   Neurological: She is alert and oriented to person, place, and time.   Skin: Skin is warm and dry. No rash noted. She is not diaphoretic.   Left chest catheter prior incision site c/d/i. Catheter now removed.   Psychiatric: She has a normal mood and affect. Her behavior is normal.   Vitals reviewed.      Significant Labs:   Blood Culture:     Recent Labs  Lab 09/23/17  0859 10/29/17  2305 10/29/17  2330 10/30/17  1022 11/03/17  1151   LABBLOO No growth after 5 days.  No growth after 5 days. Gram stain hilton bottle: Gram positive cocci in clusters resembling Staph  Results called to and read back by: Renetta (RN 4S) 10/30/2017  12:31   KLS2  Gram stain aer bottle: Gram positive cocci in clusters resembling Staph  METHICILLIN RESISTANT STAPHYLOCOCCUS AUREUSphoned [facility]/4S with MRSA  11/01/2017  06:37 Gram stain hilton bottle: Gram positive cocci in clusters resembling Staph   Positive results previously called refer to culture #[id number]  Gram stain aer bottle: Gram positive cocci in clusters resembling Staph   Positive results previously called  METHICILLIN RESISTANT STAPHYLOCOCCUS AUREUSRefer to culture #[id number] No growth after 5 days. No Growth to date  No Growth to date  No Growth to date  No Growth to date     CBC:     Recent Labs  Lab 11/06/17  0406 11/07/17  0500   WBC 8.34 6.64   HGB 10.6* 10.3*   HCT 32.6* 32.2*   * 398*     CMP:     Recent Labs  Lab 11/06/17  0406 11/07/17  0500    135*   K 3.3* 3.1*   CL 98 97   CO2 30* 28   * 111*   BUN 19 14   CREATININE 0.9 0.9   CALCIUM 8.9 8.9   PROT 7.1 7.1   ALBUMIN 2.7* 2.6*   BILITOT 0.4 0.7   ALKPHOS 102 105   AST 10 12   ALT 7* 7*   ANIONGAP 8 10   EGFRNONAA >60.0  >60.0       Significant Imaging: I have reviewed all pertinent imaging results/findings within the past 24 hours.

## 2017-11-07 NOTE — CONSULTS
Single lumen PICC placed to left basilic vein.  40cm in length, 39cm arm circumference, 0cm exposed.  Lot# [id number].

## 2017-11-07 NOTE — PROGRESS NOTES
[facility]  Infectious Disease  Progress Note    Patient Name: Emily Cook  MRN: [id number]  Admission Date: 11/1/2017  Length of Stay: 5 days  Attending Physician: Augustine Rhodes MD  Primary Care Provider: Kaylee Cazares MD    Isolation Status: Contact  Assessment/Plan:      Staphylococcus aureus bacteremia    57 year-old female with history of pulmonary HTN on home Remodulin recently treated for MRSA bacteremia secondary to an infected brush catheter in September with Vanc x 2 weeks now admitted with fevers, right arm pain and numbness.    Blood cx 10/29 positive for MRSA (4/4 bottles). Repeat blood cx 10/30 and 11/3 are NGTD. TTE and AMIE negative for vegetations. Patient is currently on Vancomycin.  Afebrile since admission.   No leukocytosis. VSS. Brush catheter has been removed and tip sent for culture which has NGTD.       Plan  - Continue Vancomycin 1250 mg IV q 24 hours for now while inpatient.     Vancomycin trough pending for today.  Trough goal 15-20  - Would hold off on placing new tunneled catheter until patient has completed two weeks of antibiotics from date of line removal on 11/4.  Estimated end date 11/18/17.  -  In order to avoid need for two central PICC lines at discharge in order to accommodate remodulin and IV abx, and given that source has been removed, may consider oral linezolid 600 mg orally twice a day to complete therapy for MRSA bacteremia.  Case Management to determine if linezolid is covered.  Will need to monitor INR (patient on coumadin) if transitioned to linezolid.   - Will follow up tomorrow with final recommendations.     Patient seen by, and plan discussed with, ID staff  Discussed with Primary Team             Thank you.   Please call for any questions or concerns.  DANILO Crump, ANP-C  [phone]    Subjective:     Principal Problem:MRSA bacteremia    HPI: Emily Cook is a 57 year-old female with history of pulmonary HTN on home Veletri  recently admitted to [facility] with MRSA bacteremia secondary to an infected brush catheter (placed 6/2017). Her brush catheter was removed on 9/5. Catheter tip cultures also grew MRSA. TTE was negative for vegetations. Blood cultures cleared 9/3. A new brush catheter was placed on 9/8 for Veletri along with a PICC line and patient was discharged home with a plan to complete 2 weeks of IV Vancomycin. At follow up patient was doing well. No systemic signs of infection. She completed 2 weeks of IV Vancomycin without difficulty.     Patient now presents with fevers up to 101.4, right arm pain and numbness. Blood cx 10/29 are positive for MRSA (4/4 bottles). Repeat blood cx 10/30 are NGTD. TTE and AMIE negative for vegetations. Patient is on Vancomycin.    Interval History:  No acute events overnight.  Afebrile. No leukocytosis. Reports feeling well.       Review of Systems   Constitutional: Negative for chills, diaphoresis, fatigue and fever.   Respiratory: Positive for shortness of breath (stable). Negative for cough, wheezing and stridor.    Cardiovascular: Negative for chest pain and palpitations.   Gastrointestinal: Negative for abdominal pain, constipation, diarrhea, nausea and vomiting.   Genitourinary: Negative for difficulty urinating, dyspareunia, dysuria and urgency.   Musculoskeletal: Positive for arthralgias. Negative for back pain and myalgias.   Neurological: Negative for dizziness, weakness and headaches.   Psychiatric/Behavioral: Negative for agitation and confusion.     Objective:     Vital Signs (Most Recent):  Temp: 98 °F (36.7 °C) (11/06/17 0845)  Pulse: 89 (11/06/17 0845)  Resp: 18 (11/06/17 0845)  BP: 124/74 (11/06/17 0845)  SpO2: 96 % (11/06/17 0845) Vital Signs (24h Range):  Temp:  [97.9 °F (36.6 °C)-99.2 °F (37.3 °C)] 98 °F (36.7 °C)  Pulse:  [] 89  Resp:  [18-20] 18  SpO2:  [90 %-98 %] 96 %  BP: ()/(54-74) 124/74     Weight: 65.9 kg (145 lb 4.5 oz)  Body mass index is 25.74  kg/m².    Estimated Creatinine Clearance: 62.9 mL/min (based on SCr of 0.9 mg/dL).    Physical Exam   Constitutional: She is oriented to person, place, and time. She appears well-developed and well-nourished. No distress.   HENT:   Head: Normocephalic and atraumatic.   Eyes: Conjunctivae are normal. No scleral icterus.   Cardiovascular: Normal rate and regular rhythm.    No murmur heard.  Pulmonary/Chest: Effort normal and breath sounds normal. No respiratory distress.   Abdominal: Soft. Bowel sounds are normal. She exhibits no distension. There is no tenderness.   Neurological: She is alert and oriented to person, place, and time.   Skin: Skin is warm and dry. No rash noted. She is not diaphoretic.   Left chest catheter prior incision site c/d/i. Catheter now removed.   Psychiatric: She has a normal mood and affect. Her behavior is normal.   Vitals reviewed.      Significant Labs:   Blood Culture:   Recent Labs  Lab 09/23/17  0859 10/29/17  2305 10/29/17  2330 10/30/17  1022 11/03/17  1151   LABBLOO No growth after 5 days.  No growth after 5 days. Gram stain hilton bottle: Gram positive cocci in clusters resembling Staph  Results called to and read back by: Renetta (RN 4S) 10/30/2017  12:31   KLS2  Gram stain aer bottle: Gram positive cocci in clusters resembling Staph  METHICILLIN RESISTANT STAPHYLOCOCCUS AUREUSphoned [facility]/4S with MRSA  11/01/2017  06:37 Gram stain hilton bottle: Gram positive cocci in clusters resembling Staph   Positive results previously called refer to culture #[id number]  Gram stain aer bottle: Gram positive cocci in clusters resembling Staph   Positive results previously called  METHICILLIN RESISTANT STAPHYLOCOCCUS AUREUSRefer to culture #[id number] No growth after 5 days. No Growth to date  No Growth to date  No Growth to date     CBC:   Recent Labs  Lab 11/05/17  0357 11/06/17  0406   WBC 7.91 8.34   HGB 11.0* 10.6*   HCT 34.2* 32.6*   * 381*     CMP:   Recent Labs  Lab  11/05/17  0357 11/06/17  0406    136   K 3.1* 3.3*   CL 96 98   CO2 31* 30*    119*   BUN 17 19   CREATININE 1.0 0.9   CALCIUM 9.1 8.9   PROT 7.2 7.1   ALBUMIN 2.7* 2.7*   BILITOT 0.4 0.4   ALKPHOS 105 102   AST 11 10   ALT 8* 7*   ANIONGAP 11 8   EGFRNONAA >60.0 >60.0       Significant Imaging: I have reviewed all pertinent imaging results/findings within the past 24 hours.

## 2017-11-07 NOTE — PLAN OF CARE
Problem: Patient Care Overview  Goal: Plan of Care Review  -Pt free from fall or injury so far this shift. Instructed to call if assistance needed, verbalized understanding.   -Sats low to mid 90's on 3 L NC.   -Cardiac monitoring in progress, currently SR.  -Remodulin infusing at 58 cc/ 24 hrs, rate verified by this RN. BRIDGETTE single lumen PICC placed, will switch over Remodulin from PIV to PICC line today with next cassette change. TEA double lumen PICC DC'd.   -Denies pain or discomfort.   -Afebrile. Vancomycin DC'd, and PO Zyvox started. Contact isolation precautions maintained as ordered. Daily labs monitored.  -Plan for pt to be DC'd tomorrow on PO Zyvox.

## 2017-11-07 NOTE — NURSING
Pt AAO, family at the bedside. VSS, see flowsheet for further assessment details.    Telemetry monitoring in progress; NSR/ST, HR     Pulm htn mgmt in progress. Remodulin infusing at @28ng/kg/min(58mls/24hrs) to L FA PIV. PIV remains pt no s/s of complication @ IV site. DW 86kg. Single lumen PICC to be placed 11/7; double lumen placed on 11/6 by mistake. O2 levels stable at 93-95% on 3L via N/C.    Infection control in progress; Tmax 99.3. Vanc trough 12.3.    Pt up ad david; no new skin breakdown noted.    I/Os monitored. See chart for details    Fall precautions in place; pt remains free of injury at this time. Daily labs monitored. No acute events overnight.

## 2017-11-07 NOTE — DISCHARGE SUMMARY
[facility]  Cardiology  Discharge Summary      Patient Name: Emily Cook  MRN: [id number]  Admission Date: 11/1/2017  Hospital Length of Stay: 6 days  Discharge Date and Time:  11/08/2017 8:30 AM  Attending Physician: Augustine Rhodes MD  Discharging Provider: Sonia Lewis MD  Primary Care Physician: Kaylee Cazares MD    HPI: 56 yo female with a PMHx of medical non adherence, ALFREDO (non adherent to CPAP), PAH (group 1) on IV remodulin/warfarin/macitentan, chronic respiratory failure on 5L ATC of home oxygen, 3rd degree AV block s/p pacemaker implantation, diverticulitis and previous episode of bacteremia with home PICC line who is being transferred for MRSA bacteremia.      She originally presented on 10/29/17 to [facility] with complaint of right arm pain and fever. Blood cultures grew MRSA and she has been treated with vancomycin with subsequent blood cultures being negative. She had plan to remove her permacath at [facility] however her INR remained slightly supratherapeutic. She is transferred here for continuation of care. Remains afebrile with stable vitals. Upon review of systems she has minimal complaints and states explicitly that she feels her LE edema is the best its been in a month. SOB/SANCHEZ stable on home 5 liters.     Procedure(s) (LRB):  TRANSESOPHAGEAL ECHOCARDIOGRAM (AMIE) (N/A)     Indwelling Lines/Drains at time of discharge:  Lines/Drains/Airways     Peripherally Inserted Central Catheter Line                 PICC Double Lumen 11/04/17 1359 right brachial 2 days                Hospital Course (synopsis of major diagnoses, care, treatment, and services provided during the course of the hospital stay):     * MRSA bacteremia     - Pt found to have bacteremia (MRSA).  Pt unable to get a Cardoso line until antibiotic treatment is complete given 2nd admission for MRSA line infection.  Pt was initially treated with IV vancomycin but this would have required 2 PICC  lines on discharge, which would increase her risk of infection.  Instead, per ID consult, it was changed to PO linezolid 600 mg BID until 11/18 so that she can go home with 1 PICC only.  It was verified with her home pharmacy that this was covered and would be $0 copay.   -Pt expressed interest in going back to subcut remodulin instead of through a Cardoso because she spends so much time in hospital for line infection.  Pt to see Dr. Rhodes in clinic on Nov 14th to discuss this option further.  At this point, it will be determined if she gets a Cardoso or goes to subcut remodulin for future.       Right-sided heart failure     Continue treatment for her PAH as above  Continue Bumex 2mg BID  Strict I/Os and daily weights  Pt has poor potassium control so should be on potassium replacement 40 mEq TID and daily magnesium oxide 400mg.       Primary pulmonary hypertension     - Continue IV remodulin and macitentan   - For remodulin line: PICC line, will consider Cardoso depending on if she wants to go back to subcut.     Consults:   Consults         Status Ordering Provider     Inpatient consult to General Surgery  Once     Provider:  Joon Escobedo MD    Completed EREN LORA     Inpatient consult to Infectious Diseases  Once     Provider:  (Not yet assigned)    Completed EREN LORA     Inpatient consult to Midline team  Once     Provider:  (Not yet assigned)    Completed JACOB CHERRY     [facility]  [facility]     Provider:  (Not yet assigned)    Completed JACOB CHERRY     [facility]  Once     Provider:  (Not yet assigned)    Completed MALACHI REZA          Significant Diagnostic Studies: Labs:   BMP:   Recent Labs  Lab 11/07/17  0500 11/08/17  0450 11/08/17  1241   * 96  --    * 135*  --    K 3.1* 2.7* 3.6   CL 97 96  --    CO2 28 28  --    BUN 14 13  --    CREATININE 0.9 1.0  --    CALCIUM 8.9 9.0  --    MG  --  1.9  --    , CMP    Recent Labs  Lab 11/07/17  0500 11/08/17  0450 11/08/17  1241   * 135*  --    K 3.1* 2.7* 3.6   CL 97 96  --    CO2 28 28  --    * 96  --    BUN 14 13  --    CREATININE 0.9 1.0  --    CALCIUM 8.9 9.0  --    PROT 7.1 7.3  --    ALBUMIN 2.6* 2.8*  --    BILITOT 0.7 0.5  --    ALKPHOS 105 113  --    AST 12 14  --    ALT 7* 8*  --    ANIONGAP 10 11  --    ESTGFRAFRICA >60.0 >60.0  --    EGFRNONAA >60.0 >60.0  --    , CBC   Recent Labs  Lab 11/07/17  0500 11/08/17  0451   WBC 6.64 5.07   HGB 10.3* 10.8*   HCT 32.2* 33.1*   * 421*   , INR   Lab Results   Component Value Date    INR 1.0 11/08/2017    INR 1.1 11/07/2017    INR 1.1 11/06/2017    and Lipid Panel   Lab Results   Component Value Date    CHOL 251 (H) 10/31/2017    HDL 61 10/31/2017    LDLCALC 123 10/31/2017    TRIG 80 10/31/2017    CHOLHDL 24.3 10/31/2017     Microbiology:   Blood Culture   Lab Results   Component Value Date    LABBLOO No Growth to date 11/03/2017    LABBLOO No Growth to date 11/03/2017    LABBLOO No Growth to date 11/03/2017    LABBLOO No Growth to date 11/03/2017    LABBLOO No Growth to date 11/03/2017    and Urine Culture    Lab Results   Component Value Date    LABURIN No growth 10/29/2017         Pending Diagnostic Studies:     Procedure Component Value Units Date/Time    VANCOMYCIN, TROUGH before 4th dose [[id number]] Collected:  11/06/17 1339    Order Status:  Sent Lab Status:  In process Updated:  11/06/17 1330    Specimen:  Blood from Blood           Final Active Diagnoses:    Diagnosis Date Noted POA    PRINCIPAL PROBLEM:  MRSA bacteremia [R78.81] 10/30/2017 Yes    Right-sided heart failure [I50.810] 08/30/2017 Yes    Bacteremia [R78.81] 11/02/2017 Yes    Staphylococcus aureus bacteremia [R78.81]  Yes    Acute on chronic respiratory failure with hypoxia [J96.21] 06/03/2017 Yes    Primary pulmonary hypertension [I27.0]  Yes    Sleep apnea- on CPAP [G47.30] 06/19/2015 Yes      Problems Resolved During this  Admission:    Diagnosis Date Noted Date Resolved POA       Discharged Condition: fair    Follow Up:    Patient Instructions:   No discharge procedures on file.  Medications:  Reconciled Home Medications:   Current Discharge Medication List      START taking these medications    Details   linezolid (ZYVOX) 600 mg Tab Take 1 tablet (600 mg total) by mouth every 12 (twelve) hours.  Qty: 22 tablet, Refills: 0      pantoprazole (PROTONIX) 40 MG tablet Take 1 tablet (40 mg total) by mouth once daily.  Qty: 30 tablet, Refills: 11      potassium chloride SA (K-DUR,KLOR-CON) 20 MEQ tablet Take 2 tablets (40 mEq total) by mouth 3 (three) times daily.  Qty: 120 tablet, Refills: 2      TREPROSTINIL SODIUM (TREPROSTINIL, REMODULIN, PUMP FOR HOME USE) Patient is on treprostinil (REMODULIN) Therapy and managed by the Pulmonary Hypertension Clinic. Contact PHTN Coordinators at [phone] for any questions about dosing. After hours, call  at [phone] and ask for HTS fellow on call         CONTINUE these medications which have CHANGED    Details   bumetanide (BUMEX) 2 MG tablet Take 1 tablet (2 mg total) by mouth 2 (two) times daily.  Qty: 60 tablet, Refills: 11      warfarin (COUMADIN) 5 MG tablet Take 1 tablet (5 mg total) by mouth Daily.  Qty: 30 tablet, Refills: 11         CONTINUE these medications which have NOT CHANGED    Details   clobetasol (TEMOVATE) 0.05 % external solution Refills: 2      gabapentin (NEURONTIN) 300 MG capsule Take 1 capsule (300 mg total) by mouth As instructed. Take one capsule qam, 1 qpm, 2 qhs.  Qty: 360 capsule, Refills: 2    Associated Diagnoses: Chronic neck pain; Right cervical radiculopathy      hydrocodone-acetaminophen 10-325mg (NORCO)  mg Tab Take 1 tablet by mouth 3 (three) times daily as needed.  Qty: 90 tablet, Refills: 0    Associated Diagnoses: Chronic neck pain; Subdeltoid bursitis, right      hydrOXYzine (VISTARIL) 50 MG Cap 50 mg daily as needed.       macitentan  (OPSUMIT) 10 mg Tab Take 1 tablet (10 mg total) by mouth once daily.  Qty: 30 tablet, Refills: 11    Associated Diagnoses: Secondary pulmonary hypertension      magnesium oxide (MAG-OX) 400 mg tablet Take 1 tablet (400 mg total) by mouth 2 (two) times daily.  Refills: 0      PATADAY 0.2 % Drop Refills: 6      spironolactone (ALDACTONE) 25 MG tablet Take 1 tablet (25 mg total) by mouth once daily.  Qty: 30 tablet, Refills: 11      duloxetine (CYMBALTA) 60 MG capsule Take 1 capsule (60 mg total) by mouth 2 (two) times daily.  Qty: 180 capsule, Refills: 1    Associated Diagnoses: Chronic neck pain; Right cervical radiculopathy         STOP taking these medications       potassium chloride (MICRO-K) 10 MEQ CpSR Comments:   Reason for Stopping:         sodium chloride 0.9% 0.9 % SolP 100 mL with treprostinil 1 mg/mL Soln 6,000,000 ng Comments:   Reason for Stopping:         vancomycin (VANCOCIN) 1 gram/200 mL PgBk 1000 mg in dextrose 5 % 200 mL IVPB Comments:   Reason for Stopping:               Time spent on the discharge of patient: 45 minutes    Sonia Lewis MD  Cardiology  [facility]

## 2017-11-07 NOTE — PROCEDURES
"Emily Cook is a 57 y.o. female patient.    Temp: 99.4 °F (37.4 °C) (11/07/17 1149)  Pulse: 89 (11/07/17 1149)  Resp: 16 (11/07/17 1149)  BP: (!) 125/58 (11/07/17 1149)  SpO2: 96 % (11/07/17 1149)  Weight: 65.9 kg (145 lb 4.5 oz) (11/07/17 0503)  Height: 5' 3" (160 cm) (11/01/17 2110)    PICC  Date/Time: 11/7/2017 12:06 PM  Performed by: MANUEL MARCH  Time out: Immediately prior to procedure a time out was called to verify the correct patient, procedure, equipment, support staff and site/side marked as required  Indications: med administration, vascular access and hemodynamic monitoring  Anesthesia: local infiltration  Local anesthetic: lidocaine 1% without epinephrine  Anesthetic Total (mL): 3  Preparation: skin prepped with ChloraPrep  Skin prep agent dried: skin prep agent completely dried prior to procedure  Sterile barriers: all five maximum sterile barriers used - cap, mask, sterile gown, sterile gloves, and large sterile sheet  Hand hygiene: hand hygiene performed prior to central venous catheter insertion  Location details: left basilic  Catheter type: single lumen  Catheter size: 4 Fr  Catheter Length: 40cm    Ultrasound guidance: yes  Vessel Caliber: medium and patent, compressibility normal  Vascular Doppler: not done  Needle advanced into vessel with real time Ultrasound guidance.  Guidewire confirmed in vessel.  Sterile sheath used.  no esophageal manometryNumber of attempts: 1  Post-procedure: blood return through all ports, chlorhexidine patch and sterile dressing applied  Technical procedures used: 3CG  Specimens: No  Implants: No  Assessment: placement verified by x-ray  Complications: none        Crystal Baum  11/7/2017  "

## 2017-11-07 NOTE — ASSESSMENT & PLAN NOTE
57 year-old female with history of pulmonary HTN on home Remodulin recently treated for MRSA bacteremia secondary to an infected cardoso catheter in September with Vanc x 2 weeks now admitted with fevers, right arm pain and numbness.    Blood cx 10/29 positive for MRSA (4/4 bottles). Repeat blood cx 10/30 and 11/3 are NGTD. TTE and AMIE negative for vegetations.  Afebrile since admission. No leukocytosis. VSS. Cardoso catheter has been removed and tip sent for culture which has NGTD.       Plan  - Recommend Linezolid 600 mg orally twice a day to complete therapy for MRSA bacteremia in order to avoid need for two central PICC lines and given that source has been removed. Will need to monitor INR (patient on coumadin) and platelet count while on Linezolid.  - Would hold off on placing new tunneled catheter until patient has completed two weeks of antibiotics from date of line removal on 11/4.  (Estimated end date 11/18/17)  - Will schedule patient an ID follow up after discharge.  - ID will sign off.

## 2017-11-08 NOTE — PROGRESS NOTES
Dr. Lewis notified of patient's K 3.6. MD to order additional replacement. Okay to discharge patient once potassium given and home health orders completed.

## 2017-11-08 NOTE — PROGRESS NOTES
Heart Failure Progress Note  Attending Physician: Augustine Rhodes MD  Hospital Day: 8    Subjective:   Interval History: No events overnight.  Now with single lumen PICC for Remodulin.  Repleting potassium now. Mg 1.9.      Plan: recheck K at noon and d/c when acceptable level.     Medications:   Continuous Infusions:   treprostinil (REMODULIN) infusion 28 ng/kg/min (11/07/17 1600)       Scheduled Meds:   bumetanide  2 mg Oral BID    gabapentin  300 mg Oral TID    linezolid  600 mg Oral Q12H    macitentan  10 mg Oral Daily    magnesium oxide  400 mg Oral BID    pantoprazole  40 mg Oral Daily    polyethylene glycol  17 g Oral BID    potassium chloride  10 mEq Intravenous Q1H    potassium chloride  60 mEq Oral Daily    sodium chloride 0.9%  10 mL Intravenous Q6H    sodium chloride 0.9%  10 mL Intravenous Q6H    sodium chloride 0.9%  3 mL Intravenous Q8H    warfarin  5 mg Oral Daily     PRN Meds:acetaminophen, HYDROmorphone, ondansetron, promethazine (PHENERGAN) IVPB, Flushing PICC Protocol **AND** sodium chloride 0.9% **AND** sodium chloride 0.9%, Flushing PICC Protocol **AND** sodium chloride 0.9% **AND** sodium chloride 0.9%, sodium chloride 0.9%    ----------------------------------------------------------------------------------------------------------------------  Home medications:   No current facility-administered medications on file prior to encounter.      Current Outpatient Prescriptions on File Prior to Encounter   Medication Sig Dispense Refill    bumetanide (BUMEX) 2 MG tablet Take 1 tablet (2 mg total) by mouth 2 (two) times daily. 60 tablet 11    clobetasol (TEMOVATE) 0.05 % external solution   2    gabapentin (NEURONTIN) 300 MG capsule Take 1 capsule (300 mg total) by mouth As instructed. Take one capsule qam, 1 qpm, 2 qhs. 360 capsule 2    hydrocodone-acetaminophen 10-325mg (NORCO)  mg Tab Take 1 tablet by mouth 3 (three) times daily as needed. 90 tablet 0    hydrOXYzine  "(VISTARIL) 50 MG Cap 50 mg daily as needed.       macitentan (OPSUMIT) 10 mg Tab Take 1 tablet (10 mg total) by mouth once daily. 30 tablet 11    magnesium oxide (MAG-OX) 400 mg tablet Take 1 tablet (400 mg total) by mouth 2 (two) times daily.  0    PATADAY 0.2 % Drop   6    potassium chloride (MICRO-K) 10 MEQ CpSR Take 4 capsules (40 mEq total) by mouth 3 (three) times daily. 360 capsule 3    spironolactone (ALDACTONE) 25 MG tablet Take 1 tablet (25 mg total) by mouth once daily. 30 tablet 11    vancomycin (VANCOCIN) 1 gram/200 mL PgBk 1000 mg in dextrose 5 % 200 mL IVPB Inject 1 g into the vein every 12 (twelve) hours.      warfarin (COUMADIN) 2.5 MG tablet Take 1 tablet (2.5 mg total) by mouth Daily. 30 tablet 11    duloxetine (CYMBALTA) 60 MG capsule Take 1 capsule (60 mg total) by mouth 2 (two) times daily. 180 capsule 1         Objective:     Vitals:  Temp:  [98.1 °F (36.7 °C)-99.4 °F (37.4 °C)]   Pulse:  []   Resp:  [16-18]   BP: ()/(53-58)   SpO2:  [93 %-96 %]     BP (!) 92/57 (Patient Position: Lying)   Pulse 77   Temp 98.1 °F (36.7 °C) (Axillary)   Resp 18   Ht 5' 3" (1.6 m)   Wt 65.4 kg (144 lb 2.9 oz)   SpO2 95%   Breastfeeding? No   BMI 25.54 kg/m²  I/O's:    Intake/Output Summary (Last 24 hours) at 11/08/17 0838  Last data filed at 11/08/17 0500   Gross per 24 hour   Intake              960 ml   Output             2550 ml   Net            -1590 ml       Wt Readings from Last 10 Encounters:   11/08/17 65.4 kg (144 lb 2.9 oz)   10/31/17 71.8 kg (158 lb 3.2 oz)   10/24/17 74.8 kg (165 lb)   10/24/17 72.4 kg (159 lb 9.8 oz)   10/09/17 74.8 kg (165 lb)   10/04/17 70.4 kg (155 lb 3.3 oz)   09/22/17 74.8 kg (165 lb)   09/20/17 74.8 kg (165 lb)   09/08/17 72.4 kg (159 lb 9.8 oz)   08/29/17 81.6 kg (180 lb)        General Appearance:    Alert, cooperative, no distress   Lungs:     Clear to auscultation bilaterally, respirations unlabored    Heart:    No JVP, Regular rate and rhythm, " HS S1+S2+no added sounds   Abdomen:     Soft, non-tender, bowel sounds active, no masses, no organomegaly   Extremities:   no edema b/l, pulses +2 b/l     Labs:       Recent Labs  Lab 11/06/17 0406 11/07/17  0500 11/08/17  0450    135* 135*   K 3.3* 3.1* 2.7*   CL 98 97 96   CO2 30* 28 28   BUN 19 14 13   CREATININE 0.9 0.9 1.0   * 111* 96   ANIONGAP 8 10 11       Recent Labs  Lab 11/06/17 0406 11/07/17  0500 11/08/17  0450   AST 10 12 14   ALT 7* 7* 8*   ALKPHOS 102 105 113   BILITOT 0.4 0.7 0.5   ALBUMIN 2.7* 2.6* 2.8*     No results for input(s): PH, PCO2, PO2, HCO3, POCSATURATED in the last 168 hours.     Recent Labs  Lab 11/06/17 0406 11/07/17  0500 11/08/17  0451   WBC 8.34 6.64 5.07   HGB 10.6* 10.3* 10.8*   HCT 32.6* 32.2* 33.1*   * 398* 421*   GRAN 60.4  5.0 69.3  4.6 50.1  2.5       Recent Labs  Lab 11/06/17 0406 11/07/17  0500 11/08/17  0451   INR 1.1 1.1 1.0     No results for input(s): TROPONINI, BNP in the last 168 hours.   Micro:   Blood Cultures  Lab Results   Component Value Date    LABBLOO No Growth to date 11/03/2017    LABBLOO No Growth to date 11/03/2017    LABBLOO No Growth to date 11/03/2017    LABBLOO No Growth to date 11/03/2017    LABBLOO No Growth to date 11/03/2017     Urine Cultures  Lab Results   Component Value Date    LABURIN No growth 10/29/2017    LABURIN PSEUDOMONAS AERUGINOSA  50,000 - 99,999 cfu/ml   09/23/2017    LABURIN ESCHERICHIA COLI  >100,000 cfu/ml   06/09/2017    LABURIN  07/15/2011     MULTIPLE ORGANISMS ISOLATED. NONE IN PREDOMINANCE  REPEAT IF CLINICALLY NECESSARY.    LABURIN  06/20/2011     MULTIPLE ORGANISMS ISOLATED. NONE IN PREDOMINANCE  REPEAT IF CLINICALLY NECESSARY.     EF   Date Value Ref Range Status   11/02/2017 60 55 - 65    08/30/2017 60 55 - 65    06/05/2017 60 55 - 65    06/01/2017 55 55 - 65    09/27/2016 55 55 - 65         Assessment:   56 yo female with a PMHx of medical non adherence, ALFREDO (non adherent to CPAP), PAH (group  1) on IV remodulin/warfarin/macitentan, chronic respiratory failure on 5L ATC of home oxygen, 3rd degree AV block s/p pacemaker implantation, diverticulitis and previous episode of bacteremia with home PICC line who is being transferred for MRSA bacteremia.      Plan:        MRSA bacteremia     - will change pt to linezolid yesterday.  Pt doing well with it.  Continue until 11/18.  -discharge home today  - blood cx negative and complete for 10/30 and 11/3  -ID follow up as outpatient  -can consider new Cardoso after abx complete       Right-sided heart failure     Continue treatment for her PAH as above  Continue Bumex 2mg BID  -recheck K at noon and d/c when acceptable level. Discuss possibility of subcut remodulin with [facility].  Strict I/Os and daily weights.        Primary pulmonary hypertension     - Continue IV remodulin and macitentan   - For remodulin line: now has single lumen PICC to go home.    - Pt may be interested in subcut remodulin again as she is getting Cardoso line infections.  She will decide and discuss this with Dr. Rhodes at next clinic appointment.  Did not like subcut remodulin because it was painful often.  -Goals of care discussion was introduced yesterday.         Sleep apnea- on CPAP     -Continue PTA CPAP                  Signed:  Sonia Lewis MD  Cardiology Hospitalist  Pager: [phone]  11/8/2017 8:38 AM

## 2017-11-08 NOTE — PROGRESS NOTES
Discharge instructions given to and reviewed with patient. Reviewed upcoming appointments and medication list, including new meds and med changes. Instructed to  Zyvox from [facility] and other prescriptions from [facility] in [place]. PICC dressing changed and dated per patient request prior to discharge - site CDI. Remodulin changed to patient's home CADD pump and cassette. Home oxygen at bedside. VSS. In NAD. Wheelchair and cart requested per escort.

## 2017-11-08 NOTE — PROGRESS NOTES
Notified by lab of critical potassium level 2.7. Dr. Lewis notified - orders received for potassium replacement both PO and IV. Will implement and continue to monitor.

## 2017-11-08 NOTE — PROGRESS NOTES
DISCHARGE    Pt presents in room alone, aaox4 with neutral affect. Pt states in agreement with plan to discharge to home today with resumed HH via [facility] ph [phone]; fax [phone]. Orders faxed and confirmed received. Pt's son will transport. Pt reports coping well and denies further needs, questions, concerns at this time and none indicated. Providing psychosocial and counseling support, education, resources, assistance and discharge planning as indicated. SW remains available.

## 2017-11-08 NOTE — NURSING
Pt AAO, family at the bedside. VSS, see flowsheet for further assessment details.     Telemetry monitoring in progress; NSR/ST, HR .     Pulm htn mgmt in progress. Remodulin infusing at @28ng/kg/min(58mls/24hrs) to L UA PICC, placed 11/7. L PIV remains pt no s/s of complication @ IV site. DW 86kg. Single lumen PICC to be placed 11/7; double lumen PICC removed 11/7. O2 levels stable at 93-95% on 3L via N/C.     Infection control in progress; Tmax 98.9. IV Vanc dc'ed. Pt now on PO Zyvox.     Pt up ad david; no new skin breakdown noted.     I/Os monitored. See chart for details     Fall precautions in place; pt remains free of injury at this time. Daily labs monitored. No acute events overnight. Possible d/c 11/8 per dayshift report.

## 2017-11-09 NOTE — PATIENT INSTRUCTIONS
Discharge Instructions: Caring for Your Peripherally Inserted Central Catheter (PICC)  You are going home with a peripherally inserted central catheter (PICC). This small, soft tube has been placed in a vein in your arm. It is often used when treatment requires medicines or nutrition for weeks or months. At home, you need to take care of your PICC to keep it working. Because a PICC line has a high infection risk, you must take extra care washing your hands and preventing the spread of germs. This sheet will help you remember what to do to care for your PICC at home.  Understanding your role  · A nurse or other healthcare provider will teach you and your caregivers how to care for the PICC. Before leaving the hospital, make sure you understand what to do at home, how long you may need the PICC, and when to have a follow-up visit.  · You will likely be told to flush the PICC with saline or heparin solution. You may also be told to change the catheters injection caps and change the dressing (bandage). Or, a nurse may do this for you during a follow-up visit. Only do these things if youre told to, following the instructions you were given.  Protecting the PICC  If the PICC gets damaged, it wont work right and could raise your chance of infection. Call your healthcare team right away if any damage occurs. To protect the PICC at home:  · Prevent infection. Use good hand hygiene by following the guidelines on this sheet. Dont touch the catheter or dressing unless you need to. And always clean your hands before and after you come in contact with any part of the PICC. Your caregivers, family members, and any visitors should use good hand hygiene, too.  · Keep the PICC dry. The catheter and dressing must stay dry. Dont take baths, go swimming, use a hot tub, or do other things that could get the PICC wet. Take a sponge bath to avoid getting your catheter wet, unless your healthcare provider tells you otherwise. Ask  your provider about the best way to keep your catheter dry when bathing or showering. If the dressing does get wet, change it only if you have been shown how. Otherwise, call your healthcare team right away for help.  · Avoid damage. Dont use any sharp or pointy objects around the catheter. This includes scissors, pins, knives, razors, or anything else that could cut it or put a hole in it (puncture it). Also, dont let anything pull or rub on the catheter, such as clothing.  · Watch for signs of problems. Pay attention to how much of the catheter sticks out from your skin. If this changes at all, let your healthcare provider know. Also watch for cracks, leaks, or other damage. If the dressing becomes dirty, loose, or wet, change it (if you have been instructed to). Or call your healthcare team right away.  · Avoid lowering your chest below your waist. This includes bending at the waist to do things like tying your shoes. When your chest is below your waist, especially for a long time, the catheters internal tip could slip out of place in the vein.  · Tell your healthcare team if you vomit or have severe coughing. This can also make the catheter slip out of place.  Protecting your arm  The arm with the PICC is at risk for developing blood clots (thrombosis). This is a serious problem. To help prevent it:  · As much as possible, use the arm with the PICC in it for normal daily activities. Lack of movement can lead to blood clots. So its important to move your arm as you normally would. Your healthcare team may suggest light arm exercises.  · Avoid activities or exercises that require major use of your arm, such as sports, unless your healthcare provider says its OK.  · Avoid any activities that cause mild pain in your arm. Talk to your healthcare team if you have concerns about pain or range of motion.  · Dont lift anything heavier than 10 pounds with the affected arm.  · Drink plenty of water. Staying hydrated  helps keep clots from forming.  Prevent infection with good hand hygiene  A PICC can let germs into your body. This can lead to serious and sometimes deadly infections. To prevent infection, its very important that you, your caregivers, and others around you use good hand hygiene. This means washing your hands well with soap and water, and cleaning them with an alcohol-based hand gel as directed. Never touch the PICC or dressing without first using one of these methods.  To wash your hands with soap and water:  · Wet your hands with warm water. (Avoid hot water, which can cause skin irritation when you wash your hands often.)  · Apply enough soap to cover the whole surface of your hands, including your fingers.  · Rub your hands together vigorously for at least 15 seconds. Make sure to rub the front and back of each hand up to the wrist, your fingers and fingernails, between the fingers, and each thumb.  · Rinse your hands with warm water.  · Dry your hands completely with a new, unused paper towel. Dont use a cloth towel or other reusable towel. These can harbor germs.  · Use the paper towel to turn off the faucet, then throw it away. If youre in a bathroom, also use a paper towel to open the door instead of touching the handle.  When you dont have access to soap and water:  Use an alcohol-based hand gel to clean your hands. The gel should have at least 60% alcohol. Follow the instructions on the package. Your healthcare team can answer any questions you have about when to use hand gel, or when its better to wash with soap and water.   When to seek medical care  Call your provider right away if you have any of the following:  · Pain or burning in your shoulder, chest, back, arm, or leg  · Fever of 100.4°F (38.0°C) or higher  · Chills  · Signs of infection at the catheter site (pain, redness, drainage, burning, or stinging)  · Coughing, wheezing, or shortness of breath  · A racing or irregular  heartbeat  · Muscle stiffness or trouble moving  · Tightness in your arm, above the catheter site  · Gurgling noises coming from the catheter  · The catheter falls out, breaks, cracks, leaks, or has other damage   Date Last Reviewed: 7/1/2016  © 8523-4784 [employer]. [address] [place] [address], [place] [address]. All rights reserved. This information is not intended as a substitute for professional medical care. Always follow your healthcare professional's instructions.

## 2017-11-09 NOTE — PROGRESS NOTES
The pt was recently admitted from 10/29 through 11/8 for MRSA bactermia.  See calendar for recent INRs and warfarin doses administered while admitted.  She was discharged with zyvox 600mg BID x 11 days.  See calendar for dosing.

## 2017-11-09 NOTE — PROGRESS NOTES
Spoke with Sandra at [facility] to clarify dosage for Coumadin, transfered to Madna in Pharmacy, per Manda pt should take Coumadin 7.5 mg on Monday and 5 mg on all other days unless notified by Coumadin clinic, read back dosage and days of each dosage to verify, Manda verbalized my understanding was correct, will contact pt with Coumadin administration instructions from Manda,spoke with pt, direction for administration of Coumadin provided, pt verbalized understanding

## 2017-11-14 PROBLEM — R78.81 BACTEREMIA: Status: RESOLVED | Noted: 2017-01-01 | Resolved: 2017-01-01

## 2017-11-14 PROBLEM — Z22.322 MRSA (METHICILLIN RESISTANT STAPH AUREUS) CULTURE POSITIVE: Status: RESOLVED | Noted: 2017-01-01 | Resolved: 2017-01-01

## 2017-11-14 NOTE — PROGRESS NOTES
Subjective:   Patient ID:  Emily Cook is a 57 y.o. female     Chief complaint:heart block      HPI  New patient to me-- referred by Dr Abdi Antony for management of recurrent bacteremia in the face of an implanted device   Background as modified from Dr Antony's note:  57 yoF pHTN, syncope (she says before and after the PPM implant, related to visits to the bathroom). Reported to have had high degree AVB and therefore received  DC PM 7/29/13.     Over the past several years, her pHTN has worsened and she was recently admitted with MRSA bacteremia suspected to be due to infected indwelling lines. Cardoso catheter placed 6/2017 for pHTN medications. She was then transferred from OSH after being admitted with acute respiratory failure 2/2 to [facility] with blood cultures 8/26 +MRSA. She then had her Hickmann removed and then replaced 9/8/17. In the interim she got vancomycin. She then presented with infectious symptoms and was found to have recurrence of MRSA bacteremia late October. Her Hickmann was removed and she is currently getting vancomycin with plan for 2 weeks course via LUE PICC. No mention of PM extraction in the chart. She paces <1% in the atrium and ventricle. AMIE 11/2/17 showed no valvular vegetations. Normal EF.      6/16/2017 - Placement of right subclavian tunneled Cardoso catheter by Dr. Pride (removed 09/05/17)  9/8/2017 - Placement of left IJ tunneled Cardoso catheter by Dr. Simental  7/29/2013 - Placement of left sided dual chamber pacemaker (still in place)     AMIE 11/2/17:  CONCLUSIONS     1 - No visualized thrombus in the left atrium.     2 - Left atrial appendage is multilobed with no visualized thrombus.     3 - Right atrial enlargement.  .     4 - Normal left ventricular systolic function (EF 60-65%).     5 - Normal left ventricular diastolic function.     6 - Mild mitral regurgitation.   No vegetations noted     Current clinical status:  She is on Remodulin and has been stable at  a class III Fc. Uses O2 at home.  Has recently completed a six min walk test with O2 on.  Most recent Pulm pressure estimated at 68  At one point, palliative care was entertained for her but she has since doen better with resumption of continuous Remodulin infusion.   No current facility-administered medications for this visit.      No current outpatient prescriptions on file.     Facility-Administered Medications Ordered in Other Visits   Medication    bumetanide tablet 2 mg    gabapentin capsule 300 mg    hydrocodone-acetaminophen 10-325mg per tablet 1 tablet    hydrOXYzine pamoate capsule 50 mg    macitentan tablet 10 mg    magnesium oxide tablet 400 mg    ondansetron injection 4 mg    pantoprazole EC tablet 40 mg    potassium chloride SA CR tablet 40 mEq    sodium chloride 0.9% flush 3 mL    spironolactone tablet 25 mg    warfarin (COUMADIN) tablet 5 mg     Review of Systems   Constitution: Negative for decreased appetite, weakness, weight gain and weight loss.   HENT: Negative for nosebleeds.    Eyes: Negative for blurred vision and visual disturbance.   Cardiovascular: Negative for chest pain, claudication, cyanosis, dyspnea on exertion, irregular heartbeat, leg swelling, near-syncope, orthopnea, palpitations, paroxysmal nocturnal dyspnea and syncope.   Respiratory: Negative for cough, shortness of breath and wheezing.    Endocrine: Negative for heat intolerance.   Skin: Negative for rash.   Musculoskeletal: Negative for muscle weakness and myalgias.   Gastrointestinal: Negative for abdominal pain, anorexia, melena, nausea and vomiting.   Genitourinary: Negative for menorrhagia.   Neurological: Negative for excessive daytime sleepiness, dizziness, headaches, loss of balance, seizures and vertigo.   Psychiatric/Behavioral: Negative for altered mental status and depression. The patient is not nervous/anxious.        Objective:   Physical Exam   Constitutional: She is oriented to person, place, and  "time. She appears well-developed and well-nourished.   Remodulin infusion bag    HENT:   Head: Normocephalic and atraumatic.   Right Ear: External ear normal.   Left Ear: External ear normal.   Eyes: Conjunctivae are normal. Pupils are equal, round, and reactive to light. Left eye exhibits no discharge. No scleral icterus.   Neck: Normal range of motion. Neck supple. No thyromegaly present.   Cardiovascular: Normal rate, regular rhythm, normal heart sounds and intact distal pulses.  Exam reveals no gallop, no S3, no S4, no friction rub, no midsystolic click and no opening snap.    No murmur heard.  Pulses:       Carotid pulses are 2+ on the right side, and 2+ on the left side.       Radial pulses are 2+ on the right side, and 2+ on the left side.        Dorsalis pedis pulses are 2+ on the right side, and 2+ on the left side.        Posterior tibial pulses are 2+ on the right side, and 2+ on the left side.   Pulmonary/Chest: Effort normal and breath sounds normal.   Device pocket is in excellent repair   Abdominal: Soft. She exhibits no distension. There is no hepatomegaly. There is no tenderness. There is no guarding.   Musculoskeletal:        Right ankle: She exhibits no swelling.        Left ankle: She exhibits no swelling.        Right lower leg: She exhibits no swelling.        Left lower leg: She exhibits no swelling.   Neurological: She is alert and oriented to person, place, and time. She has normal strength. No cranial nerve deficit. Gait normal.   Skin: Skin is warm, dry and intact. No rash noted. No cyanosis. Nails show no clubbing.   Psychiatric: She has a normal mood and affect. Her speech is normal and behavior is normal. Thought content normal. Cognition and memory are normal.   Nursing note and vitals reviewed.    BP (!) 100/58   Pulse 76   Ht 5' 3" (1.6 m)   Wt 68 kg (149 lb 14.6 oz)   BMI 26.56 kg/m²      Assessment:    Recurrent MRSA bacteremias with indwelling PPM leads. Patient currently on " continued antibiotic Rx after a prior 6 week IV course failed to prevent a recurrence. The risk of another recurrent bacteremia after completion of current antibiotic course.  1. Staphylococcus aureus bacteremia    2. Bloodstream infection associated with central venous catheter, sequela    3. MRSA bacteremia    4. Cardiac pacemaker in situ    5. Primary pulmonary hypertension    6. Sleep apnea, unspecified type    7. Chronic respiratory failure with hypoxia        Plan:    In view of the fact that her PPM leads are of relatively young age, that she has been on OAC for much of the period since implant, that she rarely uses her PPM - if at all-,  that the risk of recurrent bacteremia is quite high and basil any recurrence would place her at significant clinical jeopardy, it would seem best to proceed with lead extraction asap (before she has a chance of getting sick again). I have also reviewed the recent echo and her RV lead tip appears to be septal.  Due to the fact that she has severe pulm HTN, she would be at high risk of complications with general anesthesia and she would certainly not be a candidate for emergency rescue surgery if complications occur as a result of the extraction.  I have discussed this at length with the patient and she would like to proceed with the extraction.. We have also agreed omn the following: procedure to be done under minimal sedation and no provisions for emergency thoracotomy will be made.  I have discussed the procedure in detail with the patient. I described its benefits and risks. I reviewed alternative therapies and discussed their potential value. The patient was given ample opportunity to express concerns and ask questions and I provided appropriate responses and  answers to such.The patient understands and agrees to proceed.  Consent form was signed today by patient and myself and appropriately witnessed.      No orders of the defined types were placed in this  encounter.    Return post extraction.  There are no discontinued medications.  New Prescriptions    No medications on file     Modified Medications    No medications on file

## 2017-11-14 NOTE — TELEPHONE ENCOUNTER
Spoke with patient, mother and son (not Merrill) about patient's condition and the things that she can do to take care of herself at home and reduce the need to be hospitalized. Discussed keeping in touch with her Pulmonary Hypertension Coordinator. Gave PH Coordinator business card with direct phone number. Also, suggested that patient review the list of her medications and match those medications with the RX bottles she has at home so that she is familiar. Encouraged patient to maintain sterile precautions when changing her brush line dressing and to keep her site covered at all times. Patient has had trouble with itching and irritation from her dressing. [facility] has sent different dressings for patient to try instead.  Discussed the importance of being an advocate for herself when in an outside hospital, specifically having them contact PH Coordinator and [facility] pharmacy and not allowing anyone to draw from her line or flush her line.  Additionally, had a long discussion about oxygen and the need for patient to wear it continuously. Patient will not wear oxygen outside the home and often does not wear it at home. Stressed that oxygen is a medication and it will help.   Patient and family verbalized understanding of all.   PH coordinator contacted  with updated orders.

## 2017-11-14 NOTE — LETTER
November 14, 2017        Kaylee Cazares  [address] [facility] [address]  Phone: [phone]  Fax: [phone]             [facility]  [address]  [place] [address]  Phone: [phone]   Patient: Emily Cook   MR Number: [id number]   YOB: 1960   Date of Visit: 11/14/2017       Dear Dr. Kaylee Cazares    Thank you for referring Emily Cook to me for evaluation. Attached you will find relevant portions of my assessment and plan of care.    If you have questions, please do not hesitate to call me. I look forward to following Emily Cook along with you.    Sincerely,    Augustine Rhodes MD    Enclosure    If you would like to receive this communication electronically, please contact [email or web address] or [phone] to request [employer] access.    [employer] is a tool which provides read-only access to select patient information with whom you have a relationship. Its easy to use and provides real time access to review your patients record including encounter summaries, notes, results, and demographic information.    If you feel you have received this communication in error or would no longer like to receive these types of communications, please e-mail [email or web address]

## 2017-11-14 NOTE — PROGRESS NOTES
It appears that the pt's Zyvox isn't causing a DDI with her warfarin.  The pt also let us know that she is scheduled for a pacemaker procedure on 11/20 and was instructed to stop warfarin on 11/16.  She has been approved to hold in the past without lovenox, per Dr. Cazares.   See calendar for procedure instructions.

## 2017-11-14 NOTE — TELEPHONE ENCOUNTER
EXTRACTION/ IMPLANTABLE DEVICE EDUCATION CHECKLIST    11/17/17  PRE - PROCEDURE LABS HAVE BEEN ORDERED FOR YOU @ [facility]  BE SURE TO ARRIVE AT YOUR SCHEDULED TIME FOR THIS LAB WORK!  (YOU DO NOT HAVE TO FAST FOR THIS LABWORK!!!!)    11/20/17 @ 6 AM  REPORT TO CARDIOLOGY WAITING ROOM ON 3RD FLOOR OF HOSPITAL (DO NOT REPORT TO CLINIC)  Directions for Reporting to Cardiology Waiting Area in the Hospital  If you park in the Parking Garage:  Take elevators to the 2nd floor  Walk up ramp and turn right by Gold Elevators  Take elevator to the 3rd floor  Upon exiting the elevator, turn away from the clinic areas  Walk long montero around to front of hospital to area with windows overlooking [facility]  Check in at Reception Desk  OR  If family is dropping you off:  Have them drop you off at the front of the Hospital  (Near the ER, where all the flags are hung).  Take the E elevators to the 3rd floor.  Check in at the Reception Desk in the waiting room.    WASH YOUR CHEST WITH HIBICLENS OR AN ANTIBACTERIAL SOAP (SUCH AS DIAL) ON THE NIGHT BEFORE AND THE MORNING OF YOUR PROCEDURE.    DO NOT EAT OR DRINK ANYTHING AFTER: 12 MN ON THE NIGHT BEFORE YOUR PROCEDURE    MEDICATIONS  STOP TAKING COUMADIN 3 DAYS BEFORE YOUR PROCEDURE (LAST DOSE 11/16/17)  YOU MAY TAKE YOUR OTHER USUAL MORNING MEDICATIONS WITH A SIP OF WATER    YOU WILL BE SPENDING THE NIGHT AFTER YOUR PROCEDURE  YOU WILL NEED SOMEONE TO DRIVE YOU HOME THE DAY AFTER YOUR PROCEDURE    YOUR PAIN DURING YOUR PROCEDURE WILL BE MANAGED BY THE ANESTHESIA TEAM    THE ABOVE INSTRUCTIONS WERE GIVEN TO THE PATIENT VERBALLY AND THEY VERBALIZED UNDERSTANDING. THEY DO NOT REQUIRE ANY SPECIAL NEEDS AND DO NOT HAVE ANY LEARNING BARRIERS.     Any need to reschedule or cancel procedures, or any questions regarding your procedures should be addressed directly with the Arrhythmia Department Nurses at the following phone number: [phone]

## 2017-11-14 NOTE — TELEPHONE ENCOUNTER
Patient called about AMIE scheduled on 11/20/17 . Pre-procedural questions asked.  Denies swallowing issues and esophageal issues. Denies previous sedation/anesthesia problems. States does have sleep apnea.  Has dentures. Denies recent trauma/surgery/radiation therapy to head/neck/airway. States is able to move neck without difficulty. AMIE described to patient. Instructed NPO past midnight and to have a designated  to drive patient home after the procedures due to sedation being given. Instructed to report to   [facility] at 0600 am.  Verbalizes understanding. Questions answered.

## 2017-11-14 NOTE — PROGRESS NOTES
Subjective:    Patient ID:  Emily Cook is a 57 y.o. female who presents for follow-up of AV block      57 yoF pHTN high degree AVB s/p DC PM 7/13 here for follow up. She experienced recurrent syncope with high grade AV block. She underwent DC PM implantation 7/29/13. She had an unremarkable post-implantation course.  Normal TTM 12/15.    Interval history: She is here for her first visit since 1/16. In the interim her pHTN has worsened and she was recently admitted with MRSA bacteremia suspected to be due to infected indwelling lines. Cardoso catheter placed 6/2017 for pHTN medications. She was then transferred from OSH after being admitted with acute respiratory failure 2/2 to [facility] with blood cultures 8/26 +MRSA. She then had her Hickmann removed and then replaced 9/8/17. In the interim she got vancomycin. She then presented with infectious symptoms and was found to have recurrence of MRSA bacteremia late October. Her Hickmann was removed and she is currently getting vancomycin with plan for 2 weeks course via LUE PICC. No mention of PM extraction in the chart. She paces <1% in the atrium and ventricle. AMIE 11/2/17 showed no valvular vegetations. Normal EF.     6/16/2017 - Placement of right subclavian tunneled Cardoso catheter by Dr. Pride (removed 09/05/17)  9/8/2017 - Placement of left IJ tunneled Cardoso catheter by Dr. Simental  7/29/2013 - Placement of left sided dual chamber pacemaker (still in place)    AMIE 11/2/17:  CONCLUSIONS     1 - No visualized thrombus in the left atrium.     2 - Left atrial appendage is multilobed with no visualized thrombus.     3 - Right atrial enlargement.  .     4 - Normal left ventricular systolic function (EF 60-65%).     5 - Normal left ventricular diastolic function.     6 - Mild mitral regurgitation.   No vegetations noted    Past Medical History:  No date: Arrhythmia  No date: Arthritis  No date: Cardiac pacemaker in situ  No date: Carpal tunnel syndrome,  "right  No date: Cervical spondylosis  No date: Cervicalgia  No date: CHF (congestive heart failure)  No date: Diverticulitis  No date: Heart block AV third degree  No date: Hyperlipidemia  No date: ALFREDO on CPAP  No date: Pulmonary hypertension  No date: Subdeltoid bursitis    Past Surgical History:  No date: CARDIAC CATHETERIZATION  7/29/13: CARDIAC PACEMAKER PLACEMENT      Comment: [employer] DC PM  No date: CARDIAC SURGERY  9/28/2015: COLONOSCOPY N/A      Comment: Procedure: COLONOSCOPY;  Surgeon: Jason Diaz MD;  Location: [facility] ENDO ([address] [place] [address]);                 Service: Endoscopy;  Laterality: N/A;  Please                schedule in 2-3 months with Dr DiazPulmonary               HTN, 2nd floor (off remodulin infusion as of "a               few days" before 9/9/15)3 day hold                [facility]                scheduled before procedure  No date: ECTOPIC PREGNANCY SURGERY Left  No date: HYSTERECTOMY      Comment: partial  No date: knee arthroscopy    Social History    Marital status: Single              Spouse name:                       Years of education:                 Number of children:               Occupational History    None on file    Social History Main Topics    Smoking status: Never Smoker                                                                Smokeless tobacco: Never Used                        Alcohol use: No                 Comment: rarely    Drug use: No              Sexual activity: No                   Other Topics            Concern    None on file    Social History Narrative    None on file    Review of patient's family history indicates:    Arthritis                      Mother                    Diabetes                       Mother                    Hyperlipidemia                 Mother                    Arthritis                      Father                    Hyperlipidemia                 Father                "               Review of Systems   Constitution: Positive for weakness and malaise/fatigue.   HENT: Negative.    Eyes: Negative.    Cardiovascular: Positive for dyspnea on exertion. Negative for chest pain, irregular heartbeat, near-syncope, palpitations and syncope.   Respiratory: Negative.    Endocrine: Negative.    Hematologic/Lymphatic: Negative.    Skin: Negative.    Musculoskeletal: Negative.    Gastrointestinal: Negative.    Genitourinary: Negative.    Psychiatric/Behavioral: Negative.         Objective:    Physical Exam   Constitutional: She is oriented to person, place, and time. She appears well-developed and well-nourished. No distress.   HENT:   Head: Normocephalic and atraumatic.   Eyes: Conjunctivae and EOM are normal. Pupils are equal, round, and reactive to light.   Neck: Normal range of motion. Neck supple. JVD present. No thyromegaly present.   Cardiovascular: Normal rate and regular rhythm.    Murmur heard.  Pulmonary/Chest: Effort normal and breath sounds normal. No respiratory distress. She has no wheezes.   Abdominal: Soft. Bowel sounds are normal. She exhibits no distension. There is no tenderness.   Musculoskeletal: Normal range of motion. She exhibits no edema.   Neurological: She is alert and oriented to person, place, and time. No cranial nerve deficit.   Skin: Skin is warm and dry. No rash noted. She is not diaphoretic. No erythema.   Psychiatric: She has a normal mood and affect. Her behavior is normal. Judgment and thought content normal.   Vitals reviewed.        Assessment:       1. Heart block AV third degree    2. MRSA bacteremia         Plan:       57 yoF advanced AV block s/p DC PM here for routine visit. She has normal DC PM function with rare AP & . She has recurrent MRSA bacteremia refractory to line removal and antibiotics. I will have her see Dr Johnson for consideration of extraction. Due to her infrequent pacing burden, I suspect she could be without a pacing system for  several weeks.

## 2017-11-15 NOTE — TELEPHONE ENCOUNTER
"Called Mrs. Cook's son, Merrill, who manages patient's remodulin, including all mixing. Confirmed that he knows that patient is coming for a procedure here at [facility] on Monday the 20th. Instructed Mr. Cook to mix a new cassette on Monday, prior to patient leaving the house for the hospital. Patient needs to arrive with a full cassette infusion. Also, patient must have a prepared back-up cassette and all supplies with her upon arrival to the hospital. Mr. Cook repeated back information and verbalized understanding.  Also, attempted to confirm how Mr. Cook is mixing patient's medicine because the current rate on her pump is incorrect. Mr. Cook confirmed that patient is at a rate of 35 ml/24 hour. This rate and coinciding dose is not on patient's dosing sheet. Mr. Cook states that he is mixing "4" of remodulin and "97" of diluent but does say that he does not have the dosing sheet with him. Advised Mr. Cook that patient should be at a dose of 30 ng/kg/min, rate of 37 ml/24hr with final concentration of 10, mixing 4 and 96. Also stressed that we do not advising changing the dose without notifying the MD.   [facility] will send a new dosing sheet to patient/son.  Notified MD.  "

## 2017-11-15 NOTE — ASSESSMENT & PLAN NOTE
Continue IV remodulin as noted above   PH nurses reviewed need to be able to contact pt / expectations

## 2017-11-15 NOTE — ASSESSMENT & PLAN NOTE
Starting conversation about goals of care   Pt agrees that she wants to continue IV remodulin and accepts the risk of line sepsis  Need to research further extent of stenosis limiting line access

## 2017-11-15 NOTE — PROGRESS NOTES
"Subjective: class 3     Patient ID:  Emily Cook is a 57 y.o. female who presents for follow-up of Pulmonary Hypertension (f/u hospital d/c)      HPI    of AVB/syncope s/p PPM, pulmonary HTN on IV remodulin  (23 elisabet/kg/min) who comes after early Nov 2017 admission for MRSA bacteremia presumable due to PICC line.  She was initial treatment with vanco / line removal followed by replacement of PICC line and linezolid (later to avoid need for two lines)  Seem by EP earlier today was plan on pacemaker extraction due to  recurrent MRSA bacteremia refractory to line removal and antibiotics.  Today, she comes in with her family who support her decision to remain on IV remodulin rather than changing to subcutaneous remodulin, the later would help decrease risk of bacteremia.  Able to walk one blocks if she uses her oxygen.   No edema.        Review of Systems   Constitution: Negative for decreased appetite, weight gain and weight loss.   Cardiovascular: Negative for chest pain, dyspnea on exertion, leg swelling, near-syncope, orthopnea and palpitations.   Respiratory: Negative for cough and shortness of breath.    Musculoskeletal: Negative for myalgias.   Gastrointestinal: Negative for jaundice.        Objective:    Physical Exam   Constitutional: She appears well-developed and well-nourished. No distress.   /66 (BP Location: Right arm, Patient Position: Sitting, BP Method: Medium (Manual))   Pulse 72   Ht 5' 3" (1.6 m)   Wt 70.2 kg (154 lb 12.2 oz)   BMI 27.42 kg/m²      HENT:   Head: Normocephalic and atraumatic. Head is without abrasion and without contusion.   Right Ear: External ear normal.   Left Ear: External ear normal.   Nose: Nose normal. No epistaxis.   Mouth/Throat: Oropharynx is clear and moist. Mucous membranes are not cyanotic.   Eyes: Conjunctivae and EOM are normal. Pupils are equal, round, and reactive to light.   Neck: Normal range of motion. Neck supple. No tracheal deviation present. "   Cardiovascular: Normal rate, regular rhythm, normal heart sounds and normal pulses.  Exam reveals no gallop.    No murmur heard.  Pulmonary/Chest: Effort normal and breath sounds normal. No stridor. No respiratory distress. She has no wheezes.   Abdominal: Soft. Normal appearance, normal aorta and bowel sounds are normal. She exhibits no distension. There is no tenderness.   Musculoskeletal: She exhibits no edema or tenderness.   Neurological: She is alert. She has normal strength and normal reflexes. She exhibits normal muscle tone.   Skin: Skin is warm. No rash noted. No erythema.   Psychiatric: She has a normal mood and affect. Her speech is normal and behavior is normal. Judgment and thought content normal. Cognition and memory are normal.     Lab Results   Component Value Date    BNP 87 10/24/2017     11/13/2017    K 3.8 11/13/2017    MG 1.9 11/08/2017     11/13/2017    CO2 24 11/13/2017    BUN 14 11/13/2017    CREATININE 1.10 11/13/2017    GLU 75 11/13/2017     01/27/2017    HGBA1C 5.9 02/27/2012    AST 14 11/08/2017    ALT 8 (L) 11/08/2017    ALBUMIN 2.8 (L) 11/08/2017    ALBUMIN 3.6 01/27/2017    PROT 7.3 11/08/2017    BILITOT 0.5 11/08/2017    CHOL 251 (H) 10/31/2017    HDL 61 10/31/2017    LDLCALC 123 10/31/2017    TRIG 80 10/31/2017       Lab Results   Component Value Date    INR 1.4 (H) 11/13/2017    INR 1.4 11/13/2017    INR 1.2 (H) 11/09/2017      Ref Range & Units 1d ago  (11/13/17) 2wk ago  (10/25/17) 1mo ago  (10/10/17) 1mo ago  (9/20/17)      NT-proBNP 5.0 - 900.0 pg/mL 482.0  662.0  1130.0             Assessment:       1. Right-sided heart failure    2. Hypokalemia    3. Palliative care encounter    4. Cardiac pacemaker in situ         Plan:       Right-sided heart failure  Continue IV remodulin as noted above   PH nurses reviewed need to be able to contact pt / expectations     Hypokalemia  Will start on aldactone   Weekly BMP to see if we can decrease potassium requirement      Palliative care encounter  Starting conversation about goals of care   Pt agrees that she wants to continue IV remodulin and accepts the risk of line sepsis  Need to research further extent of stenosis limiting line access     Cardiac pacemaker in situ  Per EP plan to remove as noted above     Return in about 3 weeks (around 12/5/2017). labs followed by [facility]

## 2017-11-17 NOTE — TELEPHONE ENCOUNTER
Spoke with Mrs. Cook about her procedure on Monday. Instructed her to check in to the hospital on Sunday evening so that we could secure a bed for her. She is to bring all of her remodulin supplies and be prepared to stay post procedure.    Eliza Gunter RN Case Manager notified as well as the HTS team. Notified the EP lab.

## 2017-11-19 PROBLEM — T82.110A PACEMAKER LEAD MALFUNCTION: Status: ACTIVE | Noted: 2017-01-01

## 2017-11-20 NOTE — CONSULTS
[facility]  Cardiac Electrophysiology  Consult Note    Admission Date: 11/19/2017  Code Status: DNR   Attending Provider: Stephane Mohan MD  Consulting Provider: Troy Ceballos MD  Principal Problem:<principal problem not specified>    Inpatient consult to Electrophysiology  Consult performed by: TROY CEBALLOS  Consult ordered by: JACOB CHERRY        Subjective:     Chief Complaint: Pacemaker Extraction     HPI:   56 yo female with a PMHx of PHTN (group 1) on home Remodulin, coumadin and macitetan, recurrent MRSA bacteremia , hx of pacemaker placement due high grade AVB, chronic respiratory failure on 4 L ATC of home oxygen presenting for a scheduled pacemaker lead removal due to recurrent bacteremia. Patient has just finished a course of zyvox and also had brush catheter before last discharge on 11/08. She is on Remodulin through PICC to the left arm and has two cassettes in reserve. Recently admitted with MRSA bacteremia suspected to be due to infected indwelling lines. Brush catheter placed 6/2017 for pHTN medications    S/p successful DC PPM extraction    EKG: NSR with incomplete RBBB    Past Medical History:   Diagnosis Date    Arrhythmia     Arthritis     Cardiac pacemaker in situ     Carpal tunnel syndrome, right     Cervical spondylosis     Cervicalgia     CHF (congestive heart failure)     Diverticulitis     Heart block AV third degree     Hyperlipidemia     ALFREDO on CPAP     Pulmonary hypertension     Subdeltoid bursitis        Past Surgical History:   Procedure Laterality Date    CARDIAC CATHETERIZATION      CARDIAC PACEMAKER PLACEMENT  7/29/13    [employer] DC PM    CARDIAC SURGERY      COLONOSCOPY N/A 9/28/2015    Procedure: COLONOSCOPY;  Surgeon: Jason Diaz MD;  Location: [facility] ([address] [place] [address]);  Service: Endoscopy;  Laterality: N/A;  Please schedule in 2-3 months with Dr Diaz  Pulmonary HTN, 2nd floor (off remodulin infusion as of  ""a few days" before 9/9/15)    3 day hold Coumadin, [facility]  PT/INR scheduled before procedure    ECTOPIC PREGNANCY SURGERY Left     HYSTERECTOMY      partial    knee arthroscopy         Review of patient's allergies indicates:   Allergen Reactions    Chlorhexidine Itching and Rash     Patient had raised rash on neck following RHC procedure. She states she's never had a problem with the "prep" used until this time.     Adhesive Rash       No current facility-administered medications on file prior to encounter.      Current Outpatient Prescriptions on File Prior to Encounter   Medication Sig    bumetanide (BUMEX) 2 MG tablet Take 1 tablet (2 mg total) by mouth 2 (two) times daily.    duloxetine (CYMBALTA) 60 MG capsule Take 1 capsule (60 mg total) by mouth 2 (two) times daily.    gabapentin (NEURONTIN) 300 MG capsule Take 1 capsule (300 mg total) by mouth As instructed. Take one capsule qam, 1 qpm, 2 qhs.    hydrocodone-acetaminophen 10-325mg (NORCO)  mg Tab Take 1 tablet by mouth 3 (three) times daily as needed.    hydrOXYzine (VISTARIL) 50 MG Cap 50 mg daily as needed.     macitentan (OPSUMIT) 10 mg Tab Take 1 tablet (10 mg total) by mouth once daily.    magnesium oxide (MAG-OX) 400 mg tablet Take 1 tablet (400 mg total) by mouth 2 (two) times daily.    pantoprazole (PROTONIX) 40 MG tablet Take 1 tablet (40 mg total) by mouth once daily.    PATADAY 0.2 % Drop     potassium chloride SA (K-DUR,KLOR-CON) 20 MEQ tablet Take 2 tablets (40 mEq total) by mouth 3 (three) times daily.    spironolactone (ALDACTONE) 25 MG tablet Take 1 tablet (25 mg total) by mouth once daily.    TREPROSTINIL SODIUM (TREPROSTINIL, REMODULIN, PUMP FOR HOME USE) Patient is on treprostinil (REMODULIN) Therapy and managed by the Pulmonary Hypertension Clinic. Contact PHTN Coordinators at [phone] for any questions about dosing. After hours, call  at [phone] and ask for HTS fellow on call    " warfarin (COUMADIN) 5 MG tablet Take 1 tablet (5 mg total) by mouth Daily. (Patient taking differently: Take 5 mg by mouth Daily. Patient received 2.5 mg tablets from pharmacy)     Family History     Problem Relation (Age of Onset)    Arthritis Mother, Father    Diabetes Mother    Hyperlipidemia Mother, Father        Social History Main Topics    Smoking status: Never Smoker    Smokeless tobacco: Never Used    Alcohol use No      Comment: rarely    Drug use: No    Sexual activity: No     Review of Systems   All other systems reviewed and are negative.    Objective:     Vital Signs (Most Recent):  Temp: 97.8 °F (36.6 °C) (11/20/17 1626)  Pulse: 74 (11/20/17 1626)  Resp: 16 (11/20/17 1626)  BP: (!) 98/56 (11/20/17 1626)  SpO2: (!) 94 % (11/20/17 1626) Vital Signs (24h Range):  Temp:  [97.2 °F (36.2 °C)-98 °F (36.7 °C)] 97.8 °F (36.6 °C)  Pulse:  [57-80] 74  Resp:  [16-22] 16  SpO2:  [90 %-100 %] 94 %  BP: ()/(51-64) 98/56     Weight: 72.8 kg (160 lb 7.9 oz)  Body mass index is 28.43 kg/m².    SpO2: (!) 94 %  O2 Device (Oxygen Therapy): nasal cannula    Physical Exam   HENT:   Head: Normocephalic and atraumatic.   Neck: No JVD present.   Cardiovascular: Normal rate and regular rhythm.    Pulmonary/Chest: Effort normal and breath sounds normal.   Abdominal: Soft.   Musculoskeletal: She exhibits no edema.   Skin: Skin is warm.       Significant Labs: All pertinent lab results from the last 24 hours have been reviewed.    Significant Imaging: EKG: As in HPI    Assessment and Plan:     Pacemaker lead malfunction    Successful Extraction of Dual Chamber PPM            Thank you for your consult.    Troy Ceballos MD  Cardiac Electrophysiology  [facility]

## 2017-11-20 NOTE — INTERVAL H&P NOTE
The patient has been examined and the H&P has been reviewed:    I concur with the findings and no changes have occurred since H&P was written.   NPO since midnight.  INR 1.0 this AM.  Consents obtained this AM.  Patient remains a DNR.    Anesthesia/Surgery risks, benefits and alternative options discussed and understood by patient/family.          Active Hospital Problems    Diagnosis  POA    Pacemaker lead malfunction [T82.110A]  Yes    MRSA bacteremia [R78.81]  Yes    Chronic pulmonary heart disease [I27.9]  Yes      Resolved Hospital Problems    Diagnosis Date Resolved POA   No resolved problems to display.

## 2017-11-20 NOTE — ASSESSMENT & PLAN NOTE
- Pacemaker lead thought to be causing recurrent bacteremia  - scheduled for removal tomorrow.  - NPO past midnight  - EP consult in place. Call in AM

## 2017-11-20 NOTE — ASSESSMENT & PLAN NOTE
- Recently finished a course of zyvox and removal of Cardoso  - pacemaker lead removal tomorrow as planned

## 2017-11-20 NOTE — TRANSFER OF CARE
"Anesthesia Transfer of Care Note    Patient: Emily Cook    Procedure(s) Performed: Procedure(s) (LRB):  EXTRACTION-LEAD (N/A)  TRANSESOPHAGEAL ECHOCARDIOGRAM (AMIE) (N/A)    Patient location: PACU    Anesthesia Type: MAC    Transport from OR: Transported from OR on 2-3 L/min O2 by NC with adequate spontaneous ventilation    Post pain: adequate analgesia    Post assessment: no apparent anesthetic complications    Post vital signs: stable    Level of consciousness: awake, alert and oriented    Nausea/Vomiting: no nausea/vomiting    Complications: none    Transfer of care protocol was followed      Last vitals:   Visit Vitals  BP (!) 102/59 (BP Location: Right arm, Patient Position: Lying)   Pulse 65   Temp 36.7 °C (98 °F) (Oral)   Resp 18   Ht 5' 3" (1.6 m)   Wt 72.8 kg (160 lb 7.9 oz)   SpO2 (!) 93%   Breastfeeding? No   BMI 28.43 kg/m²     "

## 2017-11-20 NOTE — H&P (VIEW-ONLY)
Subjective:   Patient ID:  Emily Cook is a 57 y.o. female     Chief complaint:heart block      HPI  New patient to me-- referred by Dr Abdi Antony for management of recurrent bacteremia in the face of an implanted device   Background as modified from Dr Antony's note:  57 yoF pHTN, syncope (she says before and after the PPM implant, related to visits to the bathroom). Reported to have had high degree AVB and therefore received  DC PM 7/29/13.     Over the past several years, her pHTN has worsened and she was recently admitted with MRSA bacteremia suspected to be due to infected indwelling lines. Cardoso catheter placed 6/2017 for pHTN medications. She was then transferred from OSH after being admitted with acute respiratory failure 2/2 to [facility] with blood cultures 8/26 +MRSA. She then had her Hickmann removed and then replaced 9/8/17. In the interim she got vancomycin. She then presented with infectious symptoms and was found to have recurrence of MRSA bacteremia late October. Her Hickmann was removed and she is currently getting vancomycin with plan for 2 weeks course via LUE PICC. No mention of PM extraction in the chart. She paces <1% in the atrium and ventricle. AMIE 11/2/17 showed no valvular vegetations. Normal EF.      6/16/2017 - Placement of right subclavian tunneled Cardoso catheter by Dr. Pride (removed 09/05/17)  9/8/2017 - Placement of left IJ tunneled Cardoso catheter by Dr. Simental  7/29/2013 - Placement of left sided dual chamber pacemaker (still in place)     AMIE 11/2/17:  CONCLUSIONS     1 - No visualized thrombus in the left atrium.     2 - Left atrial appendage is multilobed with no visualized thrombus.     3 - Right atrial enlargement.  .     4 - Normal left ventricular systolic function (EF 60-65%).     5 - Normal left ventricular diastolic function.     6 - Mild mitral regurgitation.   No vegetations noted     Current clinical status:  She is on Remodulin and has been stable at  a class III Fc. Uses O2 at home.  Has recently completed a six min walk test with O2 on.  Most recent Pulm pressure estimated at 68  At one point, palliative care was entertained for her but she has since doen better with resumption of continuous Remodulin infusion.   No current facility-administered medications for this visit.      No current outpatient prescriptions on file.     Facility-Administered Medications Ordered in Other Visits   Medication    bumetanide tablet 2 mg    gabapentin capsule 300 mg    hydrocodone-acetaminophen 10-325mg per tablet 1 tablet    hydrOXYzine pamoate capsule 50 mg    macitentan tablet 10 mg    magnesium oxide tablet 400 mg    ondansetron injection 4 mg    pantoprazole EC tablet 40 mg    potassium chloride SA CR tablet 40 mEq    sodium chloride 0.9% flush 3 mL    spironolactone tablet 25 mg    warfarin (COUMADIN) tablet 5 mg     Review of Systems   Constitution: Negative for decreased appetite, weakness, weight gain and weight loss.   HENT: Negative for nosebleeds.    Eyes: Negative for blurred vision and visual disturbance.   Cardiovascular: Negative for chest pain, claudication, cyanosis, dyspnea on exertion, irregular heartbeat, leg swelling, near-syncope, orthopnea, palpitations, paroxysmal nocturnal dyspnea and syncope.   Respiratory: Negative for cough, shortness of breath and wheezing.    Endocrine: Negative for heat intolerance.   Skin: Negative for rash.   Musculoskeletal: Negative for muscle weakness and myalgias.   Gastrointestinal: Negative for abdominal pain, anorexia, melena, nausea and vomiting.   Genitourinary: Negative for menorrhagia.   Neurological: Negative for excessive daytime sleepiness, dizziness, headaches, loss of balance, seizures and vertigo.   Psychiatric/Behavioral: Negative for altered mental status and depression. The patient is not nervous/anxious.        Objective:   Physical Exam   Constitutional: She is oriented to person, place, and  "time. She appears well-developed and well-nourished.   Remodulin infusion bag    HENT:   Head: Normocephalic and atraumatic.   Right Ear: External ear normal.   Left Ear: External ear normal.   Eyes: Conjunctivae are normal. Pupils are equal, round, and reactive to light. Left eye exhibits no discharge. No scleral icterus.   Neck: Normal range of motion. Neck supple. No thyromegaly present.   Cardiovascular: Normal rate, regular rhythm, normal heart sounds and intact distal pulses.  Exam reveals no gallop, no S3, no S4, no friction rub, no midsystolic click and no opening snap.    No murmur heard.  Pulses:       Carotid pulses are 2+ on the right side, and 2+ on the left side.       Radial pulses are 2+ on the right side, and 2+ on the left side.        Dorsalis pedis pulses are 2+ on the right side, and 2+ on the left side.        Posterior tibial pulses are 2+ on the right side, and 2+ on the left side.   Pulmonary/Chest: Effort normal and breath sounds normal.   Device pocket is in excellent repair   Abdominal: Soft. She exhibits no distension. There is no hepatomegaly. There is no tenderness. There is no guarding.   Musculoskeletal:        Right ankle: She exhibits no swelling.        Left ankle: She exhibits no swelling.        Right lower leg: She exhibits no swelling.        Left lower leg: She exhibits no swelling.   Neurological: She is alert and oriented to person, place, and time. She has normal strength. No cranial nerve deficit. Gait normal.   Skin: Skin is warm, dry and intact. No rash noted. No cyanosis. Nails show no clubbing.   Psychiatric: She has a normal mood and affect. Her speech is normal and behavior is normal. Thought content normal. Cognition and memory are normal.   Nursing note and vitals reviewed.    BP (!) 100/58   Pulse 76   Ht 5' 3" (1.6 m)   Wt 68 kg (149 lb 14.6 oz)   BMI 26.56 kg/m²      Assessment:    Recurrent MRSA bacteremias with indwelling PPM leads. Patient currently on " continued antibiotic Rx after a prior 6 week IV course failed to prevent a recurrence. The risk of another recurrent bacteremia after completion of current antibiotic course.  1. Staphylococcus aureus bacteremia    2. Bloodstream infection associated with central venous catheter, sequela    3. MRSA bacteremia    4. Cardiac pacemaker in situ    5. Primary pulmonary hypertension    6. Sleep apnea, unspecified type    7. Chronic respiratory failure with hypoxia        Plan:    In view of the fact that her PPM leads are of relatively young age, that she has been on OAC for much of the period since implant, that she rarely uses her PPM - if at all-,  that the risk of recurrent bacteremia is quite high and basil any recurrence would place her at significant clinical jeopardy, it would seem best to proceed with lead extraction asap (before she has a chance of getting sick again). I have also reviewed the recent echo and her RV lead tip appears to be septal.  Due to the fact that she has severe pulm HTN, she would be at high risk of complications with general anesthesia and she would certainly not be a candidate for emergency rescue surgery if complications occur as a result of the extraction.  I have discussed this at length with the patient and she would like to proceed with the extraction.. We have also agreed omn the following: procedure to be done under minimal sedation and no provisions for emergency thoracotomy will be made.     No orders of the defined types were placed in this encounter.    Return post extraction.  There are no discontinued medications.  New Prescriptions    No medications on file     Modified Medications    No medications on file

## 2017-11-20 NOTE — PLAN OF CARE
Spoke with Dr. Hamilton, patient tolerated procedure well with no post op pericardial effusion.  CXR once on the floor.  OK to resume coumadin and all other mediations.  Will monitor bilateral groin sites for signs of bleeding/hematoma.  Can d/c tomorrow if no complications after assessed by EP. Patient will remain in OBS.  DNR    See H & P by Dr. Jennifer Sargent, PAJasmeetC

## 2017-11-20 NOTE — BRIEF OP NOTE
"    Post EP Procedure Note  Referring Physician: Stephane Mohan MD  Procedure: EXTRACTION-LEAD (N/A), TRANSESOPHAGEAL ECHOCARDIOGRAM (AMIE) (N/A)         Access: Right CFV/ Left CFV, L Subclavian    Intervention:     Successful DC PPM extraction  RA lead removed via L subclavian access site  RV lead broken during extraction attempt from above, snared via R CFV  [place] and post-procedure TTE with no evidence of pericardial effusion    See full report for further details    Post Cath Exam:   BP (!) 102/59 (BP Location: Right arm, Patient Position: Lying)   Pulse 65   Temp 98 °F (36.7 °C) (Oral)   Resp 18   Ht 5' 3" (1.6 m)   Wt 72.8 kg (160 lb 7.9 oz)   SpO2 (!) 93%   Breastfeeding? No   BMI 28.43 kg/m²   No unusual pain, hematoma, thrill or bruit at vascular access site.  Distal pulse present without signs of ischemia.    Recommendations:   - Routine post-cath care  - CXR when arrival to floor  - Resume medications per HTS    Signed:  Abhijeet Hamilton MD  Cardiology Fellow, PGY-5  11/20/2017 10:46 AM    "

## 2017-11-20 NOTE — PROGRESS NOTES
- Pt arrived to [facility] through Emergency Department for a planned admission to have her pacemaker extracted due to recurrent bacteremia (MRSA).  - Pt accompanied by family, pt oriented to room, placed pt on 4 L of oxygen via nasal cannula which is her home setting.  - Pt has Remodulin infusing to a left upper arm single lumen PICC line. PICC dressing c/d/i - dated 11/15/17. Inspected CADD pump, verified operational. The cassette contains her home concentration of Remodulin. Per pt, this concentration will last her until Tuesday, she also brought 2 additional cassettes with her.  - Pt will be changed to hospital concentration of Remodulin tomorrow.  - 22 G peripheral IV placed to pt's right forearm as backup access for Remodulin.  - Notified Dr. Rodriguez of pt's arrival to floor, who came to bedside, assessed pt, and entered orders.  - Pt will be kept NPO past midnight for pacemaker removal. Pt requested a sandwich from nutrition room.  - Obtained telemetry box from war room and connected to pt. Pt in NSR with HR in 60s-70s. Will continue to monitor.

## 2017-11-20 NOTE — H&P
"[facility]  Heart Transplant  H&P    Patient Name: Emily Cook  MRN: [id number]  Admission Date: 11/19/2017  Attending Physician: Stephane Mohan MD  Primary Care Provider: Kaylee Cazares MD  Principal Problem:<principal problem not specified>    Subjective:     History of Present Illness:      56 yo female with a PMHx of PHTN (group 1) on home Remodulin, coumadin and macitetan, recurrent MRSA bacteremia with recent lead removal secondary, hx of pacemaker placement due to CHB,  of medical non adherence, ALFREDO (non adherent to CPAP), chronic respiratory failure on 4 L ATC of home oxygen presenting for a scheduled pacemaker lead removal due to recurrent bacteremia. Patient has just finished a course of zyvox and alsop had brush catheter before last discharge on 11/08. She is on Remodulin through PICC to the left arm and has two cassettes in reserve. She denies fever,chills, rash, cough, edema or palpitation.      Past Medical History:   Diagnosis Date    Arrhythmia     Arthritis     Cardiac pacemaker in situ     Carpal tunnel syndrome, right     Cervical spondylosis     Cervicalgia     CHF (congestive heart failure)     Diverticulitis     Heart block AV third degree     Hyperlipidemia     ALFREDO on CPAP     Pulmonary hypertension     Subdeltoid bursitis        Past Surgical History:   Procedure Laterality Date    CARDIAC CATHETERIZATION      CARDIAC PACEMAKER PLACEMENT  7/29/13    [employer] [place] PM    CARDIAC SURGERY      COLONOSCOPY N/A 9/28/2015    Procedure: COLONOSCOPY;  Surgeon: Jason Diaz MD;  Location: [facility] ([address] [place] [address]);  Service: Endoscopy;  Laterality: N/A;  Please schedule in 2-3 months with Dr Diaz  Pulmonary HTN, 2nd floor (off remodulin infusion as of "a few days" before 9/9/15)    3 day hold Coumadin, [facility]  PT/INR scheduled before procedure    ECTOPIC PREGNANCY SURGERY Left     HYSTERECTOMY      partial    knee arthroscopy   " "      Review of patient's allergies indicates:   Allergen Reactions    Chlorhexidine Itching and Rash     Patient had raised rash on neck following RHC procedure. She states she's never had a problem with the "prep" used until this time.     Adhesive Rash       Current Facility-Administered Medications   Medication    bumetanide tablet 2 mg    gabapentin capsule 300 mg    hydrocodone-acetaminophen 10-325mg per tablet 1 tablet    hydrOXYzine pamoate capsule 50 mg    [START ON 11/20/2017] macitentan tablet 10 mg    magnesium oxide tablet 400 mg    [START ON 11/20/2017] pantoprazole EC tablet 40 mg    potassium chloride SA CR tablet 40 mEq    sodium chloride 0.9% flush 3 mL    [START ON 11/20/2017] spironolactone tablet 25 mg    [START ON 11/20/2017] warfarin (COUMADIN) tablet 5 mg     Family History     Problem Relation (Age of Onset)    Arthritis Mother, Father    Diabetes Mother    Hyperlipidemia Mother, Father        Social History Main Topics    Smoking status: Never Smoker    Smokeless tobacco: Never Used    Alcohol use No      Comment: rarely    Drug use: No    Sexual activity: No     Review of Systems   Constitutional: Negative.    HENT: Negative.    Eyes: Negative.    Respiratory: Positive for shortness of breath. Negative for wheezing.    Cardiovascular: Negative for palpitations and leg swelling.   Gastrointestinal: Negative.    Genitourinary: Negative.    Musculoskeletal: Negative.    Skin: Negative.    Neurological: Negative.    Hematological: Negative.    Psychiatric/Behavioral: Negative.      Objective:     Vital Signs (Most Recent):    Vital Signs (24h Range):        No data found.    There is no height or weight on file to calculate BMI.    No intake or output data in the 24 hours ending 11/19/17 2052    Physical Exam   Constitutional: She is oriented to person, place, and time. She appears well-developed.   HENT:   Head: Normocephalic and atraumatic.   Eyes: EOM are normal. Pupils " are equal, round, and reactive to light.   Neck: Normal range of motion. Neck supple. No JVD present.   Cardiovascular: Normal rate and regular rhythm.  Exam reveals no gallop and no friction rub.    Pulmonary/Chest: Effort normal and breath sounds normal. She has no wheezes. She has no rales. She exhibits no tenderness.   Abdominal: Soft. Bowel sounds are normal.   Musculoskeletal: Normal range of motion. She exhibits no edema or tenderness.   Neurological: She is alert and oriented to person, place, and time.   Skin: Skin is warm and dry. Capillary refill takes 2 to 3 seconds.   Psychiatric: She has a normal mood and affect.   Nursing note and vitals reviewed.      Significant Labs:  CBC:    Recent Labs  Lab 11/17/17  1400   WBC 5.18   RBC 5.18   HGB 12.2   HCT 38.6   *   MCV 75*   MCH 23.6*   MCHC 31.6*     BNP:  No results for input(s): BNP in the last 168 hours.    Invalid input(s): BNPTRIAGELBLO  CMP:    Recent Labs  Lab 11/13/17  1243 11/17/17  1400   GLU 75 96   CALCIUM 9.7 10.1    139   K 3.8 3.3*   CO2 24 29    98   BUN 14 16   CREATININE 1.10 1.3      Coagulation:     Recent Labs  Lab 11/13/17 11/13/17  1243 11/17/17  1400   INR 1.4 1.4* 1.3*   APTT  --   --  27.4     LDH:  No results for input(s): LDH in the last 72 hours.  Microbiology:  Microbiology Results (last 7 days)     ** No results found for the last 168 hours. **          I have reviewed all pertinent labs within the past 24 hours.      Diagnostic Results:      Assessment/Plan:     Pacemaker lead malfunction    - Pacemaker lead thought to be causing recurrent bacteremia  - scheduled for removal tomorrow.  - NPO past midnight  - EP consult in place. Call in AM        MRSA bacteremia    - Recently finished a course of zyvox and removal of Cardoso  - pacemaker lead removal tomorrow as planned        Chronic pulmonary heart disease    - On remodulin, coumadin and macitentan  - PICC line left arm and clean  - Has two cassetes for  tonight. Jese to order Remodulin for tomorrow onward   - oxygen through [place] as current. Compensated.  -  INR close to normal range. Hold coumadin tonight.          Discussed plan of care with Dr. Stern the attending on call    Dain Rodriguez MD  Heart Transplant  [facility]

## 2017-11-20 NOTE — SUBJECTIVE & OBJECTIVE
"Past Medical History:   Diagnosis Date    Arrhythmia     Arthritis     Cardiac pacemaker in situ     Carpal tunnel syndrome, right     Cervical spondylosis     Cervicalgia     CHF (congestive heart failure)     Diverticulitis     Heart block AV third degree     Hyperlipidemia     ALFREDO on CPAP     Pulmonary hypertension     Subdeltoid bursitis        Past Surgical History:   Procedure Laterality Date    CARDIAC CATHETERIZATION      CARDIAC PACEMAKER PLACEMENT  7/29/13    [employer] [place] PM    CARDIAC SURGERY      COLONOSCOPY N/A 9/28/2015    Procedure: COLONOSCOPY;  Surgeon: Jason Diaz MD;  Location: [facility] ([address] [place] [address]);  Service: Endoscopy;  Laterality: N/A;  Please schedule in 2-3 months with Dr Diaz  Pulmonary HTN, 2nd floor (off remodulin infusion as of "a few days" before 9/9/15)    3 day hold Coumadin, [facility]  PT/INR scheduled before procedure    ECTOPIC PREGNANCY SURGERY Left     HYSTERECTOMY      partial    knee arthroscopy         Review of patient's allergies indicates:   Allergen Reactions    Chlorhexidine Itching and Rash     Patient had raised rash on neck following RHC procedure. She states she's never had a problem with the "prep" used until this time.     Adhesive Rash       No current facility-administered medications on file prior to encounter.      Current Outpatient Prescriptions on File Prior to Encounter   Medication Sig    bumetanide (BUMEX) 2 MG tablet Take 1 tablet (2 mg total) by mouth 2 (two) times daily.    duloxetine (CYMBALTA) 60 MG capsule Take 1 capsule (60 mg total) by mouth 2 (two) times daily.    gabapentin (NEURONTIN) 300 MG capsule Take 1 capsule (300 mg total) by mouth As instructed. Take one capsule qam, 1 qpm, 2 qhs.    hydrocodone-acetaminophen 10-325mg (NORCO)  mg Tab Take 1 tablet by mouth 3 (three) times daily as needed.    hydrOXYzine (VISTARIL) 50 MG Cap 50 mg daily as needed.     macitentan (OPSUMIT) 10 mg Tab Take " 1 tablet (10 mg total) by mouth once daily.    magnesium oxide (MAG-OX) 400 mg tablet Take 1 tablet (400 mg total) by mouth 2 (two) times daily.    pantoprazole (PROTONIX) 40 MG tablet Take 1 tablet (40 mg total) by mouth once daily.    PATADAY 0.2 % Drop     potassium chloride SA (K-DUR,KLOR-CON) 20 MEQ tablet Take 2 tablets (40 mEq total) by mouth 3 (three) times daily.    spironolactone (ALDACTONE) 25 MG tablet Take 1 tablet (25 mg total) by mouth once daily.    TREPROSTINIL SODIUM (TREPROSTINIL, REMODULIN, PUMP FOR HOME USE) Patient is on treprostinil (REMODULIN) Therapy and managed by the [facility]. Contact PHTN Coordinators at [phone] for any questions about dosing. After hours, call  at [phone] and ask for HTS fellow on call    warfarin (COUMADIN) 5 MG tablet Take 1 tablet (5 mg total) by mouth Daily. (Patient taking differently: Take 5 mg by mouth Daily. Patient received 2.5 mg tablets from pharmacy)     Family History     Problem Relation (Age of Onset)    Arthritis Mother, Father    Diabetes Mother    Hyperlipidemia Mother, Father        Social History Main Topics    Smoking status: Never Smoker    Smokeless tobacco: Never Used    Alcohol use No      Comment: rarely    Drug use: No    Sexual activity: No     Review of Systems   All other systems reviewed and are negative.    Objective:     Vital Signs (Most Recent):  Temp: 97.8 °F (36.6 °C) (11/20/17 1626)  Pulse: 74 (11/20/17 1626)  Resp: 16 (11/20/17 1626)  BP: (!) 98/56 (11/20/17 1626)  SpO2: (!) 94 % (11/20/17 1626) Vital Signs (24h Range):  Temp:  [97.2 °F (36.2 °C)-98 °F (36.7 °C)] 97.8 °F (36.6 °C)  Pulse:  [57-80] 74  Resp:  [16-22] 16  SpO2:  [90 %-100 %] 94 %  BP: ()/(51-64) 98/56     Weight: 72.8 kg (160 lb 7.9 oz)  Body mass index is 28.43 kg/m².    SpO2: (!) 94 %  O2 Device (Oxygen Therapy): nasal cannula    Physical Exam   HENT:   Head: Normocephalic and atraumatic.   Neck: No JVD  present.   Cardiovascular: Normal rate and regular rhythm.    Pulmonary/Chest: Effort normal and breath sounds normal.   Abdominal: Soft.   Musculoskeletal: She exhibits no edema.   Skin: Skin is warm.       Significant Labs: All pertinent lab results from the last 24 hours have been reviewed.    Significant Imaging: EKG: As in HPI

## 2017-11-20 NOTE — SUBJECTIVE & OBJECTIVE
"Past Medical History:   Diagnosis Date    Arrhythmia     Arthritis     Cardiac pacemaker in situ     Carpal tunnel syndrome, right     Cervical spondylosis     Cervicalgia     CHF (congestive heart failure)     Diverticulitis     Heart block AV third degree     Hyperlipidemia     ALFREDO on CPAP     Pulmonary hypertension     Subdeltoid bursitis        Past Surgical History:   Procedure Laterality Date    CARDIAC CATHETERIZATION      CARDIAC PACEMAKER PLACEMENT  7/29/13    [employer] [place] PM    CARDIAC SURGERY      COLONOSCOPY N/A 9/28/2015    Procedure: COLONOSCOPY;  Surgeon: Jason Diaz MD;  Location: [facility] ([address] [place] [address]);  Service: Endoscopy;  Laterality: N/A;  Please schedule in 2-3 months with Dr Diaz  Pulmonary HTN, 2nd floor (off remodulin infusion as of "a few days" before 9/9/15)    3 day hold Coumadin, [facility]  PT/INR scheduled before procedure    ECTOPIC PREGNANCY SURGERY Left     HYSTERECTOMY      partial    knee arthroscopy         Review of patient's allergies indicates:   Allergen Reactions    Chlorhexidine Itching and Rash     Patient had raised rash on neck following RHC procedure. She states she's never had a problem with the "prep" used until this time.     Adhesive Rash       Current Facility-Administered Medications   Medication    bumetanide tablet 2 mg    gabapentin capsule 300 mg    hydrocodone-acetaminophen 10-325mg per tablet 1 tablet    hydrOXYzine pamoate capsule 50 mg    [START ON 11/20/2017] macitentan tablet 10 mg    magnesium oxide tablet 400 mg    [START ON 11/20/2017] pantoprazole EC tablet 40 mg    potassium chloride SA CR tablet 40 mEq    sodium chloride 0.9% flush 3 mL    [START ON 11/20/2017] spironolactone tablet 25 mg    [START ON 11/20/2017] warfarin (COUMADIN) tablet 5 mg     Family History     Problem Relation (Age of Onset)    Arthritis Mother, Father    Diabetes Mother    Hyperlipidemia Mother, Father        Social History " Main Topics    Smoking status: Never Smoker    Smokeless tobacco: Never Used    Alcohol use No      Comment: rarely    Drug use: No    Sexual activity: No     Review of Systems   Constitutional: Negative.    HENT: Negative.    Eyes: Negative.    Respiratory: Positive for shortness of breath. Negative for wheezing.    Cardiovascular: Negative for palpitations and leg swelling.   Gastrointestinal: Negative.    Genitourinary: Negative.    Musculoskeletal: Negative.    Skin: Negative.    Neurological: Negative.    Hematological: Negative.    Psychiatric/Behavioral: Negative.      Objective:     Vital Signs (Most Recent):    Vital Signs (24h Range):        No data found.    There is no height or weight on file to calculate BMI.    No intake or output data in the 24 hours ending 11/19/17 2052    Physical Exam   Constitutional: She is oriented to person, place, and time. She appears well-developed.   HENT:   Head: Normocephalic and atraumatic.   Eyes: EOM are normal. Pupils are equal, round, and reactive to light.   Neck: Normal range of motion. Neck supple. No JVD present.   Cardiovascular: Normal rate and regular rhythm.  Exam reveals no gallop and no friction rub.    Pulmonary/Chest: Effort normal and breath sounds normal. She has no wheezes. She has no rales. She exhibits no tenderness.   Abdominal: Soft. Bowel sounds are normal.   Musculoskeletal: Normal range of motion. She exhibits no edema or tenderness.   Neurological: She is alert and oriented to person, place, and time.   Skin: Skin is warm and dry. Capillary refill takes 2 to 3 seconds.   Psychiatric: She has a normal mood and affect.   Nursing note and vitals reviewed.      Significant Labs:  CBC:    Recent Labs  Lab 11/17/17  1400   WBC 5.18   RBC 5.18   HGB 12.2   HCT 38.6   *   MCV 75*   MCH 23.6*   MCHC 31.6*     BNP:  No results for input(s): BNP in the last 168 hours.    Invalid input(s): BNPTRIAGELBLO  CMP:    Recent Labs  Lab 11/13/17  1243  11/17/17  1400   GLU 75 96   CALCIUM 9.7 10.1    139   K 3.8 3.3*   CO2 24 29    98   BUN 14 16   CREATININE 1.10 1.3      Coagulation:     Recent Labs  Lab 11/13/17 11/13/17  1243 11/17/17  1400   INR 1.4 1.4* 1.3*   APTT  --   --  27.4     LDH:  No results for input(s): LDH in the last 72 hours.  Microbiology:  Microbiology Results (last 7 days)     ** No results found for the last 168 hours. **          I have reviewed all pertinent labs within the past 24 hours.      Diagnostic Results:

## 2017-11-20 NOTE — PLAN OF CARE
Problem: Patient Care Overview  Goal: Plan of Care Review  Outcome: Ongoing (interventions implemented as appropriate)  Pt off unit to EP lab for pacemaker removal, approx. [id number].  Educated pt on strict bedrest orders, pt verbalized understanding.  Raised HOB slightly to provide comfort to patient.  Bilateral groin dressings remain clean, dry, and intact after initial arrival to unit.  Pt and pt's mom deny questions or concerns at this time; pt denies pain or other need at this time; will continue to monitor, assess, and adjust plan of care as needed until report given to oncoming night shift nurse.

## 2017-11-20 NOTE — NURSING TRANSFER
Nursing Transfer Note      11/20/2017     Transfer To: [id number]    Transfer via stretcher    Transfer with 4 liters to O2, cardiac monitoring    Transported by rn    Medicines sent: drip    Chart send with patient: Yes    Notified: nurse    Patient reassessed at: see [facility] (date, time)

## 2017-11-20 NOTE — PROGRESS NOTES
Patient admitted to recovery see [facility] for complete assessment pacu bcg's maintained safety measures verified patient instructed on pain scale and patient verbalized understanding. Called for patient's ekg and chest xray to be done. Also called patient's mom and updated on patient location with the permission of patient.

## 2017-11-20 NOTE — HPI
58 yo female with a PMHx of PHTN (group 1) on home Remodulin, coumadin and macitetan, recurrent MRSA bacteremia with recent lead removal secondary, hx of pacemaker placement due to CHB,  of medical non adherence, ALFREDO (non adherent to CPAP), chronic respiratory failure on 4 L ATC of home oxygen presenting for a scheduled pacemaker lead removal due to recurrent bacteremia. Patient has just finished a course of zyvox and alsop had brush catheter before last discharge on 11/08. She is on Remodulin through PICC to the left arm and has two cassettes in reserve. She denies fever,chills, rash, cough, edema or palpitation.

## 2017-11-20 NOTE — HPI
56 yo female with a PMHx of PHTN (group 1) on home Remodulin, coumadin and macitetan, recurrent MRSA bacteremia , hx of pacemaker placement due high gradfe AVB, chronic respiratory failure on 4 L ATC of home oxygen presenting for a scheduled pacemaker lead removal due to recurrent bacteremia. Patient has just finished a course of zyvox and also had cardoso catheter before last discharge on 11/08. She is on Remodulin through PICC to the left arm and has two cassettes in reserve. Recently admitted with MRSA bacteremia suspected to be due to infected indwelling lines. Cardoso catheter placed 6/2017 for pHTN medications    S/p successful DC PPM extraction    EKG: NSR with incomplete RBBB

## 2017-11-20 NOTE — ANESTHESIA PREPROCEDURE EVALUATION
"Pre-operative evaluation for EXTRACTION-LEAD (N/A), TRANSESOPHAGEAL ECHOCARDIOGRAM (AMIE) (N/A)    Case Discussion:  Emily Cook is a 57 y.o. 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     Current blood Cx NGTD after abx completion. Afebrile and HD stable on 5L NC.     LDA:  - 22g PIV    Previous Airway:  None on file    Past Surgical History:   Procedure Laterality Date    CARDIAC CATHETERIZATION      CARDIAC PACEMAKER PLACEMENT  7/29/13    [employer] DC PM    CARDIAC SURGERY      COLONOSCOPY N/A 9/28/2015    Procedure: COLONOSCOPY;  Surgeon: Jason Diaz MD;  Location: [facility] ([address] [place] [address]);  Service: Endoscopy;  Laterality: N/A;  Please schedule in 2-3 months with Dr Diaz  Pulmonary HTN, 2nd floor (off remodulin infusion as of "a few days" before 9/9/15)    3 day hold Coumadin, [facility]  PT/INR scheduled before procedure    ECTOPIC PREGNANCY SURGERY Left     HYSTERECTOMY      partial    knee arthroscopy           Vital Signs Range (Last 24H):  Pulse:  [62-74]   Resp:  [20]   BP: ()/(51-55)   SpO2:  [100 %]       CBC:     Recent Labs  Lab 10/24/17  1423 10/25/17  1215 10/29/17  2305 10/30/17  0529 10/31/17  0543 11/01/17  0524 11/02/17  0455 11/03/17  0538 11/04/17  0624 11/05/17  0357 11/06/17  0406 11/07/17  0500 11/08/17  0451 11/17/17  1400   WBC 4.52 5.20 7.20 9.70 12.30 9.30 6.55 5.89 7.00 7.91 8.34 6.64 5.07 5.18   RBC 5.26 5.04 4.74 4.87 5.09 5.10 4.54 4.79 4.81 4.58 4.36 4.31 4.51 5.18   HGB 12.6 12.2 11.5* 11.9* 12.4 12.4 11.0* 11.1* 11.2* 11.0* 10.6* 10.3* 10.8* 12.2   HCT 39.5 37.9 35.4* 36.6* 37.7 37.6 33.8* 35.5* 35.5* 34.2* 32.6* 32.2* 33.1* 38.6   * 383* 322 330 322 361* 344 411* 426* 385* 381* 398* 421* 496*   MCV 75* 75* 75* 75* 74* 74* 74* 74* 74* 75* 75* 75* 73* 75*   MCH 24.0* 24.3* 24.3* 24.4* 24.3* 24.3* " 24.2* 23.2* 23.3* 24.0* 24.3* 23.9* 23.9* 23.6*   MCHC 31.9* 32.3 32.5 32.5 32.9 33.0 32.5 31.3* 31.5* 32.2 32.5 32.0 32.6 31.6*       CMP:   Recent Labs  Lab 10/24/17  1423 10/25/17  1215 10/29/17  2305 10/30/17  0529 10/31/17  0543 11/01/17  0524 11/02/17  0455 11/03/17  0538 11/04/17  0624 11/05/17  0357 11/06/17  0406 11/07/17  0500 11/08/17  0450 11/08/17  1241 11/13/17  1243 11/17/17  1400 11/19/17  2118    137 134* 138 135* 136 133* 136 136 138 136 135* 135*  --  138 139 139   K 3.9 4.0 4.1 4.0 3.3* 3.2* 2.6* 3.4* 3.7 3.1* 3.3* 3.1* 2.7* 3.6 3.8 3.3* 3.0*    104 106 105 100 93* 90* 96 96 96 98 97 96  --  101 98 105   CO2 24 22* 19* 24 25 31* 30* 29 29 31* 30* 28 28  --  24 29 23   BUN 13 10 17 13 12 12 12 10 12 17 19 14 13  --  14 16 14   CREATININE 1.1 0.90 0.90 0.80 0.90 0.90 0.8 0.8 0.8 1.0 0.9 0.9 1.0  --  1.10 1.3 0.8   GLU 90 124* 102 116* 140* 137* 136* 101 104 103 119* 111* 96  --  75 96 102   MG 1.7  --   --   --  2.2  --   --   --   --   --   --   --  1.9  --   --   --  2.0   PHOS  --   --   --   --  3.60  --   --   --   --   --   --   --   --   --   --   --   --    CALCIUM 9.5 9.5 9.0 9.1 8.4 9.1 9.0 9.2 9.0 9.1 8.9 8.9 9.0  --  9.7 10.1 9.2   ALBUMIN 3.3* 4.0 4.0 3.9 4.0 4.2 2.7* 2.7* 2.6* 2.7* 2.7* 2.6* 2.8*  --   --   --  3.0*   PROT 7.6 7.4 7.6 7.5 7.9 8.2 7.3 6.9 6.7 7.2 7.1 7.1 7.3  --   --   --  7.2   ALKPHOS 106 93 111 101 99 103 95 94 102 105 102 105 113  --   --   --  110   ALT 9* 20 28 25 24 24 11 7* 8* 8* 7* 7* 8*  --   --   --  9*   AST 16 20 25 27 28 26 13 11 13 11 10 12 14  --   --   --  13   BILITOT 0.6 0.8 0.8 0.9 1.1 1.0 0.4 0.3 0.5 0.4 0.4 0.7 0.5  --   --   --  0.3       INR:    Recent Labs  Lab 10/25/17  1215 10/29/17  2305 10/31/17  0543 11/01/17  0524 11/02/17  0455 11/03/17  0538 11/04/17  0624 11/05/17  0357 11/06/17  0406 11/07/17  0500 11/08/17  0451 11/09/17 11/09/17  1040 11/13/17 11/13/17  1243 11/17/17  1400   INR 2.3* 2.1* 2.5* 2.3* 2.4* 1.9* 1.3* 1.1 1.1  1.1 1.0 1.2 1.2* 1.4 1.4* 1.3*   APTT  --   --   --   --   --   --   --   --   --   --   --   --   --   --   --  27.4         Diagnostic Studies:      EKG 11/14/17:  Normal sinus rhythm  Incomplete right bundle branch block  Right ventricular hypertrophy with repolarization abnormality  Prolonged QT    2D Echo:  CONCLUSIONS     1 - No visualized thrombus in the left atrium.     2 - Left atrial appendage is multilobed with no visualized thrombus.     3 - Right atrial enlargement. Severely depressed RV function.     4 - Normal left ventricular systolic function (EF 60-65%).     5 - Normal left ventricular diastolic function.     6 - Mild mitral regurgitation.     Anesthesia Evaluation    I have reviewed the Patient Summary Reports.     I have reviewed the Medications.     Review of Systems  Anesthesia Hx:  History of prior surgery of interest to airway management or planning:  Denies Personal Hx of Anesthesia complications.   Cardiovascular:   Pacemaker CHF    Pulmonary:   Sleep Apnea, CPAP    Renal/:  Renal/ Normal     Hepatic/GI:   Hx of diverticulitis   Endocrine:  Endocrine Normal        Physical Exam  General:  Well nourished    Airway/Jaw/Neck:  Airway Findings: Mouth Opening: Normal Tongue: Normal  General Airway Assessment: Adult  Mallampati: I  TM Distance: Normal, at least 6 cm  Jaw/Neck Findings:  Neck ROM: Normal ROM      Dental:  Dental Findings: In tact   Chest/Lungs:  Chest/Lungs Findings: Clear to auscultation     Heart/Vascular:  Heart Findings: Rate: Normal  Heart murmur: negative       Mental Status:  Mental Status Findings:  Cooperative, Alert and Oriented         Anesthesia Plan  Type of Anesthesia, risks & benefits discussed:  Anesthesia Type:  MAC, general  Patient's Preference:   Intra-op Monitoring Plan: standard ASA monitors and arterial line  Intra-op Monitoring Plan Comments:   Post Op Pain Control Plan:   Post Op Pain Control Plan Comments:   Induction:   IV  Beta Blocker:  Patient is  not currently on a Beta-Blocker (No further documentation required).       Informed Consent: Patient understands risks and agrees with Anesthesia plan.  Questions answered. Anesthesia consent signed with patient.  ASA Score: 4     Day of Surgery Review of History & Physical:    H&P update referred to the surgeon.     Anesthesia Plan Notes: Dr. Martha Reyes wanted to discuss anesthetic modality with providing team prior to procedure.         Ready For Surgery From Anesthesia Perspective.

## 2017-11-20 NOTE — ANESTHESIA POSTPROCEDURE EVALUATION
"Anesthesia Post Evaluation    Patient: Emily Cook    Procedure(s) Performed: Procedure(s) (LRB):  EXTRACTION-LEAD (N/A)  TRANSESOPHAGEAL ECHOCARDIOGRAM (AMIE) (N/A)    Final Anesthesia Type: MAC  Patient location during evaluation: ICU  Patient participation: Yes- Able to Participate  Level of consciousness: awake and alert  Post-procedure vital signs: reviewed and stable  Pain management: adequate  Airway patency: patent  PONV status at discharge: No PONV  Anesthetic complications: no      Cardiovascular status: hemodynamically stable  Respiratory status: unassisted  Hydration status: euvolemic  Follow-up not needed.        Visit Vitals  /60 (BP Location: Right arm, Patient Position: Lying)   Pulse 60   Temp 36.2 °C (97.2 °F) (Axillary)   Resp 16   Ht 5' 3" (1.6 m)   Wt 72.8 kg (160 lb 7.9 oz)   SpO2 97%   Breastfeeding? No   BMI 28.43 kg/m²       Pain/Rancho Score: Pain Assessment Performed: Yes (11/20/2017  1:45 PM)  Presence of Pain: complains of pain/discomfort (11/20/2017  1:45 PM)  Pain Rating Prior to Med Admin: 7 (11/20/2017  1:21 PM)  Rancho Score: 9 (11/20/2017  1:45 PM)      "

## 2017-11-20 NOTE — CONSULTS
TRANSESOPHAGEAL ECHOCARDIOGRAPHY   PRE-PROCEDURE NOTE    11/20/2017    HPI:     Emily Cook is a 57 y.o. who was currently admitted to [facility] on 11/1/2017 for recurrent MRSA bacteremia. She hasPMH of pulmonary HTN (oncontinuous home Remodulin infusion), 3rd degree AV block (s/p dual-chamber pacemaker), ALFREDO, tricuspid regurgitation, diverticulitis, and recent prior history of MRSA+ bacteremia requiring Cardoso catheter removal with temporary PICC line then subsequent replacement of Cardoso.     She initially presented to [facility] on 10/29/17 with complaint of right arm pain and fever.  Blood cultures grew MRSA and she has been treated with vancomycin with subsequent blood cultures being negative. She is on Coumadin and her INR remained slightly supratherapeutic therefore patient was transferred to [facility] for further care.Patient having extraction today. Last echo 11/2/2017 with EF 60%.    Dysphagia or odynophagia:  No  Liver Disease, esophageal disease, or known varices:  No  Upper GI Bleeding: No  Snoring:  No  Sleep Apnea:  No  Prior neck surgery or radiation:  No  History of anesthetic difficulties:  No  Family history of anesthetic difficulties:  No  Last oral intake:  12 hours ago  Able to move neck in all directions:  Yes      Meds:     Scheduled Meds:   bumetanide  2 mg Oral BID    macitentan  10 mg Oral Daily    magnesium oxide  400 mg Oral BID    pantoprazole  40 mg Oral Daily    potassium chloride SA  40 mEq Oral TID    sodium chloride 0.9%  3 mL Intravenous Q8H    spironolactone  25 mg Oral Daily    warfarin  5 mg Oral Daily     PRN Meds:sodium chloride, gabapentin, hydrocodone-acetaminophen 10-325mg, hydrOXYzine pamoate, ondansetron  Continuous Infusions:     Physical Exam:     Vitals:  Temp:  [98 °F (36.7 °C)]   Pulse:  [62-74]   Resp:  [18-20]   BP: ()/(51-59)   SpO2:  [93 %-100 %]        Constitutional: NAD, conversant  HEENT:   Sclera anicteric,  Uvula midline, EOMI, OP clear  Neck:               No JVD, moves to all direction without any limitations  CV:               RRR, no murmurs / rubs / gallops, normal S1/S2  Pulm:               CTAB, no wheezes, rales, or ronchi  GI:               Abdomen soft, NTND, +BS  Extremities:              No LE edema, warm with palpable pulses  Neuro:   AAOX3, no focal motor deficits    Mallampati:II  ASA:III      Labs:     Recent Results (from the past 336 hour(s))   CBC auto differential    Collection Time: 11/17/17  2:00 PM   Result Value Ref Range    WBC 5.18 3.90 - 12.70 K/uL    Hemoglobin 12.2 12.0 - 16.0 g/dL    Hematocrit 38.6 37.0 - 48.5 %    Platelets 496 (H) 150 - 350 K/uL   CBC auto differential    Collection Time: 11/08/17  4:51 AM   Result Value Ref Range    WBC 5.07 3.90 - 12.70 K/uL    Hemoglobin 10.8 (L) 12.0 - 16.0 g/dL    Hematocrit 33.1 (L) 37.0 - 48.5 %    Platelets 421 (H) 150 - 350 K/uL   CBC auto differential    Collection Time: 11/07/17  5:00 AM   Result Value Ref Range    WBC 6.64 3.90 - 12.70 K/uL    Hemoglobin 10.3 (L) 12.0 - 16.0 g/dL    Hematocrit 32.2 (L) 37.0 - 48.5 %    Platelets 398 (H) 150 - 350 K/uL       Recent Results (from the past 336 hour(s))   Basic metabolic panel     Collection Time: 11/20/17  3:54 AM   Result Value Ref Range    Sodium 139 136 - 145 mmol/L    Potassium 3.3 (L) 3.5 - 5.1 mmol/L    Chloride 107 95 - 110 mmol/L    CO2 25 23 - 29 mmol/L    BUN, Bld 11 6 - 20 mg/dL    Creatinine 0.7 0.5 - 1.4 mg/dL    Calcium 8.9 8.7 - 10.5 mg/dL    Anion Gap 7 (L) 8 - 16 mmol/L   Basic metabolic panel    Collection Time: 11/17/17  2:00 PM   Result Value Ref Range    Sodium 139 136 - 145 mmol/L    Potassium 3.3 (L) 3.5 - 5.1 mmol/L    Chloride 98 95 - 110 mmol/L    CO2 29 23 - 29 mmol/L    BUN, Bld 16 6 - 20 mg/dL    Creatinine 1.3 0.5 - 1.4 mg/dL    Calcium 10.1 8.7 - 10.5 mg/dL    Anion Gap 12 8 - 16 mmol/L   Basic metabolic panel    Collection Time: 11/13/17 12:43 PM   Result Value  Ref Range    Sodium 138 136 - 145 mmol/L    Potassium 3.8 3.5 - 5.1 mmol/L    Chloride 101 95 - 110 mmol/L    CO2 24 23 - 29 mmol/L    BUN, Bld 14 7 - 17 mg/dL    Creatinine 1.10 0.70 - 1.20 mg/dL    Calcium 9.7 8.4 - 10.2 mg/dL       Estimated Creatinine Clearance: 84.8 mL/min (based on SCr of 0.7 mg/dL).    INR:1.0  Imaging:      Assessment & Plan:     PLAN:  1. AMIE for evaluation of during lead extraction.    -The risks, benefits & alternatives of the procedure were explained to the patient.    -The risks of transesophageal echo include but are not limited to:  Dental trauma, esophageal trauma/perforation, bleeding, laryngospasm/brochospasm, aspiration, sore throat/hoarseness, & dislodgement of the endotracheal tube/nasogastric tube (where applicable).    -The risks of moderate sedation include hypotension, respiratory depression, arrhythmias, bronchospasm, & death.    -Informed consent was obtained & the patient is agreeable to proceed with the procedure.    I will discuss with the attending physician. Attending addendum is to follow.      Attending addendum to follow     Francis Ibrahim MD  Cardiology Fellow  Pager: [phone]

## 2017-11-21 NOTE — NURSING
Patient switched over to home -mixed Remodulin cassette with infusing rate 35 ml/ 24 hours, settings verified with MARTINE Jackson RN, awaiting dc orders via EPIC, patient states son enroute to pick her up and he will bring O2 tank to travel home with.

## 2017-11-21 NOTE — PLAN OF CARE
Patient will discharge today on Home Remodulin cassette and pump. No changes made throughout this observation stay.   Patient is a DNR--no lifevest needed at this time.

## 2017-11-21 NOTE — DISCHARGE SUMMARY
[facility]  Heart Transplant  Discharge Summary      Patient Name: Emily Cook  MRN: [id number]  Admission Date: 11/19/2017  Hospital Length of Stay: 0 days  Discharge Date and Time: 11/21/2017 1:57 PM  Attending Physician: Stephane Mohan MD   Discharging Provider: BRANDI Dexter  Primary Care Provider: Kaylee Cazares MD     HPI: 58 yo female with a PMHx of PHTN (group 1) on home Remodulin, coumadin and macitetan, recurrent MRSA bacteremia with recent lead removal secondary, hx of pacemaker placement due to CHB,  of medical non adherence, ALFREDO (non adherent to CPAP), chronic respiratory failure on 4 L ATC of home oxygen presenting for a scheduled pacemaker lead removal due to recurrent bacteremia. Patient has just finished a course of zyvox and alsop had brush catheter before last discharge on 11/08. She is on Remodulin through PICC to the left arm and has two cassettes in reserve. She denies fever,chills, rash, cough, edema or palpitation.      Procedure(s) (LRB):  EXTRACTION-LEAD (N/A)  TRANSESOPHAGEAL ECHOCARDIOGRAM (AMIE) (N/A)     Hospital Course: Patient presented for scheduled Pacemaker lead removal secondary to recurrent bacteremia.  Patient tolerated to procedure well with no post op complications.  CXR post procedure done and ok.  She has no complaints on day of discharge and is ready to go home.  Will resume her previous home regimen and patient has scheduled follow up with Dr. Rhodes on 12/19.  No changes were made throughout her observation stay; patient is DNR-no life vest given on discharge.     Physical exam on day of discharge    Vitals:    11/21/17 0700 11/21/17 0730 11/21/17 1100 11/21/17 1142   BP:  (!) 101/56  (!) 103/57   BP Location:  Right arm     Patient Position:  Lying  Sitting   Pulse: 75 81 72 69   Resp:  18  18   Temp:  98.7 °F (37.1 °C)  98.3 °F (36.8 °C)   TempSrc:  Oral  Oral   SpO2:  (!) 93%  96%   Weight:       Height:         Constitutional:  laying in bed NAD  Neck: Normal range of motion. Neck supple. No JVD present.   Cardiovascular: Normal rate and regular rhythm.  Exam reveals no gallop and no friction rub.    Pulmonary/Chest: Effort normal and breath sounds normal. She has no wheezes. She has no rales. She exhibits no tenderness. Surgical site intact.  No bleeding, bruising, edema or hematoma   Abdominal: Soft. Bowel sounds are normal.   Musculoskeletal: Normal range of motion. She exhibits no edema or tenderness.   Neurological: She is alert and oriented to person, place, and time.   Skin: Skin is warm and dry. Capillary refill takes 2 to 3 seconds.   Psychiatric: She has a normal mood and affect.   Nursing note and vitals reviewed.    Consults         Status Ordering Provider     Inpatient consult to Electrophysiology  Once     Provider:  (Not yet assigned)    JACOB Vaca          Significant Diagnostic Studies: see report for full detail  AMIE: 11/20/17   EP lab procedure: 11/20/17  CXR: 11/20/17    Pending Diagnostic Studies:     Procedure Component Value Units Date/Time    Transesophageal echo [[id number]]     Order Status:  Sent Lab Status:  No result         Final Active Diagnoses:    Diagnosis Date Noted POA    PRINCIPAL PROBLEM:  Primary pulmonary hypertension [I27.0]  Yes    Chronic pulmonary heart disease [I27.9] 06/21/2012 Yes    Pacemaker lead malfunction [T82.110A] 11/19/2017 Yes    MRSA bacteremia [R78.81] 10/30/2017 Yes      Problems Resolved During this Admission:    Diagnosis Date Noted Date Resolved POA      Discharged Condition: stable    Disposition: Home or Self Care    Follow Up:  Follow-up Information     Augustine Rhodes MD On 12/19/2017.    Specialties:  Transplant, Cardiology  Why:  as scheduled at 11:30 am   Contact information:  [address]  [facility] [address] [phone]                 Patient Instructions:     Diet general   Order Specific Question Answer Comments   Additional  restrictions: Cardiac (Low Na/Chol)      Activity as tolerated     Call MD for:  temperature >100.4     Call MD for:  redness, tenderness, or signs of infection (pain, swelling, redness, odor or green/yellow discharge around incision site)     Call MD for:   Order Comments: Chest pain, shortness of breath, signs of infection       Medications:  Reconciled Home Medications:   Current Discharge Medication List      CONTINUE these medications which have NOT CHANGED    Details   bumetanide (BUMEX) 2 MG tablet Take 1 tablet (2 mg total) by mouth 2 (two) times daily.  Qty: 60 tablet, Refills: 11      duloxetine (CYMBALTA) 60 MG capsule Take 1 capsule (60 mg total) by mouth 2 (two) times daily.  Qty: 180 capsule, Refills: 1    Associated Diagnoses: Chronic neck pain; Right cervical radiculopathy      gabapentin (NEURONTIN) 300 MG capsule Take 1 capsule (300 mg total) by mouth As instructed. Take one capsule qam, 1 qpm, 2 qhs.  Qty: 360 capsule, Refills: 2    Associated Diagnoses: Chronic neck pain; Right cervical radiculopathy      hydrocodone-acetaminophen 10-325mg (NORCO)  mg Tab Take 1 tablet by mouth 3 (three) times daily as needed.  Qty: 90 tablet, Refills: 0    Associated Diagnoses: Chronic neck pain; Subdeltoid bursitis, right      hydrOXYzine (VISTARIL) 50 MG Cap 50 mg daily as needed.       macitentan (OPSUMIT) 10 mg Tab Take 1 tablet (10 mg total) by mouth once daily.  Qty: 30 tablet, Refills: 11    Associated Diagnoses: Secondary pulmonary hypertension      magnesium oxide (MAG-OX) 400 mg tablet Take 1 tablet (400 mg total) by mouth 2 (two) times daily.  Refills: 0      pantoprazole (PROTONIX) 40 MG tablet Take 1 tablet (40 mg total) by mouth once daily.  Qty: 30 tablet, Refills: 11      PATADAY 0.2 % Drop Refills: 6      potassium chloride SA (K-DUR,KLOR-CON) 20 MEQ tablet Take 2 tablets (40 mEq total) by mouth 3 (three) times daily.  Qty: 120 tablet, Refills: 2      spironolactone (ALDACTONE) 25 MG  tablet Take 1 tablet (25 mg total) by mouth once daily.  Qty: 30 tablet, Refills: 11      TREPROSTINIL SODIUM (TREPROSTINIL, REMODULIN, PUMP FOR HOME USE) Patient is on treprostinil (REMODULIN) Therapy and managed by the Pulmonary Hypertension Clinic. Contact PHTN Coordinators at [phone] for any questions about dosing. After hours, call  at [phone] and ask for HTS fellow on call      warfarin (COUMADIN) 5 MG tablet Take 1 tablet (5 mg total) by mouth Daily.  Qty: 30 tablet, Refills: 11         STOP taking these medications       linezolid (ZYVOX) 600 mg Tab Comments:   Reason for Stopping:               BRANDI Dexter  Heart Transplant  [facility]

## 2017-11-21 NOTE — NURSING
Patient ready for dc home, printed AVS reviewed along with med schedule and followup appts, patient instructed to only wash pacer removal site with soap and water and pat dry. Followup in December with Dr.Patel. Gustafson here to transport home with portable O2. Patient verbalizes underrstanding of dc instructions.

## 2017-11-21 NOTE — TELEPHONE ENCOUNTER
Patient missed her ID follow up appointment today. Appears she was admitted on 11/19 for a scheduled pacemaker lead extraction secondary to recurrent bacteremia which occurred yesterday. Plans to be discharged today.    She has completed her two weeks of Linezolid. No fevers or leukocytosis during admission. Will re-schedule ID appointment in 4 weeks or sooner if needed.

## 2017-12-01 NOTE — PROGRESS NOTES
Patient reports realizing she was using 2.5mg tablet taking 1 & 1/2 tabs on Monday then 1 tablet daily thru yesterday 11/30    Reports picking up 5mg tabs on yesterday   Reports eating mustard greens on Wednesday   Denies any other changes

## 2017-12-15 NOTE — ED PROVIDER NOTES
"Encounter Date: 12/15/2017    SCRIBE #1 NOTE: ILulu, am scribing for, and in the presence of, Dr. Gomez.       History     Chief Complaint   Patient presents with    Vascular Access Problem     Picc came out left arm around 0500 this AM. uses for remodulin infusion. Denies any current complaints.      Time seen by provider: 2:50 PM    This is a 57 y.o. female with a history of HLD, arrhythmia, pulmonary HTN (diagnosis in 2010), CHF, cardiac pacemaker in situ, and  heart block AV third degree who presents with complaint of PICC (for Remodulin infusions) "fell out" from LUE around 5:00 AM this morning. She indicates that she woke up this morning and noticed that her line was out, she does not know how. The pt denies any fevers, productive cough, LE edema, bowel issues, dysuria, CP, SOB, or chills.      The history is provided by the patient.     Review of patient's allergies indicates:   Allergen Reactions    Chlorhexidine Itching and Rash     Patient had raised rash on neck following RHC procedure. She states she's never had a problem with the "prep" used until this time.     Adhesive Rash     Past Medical History:   Diagnosis Date    Arrhythmia     Arthritis     Cardiac pacemaker in situ     Carpal tunnel syndrome, right     Cervical spondylosis     Cervicalgia     CHF (congestive heart failure)     Diverticulitis     Heart block AV third degree     Hyperlipidemia     ALFREDO on CPAP     Pulmonary hypertension     Subdeltoid bursitis      Past Surgical History:   Procedure Laterality Date    CARDIAC CATHETERIZATION      CARDIAC PACEMAKER PLACEMENT  7/29/13    [facility]    CARDIAC SURGERY      COLONOSCOPY N/A 9/28/2015    Procedure: COLONOSCOPY;  Surgeon: Jason Diaz MD;  Location: [facility] ([address] [place] [address]);  Service: Endoscopy;  Laterality: N/A;  Please schedule in 2-3 months with Dr Diaz  Pulmonary HTN, 2nd floor (off remodulin infusion as of "a few days" before 9/9/15)    3 day " "hold Coumadin, [facility]  PT/INR scheduled before procedure    ECTOPIC PREGNANCY SURGERY Left     HYSTERECTOMY      partial    knee arthroscopy       Family History   Problem Relation Age of Onset    Arthritis Mother     Diabetes Mother     Hyperlipidemia Mother     Arthritis Father     Hyperlipidemia Father      Social History   Substance Use Topics    Smoking status: Never Smoker    Smokeless tobacco: Never Used    Alcohol use No      Comment: rarely     Review of Systems   Constitutional: Negative for chills and fever.        PICC line "fell out."   HENT: Negative for sore throat.    Eyes: Negative for visual disturbance.   Respiratory: Negative for cough (Productive) and shortness of breath.    Cardiovascular: Negative for chest pain and leg swelling.   Gastrointestinal:        Denies bowel problems.   Genitourinary: Negative for dysuria.   Musculoskeletal: Negative for back pain.   Skin: Negative for pallor.   Neurological: Negative for headaches.       Physical Exam     Initial Vitals [12/15/17 1419]   BP Pulse Resp Temp SpO2   (!) 108/57 86 18 97.7 °F (36.5 °C) (!) 94 %      MAP       74         Physical Exam    Nursing note and vitals reviewed.  Constitutional: No distress.   57 y.o.  female.   HENT:   Head: Normocephalic and atraumatic.   Alopecia areata noted. No intraoral lesions noted.   Neck: No tracheal deviation present. JVD (Mild at 60 degrees) present.   Cardiovascular: Normal rate and regular rhythm.   Murmur (3/6 mid systolic murmur, loudest at apex) heard.  Pulmonary/Chest: No stridor.   Abdominal: Soft. She exhibits no distension. There is no tenderness.   Musculoskeletal: She exhibits no edema (Peripheral).   Moves all extremities x4.   Neurological: She is alert and oriented to person, place, and time.   Skin:   1.5x1.5 cm area of wound noted to Left medial UE w/o evidence of acute hemorrhage.   Psychiatric: Thought content normal.         ED Course "   Procedures  Labs Reviewed   CBC W/ AUTO DIFFERENTIAL - Abnormal; Notable for the following:        Result Value    Hemoglobin 10.9 (*)     Hematocrit 34.0 (*)     MCV 79 (*)     MCH 25.3 (*)     RDW 21.8 (*)     Platelets 413 (*)     All other components within normal limits   COMPREHENSIVE METABOLIC PANEL - Abnormal; Notable for the following:     CO2 22 (*)     Albumin 3.2 (*)     ALT 8 (*)     eGFR if non  55.9 (*)     All other components within normal limits   B-TYPE NATRIURETIC PEPTIDE - Abnormal; Notable for the following:      (*)     All other components within normal limits   PROTIME-INR - Abnormal; Notable for the following:     Prothrombin Time 27.6 (*)     INR 2.8 (*)     All other components within normal limits   TROPONIN I     EKG Readings: (Independently Interpreted)   NSR. RAD. Right ventricular hypertrophy. RBBB. Normal ST segments at 69 BPM.       X-Rays:   Independently Interpreted Readings:   Chest X-Ray: Enlarged cardiac silhouette w/o evidence of consolidation or ld fluid overload. No evidence of retained radioopaque foreign body.     Imaging Results          X-Ray Chest 1 View for PICC_Central line (Final result)  Result time 12/15/17 16:56:47    Final result by Gisela Layton MD (12/15/17 16:56:47)                 Impression:      As above      Electronically signed by: GISELA LAYTON MD  Date:     12/15/17  Time:    16:56              Narrative:    Chest one view    Indication:PICC line placement    Comparison:12/15/2070    Findings: Right PICC catheter tip projects over the mid SVC.  No pneumothorax.  No significant interval detrimental change in the cardiopulmonary status since the previous exam.                             X-Ray Chest AP Portable (Final result)  Result time 12/15/17 15:38:12    Final result by Gisela Layton MD (12/15/17 15:38:12)                 Impression:      Overall, grossly stable chronic pulmonary/interstitial findings, no  large focal consolidation.        Electronically signed by: GISELA SAUCEDO MD  Date:     12/15/17  Time:    15:38              Narrative:    Chest AP portable    Indication:Pulmonary hypertension    Comparison:11/20/2017    Findings:  The cardiomediastinal silhouette is prominent, similar in configuration as compared with the previous exam, noting prominent hilar vascularity.  There is no pleural effusion.  The trachea is midline.  The lungs are symmetrically expanded bilaterally with mildly coarse interstitial attenuation, primarily in a perihilar distribution. No large focal consolidation seen.  There is no pneumothorax.  The osseous structures are remarkable for degenerative changes.                              Medical Decision Making:   History:   Old Medical Records: I decided to obtain old medical records.  Initial Assessment:   My differential diagnoses include but are not limited to acute heart failure, chronic heart failure, PICC line complication, and indication for chronic inotropes.  Independently Interpreted Test(s):   I have ordered and independently interpreted X-rays - see prior notes.  I have ordered and independently interpreted EKG Reading(s) - see prior notes  Clinical Tests:   Lab Tests: Ordered and Reviewed  Radiological Study: Ordered and Reviewed  Medical Tests: Ordered and Reviewed            Scribe Attestation:   Scribe #1: I performed the above scribed service and the documentation accurately describes the services I performed. I attest to the accuracy of the note.    Attending Attestation:             Attending ED Notes:   Patient has been urgently evaluated by the PICC line team who has successfully placed an additional central venous catheter in the right upper extremity.  Initial chest x-ray does not reveal any evidence of retained radiopaque foreign body, and follow-up chest x-ray after placement does not reveal evidence of pneumothorax, and indicates proper placement of PICC.  The  patient will be discharged home with outpatient instructions to continue her outpatient medications as prescribed and return to the emergency department as needed for urgent concerns.          ED Course      Clinical Impression:   The primary encounter diagnosis was Displacement of peripherally inserted central venous catheter (PICC). Diagnoses of Pulmonary hypertension and Chronic anticoagulation were also pertinent to this visit.    Disposition:   Disposition: Discharged  Condition: Stable                        Spenser Gomez MD  12/20/17 0323

## 2017-12-15 NOTE — CONSULTS
Single lumen PICC placed in right basilic vein of RUE, 38cm in length with 0cm exposed and 30cm arm circumference. Lot#[id number].

## 2017-12-15 NOTE — ED NOTES
Spoke with Dr. Rosa with cards--told to place Remodulin drip at same rate as PTA and pt will not be admitted--CONCETTA Merino from TSU setting up pump--will inform ED MD and charge RN

## 2017-12-15 NOTE — NURSING
Per report, patient has been off of IV Remodulin since approximately 0500. Consulted with Dr. Stern and Michael Gibson, Clinical Pharmacist and determined that IV remodulin should be initiated through a peripheral line at 15 ng/kg/min with a dosing weight of 86 kg. Relayed all information to the ED Pharmacist.  Spoke with ED Nurse and advised her to call HTS Service and TSU Charge nurse.

## 2017-12-15 NOTE — ED NOTES
Pt to er due to PICC line becoming dislodged this am at 0500--site is without redness, swelling, and drainage--neuro intact--denies CP and SOB

## 2017-12-15 NOTE — PROCEDURES
Emily Cook is a 57 y.o. female patient.    Temp: 97.7 °F (36.5 °C) (12/15/17 1419)  Pulse: 77 (12/15/17 1450)  Resp: 18 (12/15/17 1450)  BP: (!) 95/54 (12/15/17 1450)  SpO2: (!) 91 % (12/15/17 1450)  Weight: 70.3 kg (155 lb) (12/15/17 1419)    PICC  Date/Time: 12/15/2017 4:28 PM  Performed by: MANUEL MARCH  Consent Done: Yes  Time out: Immediately prior to procedure a time out was called to verify the correct patient, procedure, equipment, support staff and site/side marked as required  Indications: med administration and vascular access  Anesthesia: local infiltration  Local anesthetic: lidocaine 1% without epinephrine  Anesthetic Total (mL): 2  Preparation: skin prepped with ChloraPrep  Skin prep agent dried: skin prep agent completely dried prior to procedure  Sterile barriers: all five maximum sterile barriers used - cap, mask, sterile gown, sterile gloves, and large sterile sheet  Hand hygiene: hand hygiene performed prior to central venous catheter insertion  Location details: right basilic  Catheter type: single lumen  Catheter size: 5 Fr  Catheter Length: 38cm    Ultrasound guidance: yes  Vessel Caliber: medium and patent, compressibility normal  Vascular Doppler: not done  Needle advanced into vessel with real time Ultrasound guidance.  Guidewire confirmed in vessel.  Image recorded and saved.  Sterile sheath used.  no esophageal manometryNumber of attempts: 1  Post-procedure: blood return through all ports, chlorhexidine patch and sterile dressing applied  Technical procedures used: 3cg  Specimens: No  Implants: No  Assessment: placement verified by x-ray  Complications: none        Karen Monsivais  12/15/2017

## 2017-12-15 NOTE — ED NOTES
Charge RN from TSU at bedside to start infusion--XRAY already performed to confirm placement of PICC line--NAD--will monitor

## 2017-12-15 NOTE — TELEPHONE ENCOUNTER
Patient called reporting her PICC Line fell out while sleeping. She will present to the ED to get replacement PICC line for her remodulin.

## 2017-12-19 PROBLEM — J96.01 ACUTE RESPIRATORY FAILURE WITH HYPOXEMIA: Status: RESOLVED | Noted: 2017-01-01 | Resolved: 2017-01-01

## 2017-12-19 PROBLEM — J96.91 RESPIRATORY FAILURE WITH HYPOXIA: Status: RESOLVED | Noted: 2017-01-01 | Resolved: 2017-01-01

## 2017-12-19 NOTE — PROGRESS NOTES
Subjective:      Patient ID: Emily Cook is a 57 y.o. female.    Chief Complaint: Follow-up MRSA bacteremia    History of Present Illness  57-year-old female with history of pulmonary HTN on home Remodulin presents for follow-up of MRSA bacteremia. MRSA bacteremia was thought to be in setting of Brush catheter. Brush catheter was removed. Patient underwent TTE and AMIE with no evidence of endocarditis. Patient was discharged home on linezolid to avoid need for 2 central lines - nothing could be infused with remodulin. Patient reports completing linezolid about 2 weeks ago. Patient reports feeling well, no complaints. Recently her PICC line fell out while asleep - she presented to ED to have PICC line replaced. Patient denied any fevers, chills, night sweats, n/v/d, abdominal pain, diarrhea, hematuria, dysuria. Reports no redness, swelling, erythema over new PICC site. Denied any redness, swelling, erythema over prior brush catheter site. Patient reports having home nursing change her dressing once a week. Patient has not received influenza vaccine this year.    Review of Systems   Constitution: Negative for chills, decreased appetite, fever, weakness, malaise/fatigue, night sweats, weight gain and weight loss.   HENT: Negative for congestion, ear pain, hearing loss, hoarse voice, sore throat and tinnitus.    Eyes: Negative for blurred vision, redness and visual disturbance.   Cardiovascular: Negative for chest pain, leg swelling and palpitations.   Respiratory: Negative for cough, hemoptysis, shortness of breath and sputum production.    Hematologic/Lymphatic: Negative for adenopathy. Does not bruise/bleed easily.   Skin: Negative for dry skin, itching, rash and suspicious lesions.   Musculoskeletal: Positive for joint pain and neck pain. Negative for back pain and myalgias.   Gastrointestinal: Negative for abdominal pain, constipation, diarrhea, heartburn, nausea and vomiting.   Genitourinary: Negative  "for dysuria, flank pain, frequency, hematuria, hesitancy and urgency.   Neurological: Negative for dizziness, headaches, numbness and paresthesias.   Psychiatric/Behavioral: Negative for depression and memory loss. The patient does not have insomnia and is not nervous/anxious.      Objective:   Physical Exam   Constitutional: She is oriented to person, place, and time. She appears well-developed and well-nourished. No distress.   HENT:   Head: Normocephalic and atraumatic.   Eyes: Conjunctivae and EOM are normal.   Neck: Normal range of motion. Neck supple.   Cardiovascular: Normal rate, regular rhythm and normal heart sounds.  Exam reveals no friction rub.    No murmur heard.  No redness, swelling, pain over prior Cardoso catheter site over L chest   Pulmonary/Chest: Effort normal and breath sounds normal. No respiratory distress. She has no wheezes. She has no rales.   Abdominal: Soft. Bowel sounds are normal. She exhibits no distension. There is no tenderness.   Musculoskeletal: Normal range of motion. She exhibits no edema.   Neurological: She is alert and oriented to person, place, and time.   Skin: Skin is warm and dry. No rash noted. She is not diaphoretic. No erythema.   Right PICC line with no surrounding redness, swelling, pain   Psychiatric: She has a normal mood and affect. Her behavior is normal.     Significant labs reviewed:   Methicillin resistant staphylococcus aureus    CULTURE, BLOOD    Cefazolin  Resistant    Ciprofloxacin <=0.5 mcg/mL"><=0.5 mcg/mL Sensitive    Clindamycin <=0.25 mcg/mL"><=0.25 mcg/mL Sensitive    Erythromycin 0.5 mcg/mL Sensitive    Gentamicin <=0.5 mcg/mL"><=0.5 mcg/mL Sensitive    Linezolid 2 mcg/mL Sensitive    Oxacillin >=4 mcg/mL Resistant    Penicillin >=0.5 mcg/mL Resistant    Rifampin <=0.5 mcg/mL"><=0.5 mcg/mL Sensitive    Tetracycline <=1 mcg/mL"><=1 mcg/mL Sensitive    Tigecycline <=0.12 mcg/mL"><=0.12 mcg/mL Sensitive    Trimeth/Sulfa <=10 mcg/mL"><=10 mcg/mL " "Sensitive    Vancomycin <=0.5 mcg/mL"><=0.5 mcg/mL Sensitive         Assessment:   57-year-old female  - MRSA bacteremia 2/2  - Infected Cardoso catheter  - Pulmonary HTN on remodulin  - Chronic anticoagulation - warfarin  - Need for influenza vaccine    Plan:   - No need for any additional antibiotics  - Influenza vaccine given today  - RTC PRN    Juliana Etienne MD MPH  Infectious Diseases [facility]  "

## 2017-12-19 NOTE — ASSESSMENT & PLAN NOTE
INCREASE remodulin slowly to 30 elisabet/kg/min  (hopefully by time she comes back in three weeks)  Continue opsumit  Did not tolerate Adcirca or Adempas

## 2017-12-19 NOTE — LETTER
December 19, 2017        Kaylee Cazares  [address] [facility] [address]  Phone: [phone]  Fax: [phone]             [facility]  [address]  [place] [address]  Phone: [phone]   Patient: Emily Cook   MR Number: [id number]   YOB: 1960   Date of Visit: 12/19/2017       Dear Dr. Kaylee Cazares    Thank you for referring Emily Cook to me for evaluation. Attached you will find relevant portions of my assessment and plan of care.    If you have questions, please do not hesitate to call me. I look forward to following Emily Cook along with you.    Sincerely,    Augustine Rhodes MD    Enclosure    If you would like to receive this communication electronically, please contact [email or web address] or [phone] to request "[employer] Link access.    "[employer] Link is a tool which provides read-only access to select patient information with whom you have a relationship. Its easy to use and provides real time access to review your patients record including encounter summaries, notes, results, and demographic information.    If you feel you have received this communication in error or would no longer like to receive these types of communications, please e-mail [email or web address]

## 2017-12-19 NOTE — PATIENT INSTRUCTIONS
USE NIGHTTIME oxygen  INCREASE remodulin slowly to 30 elisabet/kg/min  TAKE BUMEX once a day always Second dose in afternoon if weight greater than 155 lbs

## 2017-12-19 NOTE — PROGRESS NOTES
"Subjective: class 3    Patient ID:  Emily Cook is a 57 y.o. female who presents for post discharge follow-up of Pulmonary Hypertension after PICC line dislodgement in mid Dec 2017     HPI   AVB/syncope s/p PPM, pulmonary HTN on IV remodulin  (23 elisabet/kg/min) who comes after PICC line dislodgement requiring new PICC line placement earlier this month. As we have had some concerns about the accuracy of the mixing done at home so I wanted to see her to make sure she was not getting side effects from too much remodulin for her.     Current dose is 21 elisabet /kg/min (verified off pump and dosing chart)  Since discharge, thinks she is able to do a little more. Able to do mopping and some dusting she says.  Complains that she urinates a lot a night which keeps her up at night (affecting the quality of her life)   Able to walk two blocks today. Does not use oxygen at night ( or when she came in today)  No edema.      Six Minute Walk Test: will do next visit       Review of Systems   Constitution: Negative for decreased appetite, weight gain and weight loss.   Cardiovascular: Negative for chest pain, dyspnea on exertion, leg swelling, near-syncope, orthopnea and palpitations.   Respiratory: Negative for cough and shortness of breath.    Musculoskeletal: Negative for myalgias.   Gastrointestinal: Negative for jaundice.        Objective:       Physical Exam   Constitutional: She appears well-developed and well-nourished. No distress.   /68   Pulse 68   Ht 5' 3" (1.6 m)   Wt 73.1 kg (161 lb 2.5 oz)   SpO2 97%   BMI 28.55 kg/m²      HENT:   Head: Normocephalic and atraumatic. Head is without abrasion and without contusion.   Right Ear: External ear normal.   Left Ear: External ear normal.   Nose: Nose normal. No epistaxis.   Mouth/Throat: Oropharynx is clear and moist. Mucous membranes are not cyanotic.   Eyes: Conjunctivae and EOM are normal. Pupils are equal, round, and reactive to light.   Neck: Normal range " of motion. Neck supple. No tracheal deviation present.   Cardiovascular: Normal rate, regular rhythm, normal heart sounds and normal pulses.  Exam reveals no gallop.    No murmur heard.  Pulmonary/Chest: Effort normal and breath sounds normal. No stridor. No respiratory distress. She has no wheezes.   Abdominal: Soft. Normal appearance, normal aorta and bowel sounds are normal. She exhibits no distension. There is no tenderness.   Musculoskeletal: She exhibits no edema or tenderness.   Neurological: She is alert. She has normal strength and normal reflexes. She exhibits normal muscle tone.   Skin: Skin is warm. No rash noted. No erythema.   Psychiatric: She has a normal mood and affect. Her speech is normal and behavior is normal. Judgment and thought content normal. Cognition and memory are normal.           Chemistry        Component Value Date/Time     12/15/2017 1520    K 4.8 12/15/2017 1520     12/15/2017 1520    CO2 22 (L) 12/15/2017 1520    BUN 19 12/15/2017 1520    CREATININE 1.1 12/15/2017 1520    GLU 77 12/15/2017 1520     01/27/2017 2130        Component Value Date/Time    CALCIUM 9.0 12/15/2017 1520    ALKPHOS 121 12/15/2017 1520    AST 23 12/15/2017 1520    ALT 8 (L) 12/15/2017 1520    BILITOT 0.5 12/15/2017 1520    ESTGFRAFRICA >60.0 12/15/2017 1520    EGFRNONAA 55.9 (A) 12/15/2017 1520            Magnesium   Date Value Ref Range Status   11/19/2017 2.0 1.6 - 2.6 mg/dL Final       Lab Results   Component Value Date    WBC 6.36 12/15/2017    HGB 10.9 (L) 12/15/2017    HCT 34.0 (L) 12/15/2017    MCV 79 (L) 12/15/2017     (H) 12/15/2017       Lab Results   Component Value Date    INR 2.8 (H) 12/15/2017    INR 2.3 (H) 12/12/2017    INR 2.3 12/12/2017       BNP   Date Value Ref Range Status   12/15/2017 108 (H) 0 - 99 pg/mL Final     Comment:     Values of less than 100 pg/ml are consistent with non-CHF populations.   10/24/2017 87 0 - 99 pg/mL Final     Comment:     Values of less  than 100 pg/ml are consistent with non-CHF populations.   10/09/2017 93 0 - 99 pg/mL Final     Comment:     Values of less than 100 pg/ml are consistent with non-CHF populations.         Assessment:       1. Primary pulmonary hypertension    2. Hypoxia    3. Right-sided heart failure    4. Chronic pulmonary heart disease    5. Long term current use of anticoagulant therapy        WHO Group 1  Functional Class 3      Plan:     Primary pulmonary hypertension  INCREASE remodulin slowly to 30 elisabet/kg/min  (hopefully by time she comes back in three weeks)  Continue opsumit  Did not tolerate Adcirca or Adempas        Hypoxia  USE NIGHTTIME oxygen and with activity     Right-sided heart failure  TAKE BUMEX once a day always Second dose in afternoon if home weight greater than 155 lbs    Long term current use of anticoagulant therapy  Limited venous access for central lines Will discuss with pt next visit          Return in about 3 weeks (around 1/9/2018) for remodulin titration.  Orders Placed This Encounter    Comprehensive metabolic panel    CBC auto differential    Brain natriuretic peptide    Stress test, pulmonary    bumetanide (BUMEX) 2 MG tablet

## 2017-12-19 NOTE — PROGRESS NOTES
The (Medical Team HH) Called today 12/19/17 to reschedule appointment 12/19 to 12/20/17. Pt has Dr gipson today.

## 2018-01-01 ENCOUNTER — HOSPITAL ENCOUNTER (EMERGENCY)
Facility: HOSPITAL | Age: 58
Discharge: HOME | End: 2018-01-14
Attending: EMERGENCY MEDICINE
Payer: MEDICARE

## 2018-01-01 ENCOUNTER — TELEPHONE (OUTPATIENT)
Dept: TRANSPLANT | Facility: CLINIC | Age: 58
End: 2018-01-01

## 2018-01-01 ENCOUNTER — DOCUMENTATION ONLY (OUTPATIENT)
Dept: TRANSPLANT | Facility: CLINIC | Age: 58
End: 2018-01-01

## 2018-01-01 ENCOUNTER — HOSPITAL ENCOUNTER (INPATIENT)
Facility: HOSPITAL | Age: 58
LOS: 1 days | DRG: 314 | End: 2018-01-25
Attending: INTERNAL MEDICINE | Admitting: INTERNAL MEDICINE
Payer: MEDICARE

## 2018-01-01 ENCOUNTER — ANTI-COAG VISIT (OUTPATIENT)
Dept: CARDIOLOGY | Facility: CLINIC | Age: 58
End: 2018-01-01

## 2018-01-01 ENCOUNTER — ANTI-COAG VISIT (OUTPATIENT)
Dept: CARDIOLOGY | Facility: CLINIC | Age: 58
End: 2018-01-01
Payer: MEDICARE

## 2018-01-01 ENCOUNTER — HOSPITAL ENCOUNTER (EMERGENCY)
Facility: HOSPITAL | Age: 58
Discharge: HOME | End: 2018-01-08
Attending: EMERGENCY MEDICINE
Payer: MEDICARE

## 2018-01-01 VITALS
OXYGEN SATURATION: 97 % | HEIGHT: 63 IN | HEART RATE: 91 BPM | TEMPERATURE: 97 F | SYSTOLIC BLOOD PRESSURE: 94 MMHG | DIASTOLIC BLOOD PRESSURE: 57 MMHG | RESPIRATION RATE: 14 BRPM | BODY MASS INDEX: 28.35 KG/M2 | WEIGHT: 160 LBS

## 2018-01-01 VITALS
SYSTOLIC BLOOD PRESSURE: 93 MMHG | OXYGEN SATURATION: 96 % | TEMPERATURE: 98 F | HEIGHT: 63 IN | RESPIRATION RATE: 18 BRPM | WEIGHT: 160 LBS | BODY MASS INDEX: 28.35 KG/M2 | HEART RATE: 75 BPM | DIASTOLIC BLOOD PRESSURE: 61 MMHG

## 2018-01-01 DIAGNOSIS — L03.90 CELLULITIS, UNSPECIFIED CELLULITIS SITE: ICD-10-CM

## 2018-01-01 DIAGNOSIS — Z79.01 LONG TERM CURRENT USE OF ANTICOAGULANT THERAPY: ICD-10-CM

## 2018-01-01 DIAGNOSIS — G89.29 CHRONIC NECK PAIN: ICD-10-CM

## 2018-01-01 DIAGNOSIS — Z79.01 ANTICOAGULATION ADEQUATE WITH ANTICOAGULANT THERAPY: Primary | ICD-10-CM

## 2018-01-01 DIAGNOSIS — M54.2 CHRONIC NECK PAIN: ICD-10-CM

## 2018-01-01 DIAGNOSIS — M75.51 SUBDELTOID BURSITIS, RIGHT: ICD-10-CM

## 2018-01-01 DIAGNOSIS — Z95.828 STATUS POST PICC CENTRAL LINE PLACEMENT: Primary | ICD-10-CM

## 2018-01-01 DIAGNOSIS — Z78.9 PROBLEM WITH VASCULAR ACCESS: ICD-10-CM

## 2018-01-01 DIAGNOSIS — Z45.2 PICC (PERIPHERALLY INSERTED CENTRAL CATHETER) IN PLACE: Primary | ICD-10-CM

## 2018-01-01 DIAGNOSIS — R65.20 SEVERE SEPSIS: ICD-10-CM

## 2018-01-01 DIAGNOSIS — Z79.01 LONG TERM CURRENT USE OF ANTICOAGULANT THERAPY: Primary | ICD-10-CM

## 2018-01-01 DIAGNOSIS — A41.9 SEVERE SEPSIS: ICD-10-CM

## 2018-01-01 DIAGNOSIS — I27.20 PULMONARY HYPERTENSION: ICD-10-CM

## 2018-01-01 DIAGNOSIS — N17.9 AKI (ACUTE KIDNEY INJURY): ICD-10-CM

## 2018-01-01 LAB
ABO + RH BLD: NORMAL
ALBUMIN SERPL BCP-MCNC: 0.9 G/DL
ALBUMIN SERPL BCP-MCNC: 1 G/DL
ALBUMIN SERPL BCP-MCNC: 1.8 G/DL
ALBUMIN SERPL BCP-MCNC: 1.8 G/DL
ALLENS TEST: ABNORMAL
ALP SERPL-CCNC: 132 U/L
ALP SERPL-CCNC: 141 U/L
ALT SERPL W/O P-5'-P-CCNC: 34 U/L
ALT SERPL W/O P-5'-P-CCNC: 5 U/L
ANION GAP SERPL CALC-SCNC: 10 MMOL/L
ANION GAP SERPL CALC-SCNC: 14 MMOL/L
ANION GAP SERPL CALC-SCNC: 14 MMOL/L
ANION GAP SERPL CALC-SCNC: 18 MMOL/L
ANION GAP SERPL CALC-SCNC: 20 MMOL/L
ANISOCYTOSIS BLD QL SMEAR: SLIGHT
APTT BLDCRRT: 34.9 SEC
AST SERPL-CCNC: 10 U/L
AST SERPL-CCNC: 133 U/L
BASOPHILS # BLD AUTO: 0.02 K/UL
BASOPHILS # BLD AUTO: 0.03 K/UL
BASOPHILS # BLD AUTO: 0.04 K/UL
BASOPHILS # BLD AUTO: 0.05 K/UL
BASOPHILS NFR BLD: 0.2 %
BASOPHILS NFR BLD: 0.2 %
BASOPHILS NFR BLD: 0.3 %
BASOPHILS NFR BLD: 0.3 %
BILIRUB SERPL-MCNC: 2.9 MG/DL
BILIRUB SERPL-MCNC: 4 MG/DL
BLD GP AB SCN CELLS X3 SERPL QL: NORMAL
BLD PROD TYP BPU: NORMAL
BLD PROD TYP BPU: NORMAL
BLOOD UNIT EXPIRATION DATE: NORMAL
BLOOD UNIT EXPIRATION DATE: NORMAL
BLOOD UNIT TYPE CODE: 5100
BLOOD UNIT TYPE CODE: 6200
BLOOD UNIT TYPE: NORMAL
BLOOD UNIT TYPE: NORMAL
BUN SERPL-MCNC: 12 MG/DL
BUN SERPL-MCNC: 3 MG/DL
BUN SERPL-MCNC: 41 MG/DL
BUN SERPL-MCNC: 52 MG/DL
BUN SERPL-MCNC: 53 MG/DL
BURR CELLS BLD QL SMEAR: ABNORMAL
CALCIUM SERPL-MCNC: 6.2 MG/DL
CALCIUM SERPL-MCNC: 6.3 MG/DL
CALCIUM SERPL-MCNC: 8.3 MG/DL
CALCIUM SERPL-MCNC: 8.6 MG/DL
CALCIUM SERPL-MCNC: 8.6 MG/DL
CHLORIDE SERPL-SCNC: 100 MMOL/L
CHLORIDE SERPL-SCNC: 102 MMOL/L
CHLORIDE SERPL-SCNC: 102 MMOL/L
CHLORIDE SERPL-SCNC: 95 MMOL/L
CHLORIDE SERPL-SCNC: 96 MMOL/L
CO2 SERPL-SCNC: 12 MMOL/L
CO2 SERPL-SCNC: 17 MMOL/L
CO2 SERPL-SCNC: 21 MMOL/L
CO2 SERPL-SCNC: 21 MMOL/L
CO2 SERPL-SCNC: 27 MMOL/L
CODING SYSTEM: NORMAL
CODING SYSTEM: NORMAL
CREAT SERPL-MCNC: 0.3 MG/DL
CREAT SERPL-MCNC: 0.8 MG/DL
CREAT SERPL-MCNC: 1.5 MG/DL
CREAT SERPL-MCNC: 2 MG/DL
CREAT SERPL-MCNC: 2.5 MG/DL
D DIMER PPP IA.FEU-MCNC: 6.69 MG/L FEU
DELSYS: ABNORMAL
DIFFERENTIAL METHOD: ABNORMAL
DISPENSE STATUS: NORMAL
DISPENSE STATUS: NORMAL
EOSINOPHIL # BLD AUTO: 0 K/UL
EOSINOPHIL # BLD AUTO: 0.1 K/UL
EOSINOPHIL NFR BLD: 0 %
EOSINOPHIL NFR BLD: 0 %
EOSINOPHIL NFR BLD: 0.1 %
EOSINOPHIL NFR BLD: 1 %
ERYTHROCYTE [DISTWIDTH] IN BLOOD BY AUTOMATED COUNT: 18.6 %
ERYTHROCYTE [DISTWIDTH] IN BLOOD BY AUTOMATED COUNT: 18.7 %
ERYTHROCYTE [DISTWIDTH] IN BLOOD BY AUTOMATED COUNT: 18.9 %
ERYTHROCYTE [DISTWIDTH] IN BLOOD BY AUTOMATED COUNT: 19.8 %
ERYTHROCYTE [SEDIMENTATION RATE] IN BLOOD BY WESTERGREN METHOD: 14 MM/H
ERYTHROCYTE [SEDIMENTATION RATE] IN BLOOD BY WESTERGREN METHOD: 15 MM/H
ERYTHROCYTE [SEDIMENTATION RATE] IN BLOOD BY WESTERGREN METHOD: 18 MM/H
ERYTHROCYTE [SEDIMENTATION RATE] IN BLOOD BY WESTERGREN METHOD: 18 MM/H
ERYTHROCYTE [SEDIMENTATION RATE] IN BLOOD BY WESTERGREN METHOD: 24 MM/H
ERYTHROCYTE [SEDIMENTATION RATE] IN BLOOD BY WESTERGREN METHOD: 30 MM/H
EST. GFR  (AFRICAN AMERICAN): 23.9 ML/MIN/1.73 M^2
EST. GFR  (AFRICAN AMERICAN): 31.3 ML/MIN/1.73 M^2
EST. GFR  (AFRICAN AMERICAN): 44.3 ML/MIN/1.73 M^2
EST. GFR  (AFRICAN AMERICAN): >60 ML/MIN/1.73 M^2
EST. GFR  (AFRICAN AMERICAN): >60 ML/MIN/1.73 M^2
EST. GFR  (NON AFRICAN AMERICAN): 20.7 ML/MIN/1.73 M^2
EST. GFR  (NON AFRICAN AMERICAN): 27.1 ML/MIN/1.73 M^2
EST. GFR  (NON AFRICAN AMERICAN): 38.4 ML/MIN/1.73 M^2
EST. GFR  (NON AFRICAN AMERICAN): >60 ML/MIN/1.73 M^2
EST. GFR  (NON AFRICAN AMERICAN): >60 ML/MIN/1.73 M^2
FIBRINOGEN PPP-MCNC: 431 MG/DL
FIBRINOGEN PPP-MCNC: 431 MG/DL
FIO2: 100
GLUCOSE SERPL-MCNC: 106 MG/DL
GLUCOSE SERPL-MCNC: 177 MG/DL
GLUCOSE SERPL-MCNC: 205 MG/DL
GLUCOSE SERPL-MCNC: 408 MG/DL
GLUCOSE SERPL-MCNC: 71 MG/DL
HAPTOGLOB SERPL-MCNC: 145 MG/DL
HCO3 UR-SCNC: 10.7 MMOL/L (ref 24–28)
HCO3 UR-SCNC: 13 MMOL/L (ref 24–28)
HCO3 UR-SCNC: 15.7 MMOL/L (ref 24–28)
HCO3 UR-SCNC: 16.2 MMOL/L (ref 24–28)
HCO3 UR-SCNC: 16.6 MMOL/L (ref 24–28)
HCO3 UR-SCNC: 17.2 MMOL/L (ref 24–28)
HCO3 UR-SCNC: 17.5 MMOL/L (ref 24–28)
HCO3 UR-SCNC: 19.3 MMOL/L (ref 24–28)
HCO3 UR-SCNC: 19.9 MMOL/L (ref 24–28)
HCO3 UR-SCNC: 20 MMOL/L (ref 24–28)
HCO3 UR-SCNC: 20.1 MMOL/L (ref 24–28)
HCO3 UR-SCNC: 20.4 MMOL/L (ref 24–28)
HCO3 UR-SCNC: 21.7 MMOL/L (ref 24–28)
HCT VFR BLD AUTO: 26.7 %
HCT VFR BLD AUTO: 27.6 %
HCT VFR BLD AUTO: 28.3 %
HCT VFR BLD AUTO: 30 %
HGB BLD-MCNC: 10.1 G/DL
HGB BLD-MCNC: 8.8 G/DL
HGB BLD-MCNC: 9 G/DL
HGB BLD-MCNC: 9.8 G/DL
HYPOCHROMIA BLD QL SMEAR: ABNORMAL
IMM GRANULOCYTES # BLD AUTO: 0.14 K/UL
IMM GRANULOCYTES # BLD AUTO: 0.27 K/UL
IMM GRANULOCYTES # BLD AUTO: 0.45 K/UL
IMM GRANULOCYTES # BLD AUTO: 0.5 K/UL
IMM GRANULOCYTES NFR BLD AUTO: 1.3 %
IMM GRANULOCYTES NFR BLD AUTO: 2.1 %
IMM GRANULOCYTES NFR BLD AUTO: 2.5 %
IMM GRANULOCYTES NFR BLD AUTO: 2.8 %
INR PPP: 1.4
INR PPP: 1.9
INR PPP: 2.2
INR PPP: 5.8
INR PPP: 6.6
LACTATE SERPL-SCNC: 2.9 MMOL/L
LACTATE SERPL-SCNC: 3.1 MMOL/L
LACTATE SERPL-SCNC: 9 MMOL/L
LYMPHOCYTES # BLD AUTO: 0.8 K/UL
LYMPHOCYTES # BLD AUTO: 1.2 K/UL
LYMPHOCYTES # BLD AUTO: 1.4 K/UL
LYMPHOCYTES # BLD AUTO: 2.1 K/UL
LYMPHOCYTES NFR BLD: 19.4 %
LYMPHOCYTES NFR BLD: 6 %
LYMPHOCYTES NFR BLD: 6.2 %
LYMPHOCYTES NFR BLD: 8.5 %
MAGNESIUM SERPL-MCNC: 1.7 MG/DL
MAGNESIUM SERPL-MCNC: 1.7 MG/DL
MAGNESIUM SERPL-MCNC: 1.9 MG/DL
MAGNESIUM SERPL-MCNC: 2.4 MG/DL
MCH RBC QN AUTO: 25.3 PG
MCH RBC QN AUTO: 25.4 PG
MCH RBC QN AUTO: 25.6 PG
MCH RBC QN AUTO: 25.6 PG
MCHC RBC AUTO-ENTMCNC: 31.9 G/DL
MCHC RBC AUTO-ENTMCNC: 33.7 G/DL
MCHC RBC AUTO-ENTMCNC: 33.7 G/DL
MCHC RBC AUTO-ENTMCNC: 34.6 G/DL
MCV RBC AUTO: 74 FL
MCV RBC AUTO: 75 FL
MCV RBC AUTO: 76 FL
MCV RBC AUTO: 79 FL
METHEMOGLOBIN: 1.8 % (ref 0–3)
METHEMOGLOBIN: 1.9 % (ref 0–3)
MIN VOL: 13.3
MIN VOL: 14
MIN VOL: 14.6
MIN VOL: 15.7
MIN VOL: 16
MIN VOL: 16.5
MIN VOL: 17
MIN VOL: 17
MIN VOL: 9.4
MODE: ABNORMAL
MONOCYTES # BLD AUTO: 0.6 K/UL
MONOCYTES # BLD AUTO: 0.7 K/UL
MONOCYTES # BLD AUTO: 1 K/UL
MONOCYTES # BLD AUTO: 1.3 K/UL
MONOCYTES NFR BLD: 12.1 %
MONOCYTES NFR BLD: 4.5 %
MONOCYTES NFR BLD: 4.8 %
MONOCYTES NFR BLD: 5.3 %
NEUTROPHILS # BLD AUTO: 11.1 K/UL
NEUTROPHILS # BLD AUTO: 13.7 K/UL
NEUTROPHILS # BLD AUTO: 17 K/UL
NEUTROPHILS # BLD AUTO: 7.1 K/UL
NEUTROPHILS NFR BLD: 65.9 %
NEUTROPHILS NFR BLD: 84 %
NEUTROPHILS NFR BLD: 85.6 %
NEUTROPHILS NFR BLD: 86.9 %
NRBC BLD-RTO: 0 /100 WBC
OVALOCYTES BLD QL SMEAR: ABNORMAL
PCO2 BLDA: 36.7 MMHG (ref 35–45)
PCO2 BLDA: 38.4 MMHG (ref 35–45)
PCO2 BLDA: 40.1 MMHG (ref 35–45)
PCO2 BLDA: 40.2 MMHG (ref 35–45)
PCO2 BLDA: 45.2 MMHG (ref 35–45)
PCO2 BLDA: 45.2 MMHG (ref 35–45)
PCO2 BLDA: 45.8 MMHG (ref 35–45)
PCO2 BLDA: 46 MMHG (ref 35–45)
PCO2 BLDA: 47 MMHG (ref 35–45)
PCO2 BLDA: 47.4 MMHG (ref 35–45)
PCO2 BLDA: 50.3 MMHG (ref 35–45)
PCO2 BLDA: 53.8 MMHG (ref 35–45)
PCO2 BLDA: 64.3 MMHG (ref 35–45)
PEEP: 10
PH SMN: 6.97 [PH] (ref 7.35–7.45)
PH SMN: 6.99 [PH] (ref 7.35–7.45)
PH SMN: 7.01 [PH] (ref 7.35–7.45)
PH SMN: 7.15 [PH] (ref 7.35–7.45)
PH SMN: 7.21 [PH] (ref 7.35–7.45)
PH SMN: 7.22 [PH] (ref 7.35–7.45)
PH SMN: 7.22 [PH] (ref 7.35–7.45)
PH SMN: 7.24 [PH] (ref 7.35–7.45)
PH SMN: 7.25 [PH] (ref 7.35–7.45)
PH SMN: 7.26 [PH] (ref 7.35–7.45)
PH SMN: 7.28 [PH] (ref 7.35–7.45)
PH SMN: 7.29 [PH] (ref 7.35–7.45)
PH SMN: 7.32 [PH] (ref 7.35–7.45)
PHOSPHATE SERPL-MCNC: 3.6 MG/DL
PHOSPHATE SERPL-MCNC: 4.9 MG/DL
PHOSPHATE SERPL-MCNC: 5 MG/DL
PHOSPHATE SERPL-MCNC: 9.1 MG/DL
PIP: 26
PIP: 29
PIP: 30
PIP: 32
PIP: 41
PIP: 42
PIP: 45
PIP: 48
PLATELET # BLD AUTO: 12 K/UL
PLATELET # BLD AUTO: 15 K/UL
PLATELET # BLD AUTO: 399 K/UL
PLATELET # BLD AUTO: 69 K/UL
PLATELET BLD QL SMEAR: ABNORMAL
PMV BLD AUTO: 10.4 FL
PMV BLD AUTO: 9.7 FL
PMV BLD AUTO: ABNORMAL FL
PMV BLD AUTO: ABNORMAL FL
PO2 BLDA: 17 MMHG (ref 40–60)
PO2 BLDA: 31 MMHG (ref 40–60)
PO2 BLDA: 40 MMHG (ref 40–60)
PO2 BLDA: 60 MMHG (ref 80–100)
PO2 BLDA: 60 MMHG (ref 80–100)
PO2 BLDA: 61 MMHG (ref 80–100)
PO2 BLDA: 66 MMHG (ref 80–100)
PO2 BLDA: 69 MMHG (ref 80–100)
PO2 BLDA: 74 MMHG (ref 80–100)
PO2 BLDA: 75 MMHG (ref 80–100)
PO2 BLDA: 76 MMHG (ref 80–100)
POC BE: -10 MMOL/L
POC BE: -10 MMOL/L
POC BE: -11 MMOL/L
POC BE: -13 MMOL/L
POC BE: -15 MMOL/L
POC BE: -18 MMOL/L
POC BE: -21 MMOL/L
POC BE: -5 MMOL/L
POC BE: -6 MMOL/L
POC BE: -7 MMOL/L
POC BE: -7 MMOL/L
POC BE: -8 MMOL/L
POC BE: -8 MMOL/L
POC SATURATED O2: 13 % (ref 95–100)
POC SATURATED O2: 48 % (ref 95–100)
POC SATURATED O2: 67 % (ref 95–100)
POC SATURATED O2: 78 % (ref 95–100)
POC SATURATED O2: 79 % (ref 95–100)
POC SATURATED O2: 82 % (ref 95–100)
POC SATURATED O2: 86 % (ref 95–100)
POC SATURATED O2: 86 % (ref 95–100)
POC SATURATED O2: 90 % (ref 95–100)
POC SATURATED O2: 91 % (ref 95–100)
POC SATURATED O2: 91 % (ref 95–100)
POC SATURATED O2: 92 % (ref 95–100)
POC SATURATED O2: 94 % (ref 95–100)
POC TCO2: 12 MMOL/L (ref 23–27)
POC TCO2: 15 MMOL/L (ref 23–27)
POC TCO2: 17 MMOL/L (ref 23–27)
POC TCO2: 18 MMOL/L (ref 23–27)
POC TCO2: 18 MMOL/L (ref 23–27)
POC TCO2: 18 MMOL/L (ref 24–29)
POC TCO2: 19 MMOL/L (ref 24–29)
POC TCO2: 21 MMOL/L (ref 23–27)
POC TCO2: 21 MMOL/L (ref 23–27)
POC TCO2: 21 MMOL/L (ref 24–29)
POC TCO2: 22 MMOL/L (ref 23–27)
POC TCO2: 22 MMOL/L (ref 23–27)
POC TCO2: 23 MMOL/L (ref 23–27)
POCT GLUCOSE: 100 MG/DL (ref 70–110)
POCT GLUCOSE: 104 MG/DL (ref 70–110)
POCT GLUCOSE: 121 MG/DL (ref 70–110)
POCT GLUCOSE: 128 MG/DL (ref 70–110)
POCT GLUCOSE: 131 MG/DL (ref 70–110)
POCT GLUCOSE: 137 MG/DL (ref 70–110)
POCT GLUCOSE: 145 MG/DL (ref 70–110)
POCT GLUCOSE: 166 MG/DL (ref 70–110)
POCT GLUCOSE: 175 MG/DL (ref 70–110)
POCT GLUCOSE: 192 MG/DL (ref 70–110)
POCT GLUCOSE: 213 MG/DL (ref 70–110)
POCT GLUCOSE: 235 MG/DL (ref 70–110)
POCT GLUCOSE: 309 MG/DL (ref 70–110)
POCT GLUCOSE: 41 MG/DL (ref 70–110)
POCT GLUCOSE: 60 MG/DL (ref 70–110)
POCT GLUCOSE: 64 MG/DL (ref 70–110)
POCT GLUCOSE: 65 MG/DL (ref 70–110)
POCT GLUCOSE: 81 MG/DL (ref 70–110)
POCT GLUCOSE: 83 MG/DL (ref 70–110)
POLYCHROMASIA BLD QL SMEAR: ABNORMAL
POTASSIUM SERPL-SCNC: 3.7 MMOL/L
POTASSIUM SERPL-SCNC: 4.2 MMOL/L
POTASSIUM SERPL-SCNC: 4.8 MMOL/L
POTASSIUM SERPL-SCNC: 5.4 MMOL/L
POTASSIUM SERPL-SCNC: 6.2 MMOL/L
PROT SERPL-MCNC: 6.1 G/DL
PROT SERPL-MCNC: 6.2 G/DL
PROTHROMBIN TIME: 22.5 SEC
PROTHROMBIN TIME: 57.2 SEC
PROTHROMBIN TIME: 66.5 SEC
PROVIDER CREDENTIALS: ABNORMAL
PROVIDER NOTIFIED: ABNORMAL
RBC # BLD AUTO: 3.48 M/UL
RBC # BLD AUTO: 3.54 M/UL
RBC # BLD AUTO: 3.83 M/UL
RBC # BLD AUTO: 3.94 M/UL
SAMPLE: ABNORMAL
SITE: ABNORMAL
SODIUM SERPL-SCNC: 131 MMOL/L
SODIUM SERPL-SCNC: 132 MMOL/L
SODIUM SERPL-SCNC: 133 MMOL/L
SODIUM SERPL-SCNC: 136 MMOL/L
SODIUM SERPL-SCNC: 137 MMOL/L
SP02: 100
SP02: 91
SP02: 92
SP02: 92
SP02: 98
SP02: 98
TARGETS BLD QL SMEAR: ABNORMAL
TIME NOTIFIED: 1138
TIME NOTIFIED: 1230
TIME NOTIFIED: 1404
TIME NOTIFIED: 921
TRANS PLATPHERESIS VOL PATIENT: NORMAL ML
TRANS PLATPHERESIS VOL PATIENT: NORMAL ML
VANCOMYCIN SERPL-MCNC: 31.3 UG/ML
VERBAL RESULT READBACK PERFORMED: YES
VT: 450
VT: 500
WBC # BLD AUTO: 10.74 K/UL
WBC # BLD AUTO: 12.79 K/UL
WBC # BLD AUTO: 16.31 K/UL
WBC # BLD AUTO: 19.79 K/UL
WBC TOXIC VACUOLES BLD QL SMEAR: PRESENT
WBC TOXIC VACUOLES BLD QL SMEAR: PRESENT

## 2018-01-01 PROCEDURE — 83050 HGB METHEMOGLOBIN QUAN: CPT

## 2018-01-01 PROCEDURE — 63600175 PHARM REV CODE 636 W HCPCS: Performed by: STUDENT IN AN ORGANIZED HEALTH CARE EDUCATION/TRAINING PROGRAM

## 2018-01-01 PROCEDURE — 20000000 HC ICU ROOM

## 2018-01-01 PROCEDURE — 36620 INSERTION CATHETER ARTERY: CPT

## 2018-01-01 PROCEDURE — 25000003 PHARM REV CODE 250: Performed by: INTERNAL MEDICINE

## 2018-01-01 PROCEDURE — 37799 UNLISTED PX VASCULAR SURGERY: CPT

## 2018-01-01 PROCEDURE — 25500020 PHARM REV CODE 255: Performed by: EMERGENCY MEDICINE

## 2018-01-01 PROCEDURE — 63600175 PHARM REV CODE 636 W HCPCS: Performed by: INTERNAL MEDICINE

## 2018-01-01 PROCEDURE — 63600175 PHARM REV CODE 636 W HCPCS: Mod: JG | Performed by: INTERNAL MEDICINE

## 2018-01-01 PROCEDURE — 99900026 HC AIRWAY MAINTENANCE (STAT)

## 2018-01-01 PROCEDURE — S0028 INJECTION, FAMOTIDINE, 20 MG: HCPCS | Performed by: INTERNAL MEDICINE

## 2018-01-01 PROCEDURE — 94761 N-INVAS EAR/PLS OXIMETRY MLT: CPT

## 2018-01-01 PROCEDURE — A4216 STERILE WATER/SALINE, 10 ML: HCPCS | Performed by: INTERNAL MEDICINE

## 2018-01-01 PROCEDURE — 94640 AIRWAY INHALATION TREATMENT: CPT

## 2018-01-01 PROCEDURE — 99900035 HC TECH TIME PER 15 MIN (STAT)

## 2018-01-01 PROCEDURE — 87040 BLOOD CULTURE FOR BACTERIA: CPT

## 2018-01-01 PROCEDURE — C1752 CATH,HEMODIALYSIS,SHORT-TERM: HCPCS

## 2018-01-01 PROCEDURE — G0248 DEMONSTRATE USE HOME INR MON: HCPCS | Mod: S$GLB,,, | Performed by: INTERNAL MEDICINE

## 2018-01-01 PROCEDURE — 83605 ASSAY OF LACTIC ACID: CPT | Mod: 91

## 2018-01-01 PROCEDURE — 93005 ELECTROCARDIOGRAM TRACING: CPT

## 2018-01-01 PROCEDURE — 63600367 HC NITRIC OXIDE PER HOUR

## 2018-01-01 PROCEDURE — 90945 DIALYSIS ONE EVALUATION: CPT

## 2018-01-01 PROCEDURE — 99233 SBSQ HOSP IP/OBS HIGH 50: CPT | Mod: ,,, | Performed by: INTERNAL MEDICINE

## 2018-01-01 PROCEDURE — 83010 ASSAY OF HAPTOGLOBIN QUANT: CPT

## 2018-01-01 PROCEDURE — 99284 EMERGENCY DEPT VISIT MOD MDM: CPT

## 2018-01-01 PROCEDURE — 82803 BLOOD GASES ANY COMBINATION: CPT

## 2018-01-01 PROCEDURE — 63600175 PHARM REV CODE 636 W HCPCS

## 2018-01-01 PROCEDURE — 87070 CULTURE OTHR SPECIMN AEROBIC: CPT

## 2018-01-01 PROCEDURE — 85610 PROTHROMBIN TIME: CPT

## 2018-01-01 PROCEDURE — 83735 ASSAY OF MAGNESIUM: CPT

## 2018-01-01 PROCEDURE — 27000221 HC OXYGEN, UP TO 24 HOURS

## 2018-01-01 PROCEDURE — 25000242 PHARM REV CODE 250 ALT 637 W/ HCPCS: Performed by: STUDENT IN AN ORGANIZED HEALTH CARE EDUCATION/TRAINING PROGRAM

## 2018-01-01 PROCEDURE — 80053 COMPREHEN METABOLIC PANEL: CPT

## 2018-01-01 PROCEDURE — 87077 CULTURE AEROBIC IDENTIFY: CPT

## 2018-01-01 PROCEDURE — 85730 THROMBOPLASTIN TIME PARTIAL: CPT

## 2018-01-01 PROCEDURE — 99291 CRITICAL CARE FIRST HOUR: CPT | Mod: GC,,, | Performed by: INTERNAL MEDICINE

## 2018-01-01 PROCEDURE — 93010 ELECTROCARDIOGRAM REPORT: CPT | Mod: ,,, | Performed by: INTERNAL MEDICINE

## 2018-01-01 PROCEDURE — 85379 FIBRIN DEGRADATION QUANT: CPT

## 2018-01-01 PROCEDURE — P9035 PLATELET PHERES LEUKOREDUCED: HCPCS

## 2018-01-01 PROCEDURE — 36584 COMPL RPLCMT PICC RS&I: CPT

## 2018-01-01 PROCEDURE — 36600 WITHDRAWAL OF ARTERIAL BLOOD: CPT

## 2018-01-01 PROCEDURE — 85384 FIBRINOGEN ACTIVITY: CPT

## 2018-01-01 PROCEDURE — 84100 ASSAY OF PHOSPHORUS: CPT

## 2018-01-01 PROCEDURE — 99283 EMERGENCY DEPT VISIT LOW MDM: CPT | Mod: ,,, | Performed by: NURSE PRACTITIONER

## 2018-01-01 PROCEDURE — 86901 BLOOD TYPING SEROLOGIC RH(D): CPT

## 2018-01-01 PROCEDURE — 36430 TRANSFUSION BLD/BLD COMPNT: CPT

## 2018-01-01 PROCEDURE — 06HN33Z INSERTION OF INFUSION DEVICE INTO LEFT FEMORAL VEIN, PERCUTANEOUS APPROACH: ICD-10-PCS | Performed by: INTERNAL MEDICINE

## 2018-01-01 PROCEDURE — 85025 COMPLETE CBC W/AUTO DIFF WBC: CPT

## 2018-01-01 PROCEDURE — 83735 ASSAY OF MAGNESIUM: CPT | Mod: 91

## 2018-01-01 PROCEDURE — 97802 MEDICAL NUTRITION INDIV IN: CPT

## 2018-01-01 PROCEDURE — 87186 SC STD MICRODIL/AGAR DIL: CPT | Mod: 59

## 2018-01-01 PROCEDURE — 80069 RENAL FUNCTION PANEL: CPT

## 2018-01-01 PROCEDURE — 25000003 PHARM REV CODE 250: Performed by: STUDENT IN AN ORGANIZED HEALTH CARE EDUCATION/TRAINING PROGRAM

## 2018-01-01 PROCEDURE — 36568 INSJ PICC <5 YR W/O IMAGING: CPT

## 2018-01-01 PROCEDURE — 5A1945Z RESPIRATORY VENTILATION, 24-96 CONSECUTIVE HOURS: ICD-10-PCS | Performed by: INTERNAL MEDICINE

## 2018-01-01 PROCEDURE — 83605 ASSAY OF LACTIC ACID: CPT

## 2018-01-01 PROCEDURE — 87147 CULTURE TYPE IMMUNOLOGIC: CPT

## 2018-01-01 PROCEDURE — C1751 CATH, INF, PER/CENT/MIDLINE: HCPCS

## 2018-01-01 PROCEDURE — 36569 INSJ PICC 5 YR+ W/O IMAGING: CPT | Mod: 59

## 2018-01-01 PROCEDURE — 25000003 PHARM REV CODE 250

## 2018-01-01 PROCEDURE — 94002 VENT MGMT INPAT INIT DAY: CPT

## 2018-01-01 PROCEDURE — 80202 ASSAY OF VANCOMYCIN: CPT

## 2018-01-01 PROCEDURE — 76937 US GUIDE VASCULAR ACCESS: CPT

## 2018-01-01 PROCEDURE — 94003 VENT MGMT INPAT SUBQ DAY: CPT

## 2018-01-01 PROCEDURE — 80048 BASIC METABOLIC PNL TOTAL CA: CPT

## 2018-01-01 PROCEDURE — 36556 INSERT NON-TUNNEL CV CATH: CPT

## 2018-01-01 PROCEDURE — 86022 PLATELET ANTIBODIES: CPT

## 2018-01-01 PROCEDURE — 99211 OFF/OP EST MAY X REQ PHY/QHP: CPT | Mod: 25,S$GLB,,

## 2018-01-01 RX ORDER — FENTANYL CITRATE-0.9 % NACL/PF 10 MCG/ML
25 PLASTIC BAG, INJECTION (ML) INTRAVENOUS CONTINUOUS
Status: DISCONTINUED | OUTPATIENT
Start: 2018-01-01 | End: 2018-01-01

## 2018-01-01 RX ORDER — MAGNESIUM SULFATE HEPTAHYDRATE 40 MG/ML
2 INJECTION, SOLUTION INTRAVENOUS
Status: DISCONTINUED | OUTPATIENT
Start: 2018-01-01 | End: 2018-01-26 | Stop reason: HOSPADM

## 2018-01-01 RX ORDER — FAMOTIDINE 10 MG/ML
20 INJECTION INTRAVENOUS DAILY
Status: DISCONTINUED | OUTPATIENT
Start: 2018-01-01 | End: 2018-01-26 | Stop reason: HOSPADM

## 2018-01-01 RX ORDER — FENTANYL CITRATE 50 UG/ML
50 INJECTION, SOLUTION INTRAMUSCULAR; INTRAVENOUS
Status: DISCONTINUED | OUTPATIENT
Start: 2018-01-01 | End: 2018-01-26 | Stop reason: HOSPADM

## 2018-01-01 RX ORDER — GLUCAGON 1 MG
1 KIT INJECTION
Status: DISCONTINUED | OUTPATIENT
Start: 2018-01-01 | End: 2018-01-26 | Stop reason: HOSPADM

## 2018-01-01 RX ORDER — SODIUM BICARBONATE 1 MEQ/ML
100 SYRINGE (ML) INTRAVENOUS ONCE
Status: COMPLETED | OUTPATIENT
Start: 2018-01-01 | End: 2018-01-01

## 2018-01-01 RX ORDER — INSULIN ASPART 100 [IU]/ML
1-10 INJECTION, SOLUTION INTRAVENOUS; SUBCUTANEOUS EVERY 6 HOURS PRN
Status: DISCONTINUED | OUTPATIENT
Start: 2018-01-01 | End: 2018-01-26 | Stop reason: HOSPADM

## 2018-01-01 RX ORDER — SODIUM BICARBONATE 1 MEQ/ML
SYRINGE (ML) INTRAVENOUS
Status: COMPLETED
Start: 2018-01-01 | End: 2018-01-01

## 2018-01-01 RX ORDER — LOPERAMIDE HYDROCHLORIDE 2 MG/1
2 CAPSULE ORAL EVERY 4 HOURS PRN
Status: DISCONTINUED | OUTPATIENT
Start: 2018-01-01 | End: 2018-01-26 | Stop reason: HOSPADM

## 2018-01-01 RX ORDER — FAMOTIDINE 10 MG/ML
20 INJECTION INTRAVENOUS 2 TIMES DAILY
Status: DISCONTINUED | OUTPATIENT
Start: 2018-01-01 | End: 2018-01-01

## 2018-01-01 RX ORDER — ONDANSETRON 2 MG/ML
4 INJECTION INTRAMUSCULAR; INTRAVENOUS EVERY 8 HOURS PRN
Status: DISCONTINUED | OUTPATIENT
Start: 2018-01-01 | End: 2018-01-26 | Stop reason: HOSPADM

## 2018-01-01 RX ORDER — LIDOCAINE HYDROCHLORIDE 10 MG/ML
1 INJECTION INFILTRATION; PERINEURAL ONCE
Status: COMPLETED | OUTPATIENT
Start: 2018-01-01 | End: 2018-01-01

## 2018-01-01 RX ORDER — LIDOCAINE HYDROCHLORIDE 10 MG/ML
INJECTION INFILTRATION; PERINEURAL
Status: COMPLETED
Start: 2018-01-01 | End: 2018-01-01

## 2018-01-01 RX ORDER — SODIUM CHLORIDE 0.9 % (FLUSH) 0.9 %
3 SYRINGE (ML) INJECTION EVERY 8 HOURS
Status: DISCONTINUED | OUTPATIENT
Start: 2018-01-01 | End: 2018-01-26 | Stop reason: HOSPADM

## 2018-01-01 RX ORDER — VANCOMYCIN HCL IN 5 % DEXTROSE 1G/250ML
1000 PLASTIC BAG, INJECTION (ML) INTRAVENOUS ONCE
Status: COMPLETED | OUTPATIENT
Start: 2018-01-01 | End: 2018-01-01

## 2018-01-01 RX ORDER — FENTANYL CITRATE 50 UG/ML
50 INJECTION, SOLUTION INTRAMUSCULAR; INTRAVENOUS
Status: ACTIVE | OUTPATIENT
Start: 2018-01-01 | End: 2018-01-01

## 2018-01-01 RX ORDER — NOREPINEPHRINE BITARTRATE/D5W 4MG/250ML
0.04 PLASTIC BAG, INJECTION (ML) INTRAVENOUS CONTINUOUS
Status: DISCONTINUED | OUTPATIENT
Start: 2018-01-01 | End: 2018-01-01

## 2018-01-01 RX ORDER — INDOMETHACIN 25 MG/1
50 CAPSULE ORAL ONCE
Status: DISCONTINUED | OUTPATIENT
Start: 2018-01-01 | End: 2018-01-01

## 2018-01-01 RX ORDER — ONDANSETRON 8 MG/1
8 TABLET, ORALLY DISINTEGRATING ORAL EVERY 8 HOURS PRN
Status: DISCONTINUED | OUTPATIENT
Start: 2018-01-01 | End: 2018-01-26 | Stop reason: HOSPADM

## 2018-01-01 RX ORDER — CEPHALEXIN 500 MG/1
500 CAPSULE ORAL EVERY 8 HOURS
Qty: 15 CAPSULE | Refills: 0 | Status: SHIPPED | OUTPATIENT
Start: 2018-01-01 | End: 2018-01-01

## 2018-01-01 RX ORDER — OXYCODONE AND ACETAMINOPHEN 5; 325 MG/1; MG/1
1 TABLET ORAL EVERY 4 HOURS PRN
Status: DISCONTINUED | OUTPATIENT
Start: 2018-01-01 | End: 2018-01-26 | Stop reason: HOSPADM

## 2018-01-01 RX ORDER — IPRATROPIUM BROMIDE AND ALBUTEROL SULFATE 2.5; .5 MG/3ML; MG/3ML
3 SOLUTION RESPIRATORY (INHALATION) EVERY 4 HOURS
Status: DISCONTINUED | OUTPATIENT
Start: 2018-01-01 | End: 2018-01-26 | Stop reason: HOSPADM

## 2018-01-01 RX ORDER — HYDROCODONE BITARTRATE AND ACETAMINOPHEN 10; 325 MG/1; MG/1
1 TABLET ORAL 3 TIMES DAILY PRN
Qty: 90 TABLET | Refills: 0 | Status: SHIPPED | OUTPATIENT
Start: 2018-01-01 | End: 2018-02-21

## 2018-01-01 RX ORDER — HYDROCODONE BITARTRATE AND ACETAMINOPHEN 500; 5 MG/1; MG/1
TABLET ORAL
Status: DISCONTINUED | OUTPATIENT
Start: 2018-01-01 | End: 2018-01-26 | Stop reason: HOSPADM

## 2018-01-01 RX ORDER — ACETAMINOPHEN 325 MG/1
650 TABLET ORAL EVERY 6 HOURS PRN
Status: DISCONTINUED | OUTPATIENT
Start: 2018-01-01 | End: 2018-01-26 | Stop reason: HOSPADM

## 2018-01-01 RX ORDER — FENTANYL CITRATE-0.9 % NACL/PF 10 MCG/ML
25 PLASTIC BAG, INJECTION (ML) INTRAVENOUS CONTINUOUS
Status: DISCONTINUED | OUTPATIENT
Start: 2018-01-01 | End: 2018-01-26 | Stop reason: HOSPADM

## 2018-01-01 RX ORDER — HYDROCODONE BITARTRATE AND ACETAMINOPHEN 500; 5 MG/1; MG/1
TABLET ORAL CONTINUOUS
Status: DISCONTINUED | OUTPATIENT
Start: 2018-01-01 | End: 2018-01-26 | Stop reason: HOSPADM

## 2018-01-01 RX ORDER — AMIODARONE HYDROCHLORIDE 150 MG/3ML
INJECTION, SOLUTION INTRAVENOUS
Status: COMPLETED
Start: 2018-01-01 | End: 2018-01-01

## 2018-01-01 RX ORDER — CEFEPIME HYDROCHLORIDE 2 G/1
2 INJECTION, POWDER, FOR SOLUTION INTRAVENOUS
Status: DISCONTINUED | OUTPATIENT
Start: 2018-01-01 | End: 2018-01-26 | Stop reason: HOSPADM

## 2018-01-01 RX ADMIN — NOREPINEPHRINE BITARTRATE 0.6 MCG/KG/MIN: 1 INJECTION, SOLUTION, CONCENTRATE INTRAVENOUS at 01:01

## 2018-01-01 RX ADMIN — VASOPRESSIN 0.08 UNITS/MIN: 20 INJECTION INTRAVENOUS at 11:01

## 2018-01-01 RX ADMIN — LIDOCAINE HYDROCHLORIDE 1 ML: 10 INJECTION INFILTRATION; PERINEURAL at 02:01

## 2018-01-01 RX ADMIN — NOREPINEPHRINE BITARTRATE 3 MCG/KG/MIN: 1 INJECTION, SOLUTION, CONCENTRATE INTRAVENOUS at 10:01

## 2018-01-01 RX ADMIN — VASOPRESSIN 0.04 UNITS/MIN: 20 INJECTION INTRAVENOUS at 06:01

## 2018-01-01 RX ADMIN — IPRATROPIUM BROMIDE AND ALBUTEROL SULFATE 3 ML: .5; 3 SOLUTION RESPIRATORY (INHALATION) at 04:01

## 2018-01-01 RX ADMIN — NOREPINEPHRINE BITARTRATE 3 MCG/KG/MIN: 1 INJECTION, SOLUTION, CONCENTRATE INTRAVENOUS at 05:01

## 2018-01-01 RX ADMIN — VASOPRESSIN 0.08 UNITS/MIN: 20 INJECTION INTRAVENOUS at 06:01

## 2018-01-01 RX ADMIN — FAMOTIDINE 20 MG: 10 INJECTION, SOLUTION INTRAVENOUS at 08:01

## 2018-01-01 RX ADMIN — LIDOCAINE HYDROCHLORIDE 1 ML: 10 INJECTION, SOLUTION INFILTRATION; PERINEURAL at 02:01

## 2018-01-01 RX ADMIN — TREPROSTINIL 23.5 NG/KG/MIN: 100 INJECTION, SOLUTION INTRAVENOUS; SUBCUTANEOUS at 02:01

## 2018-01-01 RX ADMIN — Medication 25 MCG/HR: at 10:01

## 2018-01-01 RX ADMIN — NOREPINEPHRINE BITARTRATE 3 MCG/KG/MIN: 1 INJECTION, SOLUTION, CONCENTRATE INTRAVENOUS at 12:01

## 2018-01-01 RX ADMIN — SODIUM CHLORIDE: 0.9 INJECTION, SOLUTION INTRAVENOUS at 08:01

## 2018-01-01 RX ADMIN — IPRATROPIUM BROMIDE AND ALBUTEROL SULFATE 3 ML: .5; 3 SOLUTION RESPIRATORY (INHALATION) at 03:01

## 2018-01-01 RX ADMIN — AMIODARONE HYDROCHLORIDE 1 MG/MIN: 1.8 INJECTION, SOLUTION INTRAVENOUS at 10:01

## 2018-01-01 RX ADMIN — Medication 2.5 MG: at 10:01

## 2018-01-01 RX ADMIN — CEFEPIME 2 G: 2 INJECTION, POWDER, FOR SOLUTION INTRAVENOUS at 10:01

## 2018-01-01 RX ADMIN — AMIODARONE HYDROCHLORIDE 0.5 MG/MIN: 1.8 INJECTION, SOLUTION INTRAVENOUS at 04:01

## 2018-01-01 RX ADMIN — DEXTROSE MONOHYDRATE 12.5 G: 25 INJECTION, SOLUTION INTRAVENOUS at 04:01

## 2018-01-01 RX ADMIN — DEXTROSE MONOHYDRATE: 5 INJECTION, SOLUTION INTRAVENOUS at 04:01

## 2018-01-01 RX ADMIN — EPINEPHRINE 1 MCG/KG/MIN: 1 INJECTION INTRAMUSCULAR; INTRAVENOUS; SUBCUTANEOUS at 05:01

## 2018-01-01 RX ADMIN — SODIUM CHLORIDE, PRESERVATIVE FREE 3 ML: 5 INJECTION INTRAVENOUS at 01:01

## 2018-01-01 RX ADMIN — AMIODARONE HYDROCHLORIDE 1 MG/MIN: 1.8 INJECTION, SOLUTION INTRAVENOUS at 02:01

## 2018-01-01 RX ADMIN — SODIUM BICARBONATE 100 MEQ: 84 INJECTION, SOLUTION INTRAVENOUS at 10:01

## 2018-01-01 RX ADMIN — Medication 100 MCG/HR: at 09:01

## 2018-01-01 RX ADMIN — IPRATROPIUM BROMIDE AND ALBUTEROL SULFATE 3 ML: .5; 3 SOLUTION RESPIRATORY (INHALATION) at 11:01

## 2018-01-01 RX ADMIN — TREPROSTINIL 23.5 NG/KG/MIN: 100 INJECTION, SOLUTION INTRAVENOUS; SUBCUTANEOUS at 03:01

## 2018-01-01 RX ADMIN — IPRATROPIUM BROMIDE AND ALBUTEROL SULFATE 3 ML: .5; 3 SOLUTION RESPIRATORY (INHALATION) at 07:01

## 2018-01-01 RX ADMIN — VASOPRESSIN 0.08 UNITS/MIN: 20 INJECTION INTRAVENOUS at 03:01

## 2018-01-01 RX ADMIN — INSULIN ASPART 1 UNITS: 100 INJECTION, SOLUTION INTRAVENOUS; SUBCUTANEOUS at 12:01

## 2018-01-01 RX ADMIN — NOREPINEPHRINE BITARTRATE 0.55 MCG/KG/MIN: 1 INJECTION, SOLUTION, CONCENTRATE INTRAVENOUS at 03:01

## 2018-01-01 RX ADMIN — Medication 25 MCG/HR: at 08:01

## 2018-01-01 RX ADMIN — DEXTROSE MONOHYDRATE 12.5 G: 25 INJECTION, SOLUTION INTRAVENOUS at 02:01

## 2018-01-01 RX ADMIN — INSULIN HUMAN 7 UNITS: 100 INJECTION, SOLUTION PARENTERAL at 06:01

## 2018-01-01 RX ADMIN — NOREPINEPHRINE BITARTRATE 1.6 MCG/KG/MIN: 1 INJECTION, SOLUTION, CONCENTRATE INTRAVENOUS at 08:01

## 2018-01-01 RX ADMIN — Medication: at 12:01

## 2018-01-01 RX ADMIN — CEFEPIME 2 G: 2 INJECTION, POWDER, FOR SOLUTION INTRAVENOUS at 09:01

## 2018-01-01 RX ADMIN — DEXTROSE MONOHYDRATE 25 G: 25 INJECTION, SOLUTION INTRAVENOUS at 06:01

## 2018-01-01 RX ADMIN — SODIUM CHLORIDE, PRESERVATIVE FREE 3 ML: 5 INJECTION INTRAVENOUS at 09:01

## 2018-01-01 RX ADMIN — DEXTROSE MONOHYDRATE: 5 INJECTION, SOLUTION INTRAVENOUS at 01:01

## 2018-01-01 RX ADMIN — NOREPINEPHRINE BITARTRATE 0.18 MCG/KG/MIN: 4 SOLUTION at 11:01

## 2018-01-01 RX ADMIN — NOREPINEPHRINE BITARTRATE 3 MCG/KG/MIN: 1 INJECTION, SOLUTION, CONCENTRATE INTRAVENOUS at 03:01

## 2018-01-01 RX ADMIN — FENTANYL CITRATE 50 MCG: 50 INJECTION, SOLUTION INTRAMUSCULAR; INTRAVENOUS at 11:01

## 2018-01-01 RX ADMIN — HYDROCORTISONE SODIUM SUCCINATE 100 MG: 100 INJECTION, POWDER, FOR SOLUTION INTRAMUSCULAR; INTRAVENOUS at 09:01

## 2018-01-01 RX ADMIN — Medication: at 11:01

## 2018-01-01 RX ADMIN — VANCOMYCIN 1000 MG: 1 INJECTION, SOLUTION INTRAVENOUS at 09:01

## 2018-01-01 RX ADMIN — Medication 100 MEQ: at 10:01

## 2018-01-01 RX ADMIN — SODIUM CHLORIDE 250 ML: 0.9 INJECTION, SOLUTION INTRAVENOUS at 12:01

## 2018-01-01 RX ADMIN — HYDROCORTISONE SODIUM SUCCINATE 100 MG: 100 INJECTION, POWDER, FOR SOLUTION INTRAMUSCULAR; INTRAVENOUS at 02:01

## 2018-01-01 RX ADMIN — SODIUM CHLORIDE, PRESERVATIVE FREE 3 ML: 5 INJECTION INTRAVENOUS at 10:01

## 2018-01-01 RX ADMIN — NOREPINEPHRINE BITARTRATE 0.04 MCG/KG/MIN: 4 SOLUTION at 08:01

## 2018-01-01 RX ADMIN — AMIODARONE HYDROCHLORIDE 150 MG: 50 INJECTION, SOLUTION INTRAVENOUS at 10:01

## 2018-01-01 RX ADMIN — DEXTROSE MONOHYDRATE 25 G: 25 INJECTION, SOLUTION INTRAVENOUS at 07:01

## 2018-01-01 RX ADMIN — Medication 25 MCG/HR: at 09:01

## 2018-01-01 RX ADMIN — MAGNESIUM SULFATE IN WATER 2 G: 40 INJECTION, SOLUTION INTRAVENOUS at 09:01

## 2018-01-01 RX ADMIN — Medication 25 MCG/HR: at 03:01

## 2018-01-01 RX ADMIN — SODIUM CHLORIDE, PRESERVATIVE FREE 3 ML: 5 INJECTION INTRAVENOUS at 05:01

## 2018-01-01 RX ADMIN — FAMOTIDINE 20 MG: 10 INJECTION, SOLUTION INTRAVENOUS at 12:01

## 2018-01-01 RX ADMIN — DEXTROSE MONOHYDRATE 12.5 G: 25 INJECTION, SOLUTION INTRAVENOUS at 06:01

## 2018-01-01 RX ADMIN — NOREPINEPHRINE BITARTRATE 3 MCG/KG/MIN: 1 INJECTION, SOLUTION, CONCENTRATE INTRAVENOUS at 08:01

## 2018-01-01 RX ADMIN — EPINEPHRINE 0.02 MCG/KG/MIN: 1 INJECTION INTRAMUSCULAR; INTRAVENOUS; SUBCUTANEOUS at 09:01

## 2018-01-01 RX ADMIN — IOHEXOL 5 ML: 300 INJECTION, SOLUTION INTRAVENOUS at 10:01

## 2018-01-01 RX ADMIN — SODIUM CHLORIDE, PRESERVATIVE FREE 3 ML: 5 INJECTION INTRAVENOUS at 02:01

## 2018-01-02 NOTE — PROGRESS NOTES
Meter education complete. Pt demonstrated understanding of self test process, by performing the initial pt/inr and reporting result to [facility]. Reference materials reviewed. Terms of compliance discussed. Pt agreed by signing Patient Contract and Release Form. Opportunity given for patient to ask questions. Steps to self-testing reiterated. Training checklist signed and faxed to [facility]. Chart billed for consult.

## 2018-01-02 NOTE — PROGRESS NOTES
Pt reports eating cabbage and mustard greens for the holiday. No other changes reported. Coumadin dose 7.5mg tue/fri and 5mg all of the other days of the week.

## 2018-01-08 NOTE — PLAN OF CARE
Met with patient to discuss plan of care for IV Remodulin infusion.     Patient has been off infusion since ~ 5am . Patient did not bring medication. Explained she can not discharge on hospital mixed cassette which takes about 1-2hrs to order/mix/hand deliver and that her son would have to bring the medication.   The son is now in route to get home medication. PICC team notified of urgent need of single lumen PICC placement to restart IV Remodulin.     Discussed with ED RN that once the son arrives with Remodulin he can mix her home cassette and infusion can be initiated to PICC or PIV (straight to the hub) if PICC is not available yet.   Request to be call in order to prime new PICC line prior to connecting medication to PICC line.     She will resume her home dose and then she will need to be monitored x1hr for hemodynamic stability.     Thanks  Eliza   [id number]

## 2018-01-08 NOTE — PLAN OF CARE
Met with patient's son Merrill.   He mixed 4mls of the 2.5mg/ml vial and mixed with 96mls of the remodulin diluent. He reports the CADD rate this am prior to the PICC line being pulled was set at 39mls/24hrs putting her at a dose of 31.5ng/kg/ml (dose not on current dosing sheet).   DW = 86kg    PICC team arrived while I was down there; therefore, we decided to wait until the line was in to prime the line and start the infusion.     Discussed appropriate dose to initiate at since the patient has been off drug for almost 12hrs.     Dr. Cazares agreed with 20ng/kg/min  Final concentration 100,000ng/ml  DW 86kg  CADD rate = 25mls/24hrs    Requested a new dosing sheet from [facility] to reflect doses 20-30ngs with a concentration of 100,000ng/ml (4mls Remodulin ,mixed with 96mls of diluent).    [facility] (98143)  will prime the line and resume infusion to PICC once in and xray confirms placement.     ED RN to radhaior vital signs [id number] x4.    If hemodynamically stable the patient can discharge home after the monitoring and titrate up by 1ng/day

## 2018-01-08 NOTE — ED PROVIDER NOTES
"Encounter Date: 1/8/2018       History     Chief Complaint   Patient presents with    Vascular Access Problem     woke up and remodulin was running on bed, pic line came out     HPI     This is a 57-year-old female with a past medical history significant for third degree heart block, congestive heart failure, arrhythmia, cardiac pacemaker in situ, pulmonary hypertension and hyperlipidemia who presents to the emergency department today for a PICC problem.  Patient has a PICC line for continuous Remodulin infusion. She states that this morning she woke up around 5:00 AM with her PICC line removed and her medication drip onto the bed.  This same thing occurred about a month ago and she was seen in the emergency department to have it replaced at that time as well.  She denies any other problems or concerns at this time.    Review of patient's allergies indicates:   Allergen Reactions    Chlorhexidine Itching and Rash     Patient had raised rash on neck following RHC procedure. She states she's never had a problem with the "prep" used until this time.     Adhesive Rash     Past Medical History:   Diagnosis Date    Arrhythmia     Arthritis     Cardiac pacemaker in situ     Carpal tunnel syndrome, right     Cervical spondylosis     Cervicalgia     CHF (congestive heart failure)     Diverticulitis     Heart block AV third degree     Hyperlipidemia     ALFREDO on CPAP     Pulmonary hypertension     Subdeltoid bursitis      Past Surgical History:   Procedure Laterality Date    CARDIAC CATHETERIZATION      CARDIAC PACEMAKER PLACEMENT  7/29/13    [employer] DC PM    CARDIAC SURGERY      COLONOSCOPY N/A 9/28/2015    Procedure: COLONOSCOPY;  Surgeon: Jason Diaz MD;  Location: [facility] ([address] [place] [address]);  Service: Endoscopy;  Laterality: N/A;  Please schedule in 2-3 months with Dr Diaz  Pulmonary HTN, 2nd floor (off remodulin infusion as of "a few days" before 9/9/15)    3 day hold Coumadin, [facility]  PT/INR scheduled before procedure    ECTOPIC PREGNANCY SURGERY Left     HYSTERECTOMY      partial    knee arthroscopy       Family History   Problem Relation Age of Onset    Arthritis Mother     Diabetes Mother     Hyperlipidemia Mother     Arthritis Father     Hyperlipidemia Father      Social History   Substance Use Topics    Smoking status: Never Smoker    Smokeless tobacco: Never Used    Alcohol use No      Comment: rarely     Review of Systems   Constitutional: Negative for chills and fever.   HENT: Negative for congestion and sinus pressure.    Eyes: Negative for visual disturbance.   Respiratory: Negative for cough, chest tightness and shortness of breath.    Cardiovascular: Negative for chest pain.  Positive for PICC line problem.  Gastrointestinal: Negative for abdominal pain, diarrhea, nausea and vomiting.   Genitourinary: Negative for flank pain. Negative for dysuria.  Musculoskeletal: Negative for arthralgias and myalgias.   Skin: Negative for rash and wound.   Neurological: Negative for dizziness. Negative for weakness and numbness.   Psychiatric/Behavioral: Negative for confusion.       Physical Exam     Initial Vitals [01/08/18 0913]   BP Pulse Resp Temp SpO2   92/62 96 20 99 °F (37.2 °C) (!) 92 %      MAP       72         Physical Exam   Nursing note and vitals reviewed.  Constitutional: Patient appears well-developed and well-nourished. Not diaphoretic. No distress.   HENT:   Head: Normocephalic and atraumatic.   Eyes: Conjunctivae are normal. No scleral icterus.   Neck: Normal range of motion. Neck supple.   Cardiovascular: Normal rate, regular rhythm and normal heart sounds.  PICC line is absent from the right upper extremity.  No hemorrhage.  Pulmonary/Chest: Breath sounds normal. No respiratory distress.  Abdominal: Soft. There is no tenderness.   Musculoskeletal: Normal range of motion. No edema or tenderness.   Neurological: Alert and oriented to person, place, and time.  Normal strength. No sensory deficit.   Skin: Skin is warm and dry. No rash noted. No erythema. No pallor.   Psychiatric: Normal mood and affect. Thought content normal.     ED Course   Procedures  Labs Reviewed - No data to display          Medical Decision Making:   ED Management:  57 y.o. female who is on a continuous or modulation drip via PICC line presents to the emergency department for PICC line evaluation.  States that the PICC line fell out for arm.  She woke up at 5:00 this morning with the line out and the medication dripping into her bed.  Unsure exactly which time the medication was discontinued.  I called and spoke with the patient's care coordinator through heart transplant services.  [facility]  will be coming down to the ER once new PICC in place to determine dosage. I also called interventional radiology and cardiology to see if it is possible to expedite PICC placement but was told they cannot do the procedure sooner than the PICC team. [facility]  does not want to initiate Remodulin via peripheral line because dosing is different than PICC. Per her recommendation, Remodulin was initiated after PICC line placed and the patient was observed for one hour after initiation per Jese's recommendation.  Patient discharged home with instructions to follow up outpatient.  Return precautions discussed.              Attending Attestation:     Physician Attestation Statement for NP/PA:   I discussed this assessment and plan of this patient with the NP/PA, but I did not personally examine the patient. The face to face encounter was performed by the NP/PA.                  ED Course      Clinical Impression:   The primary encounter diagnosis was PICC (peripherally inserted central catheter) in place. A diagnosis of Problem with vascular access was also pertinent to this visit.    Disposition:   Disposition: Discharged  Condition: Stable                        Alesia Mars NP  01/11/18 0949        Madi Phillips MD  01/17/18 [id number]

## 2018-01-08 NOTE — ED NOTES
Per CONCETTA Kay,   Start remodulin as soon as the son arrives with home medications and mixes today dose via peripheral line.  When PICC placed contact Eliza at [phone] and she changed to PICC.

## 2018-01-08 NOTE — PROGRESS NOTES
Primed linda picc line with remodulin of home mixed cassettee. cadd pump set at 20ng/kg/min with DW 86kg going at at rate of 25ml/24 hours. This nurse personally gave a NEW dosing sheet to patient with son and other family member at bedside also stating to increase 1ng/kg/min DAILY. ALL acknowledged understanding. ER nurse will do vitals Q15 x 4 and if ok will discharge from ER.

## 2018-01-08 NOTE — PROCEDURES
"Emily Cook is a 57 y.o. female patient.    Temp: 96.6 °F (35.9 °C) (01/08/18 1345)  Pulse: 74 (01/08/18 1345)  Resp: 20 (01/08/18 0913)  BP: (!) 110/56 (01/08/18 1345)  SpO2: 96 % (01/08/18 1345)  Weight: 72.6 kg (160 lb) (01/08/18 0913)  Height: 5' 3" (160 cm) (01/08/18 0913)    PICC  Date/Time: 1/8/2018 2:41 PM  Performed by: MANUEL MARCH  Consent Done: Yes  Time out: Immediately prior to procedure a time out was called to verify the correct patient, procedure, equipment, support staff and site/side marked as required  Indications: med administration and vascular access  Anesthesia: local infiltration  Local anesthetic: lidocaine 1% without epinephrine  Anesthetic Total (mL): 2  Skin prep agent dried: skin prep agent completely dried prior to procedure  Sterile barriers: all five maximum sterile barriers used - cap, mask, sterile gown, sterile gloves, and large sterile sheet  Hand hygiene: hand hygiene performed prior to central venous catheter insertion  Location details: left brachial  Catheter type: single lumen  Catheter size: 4 Fr  Catheter Length: 28cm    Ultrasound guidance: yes  Vessel Caliber: medium and patent, compressibility normal  Vascular Doppler: not done  Needle advanced into vessel with real time Ultrasound guidance.  Guidewire confirmed in vessel.  Image recorded and saved.  Sterile sheath used.  no esophageal manometryNumber of attempts: 1  Post-procedure: blood return through all ports, chlorhexidine patch and sterile dressing applied  Specimens: No  Implants: No  Assessment: placement verified by x-ray  Complications: none        Ashlyn Villalpando  1/8/2018  "

## 2018-01-08 NOTE — CONSULTS
Double lumen PICC placed to LEFT BRACHIAL vein.   28cm in length, 0cm exposed, and 39cm arm circumference.  Lot # [id number]

## 2018-01-08 NOTE — ED NOTES
LOC: The patient is awake, alert, and oriented to place, time, situation. Affect is appropriate.  Speech is appropriate and clear.     APPEARANCE: Patient resting comfortably in no acute distress.  Patient is clean and well groomed.    SKIN: The skin is warm and dry; color consistent with ethnicity.  Patient has normal skin turgor and moist mucus membranes.  Skin intact; no breakdown or bruising noted.     MUSCULOSKELETAL: Patient moving upper and lower extremities without difficulty.  Denies weakness.     RESPIRATORY: Airway is open and patent. Respirations spontaneous, even, easy, and non-labored.  Patient has a normal effort and rate.  No accessory muscle use noted. Denies cough.     CARDIAC:    No peripheral edema noted. No complaints of chest pain.      ABDOMEN: Soft and non tender to palpation.  No distention noted.     NEUROLOGIC: Eyes open spontaneously.  Behavior appropriate to situation.  Follows commands; facial expression symmetrical.  Purposeful motor response noted; normal sensation in all extremities.

## 2018-01-08 NOTE — CONSULTS
Double lumen PICC placed to RIGHT BRACHIAL vein.   34cm in length, 0cm exposed, and 37cm arm circumference.  Lot # [id number]

## 2018-01-08 NOTE — PROCEDURES
"Emily Cook is a 57 y.o. female patient.    Temp: 96.6 °F (35.9 °C) (01/08/18 1345)  Pulse: 74 (01/08/18 1345)  Resp: 20 (01/08/18 0913)  BP: (!) 110/56 (01/08/18 1345)  SpO2: 96 % (01/08/18 1345)  Weight: 72.6 kg (160 lb) (01/08/18 0913)  Height: 5' 3" (160 cm) (01/08/18 0913)    PICC  Date/Time: 1/8/2018 3:22 PM  Performed by: MANUEL MARCH  Consent Done: Yes  Time out: Immediately prior to procedure a time out was called to verify the correct patient, procedure, equipment, support staff and site/side marked as required  Indications: med administration and vascular access  Anesthesia: local infiltration  Local anesthetic: lidocaine 1% without epinephrine    Skin prep agent dried: skin prep agent completely dried prior to procedure  Sterile barriers: all five maximum sterile barriers used - cap, mask, sterile gown, sterile gloves, and large sterile sheet  Hand hygiene: hand hygiene performed prior to central venous catheter insertion  Location details: right brachial  Catheter size: 4 Fr  Ultrasound guidance: yes  Vessel Caliber: medium and patent, compressibility normal  Vascular Doppler: not done  Needle advanced into vessel with real time Ultrasound guidance.  Guidewire confirmed in vessel.  Image recorded and saved.  Sterile sheath used.  Number of attempts: 2  Specimens: No  Implants: No  Assessment: placement verified by x-ray  Complications: none        Ashlyn Villalpando  1/8/2018  "

## 2018-01-14 NOTE — ED NOTES
Assumed care of patient.    Rounding completed on patient. Plan of care discussed and patient has no complaints or questions. Pt is in bed awake and alert. Pt is resting comfortably and is in no acute distress. Respirations are even and unlabored. Pt denies chest pain or SOB. Pt has no complaints at this time. The bed is in low, locked position with side rails upx2. Call light is in reach, and patient is oriented to call light use. Pt states they will call nurse if they need assistance.

## 2018-01-14 NOTE — ED NOTES
Report received--pt resting without complaint--PICC line noted to LUE--NAD--waiting for pending discharge--denies any CP and SOB

## 2018-01-14 NOTE — Clinical Note
Emily Cook discharge to home/self care.    - Condition at discharge: good  - Mode of Discharge: wheelchair  - The patient left the ED accompanied by a family member.  - The discharge instructions were discussed with the patient/parent.  - They s bowen an understanding of the discharge instructions.  - Instructed patient/parent to go to the discharge window.

## 2018-01-14 NOTE — OP NOTE
[facility]  Interventional Radiology  Procedure - Inpatient    Date: 01/14/2018 Time: 10:24 AM    Pre-Op Diagnosis: Requires long term IV access for continuous drip medication    Post-Op Diagnosis: same    Procedure Performed by: Oh Hendrickson MD    Assistant: none    Procedure: PICC placement    Specimen/Tissue Removed: No    Estimated Blood Loss: Less than 5 mL    Procedure Note/Findings: Left arm PICC placed with tip in SVC. Ready for use.    Please refer to dictated report for additional details.

## 2018-01-14 NOTE — ED NOTES
Hooked patients remodulin pump to brand new placed PICC line.  Will continue to monitor patient until discharge.

## 2018-01-14 NOTE — ED PROVIDER NOTES
"Encounter Date: 1/13/2018    SCRIBE #1 NOTE: I, Indra Pedro, am scribing for, and in the presence of,  Flor Man MD. I have scribed the following portions of the note - Other sections scribed: HPI, ROS, PE.       History     Chief Complaint   Patient presents with    Vascular Access Problem     Pt reports her PICC line came out again while taking a bath.      Time seen by provider: 12:54 AM    This is a 57 y.o. female with history of pulmonary hypertension on treprostinil who presents to the Emergency Department for evaluation of vascular access problem. Patient states that she pulled her PICC line about 4 hours ago when she was getting out of the bath. Patient states feeling more short of breath than usual at this time. Patient states that she has been on treprostinil drip for years and that the longest that she has gone without the drip is 24 hours.       The history is provided by the patient and medical records.     Review of patient's allergies indicates:   Allergen Reactions    Chlorhexidine Itching and Rash     Patient had raised rash on neck following RHC procedure. She states she's never had a problem with the "prep" used until this time.     Adhesive Rash     Past Medical History:   Diagnosis Date    Arrhythmia     Arthritis     Cardiac pacemaker in situ     Carpal tunnel syndrome, right     Cervical spondylosis     Cervicalgia     CHF (congestive heart failure)     Diverticulitis     Heart block AV third degree     Hyperlipidemia     ALFREDO on CPAP     Pulmonary hypertension     Subdeltoid bursitis      Past Surgical History:   Procedure Laterality Date    CARDIAC CATHETERIZATION      CARDIAC PACEMAKER PLACEMENT  7/29/13    [employer] DC PM    CARDIAC SURGERY      COLONOSCOPY N/A 9/28/2015    Procedure: COLONOSCOPY;  Surgeon: Jason Diaz MD;  Location: [facility] ([address] [place] [address]);  Service: Endoscopy;  Laterality: N/A;  Please schedule in 2-3 months with Dr Diaz  Pulmonary HTN, 2nd " "floor (off remodulin infusion as of "a few days" before 9/9/15)    3 day hold Coumadin, [facility]  PT/INR scheduled before procedure    ECTOPIC PREGNANCY SURGERY Left     HYSTERECTOMY      partial    knee arthroscopy       Family History   Problem Relation Age of Onset    Arthritis Mother     Diabetes Mother     Hyperlipidemia Mother     Arthritis Father     Hyperlipidemia Father      Social History   Substance Use Topics    Smoking status: Never Smoker    Smokeless tobacco: Never Used    Alcohol use No      Comment: rarely     Review of Systems   Respiratory: Positive for shortness of breath.    All other systems reviewed and are negative.      Physical Exam     Initial Vitals [01/14/18 0018]   BP Pulse Resp Temp SpO2   (!) 89/57 109 14 99.2 °F (37.3 °C) (!) 89 %      MAP       67.67         Physical Exam    Nursing note and vitals reviewed.  Constitutional: She appears well-developed and well-nourished. No distress.   HENT:   Head: Atraumatic.   Eyes: Pupils are equal, round, and reactive to light.   Cardiovascular: Regular rhythm. Tachycardia present.  Exam reveals no gallop and no friction rub.    No murmur heard.  Pulmonary/Chest: Breath sounds normal. She has no wheezes. She has no rhonchi. She has no rales.   Clear breath sounds bilaterally.    Abdominal: Soft. She exhibits no distension. There is no tenderness.   Neurological: She is alert and oriented to person, place, and time. No cranial nerve deficit or sensory deficit.   Skin:   Small area of redness on her medial right upper arm.          ED Course   Procedures  Labs Reviewed   CBC W/ AUTO DIFFERENTIAL - Abnormal; Notable for the following:        Result Value    RBC 3.54 (*)     Hemoglobin 9.0 (*)     Hematocrit 26.7 (*)     MCV 75 (*)     MCH 25.4 (*)     RDW 18.6 (*)     Platelets 399 (*)     Immature Granulocytes 1.3 (*)     Immature Grans (Abs) 0.14 (*)     Mono # 1.3 (*)     All other components within normal limits "   PROTIME-INR - Abnormal; Notable for the following:     Prothrombin Time 22.5 (*)     INR 2.2 (*)     All other components within normal limits             Medical Decision Making:   History:   Old Medical Records: I decided to obtain old medical records.              Attending Attestation:             Attending ED Notes:   1:13 AM. I have discussed this case with IR on call. She says that the decision for IR to come in on Sunday won't be decided until the AM comes in and is based on other emergent cases that could come up. I have already discussed the case with Dr. Don who would prefer not to admit the patient to hospital medicine and would rather the patient possibly being taken in by IR in the morning. We will re-call IR if we have not heard from them by 8 AM.     9:05 am IR called back they will be putting her PICC Line in this am - but will not d/c from the IR suite. I am signing out to Dr Washington just need to ensure that drip is reattached from the peripheral to the new PICC line. D/c instructions done. Pt has small area of cellulitis where old PICC was, short course of keflex rx to her pharmacy. Follow up pcp in next 3 days. Pt comfortable with plan.     I, Dr. Flor Man, personally performed the services described in this documentation. All medical record entries made by the scribe were at my direction and in my presence.  I have reviewed the chart and agree that the record reflects my personal performance and is accurate and complete. Flor Man MD.  9:02 AM 01/14/2018        ED Course      Clinical Impression:   The primary encounter diagnosis was Status post PICC central line placement. A diagnosis of Cellulitis, unspecified cellulitis site was also pertinent to this visit.                           Flor Man MD  01/14/18 0905

## 2018-01-14 NOTE — ED TRIAGE NOTES
"Emily Cook, a 57 y.o. female presents to the ED with c/o PICC line being pulled out of RUE while taking a bath. PICC line is used for continuous remodulin infusion for Pulmonary HTN.       Chief Complaint   Patient presents with    Vascular Access Problem     Pt reports her PICC line came out again while taking a bath.      Review of patient's allergies indicates:   Allergen Reactions    Chlorhexidine Itching and Rash     Patient had raised rash on neck following RHC procedure. She states she's never had a problem with the "prep" used until this time.     Adhesive Rash     Past Medical History:   Diagnosis Date    Arrhythmia     Arthritis     Cardiac pacemaker in situ     Carpal tunnel syndrome, right     Cervical spondylosis     Cervicalgia     CHF (congestive heart failure)     Diverticulitis     Heart block AV third degree     Hyperlipidemia     ALFREDO on CPAP     Pulmonary hypertension     Subdeltoid bursitis        "

## 2018-01-15 NOTE — TELEPHONE ENCOUNTER
"Contacted patient about PICC line/Cardoso. Dr. Rhodes recommends patient have a Cardoso placed instead of PICC line due to issues with line. Patient has had her PICC "fall out" three times in two months. Patient verbalized understanding. Per nephrology access, patient has appt at 8am on Friday. Instructed patient to be NPO after midnight and bring all remodulin medication, including new set-up. Patient also instructed to hold coumadin for 3 days. Labs will be collected prior to the procedure.  Notified TSU.  "

## 2018-01-15 NOTE — TELEPHONE ENCOUNTER
Received a call from the Medical Team that patient's pro-BNP was 2170 (resulted in EPIC). Contacted patient who stated that she had been taking 1 Bumex (2 mg) in the morning but not the second dose as it made her go to the bathroo. Patient has not weighed herself and states that she feels fine. Notified MD who instructed patient to take afternoon bumex today, twice Tuesday and Wednesday and we will check labs on Friday. Suggested patient take med around 3pm to decrease urge to urinate at night. Also, reminded patient to take her potassium. Patient verbalized understanding of all.

## 2018-01-17 NOTE — TELEPHONE ENCOUNTER
----- Message from Billie Gonzalez sent at 1/17/2018  1:27 PM CST -----  Contact: pt   Pt says she received a call on Friday telling her to report to Dr. Rhodes's office on Friday 1/19/18 to have a pic line removed from her arm, but was not told where to report. I don't see anything scheduled, please call the pt at the number listed.    Thanks

## 2018-01-17 NOTE — TELEPHONE ENCOUNTER
Spoke w/pt and cxd appt--Nephrology access uncertain if there will be doc available to place brush Friday. Told pt we would follow up w/her next week, and she should continue Remodulin as she is, as well as coumadin. Pt verbalized understanding and agreement.

## 2018-01-23 PROBLEM — R65.20 SEVERE SEPSIS: Status: ACTIVE | Noted: 2018-01-01

## 2018-01-23 PROBLEM — A41.9 SEVERE SEPSIS: Status: ACTIVE | Noted: 2018-01-01

## 2018-01-24 PROBLEM — N17.9 AKI (ACUTE KIDNEY INJURY): Status: ACTIVE | Noted: 2018-01-01

## 2018-01-24 PROBLEM — D69.6 THROMBOCYTOPENIA: Status: ACTIVE | Noted: 2018-01-01

## 2018-01-24 PROBLEM — R65.21 SEPTIC SHOCK: Status: ACTIVE | Noted: 2018-01-01

## 2018-01-24 NOTE — PLAN OF CARE
Problem: Patient Care Overview  Goal: Plan of Care Review  Outcome: Ongoing (interventions implemented as appropriate)  Recommendations     1. If able to extubate & advance diet, recommend cardiac diet, texture per SLP.   2. If unable to extubate, initiate enteral nutrition. Recommend Isosource 1.5 @ 40 mL/hr + 4 packs Beneprotein to provide 1540 kcals, 89 g of protein, 733 mL fluid.               - Hold for residuals >500 mL; additional fluid per MD.   3. RD to monitor & follow-up.

## 2018-01-24 NOTE — ASSESSMENT & PLAN NOTE
- Intubated and sedated, currently on /18/100%/PEEP 10  - CT Chest on 1/23 from OSH with pulmonary HTN, CXR on arrival here with B/L patchy consolidation on RML and DIMITRI   - On NO @ 20 ppm  - Continued on IV Remodulin at home dose 23.5 ng/kg/min  - Will wean vent requirements to maintain PO2 > 55, will monitor ABG's

## 2018-01-24 NOTE — PROCEDURES
"Emily Cook is a 57 y.o. female patient.    Temp: 97.6 °F (36.4 °C) (18 1300)  Pulse: (!) 121 (18 1420)  Resp: (!) 32 (18 1420)  BP: (!) 121/50 (18 1420)  SpO2: (!) 89 % (18 1420)  Weight: 73.3 kg (161 lb 9.6 oz) (18 1100)  Height: 5' 2.99" (160 cm) (18 1100)    Arterial Line  Date/Time: 2018 2:32 PM  Performed by: BIENVENIDO JARVIS  Authorized by: BIENVENIDO JARVIS   Consent Done: Yes  Consent: Written consent obtained.  Risks and benefits: risks, benefits and alternatives were discussed  Consent given by: mother  Patient understanding: patient states understanding of the procedure being performed  Patient consent: the patient's understanding of the procedure matches consent given  Patient identity confirmed: , name and MRN  Time out: Immediately prior to procedure a "time out" was called to verify the correct patient, procedure, equipment, support staff and site/side marked as required.  Preparation: Patient was prepped and draped in the usual sterile fashion.  Indications: multiple ABGs, respiratory failure and hemodynamic monitoring  Location: left radial  Anesthesia: local infiltration    Anesthesia:  Local Anesthetic: lidocaine 1% without epinephrine  Anesthetic total: 1 mL  Patient sedated: no  Orlando's test normal: yes  Needle gauge: 18  Seldinger technique: Seldinger technique used  Number of attempts: 1  Complications: No  Estimated blood loss (mL): 0  Post-procedure: line sutured and dressing applied  Post-procedure CMS: normal  Patient tolerance: Patient tolerated the procedure well with no immediate complications  Comments: Discussed with patients family high risk nature of this particular line placement given coagulopahty           No flowsheet data found.    Bienvenido Jarvis  2018  "

## 2018-01-24 NOTE — PROGRESS NOTES
Received pt from EMS on transport vent.  Sats 86%  Placed pt on (rental) on documented settings with an increase in PEEP.  Sats are now 93%.  Secured tube with commercial tube tovar at the 23cm julio cesar.  BBS noted.  ABG and CXR pending.  VSS.  Ambu bag with peep valve at [facility]. Will continue to monitor.

## 2018-01-24 NOTE — SUBJECTIVE & OBJECTIVE
"Past Medical History:   Diagnosis Date    Arrhythmia     Arthritis     Cardiac pacemaker in situ     Carpal tunnel syndrome, right     Cervical spondylosis     Cervicalgia     CHF (congestive heart failure)     Diverticulitis     Heart block AV third degree     Hyperlipidemia     ALFREDO on CPAP     Pulmonary hypertension     Subdeltoid bursitis        Past Surgical History:   Procedure Laterality Date    CARDIAC CATHETERIZATION      CARDIAC PACEMAKER PLACEMENT  7/29/13    [employer] DC PM    CARDIAC SURGERY      COLONOSCOPY N/A 9/28/2015    Procedure: COLONOSCOPY;  Surgeon: Jason Diaz MD;  Location: [facility] ([address] [place] [address]);  Service: Endoscopy;  Laterality: N/A;  Please schedule in 2-3 months with Dr Diaz  Pulmonary HTN, 2nd floor (off remodulin infusion as of "a few days" before 9/9/15)    3 day hold Coumadin, [facility]  PT/INR scheduled before procedure    ECTOPIC PREGNANCY SURGERY Left     HYSTERECTOMY      partial    knee arthroscopy         Review of patient's allergies indicates:   Allergen Reactions    Chlorhexidine Itching and Rash     Patient had raised rash on neck following RHC procedure. She states she's never had a problem with the "prep" used until this time.     Adhesive Rash       Current Facility-Administered Medications   Medication    0.9%  NaCl infusion (for blood administration)    acetaminophen tablet 650 mg    ceFEPIme injection 2 g    dextrose 50% injection 12.5 g    famotidine (PF) injection 20 mg    fentaNYL 2500 mcg in 0.9% sodium chloride 250 mL infusion premix (titrating)    fentaNYL injection 50 mcg    glucagon (human recombinant) injection 1 mg    insulin aspart pen 1-10 Units    loperamide capsule 2 mg    nitric oxide gas Gas 20 ppm    norepinephrine 16 mg in dextrose 5 % 250 mL infusion    ondansetron disintegrating tablet 8 mg    ondansetron injection 4 mg    oxyCODONE-acetaminophen 5-325 mg per tablet 1 tablet    sodium chloride " 0.9% flush 3 mL    treprostinil (REMODULIN) 6,000,000 ng in sodium chloride 0.9% 100 mL infusion    VELETRI/REMODULIN CASSETTE    VELETRI/REMODULIN TUBING     Family History     Problem Relation (Age of Onset)    Arthritis Mother, Father    Diabetes Mother    Hyperlipidemia Mother, Father        Social History Main Topics    Smoking status: Never Smoker    Smokeless tobacco: Never Used    Alcohol use No      Comment: rarely    Drug use: No    Sexual activity: No     Review of Systems   Unable to perform ROS: Intubated     Objective:     Vital Signs (Most Recent):  Temp: 97.9 °F (36.6 °C) (01/24/18 1500)  Pulse: (!) 124 (01/24/18 1534)  Resp: (!) 36 (01/24/18 1534)  BP: (!) 120/50 (01/24/18 1430)  SpO2: (!) 88 % (01/24/18 1534) Vital Signs (24h Range):  Temp:  [97 °F (36.1 °C)-98 °F (36.7 °C)] 97.9 °F (36.6 °C)  Pulse:  [108-138] 124  Resp:  [14-51] 36  SpO2:  [76 %-100 %] 88 %  BP: ()/(27-88) 120/50  Arterial Line BP: (109-125)/(54-62) 125/62     Patient Vitals for the past 72 hrs (Last 3 readings):   Weight   01/24/18 1100 73.3 kg (161 lb 9.6 oz)   01/24/18 0855 73.3 kg (161 lb 9.6 oz)     Body mass index is 28.63 kg/m².      Intake/Output Summary (Last 24 hours) at 01/24/18 1557  Last data filed at 01/24/18 1500   Gross per 24 hour   Intake           883.56 ml   Output              200 ml   Net           683.56 ml       Physical Exam   Constitutional: She appears well-developed and well-nourished. She appears ill. She is sedated and intubated.   HENT:   Head: Normocephalic and atraumatic.   Eyes: Pupils are equal, round, and reactive to light.   Neck: JVD (Elevated to ~8 cm H2O) present.   Cardiovascular: Regular rhythm.  Tachycardia present.    Pulmonary/Chest: She is intubated.   Coarse breath sounds   Abdominal: Soft. Bowel sounds are normal.   Neurological:   Intubated and sedated   Skin: Skin is warm and dry.     Significant Labs:  CBC:    Recent Labs  Lab 01/23/18  1911 01/24/18  0903   WBC  20.25* 12.79*   RBC 4.02 3.94*   HGB 10.3* 10.1*   HCT 30.1* 30.0*   PLT 28* 12*   MCV 75* 76*   MCH 25.6* 25.6*   MCHC 34.2 33.7     BNP:    Recent Labs  Lab 01/23/18 1911   BNP 1,398*     CMP:    Recent Labs  Lab 01/23/18 1911 01/23/18 2359 01/24/18  0855   * 332* 408*   CALCIUM 8.3* 7.7* 8.3*   ALBUMIN 2.3* 2.0* 1.8*   PROT 6.3 5.4* 6.1   * 129* 131*   K 3.9 3.3* 4.2   CO2 16* 19* 21*   CL 95 97 100   BUN 48* 42* 41*   CREATININE 1.8* 1.5* 1.5*   ALKPHOS 115 94 132   ALT 10 9* 5*   AST 26 24 10   BILITOT 2.1* 2.3* 2.9*      Coagulation:     Recent Labs  Lab 01/23/18 1911 01/23/18 2359 01/24/18  0903 01/24/18  1126   INR 5.5* 5.9* 6.6*  --    APTT 33.1*  --   --  34.9*     LDH:  No results for input(s): LDH in the last 72 hours.  Microbiology:  Microbiology Results (last 7 days)     Procedure Component Value Units Date/Time    Blood culture [[id number]] Collected:  01/24/18 0926    Order Status:  Sent Specimen:  Blood from Peripheral, Upper Arm, Right Updated:  01/24/18 1022    Blood culture [[id number]] Collected:  01/24/18 0926    Order Status:  Sent Specimen:  Blood from Peripheral, Forearm, Left Updated:  01/24/18 1020        I have reviewed all pertinent labs within the past 24 hours.    Diagnostic Results:  I have reviewed all pertinent imaging results/findings within the past 24 hours.

## 2018-01-24 NOTE — CONSULTS
"  [facility]  Adult Nutrition  Consult Note    SUMMARY     Recommendations    1. If able to extubate & advance diet, recommend cardiac diet, texture per SLP.   2. If unable to extubate, initiate enteral nutrition. Recommend Isosource 1.5 @ 40 mL/hr + 4 packs Beneprotein to provide 1540 kcals, 89 g of protein, 733 mL fluid.    - Hold for residuals >500 mL; additional fluid per MD.   3. RD to monitor & follow-up.    Goals: Meet % EEN, EPN  Nutrition Goal Status: new  Communication of RD Recs: reviewed with RN    Reason for Assessment    Reason for Assessment: nurse/nurse practitioner consult, physician consult  Diagnosis: infection/sepsis  Relevent Medical History: HLD, CHF   Interdisciplinary Rounds: did not attend     General Information Comments: Pt intubated w/ no family at bedside.  Nutrition Discharge Planning: Unable to determine    Nutrition Prescription Ordered    Current Diet Order: NPO     Nutrition/Diet History    Patient Reported Diet/Restrictions/Preferences: other (see comments) (YOUNG)     Factors Affecting Nutritional Intake: NPO, on mechanical ventilation    Labs/Tests/Procedures/Meds    Pertinent Labs Reviewed: reviewed, pertinent  Pertinent Labs Comments: Na 129, BUN 42, Creat 1.5, GFR 44.3, Gluc 104-332, A1C 5.9, Bili 2.3  Pertinent Medications Reviewed: reviewed, pertinent  Pertinent Medications Comments: Levophed    Physical Findings    Overall Physical Appearance: nourished, on ventilator support  Tubes: orogastric tube  Oral/Mouth Cavity: WDL  Skin: intact    Anthropometrics    Temp: 97.9 °F (36.6 °C)     Height: 5' 2.99" (160 cm)  Weight Method: Bed Scale  Weight: 73.3 kg (161 lb 9.6 oz)     Ideal Body Weight (IBW), Female: 114.95 lb  % Ideal Body Weight, Female (lb): 140.58 lb     BMI (Calculated): 28.7  BMI Grade: 25 - 29.9 - overweight     Usual Body Weight (UBW), kg:  (YOUNG)    Estimated/Assessed Needs    Weight Used For Calorie Calculations: 73.3 kg (161 lb 9.6 oz) "      Energy Calorie Requirements (kcal): 1580 kcal/d  Energy Need Method: [facility]     Weight Used For Protein Calculations: 73.3 kg (161 lb 9.6 oz)  Protein Requirements:  g/d (1.2-1.5 g/kg)     Fluid Need Method: RDA Method, other (see comments) (Per MD or 1 mL/kcal)    Assessment and Plan    Severe sepsis    Nutrition Problem  Inadequate energy intake    Related to (etiology):   Inability to consume sufficient energy    Signs and Symptoms (as evidenced by):   NPO with no alternate means of nutrition     Nutrition Diagnosis Status:   New        Monitor and Evaluation    Food and Nutrient Intake: energy intake, food and beverage intake, enteral nutrition intake  Food and Nutrient Adminstration: diet order, enteral and parenteral nutrition administration     Physical Activity and Function: nutrition-related ADLs and IADLs  Anthropometric Measurements: weight change, weight  Biochemical Data, Medical Tests and Procedures: lipid profile, inflammatory profile, glucose/endocrine profile, gastrointestinal profile, electrolyte and renal panel  Nutrition-Focused Physical Findings: overall appearance    Nutrition Risk    Level of Risk: other (see comments) (2x/week)    Nutrition Follow-Up    RD Follow-up?: Yes

## 2018-01-24 NOTE — ASSESSMENT & PLAN NOTE
- Continued on home dose IV Remodulin @ 23.5 ng/kg/min (dosing weight of 86 kg)  - Will hold Macitentan for now

## 2018-01-24 NOTE — H&P
[facility]  Heart Transplant  H&P    Patient Name: Emily Cook  MRN: [id number]  Admission Date: 1/24/2018  Attending Physician: Kaylee Cazares MD  Primary Care Provider: Kaylee Cazares MD  Principal Problem:<principal problem not specified>    Subjective:     History of Present Illness:  Ms. Cook is a 58 y/o F w/ PMHx primary pulmonary HTN on IV Remodulin (23.5 ng/kg/min) via LUE PICC + Macitentan, AVB/syncope s/p PPM, recurrent MRSA bacteremia, ALFREDO not adherant to CPAP, chronic respiratory failure on home O2 5L. She presented to [facility] yesterday after c/o 2 days subjective fevers with chills, progressive SOB and diffuse body aches. She was foiund to have gram (+) bacteremia and started on antibiotics, later needed pressors for hypotension, and was intubated for acute on chronic respiratory failure prior to [place] [facility].    [place] to [facility] on mechanical ventilation, /18/100%/Peep 10. She has a LUE PICC and a R CFV CVC which was placed in the OSH ED. Her ABG on admission was 7.211/50/74, lactate 2.9, Cr was 1.5, and she was profoundly thrombocytopenic with PLT of 12 (down from 29 at OSH at 400 about 10 days ago). She arrived with a Levophed gtt to maintain her B/P, initially at 0.06 mcg/kg/min, but has needed increasing pressor requirements throughout the morning.    Her Remodulin was continued on arrival, and NO was initially started at 10 ppm and later increased to 20 ppm to maintain PO2.     A HIT and DIC panel was sent. INR at 6.6, D-dimer elevated to 6, but fibrinogen also elevated to 431. She was given 1 unit of platelets and 2.5 mg Vitamin K in order to place an A-Line this afternoon.    WBC down to 12 from 20 at the OSH, and she has been afebrile today but has had sinus tachycardia to the 120's. Blood Cx's were repeated on arrival, and she was started on IV Vancomycin and IV Cefepime.    She remains very ill, on pressors, intubated and  "sedated.    Past Medical History:   Diagnosis Date    Arrhythmia     Arthritis     Cardiac pacemaker in situ     Carpal tunnel syndrome, right     Cervical spondylosis     Cervicalgia     CHF (congestive heart failure)     Diverticulitis     Heart block AV third degree     Hyperlipidemia     ALFREDO on CPAP     Pulmonary hypertension     Subdeltoid bursitis        Past Surgical History:   Procedure Laterality Date    CARDIAC CATHETERIZATION      CARDIAC PACEMAKER PLACEMENT  7/29/13    [employer] DC PM    CARDIAC SURGERY      COLONOSCOPY N/A 9/28/2015    Procedure: COLONOSCOPY;  Surgeon: Jason Diaz MD;  Location: [facility] ([address] [place] [address]);  Service: Endoscopy;  Laterality: N/A;  Please schedule in 2-3 months with Dr Diaz  Pulmonary HTN, 2nd floor (off remodulin infusion as of "a few days" before 9/9/15)    3 day hold Coumadin, [facility]  PT/INR scheduled before procedure    ECTOPIC PREGNANCY SURGERY Left     HYSTERECTOMY      partial    knee arthroscopy         Review of patient's allergies indicates:   Allergen Reactions    Chlorhexidine Itching and Rash     Patient had raised rash on neck following RHC procedure. She states she's never had a problem with the "prep" used until this time.     Adhesive Rash       Current Facility-Administered Medications   Medication    0.9%  NaCl infusion (for blood administration)    acetaminophen tablet 650 mg    ceFEPIme injection 2 g    dextrose 50% injection 12.5 g    famotidine (PF) injection 20 mg    fentaNYL 2500 mcg in 0.9% sodium chloride 250 mL infusion premix (titrating)    fentaNYL injection 50 mcg    glucagon (human recombinant) injection 1 mg    insulin aspart pen 1-10 Units    loperamide capsule 2 mg    nitric oxide gas Gas 20 ppm    norepinephrine 16 mg in dextrose 5 % 250 mL infusion    ondansetron disintegrating tablet 8 mg    ondansetron injection 4 mg    oxyCODONE-acetaminophen 5-325 mg per tablet 1 tablet    " sodium chloride 0.9% flush 3 mL    treprostinil (REMODULIN) 6,000,000 ng in sodium chloride 0.9% 100 mL infusion    VELETRI/REMODULIN CASSETTE    VELETRI/REMODULIN TUBING     Family History     Problem Relation (Age of Onset)    Arthritis Mother, Father    Diabetes Mother    Hyperlipidemia Mother, Father        Social History Main Topics    Smoking status: Never Smoker    Smokeless tobacco: Never Used    Alcohol use No      Comment: rarely    Drug use: No    Sexual activity: No     Review of Systems   Unable to perform ROS: Intubated     Objective:     Vital Signs (Most Recent):  Temp: 97.9 °F (36.6 °C) (01/24/18 1500)  Pulse: (!) 124 (01/24/18 1534)  Resp: (!) 36 (01/24/18 1534)  BP: (!) 120/50 (01/24/18 1430)  SpO2: (!) 88 % (01/24/18 1534) Vital Signs (24h Range):  Temp:  [97 °F (36.1 °C)-98 °F (36.7 °C)] 97.9 °F (36.6 °C)  Pulse:  [108-138] 124  Resp:  [14-51] 36  SpO2:  [76 %-100 %] 88 %  BP: ()/(27-88) 120/50  Arterial Line BP: (109-125)/(54-62) 125/62     Patient Vitals for the past 72 hrs (Last 3 readings):   Weight   01/24/18 1100 73.3 kg (161 lb 9.6 oz)   01/24/18 0855 73.3 kg (161 lb 9.6 oz)     Body mass index is 28.63 kg/m².      Intake/Output Summary (Last 24 hours) at 01/24/18 1557  Last data filed at 01/24/18 1500   Gross per 24 hour   Intake           883.56 ml   Output              200 ml   Net           683.56 ml       Physical Exam   Constitutional: She appears well-developed and well-nourished. She appears ill. She is sedated and intubated.   HENT:   Head: Normocephalic and atraumatic.   Eyes: Pupils are equal, round, and reactive to light.   Neck: JVD (Elevated to ~8 cm H2O) present.   Cardiovascular: Regular rhythm.  Tachycardia present.    Pulmonary/Chest: She is intubated.   Coarse breath sounds   Abdominal: Soft. Bowel sounds are normal.   Neurological:   Intubated and sedated   Skin: Skin is warm and dry.     Significant Labs:  CBC:    Recent Labs  Lab 01/23/18 1911  01/24/18  0903   WBC 20.25* 12.79*   RBC 4.02 3.94*   HGB 10.3* 10.1*   HCT 30.1* 30.0*   PLT 28* 12*   MCV 75* 76*   MCH 25.6* 25.6*   MCHC 34.2 33.7     BNP:    Recent Labs  Lab 01/23/18 1911   BNP 1,398*     CMP:    Recent Labs  Lab 01/23/18 1911 01/23/18 2359 01/24/18  0855   * 332* 408*   CALCIUM 8.3* 7.7* 8.3*   ALBUMIN 2.3* 2.0* 1.8*   PROT 6.3 5.4* 6.1   * 129* 131*   K 3.9 3.3* 4.2   CO2 16* 19* 21*   CL 95 97 100   BUN 48* 42* 41*   CREATININE 1.8* 1.5* 1.5*   ALKPHOS 115 94 132   ALT 10 9* 5*   AST 26 24 10   BILITOT 2.1* 2.3* 2.9*      Coagulation:     Recent Labs  Lab 01/23/18 1911 01/23/18 2359 01/24/18  0903 01/24/18  1126   INR 5.5* 5.9* 6.6*  --    APTT 33.1*  --   --  34.9*     LDH:  No results for input(s): LDH in the last 72 hours.  Microbiology:  Microbiology Results (last 7 days)     Procedure Component Value Units Date/Time    Blood culture [[id number]] Collected:  01/24/18 0926    Order Status:  Sent Specimen:  Blood from Peripheral, Upper Arm, Right Updated:  01/24/18 1022    Blood culture [[id number]] Collected:  01/24/18 0926    Order Status:  Sent Specimen:  Blood from Peripheral, Forearm, Left Updated:  01/24/18 1020        I have reviewed all pertinent labs within the past 24 hours.    Diagnostic Results:  I have reviewed all pertinent imaging results/findings within the past 24 hours.    Assessment/Plan:     Septic shock    - 2/2 Gram (+) bacteremia, likely MRSA given Hx of MRSA  - Has pre-existing LUE PICC in place for IV Remodulin, likely source  - Intubated and sedated  - WBC 12 (from 20 at OSH), afebrile on arrival, lactate 2.9  - Blood Cx's x2 from OSH with Gram (+) cocci in clusters, awaiting speciation  - Repeat Blood Cx's x 2 from this AM in process  - On Levophed gtt @ 0.6 mcg/kg/min, will wean to maintain MAP > 65  - IV Vancomycin and IV Cefepime for now, will adjust antibiotics once cultures are speciated  - Keep LUE PICC in place for now as patient is  profoundly thrombocytopenic with PLT 12 and INR at 6, high risk for bleeding  - Likely pull LUE PICC later tonight or tomorrow once risk of bleeding diminishes, will send tip for culture  - Keep R CFV CVC in place for now, will likely change to RIJ CVC tomorrow once bleeding risk is less  - Will check CVP and SvO2 from PICC for now until new CVC placed in neck        Acute on chronic respiratory failure with hypoxia    - Intubated and sedated, currently on /18/100%/PEEP 10  - CT Chest on 1/23 from OSH with pulmonary HTN, CXR on arrival here with B/L patchy consolidation on RML and DIMITRI   - On NO @ 20 ppm  - Continued on IV Remodulin at home dose 23.5 ng/kg/min  - Will wean vent requirements to maintain PO2 > 55, will monitor ABG's        Primary pulmonary hypertension    - Continued on home dose IV Remodulin @ 23.5 ng/kg/min (dosing weight of 86 kg)  - Will hold Macitentan for now        Thrombocytopenia    - Likely 2/2 sepsis, PLT 12 this AM from 20 at OSH, was 400 about 10 days ago  - Check DIC panels: INR 6.6, D-Dimer 6, but fibrinogen 431 so less likely DIC  - Checking HIT panel  - 1 unit PLT given today, will continue to monitor        Long term current use of anticoagulant therapy    - INR 6.6 on arrival  - Holding Lacey        Patient seen and examined this morning and afternoon. Discussed with Dr. Cazares - staff attestation to follow.    Abhijeet Hamilton MD  Heart Transplant  [facility]

## 2018-01-24 NOTE — PROGRESS NOTES
Admit Note     Confirmed with pt's mother that information from previous admit is correct.    Met with mother to assess needs due to pt is intubated. Patient is a 57 y.o.  female, admitted for Severe sepsis [A41.9, R65.20] per medical record. Pt on IV Remodulin at home.      Patient admitted from the ED on 1/24/2018.  At this time, patient presents as intubated.  At this time, patients caregiver is alert/oriented x4 and pleasant.    Household/Family Systems     Patient resides with patient's son, at:     [address] [facility] [address].      Support system includes adult sons and mother. Patient does not have dependents that are need of being cared for.     Patients primary caregiver is self and son.  Pt's home phone:  [phone]  Pt's cell:  [phone]  Emergency contacts  Geeta Cook (mother) [phone]  Jaden Cook (son,lives with pt) [phone]  Malini Naidu Jr (son, lives in [place]) [phone]    During admission, patient's caregiver plans to stay in patient's room. Confirmed patient and patients caregivers do have access to reliable transportation.    Cognitive Status/Learning     Patient reports reading ability as 10th grade and states patient does not have difficulty with reading, writing, seeing, hearing, comprehension, learning and memory. Pt does wear glasses.  Patient reports patient learns best by one on one.    Needed: No.   Highest education level: High School (9-12) or GED    Vocation/Disability     Working for Income: No  If no, reason not working: Disability    Patient is disabled due to pulmonary hypertension since 2011.  Prior to disability, patient  was employed as a cook.    Adherence     Patient reports a high level of adherence to patients health care regimen, however, per her medical record she has a history of non-compliance. Adherence counseling and education provided. Patient verbalizes understanding.    Substance Use    Patient reports the following  substance usage.    Tobacco: none, patient denies any use.  Alcohol: none, patient denies any use.  Illicit Drugs/Non-prescribed Medications: none.  Pt has used marijuana in the past.  Patient states clear understanding of the potential impact of substance use.  Substance abstinence/cessation counseling, education and resources provided and reviewed.     Services Utilizing/ADLS    Infusion Service: Prior to admission, patient utilizing? Pt on IV Remodulin  Home Health: Prior to admission, patient utilizing? [facility], ph: [phone], fx; [phone]. Pt's son is her PCA through a Medicaid program and is approved for 22hrs a week.   DME: Prior to admission, yes 02 set up with portables. Pt at 5LPM. DME co is [facility].  Pt also has a walker.   Pulmonary/Cardiac Rehab: Prior to admission, no  Dialysis:  Prior to admission, no  Transplant Specialty Pharmacy:  Prior to admission, no.     Prior to admission, patient reports patient is somewhat independent with self care and family assists with household tasks. Pt was not driving. Pt's son drives. Patient reports patient is not able to care for self at this time due to compromised medical condition (as documented in medical record) and physical weakness.  Patient indicates a willingness to care for self once medically cleared to do so.    Insurance/Medications    Insured by   Payor/Plan Subscr  Sex Relation Sub. Ins. ID Effective Group Num   1. HU[employer]NA MANAGE* FABIENSHALA MA* 1960 Female  [id number] 10/1/16 [id number]                                   [address] [address]   2. [place]* SHALA CONN MA* 1960 Female  [id number]* 12                                    [address] [address]      Primary Insurance (for UNOS reporting): Public Insurance - Medicare FFS (Fee For Service)  Secondary Insurance (for UNOS reporting): Public Insurance - Medicaid    Patient reports patient is able to obtain and afford medications at this time and at time of  discharge.    Living Will/Healthcare Power of     Patient states patient does not have a LW and/or HCPA.   provided education regarding LW and HCPA and the completion of forms.    Coping/Mental Health    Patient is coping adequately with the aid of  family members.  Patient denies mental health difficulties. General support provided.     Discharge Planning    At time of discharge, patient plans to return to patient's home under the care of self and son.  Patients son will transport patient. Per rounds today, expected discharge date has not been medically determined at this time.  Patients caregiver verbalizes understanding and is involved in treatment planning and discharge process.    Additional Concerns    Patient is being followed for needs, education, resources, information, emotional support, supportive counseling, and for supportive and skilled discharge plan of care.  providing ongoing psychosocial support, education, resources and d/c planning as needed.  SW remains available. Patient denies additional needs and/or concerns at this time. Patient verbalizes understanding and agreement with information reviewed, social work availability, and how to access available resources as needed.

## 2018-01-24 NOTE — ASSESSMENT & PLAN NOTE
- 2/2 Gram (+) bacteremia, likely MRSA given Hx of MRSA  - Has pre-existing LUE PICC in place for IV Remodulin, likely source  - Intubated and sedated  - WBC 12 (from 20 at OSH), afebrile on arrival, lactate 2.9  - Blood Cx's x2 from OSH with Gram (+) cocci in clusters, awaiting speciation  - Repeat Blood Cx's x 2 from this AM in process  - On Levophed gtt @ 0.6 mcg/kg/min, will wean to maintain MAP > 65  - IV Vancomycin and IV Cefepime for now, will adjust antibiotics once cultures are speciated  - Keep LUE PICC in place for now as patient is profoundly thrombocytopenic with PLT 12 and INR at 6, high risk for bleeding  - Likely pull LUE PICC later tonight or tomorrow once risk of bleeding diminishes, will send tip for culture  - Keep R CFV CVC in place for now, will likely change to RIJ CVC tomorrow once bleeding risk is less  - Will check CVP and SvO2 from PICC for now until new CVC placed in neck

## 2018-01-24 NOTE — ASSESSMENT & PLAN NOTE
- Likely 2/2 sepsis, PLT 12 this AM from 20 at OSH, was 400 about 10 days ago  - Check DIC panels: INR 6.6, D-Dimer 6, but fibrinogen 431 so less likely DIC  - Checking HIT panel  - 1 unit PLT given today, will continue to monitor

## 2018-01-24 NOTE — ASSESSMENT & PLAN NOTE
Nutrition Problem  Inadequate energy intake    Related to (etiology):   Inability to consume sufficient energy    Signs and Symptoms (as evidenced by):   NPO with no alternate means of nutrition     Nutrition Diagnosis Status:   New

## 2018-01-24 NOTE — HPI
Ms. Cook is a 58 y/o F w/ PMHx primary pulmonary HTN on IV Remodulin (23.5 ng/kg/min) via LUE PICC + Macitentan, AVB/syncope s/p PPM, recurrent MRSA bacteremia, ALFREDO not adherant to CPAP, chronic respiratory failure on home O2 5L. She presented to [facility] yesterday after c/o 2 days subjective fevers with chills, progressive SOB and diffuse body aches. She was found to have gram (+) bacteremia and started on antibiotics, later needed pressors for hypotension, and was intubated for acute on chronic respiratory failure prior to transfer to [facility].    She arrived to [facility] on mechanical ventilation, /18/100%/Peep 10. She has a LUE PICC and a R CFV CVC which was placed in the OSH ED. Her ABG on admission was 7.211/50/74, lactate 2.9, Cr was 1.5, and she was profoundly thrombocytopenic with PLT of 12 (down from 29 at OSH, 400 about 10 days ago). She arrived with a Levophed gtt to maintain her B/P, initially at 0.06 mcg/kg/min, but requiring escalating doses throughout her course.     Her Remodulin was continued on arrival, and NO was initially started at 10 ppm and later increased to 20 ppm to maintain PO2.     A HIT and DIC panel was sent. INR at 6.6, D-dimer elevated to 6, but fibrinogen also elevated to 431 thus not consistent with DIC. She was given 1 unit of platelets and 2.5 mg Vitamin K in order to place an A-Line this afternoon. Blood cultures were repeated and repeat continued to grow staph, suspect MRSA. She was started on vancomycin and cefepime here.    WBC initially improved to 12 but had progressive anuric DANICA. She also ha progressive mixed metabolic lactic acidosis and respiratory acidosis despite significant ventilator support and high minute ventilation. She decompensated overnight, pH to 6.99 with increasing levophed requirement. She was started on vasopressin and a trialysis line was placed for CRRT. A bicarbonate gtt was also started.    CRRT improved severe acidosis but unable to fully  correct and she continued to have escalating pressor requirements throughout the day, eventually on max norepi, vaso at 0.08, and max epinephrine with persistently low MAPs 55-60. She was started on an amiodarone gtt for an episode of AF/RVR.     Goals of care discussion was had with the family at the bedside. Her son who is her caretaker indicated her wishes were not to have aggressive intervention should her heart stop and DNR paperwork was completed. Aggressive care was continued but she continued to decline.    This evening, she had progressive bradycardia over a 15 minute period with eventual cardiac arrest (asystole). She was pronounce at 11:42 Pm. Family was present at the bedside throughout the evening and at time of death.

## 2018-01-25 NOTE — ASSESSMENT & PLAN NOTE
- Intubated and sedated, currently on /30/100%/PEEP 10  - CT Chest on 1/23 from OSH with pulmonary HTN, CXR on arrival here with B/L patchy consolidation on RML and DIMITRI   - On NO @ 20 ppm  - Continued on IV Remodulin at home dose 23.5 ng/kg/min  - See above under septic shock

## 2018-01-25 NOTE — PROGRESS NOTES
PM labs reviewed, worsening leukocytosis, stable thrombocytopenia, mildly worsening DANICA. Lactic acid remains mildly elevated, mixed primarily metabolic acidosis mildly improved to 7.29. Mixed venous 67 from PICC. Levophed decreased to 0.4. CVP 16.    Will transfuse additional platelets, plan to thereafter remove PICC line    Joon Martel MD  Cardiology Fellow PGY-4  [phone]

## 2018-01-25 NOTE — SUBJECTIVE & OBJECTIVE
Interval History: Overnight patient decompensated with worsening metabolic acidosis, increasing pressor requirements, and worsening respiratory failure. ABG at arrival this AM was 6.97/46/66 on /24/100%/PEEP 10. Trialysis catheter placed overnight, CRRT inititated this AM.    Over the course of the day patient has had increasing pressor requirements. She has needed CRRT + NaHCO3 gtt to improved her acidosis. Most recent ABG this afternoon was 7.26/46/60.    Currently on NO @ 20 ppm, Levophed gtt @ 3 mcg/kg/min, Epinephrine gtt @ 1 mcg/kg/min, Vasopressin gtt @ 0.08 Units/min, and home dose Remodulin 23.5 ng/kg/min. She was also loaded with IV amiodarone 150 mg and started on an amiodarone gtt after she developed AFib with RVR.    Prognosis is poor at this point, and multiple conversations were held throughout the day with family to address goals of care and poor prognosis. Son who is her caretaker has indicated that her wishes were to not have aggressive intervention should her heart stop DNR paperwork completed to indicate no further intervention should patient lose a pulse (no CPR).    Continuous Infusions:   sodium chloride 0.9% 200 mL/hr at 01/25/18 1600    amiodarone in dextrose 5% 0.5 mg/min (01/25/18 1610)    epinephrine infusion 1.001 mcg/kg/min (01/25/18 1600)    fentanyl 25 mcg/hr (01/25/18 1600)    nitric oxide gas      norepinephrine bitartrate-D5W 3.001 mcg/kg/min (01/25/18 1600)    sodium bicarbonate drip 125 mL/hr at 01/25/18 1600    sodium bicarbonate drip      treprostinil (REMODULIN) infusion 23.5 ng/kg/min (01/25/18 1600)    vasopressin (PITRESSIN) infusion 0.08 Units/min (01/25/18 1600)    veletri/remodulin cassette      veletri/remodulin tubing       Scheduled Meds:   albuterol-ipratropium 2.5mg-0.5mg/3mL  3 mL Nebulization Q4H    amiodarone in dextrose  150 mg Intravenous Once    ceFEPime (MAXIPIME) IVPB  2 g Intravenous Q12H    famotidine (PF)  20 mg Intravenous Daily     "hydrocortisone sodium succinate  100 mg Intravenous Q8H    sodium chloride 0.9%  3 mL Intravenous Q8H     PRN Meds:sodium chloride, sodium chloride, acetaminophen, dextrose 50%, [] fentaNYL **FOLLOWED BY** fentaNYL, glucagon (human recombinant), insulin aspart, loperamide, magnesium sulfate IVPB, ondansetron, ondansetron, oxyCODONE-acetaminophen    Review of patient's allergies indicates:   Allergen Reactions    Chlorhexidine Itching and Rash     Patient had raised rash on neck following RHC procedure. She states she's never had a problem with the "prep" used until this time.     Adhesive Rash     Objective:     Vital Signs (Most Recent):  Temp: 98.7 °F (37.1 °C) (18 0415)  Pulse: 110 (18 1610)  Resp: (!) 30 (18 1610)  BP: (!) 117/59 (18 2347)  SpO2: (!) 81 % (18 0810) Vital Signs (24h Range):  Temp:  [97.7 °F (36.5 °C)-98.7 °F (37.1 °C)] 98.7 °F (37.1 °C)  Pulse:  [108-138] 110  Resp:  [18-57] 30  SpO2:  [81 %-100 %] 81 %  BP: (117-119)/(50-59) 117/59  Arterial Line BP: ()/(40-64) 79/56     Patient Vitals for the past 72 hrs (Last 3 readings):   Weight   18 0300 73.3 kg (161 lb 9.6 oz)   18 1100 73.3 kg (161 lb 9.6 oz)   18 0855 73.3 kg (161 lb 9.6 oz)     Body mass index is 28.63 kg/m².      Intake/Output Summary (Last 24 hours) at 18 1648  Last data filed at 18 1600   Gross per 24 hour   Intake          4924.54 ml   Output              550 ml   Net          4374.54 ml     Physical Exam   Constitutional: She appears well-developed and well-nourished. She appears ill. She is sedated and intubated.   HENT:   Head: Normocephalic and atraumatic.   Eyes: Pupils are equal, round, and reactive to light.   Neck: JVD (Elevated to ~8 cm H2O) present.   Cardiovascular: Regular rhythm.  Tachycardia present.    Pulmonary/Chest: She is intubated.   Coarse breath sounds   Abdominal: Soft. Bowel sounds are normal.   Neurological:   Intubated and sedated "   Skin: Skin is warm and dry.     Significant Labs:  CBC:    Recent Labs  Lab 01/24/18  0903 01/24/18 1954 01/25/18 0324   WBC 12.79* 19.79* 16.31*   RBC 3.94* 3.83* 3.48*   HGB 10.1* 9.8* 8.8*   HCT 30.0* 28.3* 27.6*   PLT 12* 15* 69*   MCV 76* 74* 79*   MCH 25.6* 25.6* 25.3*   MCHC 33.7 34.6 31.9*     BNP:    Recent Labs  Lab 01/23/18 1911   BNP 1,398*     CMP:    Recent Labs  Lab 01/23/18 2359 01/24/18  0855 01/24/18 1954 01/25/18 0324 01/25/18  1414   * 408* 177* 71 205*   CALCIUM 7.7* 8.3* 8.6* 8.6* 6.3*   ALBUMIN 2.0* 1.8*  --  1.8* 1.0*   PROT 5.4* 6.1  --  6.2  --    * 131* 133* 132* 136   K 3.3* 4.2 3.7 6.2* 4.8   CO2 19* 21* 17* 12* 21*   CL 97 100 102 102 95   BUN 42* 41* 52* 53* 12   CREATININE 1.5* 1.5* 2.0* 2.5* 0.8   ALKPHOS 94 132  --  141*  --    ALT 9* 5*  --  34  --    AST 24 10  --  133*  --    BILITOT 2.3* 2.9*  --  4.0*  --       Coagulation:     Recent Labs  Lab 01/23/18 1911 01/23/18 2359 01/24/18  0903 01/24/18  1126 01/25/18  0324   INR 5.5* 5.9* 6.6*  --  5.8*   APTT 33.1*  --   --  34.9*  --      LDH:  No results for input(s): LDH in the last 72 hours.  Microbiology:  Microbiology Results (last 7 days)     Procedure Component Value Units Date/Time    Blood culture [[id number]] Collected:  01/25/18 0343    Order Status:  Completed Specimen:  Blood from Peripheral, Antecubital, Left Updated:  01/25/18 1315     Blood Culture, Routine No Growth to date    Blood culture [[id number]] Collected:  01/24/18 0926    Order Status:  Completed Specimen:  Blood from Peripheral, Forearm, Left Updated:  01/25/18 0349     Blood Culture, Routine Gram stain aer bottle: Gram positive cocci in clusters resembling Staph      Blood Culture, Routine Gram positive cocci in chains resembling Strep      Blood Culture, Routine Results called to and read back by:Bre Katz RN 01/25/2018  03:49    IV catheter culture [[id number]] Collected:  01/25/18 0057    Order Status:  Sent Specimen:  Catheter  Tip from Catheter Tip, PICC Updated:  01/25/18 0330    Culture, Anaerobe [[id number]] Collected:  01/25/18 0046    Order Status:  Sent Specimen:  Blood from Arm, Left Updated:  01/25/18 0047    Aerobic culture [[id number]] Collected:  01/25/18 0046    Order Status:  Sent Specimen:  Blood from Arm, Left Updated:  01/25/18 0047    Blood culture [[id number]] Collected:  01/24/18 0926    Order Status:  Completed Specimen:  Blood from Peripheral, Upper Arm, Right Updated:  01/24/18 2343     Blood Culture, Routine Gram stain aer bottle: Gram positive cocci      Blood Culture, Routine Results called to and read back by: Bre Rivas 01/24/2018  23:43        I have reviewed all pertinent labs within the past 24 hours.    Estimated Creatinine Clearance: 74.5 mL/min (based on SCr of 0.8 mg/dL).    Diagnostic Results:  I have reviewed all pertinent imaging results/findings within the past 24 hours.

## 2018-01-25 NOTE — PROGRESS NOTES
Patient repositioned to her left side with the wedge pillow around 2250.  Vent began alarming frequently around 2300.  RT to bedside - frequent suctioning - thick, brown secretions.  O2 sat 86-88% on peep 10, FiO2 100%.  Removed wedge pillow - patient now supine with HOB elevated.  Maxxed out fentanyl gtt (200mcg) and gave frequent boluses to decrease work of breathing in hopes of increasing O2 sat.  Patient's RR in the upper 20s while rate on vent was set at 18.  Called Dr. Joon Martel to notify/ for new orders.   O2 sat staying at 86% while on phone with MD.  Per MD, give patient 10 additional minutes to recover and notify MD if patient's sats do not increase.

## 2018-01-25 NOTE — PROGRESS NOTES
[facility]  Heart Transplant  Progress Note    Patient Name: Emily Cook  MRN: [id number]  Admission Date: 1/24/2018  Hospital Length of Stay: 1 days  Attending Physician: Kaylee Cazares MD  Primary Care Provider: Kaylee Cazares MD  Principal Problem:Septic shock    Subjective:     Interval History: Overnight patient decompensated with worsening metabolic acidosis, increasing pressor requirements, and worsening respiratory failure. ABG at arrival this AM was 6.97/46/66 on /24/100%/PEEP 10. Trialysis catheter placed overnight, CRRT inititated this AM.    Over the course of the day patient has had increasing pressor requirements. She has needed CRRT + NaHCO3 gtt to improved her acidosis. Most recent ABG this afternoon was 7.26/46/60.    Currently on NO @ 20 ppm, Levophed gtt @ 3 mcg/kg/min, Epinephrine gtt @ 1 mcg/kg/min, Vasopressin gtt @ 0.08 Units/min, and home dose Remodulin 23.5 ng/kg/min. She was also loaded with IV amiodarone 150 mg and started on an amiodarone gtt after she developed AFib with RVR.    Prognosis is poor at this point, and multiple conversations were held throughout the day with family to address goals of care and poor prognosis. Son who is her caretaker has indicated that her wishes were to not have aggressive intervention should her heart stop DNR paperwork completed to indicate no further intervention should patient lose a pulse (no CPR).    Continuous Infusions:   sodium chloride 0.9% 200 mL/hr at 01/25/18 1600    amiodarone in dextrose 5% 0.5 mg/min (01/25/18 1610)    epinephrine infusion 1.001 mcg/kg/min (01/25/18 1600)    fentanyl 25 mcg/hr (01/25/18 1600)    nitric oxide gas      norepinephrine bitartrate-D5W 3.001 mcg/kg/min (01/25/18 1600)    sodium bicarbonate drip 125 mL/hr at 01/25/18 1600    sodium bicarbonate drip      treprostinil (REMODULIN) infusion 23.5 ng/kg/min (01/25/18 1600)    vasopressin (PITRESSIN) infusion 0.08 Units/min  "(18 1600)    veletri/remodulin cassette      veletri/remodulin tubing       Scheduled Meds:   albuterol-ipratropium 2.5mg-0.5mg/3mL  3 mL Nebulization Q4H    amiodarone in dextrose  150 mg Intravenous Once    ceFEPime (MAXIPIME) IVPB  2 g Intravenous Q12H    famotidine (PF)  20 mg Intravenous Daily    hydrocortisone sodium succinate  100 mg Intravenous Q8H    sodium chloride 0.9%  3 mL Intravenous Q8H     PRN Meds:sodium chloride, sodium chloride, acetaminophen, dextrose 50%, [] fentaNYL **FOLLOWED BY** fentaNYL, glucagon (human recombinant), insulin aspart, loperamide, magnesium sulfate IVPB, ondansetron, ondansetron, oxyCODONE-acetaminophen    Review of patient's allergies indicates:   Allergen Reactions    Chlorhexidine Itching and Rash     Patient had raised rash on neck following RHC procedure. She states she's never had a problem with the "prep" used until this time.     Adhesive Rash     Objective:     Vital Signs (Most Recent):  Temp: 98.7 °F (37.1 °C) (18 0415)  Pulse: 110 (18 1610)  Resp: (!) 30 (18 1610)  BP: (!) 117/59 (18 2347)  SpO2: (!) 81 % (18 0810) Vital Signs (24h Range):  Temp:  [97.7 °F (36.5 °C)-98.7 °F (37.1 °C)] 98.7 °F (37.1 °C)  Pulse:  [108-138] 110  Resp:  [18-57] 30  SpO2:  [81 %-100 %] 81 %  BP: (117-119)/(50-59) 117/59  Arterial Line BP: ()/(40-64) 79/56     Patient Vitals for the past 72 hrs (Last 3 readings):   Weight   18 0300 73.3 kg (161 lb 9.6 oz)   18 1100 73.3 kg (161 lb 9.6 oz)   18 0855 73.3 kg (161 lb 9.6 oz)     Body mass index is 28.63 kg/m².      Intake/Output Summary (Last 24 hours) at 18 1648  Last data filed at 18 1600   Gross per 24 hour   Intake          4924.54 ml   Output              550 ml   Net          4374.54 ml     Physical Exam   Constitutional: She appears well-developed and well-nourished. She appears ill. She is sedated and intubated.   HENT:   Head: Normocephalic and " atraumatic.   Eyes: Pupils are equal, round, and reactive to light.   Neck: JVD (Elevated to ~8 cm H2O) present.   Cardiovascular: Regular rhythm.  Tachycardia present.    Pulmonary/Chest: She is intubated.   Coarse breath sounds   Abdominal: Soft. Bowel sounds are normal.   Neurological:   Intubated and sedated   Skin: Skin is warm and dry.     Significant Labs:  CBC:    Recent Labs  Lab 01/24/18 0903 01/24/18 1954 01/25/18  0324   WBC 12.79* 19.79* 16.31*   RBC 3.94* 3.83* 3.48*   HGB 10.1* 9.8* 8.8*   HCT 30.0* 28.3* 27.6*   PLT 12* 15* 69*   MCV 76* 74* 79*   MCH 25.6* 25.6* 25.3*   MCHC 33.7 34.6 31.9*     BNP:    Recent Labs  Lab 01/23/18 1911   BNP 1,398*     CMP:    Recent Labs  Lab 01/23/18 2359 01/24/18  0855 01/24/18 1954 01/25/18  0324 01/25/18  1414   * 408* 177* 71 205*   CALCIUM 7.7* 8.3* 8.6* 8.6* 6.3*   ALBUMIN 2.0* 1.8*  --  1.8* 1.0*   PROT 5.4* 6.1  --  6.2  --    * 131* 133* 132* 136   K 3.3* 4.2 3.7 6.2* 4.8   CO2 19* 21* 17* 12* 21*   CL 97 100 102 102 95   BUN 42* 41* 52* 53* 12   CREATININE 1.5* 1.5* 2.0* 2.5* 0.8   ALKPHOS 94 132  --  141*  --    ALT 9* 5*  --  34  --    AST 24 10  --  133*  --    BILITOT 2.3* 2.9*  --  4.0*  --       Coagulation:     Recent Labs  Lab 01/23/18 1911 01/23/18 2359 01/24/18  0903 01/24/18  1126 01/25/18  0324   INR 5.5* 5.9* 6.6*  --  5.8*   APTT 33.1*  --   --  34.9*  --      LDH:  No results for input(s): LDH in the last 72 hours.  Microbiology:  Microbiology Results (last 7 days)     Procedure Component Value Units Date/Time    Blood culture [[id number]] Collected:  01/25/18 0343    Order Status:  Completed Specimen:  Blood from Peripheral, Antecubital, Left Updated:  01/25/18 1315     Blood Culture, Routine No Growth to date    Blood culture [[id number]] Collected:  01/24/18 0926    Order Status:  Completed Specimen:  Blood from Peripheral, Forearm, Left Updated:  01/25/18 0349     Blood Culture, Routine Gram stain aer bottle: Gram  positive cocci in clusters resembling Staph      Blood Culture, Routine Gram positive cocci in chains resembling Strep      Blood Culture, Routine Results called to and read back by:Bre Katz RN 01/25/2018  03:49    IV catheter culture [[id number]] Collected:  01/25/18 0057    Order Status:  Sent Specimen:  Catheter Tip from Catheter Tip, PICC Updated:  01/25/18 0330    Culture, Anaerobe [[id number]] Collected:  01/25/18 0046    Order Status:  Sent Specimen:  Blood from Arm, Left Updated:  01/25/18 0047    Aerobic culture [[id number]] Collected:  01/25/18 0046    Order Status:  Sent Specimen:  Blood from Arm, Left Updated:  01/25/18 0047    Blood culture [[id number]] Collected:  01/24/18 0926    Order Status:  Completed Specimen:  Blood from Peripheral, Upper Arm, Right Updated:  01/24/18 2343     Blood Culture, Routine Gram stain aer bottle: Gram positive cocci      Blood Culture, Routine Results called to and read back by: Bre Rivas 01/24/2018  23:43        I have reviewed all pertinent labs within the past 24 hours.    Estimated Creatinine Clearance: 74.5 mL/min (based on SCr of 0.8 mg/dL).    Diagnostic Results:  I have reviewed all pertinent imaging results/findings within the past 24 hours.    Assessment and Plan:     Septic shock    - 2/2 Gram (+) bacteremia, likely MRSA given Hx of MRSA  - Has pre-existing LUE PICC in place for IV Remodulin, likely source, removed last night  - Intubated and sedated  - WBC 16, afebrile overnight, lactate is now 9 this AM  - Blood Cx's x2 from OSH with Gram (+) cocci in clusters, awaiting speciation  - Repeat Blood Cx's x 2 from this admission with Gram (+) cocci  - IV Vancomycin and IV Cefepime ongoing, vanc trough 31 this AM, dose held  - Worsening metabolic acidosis, increasing pressor requirements, and worsening respiratory failure this AM  - ABG this AM was 6.97/46/66 on /24/100%/PEEP 10, and most recent ABG this afternoon was 7.26/46/60  - Now on CRRT via  trialysis, also has needed aggressive NaHCO3 gtt to improve her acidosis  - Currently on NO @ 20 ppm, Levophed gtt @ 3 mcg/kg/min, Epinephrine gtt @ 1 mcg/kg/min, Vasopressin gtt @ 0.08 Units/min, and home dose Remodulin 23.5 ng/kg/min  - Also loaded with IV amiodarone 150 mg and started on an amiodarone gtt after she developed AFib with RVR  - Patient is critically ill, prognosis is very poor        Acute on chronic respiratory failure with hypoxia    - Intubated and sedated, currently on /30/100%/PEEP 10  - CT Chest on 1/23 from OSH with pulmonary HTN, CXR on arrival here with B/L patchy consolidation on RML and DIMITRI   - On NO @ 20 ppm  - Continued on IV Remodulin at home dose 23.5 ng/kg/min  - See above under septic shock        Primary pulmonary hypertension    - Continued on home dose IV Remodulin @ 23.5 ng/kg/min (dosing weight of 86 kg)  - Will hold Macitentan for now  - See septic shock above        Thrombocytopenia    - Likely 2/2 sepsis, PLT 12 on admission, was 400 about 10 days ago  - Check DIC panels: INR 6.6, D-Dimer 6, but fibrinogen 431 so less likely DIC  - 1 unit PLT given yesterday and overnight, currently 69, will continue to monitor        Long term current use of anticoagulant therapy    - INR 6.6 on arrival, 5.8 today  - Holding Coumadin        DANICA (acute kidney injury)    - Cr at 1./5 on arrival, from baseline of 0.6 on 1/15  - Anuric overnight, Cr increased to 2.5  - Started on CRRT this AM for refractory metabolic acidosis  - See above under septic shock        Patient seen and examined this morning and throughout the day. Prognosis is poor at this point, and multiple conversations were held throughout the day with family to address goals of care and poor prognosis. Son who is her caretaker has indicated that her wishes were to not have aggressive intervention should her heart stop DNR paperwork completed to indicate no further intervention should patient lose a pulse (no CPR).  Discussed with Dr. Cazares - staff attestation to follow.    Abhijeet Hamilton MD  Heart Transplant  [facility]

## 2018-01-25 NOTE — PLAN OF CARE
Problem: Patient Care Overview  Goal: Plan of Care Review  Outcome: Ongoing (interventions implemented as appropriate)  Very eventful night - see previous notes.  Patient alert and following commands at the beginning of the shift.  No longer following commands after 11pm assessment.  No withdrawal to pain to any extremity this AM.  Generalized edema.  Patient currently on fentanyl, vaso, levo, IVF and bicarb per eMAR.  Unable to turn patient to the left side 2/2 desaturation.  1 unit of PLT given overnight.  Left femoral trialysis placed for CRRT.  CRRT to start today.  Nephrology consulted.  Vasquez intact - drained ~50cc of concentrated yellow urine.  No BM overnight.   Patient's mother @ bedside all shift.  Ms. Cook's sons should be coming this AM to discuss POC.      Problem: Pressure Ulcer Risk (Rex Scale) (Adult,Obstetrics,Pediatric)  Intervention: Turn/Reposition Often  Attempted to turn patient q 2 hours with the wedge pillow.  Patient desatted to the low 80s when on her left side.  It took about 20-25 minutes for patient's sats to increase to the low 90s.  Dr. Martel notified.

## 2018-01-25 NOTE — PROGRESS NOTES
Worsening acidosis this am (pH 6.99), mixed metabolic acidosis (lactate 9) + respiratory acidosis (pCO2 55). RR increased on MV to 30, TV remains 500 cc. Anuric DANICA with hyperkalemia, hyperphosphatemia.  Nephrology consulted for RRT. L CFV trialysis line placed. Added bicarb gtt, 150/hr. K shifted with insulin/D50, serial POCT glucose ordered.  Increased levophed requirement, added vasopressin with decreased levophed need.    Joon Martel MD  Cardiology Fellow PGY-4  Pager: [phone]

## 2018-01-25 NOTE — PROGRESS NOTES
Patient with anuric DANICA, hyperkalemia, combined metabolic and respiratory acidosis, hypoxic on 100% FiO2, and currently requiring 2 pressors.   Will start SLED for metabolic clearance. If minimal UF tolerated, can increase as needed to help match I/Os.   Full consult note to follow.      Jesusita Goldstein, PGY-5  Nephrology Fellow  [facility]  Pager: [phone]

## 2018-01-25 NOTE — ASSESSMENT & PLAN NOTE
- Continued on home dose IV Remodulin @ 23.5 ng/kg/min (dosing weight of 86 kg)  - Will hold Macitentan for now  - See septic shock above

## 2018-01-25 NOTE — ASSESSMENT & PLAN NOTE
- Cr at 1./5 on arrival, from baseline of 0.6 on 1/15  - Anuric overnight, Cr increased to 2.5  - Started on CRRT this AM for refractory metabolic acidosis  - See above under septic shock

## 2018-01-25 NOTE — ASSESSMENT & PLAN NOTE
- Likely 2/2 sepsis, PLT 12 on admission, was 400 about 10 days ago  - Check DIC panels: INR 6.6, D-Dimer 6, but fibrinogen 431 so less likely DIC  - 1 unit PLT given yesterday and overnight, currently 69, will continue to monitor

## 2018-01-25 NOTE — ASSESSMENT & PLAN NOTE
DANICA 2/2 Ischemic ATN from septic shock.  -Hospital course complicated by GPC bacteremia, hypotension and hypoxia leading to renal hypoperfusion, now anuric with metabolic derangements.  -SLED initiated this morning urgently for metabolic clearance.  Pressor requirements continue to increase.  High vent settings.  -will continue SLED for metabolic clearance.  Unable to tolerate UF at this time 2/2 hemodynamic instability.  -will adjust bath based on chemistries.  40 bicarb, 3K bath.

## 2018-01-25 NOTE — PROGRESS NOTES
Charge Nurse, Lindsay, called to bedside.  Patient's sat slowly increasing.  Current sat 91%.  Will continue to monitor very closely.

## 2018-01-25 NOTE — PLAN OF CARE
Problem: Patient Care Overview  Goal: Plan of Care Review  Outcome: Ongoing (interventions implemented as appropriate)  Pt admitted from [facility] this am at 0700. Pt unresponsive on initial assessment (r/t paralytic?); slowly more responsive through am. By 0900, alert and FC. Remained able to FC throughout shift. Fentanyl at 25mcg/hr for comfort. PEEP remains at 10, pt sating 90% on 100% FiO2. Nitric started, inc to 20 midday. Levophed dose stabilized at 0.6mcg/kg/min. 1 unit PLT given, left radial art line placed. PICC still in place r/t coagulopathy, remodulin moved to R hand PIV. Pt is anuric. CVP 17 this afternoon.

## 2018-01-25 NOTE — PROGRESS NOTES
BP dropping.  Vaso started.  Consent obtained for trialysis placement.  Dr. Martel to place line.

## 2018-01-25 NOTE — ASSESSMENT & PLAN NOTE
- 2/2 Gram (+) bacteremia, likely MRSA given Hx of MRSA  - Has pre-existing LUE PICC in place for IV Remodulin, likely source, removed last night  - Intubated and sedated  - WBC 16, afebrile overnight, lactate is now 9 this AM  - Blood Cx's x2 from OSH with Gram (+) cocci in clusters, awaiting speciation  - Repeat Blood Cx's x 2 from this admission with Gram (+) cocci  - IV Vancomycin and IV Cefepime ongoing, vanc trough 31 this AM, dose held  - Worsening metabolic acidosis, increasing pressor requirements, and worsening respiratory failure this AM  - ABG this AM was 6.97/46/66 on /24/100%/PEEP 10, and most recent ABG this afternoon was 7.26/46/60  - Now on CRRT via trialysis, also has needed aggressive NaHCO3 gtt to improve her acidosis  - Currently on NO @ 20 ppm, Levophed gtt @ 3 mcg/kg/min, Epinephrine gtt @ 1 mcg/kg/min, Vasopressin gtt @ 0.08 Units/min, and home dose Remodulin 23.5 ng/kg/min  - Also loaded with IV amiodarone 150 mg and started on an amiodarone gtt after she developed AFib with RVR  - Patient is critically ill, prognosis is very poor

## 2018-01-25 NOTE — SUBJECTIVE & OBJECTIVE
"Past Medical History:   Diagnosis Date    Arrhythmia     Arthritis     Cardiac pacemaker in situ     Carpal tunnel syndrome, right     Cervical spondylosis     Cervicalgia     CHF (congestive heart failure)     Diverticulitis     Heart block AV third degree     Hyperlipidemia     ALFREDO on CPAP     Pulmonary hypertension     Subdeltoid bursitis        Past Surgical History:   Procedure Laterality Date    CARDIAC CATHETERIZATION      CARDIAC PACEMAKER PLACEMENT  7/29/13    [employer] DC PM    CARDIAC SURGERY      COLONOSCOPY N/A 9/28/2015    Procedure: COLONOSCOPY;  Surgeon: Jason Diaz MD;  Location: [facility] ([address] [place] [address]);  Service: Endoscopy;  Laterality: N/A;  Please schedule in 2-3 months with Dr Diaz  Pulmonary HTN, 2nd floor (off remodulin infusion as of "a few days" before 9/9/15)    3 day hold Coumadin, [facility]  PT/INR scheduled before procedure    ECTOPIC PREGNANCY SURGERY Left     HYSTERECTOMY      partial    knee arthroscopy         Review of patient's allergies indicates:   Allergen Reactions    Chlorhexidine Itching and Rash     Patient had raised rash on neck following RHC procedure. She states she's never had a problem with the "prep" used until this time.     Adhesive Rash     Current Facility-Administered Medications   Medication Frequency    0.9%  NaCl infusion (CRRT USE ONLY) Continuous    0.9%  NaCl infusion (for blood administration) Q24H PRN    0.9%  NaCl infusion (for blood administration) Q24H PRN    acetaminophen tablet 650 mg Q6H PRN    albuterol-ipratropium 2.5mg-0.5mg/3mL nebulizer solution 3 mL Q4H    amiodarone 360 mg/200 mL (1.8 mg/mL) infusion Continuous    amiodarone 360 mg/200 mL (1.8 mg/mL) infusion Continuous    amiodarone in dextrose 150 mg/100 mL (1.5 mg/mL) loading dose 150 mg Once    ceFEPIme injection 2 g Q12H    dextrose 50% injection 12.5 g PRN    EPINEPHrine (ADRENALIN) 32 mg in sodium chloride 0.9% 250 mL infusion " Continuous    famotidine (PF) injection 20 mg Daily    fentaNYL 2500 mcg in 0.9% sodium chloride 250 mL infusion premix (titrating) Continuous    fentaNYL injection 50 mcg Q1H PRN    glucagon (human recombinant) injection 1 mg PRN    hydrocortisone sodium succinate injection 100 mg Q8H    insulin aspart pen 1-10 Units Q6H PRN    loperamide capsule 2 mg Q4H PRN    magnesium sulfate 2g in water 50mL IVPB (premix) PRN    nitric oxide gas Gas 20 ppm Continuous    norepinephrine 32 mg in dextrose 5 % 250 mL infusion Continuous    ondansetron disintegrating tablet 8 mg Q8H PRN    ondansetron injection 4 mg Q8H PRN    oxyCODONE-acetaminophen 5-325 mg per tablet 1 tablet Q4H PRN    sodium bicarbonate 1 mEq/mL (8.4 %) 100 mEq in dextrose 5 % 1,000 mL infusion Continuous    sodium bicarbonate 1 mEq/mL (8.4 %) 100 mEq in dextrose 5 % 500 mL infusion Continuous    sodium chloride 0.9% flush 3 mL Q8H    treprostinil (REMODULIN) 6,000,000 ng in sodium chloride 0.9% 100 mL infusion Continuous    vasopressin (PITRESSIN) 0.2 Units/mL in dextrose 5 % 100 mL infusion Continuous    VELETRI/REMODULIN CASSETTE Continuous    VELETRI/REMODULIN TUBING Continuous     Family History     Problem Relation (Age of Onset)    Arthritis Mother, Father    Diabetes Mother    Hyperlipidemia Mother, Father        Social History Main Topics    Smoking status: Never Smoker    Smokeless tobacco: Never Used    Alcohol use No      Comment: rarely    Drug use: No    Sexual activity: No     Review of Systems   Unable to perform ROS: Intubated     Objective:     Vital Signs (Most Recent):  Temp: 98.7 °F (37.1 °C) (01/25/18 0415)  Pulse: 109 (01/25/18 1410)  Resp: (!) 31 (01/25/18 1410)  BP: (!) 117/59 (01/24/18 2347)  SpO2: (!) 81 % (01/25/18 0810)  O2 Device (Oxygen Therapy): ventilator (01/25/18 1400) Vital Signs (24h Range):  Temp:  [97.7 °F (36.5 °C)-98.7 °F (37.1 °C)] 98.7 °F (37.1 °C)  Pulse:  [109-138] 109  Resp:  [18-57]  31  SpO2:  [81 %-100 %] 81 %  BP: (117-119)/(50-59) 117/59  Arterial Line BP: ()/(40-64) 72/49     Weight: 73.3 kg (161 lb 9.6 oz) (01/25/18 0300)  Body mass index is 28.63 kg/m².  Body surface area is 1.8 meters squared.    I/O last 3 completed shifts:  In: 1989.9 [I.V.:1121.9; Blood:458; NG/GT:160; IV Piggyback:250]  Out: 230 [Urine:230]    Physical Exam   Constitutional: She appears toxic. She appears ill. No distress. She is intubated.   HENT:   Head: Normocephalic and atraumatic.   Right Ear: External ear normal.   Left Ear: External ear normal.   Eyes: Conjunctivae are normal. Right eye exhibits no discharge. Left eye exhibits no discharge.   Cardiovascular: Regular rhythm.  Tachycardia present.  Exam reveals no gallop and no friction rub.    No murmur heard.  Pulmonary/Chest: She is intubated. She has no wheezes. She has no rales.   Coarse BS on Vent   Abdominal: Soft. Bowel sounds are normal. She exhibits no distension. There is no tenderness.   Musculoskeletal: She exhibits edema. She exhibits no deformity.   Neurological: She is unresponsive.   Skin: Skin is warm and dry. She is not diaphoretic.       Significant Labs:  ABGs:   Recent Labs  Lab 01/25/18  1404   PH 7.289*   PCO2 45.2*   HCO3 21.7*   POCSATURATED 90*   BE -5     CBC:   Recent Labs  Lab 01/25/18  0324   WBC 16.31*   RBC 3.48*   HGB 8.8*   HCT 27.6*   PLT 69*   MCV 79*   MCH 25.3*   MCHC 31.9*     CMP:   Recent Labs  Lab 01/25/18  0324   GLU 71   CALCIUM 8.6*   ALBUMIN 1.8*   PROT 6.2   *   K 6.2*   CO2 12*      BUN 53*   CREATININE 2.5*   ALKPHOS 141*   ALT 34   *   BILITOT 4.0*

## 2018-01-25 NOTE — CONSULTS
[facility]  Nephrology  Consult Note    Patient Name: Emily Cook  MRN: [id number]  Admission Date: 1/24/2018  Hospital Length of Stay: 1 days  Attending Provider: Kaylee Cazares MD   Primary Care Physician: Kaylee Cazares MD  Principal Problem:<principal problem not specified>    Inpatient consult to Nephrology  Consult performed by: GISELA RIZZO  Consult ordered by: CARLOS MONTALVO  Reason for consult: DANICA        Subjective:     HPI: Ms. Cook is a 56 yo AAF with PMHx of pHTN (on Remodulin therapy), ALFREDO, chronic respiratory failure, recurrent MRSA bacteremia thought to be from her PICC line, and CKD who presented to [facility] on 1/23 with chief complaint of SOB, fever, chills and body aches x 2 days.  She was found to have GPC bacteremia and in septic shock with hypotension.  Respiratory status continued to decline and she was intubated and [place] [facility] [place].  [place] [facility] her kidney function has continued to decline and now anuric with critical metabolic derangements (hyperkalemia and acidosis).  ABG on 1/25 revealed a combined metabolic/respiratory acidosis with pH of 6.9. Nephrology was consulted for emergent RRT and SLED was started for metabolic clearance.    Past Medical History:   Diagnosis Date    Arrhythmia     Arthritis     Cardiac pacemaker in situ     Carpal tunnel syndrome, right     Cervical spondylosis     Cervicalgia     CHF (congestive heart failure)     Diverticulitis     Heart block AV third degree     Hyperlipidemia     ALFREDO on CPAP     Pulmonary hypertension     Subdeltoid bursitis        Past Surgical History:   Procedure Laterality Date    CARDIAC CATHETERIZATION      CARDIAC PACEMAKER PLACEMENT  7/29/13    [employer] DC PM    CARDIAC SURGERY      COLONOSCOPY N/A 9/28/2015    Procedure: COLONOSCOPY;  Surgeon: Jason Diaz MD;  Location: [facility] ([address] [place] [address]);  Service: Endoscopy;   "Laterality: N/A;  Please schedule in 2-3 months with Dr Diaz  Pulmonary HTN, 2nd floor (off remodulin infusion as of "a few days" before 9/9/15)    3 day [facility]  PT/INR scheduled before procedure    ECTOPIC PREGNANCY SURGERY Left     HYSTERECTOMY      partial    knee arthroscopy         Review of patient's allergies indicates:   Allergen Reactions    Chlorhexidine Itching and Rash     Patient had raised rash on neck following RHC procedure. She states she's never had a problem with the "prep" used until this time.     Adhesive Rash     Current Facility-Administered Medications   Medication Frequency    0.9%  NaCl infusion (CRRT USE ONLY) Continuous    0.9%  NaCl infusion (for blood administration) Q24H PRN    0.9%  NaCl infusion (for blood administration) Q24H PRN    acetaminophen tablet 650 mg Q6H PRN    albuterol-ipratropium 2.5mg-0.5mg/3mL nebulizer solution 3 mL Q4H    amiodarone 360 mg/200 mL (1.8 mg/mL) infusion Continuous    amiodarone 360 mg/200 mL (1.8 mg/mL) infusion Continuous    amiodarone in dextrose 150 mg/100 mL (1.5 mg/mL) loading dose 150 mg Once    ceFEPIme injection 2 g Q12H    dextrose 50% injection 12.5 g PRN    EPINEPHrine (ADRENALIN) 32 mg in sodium chloride 0.9% 250 mL infusion Continuous    famotidine (PF) injection 20 mg Daily    fentaNYL 2500 mcg in 0.9% sodium chloride 250 mL infusion premix (titrating) Continuous    fentaNYL injection 50 mcg Q1H PRN    glucagon (human recombinant) injection 1 mg PRN    hydrocortisone sodium succinate injection 100 mg Q8H    insulin aspart pen 1-10 Units Q6H PRN    loperamide capsule 2 mg Q4H PRN    magnesium sulfate 2g in water 50mL IVPB (premix) PRN    nitric oxide gas Gas 20 ppm Continuous    norepinephrine 32 mg in dextrose 5 % 250 mL infusion Continuous    ondansetron disintegrating tablet 8 mg Q8H PRN    ondansetron injection 4 mg Q8H PRN    oxyCODONE-acetaminophen 5-325 mg per tablet 1 " tablet Q4H PRN    sodium bicarbonate 1 mEq/mL (8.4 %) 100 mEq in dextrose 5 % 1,000 mL infusion Continuous    sodium bicarbonate 1 mEq/mL (8.4 %) 100 mEq in dextrose 5 % 500 mL infusion Continuous    sodium chloride 0.9% flush 3 mL Q8H    treprostinil (REMODULIN) 6,000,000 ng in sodium chloride 0.9% 100 mL infusion Continuous    vasopressin (PITRESSIN) 0.2 Units/mL in dextrose 5 % 100 mL infusion Continuous    VELETRI/REMODULIN CASSETTE Continuous    VELETRI/REMODULIN TUBING Continuous     Family History     Problem Relation (Age of Onset)    Arthritis Mother, Father    Diabetes Mother    Hyperlipidemia Mother, Father        Social History Main Topics    Smoking status: Never Smoker    Smokeless tobacco: Never Used    Alcohol use No      Comment: rarely    Drug use: No    Sexual activity: No     Review of Systems   Unable to perform ROS: Intubated     Objective:     Vital Signs (Most Recent):  Temp: 98.7 °F (37.1 °C) (01/25/18 0415)  Pulse: 109 (01/25/18 1410)  Resp: (!) 31 (01/25/18 1410)  BP: (!) 117/59 (01/24/18 2347)  SpO2: (!) 81 % (01/25/18 0810)  O2 Device (Oxygen Therapy): ventilator (01/25/18 1400) Vital Signs (24h Range):  Temp:  [97.7 °F (36.5 °C)-98.7 °F (37.1 °C)] 98.7 °F (37.1 °C)  Pulse:  [109-138] 109  Resp:  [18-57] 31  SpO2:  [81 %-100 %] 81 %  BP: (117-119)/(50-59) 117/59  Arterial Line BP: ()/(40-64) 72/49     Weight: 73.3 kg (161 lb 9.6 oz) (01/25/18 0300)  Body mass index is 28.63 kg/m².  Body surface area is 1.8 meters squared.    I/O last 3 completed shifts:  In: 1989.9 [I.V.:1121.9; Blood:458; NG/GT:160; IV Piggyback:250]  Out: 230 [Urine:230]    Physical Exam   Constitutional: She appears toxic. She appears ill. No distress. She is intubated.   HENT:   Head: Normocephalic and atraumatic.   Right Ear: External ear normal.   Left Ear: External ear normal.   Eyes: Conjunctivae are normal. Right eye exhibits no discharge. Left eye exhibits no discharge.   Cardiovascular:  Regular rhythm.  Tachycardia present.  Exam reveals no gallop and no friction rub.    No murmur heard.  Pulmonary/Chest: She is intubated. She has no wheezes. She has no rales.   Coarse BS on Vent   Abdominal: Soft. Bowel sounds are normal. She exhibits no distension. There is no tenderness.   Musculoskeletal: She exhibits edema. She exhibits no deformity.   Neurological: She is unresponsive.   Skin: Skin is warm and dry. She is not diaphoretic.       Significant Labs:  ABGs:   Recent Labs  Lab 01/25/18  1404   PH 7.289*   PCO2 45.2*   HCO3 21.7*   POCSATURATED 90*   BE -5     CBC:   Recent Labs  Lab 01/25/18  0324   WBC 16.31*   RBC 3.48*   HGB 8.8*   HCT 27.6*   PLT 69*   MCV 79*   MCH 25.3*   MCHC 31.9*     CMP:   Recent Labs  Lab 01/25/18  0324   GLU 71   CALCIUM 8.6*   ALBUMIN 1.8*   PROT 6.2   *   K 6.2*   CO2 12*      BUN 53*   CREATININE 2.5*   ALKPHOS 141*   ALT 34   *   BILITOT 4.0*     Assessment/Plan:     DANICA (acute kidney injury)    DANICA 2/2 Ischemic ATN from septic shock.  -Hospital course complicated by GPC bacteremia, hypotension and hypoxia leading to renal hypoperfusion, now anuric with metabolic derangements.  -SLED initiated this morning urgently for metabolic clearance.  Pressor requirements continue to increase.  High vent settings.  -will continue SLED for metabolic clearance.  Unable to tolerate UF at this time 2/2 hemodynamic instability.  -will adjust bath based on chemistries.  40 bicarb, 3K bath.            Fercho Chaney NP  Nephrology  [facility]  Pager:  [phone]      I have reviewed and concur with the fellow's history, physical, assessment, and plan. I have personally interviewed and examined the patient at bedside.

## 2018-01-25 NOTE — PROCEDURES
"Central Line  Date/Time: 01/25/2018   Performed by: Joon Martel MD  Pre-operative Diagnosis: septic shock, acute renal failure, metabolic and respiratory acidosis  Post-operative diagnosis: septic shock, acute renal failure, metabolic and respiratory acidosis  Consent Done: Yes  Time out: Immediately prior to procedure a "time out" was called to verify the correct patient, procedure, equipment, support staff and site/side marked as required.  Indications: RRT  Anesthesia: systemic fentanyl   Preparation: skin prepped with ChloraPrep  Location details: Patient unable to lie flat due to worsening hypoxia thus L CFV chosen for access  Catheter type: trialysis  Catheter size: 12 Fr  Catheter Length: 20cm    Ultrasound guidance: yes  Vessel Caliber: large, patent, compressible  Needle advanced into vessel with real time ultrasound guidance. Manometry performed to confirm venous access. Guidewire confirmed in vessel with ultrasound. Sterile sheath used.   Number of attempts: 1  Blood loss: 10 cc  Post-procedure: line sutured, chlorhexidine patch, sterile dressing applied and blood return through all ports  CXR ordered for confirmation of position.      "

## 2018-01-25 NOTE — PROGRESS NOTES
Multiple critical labs called to Dr. Martel (see critical lab flowsheet).  Spoke with patient's mother about having Ms. Cook's 3 sons come to the bedside to speak about goals of care with team.

## 2018-01-26 VITALS
SYSTOLIC BLOOD PRESSURE: 117 MMHG | OXYGEN SATURATION: 81 % | DIASTOLIC BLOOD PRESSURE: 59 MMHG | BODY MASS INDEX: 28.64 KG/M2 | RESPIRATION RATE: 30 BRPM | TEMPERATURE: 98 F | HEIGHT: 63 IN | WEIGHT: 161.63 LBS

## 2018-01-26 LAB — HEPARIN INDUCED THROMBOCYTOPENIA: NORMAL

## 2018-01-26 NOTE — DISCHARGE SUMMARY
[facility]  Heart Transplant  Discharge Summary      Patient Name: Emily Cook  MRN: [id number]  Admission Date: 1/24/2018  Hospital Length of Stay: 2 days  Discharge Date and Time: 01/26/2018 12:12 AM  Attending Physician: Kaylee Cazares MD   Discharging Provider: Joon Martel MD  Primary Care Provider: Kaylee Cazares MD     HPI: Ms. Cook is a 56 y/o F w/ PMHx primary pulmonary HTN on IV Remodulin (23.5 ng/kg/min) via LUE PICC + Macitentan, AVB/syncope s/p PPM, recurrent MRSA bacteremia, ALFREDO not adherant to CPAP, chronic respiratory failure on home O2 5L. She presented to [facility] yesterday after c/o 2 days subjective fevers with chills, progressive SOB and diffuse body aches. She was found to have gram (+) bacteremia and started on antibiotics, later needed pressors for hypotension, and was intubated for acute on chronic respiratory failure prior to transfer to [facility].    She arrived to [facility] on mechanical ventilation, /18/100%/Peep 10. She has a LUE PICC and a R CFV CVC which was placed in the OSH ED. Her ABG on admission was 7.211/50/74, lactate 2.9, Cr was 1.5, and she was profoundly thrombocytopenic with PLT of 12 (down from 29 at OSH, 400 about 10 days ago). She arrived with a Levophed gtt to maintain her B/P, initially at 0.06 mcg/kg/min, but requiring escalating doses throughout her course.     Her Remodulin was continued on arrival, and NO was initially started at 10 ppm and later increased to 20 ppm to maintain PO2.     A HIT and DIC panel was sent. INR at 6.6, D-dimer elevated to 6, but fibrinogen also elevated to 431 thus not consistent with DIC. She was given 1 unit of platelets and 2.5 mg Vitamin K in order to place an A-Line this afternoon. Blood cultures were repeated and repeat continued to grow staph, suspect MRSA. She was started on vancomycin and cefepime here.    WBC initially improved to 12 but had progressive anuric DANICA. She also had  progressive mixed metabolic lactic acidosis and respiratory acidosis despite significant ventilator support and high minute ventilation. She decompensated overnight, pH to 6.99 with increasing levophed requirement. She was started on vasopressin and a trialysis line was placed for CRRT. A bicarbonate gtt was also started. Her home PICC was removed given concern for source of infection with persistent bacteremia.    CRRT improved severe acidosis but unable to fully correct and she continued to have escalating pressor requirements throughout the day, eventually on max norepi, vaso at 0.08, and max epinephrine with persistently low MAPs 55-60. She was started on an amiodarone gtt for an episode of AF/RVR.     Goals of care discussion was had with the family at the bedside. Her son who is her caretaker indicated her wishes were not to have aggressive intervention should her heart stop and DNR paperwork was completed. Aggressive care was continued but she continued to decline.    This evening, she had progressive bradycardia over a 15 minute period with eventual cardiac arrest (asystole). She was pronounce at 11:42 Pm. Family was present at the bedside throughout the evening and at time of death.     * No surgery found *     Hospital Course: No notes on file    Consults         Status Ordering Provider     Inpatient consult to Nephrology  Once     Provider:  (Not yet assigned)    Completed CARLOS MONTALVO     Inpatient consult to Registered Dietitian/Nutritionist  Once     Provider:  (Not yet assigned)    Completed OMAR ETIENNE          Significant Diagnostic Studies: Labs:   BMP:   Recent Labs  Lab 01/25/18  0324 01/25/18  1414 01/25/18  2218 01/25/18  2312   GLU 71 205* 106  --    * 136 137  --    K 6.2* 4.8 5.4*  --     95 96  --    CO2 12* 21* 27  --    BUN 53* 12 3*  --    CREATININE 2.5* 0.8 0.3*  --    CALCIUM 8.6* 6.3* 6.2*  --    MG 2.4 1.7  --  1.7   , CMP   Recent Labs  Lab  01/24/18  0855  01/25/18  0324 01/25/18  1414 01/25/18  2218   *  < > 132* 136 137   K 4.2  < > 6.2* 4.8 5.4*     < > 102 95 96   CO2 21*  < > 12* 21* 27   *  < > 71 205* 106   BUN 41*  < > 53* 12 3*   CREATININE 1.5*  < > 2.5* 0.8 0.3*   CALCIUM 8.3*  < > 8.6* 6.3* 6.2*   PROT 6.1  --  6.2  --   --    ALBUMIN 1.8*  --  1.8* 1.0* 0.9*   BILITOT 2.9*  --  4.0*  --   --    ALKPHOS 132  --  141*  --   --    AST 10  --  133*  --   --    ALT 5*  --  34  --   --    ANIONGAP 10  < > 18* 20* 14   ESTGFRAFRICA 44.3*  < > 23.9* >60.0 >60.0   EGFRNONAA 38.4*  < > 20.7* >60.0 >60.0   < > = values in this interval not displayed., CBC   Recent Labs  Lab 01/24/18  0903 01/24/18  1954 01/25/18  0324   WBC 12.79* 19.79* 16.31*   HGB 10.1* 9.8* 8.8*   HCT 30.0* 28.3* 27.6*   PLT 12* 15* 69*    and INR   Lab Results   Component Value Date    INR 5.8 () 01/25/2018    INR 6.6 () 01/24/2018    INR 5.9 () 01/23/2018     Microbiology:   Blood Culture   Lab Results   Component Value Date    LABBLOO No Growth to date 01/25/2018     Radiology: X-Ray: CXR: X-Ray Chest 1 View (CXR):   Results for orders placed or performed during the hospital encounter of 01/24/18   X-Ray Chest 1 View    Narrative    Endotracheal tube, vascular catheter and nasogastric tube are present.  Patchy air space consolidation is again seen in both lung fields similar to earlier study.  Gastric air bubble is distended.    Impression     See above      Electronically signed by: Abdi Sr MD  Date:     01/24/18  Time:    08:38     and X-Ray Chest PA and Lateral (CXR): No results found for this visit on 01/24/18.    Pending Diagnostic Studies:     Procedure Component Value Units Date/Time    Heparin-induced platelet antibody [[id number]] Collected:  01/24/18 1126    Order Status:  Sent Lab Status:  In process Updated:  01/24/18 1158    Specimen:  Blood from Blood     Vancomycin, random [[id number]] Collected:  01/24/18 0621    Order Status:   Sent Lab Status:  In process Updated:  18 6735    Specimen:  Blood from Blood         Final Active Diagnoses:    Diagnosis Date Noted POA    PRINCIPAL PROBLEM:  Septic shock [A41.9, R65.21] 2018 Yes    Thrombocytopenia [D69.6] 2018 Yes    DANICA (acute kidney injury) [N17.9] 2018 Yes    Acute on chronic respiratory failure with hypoxia [J96.21] 2017 Yes    Primary pulmonary hypertension [I27.0]  Yes    Long term current use of anticoagulant therapy [Z79.01] 2012 Not Applicable      Problems Resolved During this Admission:    Diagnosis Date Noted Date Resolved POA      Discharged Condition:     Disposition: n/a    Follow Up: n/a    Patient Instructions:   No discharge procedures on file.  Medications:  None (patient  at medical facility)    Joon Martel MD  Heart Transplant  [facility]

## 2018-01-26 NOTE — CODE DOCUMENTATION
Called to bedside for asystole. Patient was made DNR earlier in the day after discussion between team and the family. Pt in septic shock on progressive vasopressors, on max dose norepinephrine, epinephrine, vasopressin throughout the night along with significant vent support and CRRT. Progressive bradycardia followed by asystole, expected in setting of above clinical course.   Pronounced at the bedside- absent cardiac or breath sounds, pupils fixed and dilated, absent pupillary response.   Family at the bedside at the time. Condolences offered and questions answered. Tubes/lines removed for bedside viewing with family.  Time of death: 23:42  Cause of death: Septic shock    Joon Martel MD  Cardiology Fellow, PGY4  [phone]

## 2018-01-26 NOTE — PHYSICIAN QUERY
PT Name: Emily Cook  MR #: [id number]    Physician Query Form - Atrial Fibrillation Specificity     CDS/: Flor Todd RN, CDI              Contact information:[phone]     This form is a permanent document in the medical record.     Query Date: January 26, 2018    By submitting this query, we are merely seeking further clarification of documentation. Please utilize your independent clinical judgment when addressing the question(s) below.    The medical record contains the following:   Indicators     Supporting Clinical Findings Location in Medical Record   x Atrial Fibrillation - Also loaded with IV amiodarone 150 mg and started on an amiodarone gtt after she developed AFib with RVR  Progress Note 1/25      EKG results     x Medication    Amiodarone IV  continuous MAR started 1/25      Treatment     x Other Long term current use of anticoagulant therapy   - INR 6.6 on arrival   [facility]     Cardiovascular: Regular rhythm.  Tachycardia present     H&P 1/24          H&P 1/24       Provider, please further specify the Atrial Fibrillation diagnosis.    [  ] Chronic  [x  ] Paroxysmal  [  ] Persistent  [  ] Other (please specify): ____________________________  [  ] Clinically Undetermined    Please document in your progress notes daily for the duration of treatment until resolved, and include in your discharge summary.

## 2018-01-26 NOTE — PHYSICIAN QUERY
PT Name: Emily Cook  MR #: [id number]  Physician Query Form - CKD Clarification     CDS/: Flor Todd RN, CDI              Contact information:[phone]  This form is a permanent document in the medical record.     Query Date: January 26, 2018    By submitting this query, we are merely seeking further clarification of documentation. Please utilize your independent clinical judgment when addressing the question(s) below.    The Medical record contains the following:     Indicators   Supporting Clinical Findings   Location in Medical Record   x CKD or Chronic Kidney (Renal) Failure / Disease PMHx of pHTN (on Remodulin therapy), ALFREDO, chronic respiratory failure, recurrent MRSA bacteremia thought to be from her PICC line, and CKD who presented to [facility] on 1/23 with chief complaint of  SOB, fever, chills and body aches x 2 days.   Consult 1/25     Nephrology   x BUN/Creatinine                          GFR BUN = 42--<52 --> 53--> 12  Cr = 1.5--> 2.0 --> 2.5--> 0.8  Gfr= 38.4->31.3> 20.7->  >60   Labs 1/23, 1/24, 1/25, 1/25    Dehydration      Nausea / Vomiting      Dialysis / CRRT      Medication      Treatment      Other Chronic Conditions     x Other PM labs reviewed, worsening leukocytosis, stable thrombocytopenia, mildly worsening DANICA Progress Note 1/24     Transplant Heart     Provider, please further specify the stage of CKD.      [  ] Chronic Kidney Disease (CKD) (please specify stage* below)       [employer] Definitions  Stage Description  eGFR (mL/min)   [  ]     I Slight kidney damage with normal or increased filtration 90+   [  ]     II Mildly reduced kidney function 60-89       [  x] Other (please specify): ___acute on chronic renal failure (CKD 3)_____________________  [  ] Clinically Undetermined    Please document in your progress notes daily for the duration of treatment until resolved and include in your discharge summary.

## 2018-01-26 NOTE — PLAN OF CARE
Problem: Patient Care Overview  Goal: Plan of Care Review  Outcome: Ongoing (interventions implemented as appropriate)  Patient unarousable all shift.  Body cool.  Extremities mottled - generalized +2 edema noted.  No corneal reflex.  Pupils fixed and dilated.     RN spoke with family about patient's poor prognosis and discussed patient's imminent death around 2130.  Encouraged patient's aunt to call family members who would want to say their good-byes.   When disconnecting patient's CRRT from her trialysis line, patient went faina with a HR in the 50s.  Within about 1 minute, patient went asystole.  Dr. Joon Martel pronounced patient 1/25/18 at 2342.  RAZ called.  Family tearful but appropriate.   at bedside.  Post mortem care completed.     Problem: Renal Replacement, Continuous (Adult)  Intervention: Carefully Balance Fluid Volume Intake and Output  CRRT stopped.  Rinsed patient back.  MAP in the 50s maxxed on pressors and with UF off.

## 2018-01-27 LAB
BACTERIA BLD CULT: NORMAL
BACTERIA CATH TIP CULT: NORMAL

## 2018-01-28 LAB
BACTERIA BLD CULT: NORMAL

## 2018-02-07 DIAGNOSIS — M54.2 CHRONIC NECK PAIN: ICD-10-CM

## 2018-02-07 DIAGNOSIS — G89.29 CHRONIC NECK PAIN: ICD-10-CM

## 2018-02-07 DIAGNOSIS — M54.12 RIGHT CERVICAL RADICULOPATHY: ICD-10-CM

## 2018-02-07 RX ORDER — GABAPENTIN 300 MG/1
CAPSULE ORAL
Qty: 360 CAPSULE | Refills: 0 | Status: SHIPPED | OUTPATIENT
Start: 2018-02-07

## 2018-02-08 NOTE — PROGRESS NOTES
[facility]  Cardiology  Progress Note    Patient Name: Emily Cook  MRN: [id number]  Admission Date: 9/22/2017  Hospital Length of Stay: 9 days  Code Status: DNR   Attending Physician: Augustine Rhodes MD   Primary Care Physician: Kaylee Cazares MD  Expected Discharge Date: 10/3/2017  Principal Problem:Acute decompensated heart failure    Subjective:     Hospital Course:   -Remained stable overnight   - Supplemental oxygen level reduced to 4 LPM   - Denied CP, SOB or palpitation.     Interval History:     Review of Systems   Constitution: Negative.   HENT: Negative.    Cardiovascular: Negative.    Respiratory: Negative.    Skin: Negative.    Musculoskeletal: Negative.    Gastrointestinal: Negative.    Genitourinary: Negative.    Neurological: Negative.    Psychiatric/Behavioral: Negative.      Objective:     Vital Signs (Most Recent):  Temp: 98.5 °F (36.9 °C) (10/01/17 1203)  Pulse: 89 (10/01/17 1203)  Resp: 18 (10/01/17 1203)  BP: 109/62 (10/01/17 1203)  SpO2: (!) 90 % (10/01/17 1203) Vital Signs (24h Range):  Temp:  [97.8 °F (36.6 °C)-98.6 °F (37 °C)] 98.5 °F (36.9 °C)  Pulse:  [] 89  Resp:  [17-18] 18  SpO2:  [87 %-100 %] 90 %  BP: ()/(54-62) 109/62     Weight: 71.6 kg (157 lb 13.6 oz)  Body mass index is 27.96 kg/m².     SpO2: (!) 90 %  O2 Device (Oxygen Therapy): High Flow nasal Cannula      Intake/Output Summary (Last 24 hours) at 10/01/17 1210  Last data filed at 09/30/17 1700   Gross per 24 hour   Intake            10.32 ml   Output                0 ml   Net            10.32 ml       Lines/Drains/Airways     Central Venous Catheter Line                 Tunneled Central Line Insertion/Assessment - Single Lumen  left subclavian -- days         Tunneled Central Line Insertion/Assessment - Double Lumen  09/08/17 0905 left internal jugular 23 days          Airway                 Airway - Non-Surgical Mask -- days          Peripheral Intravenous Line                  [facility]  Chronic Kidney Disease Chronic Care Management: Anemia    Lab Results   Component Value Date    CREATININE 3.2 02/03/2018    LABGLOM 18 02/03/2018   . Hgb and Hct trending reviewed  Lab Results   Component Value Date    HGB 8.4 (L) 02/08/2018    HGB 9.9 (L) 01/04/2018    HGB 9.4 (L) 12/02/2017    HCT 26.5 (L) 02/08/2018    HCT 30.7 (L) 01/04/2018    HCT 29.8 (L) 12/02/2017     Lab Results   Component Value Date    FERRITIN 861 01/04/2018    IRON 31 01/04/2018    LABIRON 19 01/04/2018    GULDCOTP66 > 2000 04/26/2017    FOLATE > 20.0 04/26/2017     Lab Results   Component Value Date    RETICABS 1.3 01/04/2018        10/2/17 11/2/17  12/2/17 1/4/18 2/8/18      Hgb 8.8 9.3  9.4 9.9 8.4      Aranesp 150 mcg 10/19 150 mcg 11/2 150 mcg 11/16  150 mcg 1/9/18                   Retic Count 2.5    1.3       T sat 25    19       Iron 45    31       Ferritan 911    861       Venofer - -          PO Iron - -   325 mg daily. Pt intolerant  Due to constipation, Pt does not want to take PO iron. Will do IV iron in future                       1. Anemia, chronic disease   Aranesp 150 mcg every 2 weeks x 2 doses, Repeat Hgb in one month (pt has order for Labs in one month)   2. Anemia, unspecified type  Intolerant to PO iron, Consider IV iron in future           3.  CKD (chronic kidney disease), stage IV      Goal Hgb 10 KDOQI guidelines  WIll adjust per pt response    Time spent- Non face to face: 49 Jose Guadalupe Arroyo, CNP APRN Peripheral IV - Single Lumen 09/26/17 1150 Right Upper Arm 5 days                Physical Exam   Constitutional: She is oriented to person, place, and time. She appears well-developed and well-nourished.   HENT:   Head: Normocephalic.   Left Ear: External ear normal.   Nose: Nose normal.   Mouth/Throat: Oropharynx is clear and moist. No oropharyngeal exudate.   Eyes: Conjunctivae and EOM are normal. Pupils are equal, round, and reactive to light. Right eye exhibits no discharge. Left eye exhibits no discharge. No scleral icterus.   Neck: Normal range of motion. Neck supple. No JVD present. No tracheal deviation present. No thyromegaly present.   Cardiovascular: Normal rate and regular rhythm.  Exam reveals no gallop and no friction rub.    No murmur heard.  Pulmonary/Chest: Effort normal and breath sounds normal.   Abdominal: Soft. Bowel sounds are normal.   Musculoskeletal: Normal range of motion. She exhibits no edema, tenderness or deformity.   Lymphadenopathy:     She has no cervical adenopathy.   Neurological: She is alert and oriented to person, place, and time. She has normal reflexes. No cranial nerve deficit. Coordination normal.   Skin: Skin is warm and dry.   Psychiatric: She has a normal mood and affect. Thought content normal.   Nursing note and vitals reviewed.      Significant Labs:   CMP   Recent Labs  Lab 09/30/17  0340 10/01/17  0548   * 136   K 3.2* 3.4*   CL 92* 93*   CO2 31* 32*    101   BUN 20 19   CREATININE 1.8* 1.6*   CALCIUM 9.4 9.5   ANIONGAP 12 11   ESTGFRAFRICA 35.5* 40.9*   EGFRNONAA 30.8* 35.5*   , CBC   Recent Labs  Lab 09/30/17  0340 10/01/17  0548   WBC 5.30 4.93   HGB 12.8 13.5   HCT 39.0 40.8    300    and INR   Recent Labs  Lab 09/30/17  0339 10/01/17  0548   INR 2.8* 2.9*       Significant Imaging: X-Ray: CXR: X-Ray Chest 1 View (CXR): No results found for this visit on 09/22/17.    Assessment and Plan:     Brief HPI:     Acute on chronic respiratory failure  with hypoxia    - Complicated by RV failure.   - Continue oral Bumex, and is making Urine.   - Asymptomatic - titration of the supplemental oxygen down; pt is on 4 LPM since yesterday, baseline is at 5        Moderate to severe pulmonary hypertension    - WHO Group 1, last echo with severely reduced RV function with RVSP at 96  - Continue on supplemental oxygen via [facility]   - Cont IV Veletri (Epoprostinel) and Macitentan,   - Plan to D/C on Remoduline (Treprostinil)   - Discuss this in the AM on Monday with staff as per patient request.           Palliative care encounter    -Continue DNR status  - Plan to D/C on home hospice once stable.             VTE Risk Mitigation         Ordered     warfarin tablet 5 mg  Daily     Route:  Oral        09/27/17 0852          Shantal Wilder MD  Cardiology  [facility]

## 2018-05-03 NOTE — NURSING
In to administer a.m. meds. Patient states she wishes to hold meds until after surgical procedure because she takes meds with food. Meds will be administered when patient returns. BNW-RN   No

## 2018-05-16 ENCOUNTER — TELEPHONE (OUTPATIENT)
Dept: ADMINISTRATIVE | Facility: CLINIC | Age: 58
End: 2018-05-16

## 2018-05-16 NOTE — PROGRESS NOTES
Home Health SOC and Recert for the [facility].  Dr Kaylee Cazares is patient's PCP. Pt discharged from home care and received SN and therapy services.

## 2018-05-17 ENCOUNTER — TELEPHONE (OUTPATIENT)
Dept: ADMINISTRATIVE | Facility: CLINIC | Age: 58
End: 2018-05-17

## 2018-07-10 NOTE — HPI
Ms. Cook is a 56 y.o. year old Black or  female with a PMH of AVB/syncope s/p PPM, pulmonary HTN WHO group 1 who presents this evening with a displaced Cardoso. She was recently admitted with decompensated PAH and made DNR/DNI with goal of uptitrating Veletri as tolerated. She has been at her baseline at home but noticed this afternoon that her Cardoso sutures had popped and had fallen out ~3-4 inches. Has noticed scant amounts of clear-white discharge from the entry site but denies fever/chills worsening SOB.   No

## 2019-03-21 NOTE — ASSESSMENT & PLAN NOTE
- On remodulin, coumadin and macitentan  - PICC line left arm and clean  - Has two cassetes for tonight. Jese to order Remodulin for tomorrow onward   - oxygen through NC as current. Compensated.  -  INR close to normal range. Hold coumadin tonight.   None known

## 2019-03-28 NOTE — PHYSICIAN QUERY
PT Name: Emily Cook  MR #: [id number]     Physician Query Form - Documentation Clarification      CDS/: Michelle Silva RN, CCDS              Contact information: [email or web address]    This form is a permanent document in the medical record.     Query Date: September 1, 2017    By submitting this query, we are merely seeking further clarification of documentation. Please utilize your independent clinical judgment when addressing the question(s) below.    The Medical record reflects the following:    Supporting Clinical Findings Location in Medical Record     Mg 1.5       8/31 lab     Magnesium Sulfate 2 gm IVPB x1       8/31 mar                                                                            Doctor, Please specify diagnosis or diagnoses associated with above clinical findings.    Provider Use Only      ( x   )  Hypomagnesemia    (    )  Other___________                                                                                                               [  ] Clinically undetermined             PROVIDER:[TOKEN:[[id number]]]

## 2019-10-15 NOTE — TELEPHONE ENCOUNTER
"Patient's son reports "leaking from the patient's port" ojn remodulin pump. He states that he has changed the tubing and the cap but the leaking is coming from "above that" like it is cracked. Remodulin pump is still infusing. No other issues at this time. Instructed Merrill to bring patient to the [facility] ED for assessment of patient's site.   "
No complaints

## 2019-11-05 NOTE — ASSESSMENT & PLAN NOTE
57 year-old female with history of pulmonary HTN on home Remodulin recently treated for MRSA bacteremia secondary to an infected cardoso catheter in September with Vanc x 2 weeks now admitted with fevers, right arm pain and numbness.    Blood cx 10/29 are positive for MRSA (4/4 bottles). Repeat blood cx 10/30 and 11/3 are NGTD. TTE and AMIE negative for vegetations. Patient is currently on Vancomycin. Afebrile over last 24 hours. No leukocytosis. VSS. Cardoso catheter has been removed and tip sent for culture.     Plan  - Continue Vancomycin 1250 mg IV q 24 hours. Will need vanc trough prior to 3rd dose (ordered for tomorrow).  - Anticipate antibiotic treatment for at least 2 weeks from day of line removal.   - Would hold off on placing new tunneled catheter until patient has completed two weeks of antibiotics.  - ID will follow.   EXAM: 3 views of the left foot



HISTORY: Foot pain



COMPARISON: None



FINDINGS: 3 views of the left foot shows no evidence of acute fracture or dislocation. No soft tissue
 swelling is seen. No degenerative changes are present.



IMPRESSION: No evidence of acute osseous abnormality.



Reported By: Jeremy Charles 

Electronically Signed:  11/5/2019 5:53 PM

## 2022-05-28 NOTE — HPI
Ms. Cook is a 58 yo AAF with PMHx of pHTN (on Remodulin therapy), ALFREDO, chronic respiratory failure, recurrent MRSA bacteremia thought to be from her PICC line, and CKD who presented to [facility] on 1/23 with chief complaint of SOB, fever, chills and body aches x 2 days.  She was found to have GPC bacteremia and in septic shock with hypotension.  Respiratory status continued to decline and she was intubated and transferred to [facility] for higher level of care.  Since admission to [facility], her kidney function has continued to decline and now anuric with critical metabolic derangements (hyperkalemia and acidosis).  ABG on 1/25 revealed a combined metabolic/respiratory acidosis with pH of 6.9. Nephrology was consulted for emergent RRT and SLED was started for metabolic clearance.   Ambulatory

## 2023-02-21 NOTE — LETTER
January 3, 2017        Kaylee Cazares  [address] [facility] [address]  Phone: [phone]  Fax: [phone]             [facility]  [address]  [place] [address]  Phone: [phone]   Patient: Emily Cook   MR Number: [id number]   YOB: 1960   Date of Visit: 1/3/2017       Dear Dr. Kaylee Cazares    Thank you for referring Emily Cook to me for evaluation. Attached you will find relevant portions of my assessment and plan of care.    If you have questions, please do not hesitate to call me. I look forward to following Emily Cook along with you.    Sincerely,    Brendan Hernandez MD    Enclosure    If you would like to receive this communication electronically, please contact [email or web address] or [phone] to request [employer] access.    [employer] is a tool which provides read-only access to select patient information with whom you have a relationship. Its easy to use and provides real time access to review your patients record including encounter summaries, notes, results, and demographic information.    If you feel you have received this communication in error or would no longer like to receive these types of communications, please e-mail [email or web address]        Yes - the patient is able to be screened

## 2025-01-08 NOTE — ASSESSMENT & PLAN NOTE
- Complicated by RV failure.   - Transitioned to oral diuretics from IV  - Continue Bumex oral - making good urine  - Asymptomatic - titration of the supplemental oxygen down.    no

## 2025-06-01 NOTE — PROGRESS NOTES
Cardiology Brief Progress Note    AMIE cancelled by EP Staff Dr Martha Reyes.         Francis Ibrahim MD  Cardiology Fellow  Pager: [phone]             show

## (undated) DEVICE — SUT VICRYL 3-0 27 SH

## (undated) DEVICE — DRAPE C ARM 42 X 120 10/BX

## (undated) DEVICE — SET DECANTER MEDICHOICE

## (undated) DEVICE — SOL NACL 0.9% INJ PF/50151

## (undated) DEVICE — DRAPE THYROID WITH ARMBOARD

## (undated) DEVICE — NDL HYPO REG 25G X 1 1/2

## (undated) DEVICE — SEE MEDLINE ITEM 157131

## (undated) DEVICE — SYR ONLY LUER LOCK 20CC

## (undated) DEVICE — SUT ETHILON 2-0 PSLX 30IN

## (undated) DEVICE — SUT 4-0 VICRYL / SH

## (undated) DEVICE — SEE MEDLINE ITEM 157117

## (undated) DEVICE — ELECTRODE REM PLYHSV RETURN 9

## (undated) DEVICE — SUT PROLENE 2-0 30 SH

## (undated) DEVICE — TRAY MINOR GEN SURG

## (undated) DEVICE — SUT MCRYL PLUS 4-0 PS2 27IN

## (undated) DEVICE — SYR SLIP TIP 5CC

## (undated) DEVICE — SEE MEDLINE ITEM 146417

## (undated) DEVICE — DRESSING ANTIMICROBIAL 1 INCH

## (undated) DEVICE — APPLICATOR CHLORAPREP ORN 26ML

## (undated) DEVICE — BLADE SURG CARBON STEEL SZ11

## (undated) DEVICE — DRESSING TRANS 4X4 TEGADERM

## (undated) DEVICE — CLOSURE SKIN STERI STRIP 1/2X4

## (undated) DEVICE — SYR DISP LL 5CC